# Patient Record
Sex: FEMALE | Race: WHITE | NOT HISPANIC OR LATINO | Employment: OTHER | URBAN - METROPOLITAN AREA
[De-identification: names, ages, dates, MRNs, and addresses within clinical notes are randomized per-mention and may not be internally consistent; named-entity substitution may affect disease eponyms.]

---

## 2017-05-17 ENCOUNTER — OFFICE VISIT (OUTPATIENT)
Dept: FAMILY MEDICINE CLINIC | Facility: CLINIC | Age: 82
End: 2017-05-17

## 2017-05-17 ENCOUNTER — APPOINTMENT (OUTPATIENT)
Dept: LAB | Facility: HOSPITAL | Age: 82
End: 2017-05-17

## 2017-05-17 VITALS
DIASTOLIC BLOOD PRESSURE: 62 MMHG | HEART RATE: 67 BPM | WEIGHT: 176 LBS | HEIGHT: 64 IN | BODY MASS INDEX: 30.05 KG/M2 | OXYGEN SATURATION: 96 % | TEMPERATURE: 98 F | SYSTOLIC BLOOD PRESSURE: 132 MMHG

## 2017-05-17 DIAGNOSIS — R53.83 OTHER FATIGUE: ICD-10-CM

## 2017-05-17 DIAGNOSIS — I10 ESSENTIAL HYPERTENSION: ICD-10-CM

## 2017-05-17 DIAGNOSIS — E55.9 VITAMIN D DEFICIENCY: ICD-10-CM

## 2017-05-17 DIAGNOSIS — R26.89 BALANCE PROBLEMS: ICD-10-CM

## 2017-05-17 DIAGNOSIS — L85.3 DRY SKIN DERMATITIS: ICD-10-CM

## 2017-05-17 DIAGNOSIS — N18.30 CHRONIC KIDNEY DISEASE, STAGE III (MODERATE) (HCC): Primary | ICD-10-CM

## 2017-05-17 LAB
25(OH)D3 SERPL-MCNC: 43.3 NG/ML
ALBUMIN SERPL-MCNC: 4.1 G/DL (ref 3.2–4.8)
ALBUMIN/GLOB SERPL: 1.3 G/DL (ref 1.5–2.5)
ALP SERPL-CCNC: 88 U/L (ref 25–100)
ALT SERPL W P-5'-P-CCNC: 16 U/L (ref 7–40)
ANION GAP SERPL CALCULATED.3IONS-SCNC: 4 MMOL/L (ref 3–11)
AST SERPL-CCNC: 20 U/L (ref 0–33)
BILIRUB SERPL-MCNC: 0.5 MG/DL (ref 0.3–1.2)
BUN BLD-MCNC: 32 MG/DL (ref 9–23)
BUN/CREAT SERPL: 24.6 (ref 7–25)
CALCIUM SPEC-SCNC: 10.3 MG/DL (ref 8.7–10.4)
CHLORIDE SERPL-SCNC: 106 MMOL/L (ref 99–109)
CO2 SERPL-SCNC: 30 MMOL/L (ref 20–31)
CREAT BLD-MCNC: 1.3 MG/DL (ref 0.6–1.3)
DEPRECATED RDW RBC AUTO: 49.4 FL (ref 37–54)
ERYTHROCYTE [DISTWIDTH] IN BLOOD BY AUTOMATED COUNT: 13.9 % (ref 11.3–14.5)
GFR SERPL CREATININE-BSD FRML MDRD: 38 ML/MIN/1.73
GLOBULIN UR ELPH-MCNC: 3.1 GM/DL
GLUCOSE BLD-MCNC: 111 MG/DL (ref 70–100)
HCT VFR BLD AUTO: 40.9 % (ref 34.5–44)
HGB BLD-MCNC: 12.8 G/DL (ref 11.5–15.5)
MCH RBC QN AUTO: 30.4 PG (ref 27–31)
MCHC RBC AUTO-ENTMCNC: 31.3 G/DL (ref 32–36)
MCV RBC AUTO: 97.1 FL (ref 80–99)
PLATELET # BLD AUTO: 185 10*3/MM3 (ref 150–450)
PMV BLD AUTO: 11.3 FL (ref 6–12)
POTASSIUM BLD-SCNC: 4.6 MMOL/L (ref 3.5–5.5)
PROT SERPL-MCNC: 7.2 G/DL (ref 5.7–8.2)
RBC # BLD AUTO: 4.21 10*6/MM3 (ref 3.89–5.14)
SODIUM BLD-SCNC: 140 MMOL/L (ref 132–146)
WBC NRBC COR # BLD: 6.32 10*3/MM3 (ref 3.5–10.8)

## 2017-05-17 PROCEDURE — 80053 COMPREHEN METABOLIC PANEL: CPT | Performed by: FAMILY MEDICINE

## 2017-05-17 PROCEDURE — 82306 VITAMIN D 25 HYDROXY: CPT | Performed by: FAMILY MEDICINE

## 2017-05-17 PROCEDURE — 85027 COMPLETE CBC AUTOMATED: CPT | Performed by: FAMILY MEDICINE

## 2017-05-17 PROCEDURE — 36415 COLL VENOUS BLD VENIPUNCTURE: CPT | Performed by: FAMILY MEDICINE

## 2017-05-17 PROCEDURE — 99214 OFFICE O/P EST MOD 30 MIN: CPT | Performed by: FAMILY MEDICINE

## 2017-05-17 RX ORDER — NISOLDIPINE 8.5 MG/1
8.5 TABLET, FILM COATED, EXTENDED RELEASE ORAL DAILY
Qty: 90 TABLET | Refills: 3 | Status: SHIPPED | OUTPATIENT
Start: 2017-05-17 | End: 2018-05-07 | Stop reason: SDUPTHER

## 2017-05-17 RX ORDER — TRIAMTERENE AND HYDROCHLOROTHIAZIDE 37.5; 25 MG/1; MG/1
1 CAPSULE ORAL DAILY
Qty: 90 CAPSULE | Refills: 3 | Status: SHIPPED | OUTPATIENT
Start: 2017-05-17 | End: 2017-11-30 | Stop reason: HOSPADM

## 2017-05-17 RX ORDER — DESONIDE 0.5 MG/G
1 CREAM TOPICAL 2 TIMES DAILY
COMMUNITY
End: 2017-05-17 | Stop reason: SDUPTHER

## 2017-05-17 RX ORDER — DESONIDE 0.5 MG/G
1 CREAM TOPICAL 2 TIMES DAILY
Qty: 15 G | Refills: 1 | Status: SHIPPED | OUTPATIENT
Start: 2017-05-17 | End: 2017-11-30 | Stop reason: HOSPADM

## 2017-05-17 RX ORDER — BISOPROLOL FUMARATE 5 MG/1
5 TABLET, FILM COATED ORAL DAILY
Qty: 90 TABLET | Refills: 3 | Status: SHIPPED | OUTPATIENT
Start: 2017-05-17 | End: 2018-05-07 | Stop reason: SDUPTHER

## 2017-06-23 ENCOUNTER — OFFICE VISIT (OUTPATIENT)
Dept: FAMILY MEDICINE CLINIC | Facility: CLINIC | Age: 82
End: 2017-06-23

## 2017-06-23 VITALS
HEIGHT: 64 IN | TEMPERATURE: 97.7 F | SYSTOLIC BLOOD PRESSURE: 122 MMHG | HEART RATE: 64 BPM | BODY MASS INDEX: 28.68 KG/M2 | DIASTOLIC BLOOD PRESSURE: 62 MMHG | WEIGHT: 168 LBS | OXYGEN SATURATION: 98 %

## 2017-06-23 DIAGNOSIS — J01.00 ACUTE NON-RECURRENT MAXILLARY SINUSITIS: Primary | ICD-10-CM

## 2017-06-23 DIAGNOSIS — R53.83 OTHER FATIGUE: ICD-10-CM

## 2017-06-23 PROCEDURE — 99213 OFFICE O/P EST LOW 20 MIN: CPT | Performed by: FAMILY MEDICINE

## 2017-06-23 RX ORDER — PREDNISONE 10 MG/1
TABLET ORAL
Qty: 10 TABLET | Refills: 0 | Status: SHIPPED | OUTPATIENT
Start: 2017-06-23 | End: 2017-09-08

## 2017-06-23 RX ORDER — AMOXICILLIN AND CLAVULANATE POTASSIUM 875; 125 MG/1; MG/1
TABLET, FILM COATED ORAL
Refills: 0 | COMMUNITY
Start: 2017-06-09 | End: 2017-06-23

## 2017-06-23 NOTE — PROGRESS NOTES
Subjective   Rose J Kellermann is a 89 y.o. female    HPI Comments: Patient presents today complaining of persisting upper respiratory symptoms for the past 2-3 weeks.  She initially started with a sore throat, nasal congestion and sinus congestion.  She was seen at an urgent treatment Center and prescribed Augmentin with recommendation that she also take Mucinex and Claritin but she only took them for a couple of days.  She did complete all of the Augmentin.  Her major complaint is weakness and fatigue which she also has a sensation of sinus congestion and left ear pressure.  She has not had any cough, shortness of breath or chest pain.  She denies any fever, arthralgias or myalgias.    URI    Associated symptoms include congestion, coughing, ear pain, headaches and rhinorrhea. Pertinent negatives include no sore throat.   Fatigue   Associated symptoms include congestion, coughing, fatigue and headaches. Pertinent negatives include no chills, fever or sore throat.   Shortness of Breath   Associated symptoms include ear pain, headaches and rhinorrhea. Pertinent negatives include no fever or sore throat.       The following portions of the patient's history were reviewed and updated as appropriate: allergies, current medications, past social history and problem list    Review of Systems   Constitutional: Positive for fatigue. Negative for chills and fever.   HENT: Positive for congestion, ear pain, postnasal drip, rhinorrhea and sinus pressure. Negative for sore throat.    Eyes: Positive for pain.   Respiratory: Positive for cough and shortness of breath.    Neurological: Positive for headaches. Negative for dizziness.   Hematological: Negative for adenopathy.       Objective     Vitals:    06/23/17 1114   BP: 122/62   Pulse: 64   Temp: 97.7 °F (36.5 °C)   SpO2: 98%       Physical Exam   Constitutional: She appears well-developed and well-nourished.   HENT:   Head: Normocephalic and atraumatic.   Right Ear: Tympanic  membrane and ear canal normal.   Left Ear: Tympanic membrane and ear canal normal.   Nose: Mucosal edema, rhinorrhea and sinus tenderness present. Right sinus exhibits maxillary sinus tenderness and frontal sinus tenderness. Left sinus exhibits maxillary sinus tenderness and frontal sinus tenderness.   Mouth/Throat: Oropharynx is clear and moist. No oropharyngeal exudate.   Eyes: Pupils are equal, round, and reactive to light.   Cardiovascular: Normal rate and regular rhythm.    Pulmonary/Chest: Effort normal and breath sounds normal.   Nursing note and vitals reviewed.      Assessment/Plan   Problem List Items Addressed This Visit        Other    Fatigue      Other Visit Diagnoses     Acute non-recurrent maxillary sinusitis    -  Primary    Relevant Medications    predniSONE (DELTASONE) 10 MG tablet

## 2017-06-30 ENCOUNTER — TELEPHONE (OUTPATIENT)
Dept: FAMILY MEDICINE CLINIC | Facility: CLINIC | Age: 82
End: 2017-06-30

## 2017-06-30 RX ORDER — CHLORPHENIRAMINE MALEATE 4 MG/1
4 TABLET ORAL 2 TIMES DAILY
Qty: 30 TABLET | Refills: 0 | Status: SHIPPED | OUTPATIENT
Start: 2017-06-30 | End: 2017-06-30 | Stop reason: SDUPTHER

## 2017-06-30 RX ORDER — FLUTICASONE PROPIONATE 50 MCG
1 SPRAY, SUSPENSION (ML) NASAL DAILY
Qty: 1 EACH | Refills: 0 | Status: SHIPPED | OUTPATIENT
Start: 2017-06-30 | End: 2017-06-30 | Stop reason: SDUPTHER

## 2017-06-30 RX ORDER — CHLORPHENIRAMINE MALEATE 4 MG/1
4 TABLET ORAL 2 TIMES DAILY
Qty: 30 TABLET | Refills: 0 | Status: SHIPPED | OUTPATIENT
Start: 2017-06-30 | End: 2017-09-08

## 2017-06-30 RX ORDER — FLUTICASONE PROPIONATE 50 MCG
1 SPRAY, SUSPENSION (ML) NASAL DAILY
Qty: 1 EACH | Refills: 0 | Status: SHIPPED | OUTPATIENT
Start: 2017-06-30 | End: 2017-07-30

## 2017-06-30 NOTE — TELEPHONE ENCOUNTER
"----- Message from Glo ENG Eleuterio sent at 6/30/2017  1:09 PM EDT -----  Contact: PT.  PT. SAW DR. SIEGEL 1 WEEK AGO.  PT. WAS PRESCRIBED PRETIZONE FOR A FEW DAYS, COMPLETED ALL MEDS.  RIGHT EAR \"STUFFED UP\", NOT HEARING OUT OF IT CURRENTLY.  CAN \"SOMETHING\" BE CALLED INTO RX FOR THIS SYMPTOM?  PT. WANTS 'S ADVICE.  RX=[facility]  PT. CAN BE REACHED @: ABOVE HOME #.  "

## 2017-06-30 NOTE — TELEPHONE ENCOUNTER
Chlorpheniramine 4 mg, #30, 1 tablet twice a day and Flonase nasal spray 1 spray each nostril daily.

## 2017-07-13 ENCOUNTER — OFFICE VISIT (OUTPATIENT)
Dept: FAMILY MEDICINE CLINIC | Facility: CLINIC | Age: 82
End: 2017-07-13

## 2017-07-13 VITALS
SYSTOLIC BLOOD PRESSURE: 130 MMHG | TEMPERATURE: 97.7 F | HEIGHT: 64 IN | WEIGHT: 168 LBS | DIASTOLIC BLOOD PRESSURE: 72 MMHG | HEART RATE: 76 BPM | OXYGEN SATURATION: 97 % | BODY MASS INDEX: 28.68 KG/M2

## 2017-07-13 DIAGNOSIS — H65.22 LEFT CHRONIC SEROUS OTITIS MEDIA: Primary | ICD-10-CM

## 2017-07-13 PROCEDURE — 99213 OFFICE O/P EST LOW 20 MIN: CPT | Performed by: FAMILY MEDICINE

## 2017-07-13 NOTE — PROGRESS NOTES
Subjective   Rose J Kellermann is a 89 y.o. female    HPI Comments: Patient presents today with persisting fullness and decreased hearing at the left ear.  She feels like her voice echoes in her ear.  She has been treated with antibiotics, Mucinex, antihistamines, steroid nasal spray and prednisone over the last 3-4 weeks with persisting symptoms.  I recommended ENT consultation.    Ear Fullness    Associated symptoms include headaches and hearing loss. Pertinent negatives include no coughing, rhinorrhea or sore throat.       The following portions of the patient's history were reviewed and updated as appropriate: allergies, current medications, past social history and problem list    Review of Systems   Constitutional: Positive for fatigue. Negative for chills and fever.   HENT: Positive for congestion, ear pain and hearing loss. Negative for postnasal drip, rhinorrhea, sinus pressure and sore throat.    Eyes: Negative for pain.   Respiratory: Negative for cough and shortness of breath.    Neurological: Positive for headaches. Negative for dizziness.   Hematological: Negative for adenopathy.       Objective     Vitals:    07/13/17 1009   BP: 130/72   Pulse: 76   Temp: 97.7 °F (36.5 °C)   SpO2: 97%       Physical Exam   Constitutional: She appears well-developed and well-nourished.   HENT:   Head: Normocephalic and atraumatic.   Right Ear: Ear canal normal. Tympanic membrane is scarred. Tympanic membrane mobility is abnormal.   Left Ear: Ear canal normal. Tympanic membrane is scarred and erythematous. Tympanic membrane mobility is abnormal. A middle ear effusion is present.   Nose: No mucosal edema, rhinorrhea or sinus tenderness. Right sinus exhibits no maxillary sinus tenderness and no frontal sinus tenderness. Left sinus exhibits no maxillary sinus tenderness and no frontal sinus tenderness.   Mouth/Throat: Oropharynx is clear and moist. No oropharyngeal exudate.   Eyes: Pupils are equal, round, and reactive to  light.   Cardiovascular: Normal rate and regular rhythm.    Pulmonary/Chest: Effort normal and breath sounds normal.   Nursing note and vitals reviewed.      Assessment/Plan   Problem List Items Addressed This Visit     None      Visit Diagnoses     Left chronic serous otitis media    -  Primary    Relevant Orders    Ambulatory Referral to ENT (Otolaryngology)

## 2017-09-08 ENCOUNTER — OFFICE VISIT (OUTPATIENT)
Dept: CARDIOLOGY | Facility: CLINIC | Age: 82
End: 2017-09-08

## 2017-09-08 VITALS
DIASTOLIC BLOOD PRESSURE: 85 MMHG | SYSTOLIC BLOOD PRESSURE: 155 MMHG | WEIGHT: 170.4 LBS | HEIGHT: 64 IN | BODY MASS INDEX: 29.09 KG/M2 | HEART RATE: 80 BPM

## 2017-09-08 DIAGNOSIS — I10 ESSENTIAL HYPERTENSION: Primary | ICD-10-CM

## 2017-09-08 DIAGNOSIS — E78.2 MIXED HYPERLIPIDEMIA: ICD-10-CM

## 2017-09-08 PROCEDURE — 99213 OFFICE O/P EST LOW 20 MIN: CPT | Performed by: INTERNAL MEDICINE

## 2017-09-08 RX ORDER — ASPIRIN 81 MG/1
81 TABLET ORAL DAILY
Status: ON HOLD | COMMUNITY
End: 2017-11-24

## 2017-09-08 NOTE — PROGRESS NOTES
[facility]  Office Progress Note  Rose J Kellermann  1926  [phone]      Visit Date: 2017    PCP: Sam Hung MD  [address] 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 Fatigue, dyspnea and frequent premature atrial  contractions:  a. 2010, echocardiogram, mild concentric  LVH, mild MR, TR, normal LVEF, mildly elevated RVSP.    b. 2010, Holter with greater 18,000 PACs, improved with Zebeta.    2.  Hypertension, presumed essential.    3. Remote syncope with working diagnosis of vasodepressor, .   4. Remote knee surgery in .    5. Remote pneumonia, .    6. Osteoarthritis with questionable carpal tunnel syndrome bilaterally.    7. ,  children ages 56 and 58.    8. Endometrial cancer, status post robotically assisted hysterectomy with Dr. Haddad, 2011.    Allergies  Allergies   Allergen Reactions   • Sulfa Antibiotics Nausea Only and GI Intolerance       Current Medications    Current Outpatient Prescriptions:   •  aspirin 81 MG EC tablet, Take 81 mg by mouth Daily., Disp: , Rfl:   •  bisoprolol (ZEBeta) 5 MG tablet, Take 1 tablet by mouth Daily., Disp: 90 tablet, Rfl: 3  •  desonide (DESOWEN) 0.05 % cream, Apply 1 application topically 2 (Two) Times a Day., Disp: 15 g, Rfl: 1  •  Misc Natural Products (OSTEO BI-FLEX JOINT SHIELD PO), Take  by mouth., Disp: , Rfl:   •  Multiple Vitamin (MULTI VITAMIN DAILY PO), Take  by mouth., Disp: , Rfl:   •  nisoldipine (SULAR) 8.5 MG 24 hr tablet, Take 1 tablet by mouth Daily., Disp: 90 tablet, Rfl: 3  •  triamterene-hydrochlorothiazide (DYAZIDE) 37.5-25 MG per capsule, Take 1 capsule by mouth Daily., Disp: 90 capsule, Rfl: 3      History of Present Illness     Pt denies any chest  "pain, dyspnea, dyspnea on exertion, orthopnea, PND, palpitations, lower extremity edema, or claudication.  Balance issues and some mild lower extremity edema that improves with lying recumbent overnight.  She does little activities and is able to shop and with utilization of a cart    ROS:  All systems have been reviewed and are negative with the exception of those mentioned in the HPI.    OBJECTIVE:  Vitals:    09/08/17 1023 09/08/17 1024   BP: 180/99 155/85   BP Location: Right arm Left arm   Patient Position: Sitting Sitting   Pulse: 80    Weight: 170 lb 6.4 oz (77.3 kg)    Height: 64\" (162.6 cm)      Physical Exam   Constitutional: She appears well-developed and well-nourished.   Neck: Normal range of motion. Neck supple. No hepatojugular reflux and no JVD present. Carotid bruit is not present. No tracheal deviation present. No thyromegaly present.   Cardiovascular: Normal rate, regular rhythm, S1 normal, S2 normal, intact distal pulses and normal pulses.  PMI is not displaced.  Exam reveals no gallop, no distant heart sounds, no friction rub, no midsystolic click and no opening snap.    Murmur heard.   Harsh midsystolic murmur is present with a grade of 2/6  at the upper right sternal border radiating to the neck  Pulses:       Radial pulses are 2+ on the right side, and 2+ on the left side.        Dorsalis pedis pulses are 2+ on the right side, and 2+ on the left side.        Posterior tibial pulses are 2+ on the right side, and 2+ on the left side.   Pulmonary/Chest: Effort normal and breath sounds normal. She has no wheezes. She has no rales.   Abdominal: Soft. Bowel sounds are normal. She exhibits no mass. There is no tenderness. There is no guarding.   Musculoskeletal: She exhibits edema.       Diagnostic Data:  Procedures      ASSESSMENT:   Diagnosis Plan   1. Essential hypertension     2. Mixed hyperlipidemia         PLAN:  Hypertension presumed essential status has elevation today she will follow this " at home.    Dyslipidemia observed on no medical management at current    PACs with no symptoms of palpitations     Sam Hung MD, thank you for referring Ms. Kellermann for evaluation.  I have forwarded my electronically generated recommendations to you for review.  Please do not hesitate to call with any questions.      Andrew Xiao MD, FACC

## 2017-11-24 ENCOUNTER — ANESTHESIA EVENT (OUTPATIENT)
Dept: PERIOP | Facility: HOSPITAL | Age: 82
End: 2017-11-24

## 2017-11-24 ENCOUNTER — HOSPITAL ENCOUNTER (INPATIENT)
Facility: HOSPITAL | Age: 82
LOS: 6 days | Discharge: NURSING, REHAB OR LONG-TERM CARE | End: 2017-11-30
Attending: EMERGENCY MEDICINE | Admitting: INTERNAL MEDICINE

## 2017-11-24 ENCOUNTER — APPOINTMENT (OUTPATIENT)
Dept: GENERAL RADIOLOGY | Facility: HOSPITAL | Age: 82
End: 2017-11-24

## 2017-11-24 ENCOUNTER — ANESTHESIA (OUTPATIENT)
Dept: PERIOP | Facility: HOSPITAL | Age: 82
End: 2017-11-24

## 2017-11-24 DIAGNOSIS — Z74.09 IMPAIRED FUNCTIONAL MOBILITY, BALANCE, GAIT, AND ENDURANCE: ICD-10-CM

## 2017-11-24 DIAGNOSIS — S72.002A CLOSED FRACTURE OF LEFT HIP, INITIAL ENCOUNTER (HCC): Primary | ICD-10-CM

## 2017-11-24 DIAGNOSIS — Z78.9 IMPAIRED MOBILITY AND ADLS: ICD-10-CM

## 2017-11-24 DIAGNOSIS — Z74.09 IMPAIRED MOBILITY AND ADLS: ICD-10-CM

## 2017-11-24 LAB
ABO GROUP BLD: NORMAL
ALBUMIN SERPL-MCNC: 3.6 G/DL (ref 3.2–4.8)
ALBUMIN/GLOB SERPL: 1.2 G/DL (ref 1.5–2.5)
ALP SERPL-CCNC: 68 U/L (ref 25–100)
ALT SERPL W P-5'-P-CCNC: 15 U/L (ref 7–40)
ANION GAP SERPL CALCULATED.3IONS-SCNC: 9 MMOL/L (ref 3–11)
APTT PPP: 25.4 SECONDS (ref 24–31)
AST SERPL-CCNC: 26 U/L (ref 0–33)
BACTERIA UR QL AUTO: ABNORMAL /HPF
BASOPHILS # BLD AUTO: 0.02 10*3/MM3 (ref 0–0.2)
BASOPHILS NFR BLD AUTO: 0.2 % (ref 0–1)
BILIRUB SERPL-MCNC: 0.9 MG/DL (ref 0.3–1.2)
BILIRUB UR QL STRIP: NEGATIVE
BLD GP AB SCN SERPL QL: NEGATIVE
BNP SERPL-MCNC: 631 PG/ML (ref 0–100)
BUN BLD-MCNC: 39 MG/DL (ref 9–23)
BUN/CREAT SERPL: 21.7 (ref 7–25)
CALCIUM SPEC-SCNC: 9.4 MG/DL (ref 8.7–10.4)
CHLORIDE SERPL-SCNC: 99 MMOL/L (ref 99–109)
CLARITY UR: CLEAR
CO2 SERPL-SCNC: 25 MMOL/L (ref 20–31)
COLOR UR: YELLOW
CREAT BLD-MCNC: 1.8 MG/DL (ref 0.6–1.3)
DEPRECATED RDW RBC AUTO: 45.8 FL (ref 37–54)
EOSINOPHIL # BLD AUTO: 0.14 10*3/MM3 (ref 0–0.3)
EOSINOPHIL NFR BLD AUTO: 1.4 % (ref 0–3)
ERYTHROCYTE [DISTWIDTH] IN BLOOD BY AUTOMATED COUNT: 13.5 % (ref 11.3–14.5)
GFR SERPL CREATININE-BSD FRML MDRD: 26 ML/MIN/1.73
GLOBULIN UR ELPH-MCNC: 3 GM/DL
GLUCOSE BLD-MCNC: 122 MG/DL (ref 70–100)
GLUCOSE UR STRIP-MCNC: NEGATIVE MG/DL
HCT VFR BLD AUTO: 38.3 % (ref 34.5–44)
HGB BLD-MCNC: 12.6 G/DL (ref 11.5–15.5)
HGB UR QL STRIP.AUTO: ABNORMAL
HYALINE CASTS UR QL AUTO: ABNORMAL /LPF
IMM GRANULOCYTES # BLD: 0.01 10*3/MM3 (ref 0–0.03)
IMM GRANULOCYTES NFR BLD: 0.1 % (ref 0–0.6)
INR PPP: 1.04
KETONES UR QL STRIP: NEGATIVE
LEUKOCYTE ESTERASE UR QL STRIP.AUTO: ABNORMAL
LYMPHOCYTES # BLD AUTO: 0.69 10*3/MM3 (ref 0.6–4.8)
LYMPHOCYTES NFR BLD AUTO: 6.7 % (ref 24–44)
MCH RBC QN AUTO: 30.4 PG (ref 27–31)
MCHC RBC AUTO-ENTMCNC: 32.9 G/DL (ref 32–36)
MCV RBC AUTO: 92.3 FL (ref 80–99)
MONOCYTES # BLD AUTO: 0.63 10*3/MM3 (ref 0–1)
MONOCYTES NFR BLD AUTO: 6.1 % (ref 0–12)
NEUTROPHILS # BLD AUTO: 8.77 10*3/MM3 (ref 1.5–8.3)
NEUTROPHILS NFR BLD AUTO: 85.5 % (ref 41–71)
NITRITE UR QL STRIP: NEGATIVE
PH UR STRIP.AUTO: 6.5 [PH] (ref 5–8)
PLATELET # BLD AUTO: 121 10*3/MM3 (ref 150–450)
PMV BLD AUTO: 11.7 FL (ref 6–12)
POTASSIUM BLD-SCNC: 4.4 MMOL/L (ref 3.5–5.5)
PROT SERPL-MCNC: 6.6 G/DL (ref 5.7–8.2)
PROT UR QL STRIP: NEGATIVE
PROTHROMBIN TIME: 11.4 SECONDS (ref 9.6–11.5)
RBC # BLD AUTO: 4.15 10*6/MM3 (ref 3.89–5.14)
RBC # UR: ABNORMAL /HPF
REF LAB TEST METHOD: ABNORMAL
RH BLD: POSITIVE
SODIUM BLD-SCNC: 133 MMOL/L (ref 132–146)
SP GR UR STRIP: 1.01 (ref 1–1.03)
SQUAMOUS #/AREA URNS HPF: ABNORMAL /HPF
UROBILINOGEN UR QL STRIP: ABNORMAL
WBC NRBC COR # BLD: 10.26 10*3/MM3 (ref 3.5–10.8)
WBC UR QL AUTO: ABNORMAL /HPF

## 2017-11-24 PROCEDURE — 25010000002 PHENYLEPHRINE PER 1 ML: Performed by: NURSE ANESTHETIST, CERTIFIED REGISTERED

## 2017-11-24 PROCEDURE — 86850 RBC ANTIBODY SCREEN: CPT | Performed by: EMERGENCY MEDICINE

## 2017-11-24 PROCEDURE — 25010000002 PROPOFOL 1000 MG/ML EMULSION: Performed by: NURSE ANESTHETIST, CERTIFIED REGISTERED

## 2017-11-24 PROCEDURE — C1713 ANCHOR/SCREW BN/BN,TIS/BN: HCPCS | Performed by: ORTHOPAEDIC SURGERY

## 2017-11-24 PROCEDURE — 99284 EMERGENCY DEPT VISIT MOD MDM: CPT

## 2017-11-24 PROCEDURE — 0QS734Z REPOSITION LEFT UPPER FEMUR WITH INTERNAL FIXATION DEVICE, PERCUTANEOUS APPROACH: ICD-10-PCS | Performed by: ORTHOPAEDIC SURGERY

## 2017-11-24 PROCEDURE — 25010000002 DEXAMETHASONE PER 1 MG: Performed by: NURSE ANESTHETIST, CERTIFIED REGISTERED

## 2017-11-24 PROCEDURE — 86900 BLOOD TYPING SEROLOGIC ABO: CPT | Performed by: EMERGENCY MEDICINE

## 2017-11-24 PROCEDURE — 99222 1ST HOSP IP/OBS MODERATE 55: CPT | Performed by: INTERNAL MEDICINE

## 2017-11-24 PROCEDURE — 93005 ELECTROCARDIOGRAM TRACING: CPT | Performed by: EMERGENCY MEDICINE

## 2017-11-24 PROCEDURE — 25010000002 ENOXAPARIN PER 10 MG: Performed by: ORTHOPAEDIC SURGERY

## 2017-11-24 PROCEDURE — 81001 URINALYSIS AUTO W/SCOPE: CPT | Performed by: EMERGENCY MEDICINE

## 2017-11-24 PROCEDURE — 85610 PROTHROMBIN TIME: CPT | Performed by: EMERGENCY MEDICINE

## 2017-11-24 PROCEDURE — 25010000002 ONDANSETRON PER 1 MG: Performed by: EMERGENCY MEDICINE

## 2017-11-24 PROCEDURE — 25010000002 ROPIVACAINE PER 1 MG: Performed by: NURSE ANESTHETIST, CERTIFIED REGISTERED

## 2017-11-24 PROCEDURE — 87186 SC STD MICRODIL/AGAR DIL: CPT | Performed by: EMERGENCY MEDICINE

## 2017-11-24 PROCEDURE — 71010 HC CHEST PA OR AP: CPT

## 2017-11-24 PROCEDURE — 85730 THROMBOPLASTIN TIME PARTIAL: CPT | Performed by: EMERGENCY MEDICINE

## 2017-11-24 PROCEDURE — 87086 URINE CULTURE/COLONY COUNT: CPT | Performed by: EMERGENCY MEDICINE

## 2017-11-24 PROCEDURE — 83880 ASSAY OF NATRIURETIC PEPTIDE: CPT | Performed by: EMERGENCY MEDICINE

## 2017-11-24 PROCEDURE — 25010000003 CEFAZOLIN IN DEXTROSE 2-4 GM/100ML-% SOLUTION: Performed by: NURSE ANESTHETIST, CERTIFIED REGISTERED

## 2017-11-24 PROCEDURE — 25010000003 CEFAZOLIN 1 GM/50ML SOLUTION: Performed by: ORTHOPAEDIC SURGERY

## 2017-11-24 PROCEDURE — 73502 X-RAY EXAM HIP UNI 2-3 VIEWS: CPT

## 2017-11-24 PROCEDURE — 85025 COMPLETE CBC W/AUTO DIFF WBC: CPT | Performed by: EMERGENCY MEDICINE

## 2017-11-24 PROCEDURE — 86901 BLOOD TYPING SEROLOGIC RH(D): CPT | Performed by: EMERGENCY MEDICINE

## 2017-11-24 PROCEDURE — 87077 CULTURE AEROBIC IDENTIFY: CPT | Performed by: EMERGENCY MEDICINE

## 2017-11-24 PROCEDURE — 80053 COMPREHEN METABOLIC PANEL: CPT | Performed by: EMERGENCY MEDICINE

## 2017-11-24 PROCEDURE — 76000 FLUOROSCOPY <1 HR PHYS/QHP: CPT

## 2017-11-24 PROCEDURE — P9612 CATHETERIZE FOR URINE SPEC: HCPCS

## 2017-11-24 DEVICE — IMPLANTABLE DEVICE: Type: IMPLANTABLE DEVICE | Site: HIP | Status: FUNCTIONAL

## 2017-11-24 RX ORDER — ONDANSETRON 2 MG/ML
4 INJECTION INTRAMUSCULAR; INTRAVENOUS ONCE
Status: COMPLETED | OUTPATIENT
Start: 2017-11-24 | End: 2017-11-24

## 2017-11-24 RX ORDER — CEFAZOLIN SODIUM 2 G/100ML
INJECTION, SOLUTION INTRAVENOUS AS NEEDED
Status: DISCONTINUED | OUTPATIENT
Start: 2017-11-24 | End: 2017-11-24 | Stop reason: SURG

## 2017-11-24 RX ORDER — SODIUM CHLORIDE, SODIUM LACTATE, POTASSIUM CHLORIDE, CALCIUM CHLORIDE 600; 310; 30; 20 MG/100ML; MG/100ML; MG/100ML; MG/100ML
9 INJECTION, SOLUTION INTRAVENOUS CONTINUOUS
Status: DISCONTINUED | OUTPATIENT
Start: 2017-11-24 | End: 2017-11-24

## 2017-11-24 RX ORDER — DEXAMETHASONE SODIUM PHOSPHATE 4 MG/ML
INJECTION, SOLUTION INTRA-ARTICULAR; INTRALESIONAL; INTRAMUSCULAR; INTRAVENOUS; SOFT TISSUE AS NEEDED
Status: DISCONTINUED | OUTPATIENT
Start: 2017-11-24 | End: 2017-11-24 | Stop reason: SURG

## 2017-11-24 RX ORDER — DOCUSATE SODIUM 100 MG/1
100 CAPSULE, LIQUID FILLED ORAL 2 TIMES DAILY
Status: DISCONTINUED | OUTPATIENT
Start: 2017-11-24 | End: 2017-11-27

## 2017-11-24 RX ORDER — MAGNESIUM HYDROXIDE 1200 MG/15ML
LIQUID ORAL AS NEEDED
Status: DISCONTINUED | OUTPATIENT
Start: 2017-11-24 | End: 2017-11-24 | Stop reason: HOSPADM

## 2017-11-24 RX ORDER — SODIUM CHLORIDE 450 MG/100ML
100 INJECTION, SOLUTION INTRAVENOUS CONTINUOUS
Status: DISCONTINUED | OUTPATIENT
Start: 2017-11-24 | End: 2017-11-25

## 2017-11-24 RX ORDER — NALOXONE HCL 0.4 MG/ML
0.4 VIAL (ML) INJECTION
Status: DISCONTINUED | OUTPATIENT
Start: 2017-11-24 | End: 2017-11-30 | Stop reason: HOSPADM

## 2017-11-24 RX ORDER — LIDOCAINE HYDROCHLORIDE 10 MG/ML
INJECTION, SOLUTION EPIDURAL; INFILTRATION; INTRACAUDAL; PERINEURAL AS NEEDED
Status: DISCONTINUED | OUTPATIENT
Start: 2017-11-24 | End: 2017-11-24 | Stop reason: SURG

## 2017-11-24 RX ORDER — PROMETHAZINE HYDROCHLORIDE 12.5 MG/1
12.5 TABLET ORAL EVERY 6 HOURS PRN
Status: DISCONTINUED | OUTPATIENT
Start: 2017-11-24 | End: 2017-11-30 | Stop reason: HOSPADM

## 2017-11-24 RX ORDER — BISOPROLOL FUMARATE 5 MG/1
5 TABLET, FILM COATED ORAL DAILY
Status: DISCONTINUED | OUTPATIENT
Start: 2017-11-24 | End: 2017-11-24

## 2017-11-24 RX ORDER — ROPIVACAINE HYDROCHLORIDE 2 MG/ML
8 INJECTION, SOLUTION EPIDURAL; INFILTRATION CONTINUOUS
Status: DISCONTINUED | OUTPATIENT
Start: 2017-11-24 | End: 2017-11-28

## 2017-11-24 RX ORDER — MORPHINE SULFATE 4 MG/ML
1 INJECTION, SOLUTION INTRAMUSCULAR; INTRAVENOUS EVERY 4 HOURS PRN
Status: DISCONTINUED | OUTPATIENT
Start: 2017-11-24 | End: 2017-11-30 | Stop reason: HOSPADM

## 2017-11-24 RX ORDER — DIPHENOXYLATE HYDROCHLORIDE AND ATROPINE SULFATE 2.5; .025 MG/1; MG/1
1 TABLET ORAL DAILY
Status: DISCONTINUED | OUTPATIENT
Start: 2017-11-25 | End: 2017-11-30 | Stop reason: HOSPADM

## 2017-11-24 RX ORDER — CALCIUM CARBONATE 200(500)MG
1 TABLET,CHEWABLE ORAL 2 TIMES DAILY PRN
Status: DISCONTINUED | OUTPATIENT
Start: 2017-11-24 | End: 2017-11-30 | Stop reason: HOSPADM

## 2017-11-24 RX ORDER — HYDROCODONE BITARTRATE AND ACETAMINOPHEN 7.5; 325 MG/1; MG/1
1 TABLET ORAL EVERY 4 HOURS PRN
Status: DISCONTINUED | OUTPATIENT
Start: 2017-11-24 | End: 2017-11-30 | Stop reason: HOSPADM

## 2017-11-24 RX ORDER — BISOPROLOL FUMARATE 5 MG/1
5 TABLET, FILM COATED ORAL NIGHTLY
Status: DISCONTINUED | OUTPATIENT
Start: 2017-11-24 | End: 2017-11-30 | Stop reason: HOSPADM

## 2017-11-24 RX ORDER — SODIUM CHLORIDE 0.9 % (FLUSH) 0.9 %
1-10 SYRINGE (ML) INJECTION AS NEEDED
Status: DISCONTINUED | OUTPATIENT
Start: 2017-11-24 | End: 2017-11-24 | Stop reason: HOSPADM

## 2017-11-24 RX ORDER — SODIUM CHLORIDE 9 MG/ML
9 INJECTION, SOLUTION INTRAVENOUS CONTINUOUS
Status: DISCONTINUED | OUTPATIENT
Start: 2017-11-24 | End: 2017-11-26

## 2017-11-24 RX ORDER — 0.9 % SODIUM CHLORIDE 0.9 %
10 VIAL (ML) INJECTION EVERY 12 HOURS SCHEDULED
Status: DISCONTINUED | OUTPATIENT
Start: 2017-11-24 | End: 2017-11-30 | Stop reason: HOSPADM

## 2017-11-24 RX ORDER — LIDOCAINE HYDROCHLORIDE 10 MG/ML
0.5 INJECTION, SOLUTION EPIDURAL; INFILTRATION; INTRACAUDAL; PERINEURAL ONCE AS NEEDED
Status: DISCONTINUED | OUTPATIENT
Start: 2017-11-24 | End: 2017-11-24 | Stop reason: HOSPADM

## 2017-11-24 RX ORDER — FAMOTIDINE 20 MG/1
20 TABLET, FILM COATED ORAL ONCE
Status: COMPLETED | OUTPATIENT
Start: 2017-11-24 | End: 2017-11-24

## 2017-11-24 RX ORDER — SODIUM CHLORIDE 0.9 % (FLUSH) 0.9 %
10 SYRINGE (ML) INJECTION AS NEEDED
Status: DISCONTINUED | OUTPATIENT
Start: 2017-11-24 | End: 2017-11-24

## 2017-11-24 RX ORDER — FAMOTIDINE 10 MG/ML
20 INJECTION, SOLUTION INTRAVENOUS ONCE
Status: DISCONTINUED | OUTPATIENT
Start: 2017-11-24 | End: 2017-11-24 | Stop reason: HOSPADM

## 2017-11-24 RX ORDER — SODIUM CHLORIDE 0.9 % (FLUSH) 0.9 %
1-10 SYRINGE (ML) INJECTION AS NEEDED
Status: DISCONTINUED | OUTPATIENT
Start: 2017-11-24 | End: 2017-11-30 | Stop reason: HOSPADM

## 2017-11-24 RX ORDER — FAMOTIDINE 20 MG/1
20 TABLET, FILM COATED ORAL ONCE
Status: DISCONTINUED | OUTPATIENT
Start: 2017-11-24 | End: 2017-11-24 | Stop reason: HOSPADM

## 2017-11-24 RX ORDER — FAMOTIDINE 10 MG/ML
20 INJECTION, SOLUTION INTRAVENOUS ONCE
Status: DISCONTINUED | OUTPATIENT
Start: 2017-11-24 | End: 2017-11-24

## 2017-11-24 RX ORDER — NISOLDIPINE 8.5 MG/1
8.5 TABLET, FILM COATED, EXTENDED RELEASE ORAL DAILY
Status: DISCONTINUED | OUTPATIENT
Start: 2017-11-25 | End: 2017-11-30 | Stop reason: HOSPADM

## 2017-11-24 RX ORDER — ROPIVACAINE HYDROCHLORIDE 5 MG/ML
INJECTION, SOLUTION EPIDURAL; INFILTRATION; PERINEURAL AS NEEDED
Status: DISCONTINUED | OUTPATIENT
Start: 2017-11-24 | End: 2017-11-24 | Stop reason: SURG

## 2017-11-24 RX ORDER — LORAZEPAM 1 MG/1
1 TABLET ORAL EVERY 6 HOURS PRN
Status: DISCONTINUED | OUTPATIENT
Start: 2017-11-24 | End: 2017-11-30 | Stop reason: HOSPADM

## 2017-11-24 RX ADMIN — EPHEDRINE SULFATE 10 MG: 50 INJECTION INTRAMUSCULAR; INTRAVENOUS; SUBCUTANEOUS at 15:50

## 2017-11-24 RX ADMIN — ENOXAPARIN SODIUM 30 MG: 30 INJECTION SUBCUTANEOUS at 19:51

## 2017-11-24 RX ADMIN — PHENYLEPHRINE HYDROCHLORIDE 50 MCG: 10 INJECTION INTRAVENOUS at 15:16

## 2017-11-24 RX ADMIN — SODIUM CHLORIDE, PRESERVATIVE FREE 10 ML: 5 INJECTION INTRAVENOUS at 19:51

## 2017-11-24 RX ADMIN — SODIUM CHLORIDE 100 ML/HR: 4.5 INJECTION, SOLUTION INTRAVENOUS at 18:22

## 2017-11-24 RX ADMIN — PHENYLEPHRINE HYDROCHLORIDE 20 MCG: 10 INJECTION INTRAVENOUS at 15:22

## 2017-11-24 RX ADMIN — FAMOTIDINE 20 MG: 20 TABLET, FILM COATED ORAL at 14:09

## 2017-11-24 RX ADMIN — PROPOFOL 100 MCG/KG/MIN: 10 INJECTION, EMULSION INTRAVENOUS at 15:16

## 2017-11-24 RX ADMIN — LIDOCAINE HYDROCHLORIDE 50 MG: 10 INJECTION, SOLUTION EPIDURAL; INFILTRATION; INTRACAUDAL; PERINEURAL at 15:16

## 2017-11-24 RX ADMIN — DEXAMETHASONE SODIUM PHOSPHATE 8 MG: 4 INJECTION, SOLUTION INTRAMUSCULAR; INTRAVENOUS at 15:38

## 2017-11-24 RX ADMIN — ONDANSETRON 4 MG: 2 INJECTION INTRAMUSCULAR; INTRAVENOUS at 15:38

## 2017-11-24 RX ADMIN — PHENYLEPHRINE HYDROCHLORIDE 50 MCG: 10 INJECTION INTRAVENOUS at 15:18

## 2017-11-24 RX ADMIN — SODIUM CHLORIDE, POTASSIUM CHLORIDE, SODIUM LACTATE AND CALCIUM CHLORIDE: 600; 310; 30; 20 INJECTION, SOLUTION INTRAVENOUS at 15:13

## 2017-11-24 RX ADMIN — ROPIVACAINE HYDROCHLORIDE 25 ML: 5 INJECTION, SOLUTION EPIDURAL; INFILTRATION; PERINEURAL at 13:15

## 2017-11-24 RX ADMIN — SODIUM CHLORIDE 9 ML/HR: 9 INJECTION, SOLUTION INTRAVENOUS at 14:08

## 2017-11-24 RX ADMIN — PHENYLEPHRINE HYDROCHLORIDE 50 MCG: 10 INJECTION INTRAVENOUS at 15:17

## 2017-11-24 RX ADMIN — CEFAZOLIN SODIUM 1 G: 1 INJECTION, SOLUTION INTRAVENOUS at 19:51

## 2017-11-24 RX ADMIN — CEFAZOLIN SODIUM 2 G: 2 INJECTION, SOLUTION INTRAVENOUS at 15:14

## 2017-11-24 RX ADMIN — PHENYLEPHRINE HYDROCHLORIDE 20 MCG: 10 INJECTION INTRAVENOUS at 15:21

## 2017-11-24 RX ADMIN — EPHEDRINE SULFATE 10 MG: 50 INJECTION INTRAMUSCULAR; INTRAVENOUS; SUBCUTANEOUS at 15:22

## 2017-11-24 RX ADMIN — PHENYLEPHRINE HYDROCHLORIDE 1 MCG/KG/MIN: 10 INJECTION INTRAVENOUS at 16:08

## 2017-11-24 NOTE — ANESTHESIA PROCEDURE NOTES
FASCIA iLIACUS Block    Patient location during procedure: pre-op  Start time: 11/24/2017 1:15 PM  Reason for block: procedure for pain and at surgeon's request  Performed by  CRNA: RAYMOND FREED  Assisted by: JENNIFER CONNOR  Preanesthetic Checklist  Completed: patient identified, site marked, surgical consent, pre-op evaluation, timeout performed, IV checked, risks and benefits discussed and monitors and equipment checked  Prep:  Pt Position: supine  Sterile barriers:cap, gloves and mask  Prep: ChloraPrep  Patient monitoring: blood pressure monitoring, continuous pulse oximetry and EKG  Procedure  Sedation:no  Performed under: local infiltration  Guidance:ultrasound guided  Images:still images obtained    Laterality:left  Block Type:fascia iliaca catheter  Injection Technique:catheter  Needle Type:echogenic  Needle Gauge:18 G  Resistance on Injection: none  Catheter Size:20 G (20g)  Cath Depth at skin: 15 cm  Medications  Preservative Free Saline:25ml  Local Injected:ropivacaine 0.5% Local Amount Injected:25mL  Post Assessment  Injection Assessment: negative aspiration for heme, no paresthesia on injection and incremental injection  Patient Tolerance:comfortable throughout block  Complications:no  Additional Notes  Procedure:                 Pt placed in supine position.   The insertion site was prepped in sterile fashion with Chlorapreop and clear plastic drapes.  Analgesia was provided by skin infiltration at insertion site with Lidocaine 1% 3mls.  A B-Mathews 18 g , 4 inch echogenic Touhy needle was advance In-plane under ultrasound guidance. The   Anterior superior Iliac crest was initially visualized and the probe was directed slightly medially and slightly towards the umbilicus.  The course of the needle was tracked over the sartorius muscle through the fascia Iliacus and into the anterior portion of the Iliacus muscle.  Major vessels where identified and avoided as where structures of the peritoneal cavity.   LA injection was made incrementally in 1-5ml amounts spread was visualized superiorly below fascia iliacus.  Injection was completed with negative aspiration of blood and negative intravascular injection.  Injection pressures where normal or minimal resistance.  A 20 g B-Mathews wire styleted catheter was then advance thru the needle and very easily placed in a superior or cephalad direction.  The catheter was secured at insertion site with skin afix , mastisol, steristreps.  A CHG tegaderm dressing was placed over the insertion site and the nerve catheter labeled and capped.  Thank You.

## 2017-11-24 NOTE — ANESTHESIA POSTPROCEDURE EVALUATION
Patient: Rose J Kellermann    Procedure Summary     Date Anesthesia Start Anesthesia Stop Room / Location    11/24/17 1514  [facility]       Procedure Diagnosis Surgeon Provider    HIP PERCUTANEOUS PINNING (Left Hip) No diagnosis on file. MD Baron Castillo MD          Anesthesia Type: general  Last vitals  BP   97/68   Temp   98.0   Pulse   68   Resp   14    SpO2   96%     Post Anesthesia Care and Evaluation    Patient location during evaluation: PACU  Post-procedure mental status: asleep.  Pain management: adequate  Airway patency: patent  Anesthetic complications: No anesthetic complications  PONV Status: none  Cardiovascular status: hemodynamically stable and acceptable  Respiratory status: nonlabored ventilation, acceptable and nasal cannula  Hydration status: acceptable

## 2017-11-24 NOTE — ANESTHESIA PROCEDURE NOTES
Airway  Urgency: elective    Airway not difficult    General Information and Staff    Patient location during procedure: OR  CRNA: WENDY CULP    Indications and Patient Condition  Indications for airway management: airway protection    Preoxygenated: yes  Mask difficulty assessment: 1 - vent by mask    Final Airway Details  Final airway type: supraglottic airway      Successful airway: Supreme  Size 3    Number of attempts at approach: 1    Additional Comments  LMA placed without difficulty, ventilation with assist, equal breath sounds and symmetric chest rise and fall

## 2017-11-24 NOTE — ANESTHESIA PREPROCEDURE EVALUATION
Anesthesia Evaluation     Patient summary reviewed and Nursing notes reviewed   NPO Solid Status: > 6 hours  NPO Liquid Status: > 6 hours     Airway   Mallampati: I  TM distance: >3 FB  Neck ROM: full  no difficulty expected  Dental      Pulmonary     breath sounds clear to auscultation  (+) pneumonia ,   Cardiovascular     ECG reviewed  Rhythm: regular  Rate: normal    (+) hypertension, PVD,       Neuro/Psych  GI/Hepatic/Renal/Endo      Musculoskeletal     Abdominal    Substance History      OB/GYN          Other   (+) arthritis     ROS/Med Hx Other: Indwelling fascia iliacus catheter; toast and juice at 0900      Phys Exam Other: Torus pallatinus                              Anesthesia Plan    ASA 2     general     intravenous induction   Anesthetic plan and risks discussed with patient and child.    Plan discussed with CRNA.

## 2017-11-25 PROCEDURE — 97530 THERAPEUTIC ACTIVITIES: CPT

## 2017-11-25 PROCEDURE — 25010000003 CEFAZOLIN 1 GM/50ML SOLUTION: Performed by: ORTHOPAEDIC SURGERY

## 2017-11-25 PROCEDURE — 25010000002 ENOXAPARIN PER 10 MG: Performed by: ORTHOPAEDIC SURGERY

## 2017-11-25 PROCEDURE — 97162 PT EVAL MOD COMPLEX 30 MIN: CPT

## 2017-11-25 PROCEDURE — 25010000002 ROPIVACAINE PER 1 MG: Performed by: NURSE ANESTHETIST, CERTIFIED REGISTERED

## 2017-11-25 PROCEDURE — 97166 OT EVAL MOD COMPLEX 45 MIN: CPT

## 2017-11-25 PROCEDURE — 99232 SBSQ HOSP IP/OBS MODERATE 35: CPT | Performed by: INTERNAL MEDICINE

## 2017-11-25 RX ADMIN — ENOXAPARIN SODIUM 30 MG: 30 INJECTION SUBCUTANEOUS at 20:19

## 2017-11-25 RX ADMIN — CEFAZOLIN SODIUM 1 G: 1 INJECTION, SOLUTION INTRAVENOUS at 08:44

## 2017-11-25 RX ADMIN — NISOLDIPINE 8.5 MG: 8.5 TABLET, FILM COATED, EXTENDED RELEASE ORAL at 08:44

## 2017-11-25 RX ADMIN — SODIUM CHLORIDE, PRESERVATIVE FREE 10 ML: 5 INJECTION INTRAVENOUS at 20:19

## 2017-11-25 RX ADMIN — BISOPROLOL FUMARATE 5 MG: 5 TABLET ORAL at 20:18

## 2017-11-25 RX ADMIN — ROPIVACAINE HYDROCHLORIDE 8 ML/HR: 2 INJECTION, SOLUTION EPIDURAL; INFILTRATION at 15:36

## 2017-11-25 RX ADMIN — ROPIVACAINE HYDROCHLORIDE 8 ML/HR: 2 INJECTION, SOLUTION EPIDURAL; INFILTRATION at 03:16

## 2017-11-25 RX ADMIN — DOCUSATE SODIUM 100 MG: 100 CAPSULE, LIQUID FILLED ORAL at 17:29

## 2017-11-25 RX ADMIN — SODIUM CHLORIDE, PRESERVATIVE FREE 10 ML: 5 INJECTION INTRAVENOUS at 08:45

## 2017-11-25 RX ADMIN — SODIUM CHLORIDE 100 ML/HR: 4.5 INJECTION, SOLUTION INTRAVENOUS at 10:41

## 2017-11-25 RX ADMIN — DOCUSATE SODIUM 100 MG: 100 CAPSULE, LIQUID FILLED ORAL at 08:44

## 2017-11-25 RX ADMIN — Medication 1 TABLET: at 08:44

## 2017-11-26 LAB
HCT VFR BLD AUTO: 31 % (ref 34.5–44)
HGB BLD-MCNC: 10 G/DL (ref 11.5–15.5)

## 2017-11-26 PROCEDURE — 85014 HEMATOCRIT: CPT | Performed by: INTERNAL MEDICINE

## 2017-11-26 PROCEDURE — 25010000002 ROPIVACAINE PER 1 MG: Performed by: NURSE ANESTHETIST, CERTIFIED REGISTERED

## 2017-11-26 PROCEDURE — 85018 HEMOGLOBIN: CPT | Performed by: INTERNAL MEDICINE

## 2017-11-26 PROCEDURE — 99232 SBSQ HOSP IP/OBS MODERATE 35: CPT | Performed by: INTERNAL MEDICINE

## 2017-11-26 PROCEDURE — 25010000002 ENOXAPARIN PER 10 MG: Performed by: ORTHOPAEDIC SURGERY

## 2017-11-26 RX ADMIN — Medication 1 TABLET: at 09:06

## 2017-11-26 RX ADMIN — ROPIVACAINE HYDROCHLORIDE 8 ML/HR: 2 INJECTION, SOLUTION EPIDURAL; INFILTRATION at 15:02

## 2017-11-26 RX ADMIN — ROPIVACAINE HYDROCHLORIDE 8 ML/HR: 2 INJECTION, SOLUTION EPIDURAL; INFILTRATION at 03:11

## 2017-11-26 RX ADMIN — NISOLDIPINE 8.5 MG: 8.5 TABLET, FILM COATED, EXTENDED RELEASE ORAL at 09:06

## 2017-11-26 RX ADMIN — ENOXAPARIN SODIUM 30 MG: 30 INJECTION SUBCUTANEOUS at 20:16

## 2017-11-27 PROBLEM — R82.71 ASYMPTOMATIC BACTERIURIA: Status: ACTIVE | Noted: 2017-11-27

## 2017-11-27 LAB
ANION GAP SERPL CALCULATED.3IONS-SCNC: 5 MMOL/L (ref 3–11)
BACTERIA SPEC AEROBE CULT: ABNORMAL
BACTERIA SPEC AEROBE CULT: ABNORMAL
BUN BLD-MCNC: 38 MG/DL (ref 9–23)
BUN/CREAT SERPL: 29.2 (ref 7–25)
CALCIUM SPEC-SCNC: 7.9 MG/DL (ref 8.7–10.4)
CHLORIDE SERPL-SCNC: 107 MMOL/L (ref 99–109)
CO2 SERPL-SCNC: 26 MMOL/L (ref 20–31)
CREAT BLD-MCNC: 1.3 MG/DL (ref 0.6–1.3)
GFR SERPL CREATININE-BSD FRML MDRD: 38 ML/MIN/1.73
GLUCOSE BLD-MCNC: 86 MG/DL (ref 70–100)
POTASSIUM BLD-SCNC: 3.8 MMOL/L (ref 3.5–5.5)
SODIUM BLD-SCNC: 138 MMOL/L (ref 132–146)

## 2017-11-27 PROCEDURE — 80048 BASIC METABOLIC PNL TOTAL CA: CPT | Performed by: INTERNAL MEDICINE

## 2017-11-27 PROCEDURE — 25010000002 ENOXAPARIN PER 10 MG: Performed by: ORTHOPAEDIC SURGERY

## 2017-11-27 PROCEDURE — 97110 THERAPEUTIC EXERCISES: CPT

## 2017-11-27 PROCEDURE — 99232 SBSQ HOSP IP/OBS MODERATE 35: CPT | Performed by: INTERNAL MEDICINE

## 2017-11-27 PROCEDURE — 25010000002 ROPIVACAINE PER 1 MG: Performed by: NURSE ANESTHETIST, CERTIFIED REGISTERED

## 2017-11-27 PROCEDURE — 97530 THERAPEUTIC ACTIVITIES: CPT

## 2017-11-27 PROCEDURE — 97116 GAIT TRAINING THERAPY: CPT

## 2017-11-27 RX ORDER — SENNA AND DOCUSATE SODIUM 50; 8.6 MG/1; MG/1
2 TABLET, FILM COATED ORAL NIGHTLY
Status: DISCONTINUED | OUTPATIENT
Start: 2017-11-27 | End: 2017-11-30 | Stop reason: HOSPADM

## 2017-11-27 RX ADMIN — BISOPROLOL FUMARATE 5 MG: 5 TABLET ORAL at 01:43

## 2017-11-27 RX ADMIN — NISOLDIPINE 8.5 MG: 8.5 TABLET, FILM COATED, EXTENDED RELEASE ORAL at 08:25

## 2017-11-27 RX ADMIN — Medication 1 TABLET: at 08:25

## 2017-11-27 RX ADMIN — SODIUM CHLORIDE, PRESERVATIVE FREE 10 ML: 5 INJECTION INTRAVENOUS at 20:39

## 2017-11-27 RX ADMIN — ENOXAPARIN SODIUM 30 MG: 30 INJECTION SUBCUTANEOUS at 20:39

## 2017-11-27 RX ADMIN — DOCUSATE SODIUM 100 MG: 100 CAPSULE, LIQUID FILLED ORAL at 08:25

## 2017-11-27 RX ADMIN — ROPIVACAINE HYDROCHLORIDE 8 ML/HR: 2 INJECTION, SOLUTION EPIDURAL; INFILTRATION at 14:08

## 2017-11-27 RX ADMIN — SODIUM CHLORIDE, PRESERVATIVE FREE 10 ML: 5 INJECTION INTRAVENOUS at 08:25

## 2017-11-27 RX ADMIN — ROPIVACAINE HYDROCHLORIDE 8 ML/HR: 2 INJECTION, SOLUTION EPIDURAL; INFILTRATION at 02:28

## 2017-11-27 RX ADMIN — POLYETHYLENE GLYCOL 3350 17 G: 17 POWDER, FOR SOLUTION ORAL at 10:17

## 2017-11-28 PROCEDURE — 97110 THERAPEUTIC EXERCISES: CPT

## 2017-11-28 PROCEDURE — 99232 SBSQ HOSP IP/OBS MODERATE 35: CPT | Performed by: INTERNAL MEDICINE

## 2017-11-28 PROCEDURE — 25010000002 ENOXAPARIN PER 10 MG: Performed by: ORTHOPAEDIC SURGERY

## 2017-11-28 PROCEDURE — 97116 GAIT TRAINING THERAPY: CPT

## 2017-11-28 PROCEDURE — 25010000002 ROPIVACAINE PER 1 MG: Performed by: NURSE ANESTHETIST, CERTIFIED REGISTERED

## 2017-11-28 RX ORDER — ACETAMINOPHEN 325 MG/1
650 TABLET ORAL EVERY 6 HOURS PRN
Status: DISCONTINUED | OUTPATIENT
Start: 2017-11-28 | End: 2017-11-30 | Stop reason: HOSPADM

## 2017-11-28 RX ADMIN — ROPIVACAINE HYDROCHLORIDE 8 ML/HR: 2 INJECTION, SOLUTION EPIDURAL; INFILTRATION at 01:53

## 2017-11-28 RX ADMIN — BISOPROLOL FUMARATE 5 MG: 5 TABLET ORAL at 20:30

## 2017-11-28 RX ADMIN — ACETAMINOPHEN 650 MG: 325 TABLET, FILM COATED ORAL at 20:30

## 2017-11-28 RX ADMIN — SODIUM CHLORIDE, PRESERVATIVE FREE 10 ML: 5 INJECTION INTRAVENOUS at 08:13

## 2017-11-28 RX ADMIN — ENOXAPARIN SODIUM 30 MG: 30 INJECTION SUBCUTANEOUS at 20:31

## 2017-11-28 RX ADMIN — NISOLDIPINE 8.5 MG: 8.5 TABLET, FILM COATED, EXTENDED RELEASE ORAL at 08:12

## 2017-11-28 RX ADMIN — Medication 1 TABLET: at 08:12

## 2017-11-29 PROCEDURE — 99232 SBSQ HOSP IP/OBS MODERATE 35: CPT | Performed by: NURSE PRACTITIONER

## 2017-11-29 PROCEDURE — 97110 THERAPEUTIC EXERCISES: CPT

## 2017-11-29 PROCEDURE — 25010000002 ENOXAPARIN PER 10 MG: Performed by: ORTHOPAEDIC SURGERY

## 2017-11-29 PROCEDURE — 97116 GAIT TRAINING THERAPY: CPT

## 2017-11-29 PROCEDURE — 97530 THERAPEUTIC ACTIVITIES: CPT

## 2017-11-29 RX ADMIN — SODIUM CHLORIDE, PRESERVATIVE FREE 10 ML: 5 INJECTION INTRAVENOUS at 08:34

## 2017-11-29 RX ADMIN — NISOLDIPINE 8.5 MG: 8.5 TABLET, FILM COATED, EXTENDED RELEASE ORAL at 08:33

## 2017-11-29 RX ADMIN — ENOXAPARIN SODIUM 30 MG: 30 INJECTION SUBCUTANEOUS at 20:24

## 2017-11-29 RX ADMIN — Medication 2 TABLET: at 20:23

## 2017-11-29 RX ADMIN — Medication 1 TABLET: at 08:33

## 2017-11-29 RX ADMIN — BISOPROLOL FUMARATE 5 MG: 5 TABLET ORAL at 20:24

## 2017-11-30 VITALS
DIASTOLIC BLOOD PRESSURE: 74 MMHG | OXYGEN SATURATION: 93 % | HEART RATE: 59 BPM | TEMPERATURE: 97.6 F | SYSTOLIC BLOOD PRESSURE: 163 MMHG | BODY MASS INDEX: 28.32 KG/M2 | RESPIRATION RATE: 16 BRPM | HEIGHT: 65 IN | WEIGHT: 170 LBS

## 2017-11-30 PROCEDURE — 97116 GAIT TRAINING THERAPY: CPT

## 2017-11-30 PROCEDURE — 99239 HOSP IP/OBS DSCHRG MGMT >30: CPT | Performed by: NURSE PRACTITIONER

## 2017-11-30 PROCEDURE — 97110 THERAPEUTIC EXERCISES: CPT

## 2017-11-30 RX ORDER — CALCIUM CARBONATE 200(500)MG
1 TABLET,CHEWABLE ORAL 2 TIMES DAILY PRN
Start: 2017-11-30 | End: 2018-09-07 | Stop reason: SDDI

## 2017-11-30 RX ORDER — SENNA AND DOCUSATE SODIUM 50; 8.6 MG/1; MG/1
2 TABLET, FILM COATED ORAL NIGHTLY
Start: 2017-11-30 | End: 2018-07-05

## 2017-11-30 RX ORDER — ACETAMINOPHEN 325 MG/1
650 TABLET ORAL EVERY 6 HOURS PRN
Start: 2017-11-30

## 2017-11-30 RX ADMIN — NISOLDIPINE 8.5 MG: 8.5 TABLET, FILM COATED, EXTENDED RELEASE ORAL at 09:21

## 2017-11-30 RX ADMIN — Medication 1 TABLET: at 09:21

## 2017-12-11 ENCOUNTER — TELEPHONE (OUTPATIENT)
Dept: FAMILY MEDICINE CLINIC | Facility: CLINIC | Age: 82
End: 2017-12-11

## 2018-01-11 ENCOUNTER — OFFICE VISIT (OUTPATIENT)
Dept: FAMILY MEDICINE CLINIC | Facility: CLINIC | Age: 83
End: 2018-01-11

## 2018-01-11 ENCOUNTER — TELEPHONE (OUTPATIENT)
Dept: FAMILY MEDICINE CLINIC | Facility: CLINIC | Age: 83
End: 2018-01-11

## 2018-01-11 VITALS
RESPIRATION RATE: 16 BRPM | BODY MASS INDEX: 28.75 KG/M2 | HEIGHT: 64 IN | WEIGHT: 168.4 LBS | HEART RATE: 78 BPM | OXYGEN SATURATION: 96 % | DIASTOLIC BLOOD PRESSURE: 80 MMHG | SYSTOLIC BLOOD PRESSURE: 145 MMHG | TEMPERATURE: 98.2 F

## 2018-01-11 DIAGNOSIS — J40 BRONCHITIS: Primary | ICD-10-CM

## 2018-01-11 PROCEDURE — 99213 OFFICE O/P EST LOW 20 MIN: CPT | Performed by: PHYSICIAN ASSISTANT

## 2018-01-11 RX ORDER — PREDNISONE 10 MG/1
20 TABLET ORAL 2 TIMES DAILY
Qty: 14 TABLET | Refills: 0 | Status: SHIPPED | OUTPATIENT
Start: 2018-01-11 | End: 2018-01-11 | Stop reason: SDUPTHER

## 2018-01-11 RX ORDER — PREDNISONE 10 MG/1
20 TABLET ORAL 2 TIMES DAILY
Qty: 14 TABLET | Refills: 0 | Status: SHIPPED | OUTPATIENT
Start: 2018-01-11 | End: 2018-02-19

## 2018-01-11 RX ORDER — AZITHROMYCIN 250 MG/1
TABLET, FILM COATED ORAL
Qty: 6 TABLET | Refills: 1 | Status: SHIPPED | OUTPATIENT
Start: 2018-01-11 | End: 2018-02-19

## 2018-01-11 RX ORDER — AZITHROMYCIN 250 MG/1
TABLET, FILM COATED ORAL
Qty: 6 TABLET | Refills: 1 | Status: SHIPPED | OUTPATIENT
Start: 2018-01-11 | End: 2018-01-11 | Stop reason: SDUPTHER

## 2018-01-11 NOTE — TELEPHONE ENCOUNTER
----- Message from Renae Tian sent at 1/11/2018  1:53 PM EST -----  Please call patient at [phone] regarding her predniSONE (DELTASONE) 10 MG tablet. Patient said that she only received 14 tablets and is worried that won't be enough??  Thanks,  Renae..

## 2018-01-11 NOTE — PROGRESS NOTES
Subjective   Rose J Kellermann is a 89 y.o. female    History of Present Illness  Patient is a pleasant 89-year-old white female who comes in plan shortness breath no wheezing congestion cough is productive of green-yellow sputum mild sore throat blowing green-yellow drainage from nose.  Large amount sinus pressure sinus congestion mouth sore throat large amount of congestion and is warm  The following portions of the patient's history were reviewed and updated as appropriate: allergies, current medications, past social history and problem list    Review of Systems   Constitutional: Negative for chills, fatigue and fever.   HENT: Positive for congestion, ear pain, postnasal drip, rhinorrhea and sinus pressure. Negative for sore throat.    Eyes: Positive for pain.   Respiratory: Positive for cough, chest tightness and wheezing. Negative for shortness of breath.    Cardiovascular: Negative for chest pain.   Gastrointestinal: Negative for nausea.   Neurological: Positive for headaches. Negative for dizziness.   Hematological: Negative for adenopathy.       Objective     Vitals:    01/11/18 0955   BP: 145/80   Pulse: 78   Resp: 16   Temp: 98.2 °F (36.8 °C)   SpO2: 96%       Physical Exam   Constitutional: She appears well-developed and well-nourished. No distress.   HENT:   Head: Normocephalic and atraumatic.   Right Ear: Tympanic membrane and ear canal normal.   Left Ear: Tympanic membrane and ear canal normal.   Nose: Mucosal edema, rhinorrhea and sinus tenderness present. Right sinus exhibits maxillary sinus tenderness and frontal sinus tenderness. Left sinus exhibits maxillary sinus tenderness and frontal sinus tenderness.   Mouth/Throat: Oropharynx is clear and moist. No oropharyngeal exudate.   Eyes: Pupils are equal, round, and reactive to light.   Neck: Neck supple. No JVD present.   Cardiovascular: Normal rate, regular rhythm and normal heart sounds.    No murmur heard.  Pulmonary/Chest: Effort normal and  breath sounds normal. No stridor. No respiratory distress.   Musculoskeletal: She exhibits no edema.   Lymphadenopathy:     She has no cervical adenopathy.   Skin: She is not diaphoretic.   Nursing note and vitals reviewed.      Assessment/Plan     Diagnoses and all orders for this visit:    Bronchitis  -     azithromycin (ZITHROMAX) 250 MG tablet; Take 2 tablets the first day, then 1 tablet daily for 4 days.  -     predniSONE (DELTASONE) 10 MG tablet; Take 2 tablets by mouth 2 (Two) Times a Day.    Follow-up if no better.

## 2018-01-11 NOTE — TELEPHONE ENCOUNTER
Prescription has two sets of directions...BID and 2 BID. I clarified with Castro that she should only be taking one twice daily. Patient informed.

## 2018-02-09 ENCOUNTER — TELEPHONE (OUTPATIENT)
Dept: FAMILY MEDICINE CLINIC | Facility: CLINIC | Age: 83
End: 2018-02-09

## 2018-02-09 NOTE — TELEPHONE ENCOUNTER
----- Message from Renae Tian sent at 2/8/2018  2:43 PM EST -----  [facility] (Marina) physical therapist called to get a verbal order to do more PT for patient, call Marina at /[phone]..  Thanks,  Renae..

## 2018-02-14 ENCOUNTER — TELEPHONE (OUTPATIENT)
Dept: FAMILY MEDICINE CLINIC | Facility: CLINIC | Age: 83
End: 2018-02-14

## 2018-02-14 RX ORDER — FUROSEMIDE 20 MG/1
20 TABLET ORAL DAILY
Qty: 10 TABLET | Refills: 0 | Status: SHIPPED | OUTPATIENT
Start: 2018-02-14 | End: 2018-07-05

## 2018-02-14 NOTE — TELEPHONE ENCOUNTER
----- Message from Renea Tian sent at 2/13/2018 10:49 AM EST -----  Patient went to [facility] after her hip surgery, on Lovenox injections at [facility] but did not get them at home and her left legs is swelling. She did a ultrasound for DVT and it was negative/they did this early part of December. Please call Marina/physical [facility] at [phone]. Marina is wanting to know if there is any medication that Dr.Van Ziegler can put her on for her left leg swelling. Patient has appt on February 19th to see Dr.Van Ziegler..  Thanks,  Renae..

## 2018-02-19 ENCOUNTER — TELEPHONE (OUTPATIENT)
Dept: FAMILY MEDICINE CLINIC | Facility: CLINIC | Age: 83
End: 2018-02-19

## 2018-02-19 ENCOUNTER — OFFICE VISIT (OUTPATIENT)
Dept: FAMILY MEDICINE CLINIC | Facility: CLINIC | Age: 83
End: 2018-02-19

## 2018-02-19 VITALS
DIASTOLIC BLOOD PRESSURE: 72 MMHG | HEART RATE: 65 BPM | OXYGEN SATURATION: 97 % | WEIGHT: 168.8 LBS | SYSTOLIC BLOOD PRESSURE: 138 MMHG | BODY MASS INDEX: 28.82 KG/M2 | HEIGHT: 64 IN

## 2018-02-19 DIAGNOSIS — I82.4Z2 ACUTE DEEP VEIN THROMBOSIS (DVT) OF DISTAL VEIN OF LEFT LOWER EXTREMITY (HCC): ICD-10-CM

## 2018-02-19 DIAGNOSIS — M79.662 PAIN AND SWELLING OF LEFT LOWER LEG: Primary | ICD-10-CM

## 2018-02-19 DIAGNOSIS — E78.5 DYSLIPIDEMIA: ICD-10-CM

## 2018-02-19 DIAGNOSIS — Z87.81 STATUS POST CLOSED FRACTURE OF LEFT HIP: ICD-10-CM

## 2018-02-19 DIAGNOSIS — M79.89 PAIN AND SWELLING OF LEFT LOWER LEG: Primary | ICD-10-CM

## 2018-02-19 DIAGNOSIS — I10 ESSENTIAL HYPERTENSION: ICD-10-CM

## 2018-02-19 PROCEDURE — 99214 OFFICE O/P EST MOD 30 MIN: CPT | Performed by: FAMILY MEDICINE

## 2018-02-19 NOTE — TELEPHONE ENCOUNTER
Pt aware of provider comment. Wondering if she needs to continue the lasix prescription? Please advise.

## 2018-02-19 NOTE — TELEPHONE ENCOUNTER
----- Message from Dennis Lai sent at 2/19/2018 11:33 AM EST -----  Contact: PATIENT  PATIENT ASKED IF ANYTHING NEEDS TO BE RESTRICTED WHILE SHE IS TAKING THE XARELTO SAMPLES FOR 3 WEEKS. PATIENT HAS ALSO ASKED IF SHE NEEDS TO CONTINUE THE DIURETIC. A GOOD CALL BACK SABINO PINA -[phone]. THANK YOU.

## 2018-02-19 NOTE — PROGRESS NOTES
Subjective   Rose J Kellermann is a 89 y.o. female    HPI Comments: Patient presents today for follow-up after a hip fracture in November the resulted in surgical repair using hip nails.  Patient complains of pain in the right lower side of her back and is also complaining of redness and swelling in her left lower extremity.  She has not been as mobile since her hip fracture.  She had relatively sudden onset of the redness and swelling last week and her physical therapist recommended that she be seen.      The following portions of the patient's history were reviewed and updated as appropriate: allergies, current medications, past social history and problem list    Review of Systems   Constitutional: Negative.  Negative for chills, fatigue, fever and unexpected weight change.   Respiratory: Negative.  Negative for cough, chest tightness, shortness of breath and wheezing.    Cardiovascular: Positive for leg swelling. Negative for chest pain and palpitations.   Gastrointestinal: Negative.  Negative for abdominal pain, nausea and vomiting.   Endocrine: Negative for polydipsia, polyphagia and polyuria.   Genitourinary: Negative for dysuria, frequency and urgency.   Musculoskeletal: Positive for arthralgias, back pain, gait problem and myalgias.   Skin: Positive for color change. Negative for rash.   Neurological: Negative for dizziness, tremors, syncope, weakness, numbness and headaches.   Hematological: Negative for adenopathy. Does not bruise/bleed easily.   Psychiatric/Behavioral: Negative for behavioral problems and dysphoric mood. The patient is not nervous/anxious.        Objective     Vitals:    02/19/18 0926   BP: 138/72   Pulse: 65   SpO2: 97%       Physical Exam   Constitutional: She is oriented to person, place, and time. She appears well-developed and well-nourished.   HENT:   Head: Normocephalic.   Mouth/Throat: Oropharynx is clear and moist.   Eyes: Conjunctivae are normal. Pupils are equal, round, and  reactive to light.   Neck: Neck supple. No JVD present. No thyromegaly present.   Cardiovascular: Normal rate, regular rhythm, normal heart sounds, intact distal pulses and normal pulses.    No murmur heard.  Pulmonary/Chest: Effort normal and breath sounds normal. No respiratory distress.   Abdominal: Soft. Bowel sounds are normal. There is no hepatosplenomegaly. There is no tenderness.   Musculoskeletal: She exhibits edema.        Lumbar back: She exhibits decreased range of motion, tenderness, bony tenderness and pain. She exhibits no swelling, no deformity and no spasm.   Lymphadenopathy:     She has no cervical adenopathy.   Neurological: She is alert and oriented to person, place, and time. She has normal reflexes.   Skin: Skin is warm and dry. No rash noted. There is erythema.   Erythema LLE   Psychiatric: She has a normal mood and affect. Her behavior is normal.   Nursing note and vitals reviewed.      Assessment/Plan   Problem List Items Addressed This Visit        Cardiovascular and Mediastinum    Essential hypertension       Other    Dyslipidemia      Other Visit Diagnoses     Pain and swelling of left lower leg    -  Primary    Relevant Orders    Duplex Venous Lower Extremity - Left CAR    Acute deep vein thrombosis (DVT) of distal vein of left lower extremity        Status post closed fracture of left hip                 Patient sent for venous Doppler of left lower extremity following her office visit today.  Verbal report called back that Doppler was positive for an acute DVT.  Patient started on Xarelto 15 mg twice a day.  Samples were given.  Follow-up in 3 weeks

## 2018-03-13 ENCOUNTER — OFFICE VISIT (OUTPATIENT)
Dept: FAMILY MEDICINE CLINIC | Facility: CLINIC | Age: 83
End: 2018-03-13

## 2018-03-13 VITALS
BODY MASS INDEX: 29.02 KG/M2 | HEIGHT: 64 IN | WEIGHT: 170 LBS | TEMPERATURE: 98.8 F | SYSTOLIC BLOOD PRESSURE: 124 MMHG | OXYGEN SATURATION: 95 % | HEART RATE: 54 BPM | DIASTOLIC BLOOD PRESSURE: 70 MMHG

## 2018-03-13 DIAGNOSIS — I82.4Z2 ACUTE DEEP VEIN THROMBOSIS (DVT) OF DISTAL VEIN OF LEFT LOWER EXTREMITY (HCC): Primary | ICD-10-CM

## 2018-03-13 PROCEDURE — 99213 OFFICE O/P EST LOW 20 MIN: CPT | Performed by: FAMILY MEDICINE

## 2018-05-07 ENCOUNTER — OFFICE VISIT (OUTPATIENT)
Dept: FAMILY MEDICINE CLINIC | Facility: CLINIC | Age: 83
End: 2018-05-07

## 2018-05-07 VITALS
OXYGEN SATURATION: 96 % | DIASTOLIC BLOOD PRESSURE: 70 MMHG | HEIGHT: 64 IN | TEMPERATURE: 97.8 F | BODY MASS INDEX: 28.34 KG/M2 | HEART RATE: 89 BPM | WEIGHT: 166 LBS | SYSTOLIC BLOOD PRESSURE: 126 MMHG

## 2018-05-07 DIAGNOSIS — I10 ESSENTIAL HYPERTENSION: ICD-10-CM

## 2018-05-07 DIAGNOSIS — I82.4Z2 ACUTE DEEP VEIN THROMBOSIS (DVT) OF DISTAL VEIN OF LEFT LOWER EXTREMITY (HCC): ICD-10-CM

## 2018-05-07 PROCEDURE — 99213 OFFICE O/P EST LOW 20 MIN: CPT | Performed by: FAMILY MEDICINE

## 2018-05-07 RX ORDER — BISOPROLOL FUMARATE 5 MG/1
5 TABLET, FILM COATED ORAL DAILY
Qty: 90 TABLET | Refills: 3 | Status: SHIPPED | OUTPATIENT
Start: 2018-05-07 | End: 2018-12-28 | Stop reason: SDUPTHER

## 2018-05-07 RX ORDER — TRIAMTERENE AND HYDROCHLOROTHIAZIDE 37.5; 25 MG/1; MG/1
1 CAPSULE ORAL EVERY MORNING
Qty: 90 CAPSULE | Refills: 3 | Status: SHIPPED | OUTPATIENT
Start: 2018-05-07 | End: 2018-12-28

## 2018-05-07 RX ORDER — NISOLDIPINE 8.5 MG/1
8.5 TABLET, FILM COATED, EXTENDED RELEASE ORAL DAILY
Qty: 90 TABLET | Refills: 3 | Status: SHIPPED | OUTPATIENT
Start: 2018-05-07 | End: 2019-04-22 | Stop reason: SDUPTHER

## 2018-05-07 RX ORDER — TRIAMTERENE AND HYDROCHLOROTHIAZIDE 37.5; 25 MG/1; MG/1
CAPSULE ORAL
COMMUNITY
Start: 2018-03-15 | End: 2018-05-07 | Stop reason: SDUPTHER

## 2018-05-08 NOTE — PROGRESS NOTES
Subjective   Rose J Kellermann is a 89 y.o. female    Follow-up regarding left lower extremity DVT after left hip fracture and surgical pinning.  Patient is now on Xarelto 20 mg daily and reports no adverse effects.  She is ambulating with the assistance of a cane.  She remains physically active and is driving on her own.  She is noted that the left lower extremity has more swollen than the right lower extremity but it appears to gradually be decreasing.        The following portions of the patient's history were reviewed and updated as appropriate: allergies, current medications, past social history and problem list    Review of Systems   Constitutional: Negative.  Negative for chills, fatigue, fever and unexpected weight change.   Respiratory: Negative.  Negative for cough, chest tightness, shortness of breath and wheezing.    Cardiovascular: Positive for leg swelling. Negative for chest pain and palpitations.   Gastrointestinal: Negative.  Negative for abdominal pain, nausea and vomiting.   Endocrine: Negative for polydipsia, polyphagia and polyuria.   Genitourinary: Negative for dysuria, frequency and urgency.   Musculoskeletal: Positive for arthralgias, back pain, gait problem and myalgias.   Skin: Negative for rash.   Neurological: Negative for dizziness, tremors, syncope, weakness, numbness and headaches.   Hematological: Negative for adenopathy. Does not bruise/bleed easily.   Psychiatric/Behavioral: Negative for behavioral problems and dysphoric mood. The patient is not nervous/anxious.        Objective     Vitals:    05/07/18 1119   BP: 126/70   Pulse: 89   Temp: 97.8 °F (36.6 °C)   SpO2: 96%       Physical Exam   Constitutional: She is oriented to person, place, and time. She appears well-developed and well-nourished.   HENT:   Head: Normocephalic.   Mouth/Throat: Oropharynx is clear and moist.   Eyes: Conjunctivae are normal. Pupils are equal, round, and reactive to light.   Neck: Neck supple. No JVD  present. No thyromegaly present.   Cardiovascular: Normal rate, regular rhythm, normal heart sounds, intact distal pulses and normal pulses.    No murmur heard.  Pulmonary/Chest: Effort normal and breath sounds normal. No respiratory distress.   Abdominal: Soft. Bowel sounds are normal. There is no hepatosplenomegaly. There is no tenderness.   Musculoskeletal: She exhibits edema.        Lumbar back: She exhibits decreased range of motion, tenderness, bony tenderness and pain. She exhibits no swelling, no deformity and no spasm.   Lymphadenopathy:     She has no cervical adenopathy.   Neurological: She is alert and oriented to person, place, and time. She has normal reflexes.   Skin: Skin is warm and dry. No rash noted.   Psychiatric: She has a normal mood and affect. Her behavior is normal.   Nursing note and vitals reviewed.      Assessment/Plan   Problem List Items Addressed This Visit        Cardiovascular and Mediastinum    Essential hypertension    Relevant Medications    nisoldipine (SULAR) 8.5 MG 24 hr tablet    bisoprolol (ZEBeta) 5 MG tablet    triamterene-hydrochlorothiazide (DYAZIDE) 37.5-25 MG per capsule      Other Visit Diagnoses     Acute deep vein thrombosis (DVT) of distal vein of left lower extremity        Relevant Medications    rivaroxaban (XARELTO) 20 MG tablet

## 2018-05-09 ENCOUNTER — TELEPHONE (OUTPATIENT)
Dept: FAMILY MEDICINE CLINIC | Facility: CLINIC | Age: 83
End: 2018-05-09

## 2018-05-09 DIAGNOSIS — I82.4Z2 ACUTE DEEP VEIN THROMBOSIS (DVT) OF DISTAL VEIN OF LEFT LOWER EXTREMITY (HCC): ICD-10-CM

## 2018-05-09 NOTE — TELEPHONE ENCOUNTER
----- Message from Theresa Ruelas sent at 5/9/2018  1:59 PM EDT -----  Contact: PT  PT STATES [facility] HAS NOT RECEIVED THE XARELO RX FROM 5/7. SHE ONLY HAS 7 DAYS LEFT, VERY WORRIED ABOUT RUNNING OUT. PLEASE ADVISE Pt.

## 2018-07-05 ENCOUNTER — OFFICE VISIT (OUTPATIENT)
Dept: FAMILY MEDICINE CLINIC | Facility: CLINIC | Age: 83
End: 2018-07-05

## 2018-07-05 VITALS
HEART RATE: 59 BPM | HEIGHT: 64 IN | SYSTOLIC BLOOD PRESSURE: 112 MMHG | DIASTOLIC BLOOD PRESSURE: 82 MMHG | OXYGEN SATURATION: 97 %

## 2018-07-05 DIAGNOSIS — I10 ESSENTIAL HYPERTENSION: ICD-10-CM

## 2018-07-05 DIAGNOSIS — I82.5Z2 CHRONIC DEEP VEIN THROMBOSIS (DVT) OF DISTAL VEIN OF LEFT LOWER EXTREMITY (HCC): Primary | ICD-10-CM

## 2018-07-05 DIAGNOSIS — Z87.81 STATUS POST CLOSED FRACTURE OF LEFT HIP: ICD-10-CM

## 2018-07-05 DIAGNOSIS — I83.893 VARICOSE VEINS OF BOTH LEGS WITH EDEMA: ICD-10-CM

## 2018-07-05 PROCEDURE — 99213 OFFICE O/P EST LOW 20 MIN: CPT | Performed by: FAMILY MEDICINE

## 2018-07-05 NOTE — PROGRESS NOTES
Subjective   Rose J Kellermann is a 89 y.o. female    Patient returns for follow-up regarding left lower extremity deep vein thrombosis diagnosed in February of this year.  Patient was status post left hip surgery after a fall and fracture.  She reports continued swelling in the left lower extremity more than the right lower extremity.  Her varicose veins on the left seem worse than the right and her left hip continues to hurt.  She has not had follow-up with her orthopedic surgeon and is encouraged to do so.      Hypertension   Pertinent negatives include no chest pain, headaches, palpitations or shortness of breath.       The following portions of the patient's history were reviewed and updated as appropriate: allergies, current medications, past social history and problem list    Review of Systems   Constitutional: Negative for fatigue and unexpected weight change.   Respiratory: Negative for cough, chest tightness and shortness of breath.    Cardiovascular: Positive for leg swelling. Negative for chest pain and palpitations.   Gastrointestinal: Negative for nausea.   Musculoskeletal: Positive for arthralgias, gait problem and myalgias.   Skin: Negative for color change and rash.   Neurological: Negative for dizziness, syncope, weakness and headaches.       Objective     Vitals:    07/05/18 1044   BP: 112/82   Pulse: 59   SpO2: 97%       Physical Exam   Constitutional: She appears well-developed and well-nourished.   Neck: No JVD present.   Cardiovascular: Normal rate, regular rhythm, normal heart sounds, intact distal pulses and normal pulses.    No murmur heard.  Varicose veins of both lower extremities left > right.   Pulmonary/Chest: Effort normal and breath sounds normal. No respiratory distress.   Abdominal: Soft. Bowel sounds are normal. There is no hepatosplenomegaly. There is no tenderness.   Musculoskeletal: She exhibits edema.   Skin: Skin is warm and dry.   Nursing note and vitals  reviewed.      Assessment/Plan   Problem List Items Addressed This Visit        Cardiovascular and Mediastinum    Essential hypertension      Other Visit Diagnoses     Chronic deep vein thrombosis (DVT) of distal vein of left lower extremity (CMS/HCC)    -  Primary    Relevant Orders    Ambulatory Referral to Vascular Surgery    Varicose veins of both legs with edema        Relevant Orders    Ambulatory Referral to Vascular Surgery    Status post closed fracture of left hip

## 2018-07-25 ENCOUNTER — HOSPITAL ENCOUNTER (EMERGENCY)
Facility: HOSPITAL | Age: 83
Discharge: HOME | End: 2018-07-25
Attending: EMERGENCY MEDICINE | Admitting: EMERGENCY MEDICINE

## 2018-07-25 ENCOUNTER — APPOINTMENT (OUTPATIENT)
Dept: CT IMAGING | Facility: HOSPITAL | Age: 83
End: 2018-07-25

## 2018-07-25 ENCOUNTER — TELEPHONE (OUTPATIENT)
Dept: FAMILY MEDICINE CLINIC | Facility: CLINIC | Age: 83
End: 2018-07-25

## 2018-07-25 VITALS
BODY MASS INDEX: 28.17 KG/M2 | DIASTOLIC BLOOD PRESSURE: 112 MMHG | RESPIRATION RATE: 18 BRPM | HEART RATE: 69 BPM | HEIGHT: 64 IN | OXYGEN SATURATION: 92 % | WEIGHT: 165 LBS | TEMPERATURE: 98.8 F | SYSTOLIC BLOOD PRESSURE: 174 MMHG

## 2018-07-25 DIAGNOSIS — S09.90XA CLOSED HEAD INJURY, INITIAL ENCOUNTER: Primary | ICD-10-CM

## 2018-07-25 PROCEDURE — 99283 EMERGENCY DEPT VISIT LOW MDM: CPT

## 2018-07-25 PROCEDURE — 70450 CT HEAD/BRAIN W/O DYE: CPT

## 2018-07-25 NOTE — TELEPHONE ENCOUNTER
----- Message from Glo Mcclellan sent at 7/25/2018  3:27 PM EDT -----  Contact: PT.  PT. IS WANTING TO KNOW:  PT. STEPPED OFF A CURB, & HIT HER HEAD; A BUMP ON HER HEAD, NOW.  PT. HAS AN ICE PACK ON IT.  PT. IS ON A BLOOD THINNER:  MONTSERRAT, WONDERING IF THIS IS A CONCERN?    PT. CAN BE REACHED @ [phone], CELL PHONE #.

## 2018-09-06 ENCOUNTER — OFFICE VISIT (OUTPATIENT)
Dept: FAMILY MEDICINE CLINIC | Facility: CLINIC | Age: 83
End: 2018-09-06

## 2018-09-06 VITALS
HEART RATE: 51 BPM | SYSTOLIC BLOOD PRESSURE: 120 MMHG | HEIGHT: 64 IN | DIASTOLIC BLOOD PRESSURE: 70 MMHG | OXYGEN SATURATION: 98 % | WEIGHT: 165 LBS | TEMPERATURE: 99 F | BODY MASS INDEX: 28.17 KG/M2

## 2018-09-06 DIAGNOSIS — I10 ESSENTIAL HYPERTENSION: ICD-10-CM

## 2018-09-06 DIAGNOSIS — I82.5Z2 CHRONIC DEEP VEIN THROMBOSIS (DVT) OF DISTAL VEIN OF LEFT LOWER EXTREMITY (HCC): Primary | ICD-10-CM

## 2018-09-06 DIAGNOSIS — I83.893 VARICOSE VEINS OF BOTH LEGS WITH EDEMA: ICD-10-CM

## 2018-09-06 PROCEDURE — 99213 OFFICE O/P EST LOW 20 MIN: CPT | Performed by: FAMILY MEDICINE

## 2018-09-06 NOTE — PROGRESS NOTES
Subjective   Rose J Kellermann is a 89 y.o. female    Chief Complaint    Left leg pain  Hypertension      History of Present Illness     Patient recently seen by vascular surgery.  Had venous Doppler that shows resolution of her previous DVT so her Xarelto has been stopped and she is back on 81 mg aspirin daily.  She was also prescribed support hose by the vascular surgeon.  Her blood pressure was significantly elevated in their office at 174/112 so she was told to follow-up here to make sure her blood pressure improved.  Blood pressure is much better today at 120/70.      The following portions of the patient's history were reviewed and updated as appropriate: allergies, current medications, past social history and problem list    Review of Systems   Constitutional: Negative for chills, fatigue, fever and unexpected weight change.   Respiratory: Negative for cough, chest tightness, shortness of breath and wheezing.    Cardiovascular: Negative for chest pain, palpitations and leg swelling.   Gastrointestinal: Negative for abdominal pain, nausea and vomiting.   Endocrine: Negative for polydipsia, polyphagia and polyuria.   Genitourinary: Negative for dysuria, frequency and urgency.   Musculoskeletal: Negative for arthralgias, back pain and myalgias.   Skin: Negative for color change and rash.   Neurological: Negative for dizziness, syncope, weakness and headaches.   Hematological: Negative for adenopathy. Does not bruise/bleed easily.       Objective     Vitals:    09/06/18 1342   BP: 120/70   Pulse: 51   Temp: 99 °F (37.2 °C)   SpO2: 98%       Physical Exam   Constitutional: She is oriented to person, place, and time. She appears well-developed and well-nourished.   HENT:   Head: Normocephalic.   Mouth/Throat: Oropharynx is clear and moist.   Eyes: Pupils are equal, round, and reactive to light. Conjunctivae are normal.   Neck: Neck supple. No JVD present. No thyromegaly present.   Cardiovascular: Normal rate,  regular rhythm, normal heart sounds, intact distal pulses and normal pulses.    No murmur heard.  Pulmonary/Chest: Effort normal and breath sounds normal. No respiratory distress.   Abdominal: Soft. Bowel sounds are normal. There is no hepatosplenomegaly. There is no tenderness.   Musculoskeletal: She exhibits no edema.   Lymphadenopathy:     She has no cervical adenopathy.   Neurological: She is alert and oriented to person, place, and time.   Skin: Skin is warm and dry. No rash noted.   Psychiatric: She has a normal mood and affect. Her behavior is normal.   Nursing note and vitals reviewed.      Assessment/Plan   Problem List Items Addressed This Visit        Cardiovascular and Mediastinum    Essential hypertension      Other Visit Diagnoses     Chronic deep vein thrombosis (DVT) of distal vein of left lower extremity (CMS/HCC)    -  Primary    Varicose veins of both legs with edema

## 2018-09-07 ENCOUNTER — OFFICE VISIT (OUTPATIENT)
Dept: CARDIOLOGY | Facility: CLINIC | Age: 83
End: 2018-09-07

## 2018-09-07 VITALS
HEIGHT: 63 IN | WEIGHT: 166.2 LBS | DIASTOLIC BLOOD PRESSURE: 84 MMHG | BODY MASS INDEX: 29.45 KG/M2 | HEART RATE: 62 BPM | OXYGEN SATURATION: 96 % | SYSTOLIC BLOOD PRESSURE: 180 MMHG

## 2018-09-07 DIAGNOSIS — I10 ESSENTIAL HYPERTENSION: Primary | ICD-10-CM

## 2018-09-07 DIAGNOSIS — I35.0 NONRHEUMATIC AORTIC VALVE STENOSIS: ICD-10-CM

## 2018-09-07 PROCEDURE — 99213 OFFICE O/P EST LOW 20 MIN: CPT | Performed by: INTERNAL MEDICINE

## 2018-09-07 RX ORDER — ASPIRIN 81 MG/1
81 TABLET ORAL DAILY
Status: ON HOLD | COMMUNITY
End: 2018-11-01

## 2018-09-07 NOTE — PROGRESS NOTES
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 Fatigue, dyspnea and frequent premature atrial  contractions:  a. 2010, echocardiogram, mild concentric  LVH, mild MR, TR, normal LVEF, mildly elevated RVSP.    b. 2010, Holter with greater 18,000 PACs, improved with Zebeta.    2.  Hypertension, presumed essential.    3. DVT   a. Resolution on us 2018  4. Remote syncope with working diagnosis of vasodepressor, .   5. Remote knee surgery in .    6. Remote pneumonia, .    7. Osteoarthritis with questionable carpal tunnel syndrome bilaterally.    8. ,  children ages 56 and 58.    9. Endometrial cancer, status post robotically assisted hysterectomy with Dr. Haddad, 2011.    10. L hip fx   Allergies  Allergies   Allergen Reactions   • Sulfa Antibiotics Nausea Only and GI Intolerance       Current Medications    Current Outpatient Prescriptions:   •  acetaminophen (TYLENOL) 325 MG tablet, Take 2 tablets by mouth Every 6 (Six) Hours As Needed for Mild Pain ., Disp: , Rfl:   •  aspirin 81 MG EC tablet, Take 81 mg by mouth Daily., Disp: , Rfl:   •  bisoprolol (ZEBeta) 5 MG tablet, Take 1 tablet by mouth Daily., Disp: 90 tablet, Rfl: 3  •  Misc Natural Products (OSTEO BI-FLEX JOINT SHIELD PO), Take 1 tablet by mouth Daily., Disp: , Rfl:   •  Multiple Vitamin (MULTI VITAMIN DAILY PO), Take 1 tablet by mouth Daily., Disp: , Rfl:   •  nisoldipine (SULAR) 8.5 MG 24 hr tablet, Take 1 tablet by mouth Daily., Disp: 90 tablet, Rfl: 3  •  triamterene-hydrochlorothiazide (DYAZIDE) 37.5-25 MG per capsule, Take 1 capsule by mouth Every Morning., Disp:  "90 capsule, Rfl: 3      History of Present Illness     Pt denies any chest pain, dyspnea, dyspnea on exertion, orthopnea, PND, palpitations, lower extremity edema, or claudication.  Balance issues and some mild lower extremity edema that improves with lying recumbent overnight.  She does little activities and is able to shop and with utilization of a cart    ROS:  All systems have been reviewed and are negative with the exception of those mentioned in the HPI.    OBJECTIVE:  Vitals:    09/07/18 1312   BP: 180/84   BP Location: Right arm   Patient Position: Sitting   Pulse: 62   SpO2: 96%   Weight: 75.4 kg (166 lb 3.2 oz)   Height: 160 cm (63\")     Physical Exam   Constitutional: She appears well-developed and well-nourished.   Neck: Normal range of motion. Neck supple. No hepatojugular reflux and no JVD present. Carotid bruit is not present. No tracheal deviation present. No thyromegaly present.   Cardiovascular: Normal rate, regular rhythm, S1 normal, S2 normal, intact distal pulses and normal pulses.  PMI is not displaced.  Exam reveals no gallop, no distant heart sounds, no friction rub, no midsystolic click and no opening snap.    Murmur heard.   Harsh midsystolic murmur is present with a grade of 2/6  at the upper right sternal border radiating to the neck  Pulses:       Radial pulses are 2+ on the right side, and 2+ on the left side.        Dorsalis pedis pulses are 2+ on the right side, and 2+ on the left side.        Posterior tibial pulses are 2+ on the right side, and 2+ on the left side.   Pulmonary/Chest: Effort normal and breath sounds normal. She has no wheezes. She has no rales.   Abdominal: Soft. Bowel sounds are normal. She exhibits no mass. There is no tenderness. There is no guarding.   Musculoskeletal: She exhibits edema.       Diagnostic Data:  Procedures      ASSESSMENT:   Diagnosis Plan   1. Essential hypertension     2. Nonrheumatic aortic valve stenosis         PLAN:  Hypertension presumed " essential status has elevation today she will follow this at home.    Dyslipidemia observed on no medical management at current    PACs with no symptoms of palpitations     LEE ANN- no recent echo.  Asymptomatic.  Needs home bps controlled.        Sam Hung MD, thank you for referring Ms. Kellermann for evaluation.  I have forwarded my electronically generated recommendations to you for review.  Please do not hesitate to call with any questions.    Scribed for Andrew Xaio MD by Lisa Dudley PA-C. 9/7/2018  1:29 PM   I, Andrew Xiao MD, personally performed the services described in this documentation as scribed by the above named individual in my presence, and it is both accurate and complete.  9/7/2018  1:29 PM    Andrew Xiao MD, FACC

## 2018-09-13 ENCOUNTER — TRANSCRIBE ORDERS (OUTPATIENT)
Dept: FAMILY MEDICINE CLINIC | Facility: CLINIC | Age: 83
End: 2018-09-13

## 2018-09-13 ENCOUNTER — TELEPHONE (OUTPATIENT)
Dept: FAMILY MEDICINE CLINIC | Facility: CLINIC | Age: 83
End: 2018-09-13

## 2018-09-13 DIAGNOSIS — G89.29 CHRONIC LEFT HIP PAIN: Primary | ICD-10-CM

## 2018-09-13 DIAGNOSIS — M25.552 CHRONIC LEFT HIP PAIN: Primary | ICD-10-CM

## 2018-09-14 ENCOUNTER — HOSPITAL ENCOUNTER (OUTPATIENT)
Dept: GENERAL RADIOLOGY | Facility: HOSPITAL | Age: 83
Discharge: HOME | End: 2018-09-14
Admitting: FAMILY MEDICINE

## 2018-09-14 PROCEDURE — 73502 X-RAY EXAM HIP UNI 2-3 VIEWS: CPT

## 2018-09-17 ENCOUNTER — TELEPHONE (OUTPATIENT)
Dept: FAMILY MEDICINE CLINIC | Facility: CLINIC | Age: 83
End: 2018-09-17

## 2018-09-17 NOTE — TELEPHONE ENCOUNTER
----- Message from Valarie Keyes sent at 9/17/2018  2:04 PM EDT -----  Contact: patient  SHE WANTED TO KNOW IF HER XRAYS WERE BACK FROM LAST WEEK AND IF SOMEONE CAN CALL HER WITH THE RESULTS. [phone] (CELL)

## 2018-09-20 ENCOUNTER — TRANSCRIBE ORDERS (OUTPATIENT)
Dept: FAMILY MEDICINE CLINIC | Facility: CLINIC | Age: 83
End: 2018-09-20

## 2018-09-20 ENCOUNTER — TELEPHONE (OUTPATIENT)
Dept: FAMILY MEDICINE CLINIC | Facility: CLINIC | Age: 83
End: 2018-09-20

## 2018-09-20 DIAGNOSIS — G89.29 CHRONIC LEFT HIP PAIN: Primary | ICD-10-CM

## 2018-09-20 DIAGNOSIS — M25.552 CHRONIC LEFT HIP PAIN: Primary | ICD-10-CM

## 2018-09-20 NOTE — TELEPHONE ENCOUNTER
----- Message from Renae Tian sent at 9/20/2018  9:14 AM EDT -----  Patient had an xray of her left hip and a fracture was seen of one of the compression screws within the left femoral neck of the hip. She wants to see Dr.Kirk maradiaga? I entered a referral/pending for Dr.Van Ziegler's signature..  ThanksRenae..

## 2018-09-21 ENCOUNTER — TELEPHONE (OUTPATIENT)
Dept: FAMILY MEDICINE CLINIC | Facility: CLINIC | Age: 83
End: 2018-09-21

## 2018-09-21 NOTE — TELEPHONE ENCOUNTER
Spoke with patient and she needed a referral to another surgeon about her hip.  Sent to amy to see if she could get in down stairs

## 2018-09-21 NOTE — TELEPHONE ENCOUNTER
----- Message from Glo Mcclellan sent at 9/21/2018  2:48 PM EDT -----  Contact: PTParis  PT. IS WANTING KELVIN MANNING, TO CONTACT HER BACK RE. MESSAGE FROM DR. EUGENIA MACKEY CAN BE REACHED @ [facility] #.

## 2018-09-26 ENCOUNTER — TELEPHONE (OUTPATIENT)
Dept: FAMILY MEDICINE CLINIC | Facility: CLINIC | Age: 83
End: 2018-09-26

## 2018-09-26 NOTE — TELEPHONE ENCOUNTER
----- Message from Renae Tian sent at 9/26/2018  1:25 PM EDT -----  Patient has been in so much pain regarding her hip but her knees are starting to hurt. She wants to see if Dr.Van Ziegler can get her something for her pain, called in to her pharmacy [facility] [address] [facility] [address] [facility] [address] [facility] [facility] [phone] [facility] [phone]..  ThanksRenae..

## 2018-10-01 ENCOUNTER — TELEPHONE (OUTPATIENT)
Dept: FAMILY MEDICINE CLINIC | Facility: CLINIC | Age: 83
End: 2018-10-01

## 2018-10-01 NOTE — TELEPHONE ENCOUNTER
----- Message from Theresa Ruelas sent at 10/1/2018 10:47 AM EDT -----  Contact: PTS DAUGHTER, KATHY KELLERMAN [phone]  CLIP FROM HIP SURGERY HAS COME LOOSE, IN EXTREME PAIN. SHE IS DECLINING RAPIDLY, APPT WITH DR MCADAMS IS 2 WKS AWAY AND SHE DOESN'T FEEL LIKE SHE SHOULD WAIT THAT LONG.     PLEASE CALL DAUGHTER ASAP.

## 2018-10-01 NOTE — TELEPHONE ENCOUNTER
I spoke with Antonietta and patient already has pain medication and doesn't want to go back to the ED. I recommended she call Dr. Baker's office to see if they have had any cancellations.

## 2018-10-17 ENCOUNTER — OFFICE VISIT (OUTPATIENT)
Dept: ORTHOPEDIC SURGERY | Facility: CLINIC | Age: 83
End: 2018-10-17

## 2018-10-17 ENCOUNTER — APPOINTMENT (OUTPATIENT)
Dept: PREADMISSION TESTING | Facility: HOSPITAL | Age: 83
End: 2018-10-17

## 2018-10-17 ENCOUNTER — TELEPHONE (OUTPATIENT)
Dept: CARDIOLOGY | Facility: CLINIC | Age: 83
End: 2018-10-17

## 2018-10-17 VITALS — HEIGHT: 64 IN | WEIGHT: 157 LBS | BODY MASS INDEX: 26.8 KG/M2

## 2018-10-17 VITALS
BODY MASS INDEX: 28.17 KG/M2 | TEMPERATURE: 97.8 F | OXYGEN SATURATION: 95 % | HEART RATE: 59 BPM | WEIGHT: 159 LBS | HEIGHT: 63 IN

## 2018-10-17 DIAGNOSIS — M16.52 POST-TRAUMATIC OSTEOARTHRITIS OF LEFT HIP: Primary | ICD-10-CM

## 2018-10-17 DIAGNOSIS — M16.52 POST-TRAUMATIC OSTEOARTHRITIS OF LEFT HIP: ICD-10-CM

## 2018-10-17 LAB
ANION GAP SERPL CALCULATED.3IONS-SCNC: 10 MMOL/L (ref 3–11)
APTT PPP: 26.6 SECONDS (ref 24–31)
BACTERIA UR QL AUTO: ABNORMAL /HPF
BASOPHILS # BLD AUTO: 0.01 10*3/MM3 (ref 0–0.2)
BASOPHILS NFR BLD AUTO: 0.1 % (ref 0–1)
BILIRUB UR QL STRIP: NEGATIVE
BILIRUB UR QL STRIP: NEGATIVE
BUN BLD-MCNC: 52 MG/DL (ref 9–23)
BUN/CREAT SERPL: 30.1 (ref 7–25)
CALCIUM SPEC-SCNC: 10.9 MG/DL (ref 8.7–10.4)
CHLORIDE SERPL-SCNC: 97 MMOL/L (ref 99–109)
CLARITY UR: CLEAR
CLARITY UR: CLEAR
CO2 SERPL-SCNC: 26 MMOL/L (ref 20–31)
COLOR UR: YELLOW
COLOR UR: YELLOW
CREAT BLD-MCNC: 1.73 MG/DL (ref 0.6–1.3)
CRP SERPL-MCNC: 1.78 MG/DL (ref 0–1)
DEPRECATED RDW RBC AUTO: 45 FL (ref 37–54)
EOSINOPHIL # BLD AUTO: 0.07 10*3/MM3 (ref 0–0.3)
EOSINOPHIL NFR BLD AUTO: 0.9 % (ref 0–3)
ERYTHROCYTE [DISTWIDTH] IN BLOOD BY AUTOMATED COUNT: 13.4 % (ref 11.3–14.5)
ERYTHROCYTE [SEDIMENTATION RATE] IN BLOOD: 23 MM/HR (ref 0–30)
GFR SERPL CREATININE-BSD FRML MDRD: 28 ML/MIN/1.73
GLUCOSE BLD-MCNC: 116 MG/DL (ref 70–100)
GLUCOSE UR STRIP-MCNC: NEGATIVE MG/DL
GLUCOSE UR STRIP-MCNC: NEGATIVE MG/DL
HBA1C MFR BLD: 6.1 % (ref 4.8–5.6)
HCT VFR BLD AUTO: 39.4 % (ref 34.5–44)
HGB BLD-MCNC: 12.9 G/DL (ref 11.5–15.5)
HGB UR QL STRIP.AUTO: NEGATIVE
HGB UR QL STRIP.AUTO: NEGATIVE
HYALINE CASTS UR QL AUTO: ABNORMAL /LPF
IMM GRANULOCYTES # BLD: 0.02 10*3/MM3 (ref 0–0.03)
IMM GRANULOCYTES NFR BLD: 0.3 % (ref 0–0.6)
INR PPP: 1.06 (ref 0.91–1.09)
KETONES UR QL STRIP: ABNORMAL
KETONES UR QL STRIP: ABNORMAL
LEUKOCYTE ESTERASE UR QL STRIP.AUTO: ABNORMAL
LEUKOCYTE ESTERASE UR QL STRIP.AUTO: NEGATIVE
LYMPHOCYTES # BLD AUTO: 1.18 10*3/MM3 (ref 0.6–4.8)
LYMPHOCYTES NFR BLD AUTO: 15.2 % (ref 24–44)
MCH RBC QN AUTO: 30.3 PG (ref 27–31)
MCHC RBC AUTO-ENTMCNC: 32.7 G/DL (ref 32–36)
MCV RBC AUTO: 92.5 FL (ref 80–99)
MONOCYTES # BLD AUTO: 0.84 10*3/MM3 (ref 0–1)
MONOCYTES NFR BLD AUTO: 10.8 % (ref 0–12)
NEUTROPHILS # BLD AUTO: 5.66 10*3/MM3 (ref 1.5–8.3)
NEUTROPHILS NFR BLD AUTO: 73 % (ref 41–71)
NITRITE UR QL STRIP: NEGATIVE
NITRITE UR QL STRIP: NEGATIVE
PH UR STRIP.AUTO: 6 [PH] (ref 5–8)
PH UR STRIP.AUTO: 6 [PH] (ref 5–8)
PLATELET # BLD AUTO: 169 10*3/MM3 (ref 150–450)
PMV BLD AUTO: 11.3 FL (ref 6–12)
POTASSIUM BLD-SCNC: 5.1 MMOL/L (ref 3.5–5.5)
PROT UR QL STRIP: NEGATIVE
PROT UR QL STRIP: NEGATIVE
PROTHROMBIN TIME: 11.1 SECONDS (ref 9.6–11.5)
RBC # BLD AUTO: 4.26 10*6/MM3 (ref 3.89–5.14)
RBC # UR: ABNORMAL /HPF
REF LAB TEST METHOD: ABNORMAL
SODIUM BLD-SCNC: 133 MMOL/L (ref 132–146)
SP GR UR STRIP: 1.02 (ref 1–1.03)
SP GR UR STRIP: 1.02 (ref 1–1.03)
SQUAMOUS #/AREA URNS HPF: ABNORMAL /HPF
UROBILINOGEN UR QL STRIP: ABNORMAL
UROBILINOGEN UR QL STRIP: ABNORMAL
WBC NRBC COR # BLD: 7.76 10*3/MM3 (ref 3.5–10.8)
WBC UR QL AUTO: ABNORMAL /HPF

## 2018-10-17 PROCEDURE — 99203 OFFICE O/P NEW LOW 30 MIN: CPT | Performed by: ORTHOPAEDIC SURGERY

## 2018-10-17 PROCEDURE — 85025 COMPLETE CBC W/AUTO DIFF WBC: CPT | Performed by: ORTHOPAEDIC SURGERY

## 2018-10-17 PROCEDURE — 85730 THROMBOPLASTIN TIME PARTIAL: CPT | Performed by: ORTHOPAEDIC SURGERY

## 2018-10-17 PROCEDURE — 83036 HEMOGLOBIN GLYCOSYLATED A1C: CPT | Performed by: ORTHOPAEDIC SURGERY

## 2018-10-17 PROCEDURE — 80048 BASIC METABOLIC PNL TOTAL CA: CPT | Performed by: ORTHOPAEDIC SURGERY

## 2018-10-17 PROCEDURE — 81001 URINALYSIS AUTO W/SCOPE: CPT | Performed by: ORTHOPAEDIC SURGERY

## 2018-10-17 PROCEDURE — 86140 C-REACTIVE PROTEIN: CPT | Performed by: ORTHOPAEDIC SURGERY

## 2018-10-17 PROCEDURE — 85610 PROTHROMBIN TIME: CPT | Performed by: ORTHOPAEDIC SURGERY

## 2018-10-17 PROCEDURE — 93005 ELECTROCARDIOGRAM TRACING: CPT

## 2018-10-17 PROCEDURE — 85652 RBC SED RATE AUTOMATED: CPT | Performed by: ORTHOPAEDIC SURGERY

## 2018-10-17 PROCEDURE — 93010 ELECTROCARDIOGRAM REPORT: CPT | Performed by: INTERNAL MEDICINE

## 2018-10-17 PROCEDURE — 36415 COLL VENOUS BLD VENIPUNCTURE: CPT

## 2018-10-17 PROCEDURE — 81003 URINALYSIS AUTO W/O SCOPE: CPT | Performed by: ORTHOPAEDIC SURGERY

## 2018-10-17 RX ORDER — TRAMADOL HYDROCHLORIDE 50 MG/1
TABLET ORAL
Refills: 0 | COMMUNITY
Start: 2018-09-26 | End: 2018-11-01 | Stop reason: HOSPADM

## 2018-10-17 RX ORDER — ACETAMINOPHEN 325 MG/1
1000 TABLET ORAL ONCE
Status: CANCELLED | OUTPATIENT
Start: 2018-10-17 | End: 2018-10-17

## 2018-10-17 RX ORDER — PREGABALIN 150 MG/1
150 CAPSULE ORAL ONCE
Status: CANCELLED | OUTPATIENT
Start: 2018-10-17 | End: 2018-10-17

## 2018-10-17 RX ORDER — MELOXICAM 7.5 MG/1
15 TABLET ORAL ONCE
Status: CANCELLED | OUTPATIENT
Start: 2018-10-17 | End: 2018-10-17

## 2018-10-17 ASSESSMENT — HOOS JR
HOOS JR SCORE: 20.805
HOOS JR SCORE: 21

## 2018-10-17 NOTE — DISCHARGE INSTRUCTIONS

## 2018-10-17 NOTE — PAT
Patient states dr angulo office states pt didn't have to go to class today.     Eras information given to patient and explained.     Prescription information given to patient and explained.

## 2018-10-17 NOTE — TELEPHONE ENCOUNTER
SHORTY called and says that the patient is having a knee replacement on 10/26/18 with Dr. Baker and needs pre-op clearance.   She was seen 9/7/18.

## 2018-10-17 NOTE — PROGRESS NOTES
[facility] [place]    Subjective     Chief Complaint   Patient presents with   • Left Hip - Pain        HPI    Rose J Kellermann is a 89 y.o. female.  She presents today for evaluation of left hip pain.  She had a fracture last November, when she slipped and fell on some wet leaves, sustaining a femoral neck fracture.  She underwent a closed reduction and screw fixation with Dr. Swanson at that time.  She apparently only saw Dr. Swanson for 1 visit postoperatively, but his had progressive pain over the past 2-3 months, following no injury.  The pain is 8-10 out of 10, throbbing, worsening, and she is walking with a walker with great difficulty.      Patient Active Problem List   Diagnosis   • Essential hypertension   • Vitamin D deficiency   • Fatigue   • Chronic kidney disease, stage III (moderate)    • Osteoarthritis   • Dyslipidemia   • Post-menopausal bleeding   • Endometrial cancer (CMS/HCC)   • Closed fracture of left hip (CMS/HCC)   • Asymptomatic bacteriuria   • Post-traumatic osteoarthritis of left hip     Past Medical History:   Diagnosis Date   • Abscess of back    • Arrhythmia    • Cancer (CMS/HCC) 12/2011    Endometrial cancer, status post robotically assisted hysterectomy with Dr. Haddad, December 2011.     • CKD (chronic kidney disease), stage III (CMS/HCC)     no dilaysis    • Dizzy    • Dyslipidemia     Dyslipidemia.  Observing.   • Enthesopathy of hip region    • Generalized osteoarthrosis, involving multiple sites    • H/O blood clots    • Headache    • Hearing loss    • Hypertension    • Low backache    • Needs flu shot    • Osteoarthritis     Osteoarthritis with questionable carpal tunnel syndrome bilaterally.    • Osteoarthritis of both knees    • Otitis, serous    • Pneumonia 1967    Remote pneumonia, 1967.    • Retinal hemorrhage    • SI joint arthritis (CMS/HCC)    • SOB (shortness of breath)    • Syncope 2001    Remote syncope with working diagnosis of vasodepressor,  2001.    • Venous insufficiency of leg    • Wears glasses     readers      Past Surgical History:   Procedure Laterality Date   • CATARACT EXTRACTION      bilat    • COLONOSCOPY     • HIP FRACTURE SURGERY Left 11/2017   • HIP PERCUTANEOUS PINNING Left 11/24/2017    Procedure: HIP PERCUTANEOUS PINNING;  Surgeon: Lucas Swanson MD;  Location: [facility];  Service:    • HYSTERECTOMY      total? but unsure    • KNEE SURGERY  2001    Remote knee surgery in 2001.- right       Family History   Problem Relation Age of Onset   • Heart disease Mother    • Osteoarthritis Mother    • Hypertension Mother    • Hypertension Father    • Heart attack Father    • Osteoarthritis Father    • Cancer Brother         lung     Social History     Social History   • Marital status:      Spouse name: N/A   • Number of children: N/A   • Years of education: N/A     Occupational History   • Not on file.     Social History Main Topics   • Smoking status: Never Smoker   • Smokeless tobacco: Never Used   • Alcohol use No   • Drug use: No   • Sexual activity: Defer     Other Topics Concern   • Not on file     Social History Narrative    Lives alone, 2 daughters, one here, one in [place]. Retired teacher      Current Outpatient Prescriptions on File Prior to Visit   Medication Sig Dispense Refill   • acetaminophen (TYLENOL) 325 MG tablet Take 2 tablets by mouth Every 6 (Six) Hours As Needed for Mild Pain .     • aspirin 81 MG EC tablet Take 81 mg by mouth Daily. LD 17th     • bisoprolol (ZEBeta) 5 MG tablet Take 1 tablet by mouth Daily. 90 tablet 3   • Misc Natural Products (OSTEO BI-FLEX JOINT SHIELD PO) Take 1 tablet by mouth Daily.     • Multiple Vitamin (MULTI VITAMIN DAILY PO) Take 1 tablet by mouth Daily.     • nisoldipine (SULAR) 8.5 MG 24 hr tablet Take 1 tablet by mouth Daily. 90 tablet 3   • triamterene-hydrochlorothiazide (DYAZIDE) 37.5-25 MG per capsule Take 1 capsule by mouth Every Morning. 90 capsule 3     No current  facility-administered medications on file prior to visit.       Allergies   Allergen Reactions   • Sulfa Antibiotics Nausea Only and GI Intolerance        Review of Systems   Constitutional: Positive for activity change, appetite change, chills and fatigue. Negative for diaphoresis, fever and unexpected weight change.   HENT: Positive for hearing loss. Negative for congestion, dental problem, drooling, ear discharge, ear pain, facial swelling, mouth sores, nosebleeds, postnasal drip, rhinorrhea, sinus pressure, sneezing, sore throat, tinnitus, trouble swallowing and voice change.    Eyes: Positive for redness and visual disturbance. Negative for photophobia, pain, discharge and itching.   Respiratory: Negative for apnea, cough, choking, chest tightness, shortness of breath, wheezing and stridor.    Cardiovascular: Positive for leg swelling. Negative for chest pain and palpitations.   Gastrointestinal: Positive for constipation. Negative for abdominal distention, abdominal pain, anal bleeding, blood in stool, diarrhea, nausea, rectal pain and vomiting.   Endocrine: Negative for cold intolerance, heat intolerance, polydipsia, polyphagia and polyuria.   Genitourinary: Negative for decreased urine volume, difficulty urinating, dysuria, enuresis, flank pain, frequency, genital sores, hematuria and urgency.   Musculoskeletal: Positive for arthralgias and joint swelling. Negative for back pain, gait problem, myalgias, neck pain and neck stiffness.   Skin: Negative for color change, pallor, rash and wound.   Allergic/Immunologic: Negative for environmental allergies, food allergies and immunocompromised state.   Neurological: Positive for numbness. Negative for dizziness, tremors, seizures, syncope, facial asymmetry, speech difficulty, weakness, light-headedness and headaches.   Hematological: Negative for adenopathy. Does not bruise/bleed easily.        Blood clots     Psychiatric/Behavioral: Positive for decreased  "concentration and sleep disturbance. Negative for agitation, behavioral problems, confusion, dysphoric mood, hallucinations, self-injury and suicidal ideas. The patient is not nervous/anxious and is not hyperactive.         Objective      Physical Exam  Pulse 59   Temp 97.8 °F (36.6 °C)   Ht 160 cm (62.99\")   Wt 72.1 kg (159 lb)   SpO2 95%   BMI 28.17 kg/m²     Body mass index is 28.17 kg/m².    General:   Mental Status:  Alert   Appearance: Cooperative, in no acute distress   Build and Nutrition: Well-nourished and well developed female   Orientation: Alert and oriented to person, place and time   Posture: Normal   Gait: Limping on the left    Integument:   Left hip: Previous skin incision is well-healed with no signs of infection    Neurologic:   Sensation:    Left foot: Intact to light touch on the dorsal and plantar aspect   Motor:  Left lower extremity: 5/5 quadriceps, hamstrings, ankle dorsiflexors, and ankle plantar flexors  Vascular:   Left lower extremity: 2+ dorsalis pedis pulse, prompt capillary refill    Lower Extremity:   Left Hip:    Tenderness:  None    Swelling:  None    Crepitus:  None    Atrophy:  None    Range of motion:  External Rotation: 10°, with pain       Internal Rotation: 10°, with pain       Flexion:  80°       Extension:  0°   Instability:  None  Deformities:  None  [facility]al [facility]    Short on the left compared to the right        Imaging/Studies    EXAMINATION: XR HIP W OR WO PELVIS 2-3 VIEW LEFT- 09/14/2018     INDICATION: M25.552-Pain in left hip; G89.29-Other chronic pain      COMPARISON: 11/24/2017     FINDINGS: Imaging of the pelvis and 2 views of the left hip reveal a  broken screw identified within the left femoral neck. The 2 additional  screws are intact. The alignment is satisfactory. No evidence of acute  bony abnormality. Phleboliths are seen in both sides of the pelvis.        IMPRESSION:  Fracture seen of one of the compression screws within " the  left femoral neck. No acute bony abnormality is identified. The  remainder of the 2 screws are unremarkable. Degenerative changes  identified within the acetabulum with spurring.     D:  09/14/2018  E:  09/14/2018     This report was finalized on 9/14/2018 5:06 PM by Dr. Areli You MD.    Assessment and Plan     Anastasia was seen today for pain.    Diagnoses and all orders for this visit:    Post-traumatic osteoarthritis of left hip  -     XR Hip With or Without Pelvis 1 View Left  -     Case Request; Standing  -     Instructions on coughing, deep breathing, and incentive spirometry.; Future  -     CBC and Differential; Future  -     Basic metabolic panel; Future  -     Protime-INR; Future  -     APTT; Future  -     Hemoglobin A1c; Future  -     Sedimentation rate; Future  -     C-reactive protein; Future  -     Cancel: Urinalysis With Culture If Indicated - Urine, Clean Catch; Future  -     Tranexamic Acid 1,000 mg in sodium chloride 0.9 % 100 mL; Infuse 1,000 mg into a venous catheter 1 (One) Time.  -     Tranexamic Acid 1,000 mg in sodium chloride 0.9 % 100 mL; Infuse 1,000 mg into a venous catheter 1 (One) Time.  -     ceFAZolin (ANCEF) 2 g in sodium chloride 0.9 % 100 mL IVPB; Infuse 2 g into a venous catheter 1 (One) Time.  -     acetaminophen (TYLENOL) tablet 975 mg; Take 3 tablets by mouth 1 (One) Time.  -     meloxicam (MOBIC) tablet 15 mg; Take 2 tablets by mouth 1 (One) Time.  -     pregabalin (LYRICA) capsule 150 mg; Take 1 capsule by mouth 1 (One) Time.  -     mupirocin (BACTROBAN) 2 % nasal ointment 1 application; 1 application by Each Nare route 1 (One) Time.  -     Case Request    Other orders  -     Outpatient In A Bed; Standing  -     Follow Anesthesia Guidelines / Standing Orders; Future  -     Obtain informed consent  -     Provide instructions to patient regarding NPO status  -     Clorhexidine skin prep  -     Care Order / Instruction for all Female Patients  -     Follow Anesthesia  Guidelines / Standing Orders; Standing  -     Verify NPO Status; Standing  -     SCD (sequential compression device)- to be placed on patient in Pre-op; Standing  -     Clip operative site; Standing  -     Obtain informed consent (if not collected inpatient or PAT); Standing  -     Notify Physician - Standard; Standing  -     NPO After Midnight  -     mupirocin (BACTROBAN) 2 % ointment; Apply into the nostril(s) as directed by provider 2 (Two) Times a Day.  -     Chlorhexidine Gluconate 4 % solution; Shower with hibiclens solution as directed for 5 days prior to surgery        I reviewed my findings with patient and her daughter who accompanies her today.  The fracture did not heal, and she has avascular changes in the femoral head, with broken hardware.  She is a candidate for conversion to a total hip arthroplasty.  The possibility of infection has been discussed, but I think is low likelihood.  At this point, she is interested in proceeding with surgery, as she has become significantly debilitated because of his hip pain.  Risks, benefits, and alternatives to the procedure been discussed.  Please see my counseling note for details.  Should we encounter infection at the time of the conversion to a total hip, a spacer may need to be placed, with a staged procedure if appropriate.  I think it is low likelihood that she does have an infection.  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    Of note, she did have a DVT following her hip surgery, and was on Xarelto for several months, but is currently only on an aspirin.  She had a repeat duplex scan on 8/31/2018, that showed resolution of the acute DVT.    Surgical Counseling     I have informed the patient of the diagnosis and the prognosis.  Exhaustive conservative treatment modalities have not resulted in long term pain relief.  The symptoms have progressed to the point of daily pain and inability to perform  activities of daily living without significant pain.  The patient has reached the point of desiring to proceed with total hip arthroplasty after discussing the risks, benefits and alternatives to the procedure.  The surgical procedure itself was discussed in detail.  Risks of the procedure were discussed, which included but are not limited to, bleeding, infection, damage to blood vessels and nerves, incomplete pain relief, loosening of the prosthesis, deep infection, need for further surgery, leg length discrepancy, hip dislocation, loss of limb, deep venous thrombosis, pulmonary embolus, death, heart attack, stroke, kidney failure, liver failure, and anesthetic complications.  In addition, the potential for deep infection developing in the future was discussed, which could require further surgery.  The hip would have to be re-opened, debrided, and potentially remove the prosthesis, which may or may not be replaced in the future.  Also, the possibility for loosening of the prosthesis has been mentioned.  If the prosthesis loosened, a revision arthroplasty could be performed, with results that are not as predictable compared to the original procedure.  The typical rehabilitative course has also been discussed, and full recovery may take up to a year to see the maximum benefit.  The importance of patient cooperation in the rehabilitative efforts has also been discussed.  No guarantees whatsoever were given.  The patient understands the potential risks versus the benefits and desires to proceed with total hip arthroplasty at a mutually convenient time.    Return for For surgery as planned.      Medical Decision Making  Management Options : major surgery with risk factors  Data/Risk: lab tests, radiology tests and independent visualization of imaging, lab tests, or EMG/NCV      Stephen Baker MD  10/17/18  5:40 PM

## 2018-10-19 ENCOUNTER — TELEPHONE (OUTPATIENT)
Dept: ORTHOPEDIC SURGERY | Facility: CLINIC | Age: 83
End: 2018-10-19

## 2018-10-19 NOTE — TELEPHONE ENCOUNTER
----- Message from Stephen Baker MD sent at 10/19/2018  3:13 PM EDT -----  Ok - thanks    ----- Message -----  From: Thalia Carver  Sent: 10/19/2018  11:06 AM  To: Stephen Baker MD    CRP 1.78

## 2018-10-26 ENCOUNTER — APPOINTMENT (OUTPATIENT)
Dept: GENERAL RADIOLOGY | Facility: HOSPITAL | Age: 83
End: 2018-10-26

## 2018-10-26 ENCOUNTER — ANESTHESIA EVENT (OUTPATIENT)
Dept: PERIOP | Facility: HOSPITAL | Age: 83
End: 2018-10-26

## 2018-10-26 ENCOUNTER — ANESTHESIA (OUTPATIENT)
Dept: PERIOP | Facility: HOSPITAL | Age: 83
End: 2018-10-26

## 2018-10-26 ENCOUNTER — HOSPITAL ENCOUNTER (INPATIENT)
Facility: HOSPITAL | Age: 83
LOS: 6 days | Discharge: NURSING, REHAB OR LONG-TERM CARE | End: 2018-11-01
Attending: ORTHOPAEDIC SURGERY | Admitting: ORTHOPAEDIC SURGERY

## 2018-10-26 DIAGNOSIS — Z74.09 IMPAIRED MOBILITY AND ADLS: ICD-10-CM

## 2018-10-26 DIAGNOSIS — Z78.9 IMPAIRED MOBILITY AND ADLS: ICD-10-CM

## 2018-10-26 DIAGNOSIS — M16.52 POST-TRAUMATIC OSTEOARTHRITIS OF LEFT HIP: ICD-10-CM

## 2018-10-26 DIAGNOSIS — Z74.09 IMPAIRED FUNCTIONAL MOBILITY, BALANCE, GAIT, AND ENDURANCE: Primary | ICD-10-CM

## 2018-10-26 PROBLEM — Z96.642 STATUS POST TOTAL REPLACEMENT OF LEFT HIP: Status: ACTIVE | Noted: 2018-10-26

## 2018-10-26 PROBLEM — R73.03 PREDIABETES: Status: ACTIVE | Noted: 2018-10-26

## 2018-10-26 LAB
HCT VFR BLD AUTO: 29 % (ref 34.5–44)
HGB BLD-MCNC: 9.3 G/DL (ref 11.5–15.5)
POTASSIUM BLDA-SCNC: 4.33 MMOL/L (ref 3.5–5.3)

## 2018-10-26 PROCEDURE — 87075 CULTR BACTERIA EXCEPT BLOOD: CPT | Performed by: ORTHOPAEDIC SURGERY

## 2018-10-26 PROCEDURE — A9270 NON-COVERED ITEM OR SERVICE: HCPCS | Performed by: ORTHOPAEDIC SURGERY

## 2018-10-26 PROCEDURE — 87102 FUNGUS ISOLATION CULTURE: CPT | Performed by: ORTHOPAEDIC SURGERY

## 2018-10-26 PROCEDURE — 63710000001 MELOXICAM 7.5 MG TABLET: Performed by: ORTHOPAEDIC SURGERY

## 2018-10-26 PROCEDURE — 25010000002 ROPIVACAINE PER 1 MG: Performed by: ORTHOPAEDIC SURGERY

## 2018-10-26 PROCEDURE — 25010000002 FENTANYL CITRATE (PF) 100 MCG/2ML SOLUTION: Performed by: NURSE ANESTHETIST, CERTIFIED REGISTERED

## 2018-10-26 PROCEDURE — A9270 NON-COVERED ITEM OR SERVICE: HCPCS | Performed by: ANESTHESIOLOGY

## 2018-10-26 PROCEDURE — 84132 ASSAY OF SERUM POTASSIUM: CPT | Performed by: ANESTHESIOLOGY

## 2018-10-26 PROCEDURE — 87176 TISSUE HOMOGENIZATION CULTR: CPT | Performed by: ORTHOPAEDIC SURGERY

## 2018-10-26 PROCEDURE — 25010000002 PROPOFOL 10 MG/ML EMULSION: Performed by: NURSE ANESTHETIST, CERTIFIED REGISTERED

## 2018-10-26 PROCEDURE — 97116 GAIT TRAINING THERAPY: CPT

## 2018-10-26 PROCEDURE — 0SRB04A REPLACEMENT OF LEFT HIP JOINT WITH CERAMIC ON POLYETHYLENE SYNTHETIC SUBSTITUTE, UNCEMENTED, OPEN APPROACH: ICD-10-PCS | Performed by: ORTHOPAEDIC SURGERY

## 2018-10-26 PROCEDURE — 27132 TOTAL HIP ARTHROPLASTY: CPT | Performed by: ORTHOPAEDIC SURGERY

## 2018-10-26 PROCEDURE — 0SPB04Z REMOVAL OF INTERNAL FIXATION DEVICE FROM LEFT HIP JOINT, OPEN APPROACH: ICD-10-PCS | Performed by: ORTHOPAEDIC SURGERY

## 2018-10-26 PROCEDURE — 63710000001 PREGABALIN 150 MG CAPSULE: Performed by: ORTHOPAEDIC SURGERY

## 2018-10-26 PROCEDURE — 87116 MYCOBACTERIA CULTURE: CPT | Performed by: ORTHOPAEDIC SURGERY

## 2018-10-26 PROCEDURE — 87070 CULTURE OTHR SPECIMN AEROBIC: CPT | Performed by: ORTHOPAEDIC SURGERY

## 2018-10-26 PROCEDURE — 73502 X-RAY EXAM HIP UNI 2-3 VIEWS: CPT

## 2018-10-26 PROCEDURE — 63710000001 FAMOTIDINE 20 MG TABLET: Performed by: ANESTHESIOLOGY

## 2018-10-26 PROCEDURE — C1776 JOINT DEVICE (IMPLANTABLE): HCPCS | Performed by: ORTHOPAEDIC SURGERY

## 2018-10-26 PROCEDURE — 85014 HEMATOCRIT: CPT | Performed by: NURSE ANESTHETIST, CERTIFIED REGISTERED

## 2018-10-26 PROCEDURE — 85018 HEMOGLOBIN: CPT | Performed by: NURSE ANESTHETIST, CERTIFIED REGISTERED

## 2018-10-26 PROCEDURE — 25010000002 NEOSTIGMINE 10 MG/10ML SOLUTION: Performed by: NURSE ANESTHETIST, CERTIFIED REGISTERED

## 2018-10-26 PROCEDURE — 63710000001 MUPIROCIN 2 % OINTMENT: Performed by: ORTHOPAEDIC SURGERY

## 2018-10-26 PROCEDURE — 87205 SMEAR GRAM STAIN: CPT | Performed by: ORTHOPAEDIC SURGERY

## 2018-10-26 PROCEDURE — 87206 SMEAR FLUORESCENT/ACID STAI: CPT | Performed by: ORTHOPAEDIC SURGERY

## 2018-10-26 PROCEDURE — 25010000002 PHENYLEPHRINE PER 1 ML: Performed by: NURSE ANESTHETIST, CERTIFIED REGISTERED

## 2018-10-26 PROCEDURE — 97162 PT EVAL MOD COMPLEX 30 MIN: CPT

## 2018-10-26 PROCEDURE — 25010000003 CEFAZOLIN IN DEXTROSE 2-4 GM/100ML-% SOLUTION: Performed by: ORTHOPAEDIC SURGERY

## 2018-10-26 DEVICE — R3 3 HOLE ACETABULAR SHELL 50MM
Type: IMPLANTABLE DEVICE | Site: HIP | Status: FUNCTIONAL
Brand: R3 ACETABULAR

## 2018-10-26 DEVICE — R3 0 DEGREE XLPE ACETABULAR LINER                                    32MM ID X OD 50MM
Type: IMPLANTABLE DEVICE | Site: HIP | Status: FUNCTIONAL
Brand: R3

## 2018-10-26 DEVICE — IMPLANTABLE DEVICE: Type: IMPLANTABLE DEVICE | Site: HIP | Status: FUNCTIONAL

## 2018-10-26 DEVICE — SYNERGY POROUS PLUS HA STANDARD                                    OFFSET SIZE 14
Type: IMPLANTABLE DEVICE | Site: HIP | Status: FUNCTIONAL
Brand: SYNERGY

## 2018-10-26 DEVICE — REFLECTION SPHERICAL HEAD SCREW 25MM
Type: IMPLANTABLE DEVICE | Site: HIP | Status: FUNCTIONAL
Brand: REFLECTION

## 2018-10-26 DEVICE — R3 US DELTA HEAD 32 +0
Type: IMPLANTABLE DEVICE | Site: HIP | Status: FUNCTIONAL
Brand: BIOLOX DELTA

## 2018-10-26 DEVICE — REFLECTION SPHERICAL HEAD SCREW 30MM
Type: IMPLANTABLE DEVICE | Site: HIP | Status: FUNCTIONAL
Brand: REFLECTION

## 2018-10-26 RX ORDER — LABETALOL HYDROCHLORIDE 5 MG/ML
5 INJECTION, SOLUTION INTRAVENOUS
Status: DISCONTINUED | OUTPATIENT
Start: 2018-10-26 | End: 2018-10-26 | Stop reason: HOSPADM

## 2018-10-26 RX ORDER — HYDROCODONE BITARTRATE AND ACETAMINOPHEN 5; 325 MG/1; MG/1
1 TABLET ORAL ONCE AS NEEDED
Status: DISCONTINUED | OUTPATIENT
Start: 2018-10-26 | End: 2018-10-26 | Stop reason: HOSPADM

## 2018-10-26 RX ORDER — NISOLDIPINE 8.5 MG/1
8.5 TABLET, FILM COATED, EXTENDED RELEASE ORAL DAILY
Status: DISCONTINUED | OUTPATIENT
Start: 2018-10-26 | End: 2018-10-30

## 2018-10-26 RX ORDER — FAMOTIDINE 10 MG/ML
20 INJECTION, SOLUTION INTRAVENOUS ONCE
Status: CANCELLED | OUTPATIENT
Start: 2018-10-26 | End: 2018-10-26

## 2018-10-26 RX ORDER — HYDROMORPHONE HYDROCHLORIDE 1 MG/ML
0.25 INJECTION, SOLUTION INTRAMUSCULAR; INTRAVENOUS; SUBCUTANEOUS
Status: DISCONTINUED | OUTPATIENT
Start: 2018-10-26 | End: 2018-10-26 | Stop reason: HOSPADM

## 2018-10-26 RX ORDER — PROMETHAZINE HYDROCHLORIDE 25 MG/ML
6.25 INJECTION, SOLUTION INTRAMUSCULAR; INTRAVENOUS ONCE AS NEEDED
Status: DISCONTINUED | OUTPATIENT
Start: 2018-10-26 | End: 2018-10-26 | Stop reason: SDUPTHER

## 2018-10-26 RX ORDER — MELOXICAM 7.5 MG/1
15 TABLET ORAL ONCE
Status: COMPLETED | OUTPATIENT
Start: 2018-10-26 | End: 2018-10-26

## 2018-10-26 RX ORDER — ONDANSETRON 2 MG/ML
4 INJECTION INTRAMUSCULAR; INTRAVENOUS ONCE AS NEEDED
Status: DISCONTINUED | OUTPATIENT
Start: 2018-10-26 | End: 2018-10-26 | Stop reason: HOSPADM

## 2018-10-26 RX ORDER — NALOXONE HCL 0.4 MG/ML
0.1 VIAL (ML) INJECTION
Status: DISCONTINUED | OUTPATIENT
Start: 2018-10-26 | End: 2018-11-01 | Stop reason: HOSPADM

## 2018-10-26 RX ORDER — MAGNESIUM HYDROXIDE 1200 MG/15ML
LIQUID ORAL AS NEEDED
Status: DISCONTINUED | OUTPATIENT
Start: 2018-10-26 | End: 2018-10-26 | Stop reason: HOSPADM

## 2018-10-26 RX ORDER — HYDROCODONE BITARTRATE AND ACETAMINOPHEN 5; 325 MG/1; MG/1
1 TABLET ORAL ONCE AS NEEDED
Status: DISCONTINUED | OUTPATIENT
Start: 2018-10-26 | End: 2018-10-26 | Stop reason: SDUPTHER

## 2018-10-26 RX ORDER — SODIUM CHLORIDE 0.9 % (FLUSH) 0.9 %
3 SYRINGE (ML) INJECTION EVERY 12 HOURS SCHEDULED
Status: CANCELLED | OUTPATIENT
Start: 2018-10-26

## 2018-10-26 RX ORDER — CEFAZOLIN SODIUM 2 G/100ML
2 INJECTION, SOLUTION INTRAVENOUS ONCE
Status: DISCONTINUED | OUTPATIENT
Start: 2018-10-26 | End: 2018-10-26 | Stop reason: HOSPADM

## 2018-10-26 RX ORDER — SODIUM CHLORIDE 0.9 % (FLUSH) 0.9 %
3 SYRINGE (ML) INJECTION EVERY 12 HOURS SCHEDULED
Status: DISCONTINUED | OUTPATIENT
Start: 2018-10-26 | End: 2018-10-26 | Stop reason: HOSPADM

## 2018-10-26 RX ORDER — FENTANYL CITRATE 50 UG/ML
INJECTION, SOLUTION INTRAMUSCULAR; INTRAVENOUS AS NEEDED
Status: DISCONTINUED | OUTPATIENT
Start: 2018-10-26 | End: 2018-10-26 | Stop reason: SURG

## 2018-10-26 RX ORDER — SODIUM CHLORIDE, SODIUM LACTATE, POTASSIUM CHLORIDE, CALCIUM CHLORIDE 600; 310; 30; 20 MG/100ML; MG/100ML; MG/100ML; MG/100ML
9 INJECTION, SOLUTION INTRAVENOUS CONTINUOUS
Status: DISCONTINUED | OUTPATIENT
Start: 2018-10-26 | End: 2018-10-26

## 2018-10-26 RX ORDER — NALOXONE HCL 0.4 MG/ML
0.4 VIAL (ML) INJECTION
Status: DISCONTINUED | OUTPATIENT
Start: 2018-10-26 | End: 2018-11-01 | Stop reason: HOSPADM

## 2018-10-26 RX ORDER — DOCUSATE SODIUM 100 MG/1
100 CAPSULE, LIQUID FILLED ORAL 2 TIMES DAILY PRN
Status: DISCONTINUED | OUTPATIENT
Start: 2018-10-26 | End: 2018-11-01 | Stop reason: HOSPADM

## 2018-10-26 RX ORDER — NALOXONE HCL 0.4 MG/ML
0.4 VIAL (ML) INJECTION AS NEEDED
Status: DISCONTINUED | OUTPATIENT
Start: 2018-10-26 | End: 2018-10-26 | Stop reason: SDUPTHER

## 2018-10-26 RX ORDER — HYDROCODONE BITARTRATE AND ACETAMINOPHEN 5; 325 MG/1; MG/1
1 TABLET ORAL EVERY 4 HOURS PRN
Status: DISCONTINUED | OUTPATIENT
Start: 2018-10-26 | End: 2018-11-01 | Stop reason: HOSPADM

## 2018-10-26 RX ORDER — SODIUM CHLORIDE 9 MG/ML
120 INJECTION, SOLUTION INTRAVENOUS CONTINUOUS
Status: DISCONTINUED | OUTPATIENT
Start: 2018-10-26 | End: 2018-11-01 | Stop reason: HOSPADM

## 2018-10-26 RX ORDER — BISACODYL 10 MG
10 SUPPOSITORY, RECTAL RECTAL DAILY PRN
Status: DISCONTINUED | OUTPATIENT
Start: 2018-10-26 | End: 2018-11-01 | Stop reason: HOSPADM

## 2018-10-26 RX ORDER — NALOXONE HCL 0.4 MG/ML
0.4 VIAL (ML) INJECTION AS NEEDED
Status: DISCONTINUED | OUTPATIENT
Start: 2018-10-26 | End: 2018-10-26 | Stop reason: HOSPADM

## 2018-10-26 RX ORDER — IPRATROPIUM BROMIDE AND ALBUTEROL SULFATE 2.5; .5 MG/3ML; MG/3ML
3 SOLUTION RESPIRATORY (INHALATION) ONCE AS NEEDED
Status: DISCONTINUED | OUTPATIENT
Start: 2018-10-26 | End: 2018-10-26 | Stop reason: SDUPTHER

## 2018-10-26 RX ORDER — MELOXICAM 7.5 MG/1
15 TABLET ORAL DAILY
Status: DISCONTINUED | OUTPATIENT
Start: 2018-10-27 | End: 2018-11-01 | Stop reason: HOSPADM

## 2018-10-26 RX ORDER — NEOSTIGMINE METHYLSULFATE 1 MG/ML
INJECTION, SOLUTION INTRAVENOUS AS NEEDED
Status: DISCONTINUED | OUTPATIENT
Start: 2018-10-26 | End: 2018-10-26 | Stop reason: SURG

## 2018-10-26 RX ORDER — MEPERIDINE HYDROCHLORIDE 25 MG/ML
12.5 INJECTION INTRAMUSCULAR; INTRAVENOUS; SUBCUTANEOUS
Status: DISCONTINUED | OUTPATIENT
Start: 2018-10-26 | End: 2018-10-26 | Stop reason: SDUPTHER

## 2018-10-26 RX ORDER — SODIUM CHLORIDE 0.9 % (FLUSH) 0.9 %
1-10 SYRINGE (ML) INJECTION AS NEEDED
Status: DISCONTINUED | OUTPATIENT
Start: 2018-10-26 | End: 2018-10-26 | Stop reason: SDUPTHER

## 2018-10-26 RX ORDER — PREGABALIN 150 MG/1
150 CAPSULE ORAL ONCE
Status: COMPLETED | OUTPATIENT
Start: 2018-10-26 | End: 2018-10-26

## 2018-10-26 RX ORDER — MORPHINE SULFATE 2 MG/ML
1 INJECTION, SOLUTION INTRAMUSCULAR; INTRAVENOUS
Status: DISCONTINUED | OUTPATIENT
Start: 2018-10-26 | End: 2018-11-01 | Stop reason: HOSPADM

## 2018-10-26 RX ORDER — PROMETHAZINE HYDROCHLORIDE 25 MG/1
25 SUPPOSITORY RECTAL ONCE AS NEEDED
Status: DISCONTINUED | OUTPATIENT
Start: 2018-10-26 | End: 2018-10-26 | Stop reason: HOSPADM

## 2018-10-26 RX ORDER — SODIUM CHLORIDE 0.9 % (FLUSH) 0.9 %
1-10 SYRINGE (ML) INJECTION AS NEEDED
Status: DISCONTINUED | OUTPATIENT
Start: 2018-10-26 | End: 2018-11-01 | Stop reason: HOSPADM

## 2018-10-26 RX ORDER — PROMETHAZINE HYDROCHLORIDE 25 MG/1
25 SUPPOSITORY RECTAL ONCE AS NEEDED
Status: DISCONTINUED | OUTPATIENT
Start: 2018-10-26 | End: 2018-10-26 | Stop reason: SDUPTHER

## 2018-10-26 RX ORDER — ROPIVACAINE HYDROCHLORIDE 5 MG/ML
INJECTION, SOLUTION EPIDURAL; INFILTRATION; PERINEURAL AS NEEDED
Status: DISCONTINUED | OUTPATIENT
Start: 2018-10-26 | End: 2018-10-26 | Stop reason: HOSPADM

## 2018-10-26 RX ORDER — BISOPROLOL FUMARATE 5 MG/1
5 TABLET, FILM COATED ORAL DAILY
Status: DISCONTINUED | OUTPATIENT
Start: 2018-10-26 | End: 2018-11-01 | Stop reason: HOSPADM

## 2018-10-26 RX ORDER — ACETAMINOPHEN 500 MG
1000 TABLET ORAL ONCE
Status: DISCONTINUED | OUTPATIENT
Start: 2018-10-26 | End: 2018-10-26 | Stop reason: HOSPADM

## 2018-10-26 RX ORDER — SODIUM CHLORIDE 0.9 % (FLUSH) 0.9 %
3-10 SYRINGE (ML) INJECTION AS NEEDED
Status: CANCELLED | OUTPATIENT
Start: 2018-10-26

## 2018-10-26 RX ORDER — SODIUM CHLORIDE 9 MG/ML
INJECTION, SOLUTION INTRAVENOUS CONTINUOUS PRN
Status: DISCONTINUED | OUTPATIENT
Start: 2018-10-26 | End: 2018-10-26 | Stop reason: SURG

## 2018-10-26 RX ORDER — PROMETHAZINE HYDROCHLORIDE 25 MG/1
25 TABLET ORAL ONCE AS NEEDED
Status: DISCONTINUED | OUTPATIENT
Start: 2018-10-26 | End: 2018-10-26 | Stop reason: HOSPADM

## 2018-10-26 RX ORDER — ONDANSETRON 2 MG/ML
4 INJECTION INTRAMUSCULAR; INTRAVENOUS ONCE AS NEEDED
Status: DISCONTINUED | OUTPATIENT
Start: 2018-10-26 | End: 2018-10-26 | Stop reason: SDUPTHER

## 2018-10-26 RX ORDER — PROPOFOL 10 MG/ML
VIAL (ML) INTRAVENOUS AS NEEDED
Status: DISCONTINUED | OUTPATIENT
Start: 2018-10-26 | End: 2018-10-26 | Stop reason: SURG

## 2018-10-26 RX ORDER — SODIUM CHLORIDE 0.9 % (FLUSH) 0.9 %
3 SYRINGE (ML) INJECTION EVERY 12 HOURS SCHEDULED
Status: DISCONTINUED | OUTPATIENT
Start: 2018-10-26 | End: 2018-11-01 | Stop reason: HOSPADM

## 2018-10-26 RX ORDER — FENTANYL CITRATE 50 UG/ML
50 INJECTION, SOLUTION INTRAMUSCULAR; INTRAVENOUS
Status: DISCONTINUED | OUTPATIENT
Start: 2018-10-26 | End: 2018-10-26 | Stop reason: HOSPADM

## 2018-10-26 RX ORDER — FENTANYL CITRATE 50 UG/ML
25 INJECTION, SOLUTION INTRAMUSCULAR; INTRAVENOUS
Status: DISCONTINUED | OUTPATIENT
Start: 2018-10-26 | End: 2018-10-26 | Stop reason: SDUPTHER

## 2018-10-26 RX ORDER — PROMETHAZINE HYDROCHLORIDE 25 MG/ML
6.25 INJECTION, SOLUTION INTRAMUSCULAR; INTRAVENOUS ONCE AS NEEDED
Status: DISCONTINUED | OUTPATIENT
Start: 2018-10-26 | End: 2018-10-26 | Stop reason: HOSPADM

## 2018-10-26 RX ORDER — IPRATROPIUM BROMIDE AND ALBUTEROL SULFATE 2.5; .5 MG/3ML; MG/3ML
3 SOLUTION RESPIRATORY (INHALATION) ONCE AS NEEDED
Status: DISCONTINUED | OUTPATIENT
Start: 2018-10-26 | End: 2018-10-26 | Stop reason: HOSPADM

## 2018-10-26 RX ORDER — HYDROMORPHONE HYDROCHLORIDE 1 MG/ML
0.25 INJECTION, SOLUTION INTRAMUSCULAR; INTRAVENOUS; SUBCUTANEOUS
Status: DISCONTINUED | OUTPATIENT
Start: 2018-10-26 | End: 2018-11-01 | Stop reason: HOSPADM

## 2018-10-26 RX ORDER — PROMETHAZINE HYDROCHLORIDE 25 MG/1
25 TABLET ORAL ONCE AS NEEDED
Status: DISCONTINUED | OUTPATIENT
Start: 2018-10-26 | End: 2018-10-26 | Stop reason: SDUPTHER

## 2018-10-26 RX ORDER — FAMOTIDINE 20 MG/1
20 TABLET, FILM COATED ORAL ONCE
Status: COMPLETED | OUTPATIENT
Start: 2018-10-26 | End: 2018-10-26

## 2018-10-26 RX ORDER — SODIUM CHLORIDE 0.9 % (FLUSH) 0.9 %
1-10 SYRINGE (ML) INJECTION AS NEEDED
Status: DISCONTINUED | OUTPATIENT
Start: 2018-10-26 | End: 2018-10-26 | Stop reason: HOSPADM

## 2018-10-26 RX ORDER — ONDANSETRON 2 MG/ML
4 INJECTION INTRAMUSCULAR; INTRAVENOUS EVERY 6 HOURS PRN
Status: DISCONTINUED | OUTPATIENT
Start: 2018-10-26 | End: 2018-11-01 | Stop reason: HOSPADM

## 2018-10-26 RX ORDER — LIDOCAINE HYDROCHLORIDE 10 MG/ML
0.5 INJECTION, SOLUTION EPIDURAL; INFILTRATION; INTRACAUDAL; PERINEURAL ONCE AS NEEDED
Status: COMPLETED | OUTPATIENT
Start: 2018-10-26 | End: 2018-10-26

## 2018-10-26 RX ORDER — GLYCOPYRROLATE 0.2 MG/ML
INJECTION INTRAMUSCULAR; INTRAVENOUS AS NEEDED
Status: DISCONTINUED | OUTPATIENT
Start: 2018-10-26 | End: 2018-10-26 | Stop reason: SURG

## 2018-10-26 RX ORDER — CEFAZOLIN SODIUM 2 G/100ML
2 INJECTION, SOLUTION INTRAVENOUS EVERY 8 HOURS
Status: COMPLETED | OUTPATIENT
Start: 2018-10-26 | End: 2018-10-27

## 2018-10-26 RX ORDER — BISACODYL 5 MG/1
10 TABLET, DELAYED RELEASE ORAL DAILY PRN
Status: DISCONTINUED | OUTPATIENT
Start: 2018-10-26 | End: 2018-11-01 | Stop reason: HOSPADM

## 2018-10-26 RX ORDER — LIDOCAINE HYDROCHLORIDE 10 MG/ML
INJECTION, SOLUTION EPIDURAL; INFILTRATION; INTRACAUDAL; PERINEURAL AS NEEDED
Status: DISCONTINUED | OUTPATIENT
Start: 2018-10-26 | End: 2018-10-26 | Stop reason: SURG

## 2018-10-26 RX ORDER — ATRACURIUM BESYLATE 10 MG/ML
INJECTION, SOLUTION INTRAVENOUS AS NEEDED
Status: DISCONTINUED | OUTPATIENT
Start: 2018-10-26 | End: 2018-10-26 | Stop reason: SURG

## 2018-10-26 RX ORDER — HYDRALAZINE HYDROCHLORIDE 20 MG/ML
5 INJECTION INTRAMUSCULAR; INTRAVENOUS
Status: DISCONTINUED | OUTPATIENT
Start: 2018-10-26 | End: 2018-10-26 | Stop reason: HOSPADM

## 2018-10-26 RX ORDER — LABETALOL HYDROCHLORIDE 5 MG/ML
5 INJECTION, SOLUTION INTRAVENOUS
Status: DISCONTINUED | OUTPATIENT
Start: 2018-10-26 | End: 2018-10-26 | Stop reason: SDUPTHER

## 2018-10-26 RX ORDER — ONDANSETRON 4 MG/1
4 TABLET, FILM COATED ORAL EVERY 6 HOURS PRN
Status: DISCONTINUED | OUTPATIENT
Start: 2018-10-26 | End: 2018-11-01 | Stop reason: HOSPADM

## 2018-10-26 RX ORDER — HYDROMORPHONE HYDROCHLORIDE 1 MG/ML
0.2 INJECTION, SOLUTION INTRAMUSCULAR; INTRAVENOUS; SUBCUTANEOUS
Status: DISCONTINUED | OUTPATIENT
Start: 2018-10-26 | End: 2018-10-26 | Stop reason: HOSPADM

## 2018-10-26 RX ORDER — MEPERIDINE HYDROCHLORIDE 25 MG/ML
12.5 INJECTION INTRAMUSCULAR; INTRAVENOUS; SUBCUTANEOUS
Status: DISCONTINUED | OUTPATIENT
Start: 2018-10-26 | End: 2018-10-26 | Stop reason: HOSPADM

## 2018-10-26 RX ADMIN — MELOXICAM 15 MG: 7.5 TABLET ORAL at 09:45

## 2018-10-26 RX ADMIN — Medication 3 ML: at 19:41

## 2018-10-26 RX ADMIN — SODIUM CHLORIDE: 9 INJECTION, SOLUTION INTRAVENOUS at 12:24

## 2018-10-26 RX ADMIN — PHENYLEPHRINE HYDROCHLORIDE 160 MCG: 10 INJECTION INTRAVENOUS at 12:40

## 2018-10-26 RX ADMIN — ATRACURIUM BESYLATE 35 MG: 10 INJECTION, SOLUTION INTRAVENOUS at 12:26

## 2018-10-26 RX ADMIN — PHENYLEPHRINE HYDROCHLORIDE 80 MCG: 10 INJECTION INTRAVENOUS at 13:26

## 2018-10-26 RX ADMIN — PHENYLEPHRINE HYDROCHLORIDE 80 MCG: 10 INJECTION INTRAVENOUS at 12:54

## 2018-10-26 RX ADMIN — NEOSTIGMINE METHYLSULFATE 2 MG: 1 INJECTION, SOLUTION INTRAVENOUS at 14:03

## 2018-10-26 RX ADMIN — FENTANYL CITRATE 50 MCG: 50 INJECTION, SOLUTION INTRAMUSCULAR; INTRAVENOUS at 12:45

## 2018-10-26 RX ADMIN — MUPIROCIN 1 APPLICATION: 20 OINTMENT TOPICAL at 09:45

## 2018-10-26 RX ADMIN — LIDOCAINE HYDROCHLORIDE 0.5 ML: 10 INJECTION, SOLUTION EPIDURAL; INFILTRATION; INTRACAUDAL; PERINEURAL at 09:45

## 2018-10-26 RX ADMIN — PHENYLEPHRINE HYDROCHLORIDE 80 MCG: 10 INJECTION INTRAVENOUS at 14:15

## 2018-10-26 RX ADMIN — EPHEDRINE SULFATE 10 MG: 50 INJECTION INTRAMUSCULAR; INTRAVENOUS; SUBCUTANEOUS at 12:40

## 2018-10-26 RX ADMIN — FAMOTIDINE 20 MG: 20 TABLET ORAL at 09:45

## 2018-10-26 RX ADMIN — PREGABALIN 150 MG: 150 CAPSULE ORAL at 09:45

## 2018-10-26 RX ADMIN — PROPOFOL 75 MG: 10 INJECTION, EMULSION INTRAVENOUS at 12:26

## 2018-10-26 RX ADMIN — GLYCOPYRROLATE 0.2 MG: 0.2 INJECTION, SOLUTION INTRAMUSCULAR; INTRAVENOUS at 14:03

## 2018-10-26 RX ADMIN — LIDOCAINE HYDROCHLORIDE 50 MG: 10 INJECTION, SOLUTION EPIDURAL; INFILTRATION; INTRACAUDAL; PERINEURAL at 12:26

## 2018-10-26 RX ADMIN — PHENYLEPHRINE HYDROCHLORIDE 40 MCG: 10 INJECTION INTRAVENOUS at 13:40

## 2018-10-26 RX ADMIN — CEFAZOLIN SODIUM 2 G: 2 INJECTION, SOLUTION INTRAVENOUS at 19:41

## 2018-10-26 NOTE — ANESTHESIA PROCEDURE NOTES
Airway  Date/Time: 10/26/2018 12:30 PM  Airway not difficult    General Information and Staff    Patient location during procedure: OR  CRNA: EHSAN [facility] LA TORRE    Indications and Patient Condition  Indications for airway management: airway protection    Preoxygenated: yes  Mask difficulty assessment: 1 - vent by mask    Final Airway Details  Final airway type: endotracheal airway      Successful airway: ETT  Cuffed: yes   Successful intubation technique: direct laryngoscopy  Endotracheal tube insertion site: oral  Blade: Edmondson  Blade size: 2  Cormack-Lehane Classification: grade IIa - partial view of glottis  Placement verified by: chest auscultation and capnometry   Cuff volume (mL): 8  Measured from: lips  ETT to teeth (cm): 20  Number of attempts at approach: 1

## 2018-10-26 NOTE — ANESTHESIA POSTPROCEDURE EVALUATION
Patient: Rose J Kellermann    Procedure Summary     Date:  10/26/18 Room / Location:   [facility]    Anesthesia Start:  1225 Anesthesia Stop:      Procedure:  TOTAL HIP ARTHROPLASTY LEFT (Left Hip) Diagnosis:       Post-traumatic osteoarthritis of left hip      (Post-traumatic osteoarthritis of left hip [M16.52])    Surgeon:  Stephen Baker MD Provider:  Truman Cohen MD    Anesthesia Type:  spinal ASA Status:  3          Anesthesia Type: spinal  Last vitals  BP   103/85   Temp   97.2   Pulse   83   Resp   16     SpO2   100%     Post Anesthesia Care and Evaluation    Patient location during evaluation: PACU  Patient participation: complete - patient participated  Level of consciousness: awake  Pain score: 0  Pain management: adequate  Airway patency: patent  Anesthetic complications: No anesthetic complications  PONV Status: none  Cardiovascular status: acceptable and stable  Respiratory status: nasal cannula, unassisted, acceptable and spontaneous ventilation  Hydration status: acceptable

## 2018-10-26 NOTE — ANESTHESIA PREPROCEDURE EVALUATION
Anesthesia Evaluation     Patient summary reviewed and Nursing notes reviewed   NPO Solid Status: > 8 hours  NPO Liquid Status: > 8 hours           Airway   Mallampati: II  TM distance: >3 FB  Neck ROM: full  No difficulty expected  Dental      Pulmonary    (-) asthma (No MDI ), shortness of breath, not a smoker, lung cancer  Cardiovascular     Patient on routine beta blocker    (+) hypertension, dysrhythmias PVC, DVT,   (-) past MI, CAD, angina, cardiac stents      Neuro/Psych  (-) seizures, TIA (remote ), CVA, dizziness/light headedness, syncope  GI/Hepatic/Renal/Endo    (+)   renal disease (creat v1.7 recent increase -K ok ) CRI,   (-) liver disease, diabetes, hypothyroidism    Musculoskeletal     Abdominal    Substance History      OB/GYN          Other   (+) arthritis   history of cancer (endometrial )    ROS/Med Hx Other: TXA ? In light of DVT                Anesthesia Plan    ASA 3     spinal   (Off anticoags several weeks)  intravenous induction   Anesthetic plan, all risks, benefits, and alternatives have been provided, discussed and informed consent has been obtained with: patient.    Plan discussed with CRNA.

## 2018-10-27 PROBLEM — D62 ACUTE BLOOD LOSS ANEMIA: Status: ACTIVE | Noted: 2018-10-27

## 2018-10-27 LAB
DEPRECATED RDW RBC AUTO: 46.5 FL (ref 37–54)
ERYTHROCYTE [DISTWIDTH] IN BLOOD BY AUTOMATED COUNT: 13.6 % (ref 11.3–14.5)
HCT VFR BLD AUTO: 27.3 % (ref 34.5–44)
HGB BLD-MCNC: 8.8 G/DL (ref 11.5–15.5)
MCH RBC QN AUTO: 30.2 PG (ref 27–31)
MCHC RBC AUTO-ENTMCNC: 32.2 G/DL (ref 32–36)
MCV RBC AUTO: 93.8 FL (ref 80–99)
PLATELET # BLD AUTO: 120 10*3/MM3 (ref 150–450)
PMV BLD AUTO: 11.3 FL (ref 6–12)
RBC # BLD AUTO: 2.91 10*6/MM3 (ref 3.89–5.14)
WBC NRBC COR # BLD: 9.22 10*3/MM3 (ref 3.5–10.8)

## 2018-10-27 PROCEDURE — 97166 OT EVAL MOD COMPLEX 45 MIN: CPT

## 2018-10-27 PROCEDURE — 97530 THERAPEUTIC ACTIVITIES: CPT

## 2018-10-27 PROCEDURE — 97110 THERAPEUTIC EXERCISES: CPT

## 2018-10-27 PROCEDURE — 99024 POSTOP FOLLOW-UP VISIT: CPT | Performed by: ORTHOPAEDIC SURGERY

## 2018-10-27 PROCEDURE — 97116 GAIT TRAINING THERAPY: CPT

## 2018-10-27 PROCEDURE — 25010000003 CEFAZOLIN IN DEXTROSE 2-4 GM/100ML-% SOLUTION: Performed by: ORTHOPAEDIC SURGERY

## 2018-10-27 PROCEDURE — 85027 COMPLETE CBC AUTOMATED: CPT | Performed by: ORTHOPAEDIC SURGERY

## 2018-10-27 RX ADMIN — APIXABAN 2.5 MG: 2.5 TABLET, FILM COATED ORAL at 21:08

## 2018-10-27 RX ADMIN — SODIUM CHLORIDE 120 ML/HR: 9 INJECTION, SOLUTION INTRAVENOUS at 17:16

## 2018-10-27 RX ADMIN — SODIUM CHLORIDE 120 ML/HR: 9 INJECTION, SOLUTION INTRAVENOUS at 09:19

## 2018-10-27 RX ADMIN — HYDROCODONE BITARTRATE AND ACETAMINOPHEN 1 TABLET: 5; 325 TABLET ORAL at 21:07

## 2018-10-27 RX ADMIN — DOCUSATE SODIUM 100 MG: 100 CAPSULE, LIQUID FILLED ORAL at 09:19

## 2018-10-27 RX ADMIN — MELOXICAM 15 MG: 7.5 TABLET ORAL at 09:19

## 2018-10-27 RX ADMIN — CEFAZOLIN SODIUM 2 G: 2 INJECTION, SOLUTION INTRAVENOUS at 04:15

## 2018-10-27 RX ADMIN — BISACODYL 10 MG: 5 TABLET, COATED ORAL at 21:07

## 2018-10-27 RX ADMIN — DOCUSATE SODIUM 100 MG: 100 CAPSULE, LIQUID FILLED ORAL at 21:08

## 2018-10-28 LAB
ANION GAP SERPL CALCULATED.3IONS-SCNC: 8 MMOL/L (ref 3–11)
BUN BLD-MCNC: 36 MG/DL (ref 9–23)
BUN/CREAT SERPL: 24.5 (ref 7–25)
CALCIUM SPEC-SCNC: 7.2 MG/DL (ref 8.7–10.4)
CHLORIDE SERPL-SCNC: 105 MMOL/L (ref 99–109)
CO2 SERPL-SCNC: 21 MMOL/L (ref 20–31)
CREAT BLD-MCNC: 1.47 MG/DL (ref 0.6–1.3)
DEPRECATED RDW RBC AUTO: 47.6 FL (ref 37–54)
ERYTHROCYTE [DISTWIDTH] IN BLOOD BY AUTOMATED COUNT: 13.5 % (ref 11.3–14.5)
GFR SERPL CREATININE-BSD FRML MDRD: 33 ML/MIN/1.73
GLUCOSE BLD-MCNC: 98 MG/DL (ref 70–100)
HCT VFR BLD AUTO: 24.7 % (ref 34.5–44)
HGB BLD-MCNC: 7.8 G/DL (ref 11.5–15.5)
MCH RBC QN AUTO: 30.4 PG (ref 27–31)
MCHC RBC AUTO-ENTMCNC: 31.6 G/DL (ref 32–36)
MCV RBC AUTO: 96.1 FL (ref 80–99)
PLATELET # BLD AUTO: 109 10*3/MM3 (ref 150–450)
PMV BLD AUTO: 11.2 FL (ref 6–12)
POTASSIUM BLD-SCNC: 4.7 MMOL/L (ref 3.5–5.5)
RBC # BLD AUTO: 2.57 10*6/MM3 (ref 3.89–5.14)
SODIUM BLD-SCNC: 134 MMOL/L (ref 132–146)
WBC NRBC COR # BLD: 8.34 10*3/MM3 (ref 3.5–10.8)

## 2018-10-28 PROCEDURE — 97116 GAIT TRAINING THERAPY: CPT

## 2018-10-28 PROCEDURE — 97110 THERAPEUTIC EXERCISES: CPT

## 2018-10-28 PROCEDURE — 85027 COMPLETE CBC AUTOMATED: CPT | Performed by: ORTHOPAEDIC SURGERY

## 2018-10-28 PROCEDURE — 80048 BASIC METABOLIC PNL TOTAL CA: CPT | Performed by: INTERNAL MEDICINE

## 2018-10-28 PROCEDURE — 97530 THERAPEUTIC ACTIVITIES: CPT

## 2018-10-28 RX ORDER — TAMSULOSIN HYDROCHLORIDE 0.4 MG/1
0.4 CAPSULE ORAL DAILY
Status: DISCONTINUED | OUTPATIENT
Start: 2018-10-28 | End: 2018-11-01 | Stop reason: HOSPADM

## 2018-10-28 RX ADMIN — APIXABAN 2.5 MG: 2.5 TABLET, FILM COATED ORAL at 09:02

## 2018-10-28 RX ADMIN — SODIUM CHLORIDE 120 ML/HR: 9 INJECTION, SOLUTION INTRAVENOUS at 09:05

## 2018-10-28 RX ADMIN — TAMSULOSIN HYDROCHLORIDE 0.4 MG: 0.4 CAPSULE ORAL at 13:07

## 2018-10-28 RX ADMIN — BISACODYL 10 MG: 10 SUPPOSITORY RECTAL at 06:00

## 2018-10-28 RX ADMIN — SODIUM CHLORIDE 120 ML/HR: 9 INJECTION, SOLUTION INTRAVENOUS at 16:53

## 2018-10-28 RX ADMIN — Medication 3 ML: at 19:43

## 2018-10-28 RX ADMIN — HYDROCODONE BITARTRATE AND ACETAMINOPHEN 1 TABLET: 5; 325 TABLET ORAL at 06:01

## 2018-10-28 RX ADMIN — HYDROCODONE BITARTRATE AND ACETAMINOPHEN 1 TABLET: 5; 325 TABLET ORAL at 17:49

## 2018-10-28 RX ADMIN — MELOXICAM 15 MG: 7.5 TABLET ORAL at 09:02

## 2018-10-28 RX ADMIN — APIXABAN 2.5 MG: 2.5 TABLET, FILM COATED ORAL at 19:43

## 2018-10-29 PROBLEM — N99.89 POSTOPERATIVE URINARY RETENTION: Status: ACTIVE | Noted: 2018-10-29

## 2018-10-29 PROBLEM — R33.8 POSTOPERATIVE URINARY RETENTION: Status: ACTIVE | Noted: 2018-10-29

## 2018-10-29 LAB
DEPRECATED RDW RBC AUTO: 47.8 FL (ref 37–54)
ERYTHROCYTE [DISTWIDTH] IN BLOOD BY AUTOMATED COUNT: 13.6 % (ref 11.3–14.5)
HCT VFR BLD AUTO: 23.3 % (ref 34.5–44)
HGB BLD-MCNC: 7.5 G/DL (ref 11.5–15.5)
MCH RBC QN AUTO: 30.7 PG (ref 27–31)
MCHC RBC AUTO-ENTMCNC: 32.2 G/DL (ref 32–36)
MCV RBC AUTO: 95.5 FL (ref 80–99)
PLATELET # BLD AUTO: 136 10*3/MM3 (ref 150–450)
PMV BLD AUTO: 10.8 FL (ref 6–12)
RBC # BLD AUTO: 2.44 10*6/MM3 (ref 3.89–5.14)
WBC NRBC COR # BLD: 7.38 10*3/MM3 (ref 3.5–10.8)

## 2018-10-29 PROCEDURE — 85027 COMPLETE CBC AUTOMATED: CPT | Performed by: ORTHOPAEDIC SURGERY

## 2018-10-29 PROCEDURE — 97110 THERAPEUTIC EXERCISES: CPT

## 2018-10-29 PROCEDURE — 97116 GAIT TRAINING THERAPY: CPT

## 2018-10-29 PROCEDURE — 99024 POSTOP FOLLOW-UP VISIT: CPT | Performed by: ORTHOPAEDIC SURGERY

## 2018-10-29 RX ORDER — TRIAMTERENE AND HYDROCHLOROTHIAZIDE 37.5; 25 MG/1; MG/1
1 TABLET ORAL DAILY
Status: DISCONTINUED | OUTPATIENT
Start: 2018-10-30 | End: 2018-11-01 | Stop reason: HOSPADM

## 2018-10-29 RX ADMIN — BISOPROLOL FUMARATE 5 MG: 5 TABLET ORAL at 09:50

## 2018-10-29 RX ADMIN — Medication 3 ML: at 09:51

## 2018-10-29 RX ADMIN — HYDROCODONE BITARTRATE AND ACETAMINOPHEN 1 TABLET: 5; 325 TABLET ORAL at 20:50

## 2018-10-29 RX ADMIN — Medication 3 ML: at 20:39

## 2018-10-29 RX ADMIN — DOCUSATE SODIUM 100 MG: 100 CAPSULE, LIQUID FILLED ORAL at 20:50

## 2018-10-29 RX ADMIN — HYDROCODONE BITARTRATE AND ACETAMINOPHEN 1 TABLET: 5; 325 TABLET ORAL at 04:25

## 2018-10-29 RX ADMIN — NISOLDIPINE 8.5 MG: 8.5 TABLET, FILM COATED, EXTENDED RELEASE ORAL at 09:50

## 2018-10-29 RX ADMIN — APIXABAN 2.5 MG: 2.5 TABLET, FILM COATED ORAL at 09:50

## 2018-10-29 RX ADMIN — MELOXICAM 15 MG: 7.5 TABLET ORAL at 09:50

## 2018-10-29 RX ADMIN — APIXABAN 2.5 MG: 2.5 TABLET, FILM COATED ORAL at 20:39

## 2018-10-29 RX ADMIN — TAMSULOSIN HYDROCHLORIDE 0.4 MG: 0.4 CAPSULE ORAL at 09:50

## 2018-10-29 RX ADMIN — HYDROCODONE BITARTRATE AND ACETAMINOPHEN 1 TABLET: 5; 325 TABLET ORAL at 14:39

## 2018-10-30 LAB
ANION GAP SERPL CALCULATED.3IONS-SCNC: 5 MMOL/L (ref 3–11)
BACTERIA SPEC AEROBE CULT: NORMAL
BUN BLD-MCNC: 31 MG/DL (ref 9–23)
BUN/CREAT SERPL: 23.8 (ref 7–25)
CALCIUM SPEC-SCNC: 7.4 MG/DL (ref 8.7–10.4)
CHLORIDE SERPL-SCNC: 109 MMOL/L (ref 99–109)
CO2 SERPL-SCNC: 21 MMOL/L (ref 20–31)
CREAT BLD-MCNC: 1.3 MG/DL (ref 0.6–1.3)
DEPRECATED RDW RBC AUTO: 47.2 FL (ref 37–54)
ERYTHROCYTE [DISTWIDTH] IN BLOOD BY AUTOMATED COUNT: 13.8 % (ref 11.3–14.5)
GFR SERPL CREATININE-BSD FRML MDRD: 38 ML/MIN/1.73
GLUCOSE BLD-MCNC: 81 MG/DL (ref 70–100)
GRAM STN SPEC: NORMAL
GRAM STN SPEC: NORMAL
HCT VFR BLD AUTO: 22.3 % (ref 34.5–44)
HGB BLD-MCNC: 7.1 G/DL (ref 11.5–15.5)
MCH RBC QN AUTO: 29.8 PG (ref 27–31)
MCHC RBC AUTO-ENTMCNC: 31.8 G/DL (ref 32–36)
MCV RBC AUTO: 93.7 FL (ref 80–99)
PLATELET # BLD AUTO: 175 10*3/MM3 (ref 150–450)
PMV BLD AUTO: 10.8 FL (ref 6–12)
POTASSIUM BLD-SCNC: 4 MMOL/L (ref 3.5–5.5)
RBC # BLD AUTO: 2.38 10*6/MM3 (ref 3.89–5.14)
SODIUM BLD-SCNC: 135 MMOL/L (ref 132–146)
WBC NRBC COR # BLD: 5.66 10*3/MM3 (ref 3.5–10.8)

## 2018-10-30 PROCEDURE — 97116 GAIT TRAINING THERAPY: CPT

## 2018-10-30 PROCEDURE — 80048 BASIC METABOLIC PNL TOTAL CA: CPT | Performed by: NURSE PRACTITIONER

## 2018-10-30 PROCEDURE — 97535 SELF CARE MNGMENT TRAINING: CPT | Performed by: OCCUPATIONAL THERAPIST

## 2018-10-30 PROCEDURE — 97110 THERAPEUTIC EXERCISES: CPT

## 2018-10-30 PROCEDURE — 99024 POSTOP FOLLOW-UP VISIT: CPT | Performed by: ORTHOPAEDIC SURGERY

## 2018-10-30 PROCEDURE — 85027 COMPLETE CBC AUTOMATED: CPT | Performed by: NURSE PRACTITIONER

## 2018-10-30 RX ORDER — NISOLDIPINE 8.5 MG/1
8.5 TABLET, FILM COATED, EXTENDED RELEASE ORAL DAILY
Status: DISCONTINUED | OUTPATIENT
Start: 2018-10-30 | End: 2018-11-01 | Stop reason: HOSPADM

## 2018-10-30 RX ORDER — HYDROCODONE BITARTRATE AND ACETAMINOPHEN 5; 325 MG/1; MG/1
1 TABLET ORAL EVERY 4 HOURS PRN
Qty: 20 TABLET | Refills: 0 | Status: SHIPPED | OUTPATIENT
Start: 2018-10-30 | End: 2018-11-05

## 2018-10-30 RX ADMIN — HYDROCODONE BITARTRATE AND ACETAMINOPHEN 1 TABLET: 5; 325 TABLET ORAL at 20:46

## 2018-10-30 RX ADMIN — DOCUSATE SODIUM 100 MG: 100 CAPSULE, LIQUID FILLED ORAL at 14:48

## 2018-10-30 RX ADMIN — APIXABAN 2.5 MG: 2.5 TABLET, FILM COATED ORAL at 09:24

## 2018-10-30 RX ADMIN — APIXABAN 2.5 MG: 2.5 TABLET, FILM COATED ORAL at 20:47

## 2018-10-30 RX ADMIN — MELOXICAM 15 MG: 7.5 TABLET ORAL at 09:23

## 2018-10-30 RX ADMIN — HYDROCODONE BITARTRATE AND ACETAMINOPHEN 1 TABLET: 5; 325 TABLET ORAL at 14:48

## 2018-10-30 RX ADMIN — TRIAMTERENE AND HYDROCHLOROTHIAZIDE 1 TABLET: 37.5; 25 TABLET ORAL at 09:23

## 2018-10-30 RX ADMIN — HYDROCODONE BITARTRATE AND ACETAMINOPHEN 1 TABLET: 5; 325 TABLET ORAL at 04:07

## 2018-10-30 RX ADMIN — TAMSULOSIN HYDROCHLORIDE 0.4 MG: 0.4 CAPSULE ORAL at 09:24

## 2018-10-30 RX ADMIN — BISOPROLOL FUMARATE 5 MG: 5 TABLET ORAL at 09:24

## 2018-10-30 RX ADMIN — NISOLDIPINE 8.5 MG: 8.5 TABLET, FILM COATED, EXTENDED RELEASE ORAL at 14:48

## 2018-10-31 LAB
ANION GAP SERPL CALCULATED.3IONS-SCNC: 8 MMOL/L (ref 3–11)
BUN BLD-MCNC: 25 MG/DL (ref 9–23)
BUN/CREAT SERPL: 21.9 (ref 7–25)
CALCIUM SPEC-SCNC: 8.2 MG/DL (ref 8.7–10.4)
CHLORIDE SERPL-SCNC: 108 MMOL/L (ref 99–109)
CO2 SERPL-SCNC: 21 MMOL/L (ref 20–31)
CREAT BLD-MCNC: 1.14 MG/DL (ref 0.6–1.3)
DEPRECATED RDW RBC AUTO: 45.4 FL (ref 37–54)
ERYTHROCYTE [DISTWIDTH] IN BLOOD BY AUTOMATED COUNT: 13.5 % (ref 11.3–14.5)
GFR SERPL CREATININE-BSD FRML MDRD: 45 ML/MIN/1.73
GLUCOSE BLD-MCNC: 102 MG/DL (ref 70–100)
HCT VFR BLD AUTO: 22.9 % (ref 34.5–44)
HGB BLD-MCNC: 7.5 G/DL (ref 11.5–15.5)
IRON 24H UR-MRATE: 22 MCG/DL (ref 50–175)
IRON SATN MFR SERPL: 8 % (ref 15–50)
MCH RBC QN AUTO: 30.1 PG (ref 27–31)
MCHC RBC AUTO-ENTMCNC: 32.8 G/DL (ref 32–36)
MCV RBC AUTO: 92 FL (ref 80–99)
PLATELET # BLD AUTO: 200 10*3/MM3 (ref 150–450)
PMV BLD AUTO: 10.7 FL (ref 6–12)
POTASSIUM BLD-SCNC: 4 MMOL/L (ref 3.5–5.5)
RBC # BLD AUTO: 2.49 10*6/MM3 (ref 3.89–5.14)
SODIUM BLD-SCNC: 137 MMOL/L (ref 132–146)
TIBC SERPL-MCNC: 268 MCG/DL (ref 250–450)
WBC NRBC COR # BLD: 5.39 10*3/MM3 (ref 3.5–10.8)

## 2018-10-31 PROCEDURE — 97110 THERAPEUTIC EXERCISES: CPT

## 2018-10-31 PROCEDURE — 97116 GAIT TRAINING THERAPY: CPT

## 2018-10-31 PROCEDURE — 99024 POSTOP FOLLOW-UP VISIT: CPT | Performed by: ORTHOPAEDIC SURGERY

## 2018-10-31 PROCEDURE — 80048 BASIC METABOLIC PNL TOTAL CA: CPT | Performed by: NURSE PRACTITIONER

## 2018-10-31 PROCEDURE — 85027 COMPLETE CBC AUTOMATED: CPT | Performed by: NURSE PRACTITIONER

## 2018-10-31 PROCEDURE — 83550 IRON BINDING TEST: CPT | Performed by: INTERNAL MEDICINE

## 2018-10-31 PROCEDURE — 83540 ASSAY OF IRON: CPT | Performed by: INTERNAL MEDICINE

## 2018-10-31 RX ADMIN — BISOPROLOL FUMARATE 5 MG: 5 TABLET ORAL at 09:44

## 2018-10-31 RX ADMIN — APIXABAN 2.5 MG: 2.5 TABLET, FILM COATED ORAL at 09:44

## 2018-10-31 RX ADMIN — APIXABAN 2.5 MG: 2.5 TABLET, FILM COATED ORAL at 20:03

## 2018-10-31 RX ADMIN — TAMSULOSIN HYDROCHLORIDE 0.4 MG: 0.4 CAPSULE ORAL at 09:44

## 2018-10-31 RX ADMIN — NISOLDIPINE 8.5 MG: 8.5 TABLET, FILM COATED, EXTENDED RELEASE ORAL at 09:44

## 2018-10-31 RX ADMIN — MELOXICAM 15 MG: 7.5 TABLET ORAL at 09:44

## 2018-10-31 RX ADMIN — HYDROCODONE BITARTRATE AND ACETAMINOPHEN 1 TABLET: 5; 325 TABLET ORAL at 20:03

## 2018-10-31 RX ADMIN — TRIAMTERENE AND HYDROCHLOROTHIAZIDE 1 TABLET: 37.5; 25 TABLET ORAL at 09:44

## 2018-11-01 VITALS
RESPIRATION RATE: 18 BRPM | HEIGHT: 64 IN | SYSTOLIC BLOOD PRESSURE: 145 MMHG | TEMPERATURE: 98.1 F | BODY MASS INDEX: 26.8 KG/M2 | OXYGEN SATURATION: 96 % | DIASTOLIC BLOOD PRESSURE: 85 MMHG | WEIGHT: 157 LBS | HEART RATE: 80 BPM

## 2018-11-01 PROCEDURE — 97110 THERAPEUTIC EXERCISES: CPT

## 2018-11-01 PROCEDURE — 97535 SELF CARE MNGMENT TRAINING: CPT | Performed by: OCCUPATIONAL THERAPIST

## 2018-11-01 PROCEDURE — 99024 POSTOP FOLLOW-UP VISIT: CPT | Performed by: ORTHOPAEDIC SURGERY

## 2018-11-01 RX ORDER — PSEUDOEPHEDRINE HCL 30 MG
100 TABLET ORAL 2 TIMES DAILY PRN
Start: 2018-11-01 | End: 2018-12-28

## 2018-11-01 RX ORDER — FERROUS SULFATE 325(65) MG
325 TABLET ORAL 2 TIMES DAILY
Start: 2018-11-01

## 2018-11-01 RX ORDER — ASPIRIN 81 MG/1
81 TABLET ORAL DAILY
Start: 2018-11-01 | End: 2023-02-23

## 2018-11-01 RX ORDER — ASCORBIC ACID 500 MG
500 TABLET ORAL DAILY
Start: 2018-11-01 | End: 2019-09-13

## 2018-11-01 RX ADMIN — TRIAMTERENE AND HYDROCHLOROTHIAZIDE 1 TABLET: 37.5; 25 TABLET ORAL at 08:02

## 2018-11-01 RX ADMIN — BISOPROLOL FUMARATE 5 MG: 5 TABLET ORAL at 08:02

## 2018-11-01 RX ADMIN — TAMSULOSIN HYDROCHLORIDE 0.4 MG: 0.4 CAPSULE ORAL at 08:02

## 2018-11-01 RX ADMIN — APIXABAN 2.5 MG: 2.5 TABLET, FILM COATED ORAL at 08:02

## 2018-11-01 RX ADMIN — MELOXICAM 15 MG: 7.5 TABLET ORAL at 08:02

## 2018-11-01 RX ADMIN — NISOLDIPINE 8.5 MG: 8.5 TABLET, FILM COATED, EXTENDED RELEASE ORAL at 08:02

## 2018-11-01 NOTE — THERAPY DISCHARGE NOTE
Acute Care - Physical Therapy Treatment Note/Discharge  [facility]     Patient Name: Rose J Kellermann  : 1926  MRN: [id number]  Today's Date: 2018  Onset of Illness/Injury or Date of Surgery: 10/26/18  Date of Referral to PT: 10/26/18  Referring Physician: Samuel    Admit Date: 10/26/2018    Visit Dx:    ICD-10-CM ICD-9-CM   1. Impaired functional mobility, balance, gait, and endurance Z74.09 V49.89   2. Post-traumatic osteoarthritis of left hip M16.52 715.25   3. Impaired mobility and ADLs Z74.09 799.89     Patient Active Problem List   Diagnosis   • Essential hypertension   • Vitamin D deficiency   • Fatigue   • Chronic kidney disease, stage III (moderate)    • Osteoarthritis   • Dyslipidemia   • Post-menopausal bleeding   • Endometrial cancer (CMS/HCC)   • Closed fracture of left hip (CMS/HCC)   • Asymptomatic bacteriuria   • Post-traumatic osteoarthritis of left hip   • Status post total replacement of left hip   • Prediabetes   • Acute blood loss anemia, asymptomatic   • Postoperative urinary retention       Physical Therapy Education     Title: PT OT SLP Therapies (Done)     Topic: Physical Therapy (Done)     Point: Mobility training (Done)    Learning Progress Summary     Learner Status Readiness Method Response Comment Documented by    Patient Done SHANICE Ballard NR reviewed benefits of activity SC 18 1017     Done IHSAN Henley NR reviewed safety with mobility SC 10/31/18 1400     Done Ugo PRASAD reviewed benefits of activity SC 10/31/18 0838     Active Acceptance E NR  AS 10/30/18 1400     Active Acceptance E NR  AS 10/30/18 0833     Done Acceptance E SHANICENR   10/29/18 1638     Done Acceptance E VU,NR Cues required for LE placement with transfers. Pt able to verbalize understanding. GUSTAVO 10/28/18 1539     Done Acceptance E,D SHANICE,NR Pt req vc and tc for LE exercises. Pt educated on proper technique for sit to stands to decrease fall risk. Pt verbalized understanding but req additional  cueing throughout practice.  10/28/18 0927     Done Acceptance E VU,NR PT reminded of HEP and verbalized understanding. Pt educated on dizziness relating to falls and importance of reducing fall risk. GUSTAVO 10/27/18 1551     Done Acceptance E VU,NR Pt educated on hip precautions, unable to recall despite cues. Pt also educated on HEP- verbalized understanding. GUSTAVO 10/27/18 0957     Done Acceptance E,D VU,NR Edu re: lateral hip precautions, WB status, gait mechanics, benefits of mobility  10/26/18 1655          Point: Home exercise program (Done)    Learning Progress Summary     Learner Status Readiness Method Response Comment Documented by    Patient Done SHANICE Ballard NR reviewed benefits of activity SC 11/01/18 1017     Done IHSAN Henley NR reviewed safety with mobility SC 10/31/18 1400     Done Ugo PRASAD reviewed benefits of activity SC 10/31/18 0838     Active Acceptance E NR  AS 10/30/18 1400     Active Acceptance E NR  AS 10/30/18 0833     Done Keya PRASADNR   10/29/18 1638     Done Acceptance E,D VU,NR Pt req vc and tc for LE exercises. Pt educated on proper technique for sit to stands to decrease fall risk. Pt verbalized understanding but req additional cueing throughout practice. GUSTAVO 10/28/18 0927     Done Acceptance E VU,NR PT reminded of HEP and verbalized understanding. Pt educated on dizziness relating to falls and importance of reducing fall risk.  10/27/18 1551     Done Acceptance E VU,NR Pt educated on hip precautions, unable to recall despite cues. Pt also educated on HEP- verbalized understanding.  10/27/18 0957     Done Acceptance ED SHANICE,NR Edu re: lateral hip precautions, WB status, gait mechanics, benefits of mobility  10/26/18 1655          Point: Body mechanics (Done)    Learning Progress Summary     Learner Status Readiness Method Response Comment Documented by    Patient Done SHANICE Ballard NR reviewed benefits of activity SC 11/01/18 1017     Done IHSAN Henley NR reviewed  safety with mobility SC 10/31/18 1400     Done Ugo PRASAD reviewed benefits of activity SC 10/31/18 0838     Active Acceptance E NR  AS 10/30/18 1400     Active Acceptance E NR  AS 10/30/18 0833     Done Acceptance E VU,NR   10/29/18 1638     Done Acceptance E,D VU,NR Pt req vc and tc for LE exercises. Pt educated on proper technique for sit to stands to decrease fall risk. Pt verbalized understanding but req additional cueing throughout practice. GUSTAVO 10/28/18 0927     Done Acceptance E VU,NR PT reminded of HEP and verbalized understanding. Pt educated on dizziness relating to falls and importance of reducing fall risk. GUSTAVO 10/27/18 1551     Done Acceptance E VU,NR Pt educated on hip precautions, unable to recall despite cues. Pt also educated on HEP- verbalized understanding.  10/27/18 0957     Done Acceptance E,D VU,NR Edu re: lateral hip precautions, WB status, gait mechanics, benefits of mobility  10/26/18 1655          Point: Precautions (Done)    Learning Progress Summary     Learner Status Readiness Method Response Comment Documented by    Patient Done Ugo CHAMP[place]NE,SHANICE,NR reviewed benefits of activity SC 11/01/18 1017     Done IHSAN Henley NR reviewed safety with mobility SC 10/31/18 1400     Done Ugo PRASAD reviewed benefits of activity SC 10/31/18 0838     Active Acceptance E NR  AS 10/30/18 1400     Active Acceptance E NR  AS 10/30/18 0833     Done Acceptance E VU,NR   10/29/18 1638     Done Acceptance E,D VU,NR Pt req vc and tc for LE exercises. Pt educated on proper technique for sit to stands to decrease fall risk. Pt verbalized understanding but req additional cueing throughout practice. GUSTAVO 10/28/18 0927     Done Acceptance E VU,NR PT reminded of HEP and verbalized understanding. Pt educated on dizziness relating to falls and importance of reducing fall risk. GUSTAVO 10/27/18 1551     Done Acceptance E VU,NR Pt educated on hip precautions, unable to recall despite cues. Pt also educated on HEP-  verbalized understanding. GUSTAVO 10/27/18 0957     Done Acceptance E,D SHANICE,NR Edu re: lateral hip precautions, WB status, gait mechanics, benefits of mobility  10/26/18 1655                      User Key     Initials Effective Dates Name Provider Type Discipline    SC 06/19/15 -  Sheila Becker, PT Physical Therapist PT     06/19/15 -  Yazmin Nielsen, PT Physical Therapist PT    AS 06/22/15 -  Layla Quiroz, PTA Physical Therapy Assistant PT     04/03/18 -  Jude Brush, PT Physical Therapist PT    GUSTAVO 08/30/18 -  Jeannette Plascencia, PT Physical Therapist PT                    PT Rehab Goals     Row Name 11/01/18 1017             Bed Mobility Goal 1 (PT)    Activity/Assistive Device (Bed Mobility Goal 1, PT) sit to supine/supine to sit  -SC      [place] Level/Cues Needed (Bed Mobility Goal 1, PT) conditional independence  -SC      Time Frame (Bed Mobility Goal 1, PT) 5 days;long term goal (LTG)  -SC      Progress/Outcomes (Bed Mobility Goal 1, PT) goal met  -SC         Transfer Goal 1 (PT)    Activity/Assistive Device (Transfer Goal 1, PT) sit-to-stand/stand-to-sit;walker, rolling  -SC      [place] Level/Cues Needed (Transfer Goal 1, PT) conditional independence  -SC      Time Frame (Transfer Goal 1, PT) 5 days;long term goal (LTG)  -SC      Progress/Outcome (Transfer Goal 1, PT) goal met  -SC         Gait Training Goal 1 (PT)    Activity/Assistive Device (Gait Training Goal 1, PT) gait (walking locomotion);walker, rolling  -SC      [place] Level (Gait Training Goal 1, PT) conditional independence  -SC      Distance (Gait Goal 1, PT) 150 feet  -SC      Time Frame (Gait Training Goal 1, PT) 5 days;long term goal (LTG)  -SC      Progress/Outcome (Gait Training Goal 1, PT) goal met  -SC        User Key  (r) = Recorded By, (t) = Taken By, (c) = Cosigned By    Initials Name Provider Type Discipline    SC Sheila Becker, PT Physical Therapist PT        Therapy Treatment        Rehabilitation  Treatment Summary     Row Name 11/01/18 1017 11/01/18 0755          Treatment Time/Intention    Discipline physical therapist  -SC occupational therapist  -AR     Document Type discharge treatment  -SC therapy note (daily note);discharge treatment  -AR     Subjective Information complains of;fatigue  -SC no complaints  -AR     Mode of Treatment physical therapy  -SC occupational therapy  -AR     Patient/Family Observations daughter  -SC  --     Care Plan Review risks/benefits reviewed  -SC  --     Patient Effort good  -SC excellent  -AR     Existing Precautions/Restrictions fall;left;hip, posterior  -SC fall;left;hip, posterior  -AR     Treatment Considerations/Comments patient refused to walk  -SC  --     Patient Response to Treatment good effort with exercise  -SC  --     Recorded by [SC] Sheila Becker, PT 11/01/18 1149 [AR] Maricruz Gamino, OT 11/01/18 0840     Row Name 11/01/18 0755             Cognitive Assessment/Intervention- PT/OT    Affect/Mental Status (Cognitive) WNL  -AR      Orientation Status (Cognition) oriented x 4  -AR      Follows Commands (Cognition) WNL  -AR      Cognitive Function (Cognitive) WNL  -AR      Personal Safety Interventions fall prevention program maintained  -AR      Recorded by [AR] Maricruz Gamino, OT 11/01/18 0840      Row Name 11/01/18 1017 11/01/18 0755          Safety Issues, Functional Mobility    Safety Issues Affecting Function (Mobility) judgment  -SC  --     Impairments Affecting Function (Mobility) pain;strength  -SC pain;range of motion (ROM);balance  -AR     Recorded by [SC] Sheila Becker, PT 11/01/18 1149 [AR] Maricruz Gamino, OT 11/01/18 0840     Row Name 11/01/18 1017 11/01/18 0755          Mobility Assessment/Intervention    Extremity Weight-bearing Status left lower extremity  -SC left lower extremity  -AR     Left Lower Extremity (Weight-bearing Status) weight-bearing as tolerated (WBAT)  -SC weight-bearing as tolerated (WBAT)  -AR     Recorded by  [SC] Sheila Becker, PT 11/01/18 1149 [AR] Maricruz Gamino, OT 11/01/18 0840     Row Name 11/01/18 1017 11/01/18 0755          Bed Mobility Assessment/Treatment    Comment (Bed Mobility) deferred- uic  -SC Not observed  -AR     Recorded by [SC] Sheila Becker, PT 11/01/18 1149 [AR] Maricruz Gamino, OT 11/01/18 0840     Row Name 11/01/18 0755             Functional Mobility    Functional Mobility- Ind. Level contact guard assist;verbal cues required  -AR      Functional Mobility- Device rolling walker  -AR      Functional Mobility-Distance (Feet) 15  -AR      Recorded by [AR] Maricruz Gamino, OT 11/01/18 0840      Row Name 11/01/18 1017 11/01/18 0755          Transfer Assessment/Treatment    Transfer Assessment/Treatment  -- sit-stand transfer;stand-sit transfer  -AR     Comment (Transfers) deferred- refused  -SC Cues to advance LLE during stand-to-sit transition  -AR     Recorded by [SC] Sheila Becker, PT 11/01/18 1149 [AR] Maricruz Gamino, OT 11/01/18 0840     Row Name 11/01/18 0755             Sit-Stand Transfer    Sit-Stand [place] (Transfers) contact guard  -AR      Assistive Device (Sit-Stand Transfers) walker, front-wheeled  -AR      Recorded by [AR] Maricruz Gamino, OT 11/01/18 0840      Row Name 11/01/18 0755             Stand-Sit Transfer    Stand-Sit [place] (Transfers) verbal cues;contact guard  -AR      Assistive Device (Stand-Sit Transfers) walker, front-wheeled  -AR      Recorded by [AR] Maricruz Gamino, OT 11/01/18 0840      Row Name 11/01/18 0755             Toilet Transfer    Assistive Device (Toilet Transfer) --   pt declined need to toilet  -AR      Recorded by [AR] Maricruz Gamino, OT 11/01/18 0840      Row Name 11/01/18 1017             Gait/Stairs Assessment/Training    Comment (Gait/Stairs) deferred- refused  -SC      Recorded by [SC] Sheila Becker, PT 11/01/18 1149      Row Name 11/01/18 0755             ADL Assessment/Intervention    67251 - OT  Self Care/Mgmt Minutes 24  -AR      BADL Assessment/Intervention bathing;lower body dressing  -AR      Recorded by [AR] Maricruz Gamino, OT 11/01/18 0840      Row Name 11/01/18 0755             Bathing Assessment/Intervention    Bathing [place] Level bathing skills;lower body  -AR      Comment (Bathing) Reviewed use of LH sponge to assist with LB ADLS  -AR      Recorded by [AR] Maricruz Gamino, OT 11/01/18 0840      Row Name 11/01/18 0755             Lower Body Dressing Assessment/Training    Lower Body Dressing [place] Level doff;don;socks;nonverbal cues (demo/gesture);contact guard assist  -AR      Assistive Devices (Lower Body Dressing) reacher;sock-aid  -AR      Lower Body Dressing Position supported sitting  -AR      Comment (Lower Body Dressing) Reviewed precautions and use of AE to assist with maintaining, incorporation of link-dressing technqiue.   -AR      Recorded by [AR] Maricruz Gamino, OT 11/01/18 0840      Row Name 11/01/18 1017             Therapeutic Exercise    24099 - PT Therapeutic Exercise Minutes 10  -SC      Recorded by [SC] Sheila Becker, PT 11/01/18 1149      Row Name 11/01/18 1017             Therapeutic Exercise    Lower Extremity (Therapeutic Exercise) gluteal sets;heel slides, left;LAQ (long arc quad), left;quad sets, bilateral;SAQ (short arc quad), left;SLR (straight leg raise), left   hip abd/add  -SC      Sets/Reps (Therapeutic Exercise) 10-15  -SC      Recorded by [SC] Sheila Becker, PT 11/01/18 1149      Row Name 11/01/18 0755             Static Sitting Balance    Level of [place] (Unsupported Sitting, Static Balance) supervision  -AR      Sitting Position (Unsupported Sitting, Static Balance) sitting in chair  -AR      Recorded by [AR] Maricruz Gamino, OT 11/01/18 0840      Row Name 11/01/18 0755             Dynamic Sitting Balance    Level of [place], Reaches Outside Midline (Sitting, Dynamic Balance) supervision  -AR      Sitting Position,  Reaches Outside Midline (Sitting, Dynamic Balance) sitting in chair  -AR      Recorded by [AR] Maricruz Gamino, OT 11/01/18 0840      Row Name 11/01/18 0755             Static Standing Balance    Level of [place] (Supported Standing, Static Balance) contact guard assist  -AR      Time Able to Maintain Position (Supported Standing, Static Balance) 1 to 2 minutes  -AR      Assistive Device Utilized (Supported Standing, Static Balance) rolling walker  -AR      Recorded by [AR] Maricruz Gamino, OT 11/01/18 0840      Row Name 11/01/18 0755             Dynamic Standing Balance    Level of [place], Reaches Outside Midline (Standing, Dynamic Balance) contact guard assist  -AR      Time Able to Maintain Position, Reaches Outside Midline (Standing, Dynamic Balance) 1 to 2 minutes  -AR      Recorded by [AR] Maricruz Gamino, OT 11/01/18 0840      Row Name 11/01/18 0755             Positioning and Restraints    Pre-Treatment Position sitting in chair/recliner  -AR      Post Treatment Position chair  -AR      In Chair reclined;call light within reach;encouraged to call for assist;exit alarm on  -AR      Recorded by [AR] Maricruz Gamino, OT 11/01/18 0840      Row Name 11/01/18 1017 11/01/18 0755          Pain Scale: Numbers Pre/Post-Treatment    Pain Scale: Numbers, Pretreatment  -- 0/10 - no pain  -AR     Pain Scale: Numbers, Post-Treatment  -- 0/10 - no pain  -AR     Pain Location - Side Left  -SC  --     Pain Location hip  -SC  --     Pain Intervention(s) Repositioned  -SC  --     Recorded by [SC] Sheila Becker, PT 11/01/18 1149 [AR] Maricruz Gamino, OT 11/01/18 0840     Row Name 11/01/18 1017             Pain Scale: FACES Pre/Post-Treatment    Pain: FACES Scale, Pretreatment 2-->hurts little bit  -SC      Pain: FACES Scale, Post-Treatment 2-->hurts little bit  -SC      Recorded by [SC] Sheila Becker, PT 11/01/18 1149      Row Name                Wound 10/26/18 1026 Left hip incision    Wound -  Properties Group Date first assessed: 10/26/18 [JA] Time first assessed: 1026 [JA] Side: Left [JA] Location: hip [JA] Type: incision [JA] Recorded by:  [JA] Louie Barker, RN 10/26/18 1026    Row Name 11/01/18 1017             Coping    Observed Emotional State accepting;pleasant  -SC      Verbalized Emotional State acceptance  -SC      Recorded by [SC] Sheila Becker, PT 11/01/18 1149      Row Name 11/01/18 1017 11/01/18 0755          Plan of Care Review    Plan of Care Reviewed With patient  -SC patient  -AR     Recorded by [SC] Sheila Becker, PT 11/01/18 1149 [AR] Maricruz Gamino, OT 11/01/18 0840     Row Name 11/01/18 0755             Outcome Summary/Treatment Plan (OT)    Daily Summary of Progress (OT) progress toward functional goals as expected;prepare for discharge  -AR      Anticipated Discharge Disposition (OT) inpatient rehabilitation facility  -AR      Reason for Discharge (OT Discharge Summary) patient discharged from this facility  -AR      Recorded by [AR] Maricruz Gamino, OT 11/01/18 0840      Row Name 11/01/18 1017             Outcome Summary/Treatment Plan (PT)    Daily Summary of Progress (PT) progress toward functional goals is good  -SC      Recorded by [SC] Sheila Becker, PT 11/01/18 1149        User Key  (r) = Recorded By, (t) = Taken By, (c) = Cosigned By    Initials Name Effective Dates Discipline    SC Sheila Becker, PT 06/19/15 -  PT    AR Maricruz Gamino, OT 06/22/15 -  OT    Louie Finley, RN 06/16/16 -  Nurse        Wound 10/26/18 1026 Left hip incision (Active)   Dressing Appearance dry;intact 11/1/2018  8:02 AM   Closure Adhesive bandage 10/31/2018  4:00 PM   Base dressing in place, unable to visualize 10/31/2018  2:00 PM   Drainage Amount none 11/1/2018  8:02 AM   Periwound Care, Wound dry periwound area maintained 10/31/2018  6:00 PM       PT Recommendation and Plan  Therapy Frequency (PT Clinical Impression): 2 times/day  Outcome Summary/Treatment  Plan (PT)  Daily Summary of Progress (PT): progress toward functional goals is good  Plan of Care Reviewed With: patient  Progress: improving  Outcome Summary: Patient progressing towards goals. Leaving for rehab.          Outcome Measures     Row Name 11/01/18 1017 11/01/18 0755 10/31/18 1400       How much help from another person do you currently need...    Turning from your back to your side while in flat bed without using bedrails? 3  -SC  -- 3  -SC    Moving from lying on back to sitting on the side of a flat bed without bedrails? 3  -SC  -- 3  -SC    Moving to and from a bed to a chair (including a wheelchair)? 3  -SC  -- 3  -SC    Standing up from a chair using your arms (e.g., wheelchair, bedside chair)? 3  -SC  -- 3  -SC    Climbing 3-5 steps with a railing? 2  -SC  -- 2  -SC    To walk in hospital room? 3  -SC  -- 3  -SC    AM-PAC 6 Clicks Score 17  -SC  -- 17  -SC       How much help from another is currently needed...    Putting on and taking off regular lower body clothing?  -- 3  -AR  --    Bathing (including washing, rinsing, and drying)  -- 3  -AR  --    Toileting (which includes using toilet bed pan or urinal)  -- 3  -AR  --    Putting on and taking off regular upper body clothing  -- 3  -AR  --    Taking care of personal grooming (such as brushing teeth)  -- 3  -AR  --    Eating meals  -- 3  -AR  --    Score  -- 18  -AR  --       Functional Assessment    Outcome Measure Options AM-PAC 6 Clicks Basic Mobility (PT)  -SC AM-PAC 6 Clicks Daily Activity (OT)  -AR AM-PAC 6 Clicks Basic Mobility (PT)  -SC    Row Name 10/31/18 0838 10/30/18 1700 10/30/18 1300       How much help from another person do you currently need...    Turning from your back to your side while in flat bed without using bedrails? 3  -SC  -- 3  -AS    Moving from lying on back to sitting on the side of a flat bed without bedrails? 3  -SC  -- 3  -AS    Moving to and from a bed to a chair (including a wheelchair)? 3  -SC  -- 3  -AS     Standing up from a chair using your arms (e.g., wheelchair, bedside chair)? 3  -SC  -- 3  -AS    Climbing 3-5 steps with a railing? 2  -SC  -- 2  -AS    To walk in hospital room? 3  -SC  -- 3  -AS    AM-PAC 6 Clicks Score 17  -SC  -- 17  -AS       How much help from another is currently needed...    Putting on and taking off regular lower body clothing?  -- 2  -AR  --    Bathing (including washing, rinsing, and drying)  -- 2  -AR  --    Toileting (which includes using toilet bed pan or urinal)  -- 3  -AR  --    Putting on and taking off regular upper body clothing  -- 3  -AR  --    Taking care of personal grooming (such as brushing teeth)  -- 3  -AR  --    Eating meals  -- 3  -AR  --    Score  -- 16  -AR  --       Functional Assessment    Outcome Measure Options -[facility] 6 Clicks Basic Mobility (PT)  -[facility] 6 Clicks Daily Activity (OT)  -[facility] 6 Clicks Basic Mobility (PT)  -AS    Row Name 10/30/18 0802 10/29/18 1530          How much help from another person do you currently need...    Turning from your back to your side while in flat bed without using bedrails? 3  -AS 3  -EH     Moving from lying on back to sitting on the side of a flat bed without bedrails? 3  -AS 3  -EH     Moving to and from a bed to a chair (including a wheelchair)? 3  -AS 3  -EH     Standing up from a chair using your arms (e.g., wheelchair, bedside chair)? 3  -AS 3  -EH     Climbing 3-5 steps with a railing? 2  -AS 2  -EH     To walk in hospital room? 3  -AS 3  -EH     AM-PAC 6 Clicks Score 17  -AS 17  -        Functional Assessment    Outcome Measure Options AM-PAC 6 Clicks Basic Mobility (PT)  -AS AM-[facility] 6 Clicks Basic Mobility (PT)  -       User Key  (r) = Recorded By, (t) = Taken By, (c) = Cosigned By    Initials Name Provider Type    SC Sheila Becker, PT Physical Therapist    EH Yazmin Nielsen, PT Physical Therapist    Maricruz Madden, OT Occupational Therapist    AS Layla Quiroz, [facility] Physical Therapy  Assistant           Time Calculation:         PT Charges     Row Name 11/01/18 1017             Time Calculation    Start Time 1017  -SC      PT Received On 11/01/18  -SC      PT Goal Re-Cert Due Date 11/05/18  -SC         Time Calculation- PT    Total Timed Code Minutes- PT 10 minute(s)  -SC         Timed Charges    11299 - PT Therapeutic Exercise Minutes 10  -SC        User Key  (r) = Recorded By, (t) = Taken By, (c) = Cosigned By    Initials Name Provider Type    SC Sheila Becker, PT Physical Therapist        Therapy Suggested Charges     Code   Minutes Charges    15120 (CPT®) Hc Pt Neuromusc Re Education Ea 15 Min      07067 (CPT®) Hc Pt Ther Proc Ea 15 Min 10 1    62243 (CPT®) Hc Gait Training Ea 15 Min      81891 (CPT®) Hc Pt Therapeutic Act Ea 15 Min      41617 (CPT®) Hc Pt Manual Therapy Ea 15 Min      15198 (CPT®) Hc Pt Iontophoresis Ea 15 Min      91111 (CPT®) Hc Pt Elec Stim Ea-Per 15 Min      19936 (CPT®) Hc Pt Ultrasound Ea 15 Min      99218 (CPT®) Hc Pt Self Care/Mgmt/Train Ea 15 Min      50197 (CPT®) Hc Pt Prosthetic (S) Train Initial Encounter, Each 15 Min      44816 (CPT®) Hc Pt Orthotic(S)/Prosthetic(S) Encounter, Each 15 Min      54146 (CPT®) Hc Orthotic(S) Mgmt/Train Initial Encounter, Each 15min      Total  10 1          Therapy Charges for Today     Code Description Service Date Service Provider Modifiers Qty    23289021700 HC PT THER PROC EA 15 MIN 10/31/2018 Eugenio, Shearon A, PT GP 1    20987659857 HC GAIT TRAINING EA 15 MIN 10/31/2018 Eugenio, Shearon A, PT GP 1    71651436980 HC PT THER PROC EA 15 MIN 10/31/2018 Eugenio, Shearon A, PT GP 1    48480745522 HC GAIT TRAINING EA 15 MIN 10/31/2018 Eugenio, Shearon A, PT GP 1    79814387095 HC PT THER PROC EA 15 MIN 11/1/2018 Eugenio, Shearon A, PT GP 1          PT G-Codes  Outcome Measure Options: AM-PAC 6 Clicks Basic Mobility (PT)  AM-PAC 6 Clicks Score: 17  Score: 18    PT Discharge Summary  Reason for Discharge: All goals achieved  Outcomes Achieved:  Able to achieve all goals within established timeline  Discharge Destination: Inpatient rehabilitation facility    Sheila Becker, PT  11/1/2018

## 2018-11-01 NOTE — THERAPY DISCHARGE NOTE
Acute Care - Occupational Therapy Treatment Note/Discharge   [place]     Patient Name: Rose J Kellermann  : 1926  MRN: [id number]  Today's Date: 2018  Onset of Illness/Injury or Date of Surgery: 10/26/18  Date of Referral to OT: 10/26/18  Referring Physician: Samuel      Admit Date: 10/26/2018    Visit Dx:     ICD-10-CM ICD-9-CM   1. Impaired functional mobility, balance, gait, and endurance Z74.09 V49.89   2. Post-traumatic osteoarthritis of left hip M16.52 715.25   3. Impaired mobility and ADLs Z74.09 799.89     Patient Active Problem List   Diagnosis   • Essential hypertension   • Vitamin D deficiency   • Fatigue   • Chronic kidney disease, stage III (moderate)    • Osteoarthritis   • Dyslipidemia   • Post-menopausal bleeding   • Endometrial cancer (CMS/HCC)   • Closed fracture of left hip (CMS/HCC)   • Asymptomatic bacteriuria   • Post-traumatic osteoarthritis of left hip   • Status post total replacement of left hip   • Prediabetes   • Acute blood loss anemia, asymptomatic   • Postoperative urinary retention       Therapy Treatment          Rehabilitation Treatment Summary     Row Name 18 0755             Treatment Time/Intention    Discipline occupational therapist  -AR      Document Type therapy note (daily note);discharge treatment  -AR      Subjective Information no complaints  -AR      Mode of Treatment occupational therapy  -AR      Patient Effort excellent  -AR      Existing Precautions/Restrictions fall;left;hip, posterior  -AR      Recorded by [AR] Maricruz Gamino OT 18 0840      Row Name 18 0755             Cognitive Assessment/Intervention- PT/OT    Affect/Mental Status (Cognitive) WNL  -AR      Orientation Status (Cognition) oriented x 4  -AR      Follows Commands (Cognition) WNL  -AR      Cognitive Function (Cognitive) WNL  -AR      Personal Safety Interventions fall prevention program maintained  -AR      Recorded by [AR] Maricruz Gamino OT 18 0896       Row Name 11/01/18 0755             Safety Issues, Functional Mobility    Impairments Affecting Function (Mobility) pain;range of motion (ROM);balance  -AR      Recorded by [AR] Maricruz Gamino, OT 11/01/18 0840      Row Name 11/01/18 0755             Mobility Assessment/Intervention    Extremity Weight-bearing Status left lower extremity  -AR      Left Lower Extremity (Weight-bearing Status) weight-bearing as tolerated (WBAT)  -AR      Recorded by [AR] Maricruz Gamino OT 11/01/18 0840      Row Name 11/01/18 0755             Bed Mobility Assessment/Treatment    Comment (Bed Mobility) Not observed  -AR      Recorded by [AR] Maricruz Gamino OT 11/01/18 0840      Row Name 11/01/18 0755             Functional Mobility    Functional Mobility- Ind. Level contact guard assist;verbal cues required  -AR      Functional Mobility- Device rolling walker  -AR      Functional Mobility-Distance (Feet) 15  -AR      Recorded by [AR] Maricruz Gamino OT 11/01/18 0840      Row Name 11/01/18 0755             Transfer Assessment/Treatment    Transfer Assessment/Treatment sit-stand transfer;stand-sit transfer  -AR      Comment (Transfers) Cues to advance LLE during stand-to-sit transition  -AR      Recorded by [AR] Maricruz Gamino OT 11/01/18 0840      Row Name 11/01/18 0755             Sit-Stand Transfer    Sit-Stand [place] (Transfers) contact guard  -AR      Assistive Device (Sit-Stand Transfers) walker, front-wheeled  -AR      Recorded by [AR] Maricruz Gamino OT 11/01/18 0840      Row Name 11/01/18 0755             Stand-Sit Transfer    Stand-Sit [place] (Transfers) verbal cues;contact guard  -AR      Assistive Device (Stand-Sit Transfers) walker, front-wheeled  -AR      Recorded by [AR] Maricruz Gamino OT 11/01/18 0840      Row Name 11/01/18 0755             Toilet Transfer    Assistive Device (Toilet Transfer) --   pt declined need to toilet  -AR      Recorded by [AR] Maricruz Gamino  [place], OT 11/01/18 0840      Row Name 11/01/18 0755             ADL Assessment/Intervention    34063 - OT Self Care/Mgmt Minutes 24  -AR      BADL Assessment/Intervention bathing;lower body dressing  -AR      Recorded by [AR] Maricruz Gamino OT 11/01/18 0840      Row Name 11/01/18 0755             Bathing Assessment/Intervention    Bathing [place] Level bathing skills;lower body  -AR      Comment (Bathing) Reviewed use of LH sponge to assist with LB ADLS  -AR      Recorded by [AR] Maricruz Gamino, OT 11/01/18 0840      Row Name 11/01/18 0755             Lower Body Dressing Assessment/Training    Lower Body Dressing [place] Level doff;don;socks;nonverbal cues (demo/gesture);contact guard assist  -AR      Assistive Devices (Lower Body Dressing) reacher;sock-aid  -AR      Lower Body Dressing Position supported sitting  -AR      Comment (Lower Body Dressing) Reviewed precautions and use of AE to assist with maintaining, incorporation of link-dressing technqiue.   -AR      Recorded by [AR] Maricruz Gamino OT 11/01/18 0840      Row Name 11/01/18 0755             Static Sitting Balance    Level of [place] (Unsupported Sitting, Static Balance) supervision  -AR      Sitting Position (Unsupported Sitting, Static Balance) sitting in chair  -AR      Recorded by [AR] Maricruz Gamino OT 11/01/18 0840      Row Name 11/01/18 0755             Dynamic Sitting Balance    Level of [place], Reaches Outside Midline (Sitting, Dynamic Balance) supervision  -AR      Sitting Position, Reaches Outside Midline (Sitting, Dynamic Balance) sitting in chair  -AR      Recorded by [AR] Maricruz Gamino OT 11/01/18 0840      Row Name 11/01/18 0755             Static Standing Balance    Level of [place] (Supported Standing, Static Balance) contact guard assist  -AR      Time Able to Maintain Position (Supported Standing, Static Balance) 1 to 2 minutes  -AR      Assistive Device Utilized (Supported  Standing, Static Balance) rolling walker  -AR      Recorded by [AR] Maricruz Gamino OT 11/01/18 0840      Row Name 11/01/18 0755             Dynamic Standing Balance    Level of [place], Reaches Outside Midline (Standing, Dynamic Balance) contact guard assist  -AR      Time Able to Maintain Position, Reaches Outside Midline (Standing, Dynamic Balance) 1 to 2 minutes  -AR      Recorded by [AR] Maricruz Gamino OT 11/01/18 0840      Row Name 11/01/18 0755             Positioning and Restraints    Pre-Treatment Position sitting in chair/recliner  -AR      Post Treatment Position chair  -AR      In Chair reclined;call light within reach;encouraged to call for assist;exit alarm on  -AR      Recorded by [AR] Maricruz Gamino OT 11/01/18 0840      Row Name 11/01/18 0755             Pain Scale: Numbers Pre/Post-Treatment    Pain Scale: Numbers, Pretreatment 0/10 - no pain  -AR      Pain Scale: Numbers, Post-Treatment 0/10 - no pain  -AR      Recorded by [AR] Maricruz Gamino, SALVADOR 11/01/18 0840      Row Name                Wound 10/26/18 1026 Left hip incision    Wound - Properties Group Date first assessed: 10/26/18 [JA] Time first assessed: 1026 [JA] Side: Left [JA] Location: hip [JA] Type: incision [JA] Recorded by:  [JA] Louie Barker RN 10/26/18 1026    Row Name 11/01/18 0755             Plan of Care Review    Plan of Care Reviewed With patient  -AR      Recorded by [AR] Maricruz Gamino OT 11/01/18 0840      Row Name 11/01/18 0755             Outcome Summary/Treatment Plan (OT)    Daily Summary of Progress (OT) progress toward functional goals as expected;prepare for discharge  -AR      Anticipated Discharge Disposition (OT) inpatient rehabilitation facility  -AR      Reason for Discharge (OT Discharge Summary) patient discharged from this facility  -AR      Recorded by [AR] Maricruz Gamino OT 11/01/18 0840        User Key  (r) = Recorded By, (t) = Taken By, (c) = Cosigned By     Initials Name Effective Dates Discipline    Maricruz Madden, OT 06/22/15 -  OT    Louie Finley RN 06/16/16 -  Nurse        Wound 10/26/18 1026 Left hip incision (Active)   Dressing Appearance dry;intact;no drainage 10/31/2018  8:05 PM   Closure Adhesive bandage 10/31/2018  4:00 PM   Base dressing in place, unable to visualize 10/31/2018  2:00 PM   Drainage Amount none 10/31/2018  8:05 PM   Dressing Care, Wound low-adherent 10/31/2018 10:00 AM   Periwound Care, Wound dry periwound area maintained 10/31/2018  6:00 PM             OT Rehab Goals     Row Name 11/01/18 0755             Transfer Goal 1 (OT)    Progress/Outcome (Transfer Goal 1, OT) goal not met  -AR         Dressing Goal 1 (OT)    Progress/Outcome (Dressing Goal 1, OT) goal met  -AR         Patient Education Goal (OT)    Progress/Outcome (Patient Education Goal, OT) goal not met  -AR        User Key  (r) = Recorded By, (t) = Taken By, (c) = Cosigned By    Initials Name Provider Type Discipline    Maricruz Madden, OT Occupational Therapist OT          Occupational Therapy Education     Title: PT OT SLP Therapies (Done)     Topic: Occupational Therapy (Done)     Point: ADL training (Done)     Description: Instruct learner(s) on proper safety adaptation and remediation techniques during self care or transfers.   Instruct in proper use of assistive devices.   Learning Progress Summary     Learner Status Readiness Method Response Comment Documented by    Patient Done Ugo E,TB,D VU,NR  AR 11/01/18 0755     Done Ugo PRASAD,NR  AR 10/30/18 1700     Done Acceptance E,D,TB VU,DU,NR Education reinforced for THP, role of OT, transfer training  10/27/18 1324    Family Done Acceptance E,D,TB VU,DU,NR Education reinforced for THP, role of OT, transfer training  10/27/18 1324          Point: Precautions (Done)     Description: Instruct learner(s) on prescribed precautions during self-care and functional transfers.   Learning Progress  Summary     Learner Status Readiness Method Response Comment Documented by    Patient Done Eager E,TB,D VU,NR  AR 11/01/18 0755     Done Eager E VU,NR  AR 10/30/18 1700     Done Acceptance E,D,TB VU,DU,NR Education reinforced for THP, role of OT, transfer training  10/27/18 1324    Family Done Acceptance E,D,TB VU,DU,NR Education reinforced for THP, role of OT, transfer training  10/27/18 1324          Point: Body mechanics (Done)     Description: Instruct learner(s) on proper positioning and spine alignment during self-care, functional mobility activities and/or exercises.   Learning Progress Summary     Learner Status Readiness Method Response Comment Documented by    Patient Done [place] E,TB,D VU,NR  AR 11/01/18 0755     Done Eager E VU,NR  AR 10/30/18 1700                      User Key     Initials Effective Dates Name Provider Type Discipline     06/08/18 -  Sammie Hand, OT Occupational Therapist OT    AR 06/22/15 -  Maricruz Gamino OT Occupational Therapist OT                OT Recommendation and Plan  Outcome Summary/Treatment Plan (OT)  Daily Summary of Progress (OT): progress toward functional goals as expected, prepare for discharge  Anticipated Discharge Disposition (OT): inpatient rehabilitation facility  Reason for Discharge (OT Discharge Summary): patient discharged from this facility  Daily Summary of Progress (OT): progress toward functional goals as expected, prepare for discharge  Plan of Care Review  Plan of Care Reviewed With: patient  Plan of Care Reviewed With: patient  Outcome Summary: Pt had no c/o pain , completed transfer training with [facility] this date. Reviewed use of AE with ADL retraining. Pt anticiaptes [facility] to [facility], recommend continued skilled OT intervention.           Outcome Measures     Row Name 11/01/18 0755 10/31/18 1400 10/31/18 0838       How much help from another person do you currently need...    Turning from your back to your side while in flat bed without  using bedrails?  -- 3  -SC 3  -SC    Moving from lying on back to sitting on the side of a flat bed without bedrails?  -- 3  -SC 3  -SC    Moving to and from a bed to a chair (including a wheelchair)?  -- 3  -SC 3  -SC    Standing up from a chair using your arms (e.g., wheelchair, bedside chair)?  -- 3  -SC 3  -SC    Climbing 3-5 steps with a railing?  -- 2  -SC 2  -SC    To walk in hospital room?  -- 3  -SC 3  -SC    AM-PAC 6 Clicks Score  -- 17  -SC 17  -SC       How much help from another is currently needed...    Putting on and taking off regular lower body clothing? 3  -AR  --  --    Bathing (including washing, rinsing, and drying) 3  -AR  --  --    Toileting (which includes using toilet bed pan or urinal) 3  -AR  --  --    Putting on and taking off regular upper body clothing 3  -AR  --  --    Taking care of personal grooming (such as brushing teeth) 3  -AR  --  --    Eating meals 3  -AR  --  --    Score 18  -AR  --  --       Functional Assessment    Outcome Measure Options AM-PAC 6 Clicks Daily Activity (OT)  -AR -[facility] 6 Clicks Basic Mobility (PT)  -[facility] 6 Clicks Basic Mobility (PT)  -SC    Row Name 10/30/18 1700 10/30/18 1300 10/30/18 0802       How much help from another person do you currently need...    Turning from your back to your side while in flat bed without using bedrails?  -- 3  -AS 3  -AS    Moving from lying on back to sitting on the side of a flat bed without bedrails?  -- 3  -AS 3  -AS    Moving to and from a bed to a chair (including a wheelchair)?  -- 3  -AS 3  -AS    Standing up from a chair using your arms (e.g., wheelchair, bedside chair)?  -- 3  -AS 3  -AS    Climbing 3-5 steps with a railing?  -- 2  -AS 2  -AS    To walk in hospital room?  -- 3  -AS 3  -AS    AM-PAC 6 Clicks Score  -- 17  -AS 17  -AS       How much help from another is currently needed...    Putting on and taking off regular lower body clothing? 2  -AR  --  --    Bathing (including washing, rinsing, and drying) 2   -AR  --  --    Toileting (which includes using toilet bed pan or urinal) 3  -AR  --  --    Putting on and taking off regular upper body clothing 3  -AR  --  --    Taking care of personal grooming (such as brushing teeth) 3  -AR  --  --    Eating meals 3  -AR  --  --    Score 16  -AR  --  --       Functional Assessment    Outcome Measure Options AM-PAC 6 Clicks Daily Activity (OT)  -AR AM-[facility] 6 Clicks Basic Mobility (PT)  -AS AM-[facility] 6 Clicks Basic Mobility (PT)  -AS    Row Name 10/29/18 1530 10/29/18 0857          How much help from another person do you currently need...    Turning from your back to your side while in flat bed without using bedrails? 3  -EH 3  -EH     Moving from lying on back to sitting on the side of a flat bed without bedrails? 3  -EH 3  -EH     Moving to and from a bed to a chair (including a wheelchair)? 3  -EH 3  -EH     Standing up from a chair using your arms (e.g., wheelchair, bedside chair)? 3  -EH 3  -EH     Climbing 3-5 steps with a railing? 2  -EH 2  -EH     To walk in hospital room? 3  -EH 3  -EH     AM-PAC 6 Clicks Score 17  -EH 17  -EH        Functional Assessment    Outcome Measure Options AM-PAC 6 Clicks Basic Mobility (PT)  -EH AM-[facility] 6 Clicks Basic Mobility (PT)  -EH       User Key  (r) = Recorded By, (t) = Taken By, (c) = Cosigned By    Initials Name Provider Type    Sheila Finnegan, PT Physical Therapist    EH Yazmin Nielsen, PT Physical Therapist    Maricruz Madden, OT Occupational Therapist    AS Layla Quiroz, PTA Physical Therapy Assistant           Time Calculation:          Time Calculation- OT     Row Name 11/01/18 0755             Time Calculation- OT    OT Start Time 0755  -AR      Total Timed Code Minutes- OT 24 minute(s)  -AR      OT Received On 11/01/18  -AR      OT Goal Re-Cert Due Date 11/06/18  -AR         Timed Charges    44614 - OT Self Care/Mgmt Minutes 24  -AR        User Key  (r) = Recorded By, (t) = Taken By, (c) = Cosigned By     Initials Name Provider Type    Maricruz Madden OT Occupational Therapist        Therapy Suggested Charges     Code   Minutes Charges    73010 (CPT®) Hc Ot Neuromusc Re Education Ea 15 Min      71331 (CPT®) Hc Ot Ther Proc Ea 15 Min      52786 (CPT®) Hc Ot Therapeutic Act Ea 15 Min      77931 (CPT®) Hc Ot Manual Therapy Ea 15 Min      66955 (CPT®) Hc Ot Iontophoresis Ea 15 Min      83703 (CPT®) Hc Ot Elec Stim Ea-Per 15 Min      98522 (CPT®) Hc Ot Ultrasound Ea 15 Min      04885 (CPT®) Hc Ot Self Care/Mgmt/Train Ea 15 Min 24 2    Total  24 2        Therapy Charges for Today     Code Description Service Date Service Provider Modifiers Qty    04434673184 HC OT SELF CARE/MGMT/TRAIN EA 15 MIN 11/1/2018 Maricruz Gamino OT GO 2    62401480493 HC OT THER SUPP EA 15 MIN 11/1/2018 Maricruz Gamino OT GO 2               OT Discharge Summary  Anticipated Discharge Disposition (OT): inpatient rehabilitation facility  Reason for Discharge: Discharge from facility  Discharge Destination: Inpatient rehabilitation facility    Maricruz Gamino OT  11/1/2018

## 2018-11-01 NOTE — DISCHARGE SUMMARY
Patient Name: Rose J Kellermann  MRN: [id number]  : 1926  DOS: 2018    Attending: Stephen Baker MD    Primary Care Provider: Sam Hung MD    Date of Admission:.10/26/2018  8:46 AM    Date of Discharge:  2018    Discharge Diagnosis:   Status post total replacement of left hip    Post-traumatic osteoarthritis of left hip    Essential hypertension    Chronic kidney disease, stage III (moderate)     Dyslipidemia    Prediabetes    Acute blood loss anemia, asymptomatic    Postoperative urinary retention      Hospital Course  Patient is a 89 y.o. female presented for left total hip arthroplasty by Dr. Baker under spinal anesthesia. She tolerated surgery well and was admitted for further medical management. She uses a cane or walker for ambulation.     Per Dr. Baker's note: ((The patient is a 89 y.o. 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 Baum in 2017, that failed to improve in spite of conservative treatment. The patient opted for a left total hip arthroplasty at this time and consented for the procedure. Please see my office notes for details with regard to preoperative counseling and operative rationale. ))     She has history of chronic DVT and was followed by vascular surgery. She took Xarelto; has since been discontinued and she is maintained with baby aspirin. She will be discharged with one month of Eliquis, then resume baby aspirin.  She has history of HTN. She is seen by Dr. Hinson, cardiology, and was given preop cardiac clearance.      The patient has done well postop. The patient has been able to ambulate 140 feet with PT.    The patient has had good pain control with PO pain medications.  Adjustments were made to pain medications to optimize postop pain management. Risks and benefits of opiate medications discussed with patient.    The patient was placed on DVT  prophylaxis including Eliquis. The patient was encouraged to use IS for atelectasis prophylaxis.   The patient was placed on a bowel regimen to prevent constipation while on pain medication.   The patient's H/H was monitored and iron levels checked. She refused IV iron and procrit injection. We do recommend that if she is willing she would benefit from iron and procrit in the setting of her chronic kidney disease. She will need a follow-up CBC in a week.  She did have postop urinary retention. She was given Flomax and is voiding without difficulty.    It is felt by all involved that the patient can discharge to [facility] at this time, and the patient has no further questions      Procedures Performed  DATE OF PROCEDURE:  10/26/18     PREOPERATIVE DIAGNOSIS: Left hip femoral neck nonunion, with arthritis     POSTOPERATIVE DIAGNOSIS: Left femoral neck nonunion, with arthritis     PROCEDURE PERFORMED: left total hip arthroplasty with [employer] components     SURGEON: Stephen Baker MD       Pertinent Test Results:    I reviewed the patient's new clinical results.     Results from last 7 days  Lab Units 10/31/18  0416 10/30/18  0533 10/29/18  0707   WBC 10*3/mm3 5.39 5.66 7.38   HEMOGLOBIN g/dL 7.5* 7.1* 7.5*   HEMATOCRIT % 22.9* 22.3* 23.3*   PLATELETS 10*3/mm3 200 175 136*       Results from last 7 days  Lab Units 10/31/18  0416 10/30/18  0533 10/28/18  0457   SODIUM mmol/L 137 135 134   POTASSIUM mmol/L 4.0 4.0 4.7   CHLORIDE mmol/L 108 109 105   CO2 mmol/L 21.0 21.0 21.0   BUN mg/dL 25* 31* 36*   CREATININE mg/dL 1.14 1.30 1.47*   CALCIUM mg/dL 8.2* 7.4* 7.2*   GLUCOSE mg/dL 102* 81 98     Results for PARRISALVERTOPASCALE (MRN [id number]) as of 11/1/2018 11:14   Ref. Range 10/31/2018 04:16   Iron Latest Ref Range: 50 - 175 mcg/dL 22 (L)   Iron Saturation Latest Ref Range: 15 - 50 % 8 (L)   TIBC Latest Ref Range: 250 - 450 mcg/dL 268     I reviewed the patient's new imaging including images and reports.      Physical  "therapy: Gaining strength. Walking 140  feet with walker    Discharge Assessment:    Vital Signs  Visit Vitals  /85   Pulse 80   Temp 98.1 °F (36.7 °C) (Tympanic)   Resp 18   Ht 162.6 cm (64\")   Wt 71.2 kg (157 lb)   SpO2 96%   BMI 26.95 kg/m²     Temp (24hrs), Av.2 °F (36.8 °C), Min:98 °F (36.7 °C), Max:98.5 °F (36.9 °C)      General Appearance:    Alert, cooperative, in no acute distress   Lungs:     Clear to auscultation,respirations regular, even and                   unlabored    Heart:    Regular rhythm and normal rate, normal S1 and S2   Abdomen:     Normal bowel sounds, no masses, no organomegaly, soft        non-tender, non-distended, no guarding, no rebound                 tenderness   Extremities:   Moves all extremities well, no edema, no cyanosis, no              Redness. Left hip [place] CDI   Pulses:   Pulses palpable and equal bilaterally   Skin:   No bleeding, bruising or rash   Neurologic:   Cranial nerves 2 - 12 grossly intact, sensation intact. Flexion and dorsiflexion intact bilateral feet.       Discharge Disposition: [facility]    Discharge Medications     Discharge Medications      New Medications      Instructions Start Date   apixaban 2.5 MG tablet tablet  Commonly known as:  ELIQUIS   2.5 mg, Oral, Every 12 Hours Scheduled, For 1 month      docusate sodium 100 MG capsule   100 mg, Oral, 2 Times Daily PRN      ferrous sulfate 325 (65 FE) MG tablet   325 mg, Oral, 2 Times Daily      HYDROcodone-acetaminophen 5-325 MG per tablet  Commonly known as:  NORCO   1 tablet, Oral, Every 4 Hours PRN      vitamin C 500 MG tablet  Commonly known as:  ASCORBIC ACID   500 mg, Oral, Daily         Changes to Medications      Instructions Start Date   aspirin 81 MG EC tablet  What changed:  additional instructions   81 mg, Oral, Daily, Resume in 1 month when Eliquis course complete         Continue These Medications      Instructions Start Date   acetaminophen 325 MG tablet  Commonly known as:  TYLENOL   " 650 mg, Oral, Every 6 Hours PRN      bisoprolol 5 MG tablet  Commonly known as:  ZEBeta   5 mg, Oral, Daily      MULTI VITAMIN DAILY PO   1 tablet, Oral, Daily      nisoldipine 8.5 MG 24 hr tablet  Commonly known as:  SULAR   8.5 mg, Oral, Daily      OSTEO BI-FLEX JOINT SHIELD PO   1 tablet, Oral, Daily      triamterene-hydrochlorothiazide 37.5-25 MG per capsule  Commonly known as:  DYAZIDE   1 capsule, Oral, Every Morning         Stop These Medications    traMADol 50 MG tablet  Commonly known as:  ULTRAM            Discharge Diet: Consistent carb diet    Activity at Discharge: WBAT LLE    Follow-up Appointments  Dr. Baker per his orders  CBC in 1 week    Discharge took over 30 min.    CONNIE Gutierres  11/01/18  11:20 AM   I have personally performed the evaluation on this patient. My history is consistant  with above documentation . My exam finding are listed above. I have personally reviewed and discussed the above formulated discharge plan with patient , her daughter and APRN.wy.

## 2018-11-01 NOTE — PLAN OF CARE
Problem: Patient Care Overview  Goal: Plan of Care Review  Outcome: Unable to achieve outcome(s) by discharge Date Met: 11/01/18 11/01/18 8880   OTHER   Outcome Summary Pt had no c/o pain , completed transfer training with [facility] this date. Reviewed use of AE with ADL retraining. Pt anticiaptes DC to [facility], recommend continued skilled OT intervention.    Plan of Care Review   Progress improving   Coping/Psychosocial   Plan of Care Reviewed With patient       Problem: Fractured Hip (Adult)  Goal: Signs and Symptoms of Listed Potential Problems Will be Absent, Minimized or Managed (Fractured Hip)  Outcome: Ongoing (interventions implemented as appropriate)   11/01/18 4688   Goal/Outcome Evaluation   Problems Assessed (Fractured Hip) functional deficit/self-care deficit   Problems Present (Fractured Hip) functional deficit/self-care deficit

## 2018-11-01 NOTE — PLAN OF CARE
Problem: Patient Care Overview  Goal: Plan of Care Review  Outcome: Outcome(s) achieved Date Met: 11/01/18 11/01/18 1017   OTHER   Outcome Summary Patient progressing towards goals. Leaving for rehab.   Plan of Care Review   Progress improving   Coping/Psychosocial   Plan of Care Reviewed With patient      11/01/18 1017   OTHER   Outcome Summary Patient progressing towards goals. Leaving for rehab.   Plan of Care Review   Progress improving   Coping/Psychosocial   Plan of Care Reviewed With patient      11/01/18 1017   OTHER   Outcome Summary Patient progressing towards goals. Leaving for rehab.   Plan of Care Review   Progress improving   Coping/Psychosocial   Plan of Care Reviewed With patient

## 2018-11-01 NOTE — PROGRESS NOTES
Case Management Discharge Note    Final Note: Ms. Kellermann has a skilled bed at [facility] today, if medically ready.  Notified Ms. Kellermann' daughter, Antonietta, by phone and she is at the bedside and will be transporting her Mother to the facility by private vehicle.  Please call report to [facility] at ph [phone].  Please have a copy of the transfer summary, AVS and any scripts in the discharge packet.  Thank you.    Destination - Selection Complete     Service Request Status Selected Specialties Address Phone Number Fax Number    [facility] Selected Skilled Nursing Facility [address] [id number] [phone]      Durable Medical Equipment     No service has been selected for the patient.      Dialysis/Infusion     No service has been selected for the patient.      Home Medical Care     No service has been selected for the patient.      Social Care     No service has been selected for the patient.             Final Discharge Disposition Code: 03 - skilled nursing facility (SNF)

## 2018-11-01 NOTE — PLAN OF CARE
Problem: Patient Care Overview  Goal: Plan of Care Review  Outcome: Ongoing (interventions implemented as appropriate)   11/01/18 9409   OTHER   Outcome Summary Patient rested well this shift. Up with assist x 1, walker, gait belt. VSS. PRN PO pain meds given x1. DC to [facility] today if medically ready.    Plan of Care Review   Progress no change   Coping/Psychosocial   Plan of Care Reviewed With patient       Problem: Fall Risk (Adult)  Goal: Identify Related Risk Factors and Signs and Symptoms  Outcome: Ongoing (interventions implemented as appropriate)    Goal: Absence of Fall  Outcome: Ongoing (interventions implemented as appropriate)      Problem: Skin Injury Risk (Adult)  Goal: Identify Related Risk Factors and Signs and Symptoms  Outcome: Ongoing (interventions implemented as appropriate)    Goal: Skin Health and Integrity  Outcome: Ongoing (interventions implemented as appropriate)      Problem: Fractured Hip (Adult)  Goal: Signs and Symptoms of Listed Potential Problems Will be Absent, Minimized or Managed (Fractured Hip)  Outcome: Ongoing (interventions implemented as appropriate)

## 2018-11-02 ENCOUNTER — READMISSION MANAGEMENT (OUTPATIENT)
Dept: CALL CENTER | Facility: HOSPITAL | Age: 83
End: 2018-11-02

## 2018-11-02 NOTE — OUTREACH NOTE
Prep Survey      Responses   Facility patient discharged from?  [place]   Is patient eligible?  No   What are the reasons patient is not eligible?  [facility]   Does the patient have one of the following disease processes/diagnoses(primary or secondary)?  Other   Prep survey completed?  Yes          Edel Kamara RN

## 2018-11-09 LAB — BACTERIA SPEC ANAEROBE CULT: NORMAL

## 2018-11-14 ENCOUNTER — OFFICE VISIT (OUTPATIENT)
Dept: ORTHOPEDIC SURGERY | Facility: CLINIC | Age: 83
End: 2018-11-14

## 2018-11-14 DIAGNOSIS — Z96.642 STATUS POST TOTAL REPLACEMENT OF LEFT HIP: Primary | ICD-10-CM

## 2018-11-14 PROCEDURE — 99024 POSTOP FOLLOW-UP VISIT: CPT | Performed by: PHYSICIAN ASSISTANT

## 2018-11-14 NOTE — PROGRESS NOTES
[facility] Orthopaedic Surgery Clinic Note    Subjective     Patient: Rose J Kellermann  : 1926    Primary Care Provider: Sam Hung MD    Requesting Provider: As above    Post-op (3 weeks s/p (L) NIKHIL 10/26/2018)      History    History of Present Illness: Patient presents today 3 weeks status post left total hip arthroplasty with Dr. Baker on 10/26/18.  She reports the hip is 100% better than prior to surgery.  She is working in physical therapy.  She is at [facility] with expected discharge on 18.  She has 3+ pitting edema in bilateral lower extremities but is not been wearing compression stockings.    Current Outpatient Medications on File Prior to Visit   Medication Sig Dispense Refill   • acetaminophen (TYLENOL) 325 MG tablet Take 2 tablets by mouth Every 6 (Six) Hours As Needed for Mild Pain .     • apixaban (ELIQUIS) 2.5 MG tablet tablet Take 1 tablet by mouth Every 12 (Twelve) Hours for 30 days. For 1 month 60 tablet 0   • aspirin 81 MG EC tablet Take 1 tablet by mouth Daily. Resume in 1 month when Eliquis course complete     • bisoprolol (ZEBeta) 5 MG tablet Take 1 tablet by mouth Daily. 90 tablet 3   • docusate sodium 100 MG capsule Take 100 mg by mouth 2 (Two) Times a Day As Needed for Constipation.     • ferrous sulfate 325 (65 FE) MG tablet Take 1 tablet by mouth 2 (Two) Times a Day.     • Misc Natural Products (OSTEO BI-FLEX JOINT SHIELD PO) Take 1 tablet by mouth Daily.     • Multiple Vitamin (MULTI VITAMIN DAILY PO) Take 1 tablet by mouth Daily.     • nisoldipine (SULAR) 8.5 MG 24 hr tablet Take 1 tablet by mouth Daily. 90 tablet 3   • triamterene-hydrochlorothiazide (DYAZIDE) 37.5-25 MG per capsule Take 1 capsule by mouth Every Morning. 90 capsule 3   • vitamin C (ASCORBIC ACID) 500 MG tablet Take 1 tablet by mouth Daily.       No current facility-administered medications on file prior to visit.       Allergies   Allergen Reactions   • Sulfa Antibiotics Nausea Only  and GI Intolerance      Past Medical History:   Diagnosis Date   • Abscess of back    • Arrhythmia     frequent PVC   • Cancer (CMS/[facility]) 12/2011    Endometrial cancer, status post robotically assisted hysterectomy with Dr. Haddad, December 2011.     • CKD (chronic kidney disease), stage III (CMS/[facility])     no dilaysis    • Dizzy    • Dvt femoral (deep venous thrombosis) (CMS/[facility]) 2017    s/p hip surgery in 2017. Took xarelto until August 2018   • Dyslipidemia     Dyslipidemia.  Observing.   • Enthesopathy of hip region    • Generalized osteoarthrosis, involving multiple sites    • Headache    • Hearing loss    • Hypertension    • Low backache    • Needs flu shot    • Osteoarthritis     Osteoarthritis with questionable carpal tunnel syndrome bilaterally.    • Otitis, serous    • Pneumonia 1967    Remote pneumonia, 1967.    • Retinal hemorrhage    • SI joint arthritis (CMS/[facility])    • SOB (shortness of breath)    • Syncope 2001    Remote syncope with working diagnosis of vasodepressor, 2001.    • Venous insufficiency of leg    • Wears glasses     readers     Past Surgical History:   Procedure Laterality Date   • CATARACT EXTRACTION      bilat    • COLONOSCOPY     • HYSTERECTOMY      total? but unsure    • KNEE SURGERY  2001    Remote knee surgery in 2001.- right      Family History   Problem Relation Age of Onset   • Heart disease Mother    • Osteoarthritis Mother    • Hypertension Mother    • Hypertension Father    • Heart attack Father    • Osteoarthritis Father    • Cancer Brother         lung      Social History     Socioeconomic History   • Marital status:      Spouse name: Not on file   • Number of children: Not on file   • Years of education: Not on file   • Highest education level: Not on file   Social Needs   • Financial resource strain: Not on file   • Food insecurity - worry: Not on file   • Food insecurity - inability: Not on file   • Transportation needs - medical: Not on file   • Transportation  needs - non-medical: Not on file   Occupational History   • Not on file   Tobacco Use   • Smoking status: Never Smoker   • Smokeless tobacco: Never Used   Substance and Sexual Activity   • Alcohol use: No   • Drug use: No   • Sexual activity: Defer     Birth control/protection: None   Other Topics Concern   • Not on file   Social History Narrative    Lives alone, 2 daughters, one here, one in [place]. Retired teacher        Review of Systems    The following portions of the patient's history were reviewed and updated as appropriate: allergies, current medications, past family history, past medical history, past social history, past surgical history and problem list.      Objective      Physical Exam  There were no vitals taken for this visit.    There is no height or weight on file to calculate BMI.    Ortho Exam  Left hip exam:  Incision is healing well.  No pain with hip motion  Intact hip flexors, extensors, adductors, abductors  NVI distally    Medical Decision Making    Data Review:   ordered and reviewed x-rays today    Assessment:  1. Status post total replacement of left hip        Plan:  Doing well status post right total hip arthroplasty.  X-rays show well-positioned total hip arthroplasty with no evidence of osteolysis, signs or fracture.  On exam she has no pain with hip rotation.  She does have 3+ pitting edema in bilateral lower extremities and I put on her [employer] to place compression stockings on her daily.  She will continue her therapy.  She will return in 6 to 8 weeks or sooner if needed.      Maricruz Horn, CONCHA  11/14/18  2:19 PM

## 2018-11-16 ENCOUNTER — TELEPHONE (OUTPATIENT)
Dept: ORTHOPEDIC SURGERY | Facility: CLINIC | Age: 83
End: 2018-11-16

## 2018-11-16 NOTE — TELEPHONE ENCOUNTER
[facility] [phone] needs a call back for a verbal order for PT and nurse care as patient wants to start after Thanksgiving which was not ordered.

## 2018-12-07 LAB
FUNGUS WND CULT: NORMAL
MYCOBACTERIUM SPEC CULT: NORMAL
NIGHT BLUE STAIN TISS: NORMAL

## 2018-12-28 ENCOUNTER — OFFICE VISIT (OUTPATIENT)
Dept: FAMILY MEDICINE CLINIC | Facility: CLINIC | Age: 83
End: 2018-12-28

## 2018-12-28 VITALS
BODY MASS INDEX: 28.51 KG/M2 | SYSTOLIC BLOOD PRESSURE: 142 MMHG | DIASTOLIC BLOOD PRESSURE: 80 MMHG | OXYGEN SATURATION: 97 % | HEIGHT: 64 IN | WEIGHT: 167 LBS | HEART RATE: 47 BPM | TEMPERATURE: 98.3 F

## 2018-12-28 DIAGNOSIS — D64.9 ANEMIA, UNSPECIFIED TYPE: ICD-10-CM

## 2018-12-28 DIAGNOSIS — R73.03 PREDIABETES: ICD-10-CM

## 2018-12-28 DIAGNOSIS — I10 ESSENTIAL HYPERTENSION: Primary | ICD-10-CM

## 2018-12-28 DIAGNOSIS — Z96.642 STATUS POST TOTAL REPLACEMENT OF LEFT HIP: ICD-10-CM

## 2018-12-28 DIAGNOSIS — R60.9 EDEMA, UNSPECIFIED TYPE: ICD-10-CM

## 2018-12-28 PROCEDURE — 99214 OFFICE O/P EST MOD 30 MIN: CPT | Performed by: FAMILY MEDICINE

## 2018-12-28 RX ORDER — BISOPROLOL FUMARATE 5 MG/1
5 TABLET, FILM COATED ORAL DAILY
Qty: 90 TABLET | Refills: 3 | Status: SHIPPED | OUTPATIENT
Start: 2018-12-28 | End: 2020-03-02

## 2018-12-28 RX ORDER — FUROSEMIDE 20 MG/1
20 TABLET ORAL DAILY
Qty: 90 TABLET | Refills: 3 | Status: SHIPPED | OUTPATIENT
Start: 2018-12-28 | End: 2019-04-22

## 2018-12-28 NOTE — PROGRESS NOTES
Subjective   Rose J Kellermann is a 89 y.o. female    Chief Complaint    Follow-up left total hip replacement  Hypertension  Edema      History of Present Illness  Patient presents today for a follow-up after total hip replacement.  Patient initially fell and broke her left hip and then underwent a surgical repair with 3 nails.  One of the nails broke and patient's pain dramatically worsened.  She was referred back to orthopedics and hip replacement was recommended.  She reports that she has had virtually no pain since her surgery, especially compared to the pain she was having before.  Concerns include hypertension, bilateral lower extremity edema, elevated blood sugar levels with a hemoglobin A1c of 6.1% and a postoperative anemia with a hemoglobin of 7.5.  The patient tells me she has not been taking iron since discharge from the rehabilitation facility.  She has been compliant with her antihypertensive medications and also her diuretic.  She is unable to get her support stockings on by herself.    The following portions of the patient's history were reviewed and updated as appropriate: allergies, current medications, past social history and problem list    Review of Systems   Constitutional: Negative for appetite change, chills, diaphoresis, fatigue, fever and unexpected weight change.   Eyes: Negative for visual disturbance.   Respiratory: Negative for cough, chest tightness, shortness of breath and wheezing.    Cardiovascular: Negative for chest pain, palpitations and leg swelling.   Gastrointestinal: Negative for abdominal pain, diarrhea, nausea and vomiting.   Endocrine: Negative for polydipsia, polyphagia and polyuria.   Genitourinary: Negative for dysuria, frequency and urgency.   Musculoskeletal: Positive for arthralgias and gait problem. Negative for back pain and myalgias.   Skin: Negative for color change and rash.   Neurological: Negative for dizziness, syncope, weakness, light-headedness, numbness  and headaches.   Hematological: Negative for adenopathy. Does not bruise/bleed easily.   Psychiatric/Behavioral: Negative.        Objective     Vitals:    12/28/18 1528   BP: 142/80   Pulse: (!) 47   Temp: 98.3 °F (36.8 °C)   SpO2: 97%       Physical Exam   Constitutional: She is oriented to person, place, and time. She appears well-developed and well-nourished.   HENT:   Head: Normocephalic.   Mouth/Throat: Oropharynx is clear and moist.   Eyes: Conjunctivae are normal. Pupils are equal, round, and reactive to light.   Neck: Neck supple. No JVD present. No thyromegaly present.   Cardiovascular: Normal rate, regular rhythm, normal heart sounds, intact distal pulses and normal pulses.   No murmur heard.  Pulmonary/Chest: Effort normal and breath sounds normal. No respiratory distress.   Abdominal: Soft. Bowel sounds are normal. There is no hepatosplenomegaly. There is no tenderness.   Musculoskeletal: She exhibits no edema.        Left hip: She exhibits decreased range of motion, decreased strength and tenderness.   Healing incision  Left hip   Lymphadenopathy:     She has no cervical adenopathy.   Neurological: She is alert and oriented to person, place, and time. No sensory deficit.   Skin: Skin is warm and dry. No rash noted. She is not diaphoretic.   Psychiatric: She has a normal mood and affect. Her behavior is normal.   Nursing note and vitals reviewed.      Assessment/Plan   Problem List Items Addressed This Visit        Cardiovascular and Mediastinum    Essential hypertension - Primary    Relevant Medications    furosemide (LASIX) 20 MG tablet    bisoprolol (ZEBeta) 5 MG tablet       Other    Status post total replacement of left hip    Prediabetes      Other Visit Diagnoses     Edema, unspecified type        Anemia, unspecified type

## 2019-01-09 ENCOUNTER — OFFICE VISIT (OUTPATIENT)
Dept: ORTHOPEDIC SURGERY | Facility: CLINIC | Age: 84
End: 2019-01-09

## 2019-01-09 DIAGNOSIS — Z96.642 STATUS POST TOTAL REPLACEMENT OF LEFT HIP: Primary | ICD-10-CM

## 2019-01-09 PROCEDURE — 99024 POSTOP FOLLOW-UP VISIT: CPT | Performed by: PHYSICIAN ASSISTANT

## 2019-01-09 NOTE — PROGRESS NOTES
[facility] Note    Subjective     Patient: Rose J Kellermann  : 1926    Primary Care Provider: Sam Hung MD    Requesting Provider: As above    Post-op (8 week f/u; 10 weeks status post Left Total Hip Arthroplasty 10/26/2018)      History    History of Present Illness: Patient returns today 10 weeks status post left total hip arthroplasty with Dr. Baker in 10/26/18.  Patient reports she continues to do physical therapy and using her walker out of the house and cane at home.  She is not having any pain.    Current Outpatient Medications on File Prior to Visit   Medication Sig Dispense Refill   • acetaminophen (TYLENOL) 325 MG tablet Take 2 tablets by mouth Every 6 (Six) Hours As Needed for Mild Pain .     • aspirin 81 MG EC tablet Take 1 tablet by mouth Daily. Resume in 1 month when Eliquis course complete     • bisoprolol (ZEBeta) 5 MG tablet Take 1 tablet by mouth Daily. 90 tablet 3   • ferrous sulfate 325 (65 FE) MG tablet Take 1 tablet by mouth 2 (Two) Times a Day.     • furosemide (LASIX) 20 MG tablet Take 1 tablet by mouth Daily. 90 tablet 3   • Misc Natural Products (OSTEO BI-FLEX JOINT SHIELD PO) Take 1 tablet by mouth Daily.     • Multiple Vitamin (MULTI VITAMIN DAILY PO) Take 1 tablet by mouth Daily.     • nisoldipine (SULAR) 8.5 MG 24 hr tablet Take 1 tablet by mouth Daily. 90 tablet 3   • vitamin C (ASCORBIC ACID) 500 MG tablet Take 1 tablet by mouth Daily.       No current facility-administered medications on file prior to visit.       Allergies   Allergen Reactions   • Sulfa Antibiotics Nausea Only and GI Intolerance      Past Medical History:   Diagnosis Date   • Abscess of back    • Arrhythmia     frequent PVC   • Cancer (CMS/HCC) 2011    Endometrial cancer, status post robotically assisted hysterectomy with Dr. Haddad, 2011.     • CKD (chronic kidney disease), stage III (CMS/HCC)     no dilaysis    • Dizzy    • Dvt femoral (deep  venous thrombosis) (CMS/[facility]) 2017    s/p hip surgery in 2017. Took xarelto until August 2018   • Dyslipidemia     Dyslipidemia.  Observing.   • Enthesopathy of hip region    • Generalized osteoarthrosis, involving multiple sites    • Headache    • Hearing loss    • Hypertension    • Low backache    • Needs flu shot    • Osteoarthritis     Osteoarthritis with questionable carpal tunnel syndrome bilaterally.    • Otitis, serous    • Pneumonia 1967    Remote pneumonia, 1967.    • Retinal hemorrhage    • SI joint arthritis (CMS/[facility])    • SOB (shortness of breath)    • Syncope 2001    Remote syncope with working diagnosis of vasodepressor, 2001.    • Venous insufficiency of leg    • Wears glasses     readers     Past Surgical History:   Procedure Laterality Date   • CATARACT EXTRACTION      bilat    • COLONOSCOPY     • HIP PERCUTANEOUS PINNING Left 11/24/2017    Procedure: HIP PERCUTANEOUS PINNING;  Surgeon: Lucas Swanson MD;  Location:  [facility];  Service:    • HYSTERECTOMY      total? but unsure    • KNEE SURGERY  2001    Remote knee surgery in 2001.- right    • TOTAL HIP ARTHROPLASTY Left 10/26/2018    Procedure: TOTAL HIP ARTHROPLASTY LEFT;  Surgeon: Stephen Baker MD;  Location:  [facility];  Service: Orthopedics     Family History   Problem Relation Age of Onset   • Heart disease Mother    • Osteoarthritis Mother    • Hypertension Mother    • Hypertension Father    • Heart attack Father    • Osteoarthritis Father    • Cancer Brother         lung      Social History     Socioeconomic History   • Marital status:      Spouse name: Not on file   • Number of children: Not on file   • Years of education: Not on file   • Highest education level: Not on file   Social Needs   • Financial resource strain: Not on file   • Food insecurity - worry: Not on file   • Food insecurity - inability: Not on file   • Transportation needs - medical: Not on file   • Transportation needs - non-medical: Not on file    Occupational History   • Not on file   Tobacco Use   • Smoking status: Never Smoker   • Smokeless tobacco: Never Used   Substance and Sexual Activity   • Alcohol use: No   • Drug use: No   • Sexual activity: Defer     Birth control/protection: None   Other Topics Concern   • Not on file   Social History Narrative    Lives alone, 2 daughters, one here, one in [place]. Retired teacher        Review of Systems    The following portions of the patient's history were reviewed and updated as appropriate: allergies, current medications, past family history, past medical history, past social history, past surgical history and problem list.      Objective      Physical Exam  There were no vitals taken for this visit.    There is no height or weight on file to calculate BMI.    Ortho Exam  Left hip exam:  Incision is well-healed  No pain with hip motion  4/5 hip abductor's adductor's and flexors  Neurovascular intact distally    Medical Decision Making    Data Review:   none    Assessment:  1. Status post total replacement of left hip        Plan:  Doing well status post left total hip arthroplasty.  Patient reports no pain in the hip and continues formal physical therapy.  She is using a walker for ambulation.  I suspect she has a continued limp given how painful hip was before surgery and the significance limp she had.  I would encourage her to continue strengthening exercises with physical therapy at home.  She will return in 3 months or sooner if needed.      Queta Montes PA-C  01/09/19  4:07 PM

## 2019-01-22 ENCOUNTER — TELEPHONE (OUTPATIENT)
Dept: ORTHOPEDIC SURGERY | Facility: CLINIC | Age: 84
End: 2019-01-22

## 2019-01-22 NOTE — TELEPHONE ENCOUNTER
JUDITH MANUEL BECAUSE SHE WOULD LIKE TO CONTINUE THERAPY WITH PT A LITTLE BIT LONGER TO WORK ON BALANCE STRENGTH. SHE WOULD LIKE VERBAL ORDERS. HER NUMBER [phone].

## 2019-04-22 ENCOUNTER — OFFICE VISIT (OUTPATIENT)
Dept: FAMILY MEDICINE CLINIC | Facility: CLINIC | Age: 84
End: 2019-04-22

## 2019-04-22 ENCOUNTER — LAB (OUTPATIENT)
Dept: LAB | Facility: HOSPITAL | Age: 84
End: 2019-04-22

## 2019-04-22 VITALS
HEIGHT: 64 IN | DIASTOLIC BLOOD PRESSURE: 70 MMHG | BODY MASS INDEX: 28.51 KG/M2 | HEART RATE: 64 BPM | SYSTOLIC BLOOD PRESSURE: 132 MMHG | WEIGHT: 167 LBS

## 2019-04-22 DIAGNOSIS — I83.893 VARICOSE VEINS OF BOTH LEGS WITH EDEMA: ICD-10-CM

## 2019-04-22 DIAGNOSIS — I10 ESSENTIAL HYPERTENSION: ICD-10-CM

## 2019-04-22 DIAGNOSIS — R73.03 PREDIABETES: ICD-10-CM

## 2019-04-22 DIAGNOSIS — D64.9 ANEMIA, UNSPECIFIED TYPE: ICD-10-CM

## 2019-04-22 DIAGNOSIS — Z00.00 ROUTINE GENERAL MEDICAL EXAMINATION AT A HEALTH CARE FACILITY: Primary | ICD-10-CM

## 2019-04-22 DIAGNOSIS — Z00.00 MEDICARE ANNUAL WELLNESS VISIT, SUBSEQUENT: ICD-10-CM

## 2019-04-22 DIAGNOSIS — N18.30 CHRONIC KIDNEY DISEASE, STAGE III (MODERATE) (HCC): ICD-10-CM

## 2019-04-22 DIAGNOSIS — Z00.00 ROUTINE GENERAL MEDICAL EXAMINATION AT A HEALTH CARE FACILITY: ICD-10-CM

## 2019-04-22 LAB
ALBUMIN SERPL-MCNC: 4.2 G/DL (ref 3.5–5.2)
ALBUMIN/GLOB SERPL: 1.2 G/DL
ALP SERPL-CCNC: 80 U/L (ref 39–117)
ALT SERPL W P-5'-P-CCNC: 11 U/L (ref 1–33)
ANION GAP SERPL CALCULATED.3IONS-SCNC: 16.4 MMOL/L
AST SERPL-CCNC: 20 U/L (ref 1–32)
BILIRUB SERPL-MCNC: 0.6 MG/DL (ref 0.2–1.2)
BUN BLD-MCNC: 29 MG/DL (ref 8–23)
BUN/CREAT SERPL: 17.3 (ref 7–25)
CALCIUM SPEC-SCNC: 9.9 MG/DL (ref 8.2–9.6)
CHLORIDE SERPL-SCNC: 101 MMOL/L (ref 98–107)
CO2 SERPL-SCNC: 23.6 MMOL/L (ref 22–29)
CREAT BLD-MCNC: 1.68 MG/DL (ref 0.57–1)
DEPRECATED RDW RBC AUTO: 49.3 FL (ref 37–54)
ERYTHROCYTE [DISTWIDTH] IN BLOOD BY AUTOMATED COUNT: 14.5 % (ref 12.3–15.4)
GFR SERPL CREATININE-BSD FRML MDRD: 28 ML/MIN/1.73
GLOBULIN UR ELPH-MCNC: 3.4 GM/DL
GLUCOSE BLD-MCNC: 85 MG/DL (ref 65–99)
HBA1C MFR BLD: 5.98 % (ref 4.8–5.6)
HCT VFR BLD AUTO: 41.3 % (ref 34–46.6)
HGB BLD-MCNC: 13.1 G/DL (ref 12–15.9)
IRON 24H UR-MRATE: 90 MCG/DL (ref 37–145)
MCH RBC QN AUTO: 30.2 PG (ref 26.6–33)
MCHC RBC AUTO-ENTMCNC: 31.7 G/DL (ref 31.5–35.7)
MCV RBC AUTO: 95.2 FL (ref 79–97)
PLATELET # BLD AUTO: 157 10*3/MM3 (ref 140–450)
PMV BLD AUTO: 11.5 FL (ref 6–12)
POTASSIUM BLD-SCNC: 4.6 MMOL/L (ref 3.5–5.2)
PROT SERPL-MCNC: 7.6 G/DL (ref 6–8.5)
RBC # BLD AUTO: 4.34 10*6/MM3 (ref 3.77–5.28)
SODIUM BLD-SCNC: 141 MMOL/L (ref 136–145)
WBC NRBC COR # BLD: 7.17 10*3/MM3 (ref 3.4–10.8)

## 2019-04-22 PROCEDURE — 83540 ASSAY OF IRON: CPT

## 2019-04-22 PROCEDURE — 85027 COMPLETE CBC AUTOMATED: CPT

## 2019-04-22 PROCEDURE — 83036 HEMOGLOBIN GLYCOSYLATED A1C: CPT

## 2019-04-22 PROCEDURE — 36415 COLL VENOUS BLD VENIPUNCTURE: CPT

## 2019-04-22 PROCEDURE — 99397 PER PM REEVAL EST PAT 65+ YR: CPT | Performed by: FAMILY MEDICINE

## 2019-04-22 PROCEDURE — G0439 PPPS, SUBSEQ VISIT: HCPCS | Performed by: FAMILY MEDICINE

## 2019-04-22 PROCEDURE — 80053 COMPREHEN METABOLIC PANEL: CPT

## 2019-04-22 RX ORDER — TRIAMTERENE AND HYDROCHLOROTHIAZIDE 37.5; 25 MG/1; MG/1
1 CAPSULE ORAL EVERY MORNING
Qty: 90 CAPSULE | Refills: 3 | Status: SHIPPED | OUTPATIENT
Start: 2019-04-22 | End: 2020-04-20 | Stop reason: SDUPTHER

## 2019-04-22 RX ORDER — NISOLDIPINE 8.5 MG/1
8.5 TABLET, FILM COATED, EXTENDED RELEASE ORAL DAILY
Qty: 90 TABLET | Refills: 3 | Status: SHIPPED | OUTPATIENT
Start: 2019-04-22 | End: 2020-04-20 | Stop reason: SDUPTHER

## 2019-04-22 RX ORDER — TRIAMTERENE AND HYDROCHLOROTHIAZIDE 37.5; 25 MG/1; MG/1
1 CAPSULE ORAL EVERY MORNING
COMMUNITY
End: 2019-04-22 | Stop reason: SDUPTHER

## 2019-04-22 NOTE — PROGRESS NOTES
Subjective   Rose J Kellermann is a 89 y.o. female    Chief Complaint    Annual physical exam  Subsequent Medicare wellness visit  Hypertension  Chronic kidney disease  Osteoarthritis    History of Present Illness  Patient presents today for annual physical examination and subsequent Medicare wellness visit.  Health maintenance issues are addressed.  Patient does not wish to consider further vaccinations which she bases on her age.  She appears to have recovered from her hip replacement surgery.  She reports that she still uses a cane primarily for confidence.  She occasionally uses a walker when away from home.  She denies any physical symptoms and specifically denies chest pain, shortness of breath or orthopnea.  She admits to occasional swelling in the feet and ankles although that is improved with her diuretic.  No acute complaints today.  She is not fasting but would like to get her blood work done.  She is uncertain about refills.    The following portions of the patient's history were reviewed and updated as appropriate: allergies, current medications, past social history and problem list    Review of Systems   Constitutional: Negative.  Negative for chills, fatigue, fever and unexpected weight change.   HENT: Negative.    Eyes: Negative.    Respiratory: Negative.  Negative for cough, chest tightness, shortness of breath and wheezing.    Cardiovascular: Negative.  Negative for chest pain, palpitations and leg swelling.   Gastrointestinal: Negative.  Negative for abdominal pain, nausea and vomiting.   Endocrine: Negative.  Negative for polydipsia, polyphagia and polyuria.   Genitourinary: Negative.  Negative for dysuria, frequency and urgency.   Musculoskeletal: Negative.  Negative for arthralgias, back pain and myalgias.   Skin: Negative.  Negative for color change and rash.   Allergic/Immunologic: Negative.    Neurological: Negative.  Negative for dizziness, syncope, weakness and headaches.   Hematological:  Negative.  Negative for adenopathy. Does not bruise/bleed easily.   Psychiatric/Behavioral: Negative.    All other systems reviewed and are negative.      Objective     Vitals:    04/22/19 1101   BP: 132/70   Pulse: 64       Physical Exam   Constitutional: She is oriented to person, place, and time. She appears well-developed and well-nourished.   HENT:   Head: Normocephalic and atraumatic.   Right Ear: External ear normal.   Left Ear: External ear normal.   Nose: Nose normal.   Mouth/Throat: Oropharynx is clear and moist.   Eyes: Conjunctivae and EOM are normal. Pupils are equal, round, and reactive to light.   Neck: Normal range of motion. Neck supple. No JVD present. Carotid bruit is not present. No thyromegaly present.   Cardiovascular: Normal rate, regular rhythm, normal heart sounds, intact distal pulses and normal pulses.   No murmur heard.  Pulmonary/Chest: Effort normal and breath sounds normal. No respiratory distress.   Abdominal: Soft. Bowel sounds are normal. She exhibits no mass. There is no hepatosplenomegaly. There is no tenderness.   Musculoskeletal: Normal range of motion. She exhibits no edema.   Lymphadenopathy:     She has no cervical adenopathy.   Neurological: She is alert and oriented to person, place, and time. She has normal reflexes. No cranial nerve deficit.   Skin: Skin is warm and dry. No rash noted.   Psychiatric: She has a normal mood and affect. Her behavior is normal.   Nursing note and vitals reviewed.      Assessment/Plan   Problem List Items Addressed This Visit        Cardiovascular and Mediastinum    Essential hypertension    Relevant Medications    nisoldipine (SULAR) 8.5 MG 24 hr tablet    triamterene-hydrochlorothiazide (DYAZIDE) 37.5-25 MG per capsule       Genitourinary    Chronic kidney disease, stage III (moderate)     Relevant Medications    triamterene-hydrochlorothiazide (DYAZIDE) 37.5-25 MG per capsule    Other Relevant Orders    Comprehensive Metabolic Panel        Other    Prediabetes    Relevant Orders    Comprehensive Metabolic Panel    Hemoglobin A1c      Other Visit Diagnoses     Routine general medical examination at a health care facility    -  Primary    Relevant Orders    CBC (No Diff)    Comprehensive Metabolic Panel    Medicare annual wellness visit, subsequent        Anemia, unspecified type        Relevant Orders    CBC (No Diff)    Iron    Varicose veins of both legs with edema            Patient is counseled during today's visit regarding preventive health measures to include safety in the home, medication safety measures, proper diet issues and appropriate exercise levels.

## 2019-04-22 NOTE — PROGRESS NOTES
QUICK REFERENCE INFORMATION:  The ABCs of the Annual Wellness Visit    Subsequent Medicare Wellness Visit     HEALTH RISK ASSESSMENT    : 1926    Recent Hospitalizations:  Recently treated at the following:  [facility].  [facility]      Current Medical Providers:  Patient Care Team:  Sam Hung MD as PCP - General        Smoking Status:  Social History     Tobacco Use   Smoking Status Never Smoker   Smokeless Tobacco Never Used       Alcohol Consumption:  Social History     Substance and Sexual Activity   Alcohol Use No       Depression Screen:   PHQ-2/PHQ-9 Depression Screening 2019   Little interest or pleasure in doing things 0   Feeling down, depressed, or hopeless 0   Total Score 0       Health Habits and Functional and Cognitive Screening:  Functional & Cognitive Status 2019   Do you have difficulty preparing food and eating? No   Do you have difficulty bathing yourself, getting dressed or grooming yourself? No   Do you have difficulty using the toilet? No   Do you have difficulty moving around from place to place? No   Do you have trouble with steps or getting out of a bed or a chair? No   In the past year have you fallen or experienced a near fall? No   Current Diet Well Balanced Diet   Dental Exam Up to date   Eye Exam Not up to date   Exercise (times per week) 2 times per week   Current Exercise Activities Include Walking   Do you need help using the phone?  No   Are you deaf or do you have serious difficulty hearing?  Yes   Do you need help with transportation? No   Do you need help shopping? No   Do you need help preparing meals?  No   Do you need help with housework?  Yes   Do you need help with laundry? Yes   Do you need help taking your medications? No   Do you need help managing money? No   Do you ever drive or ride in a car without wearing a seat belt? No           Does the patient have evidence of cognitive impairment? No    Asiprin use counseling: Taking  ASA appropriately as indicated      Recent Lab Results:       Lab Results   Component Value Date    HGBA1C 6.10 (H) 10/17/2018              Age-appropriate Screening Schedule:  Refer to the list below for future screening recommendations based on patient's age, sex and/or medical conditions. Orders for these recommended tests are listed in the plan section. The patient has been provided with a written plan.    Health Maintenance   Topic Date Due   • ZOSTER VACCINE (1 of 2) 12/09/1976   • LIPID PANEL  09/08/2017   • TDAP/TD VACCINES (1 - Tdap) 04/22/2020 (Originally 12/9/1945)   • PNEUMOCOCCAL VACCINES (65+ LOW/MEDIUM RISK) (1 of 2 - PCV13) 04/23/2020 (Originally 12/9/1991)   • INFLUENZA VACCINE  08/01/2019   • MAMMOGRAM  Discontinued        Subjective   History of Present Illness    Rose J Kellermann is a 89 y.o. female who presents for an Annual Wellness Visit.    The following portions of the patient's history were reviewed and updated as appropriate: allergies, current medications, past family history, past medical history, past social history, past surgical history and problem list.    Outpatient Medications Prior to Visit   Medication Sig Dispense Refill   • acetaminophen (TYLENOL) 325 MG tablet Take 2 tablets by mouth Every 6 (Six) Hours As Needed for Mild Pain .     • aspirin 81 MG EC tablet Take 1 tablet by mouth Daily. Resume in 1 month when Eliquis course complete     • bisoprolol (ZEBeta) 5 MG tablet Take 1 tablet by mouth Daily. 90 tablet 3   • ferrous sulfate 325 (65 FE) MG tablet Take 1 tablet by mouth 2 (Two) Times a Day.     • Misc Natural Products (OSTEO BI-FLEX JOINT SHIELD PO) Take 1 tablet by mouth Daily.     • Multiple Vitamin (MULTI VITAMIN DAILY PO) Take 1 tablet by mouth Daily.     • vitamin C (ASCORBIC ACID) 500 MG tablet Take 1 tablet by mouth Daily.     • nisoldipine (SULAR) 8.5 MG 24 hr tablet Take 1 tablet by mouth Daily. 90 tablet 3   • triamterene-hydrochlorothiazide (DYAZIDE) 37.5-25  "MG per capsule Take 1 capsule by mouth Every Morning.     • furosemide (LASIX) 20 MG tablet Take 1 tablet by mouth Daily. 90 tablet 3     No facility-administered medications prior to visit.        Patient Active Problem List   Diagnosis   • Essential hypertension   • Vitamin D deficiency   • Fatigue   • Chronic kidney disease, stage III (moderate)    • Osteoarthritis   • Dyslipidemia   • Post-menopausal bleeding   • Endometrial cancer (CMS/[facility])   • Closed fracture of left hip (CMS/[facility])   • Asymptomatic bacteriuria   • Post-traumatic osteoarthritis of left hip   • Status post total replacement of left hip   • Prediabetes   • Acute blood loss anemia, asymptomatic   • Postoperative urinary retention       Advance Care Planning:  Patient has an advance directive - a copy has been provided and is visible in patient header    Identification of Risk Factors:  Risk factors include: cardiovascular risk and increased fall risk.    Review of Systems    Compared to one year ago, the patient feels her physical health is better.  Compared to one year ago, the patient feels her mental health is the same.    Objective     Physical Exam    Vitals:    04/22/19 1101   BP: 132/70   Pulse: 64   Weight: 75.8 kg (167 lb)   Height: 162.6 cm (64.02\")       Patient's Body mass index is 28.65 kg/m². BMI is within normal parameters. No follow-up required..      Assessment/Plan   Patient Self-Management and Personalized Health Advice  The patient has been provided with information about: diet and exercise and preventive services including:   · Exercise counseling provided.    Visit Diagnoses:    ICD-10-CM ICD-9-CM   1. Routine general medical examination at a health care facility Z00.00 V70.0   2. Medicare annual wellness visit, subsequent Z00.00 V70.0   3. Anemia, unspecified type D64.9 285.9   4. Prediabetes R73.03 790.29   5. Varicose veins of both legs with edema I83.893 454.8   6. Chronic kidney disease, stage III (moderate)  N18.3 585.3 "   7. Essential hypertension I10 401.9       Orders Placed This Encounter   Procedures   • CBC (No Diff)     Standing Status:   Future     Number of Occurrences:   1     Standing Expiration Date:   4/22/2020   • Iron     Standing Status:   Future     Number of Occurrences:   1     Standing Expiration Date:   4/22/2020   • Comprehensive Metabolic Panel     Standing Status:   Future     Number of Occurrences:   1     Standing Expiration Date:   4/22/2020   • Hemoglobin A1c     Standing Status:   Future     Number of Occurrences:   1     Standing Expiration Date:   4/22/2020       Outpatient Encounter Medications as of 4/22/2019   Medication Sig Dispense Refill   • acetaminophen (TYLENOL) 325 MG tablet Take 2 tablets by mouth Every 6 (Six) Hours As Needed for Mild Pain .     • aspirin 81 MG EC tablet Take 1 tablet by mouth Daily. Resume in 1 month when Eliquis course complete     • bisoprolol (ZEBeta) 5 MG tablet Take 1 tablet by mouth Daily. 90 tablet 3   • ferrous sulfate 325 (65 FE) MG tablet Take 1 tablet by mouth 2 (Two) Times a Day.     • Misc Natural Products (OSTEO BI-FLEX JOINT SHIELD PO) Take 1 tablet by mouth Daily.     • Multiple Vitamin (MULTI VITAMIN DAILY PO) Take 1 tablet by mouth Daily.     • nisoldipine (SULAR) 8.5 MG 24 hr tablet Take 1 tablet by mouth Daily. 90 tablet 3   • triamterene-hydrochlorothiazide (DYAZIDE) 37.5-25 MG per capsule Take 1 capsule by mouth Every Morning. 90 capsule 3   • vitamin C (ASCORBIC ACID) 500 MG tablet Take 1 tablet by mouth Daily.     • [DISCONTINUED] nisoldipine (SULAR) 8.5 MG 24 hr tablet Take 1 tablet by mouth Daily. 90 tablet 3   • [DISCONTINUED] triamterene-hydrochlorothiazide (DYAZIDE) 37.5-25 MG per capsule Take 1 capsule by mouth Every Morning.     • [DISCONTINUED] furosemide (LASIX) 20 MG tablet Take 1 tablet by mouth Daily. 90 tablet 3     No facility-administered encounter medications on file as of 4/22/2019.        Reviewed use of high risk medication in the  elderly: not applicable  Reviewed for potential of harmful drug interactions in the elderly: not applicable    Follow Up:  Return in about 6 months (around 10/22/2019) for Recheck.     An After Visit Summary and PPPS with all of these plans were given to the patient.

## 2019-04-24 ENCOUNTER — OFFICE VISIT (OUTPATIENT)
Dept: ORTHOPEDIC SURGERY | Facility: CLINIC | Age: 84
End: 2019-04-24

## 2019-04-24 VITALS — HEIGHT: 64 IN | HEART RATE: 64 BPM | OXYGEN SATURATION: 97 % | WEIGHT: 167.11 LBS | BODY MASS INDEX: 28.53 KG/M2

## 2019-04-24 DIAGNOSIS — Z96.642 STATUS POST TOTAL REPLACEMENT OF LEFT HIP: Primary | ICD-10-CM

## 2019-04-24 PROCEDURE — 99213 OFFICE O/P EST LOW 20 MIN: CPT | Performed by: PHYSICIAN ASSISTANT

## 2019-04-24 NOTE — PROGRESS NOTES
[facility] Orthopa[facility] Clinic Note    Subjective     Patient: Rose J Kellermann  : 1926    Primary Care Provider: Sam Hung MD    Requesting Provider: As above    Follow-up of the Left Hip (3 month follow up, 6 month Status post total replacement of left hip 10/26/18)      History    Chief Complaint: Follow-up left total hip arthroplasty    History of Present Illness: Patient returns today for routine follow-up left total hip arthroplasty with Dr. Baker in 2018.  She reports no pain in the hip.  She is still using a cane secondary to weakness, not pain.  She is been very happy with her outcome.  No new symptoms.    Current Outpatient Medications on File Prior to Visit   Medication Sig Dispense Refill   • acetaminophen (TYLENOL) 325 MG tablet Take 2 tablets by mouth Every 6 (Six) Hours As Needed for Mild Pain .     • aspirin 81 MG EC tablet Take 1 tablet by mouth Daily. Resume in 1 month when Eliquis course complete     • bisoprolol (ZEBeta) 5 MG tablet Take 1 tablet by mouth Daily. 90 tablet 3   • ferrous sulfate 325 (65 FE) MG tablet Take 1 tablet by mouth 2 (Two) Times a Day.     • Misc Natural Products (OSTEO BI-FLEX JOINT SHIELD PO) Take 1 tablet by mouth Daily.     • Multiple Vitamin (MULTI VITAMIN DAILY PO) Take 1 tablet by mouth Daily.     • nisoldipine (SULAR) 8.5 MG 24 hr tablet Take 1 tablet by mouth Daily. 90 tablet 3   • triamterene-hydrochlorothiazide (DYAZIDE) 37.5-25 MG per capsule Take 1 capsule by mouth Every Morning. 90 capsule 3   • vitamin C (ASCORBIC ACID) 500 MG tablet Take 1 tablet by mouth Daily.       No current facility-administered medications on file prior to visit.       Allergies   Allergen Reactions   • Sulfa Antibiotics Nausea Only and GI Intolerance      Past Medical History:   Diagnosis Date   • Abscess of back    • Arrhythmia     frequent PVC   • Cancer (CMS/HCC) 2011    Endometrial cancer, status post robotically assisted hysterectomy  with Dr. Haddad, December 2011.     • CKD (chronic kidney disease), stage III (CMS/[facility])     no dilaysis    • Dizzy    • Dvt femoral (deep venous thrombosis) (CMS/[facility]) 2017    s/p hip surgery in 2017. Took xarelto until August 2018   • Dyslipidemia     Dyslipidemia.  Observing.   • Enthesopathy of hip region    • Generalized osteoarthrosis, involving multiple sites    • Headache    • Hearing loss    • Hypertension    • Low backache    • Needs flu shot    • Osteoarthritis     Osteoarthritis with questionable carpal tunnel syndrome bilaterally.    • Otitis, serous    • Pneumonia 1967    Remote pneumonia, 1967.    • Retinal hemorrhage    • SI joint arthritis (CMS/[facility])    • SOB (shortness of breath)    • Syncope 2001    Remote syncope with working diagnosis of vasodepressor, 2001.    • Venous insufficiency of leg    • Wears glasses     readers     Past Surgical History:   Procedure Laterality Date   • CATARACT EXTRACTION      bilat    • COLONOSCOPY     • HIP PERCUTANEOUS PINNING Left 11/24/2017    Procedure: HIP PERCUTANEOUS PINNING;  Surgeon: Lucas Swanson MD;  Location:  [facility];  Service:    • HYSTERECTOMY      total? but unsure    • KNEE SURGERY  2001    Remote knee surgery in 2001.- right    • TOTAL HIP ARTHROPLASTY Left 10/26/2018    Procedure: TOTAL HIP ARTHROPLASTY LEFT;  Surgeon: Stephen Baker MD;  Location:  [facility];  Service: Orthopedics     Family History   Problem Relation Age of Onset   • Heart disease Mother    • Osteoarthritis Mother    • Hypertension Mother    • Hypertension Father    • Heart attack Father    • Osteoarthritis Father    • Cancer Brother         lung      Social History     Socioeconomic History   • Marital status:      Spouse name: Not on file   • Number of children: Not on file   • Years of education: Not on file   • Highest education level: Not on file   Tobacco Use   • Smoking status: Never Smoker   • Smokeless tobacco: Never Used   Substance and Sexual Activity  "  • Alcohol use: No   • Drug use: No   • Sexual activity: Defer     Birth control/protection: None   Social History Narrative    Lives alone, 2 daughters, one here, one in [place]. Retired teacher        Review of Systems   Constitutional: Negative.    HENT: Positive for hearing loss.    Eyes: Negative.    Respiratory: Negative.    Cardiovascular: Negative.    Gastrointestinal: Negative.    Endocrine: Negative.    Genitourinary: Negative.    Musculoskeletal: Positive for arthralgias (left hip).   Skin: Negative.    Allergic/Immunologic: Negative.    Neurological: Negative.    Hematological: Negative.    Psychiatric/Behavioral: Negative.        The following portions of the patient's history were reviewed and updated as appropriate: allergies, current medications, past family history, past medical history, past social history, past surgical history and problem list.      Objective      Physical Exam  Pulse 64   Ht 162.6 cm (64\")   Wt 75.8 kg (167 lb 1.7 oz)   SpO2 97%   Breastfeeding? No   BMI 28.68 kg/m²     Body mass index is 28.68 kg/m².    Patient is well developed, well nourished and in no acute distress.  Alert and oriented x 3.    Ortho Exam  Left hip exam:    RANGE OF MOTION:   FLEXION CONTRACTURE: None   FLEXION: 110 degrees   INTERNAL ROTATION: 20 degrees at 90 degrees of flexion   EXTERNAL ROTATION: 40 degrees at 90 degrees of flexion    PAIN WITH HIP MOTION: no    STRENGTH:  5/5 hip adduction, abduction, flexion. 5/5 strength knee flexion, extension. 5/5 strength ankle dorsiflexion and plantarflexion.     GREATER TROCHANTER BURSAL PAIN:  No    SENSATION TO LIGHT TOUCH:  DEEP PERONEAL/SUPERFICIAL PERONEAL/SURAL/SAPHENOUS/TIBIAL:  intact     Trendelenburg gait        Medical Decision Making    Data Review:   ordered and reviewed x-rays today    Assessment:  1. Status post total replacement of left hip        Plan:  Doing well status post left total hip arthroplasty with Dr. Baker on 10/26/2018.  " X-rays today show well-positioned total hip arthroplasty with no evidence of osteolysis, subsidence or fracture.  Patient has been very pleased with her outcome with no pain in the hip.  We would recommend she continue using her cane.  She should continue doing her strengthening exercises at home.  She will return to see Dr. Baker in October 2019 for annual follow-up with repeat x-rays of the left hip or sooner if needed.    Patient history, diagnosis and treatment plan discussed with Dr. Baker.        Queta Montes PA-C  04/24/19  1:16 PM

## 2019-09-13 ENCOUNTER — OFFICE VISIT (OUTPATIENT)
Dept: CARDIOLOGY | Facility: CLINIC | Age: 84
End: 2019-09-13

## 2019-09-13 VITALS
OXYGEN SATURATION: 92 % | HEART RATE: 52 BPM | HEIGHT: 65 IN | BODY MASS INDEX: 27.99 KG/M2 | DIASTOLIC BLOOD PRESSURE: 70 MMHG | WEIGHT: 168 LBS | SYSTOLIC BLOOD PRESSURE: 162 MMHG

## 2019-09-13 DIAGNOSIS — I10 ESSENTIAL HYPERTENSION: Primary | ICD-10-CM

## 2019-09-13 DIAGNOSIS — M25.472 SWELLING OF ANKLE, LEFT: ICD-10-CM

## 2019-09-13 PROCEDURE — 99213 OFFICE O/P EST LOW 20 MIN: CPT | Performed by: INTERNAL MEDICINE

## 2019-09-13 NOTE — PROGRESS NOTES
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 Fatigue, dyspnea and frequent premature atrial  contractions:  a. 2010, echocardiogram, mild concentric  LVH, mild MR, TR, normal LVEF, mildly elevated RVSP.    b. 2010, Holter with greater 18,000 PACs, improved with Zebeta.    2. Hypertension, presumed essential.    3. DVT 2018  a. Resolution on us 2018  4. Hyperglycemia  a. 2019 A1c 5.9  5. Remote syncope with working diagnosis of vasodepressor, .   6. Remote knee surgery in .    7. Remote pneumonia, .    8. Osteoarthritis with questionable carpal tunnel syndrome bilaterally.    9. ,  children ages 56 and 58.    10. Endometrial cancer, status post robotically assisted hysterectomy with Dr. Haddad, 2011.    11. L hip fx       Chief Complaint   Patient presents with   • Hypertension     Allergies  Allergies   Allergen Reactions   • Sulfa Antibiotics Nausea Only and GI Intolerance       Current Medications    Current Outpatient Medications:   •  acetaminophen (TYLENOL) 325 MG tablet, Take 2 tablets by mouth Every 6 (Six) Hours As Needed for Mild Pain ., Disp: , Rfl:   •  aspirin 81 MG EC tablet, Take 1 tablet by mouth Daily. Resume in 1 month when Eliquis course complete, Disp: , Rfl:   •  bisoprolol (ZEBeta) 5 MG tablet, Take 1 tablet by mouth Daily., Disp: 90 tablet, Rfl: 3  •  ferrous sulfate 325 (65 FE) MG tablet, Take 1 tablet by mouth 2 (Two) Times a Day., Disp: , Rfl:   •  Misc Natural Products (OSTEO BI-FLEX JOINT SHIELD PO), Take 1 tablet by mouth Daily., Disp: , Rfl:   •  Multiple Vitamin (MULTI VITAMIN DAILY PO), Take 1 tablet by mouth Daily., Disp: , Rfl:   •  nisoldipine (SULAR) 8.5 MG 24 hr tablet,  "Take 1 tablet by mouth Daily., Disp: 90 tablet, Rfl: 3  •  triamterene-hydrochlorothiazide (DYAZIDE) 37.5-25 MG per capsule, Take 1 capsule by mouth Every Morning., Disp: 90 capsule, Rfl: 3      History of Present Illness   Rose J Kellermann is a 89 y.o. year old female here for follow up.  Not following blood pressures at home.  Generally feels well from a heart standpoint.  She does have left greater than right lower extremity swelling.          OBJECTIVE:  Vitals:    09/13/19 1256   BP: 162/70   BP Location: Right arm   Patient Position: Sitting   Pulse: 52   SpO2: 92%   Weight: 76.2 kg (168 lb)   Height: 163.8 cm (64.5\")     Physical Exam   Constitutional: She appears well-developed and well-nourished.   Neck: Normal range of motion. Neck supple. No hepatojugular reflux and no JVD present. Carotid bruit is not present. No tracheal deviation present. No thyromegaly present.   Cardiovascular: Normal rate, regular rhythm, S1 normal, S2 normal, intact distal pulses and normal pulses. PMI is not displaced. Exam reveals no gallop, no distant heart sounds, no friction rub, no midsystolic click and no opening snap.   Murmur heard.  Pulses:       Radial pulses are 2+ on the right side, and 2+ on the left side.        Dorsalis pedis pulses are 2+ on the right side, and 2+ on the left side.        Posterior tibial pulses are 2+ on the right side, and 2+ on the left side.   Pulmonary/Chest: Effort normal and breath sounds normal. She has no wheezes. She has no rales.   Abdominal: Soft. Bowel sounds are normal. She exhibits no mass. There is no tenderness. There is no guarding.   Musculoskeletal: She exhibits edema.       Diagnostic Data:  Procedures      ASSESSMENT:   Diagnosis Plan   1. Essential hypertension     2. Swelling of ankle, left         PLAN:  Hypertension patient on polypharmacy Dyazide bisoprolol nisoldipine will continue current medical regimen she will follow blood pressure next 1 week and contact us.    Kin " Sam Ziegler MD, thank you for referring Ms. Kellermann for evaluation.  I have forwarded my electronically generated recommendations to you for review.  Please do not hesitate to call with any questions.      Andrew Xiao MD, FACC

## 2019-09-23 ENCOUNTER — TELEPHONE (OUTPATIENT)
Dept: ORTHOPEDIC SURGERY | Facility: CLINIC | Age: 84
End: 2019-09-23

## 2019-09-24 ENCOUNTER — TELEPHONE (OUTPATIENT)
Dept: CARDIOLOGY | Facility: CLINIC | Age: 84
End: 2019-09-24

## 2019-09-24 NOTE — TELEPHONE ENCOUNTER
Pt called and reported blood pressures running in 120's-138's systolic and I reassured those were appropriate and where we want her bp to run.  Pt knows to follow up with us in 2020

## 2019-10-21 ENCOUNTER — OFFICE VISIT (OUTPATIENT)
Dept: ORTHOPEDIC SURGERY | Facility: CLINIC | Age: 84
End: 2019-10-21

## 2019-10-21 VITALS — BODY MASS INDEX: 27.96 KG/M2 | WEIGHT: 163.8 LBS | HEIGHT: 64 IN

## 2019-10-21 DIAGNOSIS — Z09 POSTOPERATIVE EXAMINATION: ICD-10-CM

## 2019-10-21 DIAGNOSIS — Z96.642 STATUS POST TOTAL REPLACEMENT OF LEFT HIP: Primary | ICD-10-CM

## 2019-10-21 PROCEDURE — 99213 OFFICE O/P EST LOW 20 MIN: CPT | Performed by: ORTHOPAEDIC SURGERY

## 2019-10-21 ASSESSMENT — HOOS JR
HOOS JR SCORE: 0
HOOS JR SCORE: 100

## 2019-10-21 NOTE — PROGRESS NOTES
[facility] Surgery Clinic Note    Subjective     Chief Complaint   Patient presents with   • Follow-up     6 month follow up; 1 year Status post total replacement of left hip 10/26/18        HPI    Rose J Kellermann is a 89 y.o. female.  She follows up today for left hip.  She is doing quite well, with 100% relief compared to her preoperative symptoms.  She is pleased with results.  She is now a year out from surgery.  She walks with a rolling walker.      Patient Active Problem List   Diagnosis   • Essential hypertension   • Vitamin D deficiency   • Fatigue   • Chronic kidney disease, stage III (moderate)    • Osteoarthritis   • Dyslipidemia   • Post-menopausal bleeding   • Endometrial cancer (CMS/HCC)   • Closed fracture of left hip (CMS/[facility])   • Asymptomatic bacteriuria   • Post-traumatic osteoarthritis of left hip   • Status post total replacement of left hip   • Prediabetes   • Acute blood loss anemia, asymptomatic   • Postoperative urinary retention     Past Medical History:   Diagnosis Date   • Abscess of back    • Arrhythmia     frequent PVC   • Cancer (CMS/[facility]) 12/2011    Endometrial cancer, status post robotically assisted hysterectomy with Dr. Haddad, December 2011.     • CKD (chronic kidney disease), stage III (CMS/HCC)     no dilaysis    • Dizzy    • Dvt femoral (deep venous thrombosis) (CMS/[facility]) 2017    s/p hip surgery in 2017. Took xarelto until August 2018   • Dyslipidemia     Dyslipidemia.  Observing.   • Enthesopathy of hip region    • Generalized osteoarthrosis, involving multiple sites    • Headache    • Hearing loss    • Hypertension    • Low backache    • Needs flu shot    • Osteoarthritis     Osteoarthritis with questionable carpal tunnel syndrome bilaterally.    • Otitis, serous    • Pneumonia 1967    Remote pneumonia, 1967.    • Retinal hemorrhage    • SI joint arthritis    • SOB (shortness of breath)    • Syncope 2001    Remote syncope with working diagnosis of vasodepressor,  2001.    • Venous insufficiency of leg    • Wears glasses     readers      Past Surgical History:   Procedure Laterality Date   • CATARACT EXTRACTION      bilat    • COLONOSCOPY     • HIP PERCUTANEOUS PINNING Left 11/24/2017    Procedure: HIP PERCUTANEOUS PINNING;  Surgeon: Lucas Swanson MD;  Location:  [facility];  Service:    • HYSTERECTOMY      total? but unsure    • KNEE SURGERY  2001    Remote knee surgery in 2001.- right    • TOTAL HIP ARTHROPLASTY Left 10/26/2018    Procedure: TOTAL HIP ARTHROPLASTY LEFT;  Surgeon: Stephen Baker MD;  Location:  [facility];  Service: Orthopedics      Family History   Problem Relation Age of Onset   • Heart disease Mother    • Osteoarthritis Mother    • Hypertension Mother    • Hypertension Father    • Heart attack Father    • Osteoarthritis Father    • Cancer Brother         lung     Social History     Socioeconomic History   • Marital status:      Spouse name: Not on file   • Number of children: Not on file   • Years of education: Not on file   • Highest education level: Not on file   Tobacco Use   • Smoking status: Never Smoker   • Smokeless tobacco: Never Used   Substance and Sexual Activity   • Alcohol use: No   • Drug use: No   • Sexual activity: Defer     Birth control/protection: None   Social History Narrative    Lives alone, 2 daughters, one here, one in [place]. Retired teacher      Current Outpatient Medications on File Prior to Visit   Medication Sig Dispense Refill   • acetaminophen (TYLENOL) 325 MG tablet Take 2 tablets by mouth Every 6 (Six) Hours As Needed for Mild Pain .     • aspirin 81 MG EC tablet Take 1 tablet by mouth Daily. Resume in 1 month when Eliquis course complete     • bisoprolol (ZEBeta) 5 MG tablet Take 1 tablet by mouth Daily. 90 tablet 3   • ferrous sulfate 325 (65 FE) MG tablet Take 1 tablet by mouth 2 (Two) Times a Day.     • Misc Natural Products (OSTEO BI-FLEX JOINT SHIELD PO) Take 1 tablet by mouth Daily.     • Multiple  "Vitamin (MULTI VITAMIN DAILY PO) Take 1 tablet by mouth Daily.     • nisoldipine (SULAR) 8.5 MG 24 hr tablet Take 1 tablet by mouth Daily. 90 tablet 3   • triamterene-hydrochlorothiazide (DYAZIDE) 37.5-25 MG per capsule Take 1 capsule by mouth Every Morning. 90 capsule 3     No current facility-administered medications on file prior to visit.       Allergies   Allergen Reactions   • Sulfa Antibiotics Nausea Only and GI Intolerance        Review of Systems   Constitutional: Negative.    HENT: Positive for hearing loss.    Eyes: Negative.    Respiratory: Negative.    Cardiovascular: Negative.    Gastrointestinal: Negative.    Endocrine: Negative.    Genitourinary: Negative.    Musculoskeletal: Positive for arthralgias and joint swelling.   Skin: Negative.    Allergic/Immunologic: Negative.    Neurological: Negative.    Hematological: Bruises/bleeds easily.   Psychiatric/Behavioral: Negative.         Objective      Physical Exam  Ht 163.8 cm (64.49\")   Wt 74.3 kg (163 lb 12.8 oz)   BMI 27.69 kg/m²     Body mass index is 27.69 kg/m².    General:   Mental Status:  Alert   Appearance: Cooperative, in no acute distress   Build and Nutrition: Well-nourished well-developed female   Orientation: Alert and oriented to person, place and time   Posture: Normal   Gait: Normal, with a rolling walker    Integument:   Left hip: Wound is well-healed with no signs of infection    Lower Extremity:   Left Hip:    Tenderness:  None    Swelling:  None    Crepitus:  None    Range of motion:  External Rotation: 30°       Internal Rotation: 30°       Flexion:  100°       Extension:  0°    Deformities:  None  Functional testing: Negative Stinchfield    No leg length discrepancy      Imaging/Studies      Imaging Results (last 24 hours)     Procedure Component Value Units Date/Time    XR Hip With or Without Pelvis 2 - 3 View Left [[id number]] Resulted:  10/21/19 1126     Updated:  10/21/19 1126    Narrative:       Left Hip " Radiographs  Indication: status-post left total hip arthroplasty  Views: low AP pelvis and lateral of the left hip    Comparison: no change compared to prior study, 4/24/2019    Findings:   The components are well aligned, with no signs of loosening or failure.          Assessment and Plan     Anastasia was seen today for follow-up.    Diagnoses and all orders for this visit:    Status post total replacement of left hip  -     XR Hip With or Without Pelvis 2 - 3 View Left    Postoperative examination        1. Status post total replacement of left hip    2. Postoperative examination        I reviewed my findings with the patient today.  Her left total hip arthroplasty is functioning well.  This was a conversion from cannulated screws, from a nonunion of the femoral neck fracture.  She is pleased with results, and we plan to see her back in 4 years, which would be a 5-year checkup from the time of surgery.  I will be happy to see her back sooner for any problems.    Return in about 4 years (around 10/21/2023) for Recheck with X-Rays.      Medical Decision Making  Data/Risk: radiology tests and independent visualization of imaging, lab tests, or EMG/NCV      Stephen Baker MD  10/21/19  11:46 AM

## 2019-10-22 ENCOUNTER — OFFICE VISIT (OUTPATIENT)
Dept: FAMILY MEDICINE CLINIC | Facility: CLINIC | Age: 84
End: 2019-10-22

## 2019-10-22 VITALS
OXYGEN SATURATION: 97 % | WEIGHT: 168 LBS | HEIGHT: 64 IN | HEART RATE: 121 BPM | DIASTOLIC BLOOD PRESSURE: 60 MMHG | SYSTOLIC BLOOD PRESSURE: 128 MMHG | BODY MASS INDEX: 28.68 KG/M2 | TEMPERATURE: 97.9 F

## 2019-10-22 DIAGNOSIS — E78.5 DYSLIPIDEMIA: ICD-10-CM

## 2019-10-22 DIAGNOSIS — I10 ESSENTIAL HYPERTENSION: Primary | ICD-10-CM

## 2019-10-22 DIAGNOSIS — Z23 IMMUNIZATION DUE: ICD-10-CM

## 2019-10-22 DIAGNOSIS — I87.2 VENOUS INSUFFICIENCY OF BOTH LOWER EXTREMITIES: ICD-10-CM

## 2019-10-22 DIAGNOSIS — R60.9 EDEMA, UNSPECIFIED TYPE: ICD-10-CM

## 2019-10-22 DIAGNOSIS — R73.03 PREDIABETES: ICD-10-CM

## 2019-10-22 PROCEDURE — 99214 OFFICE O/P EST MOD 30 MIN: CPT | Performed by: FAMILY MEDICINE

## 2019-10-22 NOTE — PROGRESS NOTES
Subjective   Rose J Kellermann is a 89 y.o. female    Chief Complaint    High blood pressure  Occasional palpitations  Elevated glucose  Abnormal labs    History of Present Illness  Patient presents today to recheck her hypertension.  Her blood pressure is quite normal today.  She had been checking it some at home and reports that it was always normal.  She did see her cardiologist in September and her blood pressure was elevated at that time.  She reports that she occasionally has some skipped beats but does not have any prolonged episodes of irregular pulse and has no symptoms associated with them.  Patient did not get her lab letter after her labs last April.  We went over them today.  Her blood sugar remains slightly elevated BUN and creatinine are also slightly elevated.  Patient reports that she has some occasional swelling in her left lower extremity but it will resolve overnight.  She does not wish to wear support stockings.  Medication list is reviewed with patient.  No refills needed.    The following portions of the patient's history were reviewed and updated as appropriate: allergies, current medications, past social history and problem list    Review of Systems   Constitutional: Negative for chills, fatigue, fever and unexpected weight change.   HENT: Negative.    Respiratory: Negative for cough, chest tightness, shortness of breath and wheezing.    Cardiovascular: Positive for leg swelling. Negative for chest pain and palpitations.   Gastrointestinal: Negative for abdominal pain, nausea and vomiting.   Endocrine: Negative for polydipsia, polyphagia and polyuria.   Genitourinary: Negative for dysuria, frequency and urgency.   Musculoskeletal: Negative for arthralgias, back pain and myalgias.   Skin: Negative for color change and rash.   Neurological: Negative for dizziness, syncope, weakness and headaches.   Hematological: Negative for adenopathy. Bruises/bleeds easily.   Psychiatric/Behavioral:  Negative for dysphoric mood. The patient is not nervous/anxious.        Objective     Vitals:    10/22/19 1056   BP: 128/60   Pulse: (!) 121   Temp: 97.9 °F (36.6 °C)   SpO2: 97%       Physical Exam   Constitutional: She is oriented to person, place, and time. She appears well-developed and well-nourished.   HENT:   Head: Normocephalic.   Mouth/Throat: Oropharynx is clear and moist.   Eyes: Conjunctivae are normal. Pupils are equal, round, and reactive to light.   Neck: Neck supple. No JVD present. No thyromegaly present.   Cardiovascular: Normal rate, regular rhythm, normal heart sounds, intact distal pulses and normal pulses.   No murmur heard.  Pulmonary/Chest: Effort normal and breath sounds normal. No respiratory distress.   Abdominal: Soft. Bowel sounds are normal. There is no hepatosplenomegaly. There is no tenderness.   Musculoskeletal: She exhibits edema. She exhibits no deformity.   Lymphadenopathy:     She has no cervical adenopathy.   Neurological: She is alert and oriented to person, place, and time.   Skin: Skin is warm and dry. No rash noted.   Psychiatric: She has a normal mood and affect. Her behavior is normal.   Nursing note and vitals reviewed.      Assessment/Plan   Problem List Items Addressed This Visit        Cardiovascular and Mediastinum    Essential hypertension - Primary       Other    Dyslipidemia    Prediabetes      Other Visit Diagnoses     Edema, unspecified type        Venous insufficiency of both lower extremities            Problem #1 continue current antihypertensive medications.  Continue to check blood pressure at home weekly.  Problem #2 continue diet treatment for elevated lipids.  Problem #3 stress limited sweets and carbohydrates.  Problem #4 elevate legs when sitting, consider support stockings (patient states she will not wear support stockings)  Problem #5 elevate legs when sitting, consider support stockings

## 2019-12-09 ENCOUNTER — OFFICE VISIT (OUTPATIENT)
Dept: FAMILY MEDICINE CLINIC | Facility: CLINIC | Age: 84
End: 2019-12-09

## 2019-12-09 ENCOUNTER — TELEPHONE (OUTPATIENT)
Dept: FAMILY MEDICINE CLINIC | Facility: CLINIC | Age: 84
End: 2019-12-09

## 2019-12-09 VITALS
BODY MASS INDEX: 28.4 KG/M2 | TEMPERATURE: 100.8 F | DIASTOLIC BLOOD PRESSURE: 62 MMHG | HEIGHT: 64 IN | SYSTOLIC BLOOD PRESSURE: 126 MMHG | OXYGEN SATURATION: 97 % | HEART RATE: 80 BPM

## 2019-12-09 DIAGNOSIS — J01.90 ACUTE SINUSITIS, RECURRENCE NOT SPECIFIED, UNSPECIFIED LOCATION: Primary | ICD-10-CM

## 2019-12-09 DIAGNOSIS — J40 BRONCHITIS: ICD-10-CM

## 2019-12-09 LAB
EXPIRATION DATE: NORMAL
FLUAV AG NPH QL: NEGATIVE
FLUBV AG NPH QL: NEGATIVE
INTERNAL CONTROL: NORMAL
Lab: NORMAL

## 2019-12-09 PROCEDURE — 99213 OFFICE O/P EST LOW 20 MIN: CPT | Performed by: FAMILY MEDICINE

## 2019-12-09 PROCEDURE — 87804 INFLUENZA ASSAY W/OPTIC: CPT | Performed by: FAMILY MEDICINE

## 2019-12-09 RX ORDER — PREDNISONE 10 MG/1
TABLET ORAL
Qty: 30 TABLET | Refills: 0 | Status: SHIPPED | OUTPATIENT
Start: 2019-12-09 | End: 2022-06-10

## 2019-12-09 RX ORDER — PROMETHAZINE HYDROCHLORIDE AND CODEINE PHOSPHATE 6.25; 1 MG/5ML; MG/5ML
5 SYRUP ORAL EVERY 6 HOURS PRN
Qty: 180 ML | Refills: 0 | Status: SHIPPED | OUTPATIENT
Start: 2019-12-09 | End: 2022-06-10

## 2019-12-09 RX ORDER — LEVOFLOXACIN 500 MG/1
500 TABLET, FILM COATED ORAL DAILY
Qty: 10 TABLET | Refills: 0 | Status: SHIPPED | OUTPATIENT
Start: 2019-12-09 | End: 2022-06-10

## 2019-12-09 NOTE — TELEPHONE ENCOUNTER
Pt called and said she went to an urgent care for bronchitis and the medications they gave her have not helped at all, she is still feeling miserable and wants to see Dr. Hung today if possible.  His next available appt was 1/3 and she cannot wait that long and wants to see if he can work her in.    Callback [phone]

## 2019-12-10 ENCOUNTER — HOSPITAL ENCOUNTER (EMERGENCY)
Facility: HOSPITAL | Age: 84
Discharge: HOME | End: 2019-12-10
Attending: EMERGENCY MEDICINE | Admitting: EMERGENCY MEDICINE

## 2019-12-10 ENCOUNTER — APPOINTMENT (OUTPATIENT)
Dept: CT IMAGING | Facility: HOSPITAL | Age: 84
End: 2019-12-10

## 2019-12-10 VITALS
DIASTOLIC BLOOD PRESSURE: 79 MMHG | OXYGEN SATURATION: 95 % | WEIGHT: 150 LBS | SYSTOLIC BLOOD PRESSURE: 120 MMHG | HEIGHT: 64 IN | RESPIRATION RATE: 16 BRPM | BODY MASS INDEX: 25.61 KG/M2 | TEMPERATURE: 97.4 F | HEART RATE: 79 BPM
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
## 2019-12-10 DIAGNOSIS — S00.03XA HEMATOMA OF SCALP, INITIAL ENCOUNTER: ICD-10-CM

## 2019-12-10 DIAGNOSIS — S09.90XA INJURY OF HEAD, INITIAL ENCOUNTER: ICD-10-CM

## 2019-12-10 PROCEDURE — 70450 CT HEAD/BRAIN W/O DYE: CPT

## 2019-12-10 PROCEDURE — 99283 EMERGENCY DEPT VISIT LOW MDM: CPT

## 2019-12-10 NOTE — ED PROVIDER NOTES
Subjective   Rose J Kellermann is a 89 y.o. female who presents to the ED with c/o fall. The patient was reportedly walking down the hallway in her home when she fell. She states that she does not remember injuring her head but the patient's daughter states that the patient received a bump on the back of her head after the fall. The patient denies loss of consciousness and the patient's daughter states that the patient has not shown any mental status changes after the fall. The patient was reportedly able to ambulate normally after the fall. She denies any pain. The patient states the she takes a baby Asprin regularly. She also reports that she has been dizzy recently due to her bronchitis, but states that her bronchitis symptoms have been improving. There are no other acute complaints at this time.        History provided by:  Patient, relative and medical records  Fall   Mechanism of injury: fall    Injury location:  Head/neck  Head/neck injury location:  Scalp  Incident location:  Home  Fall:     Fall occurred:  Walking and standing    Point of impact:  Head  Prior to arrival data:     Loss of consciousness: no    Associated symptoms: no abdominal pain, no back pain, no chest pain, no difficulty breathing, no headaches, no loss of consciousness, no nausea, no neck pain and no vomiting    Risk factors: anticoagulation therapy (asa)    Risk factors: no CAD        Review of Systems   Respiratory: Positive for cough.    Cardiovascular: Negative for chest pain.   Gastrointestinal: Negative for abdominal pain, nausea and vomiting.   Musculoskeletal: Negative for back pain and neck pain.   Neurological: Positive for dizziness. Negative for loss of consciousness and headaches.   All other systems reviewed and are negative.      Past Medical History:   Diagnosis Date   • Abscess of back    • Arrhythmia     frequent PVC   • Cancer (CMS/[facility]) 12/2011    Endometrial cancer, status post robotically assisted hysterectomy with  Dr. Haddad, December 2011.     • CKD (chronic kidney disease), stage III (CMS/[facility])     no dilaysis    • Dizzy    • Dvt femoral (deep venous thrombosis) (CMS/[facility]) 2017    s/p hip surgery in 2017. Took xarelto until August 2018   • Dyslipidemia     Dyslipidemia.  Observing.   • Enthesopathy of hip region    • Generalized osteoarthrosis, involving multiple sites    • Headache    • Hearing loss    • Hypertension    • Low backache    • Needs flu shot    • Osteoarthritis     Osteoarthritis with questionable carpal tunnel syndrome bilaterally.    • Otitis, serous    • Pneumonia 1967    Remote pneumonia, 1967.    • Retinal hemorrhage    • SI joint arthritis    • SOB (shortness of breath)    • Syncope 2001    Remote syncope with working diagnosis of vasodepressor, 2001.    • Venous insufficiency of leg    • Wears glasses     readers       Allergies   Allergen Reactions   • Sulfa Antibiotics Nausea Only and GI Intolerance       Past Surgical History:   Procedure Laterality Date   • CATARACT EXTRACTION      bilat    • COLONOSCOPY     • HIP PERCUTANEOUS PINNING Left 11/24/2017    Procedure: HIP PERCUTANEOUS PINNING;  Surgeon: Lucas Swanson MD;  Location:  [facility];  Service:    • HYSTERECTOMY      total? but unsure    • KNEE SURGERY  2001    Remote knee surgery in 2001.- right    • TOTAL HIP ARTHROPLASTY Left 10/26/2018    Procedure: TOTAL HIP ARTHROPLASTY LEFT;  Surgeon: Stephen Baker MD;  Location:  [facility];  Service: Orthopedics       Family History   Problem Relation Age of Onset   • Heart disease Mother    • Osteoarthritis Mother    • Hypertension Mother    • Hypertension Father    • Heart attack Father    • Osteoarthritis Father    • Cancer Brother         lung       Social History     Socioeconomic History   • Marital status:      Spouse name: Not on file   • Number of children: Not on file   • Years of education: Not on file   • Highest education level: Not on file   Tobacco Use   • Smoking status:  Never Smoker   • Smokeless tobacco: Never Used   Substance and Sexual Activity   • Alcohol use: No   • Drug use: No   • Sexual activity: Defer     Birth control/protection: None   Social History Narrative    Lives alone, 2 daughters, one here, one in [place]. Retired teacher         Objective   Physical Exam   Constitutional: She is oriented to person, place, and time. She appears well-developed and well-nourished. No distress.   HENT:   Head: Normocephalic and atraumatic.   Nose: Nose normal.   Eyes: Conjunctivae are normal. No scleral icterus.   Neck: Normal range of motion. Neck supple.   Cardiovascular: Normal rate, regular rhythm and normal heart sounds.   Pulmonary/Chest: Effort normal and breath sounds normal. No respiratory distress.   Abdominal: Soft. There is no tenderness.   Musculoskeletal: Normal range of motion. She exhibits no tenderness.   No difficulty moving extremities. No hip tenderness. No cervical, thoracic, and lumbar tenderness. Posterior left scalp hematoma.    Neurological: She is alert and oriented to person, place, and time.   Skin: Skin is warm and dry.   Psychiatric: She has a normal mood and affect. Her behavior is normal.   Nursing note and vitals reviewed.      Procedures         ED Course      Re-examined patient and she still has not complaints in ED other than scalp hematoma. Discussed result and tx plan for discharge. Family agreeable.     Reviewed old records.     No results found for this or any previous visit (from the past 24 hour(s)).  Note: In addition to lab results from this visit, the labs listed above may include labs taken at another facility or during a different encounter within the last 24 hours. Please correlate lab times with ED admission and discharge times for further clarification of the services performed during this visit.    CT Head Without Contrast   Final Result   There are diffuse age-related changes. There are no acute   findings. Specifically there  "is no calvarial fracture or intracranial   hemorrhage. A cephalhematoma soft tissue hemorrhage injury is noted in   the right posterior parietal region.       D:  12/10/2019   E:  12/10/2019           This report was finalized on 12/10/2019 11:54 AM by Dr. Aftab Dorantes MD.            Vitals:    12/10/19 1004 12/10/19 1157   BP: 113/71 120/79   BP Location: Right arm    Patient Position: Sitting Sitting   Pulse: 79 79   Resp: 20 16   Temp: 97.4 °F (36.3 °C)    TempSrc: Oral    SpO2: 93% 95%   Weight: 68 kg (150 lb)    Height: 162.6 cm (64\")      Medications - No data to display  ECG/EMG Results (last 24 hours)     ** No results found for the last 24 hours. **        No orders to display                       MDM    Final diagnoses:   Fall, initial encounter   Injury of head, initial encounter   Hematoma of scalp, initial encounter       Documentation assistance provided by leif Vázquez.  Information recorded by the scribe was done at my direction and has been verified and validated by me.     Brenda Vázquez  12/10/19 1242       Miya Hawkins PA  12/10/19 3996    "

## 2019-12-10 NOTE — PROGRESS NOTES
Discharge Planning Assessment  [facility]     Patient Name: Rose J Kellermann  MRN: [id number]  Today's Date: 12/10/2019    Admit Date: 12/10/2019    Discharge Needs Assessment     Row Name 12/10/19 1039       Living Environment    Lives With  alone    Current Living Arrangements  home/apartment/condo    Primary Care Provided by  self    Provides Primary Care For  no one    Family Caregiver if Needed  child(malika), adult    Family Caregiver Names  ALONZO, DAUGHTER, POA    Quality of Family Relationships  helpful;involved;supportive    Able to Return to Prior Arrangements  yes       Resource/Environmental Concerns    Resource/Environmental Concerns  none    Transportation Concerns  car, none       Transition Planning    Patient/Family Anticipates Transition to  home    Patient/Family Anticipated Services at Transition  none    Transportation Anticipated  family or friend will provide       Discharge Needs Assessment    Readmission Within the Last 30 Days  no previous admission in last 30 days    Concerns to be Addressed  denies needs/concerns at this time    Equipment Currently Used at Home  walker, rolling;cane, straight    Anticipated Changes Related to Illness  none    Equipment Needed After Discharge  none    Provided post acute provider list?  Refused    Patient's Choice of Community Agency(s)   COMPANY WAS SET-UP BY [facility], ONE YEAR AGO, PT        Discharge Plan     Row Name 12/10/19 1041       Plan    Plan  INITIAL    Plan Comments  PT LIVES IN [facility] IN HER OWN HOME. SHE LIVES ALONE, PROVIDES HER OWN CARE AND IS QUIT ALERT AND ORIENTED. HER DAUGHTER IS HERE WITH HER TODAY. SHE USES A STRAIGHT CANE IN THE HOME AND A WALKER WHEN OUT. SHE HAS RECEIVED CARE AT [facility] ONE YR AGO AFTER A HIP FX; WENT HOME WITH  PT PCP IS REGINA SIEGEL    Final Discharge Disposition Code  01 - home or self-care        Destination      Coordination has not been started for this encounter.      Durable Medical Equipment       Coordination has not been started for this encounter.      Dialysis/Infusion      Coordination has not been started for this encounter.      Home Medical Care      Coordination has not been started for this encounter.      Therapy      Coordination has not been started for this encounter.      Community Resources      Coordination has not been started for this encounter.          Demographic Summary    No documentation.       Functional Status     Row Name 12/10/19 1039       Functional Status    Usual Activity Tolerance  good    Current Activity Tolerance  moderate       Functional Status, IADL    Medications  independent    Meal Preparation  independent    Housekeeping  independent    Laundry  independent    Shopping  independent       Mental Status    General Appearance WDL  WDL       Mental Status Summary    Recent Changes in Mental Status/Cognitive Functioning  no changes       Employment/    Employment Status  retired        Psychosocial    No documentation.       Abuse/Neglect    No documentation.       Legal    No documentation.       Substance Abuse    No documentation.       Patient Forms    No documentation.           Rossy Coffey RN

## 2020-02-28 DIAGNOSIS — I10 ESSENTIAL HYPERTENSION: ICD-10-CM

## 2020-03-02 RX ORDER — BISOPROLOL FUMARATE 5 MG/1
TABLET, FILM COATED ORAL
Qty: 90 TABLET | Refills: 4 | Status: SHIPPED | OUTPATIENT
Start: 2020-03-02 | End: 2020-04-20 | Stop reason: SDUPTHER

## 2020-04-20 DIAGNOSIS — I10 ESSENTIAL HYPERTENSION: ICD-10-CM

## 2020-04-20 RX ORDER — NISOLDIPINE 8.5 MG/1
8.5 TABLET, FILM COATED, EXTENDED RELEASE ORAL DAILY
Qty: 90 TABLET | Refills: 1 | Status: SHIPPED | OUTPATIENT
Start: 2020-04-20 | End: 2020-10-06 | Stop reason: SDUPTHER

## 2020-04-20 RX ORDER — BISOPROLOL FUMARATE 5 MG/1
5 TABLET, FILM COATED ORAL DAILY
Qty: 90 TABLET | Refills: 1 | Status: SHIPPED | OUTPATIENT
Start: 2020-04-20 | End: 2020-10-06 | Stop reason: SDUPTHER

## 2020-04-20 RX ORDER — TRIAMTERENE AND HYDROCHLOROTHIAZIDE 37.5; 25 MG/1; MG/1
1 CAPSULE ORAL EVERY MORNING
Qty: 90 CAPSULE | Refills: 1 | Status: SHIPPED | OUTPATIENT
Start: 2020-04-20 | End: 2020-10-06 | Stop reason: SDUPTHER

## 2020-04-20 NOTE — TELEPHONE ENCOUNTER
PT IS REQUESTING REFILLS. PT STATES SHE DOES NOT WANT TO COME INTO THE OFFICE AT THIS TIME.    nisoldipine (SULAR) 8.5 MG 24 hr tablet    triamterene-hydrochlorothiazide (DYAZIDE) 37.5-25 MG per capsule    bisoprolol (ZEBeta) 5 MG tablet    PT CALL BACK [phone]    [facility]

## 2020-07-06 ENCOUNTER — TELEPHONE (OUTPATIENT)
Dept: FAMILY MEDICINE CLINIC | Facility: CLINIC | Age: 85
End: 2020-07-06

## 2020-10-06 DIAGNOSIS — I10 ESSENTIAL HYPERTENSION: ICD-10-CM

## 2020-10-06 RX ORDER — NISOLDIPINE 8.5 MG/1
8.5 TABLET, FILM COATED, EXTENDED RELEASE ORAL DAILY
Qty: 90 TABLET | Refills: 1 | Status: SHIPPED | OUTPATIENT
Start: 2020-10-06 | End: 2021-03-17

## 2020-10-06 RX ORDER — TRIAMTERENE AND HYDROCHLOROTHIAZIDE 37.5; 25 MG/1; MG/1
1 CAPSULE ORAL EVERY MORNING
Qty: 90 CAPSULE | Refills: 0 | Status: SHIPPED | OUTPATIENT
Start: 2020-10-06 | End: 2021-01-08

## 2020-10-06 RX ORDER — BISOPROLOL FUMARATE 5 MG/1
5 TABLET, FILM COATED ORAL DAILY
Qty: 90 TABLET | Refills: 1 | Status: SHIPPED | OUTPATIENT
Start: 2020-10-06 | End: 2021-09-07

## 2020-10-06 NOTE — TELEPHONE ENCOUNTER
Caller: Kellermann, Rose J    Relationship: Self    Best call back number: [phone]    Medication needed:   Requested Prescriptions     Pending Prescriptions Disp Refills   • triamterene-hydrochlorothiazide (DYAZIDE) 37.5-25 MG per capsule 90 capsule 1     Sig: Take 1 capsule by mouth Every Morning.   • nisoldipine (SULAR) 8.5 MG 24 hr tablet 90 tablet 1     Sig: Take 1 tablet by mouth Daily.   • bisoprolol (ZEBeta) 5 MG tablet 90 tablet 1     Sig: Take 1 tablet by mouth Daily.       When do you need the refill by: ASAP    What details did the patient provide when requesting the medication: PATIENT HAS REQUESTED IF PRESCRIPTIONS COULD BE CALLED IN FOR ABOVE MEDICATIONS WITHOUT HER HAVING TO COME TO THE OFFICE    Does the patient have less than a 3 day supply:  [x] Yes  [x] No    What is the patient's preferred pharmacy:    PATIENT USES [facility]

## 2020-10-06 NOTE — TELEPHONE ENCOUNTER
Pharmacy requesting  Triamterene, nisoldipine, and bisoprolol  Sent to mail order pharmacy    Last OV 12/09/19  ER for a Fall on 12/10/19  Last CMP was 04/22/19

## 2020-12-30 ENCOUNTER — TELEPHONE (OUTPATIENT)
Dept: FAMILY MEDICINE CLINIC | Facility: CLINIC | Age: 85
End: 2020-12-30

## 2020-12-30 NOTE — TELEPHONE ENCOUNTER
PATIENT WOULD LIKE TO TALK TO DR SIEGEL ABOUT A MEDICATION CHANGE THAT INSURANCE HAS ADVISED HER ON.    PLEASE HAVE DR SIEGEL CALL PATIENT BACK [phone]

## 2020-12-31 NOTE — TELEPHONE ENCOUNTER
Spoke with Ms. Quinonesman,   She states that her insurance will not pay for sular anymore and will pay amlodipine.  She has questions, do you like the medication?  She is worried about the new medication due to being on the sular for so long.  She has not tried anything else. Would it be safe to try the medication?  Please advise?  She has enough medication for a while right now.  She is just wanting your opinion.

## 2021-01-08 RX ORDER — TRIAMTERENE AND HYDROCHLOROTHIAZIDE 37.5; 25 MG/1; MG/1
CAPSULE ORAL
Qty: 90 CAPSULE | Refills: 3 | Status: SHIPPED | OUTPATIENT
Start: 2021-01-08 | End: 2021-12-13

## 2021-01-21 ENCOUNTER — HOSPITAL ENCOUNTER (EMERGENCY)
Facility: HOSPITAL | Age: 86
Discharge: HOME | End: 2021-01-22
Attending: EMERGENCY MEDICINE | Admitting: EMERGENCY MEDICINE

## 2021-01-21 ENCOUNTER — APPOINTMENT (OUTPATIENT)
Dept: CT IMAGING | Facility: HOSPITAL | Age: 86
End: 2021-01-21

## 2021-01-21 DIAGNOSIS — S01.01XA LACERATION OF SCALP, INITIAL ENCOUNTER: Primary | ICD-10-CM

## 2021-01-21 DIAGNOSIS — S09.90XA CLOSED HEAD INJURY, INITIAL ENCOUNTER: ICD-10-CM

## 2021-01-21 DIAGNOSIS — S01.81XA LACERATION OF FOREHEAD, INITIAL ENCOUNTER: ICD-10-CM
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
## 2021-01-21 PROCEDURE — 70450 CT HEAD/BRAIN W/O DYE: CPT

## 2021-01-21 PROCEDURE — 99283 EMERGENCY DEPT VISIT LOW MDM: CPT

## 2021-01-21 PROCEDURE — 72125 CT NECK SPINE W/O DYE: CPT

## 2021-01-21 RX ORDER — LIDOCAINE HYDROCHLORIDE AND EPINEPHRINE 10; 10 MG/ML; UG/ML
10 INJECTION, SOLUTION INFILTRATION; PERINEURAL ONCE
Status: COMPLETED | OUTPATIENT
Start: 2021-01-21 | End: 2021-01-21

## 2021-01-21 RX ADMIN — LIDOCAINE HYDROCHLORIDE,EPINEPHRINE BITARTRATE 10 ML: 10; .01 INJECTION, SOLUTION INFILTRATION; PERINEURAL at 22:10

## 2021-01-22 ENCOUNTER — PATIENT OUTREACH (OUTPATIENT)
Dept: CASE MANAGEMENT | Facility: OTHER | Age: 86
End: 2021-01-22

## 2021-01-22 VITALS
OXYGEN SATURATION: 96 % | HEIGHT: 64 IN | HEART RATE: 68 BPM | DIASTOLIC BLOOD PRESSURE: 89 MMHG | WEIGHT: 150 LBS | TEMPERATURE: 98.2 F | BODY MASS INDEX: 25.61 KG/M2 | SYSTOLIC BLOOD PRESSURE: 167 MMHG | RESPIRATION RATE: 18 BRPM

## 2021-01-22 PROCEDURE — 90471 IMMUNIZATION ADMIN: CPT | Performed by: EMERGENCY MEDICINE

## 2021-01-22 PROCEDURE — 25010000002 TDAP 5-2.5-18.5 LF-MCG/0.5 SUSPENSION: Performed by: EMERGENCY MEDICINE

## 2021-01-22 PROCEDURE — 90715 TDAP VACCINE 7 YRS/> IM: CPT | Performed by: EMERGENCY MEDICINE

## 2021-01-22 RX ORDER — CEPHALEXIN 500 MG/1
500 CAPSULE ORAL 4 TIMES DAILY
Qty: 40 CAPSULE | Refills: 0 | Status: SHIPPED | OUTPATIENT
Start: 2021-01-22 | End: 2021-02-01

## 2021-01-22 RX ORDER — ACETAMINOPHEN 500 MG
500 TABLET ORAL EVERY 4 HOURS PRN
Qty: 20 TABLET | Refills: 0 | Status: SHIPPED | OUTPATIENT
Start: 2021-01-22

## 2021-01-22 RX ADMIN — TETANUS TOXOID, REDUCED DIPHTHERIA TOXOID AND ACELLULAR PERTUSSIS VACCINE, ADSORBED 0.5 ML: 5; 2.5; 8; 8; 2.5 SUSPENSION INTRAMUSCULAR at 01:06

## 2021-01-22 NOTE — DISCHARGE INSTRUCTIONS
"Please follow-up with your PCP in a couple days for reevaluation.  Also follow-up with your primary care specialist in 1 week for wound check, suture and staple removal. You will need both the staples, sutures removed from the forehead and the scalp. There are 2 deeper sutures underneath the staples which will also need to be removed. These have long \"tails\" on them.  Please continue to monitor your symptoms over the next few days.  Any altered status, not acting on her baseline or any further concerns in the meantime, return back to the emergency department.  "

## 2021-01-22 NOTE — OUTREACH NOTE
Care Coordination Assessment    Documented/Reviewed By: Glo Hall RN Date/time: 1/22/2021 12:13 PM   Assessment completed with: children  Living arrangement: alone  Support system: children  Equipment used at home: cane, walker, tub seat  Bed or wheelchair confined: No  Inadequate nutrition: No  Medication adherence problem: No  Experiencing side effects from current medications: No  History of fall(s) in last 6 months: Yes  Difficulty keeping appointments: No  Family aware of the patient's advance care planning wishes: Yes (Comment: Has Living will )

## 2021-01-22 NOTE — OUTREACH NOTE
"Care Plan Note      Responses   Annual Wellness Visit:   -- [Will discuss with Dr. Hung ]   Care Gaps Addressed  Flu Shot, Pneumonia Vaccine   Flu Shot Status  Up to Date   Flu Shot Completion at [facility] or Other  Other   Other Location  [facility]   Pneumonia Vaccine Status  Up to Date   Other Patient Education/Resources   24/7 [facility] Nurse Call Line, [facility]   24/7 Nurse Call Line Education Method  Verbal   [facility] Education Method  Verbal   Does patient have depression diagnosis?  No   Advanced Directives:  Patient Has   Ed Visits past 12 months:  1   Hospitalizations past 12 months  None        The main concerns and/or symptoms the patient would like to address are: Was contacted pt regarding ED visit 1/21/21 with chief c/o fall.  Areli (POA) answered phone and states \"she seems to be doing fine.\"  She has obtained her antibiotic and started.  Knows to complete course.  Appt has been made with PCP, Dr.Van Ziegler to ED f/u and to remove sutures and staples from head laceration. Symptoms that would warrant return to ED discussed.  States she does walk with cane in home and has walker if needed. \"She does have a fall once in a blue moon, but never to this extent. \" Discussed fall prevention.  She has shower seat and grab bar and was actually on phone getting her Medical Alert necklace set up.  Care gaps discussed.  Had flu vaccine at pharmacy and has her scheduled for Covid 19 vaccine at [facility] next week.  Daughter is her transportation and she denies any food insecurities. At this point, she had to go to take the call concerning Medical Alert necklace, but denied any needs and voiced her appreciation for the call.     Education/instruction provided by Care Coordinator:  Role of  explained and contact number given.  [facility] 24/7 Nurse line explained and contact number provided. Fall prevention discussed.  AWV discussed and will discuss with Dr. Hung at next " weeks office visit.     Follow Up Outreach Due:    As needed     Glo Hall RN  Ambulatory     1/22/2021, 12:24 EST

## 2021-01-22 NOTE — ED PROVIDER NOTES
EMERGENCY DEPARTMENT ENCOUNTER      Pt Name: Rose J Kellermann  MRN: [id number]  YOB: 1926  Date of evaluation: 1/21/2021  Provider: Dylan Albright DO    CHIEF COMPLAINT       Chief Complaint   Patient presents with   • Fall         HISTORY OF PRESENT ILLNESS  (Location/Symptom, Timing/Onset, Context/Setting, Quality, Duration, Modifying Factors, Severity.)   Rose J Kellermann is a 89 y.o. female who presents to the emergency department for evaluation status post a fall.  She notes she tripped over the side of a carpet at home, feels like she hit her head on a wooden chair.  The patient denies any chest pain palpitations, unilateral weakness, numbness or tingling prior to the incident.  During my examination she does have active bleeding and she states she feels somewhat lightheaded but denies any neck pain, significant headache, no vision changes.  She denies any recent illness.  She does not take blood thinning medications.  Patient denies any other acute systemic complaints at this time.  Denies any other injuries, no other extremity pain.  No chest pain, abdominal pain, no hip pain or back pain.      Nursing notes were reviewed.    REVIEW OF SYSTEMS    (2-9 systems for level 4, 10 or more for level 5)   ROS:  General:  No fevers, no chills, no weakness  Cardiovascular:  No chest pain, no palpitations  Respiratory:  No shortness of breath, no cough, no wheezing  Gastrointestinal:  No pain, no nausea, no vomiting, no diarrhea  Musculoskeletal:  No muscle pain, no joint pain  Skin:  No rash, no easy bruising  Neurologic:  No speech problems, no headache, no extremity numbness, no extremity tingling, no extremity weakness  Psychiatric:  No anxiety  Genitourinary:  No dysuria, no hematuria    Except as noted above the remainder of the review of systems was reviewed and negative.       PAST MEDICAL HISTORY     Past Medical History:   Diagnosis Date   • Abscess of back    • Arrhythmia     frequent  PVC   • Cancer (CMS/[facility]) 12/2011    Endometrial cancer, status post robotically assisted hysterectomy with Dr. Haddad, December 2011.     • CKD (chronic kidney disease), stage III (CMS/[facility])     no dilaysis    • Dizzy    • Dvt femoral (deep venous thrombosis) (CMS/[facility]) 2017    s/p hip surgery in 2017. Took xarelto until August 2018   • Dyslipidemia     Dyslipidemia.  Observing.   • Enthesopathy of hip region    • Generalized osteoarthrosis, involving multiple sites    • Headache    • Hearing loss    • Hypertension    • Low backache    • Needs flu shot    • Osteoarthritis     Osteoarthritis with questionable carpal tunnel syndrome bilaterally.    • Otitis, serous    • Pneumonia 1967    Remote pneumonia, 1967.    • Retinal hemorrhage    • SI joint arthritis    • SOB (shortness of breath)    • Syncope 2001    Remote syncope with working diagnosis of vasodepressor, 2001.    • Venous insufficiency of leg    • Wears glasses     readers         SURGICAL HISTORY       Past Surgical History:   Procedure Laterality Date   • CATARACT EXTRACTION      bilat    • COLONOSCOPY     • HIP PERCUTANEOUS PINNING Left 11/24/2017    Procedure: HIP PERCUTANEOUS PINNING;  Surgeon: Lucas Swanson MD;  Location: [facility] OR;  Service:    • HYSTERECTOMY      total? but unsure    • KNEE SURGERY  2001    Remote knee surgery in 2001.- right    • TOTAL HIP ARTHROPLASTY Left 10/26/2018    Procedure: TOTAL HIP ARTHROPLASTY LEFT;  Surgeon: Stephen Baker MD;  Location: [facility] OR;  Service: Orthopedics         CURRENT MEDICATIONS       Current Facility-Administered Medications:   •  Tdap (BOOSTRIX) injection 0.5 mL, 0.5 mL, Intramuscular, During Hospitalization, Dylan Albright, DO    Current Outpatient Medications:   •  acetaminophen (TYLENOL) 325 MG tablet, Take 2 tablets by mouth Every 6 (Six) Hours As Needed for Mild Pain ., Disp: , Rfl:   •  acetaminophen (TYLENOL) 500 MG tablet, Take 1 tablet by mouth Every 4 (Four) Hours As  Needed for Mild Pain  or Fever., Disp: 20 tablet, Rfl: 0  •  aspirin 81 MG EC tablet, Take 1 tablet by mouth Daily. Resume in 1 month when Eliquis course complete, Disp: , Rfl:   •  bisoprolol (ZEBeta) 5 MG tablet, Take 1 tablet by mouth Daily., Disp: 90 tablet, Rfl: 1  •  cephalexin (KEFLEX) 500 MG capsule, Take 1 capsule by mouth 4 (Four) Times a Day for 10 days., Disp: 40 capsule, Rfl: 0  •  ferrous sulfate 325 (65 FE) MG tablet, Take 1 tablet by mouth 2 (Two) Times a Day., Disp: , Rfl:   •  levoFLOXacin (LEVAQUIN) 500 MG tablet, Take 1 tablet by mouth Daily., Disp: 10 tablet, Rfl: 0  •  Misc Natural Products (OSTEO BI-FLEX JOINT SHIELD PO), Take 1 tablet by mouth Daily., Disp: , Rfl:   •  Multiple Vitamin (MULTI VITAMIN DAILY PO), Take 1 tablet by mouth Daily., Disp: , Rfl:   •  nisoldipine (SULAR) 8.5 MG 24 hr tablet, Take 1 tablet by mouth Daily., Disp: 90 tablet, Rfl: 1  •  predniSONE (DELTASONE) 10 MG tablet, 3 tablets each morning for 5 days then 2 tablets each morning for 5 days then 1 tablet each morning for 5 days, Disp: 30 tablet, Rfl: 0  •  promethazine-codeine (PHENERGAN with CODEINE) 6.25-10 MG/5ML syrup, Take 5 mL by mouth Every 6 (Six) Hours As Needed for Cough., Disp: 180 mL, Rfl: 0  •  triamterene-hydrochlorothiazide (DYAZIDE) 37.5-25 MG per capsule, TAKE 1 CAPSULE EVERY MORNING (NEED APPOINTMENT BY DECEMBER), Disp: 90 capsule, Rfl: 3    ALLERGIES     Sulfa antibiotics    FAMILY HISTORY       Family History   Problem Relation Age of Onset   • Heart disease Mother    • Osteoarthritis Mother    • Hypertension Mother    • Hypertension Father    • Heart attack Father    • Osteoarthritis Father    • Cancer Brother         lung          SOCIAL HISTORY       Social History     Socioeconomic History   • Marital status:      Spouse name: Not on file   • Number of children: Not on file   • Years of education: Not on file   • Highest education level: Not on file   Tobacco Use   • Smoking status:  "Never Smoker   • Smokeless tobacco: Never Used   Substance and Sexual Activity   • Alcohol use: No   • Drug use: No   • Sexual activity: Defer     Birth control/protection: None   Social History Narrative    Lives alone, 2 daughters, one here, one in [place]. Retired teacher         PHYSICAL EXAM    (up to 7 for level 4, 8 or more for level 5)     Vitals:    01/21/21 2157 01/21/21 2229 01/21/21 2325   BP:  (!) 150/105    BP Location:  Right arm    Patient Position:  Lying    Pulse: 74     Resp: 18 18    Temp:   98.2 °F (36.8 °C)   TempSrc:   Oral   SpO2:  95%    Weight:  68 kg (150 lb)    Height:  162.6 cm (64\")        Physical Exam  HENT:      Head:        Comments: There is a significant open laceration to the forehead extending up through the upper scalp and left posterior lateral scalp laceration is about 7 inches in total length.  There is small arteriole bleeding noted.      General : Patient is awake, alert, answer questions appropriately, no obvious head injury with active bleeding noted.  GCS 15.  HEENT: Scalp laceration as noted above.  Pupils are equally round and reactive to light, EOMI, conjunctivae clear  Neck: Neck is supple, full range of motion, trachea midline, no midline tenderness to palpation.  Cardiac: Heart regular rate, rhythm, no murmurs, rubs, or gallops  Lungs: Lungs are clear to auscultation  Chest wall: There is no tenderness to palpation over the chest wall or over ribs  Abdomen: Abdomen is soft, nontender, nondistended. There are no firm or pulsatile masses, no rebound rigidity or guarding.   Musculoskeletal: No tenderness to palpation of the bilateral upper and lower extremities.  No other obvious deformities noted.  5 out of 5 strength in all 4 extremities.  No focal muscle deficits are appreciated  Neuro: Motor intact, sensory intact, level of consciousness is normal, GCS 15  Dermatology: Laceration to the scalp and face noted above.  Skin is warm and dry  Psych: Mentation is " grossly normal, cognition is grossly normal. Affect is appropriate.      DIAGNOSTIC RESULTS     EKG: All EKG's are interpreted by the Emergency Department Physician who either signs or Co-signs this chart in the absence of a cardiologist.    No orders to display       RADIOLOGY:   Non-plain film images such as CT, Ultrasound and MRI are read by the radiologist. Plain radiographic images are visualized and preliminarily interpreted by the emergency physician with the below findings:      [] Radiologist's Report Reviewed:  CT Head Without Contrast   Final Result      1.  Large left frontal scalp soft tissue swelling and hematoma. No underlying fracture or evidence of acute intracranial hemorrhage.   2.  Fairly extensive presumed chronic microvascular ischemic change. Old left cerebellar infarct.   3.  Anterior wedging of both C5 and C6 most likely relates to advanced spondylosis, anterior spurring and degenerative endplate change, though without comparison exam it would be difficult to entirely exclude the possibility of acute injury though it is   felt unlikely. Vertebral body heights are otherwise maintained and no convincing evidence of acute displaced fracture or dislocation otherwise.    4.  Advanced multilevel degenerative changes in the cervical spine.   5.  Sinus disease.      Signer Name: AXEL LEONARDO MD    Signed: 1/22/2021 12:04 AM    Workstation Name: [id number]     [facility]      CT Cervical Spine Without Contrast   Final Result      1.  Large left frontal scalp soft tissue swelling and hematoma. No underlying fracture or evidence of acute intracranial hemorrhage.   2.  Fairly extensive presumed chronic microvascular ischemic change. Old left cerebellar infarct.   3.  Anterior wedging of both C5 and C6 most likely relates to advanced spondylosis, anterior spurring and degenerative endplate change, though without comparison exam it would be difficult to entirely exclude the  "possibility of acute injury though it is   felt unlikely. Vertebral body heights are otherwise maintained and no convincing evidence of acute displaced fracture or dislocation otherwise.    4.  Advanced multilevel degenerative changes in the cervical spine.   5.  Sinus disease.      Signer Name: AXEL LEONARDO MD    Signed: 1/22/2021 12:04 AM    Workstation Name: [email or web address]     [facility]            ED BEDSIDE ULTRASOUND:   Performed by ED Physician - none    LABS:    I have reviewed and interpreted all of the currently available lab results from this visit (if applicable):  Results for orders placed or performed in visit on 12/09/19   POCT Influenza A/B    Specimen: Swab   Result Value Ref Range    Rapid Influenza A Ag Negative Negative    Rapid Influenza B Ag Negative Negative    Internal Control Passed Passed    Lot Number [id number]     Expiration Date 11/17/2021         All other labs were within normal range or not returned as of this dictation.      EMERGENCY DEPARTMENT COURSE and DIFFERENTIAL DIAGNOSIS/MDM:   Vitals:    Vitals:    01/21/21 2157 01/21/21 2229 01/21/21 2325   BP:  (!) 150/105    BP Location:  Right arm    Patient Position:  Lying    Pulse: 74     Resp: 18 18    Temp:   98.2 °F (36.8 °C)   TempSrc:   Oral   SpO2:  95%    Weight:  68 kg (150 lb)    Height:  162.6 cm (64\")             Patient with a trip and fall with a significant wound laceration to the forehead, scalp.  There are signs of arteriole bleeding on arrival.  The wound was cleansed, irrigated, lidocaine with epinephrine was infiltrated.  I did have to place to figure 8 sutures about 2inches posterior to the hairline on the scalp secondary to an active bleed.  This was placed using 0 silk with very long tails for identification for removal.  We then placed about 10 staples into the scalp laceration.  The forehead laceration was approximated using 3-0, 4-0 simple interrupted sutures, 7 stitches were placed.  CT " "of the head, C-spine was obtained.  Imaging reviewed, no acute signs of any skull fracture or intracranial abnormality, some mild age-related changes to the neck.  The patient is nontender on reevaluation.  On reexam the patient's daughter is at her bedside.  She is fully awake and alert, no altered state, is answering questions appropriately, states she feels very well and wants to go home.  She is adamant that she had a trip and fall, she denies any abdominal pain, no signs of any urinary tract infection or recent illness.  They would prefer not to have any blood work or urinalysis completed at this time.  I feel it is reasonable, will continue with close head injury precautions with strict return precautions.  Will follow up with her PCP in 7 days for wound check, suture and staple removal.  I also discussed there are 2 deeper sutures with long \"tails\" approximately 2 inches from the hairline underneath the staples which will also need to be removed.  Patient will keep her pressure bandage on for the next couple days, then will keep the area cleansed with gentle soap, water.  Tetanus updated.  Is pretty extensive wound, with good irrigation, but given the extent of the large laceration we will cover the patient with Keflex antibiotic.  I had a discussion with the patient/family regarding diagnosis, diagnostic results, treatment plan, and medications.  The patient/family indicated understanding of these instructions.  I spent adequate time at the bedside preceding discharge necessary to personally discuss the aftercare instructions, giving patient education, providing explanations of the results of our evaluations/findings, and my decision making to assure that the patient/family understand the plan of care.  Time was allotted to answer questions at that time and throughout the ED course.  Emphasis was placed on timely follow-up after discharge.  I also discussed the potential for the development of an acute " emergent condition requiring further evaluation, admission, or even surgical intervention. I discussed that we found nothing during the visit today indicating the need for further workup, admission, or the presence of an unstable medical condition.  I encouraged the patient to return to the emergency department immediately for ANY concerns, worsening, new complaints, or if symptoms persist and unable to seek follow-up in a timely fashion.  The patient/family expressed understanding and agreement with this plan.  The patient will follow-up with their PCP in 1-2 days for reevaluation.       MEDICATIONS ADMINISTERED IN ED:  Medications   Tdap (BOOSTRIX) injection 0.5 mL (has no administration in time range)   lidocaine-EPINEPHrine (XYLOCAINE W/EPI) 1 %-1:414866 injection 10 mL (10 mL Injection Given by Other 1/21/21 2210)       PROCEDURES:  Procedures   Procedure Note - Laceration repair:  Questions were sought and answered and verbal consent was given by patient for the procedure. The area was prepped and draped in standard bedside fashion. The wound area was anesthetized with lidocaine with epinephrine. The wound was explored with No foreign bodies found. The wound was repaired with 3-0 4-0 Ethilon; 7 simple interrupted sutures were used.  Also approx 10 staples were used in the scalp laceration.  2 figure-of-eight deep stitches were placed in the scalp as well.  Long tails were left for identification for removal. The patient tolerated the procedure well without complications and my repeat neurovascular exam post-procedure is unchanged.    Wound care and scar minimization education was provided. Instructions were given to return for increasing pain, redness, streaking, discharge, or any other worsening or worrisome concerns.    CRITICAL CARE TIME    Total Critical Care time was 0 minutes, excluding separately reportable procedures.   There was a high probability of clinically significant/life threatening  "deterioration in the patient's condition which required my urgent intervention.      FINAL IMPRESSION      1. Laceration of scalp, initial encounter    2. Laceration of forehead, initial encounter    3. Fall, initial encounter    4. Closed head injury, initial encounter          DISPOSITION/PLAN     ED Disposition     ED Disposition Condition Comment    Discharge Stable           PATIENT REFERRED TO:  Sam Hung MD  [address]    [place] [address]  [phone]    In 1 week  For suture removal, For wound re-check.  You will need both the staples, sutures removed from the forehead and the scalp.  There are 2 deeper sutures underneath the staples which will also need to be removed.  These have long \"tails\" on them    [facility] Emergency Department  [address] [place] [id number] [phone]    If symptoms worsen      DISCHARGE MEDICATIONS:     Medication List      START taking these medications    cephalexin 500 MG capsule  Commonly known as: KEFLEX  Take 1 capsule by mouth 4 (Four) Times a Day for 10 days.        CHANGE how you take these medications    * acetaminophen 325 MG tablet  Commonly known as: TYLENOL  Take 2 tablets by mouth Every 6 (Six) Hours As Needed for Mild Pain .  What changed: Another medication with the same name was added. Make sure you understand how and when to take each.     * acetaminophen 500 MG tablet  Commonly known as: TYLENOL  Take 1 tablet by mouth Every 4 (Four) Hours As Needed for Mild Pain  or Fever.  What changed: You were already taking a medication with the same name, and this prescription was added. Make sure you understand how and when to take each.         * This list has 2 medication(s) that are the same as other medications prescribed for you. Read the directions carefully, and ask your doctor or other care provider to review them with you.            CONTINUE taking these medications    aspirin 81 MG " EC tablet  Take 1 tablet by mouth Daily. Resume in 1 month when Eliquis course complete     bisoprolol 5 MG tablet  Commonly known as: ZEBeta  Take 1 tablet by mouth Daily.     ferrous sulfate 325 (65 FE) MG tablet  Take 1 tablet by mouth 2 (Two) Times a Day.     levoFLOXacin 500 MG tablet  Commonly known as: LEVAQUIN  Take 1 tablet by mouth Daily.     multivitamin tablet tablet  Commonly known as: THERAGRAN     nisoldipine 8.5 MG 24 hr tablet  Commonly known as: SULAR  Take 1 tablet by mouth Daily.     OSTEO BI-FLEX JOINT SHIELD PO     predniSONE 10 MG tablet  Commonly known as: DELTASONE  3 tablets each morning for 5 days then 2 tablets each morning for 5 days then 1 tablet each morning for 5 days     promethazine-codeine 6.25-10 MG/5ML syrup  Commonly known as: PHENERGAN with CODEINE  Take 5 mL by mouth Every 6 (Six) Hours As Needed for Cough.     triamterene-hydrochlorothiazide 37.5-25 MG per capsule  Commonly known as: DYAZIDE  TAKE 1 CAPSULE EVERY MORNING (NEED APPOINTMENT BY DECEMBER)           Where to Get Your Medications      These medications were sent to [id number] - [place], [place] - [address] [facility] [phone]  - [phone] [place] [address] [facility] [address]    Phone: [phone]   · acetaminophen 500 MG tablet  · cephalexin 500 MG capsule             Comment: Please note this report has been produced using speech recognition software.      Dylan Albright DO  Attending Emergency Physician               Dylan Albright DO  01/22/21 0031

## 2021-01-29 ENCOUNTER — OFFICE VISIT (OUTPATIENT)
Dept: FAMILY MEDICINE CLINIC | Facility: CLINIC | Age: 86
End: 2021-01-29

## 2021-01-29 VITALS
SYSTOLIC BLOOD PRESSURE: 130 MMHG | DIASTOLIC BLOOD PRESSURE: 82 MMHG | HEIGHT: 64 IN | BODY MASS INDEX: 25.61 KG/M2 | HEART RATE: 75 BPM | RESPIRATION RATE: 18 BRPM | WEIGHT: 150 LBS | OXYGEN SATURATION: 98 %

## 2021-01-29 DIAGNOSIS — Z48.02 ENCOUNTER FOR STAPLE REMOVAL: ICD-10-CM

## 2021-01-29 DIAGNOSIS — S01.81XD LACERATION OF FOREHEAD, SUBSEQUENT ENCOUNTER: Primary | ICD-10-CM

## 2021-01-29 DIAGNOSIS — Z48.02 VISIT FOR SUTURE REMOVAL: ICD-10-CM

## 2021-01-29 DIAGNOSIS — S01.01XD LACERATION OF SCALP, SUBSEQUENT ENCOUNTER: ICD-10-CM

## 2021-01-29 PROCEDURE — 99212 OFFICE O/P EST SF 10 MIN: CPT | Performed by: FAMILY MEDICINE

## 2021-01-30 NOTE — PROGRESS NOTES
Subjective   Rose J Kellermann is a 89 y.o. female    Chief Complaint    Wound check  Suture and staple removal      History of Present Illness  Patient presents today accompanied by her daughter for removal of suture and staples that were placed on 1/21/2021 in the emergency department at [facility].  According to the emergency department note there were 7 anterior sutures over the forehead and approximately 10 staples placed in the scalp.  There were also 2 sutures beyond the hairline that were placed to help control bleeding.  Patient has had no sequelae from her fall, no signs of concussion with no headache or other neurologic changes.  She apparently tripped over the edge of a carpet and fell in her own home.  She has extensive ecchymosis over her face extending down into her chin area.    The following portions of the patient's history were reviewed and updated as appropriate: allergies, current medications, past social history and problem list    Review of Systems   Constitutional: Negative for chills and fever.   Eyes: Negative for photophobia, pain and visual disturbance.   Respiratory: Negative for cough and shortness of breath.    Cardiovascular: Negative for chest pain and palpitations.   Skin: Positive for wound.   Neurological: Negative for dizziness, tremors, seizures, syncope, facial asymmetry, speech difficulty, weakness, light-headedness, numbness and headaches.   Hematological: Negative for adenopathy. Does not bruise/bleed easily.   Psychiatric/Behavioral: Negative for confusion and dysphoric mood. The patient is not nervous/anxious.        Objective     Vitals:    01/29/21 1443   BP: 130/82   Pulse: 75   Resp: 18   SpO2: 98%       Physical Exam  Vitals signs and nursing note reviewed.   HENT:      Head: Normocephalic.      Comments: Posttraumatic appearance with diffuse ecchymosis involving the entire left side of her face and forehead extending down into the mandibular chin area.   This all appears to be in the resolving phase.  Visible sutures in the forehead as well as staples in the frontal scalp area are noted.  Skin:     Comments: 7 interrupted sutures removed from patient's forehead without incident.  Wound well adhered.  There is no bleeding, gaping, induration or hematoma present.    11 staples removed from the left anterior scalp with no evidence of bleeding after removal of the staples.    Patient's hair is extensively matted and stuck together and further inspection of the scalp and wound are not possible without cutting her hair away which I do not think is necessary.  She is given instructions for washing hair and if there appears to be any more sutures or staples present she is to return for removal.  Patient's daughter is present during our exam today and will help her mother look for anything that might have been missed today after they have a chance to clean her hair and scalp.   Neurological:      Mental Status: She is alert.         Assessment/Plan   Problems Addressed this Visit     None      Visit Diagnoses     Laceration of forehead, subsequent encounter    -  Primary    Laceration of scalp, subsequent encounter        Visit for suture removal        Encounter for staple removal          Diagnoses       Codes Comments    Laceration of forehead, subsequent encounter    -  Primary ICD-10-CM: S01.81XD  ICD-9-CM: V58.89, 873.42     Laceration of scalp, subsequent encounter     ICD-10-CM: S01.01XD  ICD-9-CM: V58.89, 873.0     Visit for suture removal     ICD-10-CM: Z48.02  ICD-9-CM: V58.32     Encounter for staple removal     ICD-10-CM: Z48.02  ICD-9-CM: V58.32

## 2021-02-26 ENCOUNTER — OFFICE VISIT (OUTPATIENT)
Dept: FAMILY MEDICINE CLINIC | Facility: CLINIC | Age: 86
End: 2021-02-26

## 2021-02-26 VITALS
HEART RATE: 71 BPM | DIASTOLIC BLOOD PRESSURE: 90 MMHG | OXYGEN SATURATION: 96 % | RESPIRATION RATE: 16 BRPM | HEIGHT: 64 IN | BODY MASS INDEX: 25.75 KG/M2 | SYSTOLIC BLOOD PRESSURE: 144 MMHG

## 2021-02-26 DIAGNOSIS — S01.81XD LACERATION OF FOREHEAD, SUBSEQUENT ENCOUNTER: Primary | ICD-10-CM

## 2021-02-26 DIAGNOSIS — Z48.02 VISIT FOR SUTURE REMOVAL: ICD-10-CM

## 2021-02-26 DIAGNOSIS — S01.01XD LACERATION OF SCALP, SUBSEQUENT ENCOUNTER: ICD-10-CM

## 2021-02-26 PROCEDURE — 99212 OFFICE O/P EST SF 10 MIN: CPT | Performed by: FAMILY MEDICINE

## 2021-02-26 NOTE — PROGRESS NOTES
Subjective   Rose J Kellermann is a 89 y.o. female    Chief Complaint    Residual suture/staple removal.    History of Present Illness  Ms. Oconnor suffered a fall with head laceration 01/21/21 where sutures and staples were placed. This was done in the emergency room at [facility]. She presented to the office 01/29/21 for removal of sutures and staples. Attempts to remove all of the sutures and staples were carried out on that date. However, there was a great deal of dried blood and matted hair and apparently not all of these were removed. She returns today to have this completed.      The following portions of the patient's history were reviewed and updated as appropriate: allergies, current medications, past social history and problem list    Review of Systems   Constitutional: Negative for chills and fever.   Eyes: Negative for photophobia, pain and visual disturbance.   Respiratory: Negative for cough and shortness of breath.    Cardiovascular: Negative for chest pain and palpitations.   Skin: Positive for wound.   Neurological: Negative for dizziness, tremors, seizures, syncope, facial asymmetry, speech difficulty, weakness, light-headedness, numbness and headaches.   Hematological: Negative for adenopathy. Does not bruise/bleed easily.   Psychiatric/Behavioral: Negative for confusion and dysphoric mood. The patient is not nervous/anxious.        Objective     Vitals:    02/26/21 1141   BP: 144/90   Pulse: 71   Resp: 16   SpO2: 96%       Physical Exam  Vitals signs and nursing note reviewed.   Constitutional:       Appearance: Normal appearance.   HENT:      Head: Normocephalic.   Skin:     Comments: A single staple is retained in the scalp wound and is removed with a staple removal.  Through elongated silk sutures are identified and are also removed with sterile scissors and pickups.  There is still a great deal of dried blood and eschar present.  This is debrided away carefully.  Minimal bleeding  is ensued.  This is easily well controlled   Neurological:      Mental Status: She is alert.       Assessment/Plan   Problems Addressed this Visit     None      Visit Diagnoses     Laceration of forehead, subsequent encounter    -  Primary    Laceration of scalp, subsequent encounter        Visit for suture removal          Diagnoses       Codes Comments    Laceration of forehead, subsequent encounter    -  Primary ICD-10-CM: S01.81XD  ICD-9-CM: V58.89, 873.42     Laceration of scalp, subsequent encounter     ICD-10-CM: S01.01XD  ICD-9-CM: V58.89, 873.0     Visit for suture removal     ICD-10-CM: Z48.02  ICD-9-CM: V58.32         Scribed for URMILA Hung MD by RENY MCKEON.  02/26/21   16:37 EST    I have personally performed the services described in this document as scribed by the above individual, and it is both accurate and complete.  URMILA Hung MD  2/28/2021  13:42 EST

## 2021-03-17 DIAGNOSIS — I10 ESSENTIAL HYPERTENSION: ICD-10-CM

## 2021-03-17 RX ORDER — NISOLDIPINE 8.5 MG/1
TABLET, FILM COATED, EXTENDED RELEASE ORAL
Qty: 90 TABLET | Refills: 3 | Status: SHIPPED | OUTPATIENT
Start: 2021-03-17

## 2021-09-03 DIAGNOSIS — I10 ESSENTIAL HYPERTENSION: ICD-10-CM

## 2021-09-07 RX ORDER — BISOPROLOL FUMARATE 5 MG/1
TABLET, FILM COATED ORAL
Qty: 90 TABLET | Refills: 3 | Status: SHIPPED | OUTPATIENT
Start: 2021-09-07 | End: 2022-08-10

## 2021-09-10 ENCOUNTER — TELEPHONE (OUTPATIENT)
Dept: FAMILY MEDICINE CLINIC | Facility: CLINIC | Age: 86
End: 2021-09-10

## 2021-09-10 RX ORDER — AMLODIPINE BESYLATE 5 MG/1
5 TABLET ORAL DAILY
Qty: 90 TABLET | Refills: 3 | Status: SHIPPED | OUTPATIENT
Start: 2021-09-10 | End: 2022-08-10

## 2021-09-10 NOTE — TELEPHONE ENCOUNTER
PATIENT HAS QUESTION ABOUT PRESCRIPTION SHE NEEDS. SHE WANTS TO CHANGE  HER MEDICATION.  UNCLEAR WHAT SHE WANTS.     PLEASE CALL [phone]

## 2021-09-10 NOTE — TELEPHONE ENCOUNTER
Caller: KELLERMANN, KATHERINE    Relationship: Emergency Contact    Best call back number: [phone]     What medication are you requesting: PATIENT'S DAUGHTER ALONZO CALLED STATING THAT THE PATIENT WOULD LIKE TO CHANGE A PRESCRIPTION.   PATIENT WOULD NEED A NEW PRESCRIPTION FOR AMLODIPINE BESYLATE. PATIENT STATED THAT THE PHARMACY CHANGED THE PRICE FOR nisoldipine (SULAR) 8.5 MG 24 hr tablet.        If a prescription is needed, what is your preferred pharmacy and phone number: [facility] [address] [phone] [facility] [phone]      Additional notes:  ALONZO ASKED FOR A CALL TO BE GIVEN TO THE PATIENT OR HERSELF WHEN THE PHARMACY HAS BEEN SENT THE NEW PRESCRIPTION. PATIENT'S  CONTACT NUMBER [phone].

## 2021-12-13 RX ORDER — TRIAMTERENE AND HYDROCHLOROTHIAZIDE 37.5; 25 MG/1; MG/1
CAPSULE ORAL
Qty: 90 CAPSULE | Refills: 3 | Status: SHIPPED | OUTPATIENT
Start: 2021-12-13 | End: 2023-01-23 | Stop reason: SDUPTHER

## 2022-06-10 ENCOUNTER — OFFICE VISIT (OUTPATIENT)
Dept: FAMILY MEDICINE CLINIC | Facility: CLINIC | Age: 87
End: 2022-06-10

## 2022-06-10 VITALS
DIASTOLIC BLOOD PRESSURE: 76 MMHG | TEMPERATURE: 97.2 F | HEART RATE: 71 BPM | BODY MASS INDEX: 30.39 KG/M2 | OXYGEN SATURATION: 99 % | HEIGHT: 64 IN | SYSTOLIC BLOOD PRESSURE: 138 MMHG | WEIGHT: 178 LBS

## 2022-06-10 DIAGNOSIS — L02.212 CUTANEOUS ABSCESS OF BACK EXCLUDING BUTTOCKS: Primary | ICD-10-CM

## 2022-06-10 PROCEDURE — 99213 OFFICE O/P EST LOW 20 MIN: CPT | Performed by: PHYSICIAN ASSISTANT

## 2022-06-10 RX ORDER — CEFTRIAXONE 500 MG/1
500 INJECTION, POWDER, FOR SOLUTION INTRAMUSCULAR; INTRAVENOUS ONCE
Qty: 1 EACH | Refills: 0 | Status: SHIPPED | OUTPATIENT
Start: 2022-06-10 | End: 2022-06-10

## 2022-06-10 RX ORDER — CEFDINIR 300 MG/1
300 CAPSULE ORAL 2 TIMES DAILY
Qty: 14 CAPSULE | Refills: 0 | Status: SHIPPED | OUTPATIENT
Start: 2022-06-10 | End: 2023-02-23

## 2022-06-10 NOTE — PROGRESS NOTES
Subjective   Rose J Kellermann is a 89 y.o. female  Cyst (Concerned about possible cyst/boil on back with swelling and redness x1 week )      History of Present Illness     Patient is a 89-year-old female seen today to discuss a red cyst or boil on her back with swelling for the past week that has been draining. She denies any allergies to antibiotics. The patient states she experienced similar symptoms several years ago that eventually popped and then resolved. She has a bandage on the site and denies taking any antibiotics at this time.     She is accompanied by an adult female.     The following portions of the patient's history were reviewed and updated as appropriate: allergies, current medications, past social history and problem list    Review of Systems   Constitutional: Negative for fever.   Musculoskeletal: Positive for back pain. Negative for arthralgias.   Skin: Positive for color change and wound. Negative for pallor and rash.       Objective     Vitals:    06/10/22 1157   BP: 138/76   Pulse: 71   Temp: 97.2 °F (36.2 °C)   SpO2: 99%       Physical Exam  Vitals and nursing note reviewed.   Constitutional:       General: She is not in acute distress.     Appearance: Normal appearance. She is well-developed. She is not ill-appearing, toxic-appearing or diaphoretic.   Skin:     General: Skin is warm and dry.      Coloration: Skin is not pale.      Findings: Erythema present. No rash.      Comments: Draining abscess measuring approximately 3 x 2 cm on mid upper back purulent drainage noted mild erythematous mildly tender   Neurological:      Mental Status: She is alert.         Assessment & Plan     Diagnoses and all orders for this visit:    1. Cutaneous abscess of back excluding buttocks (Primary)    Other orders  -     cefTRIAXone (ROCEPHIN) 500 MG injection; Inject 500 mg into the appropriate muscle as directed by prescriber 1 (One) Time for 1 dose.  Dispense: 1 each; Refill: 0  -     cefdinir (OMNICEF)  300 MG capsule; Take 1 capsule by mouth 2 (Two) Times a Day.  Dispense: 14 capsule; Refill: 0       1. Infected cyst  - The patient will receive an injection of an antibiotic today.  - I will start the patient on an oral antibiotic today.  - I advised the patient to do a warm compress twice a day for about 5 minutes.  - If the patient's symptoms do not improve by Monday, 06/13/2022, she will call the office.      Transcribed from ambient dictation for Navya Sethi PA-C by KADY BARROW.  06/10/22   14:08 EDT    Patient verbalized consent to the visit recording.

## 2022-06-15 ENCOUNTER — TELEPHONE (OUTPATIENT)
Dept: FAMILY MEDICINE CLINIC | Facility: CLINIC | Age: 87
End: 2022-06-15

## 2022-06-15 NOTE — TELEPHONE ENCOUNTER
PT DAUGHTER CALLED STATED THAT PT RECEIVED CALL FROM [facility] FOR RX   ceftriaxone, PT NOT SURE AS TO KNOW SHE IS HAVING TO  RX AND LIKE TO KNOW WHAT IT IS FOR.    PLEASE ADVISE.  CALL BACK:[phone]    [facility] #[id number] - [place], [place] - [address] [facility] - [phone]  - [phone] FX

## 2022-06-17 ENCOUNTER — TELEPHONE (OUTPATIENT)
Dept: FAMILY MEDICINE CLINIC | Facility: CLINIC | Age: 87
End: 2022-06-17

## 2022-06-17 NOTE — TELEPHONE ENCOUNTER
Caller: KELLERMANN, KATHERINE    Relationship: Emergency Contact    Best call back number: [phone]    What is the best time to reach you: ANY    Who are you requesting to speak with (clinical staff, provider,  specific staff member): CLINICAL    Do you know the name of the person who called: [facility]    What was the call regarding: [facility] CALL[facility] AND TOLD PATIENT THAT HAS ANOTHER MEDICATION TO  START OUT 'CEPH' ENDS WITH 'XONE'  DAUGHTER HAS CALLED THREE TIMES ASKING WHY THIS IS BEING CALLED IN AND TO CONFIRM THIS MEDICATION IF FOR PATIENT. STATES HAS ALREADY PICKED UP NEW MEDICATION ON Friday. SO IS NOT EXPECTING ANY OTHER MEDICATIONS.    Do you require a callback: PLEASE CALL- WILL WARM TRANSFER DUE TO CONCERN OF NO CALL BACKS FROM PREVIOUS MESSAGE.

## 2022-08-10 DIAGNOSIS — I10 ESSENTIAL HYPERTENSION: ICD-10-CM

## 2022-08-10 RX ORDER — BISOPROLOL FUMARATE 5 MG/1
TABLET, FILM COATED ORAL
Qty: 90 TABLET | Refills: 3 | Status: SHIPPED | OUTPATIENT
Start: 2022-08-10

## 2022-08-10 RX ORDER — AMLODIPINE BESYLATE 5 MG/1
TABLET ORAL
Qty: 90 TABLET | Refills: 3 | Status: SHIPPED | OUTPATIENT
Start: 2022-08-10

## 2023-01-23 RX ORDER — TRIAMTERENE AND HYDROCHLOROTHIAZIDE 37.5; 25 MG/1; MG/1
1 CAPSULE ORAL DAILY
Qty: 90 CAPSULE | Refills: 3 | Status: SHIPPED | OUTPATIENT
Start: 2023-01-23 | End: 2023-01-23 | Stop reason: SDUPTHER

## 2023-01-23 NOTE — TELEPHONE ENCOUNTER
Caller: KELLERMANN, KATHERINE    Relationship: Emergency Contact    Best call back number: [phone]    Requested Prescriptions:   Requested Prescriptions     Pending Prescriptions Disp Refills   • triamterene-hydrochlorothiazide (DYAZIDE) 37.5-25 MG per capsule 90 capsule 3      Pharmacy where request should be sent: [id number] - [facility] [address]  AT [place] [facility] & [facility] [phone] [facility] [phone] FX     PATIENT IS WANTING THIS LOCALLY THIS TIME AS SHE IS OUT, PATIENT IS ALSO WANTING SCRIPTS TO BE SENT TO [facility].     Does the patient have less than a 3 day supply:  [x] Yes  [] No    Would you like a call back once the refill request has been completed: [x] Yes [] No    If the office needs to give you a call back, can they leave a voicemail: [x] Yes [] No    Laura Sharpe Rep   01/23/23 09:47 EST

## 2023-01-23 NOTE — TELEPHONE ENCOUNTER
Caller: KELLERMANN, KATHERINE    Relationship: Emergency Contact    Best call back number: [phone]    Requested Prescriptions:   Requested Prescriptions     Pending Prescriptions Disp Refills   • triamterene-hydrochlorothiazide (DYAZIDE) 37.5-25 MG per capsule 90 capsule 3     Sig: Take 1 capsule by mouth Daily.        Pharmacy where request should be sent: [id number] [facility] [address] AT [facility] [address] & [facility] [phone] [facility] [phone]      Additional details provided by patient: PATIENT HAS BEEN OUT AND HER  DAUGHTER DOES NOT KNOW HOW LONG IT HAS BEEN OUT FOR AND IT IS WORRYING HER     Does the patient have less than a 3 day supply:  [x] Yes  [] No    Would you like a call back once the refill request has been completed: [x] Yes [] No    If the office needs to give you a call back, can they leave a voicemail: [x] Yes [] No    Cadance Dunaway, Reg[facility] Rep   01/23/23 14:19 EST

## 2023-01-24 RX ORDER — TRIAMTERENE AND HYDROCHLOROTHIAZIDE 37.5; 25 MG/1; MG/1
1 CAPSULE ORAL DAILY
Qty: 90 CAPSULE | Refills: 3 | Status: SHIPPED | OUTPATIENT
Start: 2023-01-24

## 2023-02-23 ENCOUNTER — OFFICE VISIT (OUTPATIENT)
Dept: FAMILY MEDICINE CLINIC | Facility: CLINIC | Age: 88
End: 2023-02-23
Payer: MEDICARE

## 2023-02-23 VITALS
TEMPERATURE: 98 F | BODY MASS INDEX: 28.68 KG/M2 | WEIGHT: 168 LBS | SYSTOLIC BLOOD PRESSURE: 152 MMHG | DIASTOLIC BLOOD PRESSURE: 90 MMHG | HEIGHT: 64 IN | OXYGEN SATURATION: 95 % | RESPIRATION RATE: 16 BRPM | HEART RATE: 93 BPM

## 2023-02-23 DIAGNOSIS — R29.898 WEAKNESS OF BOTH LOWER EXTREMITIES: Primary | ICD-10-CM

## 2023-02-23 DIAGNOSIS — R29.6 FREQUENT FALLS: ICD-10-CM

## 2023-02-23 PROBLEM — H02.409 PTOSIS OF EYELID: Status: ACTIVE | Noted: 2019-05-31

## 2023-02-23 PROBLEM — H52.229 REGULAR ASTIGMATISM: Status: ACTIVE | Noted: 2017-05-12

## 2023-02-23 PROBLEM — H26.40 SECONDARY CATARACT: Status: ACTIVE | Noted: 2021-07-12

## 2023-02-23 PROBLEM — H52.4 PRESBYOPIA: Status: ACTIVE | Noted: 2017-05-12

## 2023-02-23 PROBLEM — H18.49: Status: ACTIVE | Noted: 2021-07-12

## 2023-02-23 PROBLEM — H52.10 MYOPIA: Status: ACTIVE | Noted: 2017-05-12

## 2023-02-23 PROCEDURE — 99213 OFFICE O/P EST LOW 20 MIN: CPT | Performed by: FAMILY MEDICINE

## 2023-02-23 PROCEDURE — 1159F MED LIST DOCD IN RCRD: CPT | Performed by: FAMILY MEDICINE

## 2023-02-23 PROCEDURE — 1160F RVW MEDS BY RX/DR IN RCRD: CPT | Performed by: FAMILY MEDICINE

## 2023-03-08 ENCOUNTER — TELEPHONE (OUTPATIENT)
Dept: FAMILY MEDICINE CLINIC | Facility: CLINIC | Age: 88
End: 2023-03-08
Payer: MEDICARE

## 2023-03-09 ENCOUNTER — OFFICE VISIT (OUTPATIENT)
Dept: FAMILY MEDICINE CLINIC | Facility: CLINIC | Age: 88
End: 2023-03-09
Payer: MEDICARE

## 2023-03-09 VITALS
SYSTOLIC BLOOD PRESSURE: 134 MMHG | HEIGHT: 64 IN | TEMPERATURE: 97.6 F | DIASTOLIC BLOOD PRESSURE: 62 MMHG | OXYGEN SATURATION: 98 % | HEART RATE: 78 BPM | BODY MASS INDEX: 28.84 KG/M2

## 2023-03-09 DIAGNOSIS — B02.8 HERPES ZOSTER WITH COMPLICATION: Primary | ICD-10-CM

## 2023-03-09 PROCEDURE — 99213 OFFICE O/P EST LOW 20 MIN: CPT | Performed by: PHYSICIAN ASSISTANT

## 2023-03-09 PROCEDURE — 1159F MED LIST DOCD IN RCRD: CPT | Performed by: PHYSICIAN ASSISTANT

## 2023-03-09 PROCEDURE — 1160F RVW MEDS BY RX/DR IN RCRD: CPT | Performed by: PHYSICIAN ASSISTANT

## 2023-03-09 RX ORDER — CEPHALEXIN 500 MG/1
500 CAPSULE ORAL 3 TIMES DAILY
Qty: 21 CAPSULE | Refills: 0 | Status: SHIPPED | OUTPATIENT
Start: 2023-03-09 | End: 2023-03-24

## 2023-03-09 RX ORDER — VALACYCLOVIR HYDROCHLORIDE 1 G/1
1000 TABLET, FILM COATED ORAL 3 TIMES DAILY
Qty: 21 TABLET | Refills: 0 | Status: SHIPPED | OUTPATIENT
Start: 2023-03-09 | End: 2023-03-24

## 2023-03-09 RX ORDER — MAGNESIUM HYDROXIDE 1200 MG/15ML
100 LIQUID ORAL DAILY
Qty: 2000 ML | Refills: 2 | Status: SHIPPED | OUTPATIENT
Start: 2023-03-09

## 2023-03-09 NOTE — PROGRESS NOTES
Subjective   Rose J Kellermann is a 89 y.o. female  Rash (Rash on left side of torso, concerned about possible shingles )      History of Present Illness    The patient presents today for evaluation of a rash. She is accompanied by an adult female.    The adult female reports the rash is mostly on her back and it circles around to the front. The adult female reports the rash is worse today than it was yesterday. The adult female reports the patient has been under the weather for the past week. The adult female noticed a guevara on the patient's pajamas, but it was not blood, it was pus. The adult female reports the patient felt a little bit better and she washed her hair and took a bath and that is when they noticed the rash. The patient reports the rash does not itch. The adult female reports the rash was red and cracked looking and there were a couple of pus nodules. The patient is not complaining of pain.     The following portions of the patient's history were reviewed and updated as appropriate: allergies, current medications, past social history and problem list    Review of Systems   Constitutional: Positive for fatigue. Negative for fever.   Musculoskeletal: Negative for arthralgias.   Skin: Positive for color change and rash. Negative for pallor and wound.   Allergic/Immunologic: Positive for immunocompromised state.       Objective     Vitals:    03/09/23 1356   BP: 134/62   Pulse: 78   Temp: 97.6 °F (36.4 °C)   SpO2: 98%       Physical Exam  Vitals and nursing note reviewed.   Constitutional:       General: She is not in acute distress.     Appearance: Normal appearance. She is well-developed. She is not ill-appearing, toxic-appearing or diaphoretic.   Skin:     General: Skin is warm and dry.      Coloration: Skin is not pale.      Findings: Erythema and rash present.      Comments: Zoster rash over left upper back around to LUQunder left breast   Neurological:      Mental Status: She is alert.          Assessment & Plan     Shingles  I will prescribe Keflex and Valtrex to be taken 3 times daily.  I advised the patient to rinse the area with sterile saline water 1 to 2 times daily and pat it dry with a dry washcloth.  I advised the patient to wear a nice soft cotton shirt over it.  I advised the patient to contact the office if her symptoms worsen.    Diagnoses and all orders for this visit:    1. Herpes zoster with complication (Primary)    Other orders  -     sodium chloride (NS) 0.9 % irrigation; Irrigate with 100 mL to the affected area as directed by provider Daily.  Dispense: 2000 mL; Refill: 2  -     valACYclovir (Valtrex) 1000 MG tablet; Take 1 tablet by mouth 3 (Three) Times a Day.  Dispense: 21 tablet; Refill: 0  -     cephalexin (Keflex) 500 MG capsule; Take 1 capsule by mouth 3 (Three) Times a Day.  Dispense: 21 capsule; Refill: 0     Transcribed from ambient dictation for Navya Sethi PA-C by Thalia Juarez.  03/09/23   15:27 EST    Patient or patient representative verbalized consent to the visit recording.  I have personally performed the services described in this document as transcribed by the above individual, and it is both accurate and complete.  Navya Sethi PA-C  3/9/2023  16:55 EST

## 2023-03-16 ENCOUNTER — TELEPHONE (OUTPATIENT)
Dept: FAMILY MEDICINE CLINIC | Facility: CLINIC | Age: 88
End: 2023-03-16

## 2023-03-16 NOTE — TELEPHONE ENCOUNTER
Caller: KELLERMANN, KATHERINE    Relationship: Emergency Contact    Best call back number: [phone]    What is the best time to reach you: ANYTIME    Who are you requesting to speak with (clinical staff, provider,  specific staff member): CLINICAL STAFF    Do you know the name of the person who called: DAUGHTER    What was the call regarding: PATIENTS DAUGHTER STATES THAT SHE WOULD LIKE A CALL ABOUT THE PATIENTS valACYclovir (Valtrex) 1000 MG tablet MEDICATION.    Do you require a callback: YES

## 2023-03-17 NOTE — TELEPHONE ENCOUNTER
Daughter states the patient has shingles and the pharmacy gave her 31 valcyclovir pills instead of 21. She wants to know if she should give her the remainder of the prescription or discontinue it

## 2023-03-17 NOTE — TELEPHONE ENCOUNTER
Areli Castro's call. States that the rash is better- has scabbed over and hasnt been draining in a while. Advised to call if it starts getting worse again.

## 2023-03-24 ENCOUNTER — OFFICE VISIT (OUTPATIENT)
Dept: FAMILY MEDICINE CLINIC | Facility: CLINIC | Age: 88
End: 2023-03-24
Payer: MEDICARE

## 2023-03-24 VITALS
SYSTOLIC BLOOD PRESSURE: 128 MMHG | TEMPERATURE: 98.1 F | BODY MASS INDEX: 28.84 KG/M2 | HEIGHT: 64 IN | HEART RATE: 67 BPM | OXYGEN SATURATION: 93 % | DIASTOLIC BLOOD PRESSURE: 62 MMHG

## 2023-03-24 DIAGNOSIS — E44.1 MILD PROTEIN-CALORIE MALNUTRITION: ICD-10-CM

## 2023-03-24 DIAGNOSIS — B02.8 HERPES ZOSTER WITH COMPLICATION: Primary | ICD-10-CM

## 2023-03-24 DIAGNOSIS — R53.1 WEAKNESS: ICD-10-CM

## 2023-03-24 PROCEDURE — 99212 OFFICE O/P EST SF 10 MIN: CPT | Performed by: PHYSICIAN ASSISTANT

## 2023-03-24 NOTE — PROGRESS NOTES
Subjective   Rose J Kellermann is a 89 y.o. female  Herpes Zoster (Follow up on shingles ) and Fatigue (Ongoing fatigue and weakness, not eating much )      History of Present Illness     The patient is a 89-year-old female seen today for follow-up on shingles, ongoing weakness, and fatigue. She is accompanied by her daughter.    Since her last visit, the patient notes that her shingles rash has scabbed over and has not been painful at all. She currently complains of loss of appetite. She denies any nausea, constipation, urination issues, abdominal pain with eating, vomiting, or diarrhea. The patient's daughter said the patient eats well at breakfast, has some lunch, and usually has 0.5 portions of a regular dinner. She also notes the patient has been going to bed around 6:00 PM and wakes up between 9:00 and 10:00 AM. The patient likes to drink orange juice, water, and 7 Up.    The following portions of the patient's history were reviewed and updated as appropriate: allergies, current medications, past social history and problem list    Review of Systems   Constitutional: Positive for fatigue.   Skin: Positive for rash.   Neurological: Positive for weakness.   Psychiatric/Behavioral: Positive for decreased concentration.       Objective     Vitals:    03/24/23 1325   BP: 128/62   Pulse: 67   Temp: 98.1 °F (36.7 °C)   SpO2: 93%       Physical Exam  Vitals and nursing note reviewed.   Constitutional:       General: She is not in acute distress.     Appearance: Normal appearance. She is well-developed. She is not ill-appearing, toxic-appearing or diaphoretic.   Eyes:      Conjunctiva/sclera: Conjunctivae normal.   Cardiovascular:      Rate and Rhythm: Normal rate and regular rhythm.   Pulmonary:      Effort: Pulmonary effort is normal.      Breath sounds: Normal breath sounds.   Skin:     Findings: Rash ( zoster rash healing) present.   Neurological:      Mental Status: She is alert and oriented to person, place, and  time.      Coordination: Coordination normal.   Psychiatric:         Attention and Perception: She is attentive.         Mood and Affect: Mood normal.         Behavior: Behavior normal.         Thought Content: Thought content normal.         Judgment: Judgment normal.         Assessment & Plan     Diagnoses and all orders for this visit:    1. Herpes zoster with complication (Primary)    2. Weakness    3. Mild protein-calorie malnutrition (HCC)    The patient was advised to use a high protein meal replacement shake, like Premier Protein, 3 times a day to at least get calories and protein and nutrition in her until that can stimulate her appetite back to normal. She was also advised to drink plenty of water.     I spent 20 minutes in patient care: Reviewing records prior to the visit, examining the patient, entering orders and documentation    Part of this note may be an electronic transcription/translation of spoken language to printed text using the Dragon Dictation System.      Transcribed from ambient dictation for Navya Sethi PA-C by Ninfa Jha.  03/24/23   14:40 EDT    Patient or patient representative verbalized consent to the visit recording.  I have personally performed the services described in this document as transcribed by the above individual, and it is both accurate and complete.  Navya Sethi PA-C  3/24/2023  17:04 EDT

## 2023-04-18 ENCOUNTER — TELEPHONE (OUTPATIENT)
Dept: FAMILY MEDICINE CLINIC | Facility: CLINIC | Age: 88
End: 2023-04-18

## 2023-04-18 NOTE — TELEPHONE ENCOUNTER
Caller: KELLERMANN, KATHERINE    Relationship: Emergency Contact    Best call back number: [phone]    What is the best time to reach you: ANYTIME    Who are you requesting to speak with (clinical staff, provider,  specific staff member): LUL WU    Do you know the name of the person who called: DAUGHTER    What was the call regarding: PATIENTS DAUGHTER STATES THAT SHE WOULD LIKE A CALL ABOUT HER MOTHER.    Do you require a callback: YES

## 2023-04-25 ENCOUNTER — TELEPHONE (OUTPATIENT)
Dept: FAMILY MEDICINE CLINIC | Facility: CLINIC | Age: 88
End: 2023-04-25

## 2023-04-25 NOTE — TELEPHONE ENCOUNTER
Jaziel with [facility] home health calling to get verbal orders for:  Home health 1x wk for 8 weeks  Speech therapy- evaluation  Please call Jaziel at [phone]

## 2023-05-08 ENCOUNTER — LAB (OUTPATIENT)
Dept: FAMILY MEDICINE CLINIC | Facility: CLINIC | Age: 88
End: 2023-05-08
Payer: MEDICARE

## 2023-05-08 ENCOUNTER — OFFICE VISIT (OUTPATIENT)
Dept: FAMILY MEDICINE CLINIC | Facility: CLINIC | Age: 88
End: 2023-05-08
Payer: MEDICARE

## 2023-05-08 VITALS
HEIGHT: 64 IN | RESPIRATION RATE: 14 BRPM | TEMPERATURE: 96.9 F | BODY MASS INDEX: 28.84 KG/M2 | OXYGEN SATURATION: 94 % | HEART RATE: 86 BPM | SYSTOLIC BLOOD PRESSURE: 120 MMHG | DIASTOLIC BLOOD PRESSURE: 78 MMHG

## 2023-05-08 DIAGNOSIS — I49.9 IRREGULAR HEART RATE: ICD-10-CM

## 2023-05-08 DIAGNOSIS — R01.1 HEART MURMUR: ICD-10-CM

## 2023-05-08 DIAGNOSIS — N18.30 STAGE 3 CHRONIC KIDNEY DISEASE, UNSPECIFIED WHETHER STAGE 3A OR 3B CKD: ICD-10-CM

## 2023-05-08 DIAGNOSIS — R53.83 FATIGUE, UNSPECIFIED TYPE: ICD-10-CM

## 2023-05-08 DIAGNOSIS — R63.0 ANOREXIA: ICD-10-CM

## 2023-05-08 DIAGNOSIS — E55.9 VITAMIN D DEFICIENCY: ICD-10-CM

## 2023-05-08 DIAGNOSIS — Z00.00 GENERAL MEDICAL EXAM: ICD-10-CM

## 2023-05-08 DIAGNOSIS — B02.8 HERPES ZOSTER WITH COMPLICATION: Primary | ICD-10-CM

## 2023-05-08 DIAGNOSIS — I10 ESSENTIAL HYPERTENSION: ICD-10-CM

## 2023-05-08 LAB
DEPRECATED RDW RBC AUTO: 47.2 FL (ref 37–54)
ERYTHROCYTE [DISTWIDTH] IN BLOOD BY AUTOMATED COUNT: 13.8 % (ref 12.3–15.4)
HCT VFR BLD AUTO: 35.9 % (ref 34–46.6)
HGB BLD-MCNC: 12.2 G/DL (ref 12–15.9)
MCH RBC QN AUTO: 32.1 PG (ref 26.6–33)
MCHC RBC AUTO-ENTMCNC: 34 G/DL (ref 31.5–35.7)
MCV RBC AUTO: 94.5 FL (ref 79–97)
PLATELET # BLD AUTO: 154 10*3/MM3 (ref 140–450)
PMV BLD AUTO: 11.4 FL (ref 6–12)
RBC # BLD AUTO: 3.8 10*6/MM3 (ref 3.77–5.28)
WBC NRBC COR # BLD: 7.34 10*3/MM3 (ref 3.4–10.8)

## 2023-05-08 PROCEDURE — 84439 ASSAY OF FREE THYROXINE: CPT | Performed by: FAMILY MEDICINE

## 2023-05-08 PROCEDURE — 84443 ASSAY THYROID STIM HORMONE: CPT | Performed by: FAMILY MEDICINE

## 2023-05-08 PROCEDURE — 82306 VITAMIN D 25 HYDROXY: CPT | Performed by: FAMILY MEDICINE

## 2023-05-08 PROCEDURE — 80053 COMPREHEN METABOLIC PANEL: CPT | Performed by: FAMILY MEDICINE

## 2023-05-08 PROCEDURE — 85027 COMPLETE CBC AUTOMATED: CPT | Performed by: FAMILY MEDICINE

## 2023-05-08 PROCEDURE — 82607 VITAMIN B-12: CPT | Performed by: FAMILY MEDICINE

## 2023-05-08 PROCEDURE — 82746 ASSAY OF FOLIC ACID SERUM: CPT | Performed by: FAMILY MEDICINE

## 2023-05-08 RX ORDER — CHLORAL HYDRATE 500 MG
CAPSULE ORAL
COMMUNITY

## 2023-05-08 NOTE — PROGRESS NOTES
Subjective   Rose J Kellermann is a 89 y.o. female    Herpes zoster  Fatigue  Hypertension    History of Present Illness  The patient is in for a recheck related to a shingles outbreak from back in 03/2023. She was advised to follow up if this did not resolve. The rash is still visible, so she is here with her daughter to have it checked out. Her blood pressure is well controlled at 120/78 mmHg. Noted on the appointment schedule was that the patient was having severe fatigue, but that was not mentioned in the nurse's complaints.    The patient's daughter conveys that the patient never had pain from the shingles. The patient's daughter notes that she is tired all the time. The patient's daughter relays that she is confused off and on.    The patient's daughter reports that it has been a while since she had an echocardiogram or even seen her cardiologist, Dr. Xiao.    The following portions of the patient's history were reviewed and updated as appropriate: allergies, current medications, past social history and problem list    Review of Systems   Constitutional: Positive for fatigue and unexpected weight change. Negative for chills and fever.   Respiratory: Negative for cough, chest tightness and shortness of breath.    Cardiovascular: Negative for chest pain, palpitations and leg swelling.   Gastrointestinal: Negative for diarrhea, nausea and vomiting.   Musculoskeletal: Negative for arthralgias.   Skin: Positive for color change. Negative for pallor, rash and wound.   Allergic/Immunologic: Negative for immunocompromised state.   Neurological: Negative for dizziness, syncope, weakness and headaches.   Psychiatric/Behavioral: Positive for confusion and decreased concentration. Negative for agitation, behavioral problems and hallucinations. The patient is nervous/anxious.        Objective     Vitals:    05/08/23 1401   BP: 120/78   Pulse: 86   Resp: 14   Temp: 96.9 °F (36.1 °C)   SpO2: 94%       Physical Exam  Vitals  and nursing note reviewed.   Constitutional:       General: She is not in acute distress.     Appearance: Normal appearance. She is well-developed. She is not ill-appearing, toxic-appearing or diaphoretic.   HENT:      Head: Normocephalic and atraumatic.   Eyes:      Conjunctiva/sclera: Conjunctivae normal.      Pupils: Pupils are equal, round, and reactive to light.   Neck:      Vascular: No carotid bruit or JVD.   Cardiovascular:      Rate and Rhythm: Normal rate and regular rhythm.      Pulses: Normal pulses.      Heart sounds: Normal heart sounds. No murmur heard.     Comments: 2/6 to 3/6 systolic murmur. Irregular heart rate.   Pulmonary:      Effort: Pulmonary effort is normal. No respiratory distress.      Breath sounds: Normal breath sounds.   Abdominal:      Palpations: Abdomen is soft.      Tenderness: There is no abdominal tenderness.   Musculoskeletal:      Right lower leg: No edema.      Left lower leg: No edema.   Skin:     General: Skin is warm and dry.      Coloration: Skin is not pale.      Findings: Erythema present. No rash.   Neurological:      Mental Status: She is alert.   Psychiatric:         Mood and Affect: Mood normal.         Behavior: Behavior normal.      Comments: Pleasantly confused.         Assessment & Plan     Problems Addressed this Visit        Cardiac and Vasculature    Essential hypertension    Relevant Orders    Comprehensive Metabolic Panel    TSH    T4, Free       Endocrine and Metabolic    Vitamin D deficiency    Relevant Orders    Vitamin D,25-Hydroxy       Genitourinary and Reproductive     Chronic kidney disease, stage III (moderate)     Relevant Orders    Comprehensive Metabolic Panel       Symptoms and Signs    Fatigue    Relevant Orders    CBC (No Diff)    Comprehensive Metabolic Panel    TSH    T4, Free    Vitamin B12    Folate   Other Visit Diagnoses     Herpes zoster with complication    -  Primary    Relevant Orders    CBC (No Diff)    Comprehensive Metabolic Panel     Anorexia        Relevant Orders    CBC (No Diff)    Comprehensive Metabolic Panel    TSH    T4, Free    Vitamin B12    Irregular heart rate        Heart murmur          Diagnoses       Codes Comments    Herpes zoster with complication    -  Primary ICD-10-CM: B02.8  ICD-9-CM: 053.8     Essential hypertension     ICD-10-CM: I10  ICD-9-CM: 401.9     Fatigue, unspecified type     ICD-10-CM: R53.83  ICD-9-CM: 780.79     Stage 3 chronic kidney disease, unspecified whether stage 3a or 3b CKD     ICD-10-CM: N18.30  ICD-9-CM: 585.3     Vitamin D deficiency     ICD-10-CM: E55.9  ICD-9-CM: 268.9     Anorexia     ICD-10-CM: R63.0  ICD-9-CM: 783.0     Irregular heart rate     ICD-10-CM: I49.9  ICD-9-CM: 427.9     Heart murmur     ICD-10-CM: R01.1  ICD-9-CM: 785.2         Plan    Lab survey to search for etiology for symptoms.  If all normal recommend follow-up with cardiology as her valvular function appears to have worsened based on her auscultation.    Encouraged patient and daughter to continue working on diet as they have been doing.    I spent 25 minutes in patient care: Reviewing records prior to the visit, examining the patient, entering orders and documentation    Part of this note may be an electronic transcription/translation of spoken language to printed text using the Dragon Dictation System.           Transcribed from ambient dictation for URMILA Hung MD by Summer Zuluaga.  05/08/23   16:01 EDT  Patient or patient representative verbalized consent to the visit recording.  I have personally performed the services described in this document as transcribed by the above individual, and it is both accurate and complete.

## 2023-05-09 ENCOUNTER — LAB (OUTPATIENT)
Dept: LAB | Facility: HOSPITAL | Age: 88
End: 2023-05-09
Payer: MEDICARE

## 2023-05-09 ENCOUNTER — TELEPHONE (OUTPATIENT)
Dept: FAMILY MEDICINE CLINIC | Facility: CLINIC | Age: 88
End: 2023-05-09
Payer: MEDICARE

## 2023-05-09 ENCOUNTER — TRANSCRIBE ORDERS (OUTPATIENT)
Dept: FAMILY MEDICINE CLINIC | Facility: CLINIC | Age: 88
End: 2023-05-09
Payer: MEDICARE

## 2023-05-09 DIAGNOSIS — E83.52 HYPERCALCEMIA: ICD-10-CM

## 2023-05-09 DIAGNOSIS — N17.9 ACUTE RENAL FAILURE, UNSPECIFIED ACUTE RENAL FAILURE TYPE: ICD-10-CM

## 2023-05-09 DIAGNOSIS — R01.1 HEART MURMUR: ICD-10-CM

## 2023-05-09 DIAGNOSIS — I49.9 IRREGULAR HEART RATE: ICD-10-CM

## 2023-05-09 DIAGNOSIS — I10 ESSENTIAL HYPERTENSION: ICD-10-CM

## 2023-05-09 DIAGNOSIS — N17.9 ACUTE RENAL FAILURE, UNSPECIFIED ACUTE RENAL FAILURE TYPE: Primary | ICD-10-CM

## 2023-05-09 LAB
25(OH)D3 SERPL-MCNC: 107 NG/ML (ref 30–100)
ALBUMIN SERPL-MCNC: 3.8 G/DL (ref 3.5–5.2)
ALBUMIN/GLOB SERPL: 1.1 G/DL
ALP SERPL-CCNC: 70 U/L (ref 39–117)
ALT SERPL W P-5'-P-CCNC: 16 U/L (ref 1–33)
ANION GAP SERPL CALCULATED.3IONS-SCNC: 11.8 MMOL/L (ref 5–15)
AST SERPL-CCNC: 24 U/L (ref 1–32)
BACTERIA UR QL AUTO: ABNORMAL /HPF
BILIRUB SERPL-MCNC: 0.4 MG/DL (ref 0–1.2)
BILIRUB UR QL STRIP: NEGATIVE
BUN SERPL-MCNC: 49 MG/DL (ref 8–23)
BUN/CREAT SERPL: 23.7 (ref 7–25)
CALCIUM SPEC-SCNC: 12.9 MG/DL (ref 8.2–9.6)
CHLORIDE SERPL-SCNC: 98 MMOL/L (ref 98–107)
CLARITY UR: ABNORMAL
CO2 SERPL-SCNC: 27.2 MMOL/L (ref 22–29)
COD CRY URNS QL: ABNORMAL /HPF
COLOR UR: YELLOW
CREAT SERPL-MCNC: 2.07 MG/DL (ref 0.57–1)
EGFRCR SERPLBLD CKD-EPI 2021: 21.6 ML/MIN/1.73
FOLATE SERPL-MCNC: >20 NG/ML (ref 4.78–24.2)
GLOBULIN UR ELPH-MCNC: 3.6 GM/DL
GLUCOSE SERPL-MCNC: 102 MG/DL (ref 65–99)
GLUCOSE UR STRIP-MCNC: NEGATIVE MG/DL
HGB UR QL STRIP.AUTO: NEGATIVE
HYALINE CASTS UR QL AUTO: ABNORMAL /LPF
KETONES UR QL STRIP: NEGATIVE
LEUKOCYTE ESTERASE UR QL STRIP.AUTO: ABNORMAL
NITRITE UR QL STRIP: NEGATIVE
PH UR STRIP.AUTO: 6.5 [PH] (ref 5–8)
POTASSIUM SERPL-SCNC: 5.1 MMOL/L (ref 3.5–5.2)
PROT SERPL-MCNC: 7.4 G/DL (ref 6–8.5)
PROT UR QL STRIP: ABNORMAL
RBC # UR STRIP: ABNORMAL /HPF
REF LAB TEST METHOD: ABNORMAL
SODIUM SERPL-SCNC: 137 MMOL/L (ref 136–145)
SP GR UR STRIP: 1.02 (ref 1–1.03)
SQUAMOUS #/AREA URNS HPF: ABNORMAL /HPF
T4 FREE SERPL-MCNC: 1.29 NG/DL (ref 0.93–1.7)
TRANS CELLS #/AREA URNS HPF: ABNORMAL /HPF
TSH SERPL DL<=0.05 MIU/L-ACNC: 4.62 UIU/ML (ref 0.27–4.2)
UROBILINOGEN UR QL STRIP: ABNORMAL
VIT B12 BLD-MCNC: 1236 PG/ML (ref 211–946)
WAXY CASTS #/AREA URNS LPF: ABNORMAL /LPF
WBC # UR STRIP: ABNORMAL /HPF

## 2023-05-09 PROCEDURE — 82310 ASSAY OF CALCIUM: CPT

## 2023-05-09 PROCEDURE — 81001 URINALYSIS AUTO W/SCOPE: CPT

## 2023-05-09 PROCEDURE — 83970 ASSAY OF PARATHORMONE: CPT

## 2023-05-09 PROCEDURE — 36415 COLL VENOUS BLD VENIPUNCTURE: CPT

## 2023-05-09 NOTE — TELEPHONE ENCOUNTER
I spoke with Areli LEDBETTER regarding pts results, she verbalized an understanding [email or web address] (it's more convenient than coming here). I told her to hold Vit B12 and Vit D; should she hold other vitamins like Centrum, Osteo-biflex, and fish oil?  Also what are your thoughts/recc for pt to get cognitive testing done? She says you all discussed this a bit yesterday.

## 2023-05-09 NOTE — TELEPHONE ENCOUNTER
Areli notified, she said pt is not taking Sular; the only prescription meds she's on is Amlodipine, Triam/HCTZ, and Bisoprolol. Med list updated.

## 2023-05-09 NOTE — TELEPHONE ENCOUNTER
Yes hold those for now until she sees the kidney specialist.  I would also hold off on referral for cognitive testing until we get her labs straightened out.  Certainly her high calcium and high vitamin D could be contributing to her mental changes.

## 2023-05-09 NOTE — PROGRESS NOTES
Kidney failure. Hold vitamin supplements.  Should not be on both amlodipine and nisoldipine. Stop nisoldipine.  Need to repeat her calcium level and check her parathyroid hormone level.  Will need to return to the lab today.  We will make referral to kidney specialist.

## 2023-05-10 LAB
CALCIUM SPEC-SCNC: 12.7 MG/DL (ref 8.2–9.6)
PTH-INTACT SERPL-MCNC: 7.4 PG/ML (ref 15–65)

## 2023-05-31 ENCOUNTER — TELEPHONE (OUTPATIENT)
Dept: FAMILY MEDICINE CLINIC | Facility: CLINIC | Age: 88
End: 2023-05-31

## 2023-05-31 NOTE — TELEPHONE ENCOUNTER
Jaziel Osei,physical therapist with [facility], reporting that her starting oxygen was 93-96, upon barely any exertion it dropped to 82/83. Please advise if she needs to go to the ER. She does have an appt with cardio tomorrow.     P: [phone] (will be with patient for the next 30 min/until 2:45pm, please call patient after this time to let her know)

## 2023-06-01 ENCOUNTER — OFFICE VISIT (OUTPATIENT)
Dept: CARDIOLOGY | Facility: CLINIC | Age: 88
End: 2023-06-01

## 2023-06-01 ENCOUNTER — HOSPITAL ENCOUNTER (OUTPATIENT)
Dept: CARDIOLOGY | Facility: HOSPITAL | Age: 88
Discharge: HOME | End: 2023-06-01

## 2023-06-01 VITALS
SYSTOLIC BLOOD PRESSURE: 100 MMHG | DIASTOLIC BLOOD PRESSURE: 56 MMHG | WEIGHT: 168 LBS | HEIGHT: 63 IN | HEART RATE: 54 BPM | BODY MASS INDEX: 29.77 KG/M2 | OXYGEN SATURATION: 86 %

## 2023-06-01 VITALS — WEIGHT: 168 LBS | HEIGHT: 63 IN | BODY MASS INDEX: 29.77 KG/M2

## 2023-06-01 DIAGNOSIS — I10 ESSENTIAL HYPERTENSION: ICD-10-CM

## 2023-06-01 DIAGNOSIS — I49.1 PREMATURE ATRIAL CONTRACTIONS: ICD-10-CM

## 2023-06-01 DIAGNOSIS — R01.1 HEART MURMUR: ICD-10-CM

## 2023-06-01 DIAGNOSIS — R01.1 HEART MURMUR: Primary | ICD-10-CM

## 2023-06-01 DIAGNOSIS — R09.02 HYPOXIA: ICD-10-CM

## 2023-06-01 PROCEDURE — 93306 TTE W/DOPPLER COMPLETE: CPT | Performed by: INTERNAL MEDICINE

## 2023-06-01 PROCEDURE — 93306 TTE W/DOPPLER COMPLETE: CPT

## 2023-06-01 NOTE — TELEPHONE ENCOUNTER
Left detailed vm with POA number on file to go to ER if O2 has continued to drop into the 80s; and/of keep f/u with cardio today.

## 2023-06-01 NOTE — PROGRESS NOTES
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 Heart Murmur  2. Fatigue, dyspnea and frequent premature atrial contractions:  a. 2010, echocardiogram, mild concentric LVH, mild MR, TR, normal LVEF, mildly elevated RVSP.    b. 2010, Holter with greater 18,000 PACs, improved with Zebeta.    3. Hypertension, presumed essential.    4. DVT 2018  a. Resolution on us 2018  5. CKD Stage IV  1. 2023 Cr 2.07, eGFR 23  6. Hypercalcemia  1.  Calcium 12.7, PTHi 7.4 (low)  2. Continued workup by Dr. MACHUCA  7. Elevated TSH   8. Hyperglycemia  a. 2019 A1c 5.9  9. Remote syncope with working diagnosis of vasodepressor, .   10. Remote knee surgery in .    11. Remote pneumonia, .    12. Osteoarthritis with questionable carpal tunnel syndrome bilaterally.    13. ,  children ages 56 and 58.    14. Endometrial cancer, status post robotically assisted hysterectomy with Dr. Haddad, 2011.    15. L hip fx     CC:  Chief Complaint   Patient presents with   • Essential hypertension       Allergies  Allergies   Allergen Reactions   • Sulfa Antibiotics Nausea Only and GI Intolerance       Current Medications    Current Outpatient Medications:   •  amLODIPine (NORVASC) 5 MG tablet, TAKE 1 TABLET DAILY (Patient taking differently: Take 2 tablets by mouth Daily.), Disp: 90 tablet, Rfl: 3  •  bisoprolol (ZEBeta) 5 MG tablet, TAKE 1 TABLET DAILY, Disp: 90 tablet, Rfl: 3  •  Omega-3 Fatty Acids (fish oil) 1000 MG capsule capsule, Take  by mouth Daily With Breakfast., Disp: , Rfl:      History of Present Illness   HPI  Rose J Kellermann is a 89  y.o. year old female with the above mentioned PMH who presents for consult from Sam Ryan* for evaluation of heart murmur and irregular heart rate. She had a case of Shingles that was taking longer to heal around 2 to 3 months ago and the patient has been seeing Dr. Hung.  Patient's daughter noted that Ms. Kellerman has been experiencing more fatigue, sleeping more often and had more problems with memory since around that time.  She denies any symptoms of chest pain, shortness of breath, palpitations or lightheadedness.  Physical therapy saw her yesterday and noted some low oxygen saturations. Her oxygen saturation in office today was 86%. Dr. Hung has also been delineating cause of hypercalcemia with decreased parathyroid hormone.  Her nephrologist just discontinued her triamterene HCTZ and increased her amlodipine.    Pt denies any chest pain,PND, palpitations, or claudication. Pt denies history of CHF, DVT, PE, MI, CVA, TIA, or rheumatic fever.       ROS  Review of Systems   Constitutional: Positive for decreased appetite and malaise/fatigue.   Respiratory: Positive for shortness of breath.    Endocrine: Positive for cold intolerance.   Musculoskeletal: Positive for joint pain.   Gastrointestinal: Positive for nausea.   Psychiatric/Behavioral: Positive for altered mental status.   All other systems reviewed and are negative.      SOCIAL HX  Social History     Socioeconomic History   • Marital status:    Tobacco Use   • Smoking status: Never     Passive exposure: Never   • Smokeless tobacco: Never   Vaping Use   • Vaping Use: Never used   Substance and Sexual Activity   • Alcohol use: No   • Drug use: No   • Sexual activity: Defer     Birth control/protection: None       FAMILY HX  Family History   Problem Relation Age of Onset   • Heart disease Mother    • Osteoarthritis Mother    • Hypertension Mother    • Hypertension Father    • Heart attack Father    • Osteoarthritis Father   "  • Cancer Brother         lung       Vitals:    06/01/23 1252   BP: 100/56   BP Location: Left arm   Patient Position: Sitting   Pulse: 54   SpO2: (!) 86%   Weight: 76.2 kg (168 lb)   Height: 160 cm (63\")     Body mass index is 29.76 kg/m².     PHYSICAL EXAMINATION:  Constitutional:       Appearance: Not in distress. Frail.   Pulmonary:      Effort: Pulmonary effort is normal.      Breath sounds: Diffuse Wheezing present. No rhonchi. No rales.   Cardiovascular:      Normal rate. Irregular rhythm. Normal S1. Normal S2.      Murmurs: There is a grade 3/6 early systolic murmur.      No gallop. No click. No rub.   Pulses:     Intact distal pulses.   Edema:     Peripheral edema absent.   Skin:     General: Skin is dry.   Neurological:      General: No focal deficit present.      Mental Status: Alert and oriented to person, place and time.         Diagnostic Data:    ECG 12 Lead    Date/Time: 6/1/2023 1:41 PM  Performed by: Zain Talamantes PA-C  Authorized by: Zain Talamantes PA-C   Comparison: compared with previous ECG from 10/17/2018  Comparison to previous ECG: Patient now with nonconducted PACs  Rhythm: sinus rhythm and sinus bradycardia  Ectopy comments: With occasional nonconducted PACs  Rate: bradycardic  BPM: 58    Clinical impression: abnormal EKG          No results found for: CHLPL, TRIG, HDL, LDLDIRECT  Lab Results   Component Value Date    GLUCOSE 102 (H) 05/08/2023    BUN 49 (H) 05/08/2023    CREATININE 2.07 (H) 05/08/2023     05/08/2023    K 5.1 05/08/2023    CL 98 05/08/2023    CO2 27.2 05/08/2023     Lab Results   Component Value Date    HGBA1C 5.98 (H) 04/22/2019     Lab Results   Component Value Date    WBC 7.34 05/08/2023    HGB 12.2 05/08/2023    HCT 35.9 05/08/2023     05/08/2023         Advance Care Planning   ACP discussion was declined by the patient. Patient has an advance directive in EMR which is still valid.          ASSESSMENT:   Diagnosis Plan   1. Heart murmur  Adult " Transthoracic Echo Complete W/ Cont if Necessary Per Protocol      2. Premature atrial contractions  Holter Monitor - 48 Hour      3. Essential hypertension        4. Hypoxia            PLAN:  Heart Murmur- will document echocardiogram today at 3 PM. Prior echo in 2010 documented only mild MR.    PACs- EKG today showed sinus rhythm that appears to have PACs with AVB. We will document 48 hour monitor to rule out frequent high grade heart block and document burden of ectopy.    Hypertension- Controlled at 100/56 with recent antihypertensive change by nephrology.  Continue current regimen.    Hypoxia-patient experiences drop of oxygen into the 80s after physical exertion.  We will document overnight oximetry try to get her set up with overnight oxygen therapy.  Continue to attend follow-up with Dr. Archer.    Sam Ryan*, thank you for referring Ms. Kellermann for evaluation.  I have forwarded my electronically generated recommendations to you for review.  Please do not hesitate to call with any questions.    Scribed by Zain Talamantes PA-C for Andrew Xiao MD, FACC

## 2023-06-02 LAB
BH CV ECHO MEAS - AI P1/2T: 515.7 MSEC
BH CV ECHO MEAS - AO MAX PG: 25.8 MMHG
BH CV ECHO MEAS - AO MEAN PG: 15.3 MMHG
BH CV ECHO MEAS - AO ROOT DIAM: 3.1 CM
BH CV ECHO MEAS - AO V2 MAX: 254.1 CM/SEC
BH CV ECHO MEAS - AO V2 VTI: 60.4 CM
BH CV ECHO MEAS - AVA(I,D): 1.57 CM2
BH CV ECHO MEAS - EDV(CUBED): 107.2 ML
BH CV ECHO MEAS - EDV(MOD-SP2): 72.3 ML
BH CV ECHO MEAS - EDV(MOD-SP4): 63.5 ML
BH CV ECHO MEAS - EF(MOD-BP): 61.4 %
BH CV ECHO MEAS - EF(MOD-SP2): 65.1 %
BH CV ECHO MEAS - EF(MOD-SP4): 54.8 %
BH CV ECHO MEAS - ESV(CUBED): 23.2 ML
BH CV ECHO MEAS - ESV(MOD-SP2): 25.2 ML
BH CV ECHO MEAS - ESV(MOD-SP4): 28.7 ML
BH CV ECHO MEAS - FS: 40 %
BH CV ECHO MEAS - IVS/LVPW: 1.15 CM
BH CV ECHO MEAS - IVSD: 0.97 CM
BH CV ECHO MEAS - LA DIMENSION: 3.9 CM
BH CV ECHO MEAS - LAT PEAK E' VEL: 10.4 CM/SEC
BH CV ECHO MEAS - LV MASS(C)D: 146 GRAMS
BH CV ECHO MEAS - LV MAX PG: 5.6 MMHG
BH CV ECHO MEAS - LV MEAN PG: 3 MMHG
BH CV ECHO MEAS - LV V1 MAX: 118.7 CM/SEC
BH CV ECHO MEAS - LV V1 VTI: 30.8 CM
BH CV ECHO MEAS - LVIDD: 4.8 CM
BH CV ECHO MEAS - LVIDS: 2.9 CM
BH CV ECHO MEAS - LVOT AREA: 3.1 CM2
BH CV ECHO MEAS - LVOT DIAM: 1.98 CM
BH CV ECHO MEAS - LVPWD: 0.84 CM
BH CV ECHO MEAS - MED PEAK E' VEL: 7.2 CM/SEC
BH CV ECHO MEAS - MV A MAX VEL: 142 CM/SEC
BH CV ECHO MEAS - MV DEC SLOPE: 561.2 CM/SEC2
BH CV ECHO MEAS - MV DEC TIME: 0.49 MSEC
BH CV ECHO MEAS - MV E MAX VEL: 142.6 CM/SEC
BH CV ECHO MEAS - MV E/A: 1
BH CV ECHO MEAS - MV MAX PG: 12.4 MMHG
BH CV ECHO MEAS - MV MEAN PG: 5.3 MMHG
BH CV ECHO MEAS - MV P1/2T: 93.3 MSEC
BH CV ECHO MEAS - MV V2 VTI: 64.8 CM
BH CV ECHO MEAS - MVA(P1/2T): 2.36 CM2
BH CV ECHO MEAS - MVA(VTI): 1.46 CM2
BH CV ECHO MEAS - PA ACC TIME: 0.12 SEC
BH CV ECHO MEAS - PA PR(ACCEL): 23.5 MMHG
BH CV ECHO MEAS - PI END-D VEL: 125.3 CM/SEC
BH CV ECHO MEAS - RAP SYSTOLE: 15 MMHG
BH CV ECHO MEAS - RVSP: 80 MMHG
BH CV ECHO MEAS - SV(LVOT): 94.6 ML
BH CV ECHO MEAS - SV(MOD-SP2): 47.1 ML
BH CV ECHO MEAS - SV(MOD-SP4): 34.8 ML
BH CV ECHO MEAS - TAPSE (>1.6): 2.26 CM
BH CV ECHO MEAS - TR MAX PG: 65.1 MMHG
BH CV ECHO MEAS - TR MAX VEL: 403.1 CM/SEC
BH CV ECHO MEASUREMENTS AVERAGE E/E' RATIO: 16.2
BH CV XLRA - RV BASE: 3.7 CM
BH CV XLRA - RV LENGTH: 6.5 CM
BH CV XLRA - RV MID: 2.8 CM
BH CV XLRA - TDI S': 15.7 CM/SEC
LEFT ATRIUM VOLUME INDEX: 54.7 ML/M2
MAXIMAL PREDICTED HEART RATE: 124 BPM
STRESS TARGET HR: 105 BPM

## 2023-06-05 ENCOUNTER — TELEPHONE (OUTPATIENT)
Dept: CARDIOLOGY | Facility: CLINIC | Age: 88
End: 2023-06-05
Payer: MEDICARE

## 2023-06-05 NOTE — TELEPHONE ENCOUNTER
Per [facility] -   Mild AS, MS, nl lvef     Left detailed message per verbal release, call for any further questions.

## 2023-06-08 ENCOUNTER — TELEPHONE (OUTPATIENT)
Dept: CARDIOLOGY | Facility: CLINIC | Age: 88
End: 2023-06-08
Payer: MEDICARE

## 2023-06-08 NOTE — TELEPHONE ENCOUNTER
Spoke with daughter, Antonietta and she is requesting results from OO the patient wore over the weekend of 6/3/2023  Spoke with Darlene today, 6/8/2023 - they report that they have not been able to get in touch to deliver to pt. Verified phone number they are contacting. LM for Antonietta to call [employer] or me if needed.     Antonietta returned call and left message. Holter was mailed back over the weekend and she is requesting results. Per Holter, monitor is being processed and results should be available on 6/9. Will follow up Friday.    Update - 6/9/2023  Monitor reviewed with ERNESTO - average HR 55, max 81. Order noted to decrease bisoprolol to every other day for one week and then discontinue. Will await overnight oximeter to determine need for nocturnal O2.   Daughter Antonietta aware of med change. Pt setup to wear overnight oximetry tonight. Will follow up next week.

## 2023-06-12 ENCOUNTER — TELEPHONE (OUTPATIENT)
Dept: FAMILY MEDICINE CLINIC | Facility: CLINIC | Age: 88
End: 2023-06-12

## 2023-06-12 NOTE — TELEPHONE ENCOUNTER
Caller: KELLERMANN, KATHERINE    Relationship: Emergency Contact    Best call back number: [phone]     What was the call regarding:  PATIENT'S (DAUGHTER) ALONZO WOULD LIKE A CALL BACK REGARDING PATIENT HAVING SOME RECENT MEDICATION'S CHANGE'S AND NOW PATIENT HAS SWELLING IN UPPER ANKLES ON BOTH FEET AND WOULD LIKE TO BE INFORMED IF THE MEDICATION CHANGE COULD BE THE CAUSE AND IF SO WHAT SHOULD PATIENT DO TO HELP WITH THE SWELLING IN HER ANKLES

## 2023-06-13 RX ORDER — BUMETANIDE 0.5 MG/1
TABLET ORAL
Qty: 30 TABLET | Refills: 5 | Status: SHIPPED | OUTPATIENT
Start: 2023-06-13

## 2023-06-13 NOTE — TELEPHONE ENCOUNTER
Stay on the increased dose of Norvasc. Will add a different diuretic to use each morning for swelling as needed.

## 2023-06-13 NOTE — TELEPHONE ENCOUNTER
Daughter says that pt seen Dr. Hernandez in May (nephrologist) and he DC'd her off of Triam/HCTZ and started her on Norvasc 5mg daily, she started have BLE, so when she seen Dr. Xiao on 06/01 they told him about this and he increased her Norvasc to 2 pills daily. She says that it hasn't helped at all. Daughter would like to know if you have any recc?

## 2023-06-19 PROBLEM — G47.34 NOCTURNAL HYPOXIA: Status: ACTIVE | Noted: 2023-06-01

## 2023-06-20 ENCOUNTER — TELEPHONE (OUTPATIENT)
Dept: FAMILY MEDICINE CLINIC | Facility: CLINIC | Age: 88
End: 2023-06-20

## 2023-06-20 NOTE — TELEPHONE ENCOUNTER
Caller: JAZZYROSAAUDELIA DEL TOROINE    Relationship: Emergency Contact    Best call back number: [phone]     What is the best time to reach you: ANYTIME    Who are you requesting to speak with (clinical staff, provider,  specific staff member): CLINICAL STAFF    Do you know the name of the person who called: NURSE    What was the call regarding: THE TREATMENT THAT IS BEING RECOMMENDED BY THE CARDIOLOGIST.  SHE HAS A NEW ONSET OF THE OXYGEN LEVEL DROPPING.    PLEASE CALL BACK ASAP         DELETE AFTER READING TO PATIENT: “ Thank you for sharing this information with me. I will send a message to the . Please allow up to 48 hours for the  to follow up on this request.”

## 2023-06-21 NOTE — TELEPHONE ENCOUNTER
Please see if we can arrange a venous Doppler to rule out DVT in her lower extremities.    Majority of nocturnal oxygen desaturation comes from COPD/emphysema and/or sleep apnea.  The simple treatment of supplemental oxygen is the same for either of these causes.  It does not make a lot of sense to put her through a sleep study and/or a pulmonary work-up if the treatment is going to be the same.

## 2023-06-21 NOTE — TELEPHONE ENCOUNTER
Spoke with daughter and she said Dr. Xiao done an overnight oximetry test on Anastasia and said her O2 is dropping at night and he recc overnight O2. Daughter is concerned because she feels like she would rather find out what's causing her O2 to drop at night before they try to fix it. She said Anastasia does have a hx of a clot in one of her legs. She wants to know if you could order some testing/studies to check for a possible clot and/or to see what's causing the O2 to drop. Please advise.

## 2023-06-22 NOTE — TELEPHONE ENCOUNTER
,  I ordered a venous doppler for bilateral lower extremities. Can you please sign order?  Thanks,  Renae.....

## 2023-07-25 ENCOUNTER — TELEPHONE (OUTPATIENT)
Dept: FAMILY MEDICINE CLINIC | Facility: CLINIC | Age: 88
End: 2023-07-25

## 2023-07-25 RX ORDER — TRIAMTERENE CAPSULES 50 MG/1
50 CAPSULE ORAL 2 TIMES DAILY
Qty: 180 CAPSULE | Refills: 3 | Status: CANCELLED | OUTPATIENT
Start: 2023-07-25

## 2023-07-25 RX ORDER — HYDRALAZINE HYDROCHLORIDE 25 MG/1
25 TABLET, FILM COATED ORAL 2 TIMES DAILY
Qty: 180 TABLET | Refills: 3 | Status: SHIPPED | OUTPATIENT
Start: 2023-07-25

## 2023-07-25 NOTE — TELEPHONE ENCOUNTER
Triamterene is not recommended in patients with decreased kidney function because of risk of high potassium and/or low sodium which can lead to multiple problems by themselves.  I am okay with switching off the amlodipine to something different just not triamterene.

## 2023-07-25 NOTE — TELEPHONE ENCOUNTER
Caller: KELLERMANN, KATHERINE    Relationship: Emergency Contact    Best call back number: [phone]    What is the best time to reach you: ANYTIME     Who are you requesting to speak with (clinical staff, provider,  specific staff member): CLINICAL STAFF    What was the call regarding: PATIENT'S DAUGHTER IS CALLING TO SPEAK WITH THE NURSE. SHE WOULD LIKE A CALL BACK AS SOON AS SHE CAN. SHE SAYS THAT IT IS IN REGARDS TO THE PATIENT'S SWELLING IN HER LEGS.     Is it okay if the provider responds through MyChart: NO

## 2023-07-25 NOTE — TELEPHONE ENCOUNTER
Spoke with Areli, she said pts previous doctor had prescribed her Triamterene at one point, she thinks's it's the one without the HCTZ, her insurance didn't cover it and that's why she was put on Amlodipine. She thinks that's what's caused the leg edema. She would like to know if you'd be willing to DC the Amlodipine and put her on plain Triamterene (she's pretty sure it's covered under her plan now). She would like to have that and continue the Bumetanide. Please advise, if so, she wants meds send to [facility].

## 2023-07-25 NOTE — TELEPHONE ENCOUNTER
Areli notified and said whatever you recc is fine, please send to [facility]. She said she will continue to give her the Amlodipine until the new med arrives; she may only give her the 5mg though to see if the swelling decreases. She will monitor her BP.

## 2023-08-07 RX ORDER — BUMETANIDE 0.5 MG/1
TABLET ORAL
Qty: 30 TABLET | Refills: 5 | Status: SHIPPED | OUTPATIENT
Start: 2023-08-07 | End: 2023-08-09

## 2023-08-07 NOTE — TELEPHONE ENCOUNTER
Caller: KELLERMANN, KATHERINE    Relationship: Emergency Contact    Best call back number: [phone]    Requested Prescriptions:   bumetanide (BUMEX) 0.5 MG tablet        Pharmacy where request should be sent:  [facility] - [place] [id number] [place] [address] [phone] FX      Last office visit with prescribing clinician: 7/6/2023   Last telemedicine visit with prescribing clinician: Visit date not found   Next office visit with prescribing clinician: Visit date not found     Additional details provided by patient: REQUEST TO HAVE 10-15 TABLETS BUMEX TO BE SENT TO [id number] - [facility] [address]  [facility]  & [address] [facility] [phone] [facility] [phone] FX     SINCE PT IS OUT OF RX AND REFILL TO BE SENT TO [facility] - [place] [id number] [facility] [phone] FX        Does the patient have less than a 3 day supply:  [x] Yes  [] No    Would you like a call back once the refill request has been completed: [] Yes [x] No    If the office needs to give you a call back, can they leave a voicemail: [] Yes [x] No    Laura Pedraza Rep   08/07/23 10:55 EDT

## 2023-08-08 RX ORDER — BUMETANIDE 0.5 MG/1
TABLET ORAL
Qty: 30 TABLET | Refills: 5 | Status: CANCELLED | OUTPATIENT
Start: 2023-08-08

## 2023-08-08 NOTE — TELEPHONE ENCOUNTER
Areli reports that pt is completely out of the Bumex and is [facility] [address] [address] [facility] [address] [facility]. [facility], but states that they have been taking the 0.5mg twice a day, but has been sent to [facility] as once a day.   Please advise on the temp script and about the frequency of the bumex [facility] to [facility].   States that they are taking the hydralazine for less than a week and have not seen any changes to her edema.

## 2023-08-09 RX ORDER — BUMETANIDE 1 MG/1
TABLET ORAL
Qty: 60 TABLET | Refills: 5 | Status: ON HOLD | OUTPATIENT
Start: 2023-08-09

## 2023-08-09 NOTE — TELEPHONE ENCOUNTER
I increased the dose of Bumex to 1.0 mg and she is to take 1 or 2 of these each morning for swelling.  Make sure she is elevating her feet at all times.

## 2023-08-09 NOTE — TELEPHONE ENCOUNTER
I will resend meds but can you verify if you want her to take the Bumex once a day or twice a day? Also, see daughters message saying there is no difference in edema with taking Bumex and Hydralazine.

## 2023-08-10 ENCOUNTER — APPOINTMENT (OUTPATIENT)
Dept: CT IMAGING | Facility: HOSPITAL | Age: 88
DRG: 291 | End: 2023-08-10
Payer: MEDICARE

## 2023-08-10 ENCOUNTER — APPOINTMENT (OUTPATIENT)
Dept: GENERAL RADIOLOGY | Facility: HOSPITAL | Age: 88
DRG: 291 | End: 2023-08-10
Payer: MEDICARE

## 2023-08-10 ENCOUNTER — HOSPITAL ENCOUNTER (INPATIENT)
Facility: HOSPITAL | Age: 88
LOS: 6 days | Discharge: HOME | DRG: 291 | End: 2023-08-16
Attending: EMERGENCY MEDICINE | Admitting: PEDIATRICS
Payer: MEDICARE

## 2023-08-10 DIAGNOSIS — J96.01 ACUTE RESPIRATORY FAILURE WITH HYPOXIA: ICD-10-CM

## 2023-08-10 DIAGNOSIS — I82.5Y3 CHRONIC DEEP VEIN THROMBOSIS (DVT) OF PROXIMAL VEIN OF BOTH LOWER EXTREMITIES: ICD-10-CM

## 2023-08-10 DIAGNOSIS — I50.9 ACUTE ON CHRONIC CONGESTIVE HEART FAILURE, UNSPECIFIED HEART FAILURE TYPE: Primary | ICD-10-CM

## 2023-08-10 DIAGNOSIS — N18.9 CHRONIC KIDNEY DISEASE, UNSPECIFIED CKD STAGE: ICD-10-CM

## 2023-08-10 PROBLEM — I50.31 ACUTE HEART FAILURE WITH PRESERVED EJECTION FRACTION (HFPEF): Status: ACTIVE | Noted: 2023-08-10

## 2023-08-10 PROBLEM — I82.503 CHRONIC DEEP VEIN THROMBOSIS (DVT) OF BOTH LOWER EXTREMITIES: Status: ACTIVE | Noted: 2023-08-10

## 2023-08-10 LAB
ALBUMIN SERPL-MCNC: 3.8 G/DL (ref 3.5–5.2)
ALBUMIN/GLOB SERPL: 1.2 G/DL
ALP SERPL-CCNC: 75 U/L (ref 39–117)
ALT SERPL W P-5'-P-CCNC: 8 U/L (ref 1–33)
ANION GAP SERPL CALCULATED.3IONS-SCNC: 12 MMOL/L (ref 5–15)
AST SERPL-CCNC: 15 U/L (ref 1–32)
BASOPHILS # BLD AUTO: 0.02 10*3/MM3 (ref 0–0.2)
BASOPHILS NFR BLD AUTO: 0.4 % (ref 0–1.5)
BILIRUB SERPL-MCNC: 0.5 MG/DL (ref 0–1.2)
BUN SERPL-MCNC: 21 MG/DL (ref 8–23)
BUN/CREAT SERPL: 11.7 (ref 7–25)
CALCIUM SPEC-SCNC: 10 MG/DL (ref 8.2–9.6)
CHLORIDE SERPL-SCNC: 104 MMOL/L (ref 98–107)
CO2 SERPL-SCNC: 26 MMOL/L (ref 22–29)
CREAT SERPL-MCNC: 1.79 MG/DL (ref 0.57–1)
D DIMER PPP FEU-MCNC: 1.22 MCGFEU/ML (ref 0–0.96)
DEPRECATED RDW RBC AUTO: 46.7 FL (ref 37–54)
EGFRCR SERPLBLD CKD-EPI 2021: 25.7 ML/MIN/1.73
EOSINOPHIL # BLD AUTO: 0.18 10*3/MM3 (ref 0–0.4)
EOSINOPHIL NFR BLD AUTO: 3.7 % (ref 0.3–6.2)
ERYTHROCYTE [DISTWIDTH] IN BLOOD BY AUTOMATED COUNT: 13.2 % (ref 12.3–15.4)
FLUAV RNA RESP QL NAA+PROBE: NOT DETECTED
FLUBV RNA RESP QL NAA+PROBE: NOT DETECTED
GLOBULIN UR ELPH-MCNC: 3.3 GM/DL
GLUCOSE SERPL-MCNC: 130 MG/DL (ref 65–99)
HCT VFR BLD AUTO: 39.5 % (ref 34–46.6)
HGB BLD-MCNC: 12.3 G/DL (ref 12–15.9)
HOLD SPECIMEN: NORMAL
IMM GRANULOCYTES # BLD AUTO: 0.01 10*3/MM3 (ref 0–0.05)
IMM GRANULOCYTES NFR BLD AUTO: 0.2 % (ref 0–0.5)
LYMPHOCYTES # BLD AUTO: 0.94 10*3/MM3 (ref 0.7–3.1)
LYMPHOCYTES NFR BLD AUTO: 19.5 % (ref 19.6–45.3)
MCH RBC QN AUTO: 30.6 PG (ref 26.6–33)
MCHC RBC AUTO-ENTMCNC: 31.1 G/DL (ref 31.5–35.7)
MCV RBC AUTO: 98.3 FL (ref 79–97)
MONOCYTES # BLD AUTO: 0.52 10*3/MM3 (ref 0.1–0.9)
MONOCYTES NFR BLD AUTO: 10.8 % (ref 5–12)
NEUTROPHILS NFR BLD AUTO: 3.14 10*3/MM3 (ref 1.7–7)
NEUTROPHILS NFR BLD AUTO: 65.4 % (ref 42.7–76)
NRBC BLD AUTO-RTO: 0 /100 WBC (ref 0–0.2)
NT-PROBNP SERPL-MCNC: 2658 PG/ML (ref 0–1800)
PLATELET # BLD AUTO: 169 10*3/MM3 (ref 140–450)
PMV BLD AUTO: 10.3 FL (ref 6–12)
POTASSIUM SERPL-SCNC: 4.2 MMOL/L (ref 3.5–5.2)
PROT SERPL-MCNC: 7.1 G/DL (ref 6–8.5)
QT INTERVAL: 382 MS
QTC INTERVAL: 446 MS
RBC # BLD AUTO: 4.02 10*6/MM3 (ref 3.77–5.28)
SARS-COV-2 RNA RESP QL NAA+PROBE: NOT DETECTED
SODIUM SERPL-SCNC: 142 MMOL/L (ref 136–145)
TROPONIN T SERPL HS-MCNC: 39 NG/L
WBC NRBC COR # BLD: 4.81 10*3/MM3 (ref 3.4–10.8)
WHOLE BLOOD HOLD COAG: NORMAL
WHOLE BLOOD HOLD SPECIMEN: NORMAL

## 2023-08-10 PROCEDURE — 71275 CT ANGIOGRAPHY CHEST: CPT

## 2023-08-10 PROCEDURE — 85379 FIBRIN DEGRADATION QUANT: CPT | Performed by: EMERGENCY MEDICINE

## 2023-08-10 PROCEDURE — 80053 COMPREHEN METABOLIC PANEL: CPT | Performed by: EMERGENCY MEDICINE

## 2023-08-10 PROCEDURE — 85025 COMPLETE CBC W/AUTO DIFF WBC: CPT | Performed by: EMERGENCY MEDICINE

## 2023-08-10 PROCEDURE — 99222 1ST HOSP IP/OBS MODERATE 55: CPT | Performed by: INTERNAL MEDICINE

## 2023-08-10 PROCEDURE — 83880 ASSAY OF NATRIURETIC PEPTIDE: CPT | Performed by: EMERGENCY MEDICINE

## 2023-08-10 PROCEDURE — 99285 EMERGENCY DEPT VISIT HI MDM: CPT

## 2023-08-10 PROCEDURE — 84484 ASSAY OF TROPONIN QUANT: CPT | Performed by: EMERGENCY MEDICINE

## 2023-08-10 PROCEDURE — 71045 X-RAY EXAM CHEST 1 VIEW: CPT

## 2023-08-10 PROCEDURE — 25510000001 IOPAMIDOL PER 1 ML: Performed by: EMERGENCY MEDICINE

## 2023-08-10 PROCEDURE — 25010000002 FUROSEMIDE PER 20 MG: Performed by: EMERGENCY MEDICINE

## 2023-08-10 PROCEDURE — 93005 ELECTROCARDIOGRAM TRACING: CPT | Performed by: EMERGENCY MEDICINE

## 2023-08-10 PROCEDURE — 87636 SARSCOV2 & INF A&B AMP PRB: CPT | Performed by: EMERGENCY MEDICINE

## 2023-08-10 RX ORDER — ONDANSETRON 2 MG/ML
4 INJECTION INTRAMUSCULAR; INTRAVENOUS EVERY 6 HOURS PRN
Status: DISCONTINUED | OUTPATIENT
Start: 2023-08-10 | End: 2023-08-16 | Stop reason: HOSPADM

## 2023-08-10 RX ORDER — ACETAMINOPHEN 160 MG/5ML
650 SOLUTION ORAL EVERY 4 HOURS PRN
Status: DISCONTINUED | OUTPATIENT
Start: 2023-08-10 | End: 2023-08-16 | Stop reason: HOSPADM

## 2023-08-10 RX ORDER — AMLODIPINE BESYLATE 10 MG/1
10 TABLET ORAL DAILY
Status: DISCONTINUED | OUTPATIENT
Start: 2023-08-10 | End: 2023-08-11

## 2023-08-10 RX ORDER — ONDANSETRON 4 MG/1
4 TABLET, FILM COATED ORAL EVERY 6 HOURS PRN
Status: DISCONTINUED | OUTPATIENT
Start: 2023-08-10 | End: 2023-08-16 | Stop reason: HOSPADM

## 2023-08-10 RX ORDER — AMLODIPINE BESYLATE 5 MG/1
1 TABLET ORAL DAILY
COMMUNITY
Start: 2023-08-17

## 2023-08-10 RX ORDER — BISACODYL 5 MG/1
5 TABLET, DELAYED RELEASE ORAL DAILY PRN
Status: DISCONTINUED | OUTPATIENT
Start: 2023-08-10 | End: 2023-08-16 | Stop reason: HOSPADM

## 2023-08-10 RX ORDER — POLYETHYLENE GLYCOL 3350 17 G/17G
17 POWDER, FOR SOLUTION ORAL DAILY PRN
Status: DISCONTINUED | OUTPATIENT
Start: 2023-08-10 | End: 2023-08-16 | Stop reason: HOSPADM

## 2023-08-10 RX ORDER — ACETAMINOPHEN 325 MG/1
650 TABLET ORAL EVERY 4 HOURS PRN
Status: DISCONTINUED | OUTPATIENT
Start: 2023-08-10 | End: 2023-08-16 | Stop reason: HOSPADM

## 2023-08-10 RX ORDER — ACETAMINOPHEN 650 MG/1
650 SUPPOSITORY RECTAL EVERY 4 HOURS PRN
Status: DISCONTINUED | OUTPATIENT
Start: 2023-08-10 | End: 2023-08-16 | Stop reason: HOSPADM

## 2023-08-10 RX ORDER — SODIUM CHLORIDE 0.9 % (FLUSH) 0.9 %
10 SYRINGE (ML) INJECTION EVERY 12 HOURS SCHEDULED
Status: DISCONTINUED | OUTPATIENT
Start: 2023-08-10 | End: 2023-08-16 | Stop reason: HOSPADM

## 2023-08-10 RX ORDER — BUMETANIDE 0.25 MG/ML
2 INJECTION INTRAMUSCULAR; INTRAVENOUS EVERY 12 HOURS
Status: DISCONTINUED | OUTPATIENT
Start: 2023-08-10 | End: 2023-08-11

## 2023-08-10 RX ORDER — HYDRALAZINE HYDROCHLORIDE 25 MG/1
25 TABLET, FILM COATED ORAL 2 TIMES DAILY
Status: DISCONTINUED | OUTPATIENT
Start: 2023-08-10 | End: 2023-08-16 | Stop reason: HOSPADM

## 2023-08-10 RX ORDER — SODIUM CHLORIDE 0.9 % (FLUSH) 0.9 %
10 SYRINGE (ML) INJECTION AS NEEDED
Status: DISCONTINUED | OUTPATIENT
Start: 2023-08-10 | End: 2023-08-16 | Stop reason: HOSPADM

## 2023-08-10 RX ORDER — BISACODYL 10 MG
10 SUPPOSITORY, RECTAL RECTAL DAILY PRN
Status: DISCONTINUED | OUTPATIENT
Start: 2023-08-10 | End: 2023-08-16 | Stop reason: HOSPADM

## 2023-08-10 RX ORDER — SODIUM CHLORIDE 9 MG/ML
40 INJECTION, SOLUTION INTRAVENOUS AS NEEDED
Status: DISCONTINUED | OUTPATIENT
Start: 2023-08-10 | End: 2023-08-16 | Stop reason: HOSPADM

## 2023-08-10 RX ORDER — FUROSEMIDE 10 MG/ML
80 INJECTION INTRAMUSCULAR; INTRAVENOUS ONCE
Status: COMPLETED | OUTPATIENT
Start: 2023-08-10 | End: 2023-08-10

## 2023-08-10 RX ORDER — AMOXICILLIN 250 MG
2 CAPSULE ORAL 2 TIMES DAILY
Status: DISCONTINUED | OUTPATIENT
Start: 2023-08-10 | End: 2023-08-16 | Stop reason: HOSPADM

## 2023-08-10 RX ADMIN — FUROSEMIDE 80 MG: 10 INJECTION, SOLUTION INTRAMUSCULAR; INTRAVENOUS at 13:29

## 2023-08-10 RX ADMIN — Medication 10 ML: at 20:53

## 2023-08-10 RX ADMIN — APIXABAN 5 MG: 5 TABLET, FILM COATED ORAL at 20:53

## 2023-08-10 RX ADMIN — AMLODIPINE BESYLATE 10 MG: 10 TABLET ORAL at 15:29

## 2023-08-10 RX ADMIN — IOPAMIDOL 75 ML: 755 INJECTION, SOLUTION INTRAVENOUS at 12:26

## 2023-08-10 RX ADMIN — BUMETANIDE 2 MG: 0.25 INJECTION, SOLUTION INTRAMUSCULAR; INTRAVENOUS at 20:52

## 2023-08-10 RX ADMIN — HYDRALAZINE HYDROCHLORIDE 25 MG: 25 TABLET, FILM COATED ORAL at 20:52

## 2023-08-10 NOTE — PLAN OF CARE
Goal Outcome Evaluation:  Plan of Care Reviewed With: patient           Outcome Evaluation: Pt a/o, VSS, 6 LNC, Sat. 92%,  no c/o pain at this time. Continue monitoring.

## 2023-08-10 NOTE — H&P
[facility]  HISTORY AND PHYSICAL    Patient Name: Rose J Kellermann  : 1926  MRN: [id number]  Primary Care Physician: Sam Hung MD  Date of admission: 8/10/2023      Subjective   Subjective     Chief Complaint:  SOA    HPI:  Rose J Kellermann is a 89 y.o. female with a past medical history of HTN, b/l DVT diagnosis in 2023 on Eliquis and HFrEF presented to the ED complaining of increased shortness of air over the last few days. The patient states she has become more and more short of air at home over the last few days, especially with exertion. She states she checked her pulse ox level at home and it was in the 80s so she came to the ED. The patient follows with Dr. Xiao and takes Bumex 2mg PO daily at home. She states she has been compliant with her medications. She states she has also had some b/l leg edema. She states she had some leg swelling in July and her PCP did an ultrasound that showed b/l LE DVTs and she was started on Eliquis. She states she has been compliant with taking this. CTA chest in the ED is negative for a PE but does show pulmonary edema. The patient is currently requiring 4L NC to keep her O2 sats above 90%. The patient will be admitted to the hospitalist service for further evaluation and treatment.    Review of Systems   Gen- No fevers, chills  CV- No chest pain, palpitations  Resp- No cough, +dyspnea  GI- No N/V/D, abd pain  +LE edema b/l    Personal History     Past Medical History:   Diagnosis Date    Abscess of back     Arrhythmia     frequent PVC    Cancer 2011    Endometrial cancer, status post robotically assisted hysterectomy with Dr. Haddad, 2011.      CKD (chronic kidney disease), stage III     no dilaysis     Dizzy     Dvt femoral (deep venous thrombosis) 2017    s/p hip surgery in . Took xarelto until 2018    Dyslipidemia     Dyslipidemia.  Observing.    Enthesopathy of hip  region     Generalized osteoarthrosis, involving multiple sites     Headache     Hearing loss     Hypertension     Low backache     Needs flu shot     Osteoarthritis     Osteoarthritis with questionable carpal tunnel syndrome bilaterally.     Otitis, serous     Pneumonia 1967    Remote pneumonia, 1967.     Retinal hemorrhage     SI joint arthritis     SOB (shortness of breath)     Syncope 2001    Remote syncope with working diagnosis of vasodepressor, 2001.     Venous insufficiency of leg     Wears glasses     readers         Oncology Problem List:  Endometrial cancer (Status: Active)    Oncology/Hematology History   Endometrial cancer   12/12/2011 Initial Diagnosis    D&C and hysterectomy for PMB. 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 Stage IA grade 2 by final path         Past Surgical History:   Procedure Laterality Date    CATARACT EXTRACTION      bilat     COLONOSCOPY      HIP PERCUTANEOUS PINNING Left 11/24/2017    Procedure: HIP PERCUTANEOUS PINNING;  Surgeon: Lucas Swanson MD;  Location:  [facility];  Service:     HYSTERECTOMY      total? but unsure     KNEE SURGERY  2001    Remote knee surgery in 2001.- right     TOTAL HIP ARTHROPLASTY Left 10/26/2018    Procedure: TOTAL HIP ARTHROPLASTY LEFT;  Surgeon: Stephen Baker MD;  Location:  [facility];  Service: Orthopedics       Family History: her family history includes Cancer in her brother; Heart attack in her father; Heart disease in her mother; Hypertension in her father and mother; Osteoarthritis in her father and mother.     Social History:  reports that she has never smoked. She has never been exposed to tobacco smoke. She has never used smokeless tobacco. She reports that she does not drink alcohol and does not use drugs.  Social History     Social History Narrative    Lives alone, 2 daughters, one here, one in [place]. Retired teacher        Medications:  Available home medication information reviewed.  (Not in a hospital admission)      Allergies   Allergen Reactions    Sulfa Antibiotics Nausea Only and GI Intolerance       Objective   Objective     Vital Signs:   Temp:  [98 øF (36.7 øC)] 98 øF (36.7 øC)  Heart Rate:  [77] 77  Resp:  [18] 18  BP: (127)/(100) 127/100  Flow (L/min):  [2-4] 4       Physical Exam   Constitutional: Awake, alert  Eyes: PERRLA, sclerae anicteric, no conjunctival injection  HENT: NCAT, mucous membranes moist  Neck: Supple, no thyromegaly, no lymphadenopathy, trachea midline  Respiratory: decreased to auscultation bilaterally with rhonchi in bases, nonlabored respirations on 4L NC  Cardiovascular: RRR, no murmurs, rubs, or gallops, palpable pedal pulses bilaterally  Gastrointestinal: Positive bowel sounds, soft, nontender, nondistended  Musculoskeletal: 2+ bilateral LE pitting edema, no clubbing or cyanosis to extremities  Psychiatric: Appropriate affect, cooperative  Neurologic: Oriented x 3, strength symmetric in all extremities, Cranial Nerves grossly intact to confrontation, speech clear  Skin: No rashes    Result Review:  I have personally reviewed the results from the time of this admission to 8/10/2023 13:28 EDT and agree with these findings:  [x]  Laboratory list / accordion  []  Microbiology  [x]  Radiology  []  EKG/Telemetry   []  Cardiology/Vascular   []  Pathology  []  Old records  []  Other:    LAB RESULTS:      Lab 08/10/23  1121   WBC 4.81   HEMOGLOBIN 12.3   HEMATOCRIT 39.5   PLATELETS 169   NEUTROS ABS 3.14   IMMATURE GRANS (ABS) 0.01   LYMPHS ABS 0.94   MONOS ABS 0.52   EOS ABS 0.18   MCV 98.3*   D DIMER QUANT 1.22*         Lab 08/10/23  1121   SODIUM 142   POTASSIUM 4.2   CHLORIDE 104   CO2 26.0   ANION GAP 12.0   BUN 21   CREATININE 1.79*   EGFR 25.7*   GLUCOSE 130*   CALCIUM 10.0*         Lab 08/10/23  1121   TOTAL PROTEIN 7.1   ALBUMIN 3.8   GLOBULIN 3.3   ALT (SGPT) 8   AST (SGOT) 15   BILIRUBIN 0.5    ALK PHOS 75         Lab 08/10/23  1121   PROBNP 2,658.0*   HSTROP T 39*                 UA          5/9/2023    11:37   Urinalysis   Squamous Epithelial Cells, UA 7-12    Specific [place], UA 1.019    Ketones, UA Negative    Blood, UA Negative    Leukocytes, UA Small (1+)    Nitrite, UA Negative    RBC, UA None Seen    WBC, UA 0-2    Bacteria, UA None Seen        Microbiology Results (last 10 days)       Procedure Component Value - Date/Time    COVID PRE-OP / PRE-PROCEDURE SCREENING ORDER (NO ISOLATION) - Swab, Nasopharynx [[id number]]  (Normal) Collected: 08/10/23 1130    Lab Status: Final result Specimen: Swab from Nasopharynx Updated: 08/10/23 1206    Narrative:      The following orders were created for panel order COVID PRE-OP / PRE-PROCEDURE SCREENING ORDER (NO ISOLATION) - Swab, Nasopharynx.  Procedure                               Abnormality         Status                     ---------                               -----------         ------                     COVID-19 and FLU A/B PCR...[[id number]]  Normal              Final result                 Please view results for these tests on the individual orders.    COVID-19 and FLU A/B PCR - Swab, Nasopharynx [[id number]]  (Normal) Collected: 08/10/23 1130    Lab Status: Final result Specimen: Swab from Nasopharynx Updated: 08/10/23 1206     COVID19 Not Detected     Influenza A PCR Not Detected     Influenza B PCR Not Detected    Narrative:      Fact sheet for providers: https://www.fda.gov/media/654491/download    Fact sheet for patients: https://www.fda.gov/media/146695/download    Test performed by PCR.            XR Chest 1 View    Result Date: 8/10/2023  XR CHEST 1 VW Date of Exam: 8/10/2023 11:20 AM EDT Indication: SOA triage protocol Comparison: 11/24/2017. Findings: There is continued moderately severe cardiomegaly. There is a component of underlying chronic interstitial lung disease. However, there are new and worsening infiltrates of the left lower  lobe. There may be a small left effusion. Suggestion of minimal right effusion.     Impression: Impression: Findings suggest left lower lobe atelectasis and/or pneumonia superimposed over chronic lung disease, with small effusions. Electronically Signed: Sherley Sahu MD  8/10/2023 11:55 AM EDT  Workstation ID: [id number]    CT Angiogram Chest    Result Date: 8/10/2023  CT ANGIOGRAM CHEST Date of Exam: 8/10/2023 11:20 AM CDT Indication: hypoxia, elevated dimer, eval for pe. Comparison: None available. Technique: CTA of the chest was performed after the uneventful intravenous administration of 75 cc Isovue-370. Reconstructed coronal and sagittal images were also obtained. In addition, a 3-D volume rendered image was created for interpretation. Automated exposure control and iterative reconstruction methods were used. Findings: PULMONARY VASCULATURE: Pulmonary arteries are widely patent without evidence of embolus.Main pulmonary artery is normal in size. No evidence of right heart strain. MEDIASTINUM: Enlarged heart. Aortic and heart size are normal. No aortic dissection identified. No mass nor pericardial effusion. CORONARY ARTERIES: There is calcified atherosclerotic disease. LUNGS: There is bibasilar airspace disease, likely compressive atelectasis. There is intralobular septal thickening with mild central pulmonary edema. PLEURAL SPACE: There are small to moderate bilateral pleural effusions. LYMPH NODES: There are no pathologically enlarged lymph nodes. UPPER ABDOMEN: Unremarkable OSSEOUS STRUCTURES: There are subacute to more chronic posterior lateral left rib fractures. Correlate with history.     Impression: Impression: 1.No evidence of pulmonary embolism. 2.CHF features. Electronically Signed: Nils Vides MD  8/10/2023 11:35 AM CDT  Workstation ID: [id number]     Results for orders placed during the hospital encounter of 06/01/23    Adult Transthoracic Echo Complete W/ Cont if Necessary Per  Protocol    Interpretation Summary    Left ventricular systolic function is normal. Calculated left ventricular EF = 61.4% Left ventricular ejection fraction appears to be 61 - 65%.    Left ventricular diastolic function was indeterminate.    Left atrial volume is severely increased.    Mild aortic valve stenosis is present.    Aortic valve maximum pressure gradient is 26 mmHg. Aortic valve mean pressure gradient is 15 mmHg.    Mild mitral valve stenosis is present. The mitral valve peak gradient is 12 mmHg. The mitral valve mean gradient is 5 mmHg.    Moderate tricuspid valve regurgitation is present.    Estimated right ventricular systolic pressure from tricuspid regurgitation is markedly elevated (>55 mmHg).    There is a small left pleural effusion.      Assessment & Plan   Assessment & Plan     Active Hospital Problems    Diagnosis  POA    **Acute hypoxemic respiratory failure [J96.01]  Yes    Acute heart failure with preserved ejection fraction (HFpEF) [I50.31]  Yes    Chronic deep vein thrombosis (DVT) of both lower extremities [I82.503]  Yes    Chronic kidney disease, stage III (moderate)  [N18.30]  Yes    Essential hypertension [I10]  Yes     Rose J Kellermann is a 89 y.o. female with a past medical history of HTN, b/l DVT diagnosis in July 2023 on Eliquis and HFrEF presented to the ED complaining of increased shortness of air over the last few days.     Acute on chronic HFpEF  Acute hypoxemic respiratory failure  - CTA chest shows edema/CHF  - proBNP of 2,658  - patient currently on Eliquis for b/l DVT diagnosed outpatient in July 2023  - patient requiring 4L NC (above baseline)  - Lasix 80mg IV given in ED  - hold home Bumex 2mg PO every morning  - start Bumex 2mg IV Q12H  - patient follows with Dr Xiao- consult  - ECHO done in June 2023- reviewed. Defer obtaining another ECHO to cardiology  - strict intake/output  - cardiac diet with fluid restriction    B/l DVT  -patient currently on Eliquis for b/l  DVT diagnosed outpatient in July 2023    CKD Stage 3  - creatinine appears to be around baseline~ 1.5-1.7  - monitor closely with high dose IV diuretics ongoing    HTN  - continue home Hydralazine 50mg PO BID and Amlodipine 10mg PO daily    DVT prophylaxis:  Eliquis      CODE STATUS:  Full/CPR  Code Status and Medical Interventions:   Ordered at: 08/10/23 1327     Level Of Support Discussed With:    Patient     Code Status (Patient has no pulse and is not breathing):    CPR (Attempt to Resuscitate)     Medical Interventions (Patient has pulse or is breathing):    Full Support       Expected Discharge   Expected Discharge Date: 8/14/2023; Expected Discharge Time:      Dolores Durbin DO  08/10/23

## 2023-08-10 NOTE — Clinical Note
Level of Care: Telemetry [5]   Diagnosis: Acute hypoxemic respiratory failure [5644549]   Admitting Physician: BK PRUITT [[id number]]   Attending Physician: BK PRUITT [[id number]]

## 2023-08-10 NOTE — ED PROVIDER NOTES
[facility]    EMERGENCY DEPARTMENT ENCOUNTER      Pt Name: Rose J Kellermann  MRN: [id number]  YOB: 1926  Date of evaluation: 8/10/2023  Provider: Volodymyr Lord MD    CHIEF COMPLAINT       Chief Complaint   Patient presents with    Hypoxia         HISTORY OF PRESENT ILLNESS   Rose J Kellermann is a 89 y.o. female who presents to the emergency department with progressive moderate severity shortness of breath over the course the past several days.  Patient has history of congestive heart failure.  Does not normally wear oxygen at home but was found to be saturating in the low 90s on room air.  Denies any chest pain, cough, fever, or chills.      Nursing notes were reviewed.    REVIEW OF SYSTEMS     ROS:  A chief complaint appropriate review of systems was completed and is negative except as noted in the HPI.      PAST MEDICAL HISTORY     Past Medical History:   Diagnosis Date    Abscess of back     Arrhythmia     frequent PVC    Cancer 12/2011    Endometrial cancer, status post robotically assisted hysterectomy with Dr. Haddad, December 2011.      CKD (chronic kidney disease), stage III     no dilaysis     Dizzy     Dvt femoral (deep venous thrombosis) 2017    s/p hip surgery in 2017. Took xarelto until August 2018    Dyslipidemia     Dyslipidemia.  Observing.    Enthesopathy of hip region     Generalized osteoarthrosis, involving multiple sites     Headache     Hearing loss     Hypertension     Low backache     Needs flu shot     Osteoarthritis     Osteoarthritis with questionable carpal tunnel syndrome bilaterally.     Otitis, serous     Pneumonia 1967    Remote pneumonia, 1967.     Retinal hemorrhage     SI joint arthritis     SOB (shortness of breath)     Syncope 2001    Remote syncope with working diagnosis of vasodepressor, 2001.     Venous insufficiency of leg     Wears glasses     readers         SURGICAL HISTORY       Past Surgical History:   Procedure Laterality Date    CATARACT EXTRACTION       bilat     COLONOSCOPY      HIP PERCUTANEOUS PINNING Left 11/24/2017    Procedure: HIP PERCUTANEOUS PINNING;  Surgeon: Lucas Swanson MD;  Location:  [facility];  Service:     HYSTERECTOMY      total? but unsure     KNEE SURGERY  2001    Remote knee surgery in 2001.- right     TOTAL HIP ARTHROPLASTY Left 10/26/2018    Procedure: TOTAL HIP ARTHROPLASTY LEFT;  Surgeon: Stephen Baker MD;  Location:  [facility];  Service: Orthopedics         CURRENT MEDICATIONS       Current Facility-Administered Medications:     sodium chloride 0.9 % flush 10 mL, 10 mL, Intravenous, PRN, Volodymyr Lord MD    Current Outpatient Medications:     amLODIPine (NORVASC) 5 MG tablet, Take 2 tablets by mouth Daily., Disp: 180 tablet, Rfl: 1    apixaban (ELIQUIS) 5 MG tablet tablet, Take 1 tablet by mouth 2 (Two) Times a Day., Disp: 60 tablet, Rfl: 5    bumetanide (Bumex) 1 MG tablet, 1 or 2 tablets each morning for swelling, Disp: 60 tablet, Rfl: 5    hydrALAZINE (APRESOLINE) 25 MG tablet, Take 1 tablet by mouth 2 (Two) Times a Day., Disp: 180 tablet, Rfl: 3    Omega-3 Fatty Acids (fish oil) 1000 MG capsule capsule, Take  by mouth Daily With Breakfast., Disp: , Rfl:     ALLERGIES     Sulfa antibiotics    FAMILY HISTORY       Family History   Problem Relation Age of Onset    Heart disease Mother     Osteoarthritis Mother     Hypertension Mother     Hypertension Father     Heart attack Father     Osteoarthritis Father     Cancer Brother         lung          SOCIAL HISTORY       Social History     Socioeconomic History    Marital status:    Tobacco Use    Smoking status: Never     Passive exposure: Never    Smokeless tobacco: Never   Vaping Use    Vaping Use: Never used   Substance and Sexual Activity    Alcohol use: No    Drug use: No    Sexual activity: Defer     Birth control/protection: None         PHYSICAL EXAM    (up to 7 for level 4, 8 or more for level 5)     Vitals:    08/10/23 1329 08/10/23 1400 08/10/23 1413  08/10/23 1414   BP: (!) 153/113 (!) 167/140     BP Location:       Patient Position:       Pulse: 70 92 74    Resp:       Temp:       TempSrc:       SpO2:  (!) 87% (!) 89% 93%   Weight:       Height:           General: Awake, alert, no acute distress.  HEENT: Conjunctivae normal.  Neck: Trachea midline.  Cardiac: Heart regular rate, rhythm, no murmurs, rubs, or gallops  Lungs: Lungs are clear to auscultation, there is no wheezing, rhonchi, or rales. There is no use of accessory muscles.  Chest wall: There is no tenderness to palpation over the chest wall or over ribs  Abdomen: Abdomen is soft, nontender, nondistended. There are no firm or pulsatile masses, no rebound rigidity or guarding.   Musculoskeletal: Significant pitting edema to bilateral lower extremities  Neuro: Alert and oriented x 4.  Dermatology: Skin is warm and dry  Psych: Mentation is grossly normal, cognition is grossly normal. Affect is appropriate.        DIAGNOSTIC RESULTS     EKG: All EKGs are interpreted by the Emergency Department Physician who either signs or Co-signs this chart in the absence of a cardiologist.    ECG 12 Lead ED Triage Standing Order; SOA   Final Result   Test Reason : SOB   Blood Pressure :   */*   mmHG   Vent. Rate :  82 BPM     Atrial Rate :  82 BPM      P-R Int : 216 ms          QRS Dur :  90 ms       QT Int : 382 ms       P-R-T Axes :  88 -14  35 degrees      QTc Int : 446 ms      Sinus rhythm with 1st degree AV block   Anterior infarct , age undetermined   Abnormal ECG   When compared with ECG of 17-OCT-2018 16:09,   premature supraventricular complexes are no longer present   Confirmed by RODRI EID ([id number]) on 8/10/2023 11:16:44 AM      Referred By: ED MD           Confirmed By: RODRI EID            RADIOLOGY:   [x] Radiologist's Report Reviewed:  CT Angiogram Chest   Final Result   Impression:   1.No evidence of pulmonary embolism.   2.CHF features.            Electronically Signed: Nils Vides MD      8/10/2023 11:35 AM CDT     Workstation ID: [id number]      XR Chest 1 View   Final Result   Impression:   Findings suggest left lower lobe atelectasis and/or pneumonia superimposed over chronic lung disease, with small effusions.         Electronically Signed: Sherley Sahu MD     8/10/2023 11:55 AM EDT     Workstation ID: [id number]          I ordered and independently reviewed the above noted radiographic studies.        LABS:    I have reviewed and interpreted all of the currently available lab results from this visit (if applicable):  Results for orders placed or performed during the hospital encounter of 08/10/23   COVID-19 and FLU A/B PCR - Swab, Nasopharynx    Specimen: Nasopharynx; Swab   Result Value Ref Range    COVID19 Not Detected Not Detected - Ref. Range    Influenza A PCR Not Detected Not Detected    Influenza B PCR Not Detected Not Detected   Comprehensive Metabolic Panel    Specimen: Blood   Result Value Ref Range    Glucose 130 (H) 65 - 99 mg/dL    BUN 21 8 - 23 mg/dL    Creatinine 1.79 (H) 0.57 - 1.00 mg/dL    Sodium 142 136 - 145 mmol/L    Potassium 4.2 3.5 - 5.2 mmol/L    Chloride 104 98 - 107 mmol/L    CO2 26.0 22.0 - 29.0 mmol/L    Calcium 10.0 (H) 8.2 - 9.6 mg/dL    Total Protein 7.1 6.0 - 8.5 g/dL    Albumin 3.8 3.5 - 5.2 g/dL    ALT (SGPT) 8 1 - 33 U/L    AST (SGOT) 15 1 - 32 U/L    Alkaline Phosphatase 75 39 - 117 U/L    Total Bilirubin 0.5 0.0 - 1.2 mg/dL    Globulin 3.3 gm/dL    A/G Ratio 1.2 g/dL    BUN/Creatinine Ratio 11.7 7.0 - 25.0    Anion Gap 12.0 5.0 - 15.0 mmol/L    eGFR 25.7 (L) >60.0 mL/min/1.73   BNP    Specimen: Blood   Result Value Ref Range    proBNP 2,658.0 (H) 0.0 - 1,800.0 pg/mL   Single High Sensitivity Troponin T    Specimen: Blood   Result Value Ref Range    HS Troponin T 39 (H) <10 ng/L   CBC Auto Differential    Specimen: Blood   Result Value Ref Range    WBC 4.81 3.40 - 10.80 10*3/mm3    RBC 4.02 3.77 - 5.28 10*6/mm3    Hemoglobin 12.3 12.0 - 15.9 g/dL    Hematocrit  39.5 34.0 - 46.6 %    MCV 98.3 (H) 79.0 - 97.0 fL    MCH 30.6 26.6 - 33.0 pg    MCHC 31.1 (L) 31.5 - 35.7 g/dL    RDW 13.2 12.3 - 15.4 %    RDW-SD 46.7 37.0 - 54.0 fl    MPV 10.3 6.0 - 12.0 fL    Platelets 169 140 - 450 10*3/mm3    Neutrophil % 65.4 42.7 - 76.0 %    Lymphocyte % 19.5 (L) 19.6 - 45.3 %    Monocyte % 10.8 5.0 - 12.0 %    Eosinophil % 3.7 0.3 - 6.2 %    Basophil % 0.4 0.0 - 1.5 %    Immature Grans % 0.2 0.0 - 0.5 %    Neutrophils, Absolute 3.14 1.70 - 7.00 10*3/mm3    Lymphocytes, Absolute 0.94 0.70 - 3.10 10*3/mm3    Monocytes, Absolute 0.52 0.10 - 0.90 10*3/mm3    Eosinophils, Absolute 0.18 0.00 - 0.40 10*3/mm3    Basophils, Absolute 0.02 0.00 - 0.20 10*3/mm3    Immature Grans, Absolute 0.01 0.00 - 0.05 10*3/mm3    nRBC 0.0 0.0 - 0.2 /100 WBC   D-dimer, Quantitative    Specimen: Blood   Result Value Ref Range    D-Dimer, Quantitative 1.22 (H) 0.00 - 0.96 MCGFEU/mL   ECG 12 Lead ED Triage Standing Order; SOA   Result Value Ref Range    QT Interval 382 ms    QTC Interval 446 ms   Green Top (Gel)   Result Value Ref Range    Extra Tube Hold for add-ons.    Lavender Top   Result Value Ref Range    Extra Tube hold for add-on    Gold Top - SST   Result Value Ref Range    Extra Tube Hold for add-ons.    Light Blue Top   Result Value Ref Range    Extra Tube Hold for add-ons.         If labs were ordered, I independently reviewed the results and considered them in treating the patient.      EMERGENCY DEPARTMENT COURSE and DIFFERENTIAL DIAGNOSIS/MDM:   Vitals:  AS OF 14:42 EDT    BP - (!) 167/140  HR - 74  TEMP - 98 øF (36.7 øC) (Oral)  O2 SATS - 93%        Discussion below represents my analysis of pertinent findings related to patient's condition, differential diagnosis, treatment plan and final disposition.      Differential diagnosis:  The differential diagnosis associated with the patient's presentation includes: CHF exacerbation, pneumonia, PE, anemia, dysrhythmia, ACS      Independent interpretations  (ECG/rhythm strip/X-ray/US/CT scan): I independently interpreted the patient's CTA chest and see no evidence of PE.  I dependently interpreted the patient's cardiac monitor which shows sinus rhythm.      Additional sources:  Discussed/obtained information from independent historians:   [] Spouse:   [] Parent:   [] Friend:   [x] EMS: Report taken from EMS.  Patient with weakness and hypoxia.   [] Other:  External (non-ED) record review:   [] Inpatient record:   [] Office record:   [] Outpatient record:   [] Prior Outpatient labs:   [] Prior Outpatient radiology:   [] Primary Care record:   [] Outside ED record:   [x] Other: Reviewed prior echocardiogram report which revealed intact ejection fraction.      Patient's care impacted by:   [] Diabetes   [] Hypertension   [] Coronary Artery Disease   [] Cancer   [x] Other: Chronic kidney disease, arrhythmia, dyslipidemia, hypertension    Care significantly affected by Social Determinants of Health (housing and economic circumstances, unemployment)    [] Yes     [x] No   If yes, Patient's care significantly limited by  Social Determinants of Health including:    [] Inadequate housing    [] Low income    [] Alcoholism and drug addiction in family    [] Problems related to primary support group    [] Unemployment    [] Problems related to employment    [] Other Social Determinants of Health:       Consideration of admission/observation vs discharge: This patient presents with hypoxic respiratory failure and requires admission      I considered prescription management with:    [] Pain medication:   [] Antiviral:   [] Antibiotic:   [x] Other: Patient started on IV Lasix    ED Course:    ED Course as of 08/10/23 1442   Thu Aug 10, 2023   1314 Spoke with hospitalist Dr. Barboza.  Discussed patient history, presentation, workup.  Accepts patient for admission. [NS]   1441 Patient with acute hypoxic respiratory failure secondary to exacerbation of CHF.  There is no evidence of  pneumonia, PE, or other acute intrathoracic process.  No dysrhythmia or significant anemia.  Patient given IV Lasix in addition to supplemental oxygen in the ED and will be admitted to the hospital service. [NS]      ED Course User Index  [NS] Volodymyr Lord MD           CRITICAL CARE TIME    Approximately 35 minutes of discontinuous critical care time was provided to this patient by myself absent of any time spent performing procedures.  Patient presents critically ill with acute hypoxic respiratory failure secondary to acute exacerbation of CHF placing cardiovascular, respiratory, neurologic systems at risk requiring the following interventions supplemental oxygen, IV diuretic, interpretation of lab/ECG/imaging, frequent reassessment, coordination of admission with the following response: Resolution of hypoxia.  Patient at high risk of deterioration and possibly death without these interventions.      FINAL IMPRESSION      1. Acute on chronic congestive heart failure, unspecified heart failure type    2. Acute respiratory failure with hypoxia    3. Chronic kidney disease, unspecified CKD stage          DISPOSITION/PLAN     ED Disposition       ED Disposition   Decision to Admit    Condition   --    Comment   Level of Care: Telemetry [5]   Diagnosis: Acute hypoxemic respiratory failure [[id number]]   Admitting Physician: BK PRUITT [[id number]]   Attending Physician: BK PRUITT [[id number]]   Certification: I Certify That Inpatient Hospital Services Are Medically Necessary For Greater Than 2 Midnights                   Comment: Please note this report has been produced using speech recognition software.      Volodymyr Lord MD  Attending Emergency Physician             Volodymyr Lord MD  08/10/23 9699

## 2023-08-10 NOTE — CASE MANAGEMENT/SOCIAL WORK
Discharge Planning Assessment  [facility]     Patient Name: Rose J Kellermann  MRN: [id number]  Today's Date: 8/10/2023    Admit Date: 8/10/2023    Plan: IDP   Discharge Needs Assessment       Row Name 08/10/23 1647       Living Environment    People in Home alone    Current Living Arrangements home    Potentially Unsafe Housing Conditions unable to assess    Primary Care Provided by self;child(malika)    Provides Primary Care For no one    Family Caregiver if Needed child(malika), adult    Family Caregiver Names Katherine Kellermann    Quality of Family Relationships helpful;involved;supportive       Resource/Environmental Concerns    Transportation Concerns none       Transition Planning    Patient/Family Anticipates Transition to home    Transportation Anticipated family or friend will provide       Discharge Needs Assessment    Readmission Within the Last 30 Days no previous admission in last 30 days    Equipment Currently Used at Home wheelchair;rollator                   Discharge Plan       Row Name 08/10/23 8838       Plan    Plan IDP    Plan Comments MSW met with pt. and her daughter Katherine Kellermann at bedside. Pt. lives alone in [place].  Pt.'s daughter reports she lives less than 2 miles away from pt. Pt.'s PCP is Sam Hung. Pt.'s pharmacy is [facility]. Pt.'s insurance is [employer]. Pt. reports that she needs some assistance at baseline. Pt. has a rollator and w/c. No O2 currently. [facility] Pt. reports she has transportation back home when she is medically ready to d/c. Pt. has an advanced directive and ACP documentation on file. CM will continue to follow pt. throughout her stay.    Final Discharge Disposition Code 30 - still a patient                  Continued Care and Services - Admitted Since 8/10/2023    Coordination has not been started for this encounter.       Expected Discharge Date and Time       Expected  Discharge Date Expected Discharge Time    Aug 14, 2023            Demographic Summary       Row Name 08/10/23 1645       General Information    Admission Type inpatient    Arrived From home    Referral Source admission list;emergency department    Reason for Consult discharge planning    Preferred Language English                   Functional Status       Row Name 08/10/23 1646       Functional Status, IADL    Medications independent    Meal Preparation assistive equipment and person    Housekeeping assistive equipment    Laundry assistive equipment    Shopping assistive equipment       Mental Status Summary    Recent Changes in Mental Status/Cognitive Functioning unable to assess       Employment/    Employment Status retired                   Psychosocial    No documentation.                  Abuse/Neglect    No documentation.                  Legal    No documentation.                  Substance Abuse    No documentation.                  Patient Forms    No documentation.                     ROBERTO Santamaria

## 2023-08-10 NOTE — CONSULTS
[facility] Cardiology  Initial Consult Note      Patient Identification:  Rose J Kellermann        89 y.o.        female  1926  [id number]       Date of Consultation:  08/10/23    Reason for Consultation:  heart failure    PCP: Sam Hung MD  Primary cardiologist: Modesto Xiao MD  Referring physician: Dolores Durbin DO    PROBLEM LIST:  Heart Murmur  Fatigue, dyspnea and frequent premature atrial contractions:  2010, echocardiogram, mild concentric LVH, mild MR, TR, normal LVEF, mildly elevated RVSP.    2010, Holter with greater 18,000 PACs, improved with Zebeta.    Hypertension, presumed essential.    DVT   Resolution on us 2018  CKD Stage IV  2023 Cr 2.07, eGFR 23  Hypercalcemia   Calcium 12.7, PTHi 7.4 (low)  Continued workup by Dr. MACHUCA  Elevated TSH   Hyperglycemia  2019 A1c 5.9  Remote syncope with working diagnosis of vasodepressor, .   Remote knee surgery in .    Remote pneumonia, .    Osteoarthritis with questionable carpal tunnel syndrome bilaterally.    ,  children ages 56 and 58.    Endometrial cancer, status post robotically assisted hysterectomy with Dr. Haddad, 2011.    L hip fx     History of Present Illness:     Rose J Kellermann is a 89 y.o. with a history of frequent PACs, heart failure with preserved ejection fraction, mild aortic stenosis and mild mitral stenosis, moderate tricuspid regurgitation with echocardiographic evidence of pulmonary hypertension, chronic kidney disease, recurrent DVT diagnosed in  of this year who presented to the hospital today with increasing dyspnea and hypoxemia with physical therapy at home.  Her and her daughter report that for a period of a few weeks she has been having increased leg swelling and dyspnea with activity.  With physical therapy she has been noted to have her oxygen dropping into the 80s.  She sleeps on 2-3 pillows but this has  been stable and she denies any PND or orthopnea.  Additionally, she denies any palpitations or chest pain.    Past History:  Past Medical History:   Diagnosis Date    Abscess of back     Arrhythmia     frequent PVC    Cancer 12/2011    Endometrial cancer, status post robotically assisted hysterectomy with Dr. Haddad, December 2011.      CKD (chronic kidney disease), stage III     no dilaysis     Dizzy     Dvt femoral (deep venous thrombosis) 2017    s/p hip surgery in 2017. Took xarelto until August 2018    Dyslipidemia     Dyslipidemia.  Observing.    Enthesopathy of hip region     Generalized osteoarthrosis, involving multiple sites     Headache     Hearing loss     Hypertension     Low backache     Needs flu shot     Osteoarthritis     Osteoarthritis with questionable carpal tunnel syndrome bilaterally.     Otitis, serous     Pneumonia 1967    Remote pneumonia, 1967.     Retinal hemorrhage     SI joint arthritis     SOB (shortness of breath)     Syncope 2001    Remote syncope with working diagnosis of vasodepressor, 2001.     Venous insufficiency of leg     Wears glasses     readers     Past Surgical History:   Procedure Laterality Date    CATARACT EXTRACTION      bilat     COLONOSCOPY      HIP PERCUTANEOUS PINNING Left 11/24/2017    Procedure: HIP PERCUTANEOUS PINNING;  Surgeon: Lucas Swanson MD;  Location:  [facility];  Service:     HYSTERECTOMY      total? but unsure     KNEE SURGERY  2001    Remote knee surgery in 2001.- right     TOTAL HIP ARTHROPLASTY Left 10/26/2018    Procedure: TOTAL HIP ARTHROPLASTY LEFT;  Surgeon: Stephen Baker MD;  Location:  [facility];  Service: Orthopedics     Allergies   Allergen Reactions    Sulfa Antibiotics Nausea Only and GI Intolerance     Social History     Socioeconomic History    Marital status:    Tobacco Use    Smoking status: Never     Passive exposure: Never    Smokeless tobacco: Never   Vaping Use    Vaping Use: Never used   Substance and Sexual  Activity    Alcohol use: No    Drug use: No    Sexual activity: Defer     Birth control/protection: None     Family History   Problem Relation Age of Onset    Heart disease Mother     Osteoarthritis Mother     Hypertension Mother     Hypertension Father     Heart attack Father     Osteoarthritis Father     Cancer Brother         lung     Medications:  Medications Prior to Admission   Medication Sig Dispense Refill Last Dose    amLODIPine (NORVASC) 5 MG tablet Take 1 tablet by mouth Daily.   8/10/2023    apixaban (ELIQUIS) 5 MG tablet tablet Take 1 tablet by mouth 2 (Two) Times a Day. 60 tablet 5 8/10/2023    bumetanide (Bumex) 1 MG tablet 1 or 2 tablets each morning for swelling 60 tablet 5 8/10/2023    hydrALAZINE (APRESOLINE) 25 MG tablet Take 1 tablet by mouth 2 (Two) Times a Day. 180 tablet 3 8/10/2023     Current medications:  amLODIPine, 10 mg, Oral, Daily  apixaban, 5 mg, Oral, BID  bumetanide, 2 mg, Intravenous, Q12H  hydrALAZINE, 25 mg, Oral, BID  senna-docusate sodium, 2 tablet, Oral, BID  sodium chloride, 10 mL, Intravenous, Q12H      Current IV drips:       Review of Systems   Constitutional: Positive for weight gain.   Cardiovascular:  Positive for dyspnea on exertion. Negative for chest pain, orthopnea and palpitations.   Respiratory:  Positive for shortness of breath.    Skin:  Positive for color change.     Physical exam:  Temp:  [98 øF (36.7 øC)-98.4 øF (36.9 øC)] 98.4 øF (36.9 øC)  Heart Rate:  [70-92] 71  Resp:  [16-18] 16  BP: (127-167)/(100-140) 137/101  Flow (L/min):  [2-6] 6  Body mass index is 30.28 kg/mý.    Gen: well developed, older white female, lying in bed, comfortable appearing  HEENT: MMM, sclerae anicteric, conjunctivae normal  CV: regular rate, irregularly irregular, 2/6 systolic murmur at right upper sternal border, 1 out of 6 early diastolic murmur at apex, normal S1, S2. 2+ radial and DP pulses  Pulm: Nasal cannula, normal work of breathing, decreased air movement in bilateral  bases, bibasilar rales  Abd: soft, nontender, nondistended  Ext: normal bulk for age, normal tone, 1+ pitting bilateral lower extremities with woody skin changes and hyperpigmentation  Neuro: alert, oriented, face symmetrical, moving all extremities well  Psych: normal mood, appropriate affect    Cardiac Testing:    ECG  (personally reviewed) SR, 1st deg AVB, PRWP    ECHO:   Results for orders placed during the hospital encounter of 06/01/23    Adult Transthoracic Echo Complete W/ Cont if Necessary Per Protocol    Interpretation Summary    Left ventricular systolic function is normal. Calculated left ventricular EF = 61.4% Left ventricular ejection fraction appears to be 61 - 65%.    Left ventricular diastolic function was indeterminate.    Left atrial volume is severely increased.    Mild aortic valve stenosis is present.    Aortic valve maximum pressure gradient is 26 mmHg. Aortic valve mean pressure gradient is 15 mmHg.    Mild mitral valve stenosis is present. The mitral valve peak gradient is 12 mmHg. The mitral valve mean gradient is 5 mmHg.    Moderate tricuspid valve regurgitation is present.    Estimated right ventricular systolic pressure from tricuspid regurgitation is markedly elevated (>55 mmHg).    There is a small left pleural effusion.    Lab Review:    Lab Results   Component Value Date    WBC 4.81 08/10/2023    HGB 12.3 08/10/2023    HCT 39.5 08/10/2023    MCV 98.3 (H) 08/10/2023     08/10/2023     Lab Results   Component Value Date    GLUCOSE 130 (H) 08/10/2023    BUN 21 08/10/2023    CREATININE 1.79 (H) 08/10/2023    EGFRIFNONA 28 (L) 04/22/2019    BCR 11.7 08/10/2023    K 4.2 08/10/2023    CO2 26.0 08/10/2023    CALCIUM 10.0 (H) 08/10/2023    ALBUMIN 3.8 08/10/2023    AST 15 08/10/2023    ALT 8 08/10/2023     Lab Results   Component Value Date    TROPONINT 39 (H) 08/10/2023     Lab Results   Component Value Date    PROBNP 2,658.0 (H) 08/10/2023     Imaging:  All pertinent imaging studies  were personally reviewed.    Assessment & Plan    Acute hypoxemic respiratory failure    Essential hypertension    Chronic kidney disease, stage III (moderate)     Acute heart failure with preserved ejection fraction (HFpEF)    Chronic deep vein thrombosis (DVT) of both lower extremities    Acute on chronic heart failure with preserved ejection fraction  Mild aortic stenosis  Mild mitral stenosis   - Agree with continuation of IV diuretics with monitoring of renal function and electrolytes   - Some of her lower extremity edema may be attributable to her recent diagnosis of acute bilateral DVTs as well as usage of amlodipine    Hypertension   -Continuing hydralazine, consider decreasing dose of amlodipine if BP is acceptable to decrease leg swelling    Frequent PACs   - Bisoprolol had been stopped in June after cardiac monitor showed a burden of 10.9% but also demonstrated frequent pauses, longest episode 3 seconds    Thank you for allowing us to share in the care of Rose J Kellermann J. Adam Leigh, MD  08/10/23   15:34 EDT

## 2023-08-10 NOTE — TELEPHONE ENCOUNTER
Spoke with daughter and she said pt still feeling dizzy, O2 has been dropping to the 70s at times, stays around 88, still feeling dizzy at times; I told daughter its best to take her to the ER for eval, she agreed and said she'd take her to .

## 2023-08-11 LAB
ALBUMIN SERPL-MCNC: 3.3 G/DL (ref 3.5–5.2)
ALBUMIN/GLOB SERPL: 1.3 G/DL
ALP SERPL-CCNC: 63 U/L (ref 39–117)
ALT SERPL W P-5'-P-CCNC: 7 U/L (ref 1–33)
ANION GAP SERPL CALCULATED.3IONS-SCNC: 11 MMOL/L (ref 5–15)
AST SERPL-CCNC: 12 U/L (ref 1–32)
BILIRUB SERPL-MCNC: 0.5 MG/DL (ref 0–1.2)
BUN SERPL-MCNC: 24 MG/DL (ref 8–23)
BUN/CREAT SERPL: 13 (ref 7–25)
CALCIUM SPEC-SCNC: 9.1 MG/DL (ref 8.2–9.6)
CHLORIDE SERPL-SCNC: 101 MMOL/L (ref 98–107)
CHOLEST SERPL-MCNC: 169 MG/DL (ref 0–200)
CO2 SERPL-SCNC: 32 MMOL/L (ref 22–29)
CREAT SERPL-MCNC: 1.84 MG/DL (ref 0.57–1)
DEPRECATED RDW RBC AUTO: 46.5 FL (ref 37–54)
EGFRCR SERPLBLD CKD-EPI 2021: 24.9 ML/MIN/1.73
ERYTHROCYTE [DISTWIDTH] IN BLOOD BY AUTOMATED COUNT: 13.1 % (ref 12.3–15.4)
GLOBULIN UR ELPH-MCNC: 2.5 GM/DL
GLUCOSE SERPL-MCNC: 79 MG/DL (ref 65–99)
HCT VFR BLD AUTO: 35.6 % (ref 34–46.6)
HDLC SERPL-MCNC: 61 MG/DL (ref 40–60)
HGB BLD-MCNC: 11.3 G/DL (ref 12–15.9)
IRON 24H UR-MRATE: 41 MCG/DL (ref 37–145)
IRON SATN MFR SERPL: 16 % (ref 20–50)
LDLC SERPL CALC-MCNC: 94 MG/DL (ref 0–100)
LDLC/HDLC SERPL: 1.53 {RATIO}
MAGNESIUM SERPL-MCNC: 1.8 MG/DL (ref 1.7–2.3)
MCH RBC QN AUTO: 30.9 PG (ref 26.6–33)
MCHC RBC AUTO-ENTMCNC: 31.7 G/DL (ref 31.5–35.7)
MCV RBC AUTO: 97.3 FL (ref 79–97)
PLATELET # BLD AUTO: 161 10*3/MM3 (ref 140–450)
PMV BLD AUTO: 10.4 FL (ref 6–12)
POTASSIUM SERPL-SCNC: 3.9 MMOL/L (ref 3.5–5.2)
PROT SERPL-MCNC: 5.8 G/DL (ref 6–8.5)
RBC # BLD AUTO: 3.66 10*6/MM3 (ref 3.77–5.28)
SODIUM SERPL-SCNC: 144 MMOL/L (ref 136–145)
TIBC SERPL-MCNC: 264 MCG/DL (ref 298–536)
TRANSFERRIN SERPL-MCNC: 177 MG/DL (ref 200–360)
TRIGL SERPL-MCNC: 73 MG/DL (ref 0–150)
VLDLC SERPL-MCNC: 14 MG/DL (ref 5–40)
WBC NRBC COR # BLD: 5.05 10*3/MM3 (ref 3.4–10.8)

## 2023-08-11 PROCEDURE — 84466 ASSAY OF TRANSFERRIN: CPT | Performed by: HOSPITALIST

## 2023-08-11 PROCEDURE — 83540 ASSAY OF IRON: CPT | Performed by: HOSPITALIST

## 2023-08-11 PROCEDURE — 99232 SBSQ HOSP IP/OBS MODERATE 35: CPT | Performed by: STUDENT IN AN ORGANIZED HEALTH CARE EDUCATION/TRAINING PROGRAM

## 2023-08-11 PROCEDURE — 99232 SBSQ HOSP IP/OBS MODERATE 35: CPT | Performed by: INTERNAL MEDICINE

## 2023-08-11 PROCEDURE — 80061 LIPID PANEL: CPT | Performed by: HOSPITALIST

## 2023-08-11 PROCEDURE — 83735 ASSAY OF MAGNESIUM: CPT | Performed by: HOSPITALIST

## 2023-08-11 PROCEDURE — 85027 COMPLETE CBC AUTOMATED: CPT | Performed by: HOSPITALIST

## 2023-08-11 PROCEDURE — 80053 COMPREHEN METABOLIC PANEL: CPT | Performed by: HOSPITALIST

## 2023-08-11 RX ORDER — BUMETANIDE 0.25 MG/ML
2 INJECTION INTRAMUSCULAR; INTRAVENOUS DAILY
Status: DISCONTINUED | OUTPATIENT
Start: 2023-08-12 | End: 2023-08-14

## 2023-08-11 RX ORDER — AMLODIPINE BESYLATE 5 MG/1
5 TABLET ORAL DAILY
Status: DISCONTINUED | OUTPATIENT
Start: 2023-08-12 | End: 2023-08-16 | Stop reason: HOSPADM

## 2023-08-11 RX ADMIN — SENNOSIDES AND DOCUSATE SODIUM 2 TABLET: 50; 8.6 TABLET ORAL at 08:38

## 2023-08-11 RX ADMIN — Medication 10 ML: at 20:01

## 2023-08-11 RX ADMIN — HYDRALAZINE HYDROCHLORIDE 25 MG: 25 TABLET, FILM COATED ORAL at 08:38

## 2023-08-11 RX ADMIN — Medication 10 ML: at 08:40

## 2023-08-11 RX ADMIN — HYDRALAZINE HYDROCHLORIDE 25 MG: 25 TABLET, FILM COATED ORAL at 20:01

## 2023-08-11 RX ADMIN — AMLODIPINE BESYLATE 10 MG: 10 TABLET ORAL at 08:38

## 2023-08-11 RX ADMIN — APIXABAN 2.5 MG: 2.5 TABLET, FILM COATED ORAL at 20:01

## 2023-08-11 RX ADMIN — SENNOSIDES AND DOCUSATE SODIUM 2 TABLET: 50; 8.6 TABLET ORAL at 20:01

## 2023-08-11 RX ADMIN — BUMETANIDE 2 MG: 0.25 INJECTION, SOLUTION INTRAMUSCULAR; INTRAVENOUS at 08:38

## 2023-08-11 RX ADMIN — APIXABAN 5 MG: 5 TABLET, FILM COATED ORAL at 08:38

## 2023-08-11 NOTE — PLAN OF CARE
Goal Outcome Evaluation:   Patient is A&O and pleasant. Patient's VSS. NSR on tele. Sats 93% on 6lpm via NC. Plan for discharge 8/14/23. Daughter at bedside. Will continue to follow plan of care.

## 2023-08-11 NOTE — CASE MANAGEMENT/SOCIAL WORK
Continued Stay Note   [place]     Patient Name: Rose J Kellermann  MRN: [id number]  Today's Date: 8/11/2023    Admit Date: 8/10/2023    Plan: discharge plan   Discharge Plan       Row Name 08/11/23 1529       Plan    Plan discharge plan    Plan Comments Per discussion in MD rounds patient will be here throught weekend. Patient currently on 6L/NC O2 and daugther confirmed that patient does not have home oxygen. [facility] confirmed that patient was discharged from their services on 6/21. Spoke with daughter, Areli regarding discharge plan. Daughter is agreeable to home oxygen arranged if needed and has no preference to a particular DME company. Daughter also wants any home health services that's recommended and [facility]    Final Discharge Disposition Code 06 - home with home health care                   Discharge Codes    No documentation.                 Expected Discharge Date and Time       Expected Discharge Date Expected Discharge Time    Aug 14, 2023               Renae Mckeon RN

## 2023-08-11 NOTE — PROGRESS NOTES
"[facility] Cardiology  Hospital Progress Note     LOS: 1 day   Patient Care Team:  Sam Hung MD as PCP - General  PCP:  Sam Hung MD    Chief Complaint:  fu chf    SUBJECTIVE: less dyspneic, legs less swollen      OBJECTIVE:    Vital Sign Min/Max for last 24 hours  Temp  Min: 97.6 øF (36.4 øC)  Max: 98.6 øF (37 øC)   BP  Min: 112/95  Max: 146/86   Pulse  Min: 66  Max: 90   Resp  Min: 16  Max: 20   SpO2  Min: 90 %  Max: 95 %   No data recorded   No data recorded     Flowsheet Rows      Flowsheet Row First Filed Value   Admission Height 160 cm (63\") Documented at 08/10/2023 1108   Admission Weight 76.2 kg (168 lb) Documented at 08/10/2023 1108            Telemetry: sr w pacs      Intake/Output Summary (Last 24 hours) at 8/11/2023 1659  Last data filed at 8/11/2023 1300  Gross per 24 hour   Intake 600 ml   Output 3550 ml   Net -2950 ml     Intake & Output (last 3 days)         08/08 0701  08/09 0700 08/09 0701  08/10 0700 08/10 0701  08/11 0700 08/11 0701  08/12 0700    P.O.    600    Total Intake(mL/kg)    600 (7.5)    Urine (mL/kg/hr)   2850 700 (0.9)    Total Output   2850 700    Net   -2850 -100            Urine Unmeasured Occurrence   1 x              Physical Exam:  Constitutional:       Appearance: Not in distress.      Comments: Elderly on 3L nc    Neck:      Vascular: No JVR. JVD normal.   Pulmonary:      Effort: Pulmonary effort is normal.      Breath sounds: Normal breath sounds. No wheezing. No rhonchi. No rales.   Chest:      Chest wall: Not tender to palpatation.   Cardiovascular:      PMI at left midclavicular line. Normal rate. Regular rhythm. Normal S1. Normal S2.       Murmurs: There is a systolic murmur. There is a diastolic murmur.      No gallop.  No click. No rub.   Pulses:     Intact distal pulses.   Edema:     Peripheral edema present.  Abdominal:      General: Bowel sounds are normal.      Palpations: Abdomen is soft.      Tenderness: There " is no abdominal tenderness.   Musculoskeletal: Normal range of motion.         General: No tenderness. Skin:     General: Skin is warm and dry.   Neurological:      General: No focal deficit present.      Mental Status: Alert and oriented to person, place and time.        LABS/DIAGNOSTIC DATA:  Results from last 7 days   Lab Units 08/11/23  0516 08/10/23  1121   WBC 10*3/mm3 5.05 4.81   HEMOGLOBIN g/dL 11.3* 12.3   HEMATOCRIT % 35.6 39.5   PLATELETS 10*3/mm3 161 169     Lab Results   Lab Value Date/Time    TROPONINT 39 (H) 08/10/2023 1121         Results from last 7 days   Lab Units 08/11/23  0516 08/10/23  1121   SODIUM mmol/L 144 142   POTASSIUM mmol/L 3.9 4.2   CHLORIDE mmol/L 101 104   CO2 mmol/L 32.0* 26.0   BUN mg/dL 24* 21   CREATININE mg/dL 1.84* 1.79*   CALCIUM mg/dL 9.1 10.0*   BILIRUBIN mg/dL 0.5 0.5   ALK PHOS U/L 63 75   ALT (SGPT) U/L 7 8   AST (SGOT) U/L 12 15   GLUCOSE mg/dL 79 130*         Results from last 7 days   Lab Units 08/11/23  0516   CHOLESTEROL mg/dL 169   TRIGLYCERIDES mg/dL 73   HDL CHOL mg/dL 61*   LDL CHOL mg/dL 94               Medication Review:   [START ON 8/12/2023] amLODIPine, 5 mg, Oral, Daily  apixaban, 2.5 mg, Oral, BID  [START ON 8/12/2023] bumetanide, 2 mg, Intravenous, Daily  hydrALAZINE, 25 mg, Oral, BID  pharmacy consult - MTM, , Does not apply, Daily  senna-docusate sodium, 2 tablet, Oral, BID  sodium chloride, 10 mL, Intravenous, Q12H               Acute hypoxemic respiratory failure    Essential hypertension    Chronic kidney disease, stage III (moderate)     Acute heart failure with preserved ejection fraction (HFpEF)    Chronic deep vein thrombosis (DVT) of both lower extremities      ASSESSMENT/PLAN:  CHF HFpEF with valvular component   Diuresed with a/c renal insuff.  Given age and GFR will decrease diuretics.  Pt now agrees to nocturnal O2 at home.     Paf - given age and GFR decrease to low dose NOAC    Will see again Monday           Andrew Xiao MD    08/11/23  16:59 EDT

## 2023-08-11 NOTE — PROGRESS NOTES
[facility] Medicine Services  PROGRESS NOTE    Patient Name: Rose J Kellermann  : 1926  MRN: [id number]    Date of Admission: 8/10/2023  Primary Care Physician: Sam Hung MD    Subjective   Subjective     CC:  dyspnea    HPI:  She states she is feeling better today. However, still on 6L NC, home requirement is RA    ROS:  Gen- No fevers, chills  CV- No chest pain, palpitations  Resp- as above  GI- No N/V/D, abd pain       Objective   Objective     Vital Signs:   Temp:  [97.6 øF (36.4 øC)-98.4 øF (36.9 øC)] 98.2 øF (36.8 øC)  Heart Rate:  [66-92] 82  Resp:  [16-18] 18  BP: (112-167)/() 112/95  Flow (L/min):  [5-6] 6     Physical Exam:  Constitutional: No acute distress, awake, alert  HENT: NCAT, mucous membranes moist  Respiratory: resp effort fair. Bibasilar diminished sounds on 6L of NC. Appears comfortable at rest  Cardiovascular: RRR, no murmurs, rubs, or gallops  Gastrointestinal: Positive bowel sounds, soft, nontender, nondistended  Musculoskeletal: 1+ bilateral ankle edema  Psychiatric: Appropriate affect, cooperative  Neurologic: Oriented x 3, no focal deficits, speech is clear  Skin: BLE venous stasis      Results Reviewed:  LAB RESULTS:      Lab 23  0516 08/10/23  1121   WBC 5.05 4.81   HEMOGLOBIN 11.3* 12.3   HEMATOCRIT 35.6 39.5   PLATELETS 161 169   NEUTROS ABS  --  3.14   IMMATURE GRANS (ABS)  --  0.01   LYMPHS ABS  --  0.94   MONOS ABS  --  0.52   EOS ABS  --  0.18   MCV 97.3* 98.3*   D DIMER QUANT  --  1.22*         Lab 23  0516 08/10/23  1121   SODIUM 144 142   POTASSIUM 3.9 4.2   CHLORIDE 101 104   CO2 32.0* 26.0   ANION GAP 11.0 12.0   BUN 24* 21   CREATININE 1.84* 1.79*   EGFR 24.9* 25.7*   GLUCOSE 79 130*   CALCIUM 9.1 10.0*   MAGNESIUM 1.8  --          Lab 23  0516 08/10/23  1121   TOTAL PROTEIN 5.8* 7.1   ALBUMIN 3.3* 3.8   GLOBULIN 2.5 3.3   ALT (SGPT) 7 8   AST (SGOT) 12 15   BILIRUBIN 0.5 0.5   ALK PHOS 63 75          Lab 08/10/23  1121   PROBNP 2,658.0*   HSTROP T 39*         Lab 08/11/23  0516   CHOLESTEROL 169   LDL CHOL 94   HDL CHOL 61*   TRIGLYCERIDES 73         Lab 08/11/23  0516   IRON 41   IRON SATURATION (TSAT) 16*   TIBC 264*   TRANSFERRIN 177*         Brief Urine Lab Results  (Last result in the past 365 days)        Color   Clarity   Blood   Leuk Est   Nitrite   Protein   CREAT   Urine HCG        05/09/23 1137 Yellow   Cloudy   Negative   Small (1+)   Negative   100 mg/dL (2+)                   Microbiology Results Abnormal       Procedure Component Value - Date/Time    COVID PRE-OP / PRE-PROCEDURE SCREENING ORDER (NO ISOLATION) - Swab, Nasopharynx [[id number]]  (Normal) Collected: 08/10/23 1130    Lab Status: Final result Specimen: Swab from Nasopharynx Updated: 08/10/23 1206    Narrative:      The following orders were created for panel order COVID PRE-OP / PRE-PROCEDURE SCREENING ORDER (NO ISOLATION) - Swab, Nasopharynx.  Procedure                               Abnormality         Status                     ---------                               -----------         ------                     COVID-19 and FLU A/B PCR...[[id number]]  Normal              Final result                 Please view results for these tests on the individual orders.    COVID-19 and FLU A/B PCR - Swab, Nasopharynx [[id number]]  (Normal) Collected: 08/10/23 1130    Lab Status: Final result Specimen: Swab from Nasopharynx Updated: 08/10/23 1206     COVID19 Not Detected     Influenza A PCR Not Detected     Influenza B PCR Not Detected    Narrative:      Fact sheet for providers: https://www.fda.gov/media/020857/download    Fact sheet for patients: https://www.fda.gov/media/329437/download    Test performed by PCR.            XR Chest 1 View    Result Date: 8/10/2023  XR CHEST 1 VW Date of Exam: 8/10/2023 11:20 AM EDT Indication: SOA triage protocol Comparison: 11/24/2017. Findings: There is continued moderately severe cardiomegaly. There is a  component of underlying chronic interstitial lung disease. However, there are new and worsening infiltrates of the left lower lobe. There may be a small left effusion. Suggestion of minimal right effusion.     Impression: Impression: Findings suggest left lower lobe atelectasis and/or pneumonia superimposed over chronic lung disease, with small effusions. Electronically Signed: Sherley Sahu MD  8/10/2023 11:55 AM EDT  Workstation ID: [id number]    CT Angiogram Chest    Result Date: 8/10/2023  CT ANGIOGRAM CHEST Date of Exam: 8/10/2023 11:20 AM CDT Indication: hypoxia, elevated dimer, eval for pe. Comparison: None available. Technique: CTA of the chest was performed after the uneventful intravenous administration of 75 cc Isovue-370. Reconstructed coronal and sagittal images were also obtained. In addition, a 3-D volume rendered image was created for interpretation. Automated exposure control and iterative reconstruction methods were used. Findings: PULMONARY VASCULATURE: Pulmonary arteries are widely patent without evidence of embolus.Main pulmonary artery is normal in size. No evidence of right heart strain. MEDIASTINUM: Enlarged heart. Aortic and heart size are normal. No aortic dissection identified. No mass nor pericardial effusion. CORONARY ARTERIES: There is calcified atherosclerotic disease. LUNGS: There is bibasilar airspace disease, likely compressive atelectasis. There is intralobular septal thickening with mild central pulmonary edema. PLEURAL SPACE: There are small to moderate bilateral pleural effusions. LYMPH NODES: There are no pathologically enlarged lymph nodes. UPPER ABDOMEN: Unremarkable OSSEOUS STRUCTURES: There are subacute to more chronic posterior lateral left rib fractures. Correlate with history.     Impression: Impression: 1.No evidence of pulmonary embolism. 2.CHF features. Electronically Signed: Nils Vides MD  8/10/2023 11:35 AM CDT  Workstation ID: [id number]     Results for orders  placed during the hospital encounter of 06/01/23    Adult Transthoracic Echo Complete W/ Cont if Necessary Per Protocol    Interpretation Summary    Left ventricular systolic function is normal. Calculated left ventricular EF = 61.4% Left ventricular ejection fraction appears to be 61 - 65%.    Left ventricular diastolic function was indeterminate.    Left atrial volume is severely increased.    Mild aortic valve stenosis is present.    Aortic valve maximum pressure gradient is 26 mmHg. Aortic valve mean pressure gradient is 15 mmHg.    Mild mitral valve stenosis is present. The mitral valve peak gradient is 12 mmHg. The mitral valve mean gradient is 5 mmHg.    Moderate tricuspid valve regurgitation is present.    Estimated right ventricular systolic pressure from tricuspid regurgitation is markedly elevated (>55 mmHg).    There is a small left pleural effusion.      Current medications:  Scheduled Meds:amLODIPine, 10 mg, Oral, Daily  apixaban, 5 mg, Oral, BID  bumetanide, 2 mg, Intravenous, Q12H  hydrALAZINE, 25 mg, Oral, BID  pharmacy consult - MTM, , Does not apply, Daily  senna-docusate sodium, 2 tablet, Oral, BID  sodium chloride, 10 mL, Intravenous, Q12H      Continuous Infusions:   PRN Meds:.  acetaminophen **OR** acetaminophen **OR** acetaminophen    senna-docusate sodium **AND** polyethylene glycol **AND** bisacodyl **AND** bisacodyl    ondansetron **OR** ondansetron    sodium chloride    sodium chloride    sodium chloride    Assessment & Plan   Assessment & Plan     Active Hospital Problems    Diagnosis  POA    **Acute hypoxemic respiratory failure [J96.01]  Yes    Acute heart failure with preserved ejection fraction (HFpEF) [I50.31]  Yes    Chronic deep vein thrombosis (DVT) of both lower extremities [I82.503]  Yes    Chronic kidney disease, stage III (moderate)  [N18.30]  Yes    Essential hypertension [I10]  Yes      Resolved Hospital Problems   No resolved problems to display.        Brief Hospital  Course to date:  Rose J Kellermann is a 89 y.o. female with a past medical history of HTN, b/l DVT diagnosis in July 2023 on Eliquis and HFrEF presented to the ED complaining of increased shortness of air over the last few days. She has been admitted for acute on chronic HFpEF and cardiology consulted.     Acute on chronic HFpEF  Acute hypoxemic respiratory failure  - CTA chest shows edema/CHF  - proBNP of 2,658  - patient currently on Eliquis for b/l DVT diagnosed outpatient in July 2023  - still on 6L NC, above home requirement  - Lasix 80mg IV given in ED  - hold home Bumex 2mg PO every morning  - cont Bumex 2mg IV Q12H  - cardiology following  - ECHO done in June 2023- reviewed. Defer obtaining another ECHO to cardiology  - strict intake/output  - cardiac diet with fluid restriction     B/l DVT  -patient currently on Eliquis for b/l DVT diagnosed outpatient in July 2023     CKD Stage 3  - creatinine appears to be around baseline~ 1.5-1.7  - monitor closely with high dose IV diuretics ongoing     HTN  - continue home Hydralazine 50mg PO BID and will plan to decrease amlodipine dose in case leg swelling related to that        Expected Discharge Location and Transportation: home  Expected Discharge   Expected Discharge Date: 8/14/2023; Expected Discharge Time:      DVT prophylaxis:  Medical DVT prophylaxis orders are present.     AM-PAC 6 Clicks Score (PT): 18 (08/10/23 2000)    CODE STATUS:   Code Status and Medical Interventions:   Ordered at: 08/10/23 1327     Level Of Support Discussed With:    Patient     Code Status (Patient has no pulse and is not breathing):    CPR (Attempt to Resuscitate)     Medical Interventions (Patient has pulse or is breathing):    Full Support       Chanel Noriega MD  08/11/23

## 2023-08-12 LAB
ANION GAP SERPL CALCULATED.3IONS-SCNC: 10 MMOL/L (ref 5–15)
BUN SERPL-MCNC: 29 MG/DL (ref 8–23)
BUN/CREAT SERPL: 16.9 (ref 7–25)
CALCIUM SPEC-SCNC: 9 MG/DL (ref 8.2–9.6)
CHLORIDE SERPL-SCNC: 100 MMOL/L (ref 98–107)
CO2 SERPL-SCNC: 31 MMOL/L (ref 22–29)
CREAT SERPL-MCNC: 1.72 MG/DL (ref 0.57–1)
EGFRCR SERPLBLD CKD-EPI 2021: 26.9 ML/MIN/1.73
GLUCOSE SERPL-MCNC: 85 MG/DL (ref 65–99)
POTASSIUM SERPL-SCNC: 3.9 MMOL/L (ref 3.5–5.2)
SODIUM SERPL-SCNC: 141 MMOL/L (ref 136–145)

## 2023-08-12 PROCEDURE — 97110 THERAPEUTIC EXERCISES: CPT

## 2023-08-12 PROCEDURE — 97535 SELF CARE MNGMENT TRAINING: CPT

## 2023-08-12 PROCEDURE — 97162 PT EVAL MOD COMPLEX 30 MIN: CPT

## 2023-08-12 PROCEDURE — 99232 SBSQ HOSP IP/OBS MODERATE 35: CPT

## 2023-08-12 PROCEDURE — 80048 BASIC METABOLIC PNL TOTAL CA: CPT | Performed by: STUDENT IN AN ORGANIZED HEALTH CARE EDUCATION/TRAINING PROGRAM

## 2023-08-12 PROCEDURE — 97166 OT EVAL MOD COMPLEX 45 MIN: CPT

## 2023-08-12 PROCEDURE — 99232 SBSQ HOSP IP/OBS MODERATE 35: CPT | Performed by: STUDENT IN AN ORGANIZED HEALTH CARE EDUCATION/TRAINING PROGRAM

## 2023-08-12 RX ADMIN — AMLODIPINE BESYLATE 5 MG: 5 TABLET ORAL at 08:34

## 2023-08-12 RX ADMIN — APIXABAN 2.5 MG: 2.5 TABLET, FILM COATED ORAL at 21:40

## 2023-08-12 RX ADMIN — HYDRALAZINE HYDROCHLORIDE 25 MG: 25 TABLET, FILM COATED ORAL at 08:34

## 2023-08-12 RX ADMIN — BUMETANIDE 2 MG: 0.25 INJECTION, SOLUTION INTRAMUSCULAR; INTRAVENOUS at 08:33

## 2023-08-12 RX ADMIN — HYDRALAZINE HYDROCHLORIDE 25 MG: 25 TABLET, FILM COATED ORAL at 21:41

## 2023-08-12 RX ADMIN — Medication 10 ML: at 08:35

## 2023-08-12 RX ADMIN — APIXABAN 2.5 MG: 2.5 TABLET, FILM COATED ORAL at 08:34

## 2023-08-12 NOTE — PLAN OF CARE
Problem: Adult Inpatient Plan of Care  Goal: Optimal Comfort and Wellbeing  Intervention: Provide Person-Centered Care  Recent Flowsheet Documentation  Taken 8/11/2023 2000 by Molly Last RN  Trust Relationship/Rapport:   care explained   choices provided   reassurance provided   thoughts/feelings acknowledged   Goal Outcome Evaluation:      Pt is A&OX4 with VSS on 6L humidified 6L NC and no c/o pain or discomfort. Plan is to DC home with HH and 02 on the 14th.

## 2023-08-12 NOTE — THERAPY EVALUATION
Patient Name: Rose J Kellermann  : 1926    MRN: [id number]                              Today's Date: 2023       Admit Date: 8/10/2023    Visit Dx:     ICD-10-CM ICD-9-CM   1. Acute on chronic congestive heart failure, unspecified heart failure type  I50.9 428.0   2. Acute respiratory failure with hypoxia  J96.01 518.81   3. Chronic kidney disease, unspecified CKD stage  N18.9 585.9     Patient Active Problem List   Diagnosis    Essential hypertension    Vitamin D deficiency    Fatigue    Chronic kidney disease, stage III (moderate)     Osteoarthritis    Dyslipidemia    Post-menopausal bleeding    Endometrial cancer    Closed fracture of left hip    Asymptomatic bacteriuria    Post-traumatic osteoarthritis of left hip    Status post total replacement of left hip    Prediabetes    Acute blood loss anemia, asymptomatic    Postoperative urinary retention    Myopia    Posterior crocodile shagreen of cornea    Presbyopia    Ptosis of eyelid    Regular astigmatism    Retinal neovascularization    After cataract    Secondary cataract    Premature atrial contractions    Heart murmur    Nocturnal hypoxia    Acute hypoxemic respiratory failure    Acute heart failure with preserved ejection fraction (HFpEF)    Chronic deep vein thrombosis (DVT) of both lower extremities     Past Medical History:   Diagnosis Date    Abscess of back     Arrhythmia     frequent PVC    Cancer 2011    Endometrial cancer, status post robotically assisted hysterectomy with Dr. Haddad, 2011.      CKD (chronic kidney disease), stage III     no dilaysis     Dizzy     Dvt femoral (deep venous thrombosis) 2017    s/p hip surgery in . Took xarelto until 2018    Dyslipidemia     Dyslipidemia.  Observing.    Enthesopathy of hip region     Generalized osteoarthrosis, involving multiple sites     Headache     Hearing loss     Hypertension     Low backache     Needs flu shot     Osteoarthritis     Osteoarthritis with  questionable carpal tunnel syndrome bilaterally.     Otitis, serous     Pneumonia 1967    Remote pneumonia, 1967.     Retinal hemorrhage     SI joint arthritis     SOB (shortness of breath)     Syncope 2001    Remote syncope with working diagnosis of vasodepressor, 2001.     Venous insufficiency of leg     Wears glasses     readers     Past Surgical History:   Procedure Laterality Date    CATARACT EXTRACTION      bilat     COLONOSCOPY      HIP PERCUTANEOUS PINNING Left 11/24/2017    Procedure: HIP PERCUTANEOUS PINNING;  Surgeon: Lucas Swanson MD;  Location:  [facility];  Service:     HYSTERECTOMY      total? but unsure     KNEE SURGERY  2001    Remote knee surgery in 2001.- right     TOTAL HIP ARTHROPLASTY Left 10/26/2018    Procedure: TOTAL HIP ARTHROPLASTY LEFT;  Surgeon: Stephen Baker MD;  Location:  [facility];  Service: Orthopedics      General Information       Row Name 08/12/23 1714          Physical Therapy Time and Intention    Document Type evaluation  -DM     Mode of Treatment physical therapy  -DM       Row Name 08/12/23 1714          General Information    Patient Profile Reviewed yes  -DM     Prior Level of Function independent:;all household mobility;gait;transfer;bed mobility;ADL's;dependent:;driving;yard work  indep gt w/ rollator; has w/c, o2, SPC; had HHPT till 6-21-23; Dtr assists PRN  -DM     Existing Precautions/Restrictions fall;oxygen therapy device and L/min;other (see comments)  Hypox.resp fail; CHF; chr DVT BLE (Eliquis 7/'23);ptosis  -DM       Row Name 08/12/23 1714          Living Environment    People in Home alone  -DM       Row Name 08/12/23 1714          Home Main Entrance    Number of Stairs, Main Entrance three  -DM       Row Name 08/12/23 1714          Stairs Within Home, Primary    Stairs, Within Home, Primary 1-st w/ tub/show; gr bar,show.ch.  -DM     Number of Stairs, Within Home, Primary none  -DM       Row Name 08/12/23 1714          Cognition    Orientation Status  (Cognition) oriented x 3  -DM       Row Name 08/12/23 1714          Safety Issues, Functional Mobility    Safety Issues Affecting Function (Mobility) awareness of need for assistance;insight into deficits/self-awareness;safety precaution awareness;sequencing abilities  -DM     Impairments Affecting Function (Mobility) endurance/activity tolerance;shortness of breath;strength  -DM               User Key  (r) = Recorded By, (t) = Taken By, (c) = Cosigned By      Initials Name Provider Type    DM Lynne Davison, PT Physical Therapist                   Mobility       Row Name 08/12/23 1714          Bed Mobility    Supine-Sit [place] (Bed Mobility) unable to assess  -DM     Comment, (Bed Mobility) Nsg states pt fatigued from [facility] w/ OT this AM,& has already transf to Bed (req. ther exer in bed for this session)  -DM       Row Name 08/12/23 1714          Transfers    Comment, (Transfers) def. per nsg  -DM       Row Name 08/12/23 1714          Bed-Chair Transfer    Bed-Chair [place] (Transfers) unable to assess  -DM       Row Name 08/12/23 1714          Sit-Stand Transfer    Sit-Stand [place] (Transfers) unable to assess  -DM       Row Name 08/12/23 1714          Gait/Stairs (Locomotion)    [place] Level (Gait) unable to assess  -DM     Comment, (Gait/Stairs) def. per nsg req  -DM               User Key  (r) = Recorded By, (t) = Taken By, (c) = Cosigned By      Initials Name Provider Type    DM Lynne Davison, PT Physical Therapist                   Obj/Interventions       Row Name 08/12/23 1714          Range of Motion Comprehensive    General Range of Motion lower extremity range of motion deficits identified  -DM     Comment, General Range of Motion BLE carroll. 25-30% d/t prev. L hip ORIF 11/'17 & subseq L THR 10/'18  -DM       Row Name 08/12/23 1714          Strength Comprehensive (MMT)    General Manual Muscle Testing (MMT) Assessment lower extremity strength deficits identified  -DM     Comment,  General Manual Muscle Testing (MMT) Assessment BLE grossly 2+ to 3+/5  -DM       Row Name 08/12/23 1714          Motor Skills    Therapeutic Exercise hip;knee;ankle;shoulder  issued HEP & Instructed;did 10 reps BUE exer as well w/ deep inspirations for sh. exer; rests btwn sets d/t O2 desat 88% on 6L  -DM       Row Name 08/12/23 1714          Shoulder (Therapeutic Exercise)    Shoulder (Therapeutic Exercise) AROM (active range of motion)  -DM     Shoulder AROM (Therapeutic Exercise) bilateral;flexion;aBduction;extension;aDduction;horizontal aBduction/aDduction;supine;10 repetitions;other (see comments)  Biceps curls  -DM       Row Name 08/12/23 1714          Hip (Therapeutic Exercise)    Hip (Therapeutic Exercise) AROM (active range of motion);AAROM (active assistive range of motion)  -DM     Hip AROM (Therapeutic Exercise) right;external rotation;internal rotation;supine;10 repetitions;other (see comments)  def. LLE rot. d/t prev. L THR '18  -DM     Hip AAROM (Therapeutic Exercise) bilateral;flexion;extension;aBduction;aDduction;supine;10 repetitions  -DM       Row Name 08/12/23 1714          Knee (Therapeutic Exercise)    Knee (Therapeutic Exercise) AROM (active range of motion);AAROM (active assistive range of motion);isometric exercises  -DM     Knee AROM (Therapeutic Exercise) bilateral;flexion;extension;SAQ (short arc quad);LAQ (long arc quad);heel slides;supine;10 repetitions;other (see comments)  -DM     Knee AAROM (Therapeutic Exercise) bilateral;flexion;extension;supine;10 repetitions  Min A for Ext.w/ modif LAQ, & flex w/ H.Slides  -DM     Knee Isometrics (Therapeutic Exercise) bilateral;hamstring sets;quad sets;supine;10 repetitions  -DM       Row Name 08/12/23 1714          Ankle (Therapeutic Exercise)    Ankle (Therapeutic Exercise) AROM (active range of motion);AAROM (active assistive range of motion)  -DM     Ankle AROM (Therapeutic Exercise) bilateral;dorsiflexion;plantarflexion;supine;10  repetitions  -DM     Ankle AAROM (Therapeutic Exercise) bilateral;supine;10 repetitions;other (see comments)  AC  -DM               User Key  (r) = Recorded By, (t) = Taken By, (c) = Cosigned By      Initials Name Provider Type    DM Lynne Davison, PT Physical Therapist                   Goals/Plan       Row Name 08/12/23 1714          Bed Mobility Goal 1 (PT)    Activity/Assistive Device (Bed Mobility Goal 1, PT) bed mobility activities, all  -DM     [place] Level/Cues Needed (Bed Mobility Goal 1, PT) minimum assist (75% or more patient effort)  -DM     Time Frame (Bed Mobility Goal 1, PT) long term goal (LTG);1 week  -DM       Row Name 08/12/23 1714          Transfer Goal 1 (PT)    Activity/Assistive Device (Transfer Goal 1, PT) sit-to-stand/stand-to-sit;bed-to-chair/chair-to-bed;walker, rolling  -DM     [place] Level/Cues Needed (Transfer Goal 1, PT) minimum assist (75% or more patient effort)  -DM     Time Frame (Transfer Goal 1, PT) long term goal (LTG);1 week  -DM       Row Name 08/12/23 1714          Gait Training Goal 1 (PT)    Activity/Assistive Device (Gait Training Goal 1, PT) gait (walking locomotion);walker, rolling  Stable VS; O2 sat > 92%  -DM     [place] Level (Gait Training Goal 1, PT) minimum assist (75% or more patient effort)  -DM     Distance (Gait Training Goal 1, PT) 25  -DM     Time Frame (Gait Training Goal 1, PT) long term goal (LTG);1 week  -DM       Row Name 08/12/23 1714          Stairs Goal 1 (PT)    Activity/Assistive Device (Stairs Goal 1, PT) ascending stairs;descending stairs;using handrail, right;cane, straight  -DM     [place] Level/Cues Needed (Stairs Goal 1, PT) moderate assist (50-74% patient effort)  -DM     Number of Stairs (Stairs Goal 1, PT) 3  -DM     Time Frame (Stairs Goal 1, PT) long term goal (LTG);1 week  -DM       Row Name 08/12/23 1714          Patient Education Goal (PT)    Activity (Patient Education Goal, PT) HEP exer  -DM      [place]/Cues/Accuracy (Memory Goal 2, PT) demonstrates adequately;verbalizes understanding  -DM     Time Frame (Patient Education Goal, PT) long term goal (LTG);1 week  -DM       Row Name 08/12/23 1714          Therapy Assessment/Plan (PT)    Planned Therapy Interventions (PT) balance training;bed mobility training;gait training;home exercise program;patient/family education;stair training;strengthening;transfer training  -DM               User Key  (r) = Recorded By, (t) = Taken By, (c) = Cosigned By      Initials Name Provider Type    DM Lynne Davison, PT Physical Therapist                   Clinical Impression       Row Name 08/12/23 1714          Pain    Pretreatment Pain Rating 0/10 - no pain  -DM     Posttreatment Pain Rating 0/10 - no pain  -DM     Additional Documentation Pain Scale: Numbers Pre/Post-Treatment (Group)  -DM       Row Name 08/12/23 1714          Plan of Care Review    Plan of Care Reviewed With patient  -DM     Progress improving  -DM     Outcome Evaluation PT eval completed. Presents w/ hypox resp fail., CHF, chr. BLE DVT's (hosp 7/'23 & placed on Eliquis), decr strength/ endurance & impaired funct mobil. Nsg req. defer mobil. d/t pt fatigued from [facility]IC > 4 Hr & just transf to bed, therefore performed ther exer in sup. w/ rests btwn sets d/t desat. 88% on 6 L, & HR to 99. Recommend home w/ dtr's assist & resuming HHPT at d/c.  -DM       Row Name 08/12/23 1714          Therapy Assessment/Plan (PT)    Patient/Family Therapy Goals Statement (PT) improved funct mobil  -DM     Rehab Potential (PT) good, to achieve stated therapy goals  -DM     Criteria for Skilled Interventions Met (PT) yes;meets criteria;skilled treatment is necessary  -DM     Therapy Frequency (PT) daily  -DM       Row Name 08/12/23 1714          Vital Signs    Pre Systolic BP Rehab 139  -DM     Pre Treatment Diastolic BP 67  -DM     Post Systolic BP Rehab 119  -DM     Post Treatment Diastolic BP 85  -DM     Pretreatment  Heart Rate (beats/min) 82  -DM     Intratreatment Heart Rate (beats/min) 99  -DM     Posttreatment Heart Rate (beats/min) 85  -DM     Pre SpO2 (%) 94  -DM     O2 Delivery Pre Treatment nasal cannula  6 L  -DM     Intra SpO2 (%) 88  -DM     O2 Delivery Intra Treatment nasal cannula  -DM     Post SpO2 (%) 93  -DM     O2 Delivery Post Treatment nasal cannula  -DM     Pre Patient Position Supine  -DM     Intra Patient Position Supine  -DM     Post Patient Position Supine  -DM       Row Name 08/12/23 1714          Positioning and Restraints    Pre-Treatment Position in bed  -DM     Post Treatment Position bed  -DM     In Bed notified nsg;supine;call light within reach;encouraged to call for assist;exit alarm on;heels elevated  -DM               User Key  (r) = Recorded By, (t) = Taken By, (c) = Cosigned By      Initials Name Provider Type    Lynne Bowers, PT Physical Therapist                   Outcome Measures       Row Name 08/12/23 1714          How much help from another person do you currently need...    Turning from your back to your side while in flat bed without using bedrails? 3  -DM     Moving from lying on back to sitting on the side of a flat bed without bedrails? 3  -DM     Moving to and from a bed to a chair (including a wheelchair)? 3  -DM     Standing up from a chair using your arms (e.g., wheelchair, bedside chair)? 3  -DM     Climbing 3-5 steps with a railing? 2  -DM     To walk in hospital room? 3  -DM     AM-PAC 6 Clicks Score (PT) 17  -DM     Highest level of mobility 5 --> Static standing  -DM       Row Name 08/12/23 1714 08/12/23 1421       Functional Assessment    Outcome Measure Options AM-PAC 6 Clicks Basic Mobility (PT)  -DM AM-PAC 6 Clicks Daily Activity (OT)  -JR              User Key  (r) = Recorded By, (t) = Taken By, (c) = Cosigned By      Initials Name Provider Type    Lynne Bowers, PT Physical Therapist    JR Paola Azar, OT Occupational Therapist                                  Physical Therapy Education       Title: PT OT SLP Therapies (In Progress)       Topic: Physical Therapy (In Progress)       Point: Mobility training (In Progress)       Learning Progress Summary             Patient Acceptance, E,D,H, NR by DM at 8/12/2023 1737                         Point: Home exercise program (In Progress)       Learning Progress Summary             Patient Acceptance, E,D,H, NR by DM at 8/12/2023 1737                         Point: Body mechanics (In Progress)       Learning Progress Summary             Patient Acceptance, E,D,H, NR by DM at 8/12/2023 1737                         Point: Precautions (In Progress)       Learning Progress Summary             Patient Acceptance, E,D,H, NR by DM at 8/12/2023 1737                                         User Key       Initials Effective Dates Name Provider Type Discipline    DM 02/03/23 -  Lynne Davison, PT Physical Therapist PT                  PT Recommendation and Plan  Planned Therapy Interventions (PT): balance training, bed mobility training, gait training, home exercise program, patient/family education, stair training, strengthening, transfer training  Plan of Care Reviewed With: patient  Progress: improving  Outcome Evaluation: PT eval completed. Presents w/ hypox resp fail., CHF, chr. BLE DVT's (hosp 7/'23 & placed on Eliquis), decr strength/ endurance & impaired funct mobil. Nsg req. defer mobil. d/t pt fatigued from [facility] > 4 Hr & just transf to bed, therefore performed ther exer in sup. w/ rests btwn sets d/t desat. 88% on 6 L, & HR to 99. Recommend home w/ dtr's assist & resuming HHPT at d/c.     Time Calculation:   PT Evaluation Complexity  History, PT Evaluation Complexity: 3 or more personal factors and/or comorbidities  Examination of Body Systems (PT Eval Complexity): total of 3 or more elements  Clinical Presentation (PT Evaluation Complexity): evolving  Clinical Decision Making (PT Evaluation Complexity): moderate  complexity  Overall Complexity (PT Evaluation Complexity): moderate complexity     PT Charges       Row Name 08/12/23 1749             Time Calculation    Start Time 1714  -DM      PT Received On 08/12/23  -DM      PT Goal Re-Cert Due Date 08/22/23  -DM         Time Calculation- PT    Total Timed Code Minutes- PT 36 minute(s)  -DM         Timed Charges    94252 - PT Therapeutic Exercise Minutes 8  -DM         Untimed Charges    PT Eval/Re-eval Minutes 28  -DM         Total Minutes    Timed Charges Total Minutes 8  -DM      Untimed Charges Total Minutes 28  -DM       Total Minutes 36  -DM                User Key  (r) = Recorded By, (t) = Taken By, (c) = Cosigned By      Initials Name Provider Type    DM Lynne Davison, PT Physical Therapist                  Therapy Charges for Today       Code Description Service Date Service Provider Modifiers Qty    73273375130 HC PT THER PROC EA 15 MIN 8/12/2023 Lynne Davison, PT GP 1    61756035026 HC PT EVAL MOD COMPLEXITY 2 8/12/2023 Lynne Davison, PT GP 1            PT G-Codes  Outcome Measure Options: AM-PAC 6 Clicks Basic Mobility (PT)  AM-PAC 6 Clicks Score (PT): 17  AM-PAC 6 Clicks Score (OT): 16  PT Discharge Summary  Anticipated Discharge Disposition (PT): home with assist, home with home health    Lynne Davison, PT  8/12/2023

## 2023-08-12 NOTE — THERAPY EVALUATION
Patient Name: Rose J Kellermann  : 1926    MRN: [id number]                              Today's Date: 2023       Admit Date: 8/10/2023    Visit Dx:     ICD-10-CM ICD-9-CM   1. Acute on chronic congestive heart failure, unspecified heart failure type  I50.9 428.0   2. Acute respiratory failure with hypoxia  J96.01 518.81   3. Chronic kidney disease, unspecified CKD stage  N18.9 585.9     Patient Active Problem List   Diagnosis    Essential hypertension    Vitamin D deficiency    Fatigue    Chronic kidney disease, stage III (moderate)     Osteoarthritis    Dyslipidemia    Post-menopausal bleeding    Endometrial cancer    Closed fracture of left hip    Asymptomatic bacteriuria    Post-traumatic osteoarthritis of left hip    Status post total replacement of left hip    Prediabetes    Acute blood loss anemia, asymptomatic    Postoperative urinary retention    Myopia    Posterior crocodile shagreen of cornea    Presbyopia    Ptosis of eyelid    Regular astigmatism    Retinal neovascularization    After cataract    Secondary cataract    Premature atrial contractions    Heart murmur    Nocturnal hypoxia    Acute hypoxemic respiratory failure    Acute heart failure with preserved ejection fraction (HFpEF)    Chronic deep vein thrombosis (DVT) of both lower extremities     Past Medical History:   Diagnosis Date    Abscess of back     Arrhythmia     frequent PVC    Cancer 2011    Endometrial cancer, status post robotically assisted hysterectomy with Dr. Haddad, 2011.      CKD (chronic kidney disease), stage III     no dilaysis     Dizzy     Dvt femoral (deep venous thrombosis) 2017    s/p hip surgery in . Took xarelto until 2018    Dyslipidemia     Dyslipidemia.  Observing.    Enthesopathy of hip region     Generalized osteoarthrosis, involving multiple sites     Headache     Hearing loss     Hypertension     Low backache     Needs flu shot     Osteoarthritis     Osteoarthritis with  questionable carpal tunnel syndrome bilaterally.     Otitis, serous     Pneumonia 1967    Remote pneumonia, 1967.     Retinal hemorrhage     SI joint arthritis     SOB (shortness of breath)     Syncope 2001    Remote syncope with working diagnosis of vasodepressor, 2001.     Venous insufficiency of leg     Wears glasses     readers     Past Surgical History:   Procedure Laterality Date    CATARACT EXTRACTION      bilat     COLONOSCOPY      HIP PERCUTANEOUS PINNING Left 11/24/2017    Procedure: HIP PERCUTANEOUS PINNING;  Surgeon: Lucas Swanson MD;  Location:  [facility];  Service:     HYSTERECTOMY      total? but unsure     KNEE SURGERY  2001    Remote knee surgery in 2001.- right     TOTAL HIP ARTHROPLASTY Left 10/26/2018    Procedure: TOTAL HIP ARTHROPLASTY LEFT;  Surgeon: Stephen Baker MD;  Location:  [facility];  Service: Orthopedics      General Information       Row Name 08/12/23 1411          OT Time and Intention    Document Type evaluation  -     Mode of Treatment occupational therapy  -[facility] Name 08/12/23 [facility]          General Information    Patient Profile Reviewed yes  -JR     Prior Level of Function independent:;gait;transfer;bed mobility;ADL's  -     Existing Precautions/Restrictions fall;oxygen therapy device and L/min  -     Barriers to Rehab medically complex;previous functional deficit;hearing deficit   -[facility] Name 08/12/23 1411          Occupational Profile    Occupational History/Life Experiences (Occupational Profile) Pt and daughter report pt lives alone. Dtr assists with errands, meal prep, laundry anything in the basement. Pt has been receiving  PT with a goal of being able to step over side of bathtub. Dtr reports w/c used for community mobility. Pt usually complete sponge bath.  -       Row Name 08/12/23 1411          Living Environment    People in Home alone  -[facility] Name 08/12/23 [facility]          Home Main Entrance    Number of Stairs, Main Entrance  three  -JR       Row Name 08/12/23 1411          Stairs Within Home, Primary    Number of Stairs, Within Home, Primary none  -       Row Name 08/12/23 1411          Cognition    Orientation Status (Cognition) oriented x 3  -JR       Row Name 08/12/23 1411          Safety Issues, Functional Mobility    Safety Issues Affecting Function (Mobility) awareness of need for assistance;insight into deficits/self-awareness  -     Impairments Affecting Function (Mobility) balance;endurance/activity tolerance;shortness of breath;strength  -               User Key  (r) = Recorded By, (t) = Taken By, (c) = Cosigned By      Initials Name Provider Type    JR Doe, Paola KEARNS, OT Occupational Therapist                     Mobility/ADL's       Row Name 08/12/23 1415          Bed Mobility    Bed Mobility supine-sit  -     Supine-Sit [place] (Bed Mobility) contact guard;verbal cues  -     Assistive Device (Bed Mobility) bed rails;head of bed elevated  -     Comment, (Bed Mobility) Pt required verbal cues for sequencing.  -       Row Name 08/12/23 1415          Transfers    Transfers bed-chair transfer  -[facility] Name 08/12/23 1415          Bed-Chair Transfer    Bed-Chair [place] (Transfers) contact guard;verbal cues  -     Assistive Device (Bed-Chair Transfers) walker, front-wheeled  -     Comment, (Bed-Chair Transfer) Pt required verbal cues for hand placement and safety with walker  -[facility] Name 08/12/23 1415          Activities of Daily Living    BADL Assessment/Intervention grooming;upper body dressing  -[facility] Name 08/12/23 1415          Grooming Assessment/Training    [place] Level (Grooming) wash face, hands;set up  -     Position (Grooming) supported sitting  -     Comment, (Grooming) Dtr combed hair  -[facility] Name 08/12/23 1415          Upper Body Dressing Assessment/Training    [place] Level (Upper Body Dressing) don;front opening garment  -     Position (Upper  Body Dressing) edge of bed sitting  -               User Key  (r) = Recorded By, (t) = Taken By, (c) = Cosigned By      Initials Name Provider Type    Paola Santiago OT Occupational Therapist                   Obj/Interventions       Row Name 08/12/23 1417          Sensory Assessment (Somatosensory)    Sensory Assessment (Somatosensory) sensation intact  -JR       Row Name 08/12/23 1417          Range of Motion Comprehensive    General Range of Motion no range of motion deficits identified  -JR       Row Name 08/12/23 1417          Strength Comprehensive (MMT)    General Manual Muscle Testing (MMT) Assessment no strength deficits identified  -JR       Row Name 08/12/23 1417          Shoulder (Therapeutic Exercise)    Shoulder (Therapeutic Exercise) AROM (active range of motion)  -     Shoulder AROM (Therapeutic Exercise) bilateral;flexion;extension;horizontal aBduction/aDduction;5 repetitions  -JR       Row Name 08/12/23 1417          Elbow/Forearm (Therapeutic Exercise)    Elbow/Forearm (Therapeutic Exercise) AROM (active range of motion)  -     Elbow/Forearm AROM (Therapeutic Exercise) bilateral;flexion;extension;5 repetitions  -JR       Row Name 08/12/23 1417          Motor Skills    Therapeutic Exercise shoulder;elbow/forearm  -JR       Row Name 08/12/23 1417          Balance    Balance Assessment sitting static balance;standing dynamic balance  -     Static Sitting Balance contact guard  -     Dynamic Standing Balance contact guard  -     Position/Device Used, Standing Balance walker, rolling  -               User Key  (r) = Recorded By, (t) = Taken By, (c) = Cosigned By      Initials Name Provider Type    Paola Santiago OT Occupational Therapist                   Goals/Plan       Row Name 08/12/23 1420          Bed Mobility Goal 1 (OT)    Activity/Assistive Device (Bed Mobility Goal 1, OT) bed mobility activities, all  -     [place] Level/Cues Needed (Bed Mobility Goal 1, OT)  modified independence  -JR     Time Frame (Bed Mobility Goal 1, OT) long term goal (LTG);by discharge  -JR     Progress/Outcomes (Bed Mobility Goal 1, OT) new goal;goal ongoing  -JR       Row Name 08/12/23 1420          Transfer Goal 1 (OT)    Activity/Assistive Device (Transfer Goal 1, OT) transfers, all;walker, rolling  -JR     [place] Level/Cues Needed (Transfer Goal 1, OT) modified independence  -JR     Time Frame (Transfer Goal 1, OT) long term goal (LTG);by discharge  -JR     Progress/Outcome (Transfer Goal 1, OT) new goal;goal ongoing  -JR       Row Name 08/12/23 1420          Toileting Goal 1 (OT)    Activity/Device (Toileting Goal 1, OT) toileting skills, all  -JR     [place] Level/Cues Needed (Toileting Goal 1, OT) modified independence;verbal cues required  -JR     Time Frame (Toileting Goal 1, OT) long term goal (LTG);by discharge  -JR     Progress/Outcome (Toileting Goal 1, OT) new goal;goal ongoing  -JR       Row Name 08/12/23 1420          Strength Goal 1 (OT)    Strength Goal 1 (OT) Pt to demo independence with B UE HEP to support activity tolerance for task.  -JR     Time Frame (Strength Goal 1, OT) long term goal (LTG);by discharge  -JR     Progress/Outcome (Strength Goal 1, OT) new goal;goal ongoing  -JR       Row Name 08/12/23 1420          Therapy Assessment/Plan (OT)    Planned Therapy Interventions (OT) activity tolerance training;adaptive equipment training;functional balance retraining;occupation/activity based interventions;ROM/therapeutic exercise;strengthening exercise;transfer/mobility retraining;patient/caregiver education/training;BADL retraining  -               User Key  (r) = Recorded By, (t) = Taken By, (c) = Cosigned By      Initials Name Provider Type    JR Paola Azar, OT Occupational Therapist                   Clinical Impression       Row Name 08/12/23 141          Pain Assessment    Pretreatment Pain Rating 0/10 - no pain  -JR     Posttreatment Pain Rating  0/10 - no pain  -       Row Name 08/12/23 1418          Plan of Care Review    Plan of Care Reviewed With patient;daughter  -     Outcome Evaluation OT initial eval and expanded chart review completed. Pt presents with multiple comorbidities and decreased independence with ADL's and mobilty from baseline. Recommend continued skilled OT services and d/c home with family and HH.  -       Row Name 08/12/23 1418          Therapy Assessment/Plan (OT)    Patient/Family Therapy Goal Statement (OT) go home  -     Rehab Potential (OT) good, to achieve stated therapy goals  -     Criteria for Skilled Therapeutic Interventions Met (OT) yes;meets criteria;skilled treatment is necessary  -     Therapy Frequency (OT) daily  -       Row Name 08/12/23 1418          Therapy Plan Review/Discharge Plan (OT)    Anticipated Discharge Disposition (OT) home with assist;home with home health  -       Row Name 08/12/23 1418          Vital Signs    Pre Systolic BP Rehab 145  -JR     Pre Treatment Diastolic BP 80  -     Post Systolic BP Rehab 139  -     Post Treatment Diastolic BP 67  -JR     Pretreatment Heart Rate (beats/min) 76  -     Posttreatment Heart Rate (beats/min) 72  -     Pre SpO2 (%) 94  -JR     O2 Delivery Pre Treatment supplemental O2  -JR     Post SpO2 (%) 95  -JR     O2 Delivery Post Treatment supplemental O2  -JR     Pre Patient Position Supine  -JR     Intra Patient Position Standing  -JR     Post Patient Position Sitting  -       Row Name 08/12/23 1418          Positioning and Restraints    Pre-Treatment Position in bed  -JR     Post Treatment Position chair  -JR     In Chair notified nsg;reclined;call light within reach;encouraged to call for assist;exit alarm on;waffle cushion;with family/caregiver  -               User Key  (r) = Recorded By, (t) = Taken By, (c) = Cosigned By      Initials Name Provider Type    Paola Santiago, OT Occupational Therapist                   Outcome Measures        Row Name 08/12/23 1421          How much help from another is currently needed...    Putting on and taking off regular lower body clothing? 2  -JR     Bathing (including washing, rinsing, and drying) 2  -JR     Toileting (which includes using toilet bed pan or urinal) 2  -JR     Putting on and taking off regular upper body clothing 3  -JR     Taking care of personal grooming (such as brushing teeth) 3  -JR     Eating meals 4  -JR     AM-PAC 6 Clicks Score (OT) 16  -JR       Row Name 08/12/23 1421          Functional Assessment    Outcome Measure Options AM-PAC 6 Clicks Daily Activity (OT)  -               User Key  (r) = Recorded By, (t) = Taken By, (c) = Cosigned By      Initials Name Provider Type    Paola Santiago OT Occupational Therapist                    Occupational Therapy Education       Title: PT OT SLP Therapies (In Progress)       Topic: Occupational Therapy (In Progress)       Point: ADL training (In Progress)       Description:   Instruct learner(s) on proper safety adaptation and remediation techniques during self care or transfers.   Instruct in proper use of assistive devices.                  Learning Progress Summary             Patient Acceptance, E,TB,D, NR by  at 8/12/2023 1110    Comment: Educted regarding role of therapy and therex   Family Acceptance, E,TB,D, NR by  at 8/12/2023 1110    Comment: Educted regarding role of therapy and therex                         Point: Precautions (Not Started)       Description:   Instruct learner(s) on prescribed precautions during self-care and functional transfers.                  Learner Progress:  Not documented in this visit.              Point: Body mechanics (Not Started)       Description:   Instruct learner(s) on proper positioning and spine alignment during self-care, functional mobility activities and/or exercises.                  Learner Progress:  Not documented in this visit.                              User Key        Initials Effective Dates Name Provider Type Discipline     02/03/23 -  Paola Azar OT Occupational Therapist OT                  OT Recommendation and Plan  Planned Therapy Interventions (OT): activity tolerance training, adaptive equipment training, functional balance retraining, occupation/activity based interventions, ROM/therapeutic exercise, strengthening exercise, transfer/mobility retraining, patient/caregiver education/training, BADL retraining  Therapy Frequency (OT): daily  Plan of Care Review  Plan of Care Reviewed With: patient, daughter  Outcome Evaluation: OT initial eval and expanded chart review completed. Pt presents with multiple comorbidities and decreased independence with ADL's and mobilty from baseline. Recommend continued skilled OT services and d/c home with family and HH.     Time Calculation:   Evaluation Complexity (OT)  Review Occupational Profile/Medical/Therapy History Complexity: expanded/moderate complexity  Assessment, Occupational Performance/Identification of Deficit Complexity: 3-5 performance deficits  Clinical Decision Making Complexity (OT): detailed assessment/moderate complexity  Overall Complexity of Evaluation (OT): moderate complexity     Time Calculation- OT       Row Name 08/12/23 1423             Time Calculation- OT    OT Start Time 1110  -JR      OT Received On 08/12/23  -      OT Goal Re-Cert Due Date 08/22/23  -         Timed Charges    42611 - OT Self Care/Mgmt Minutes 8  -JR         Untimed Charges    OT Eval/Re-eval Minutes 46  -JR         Total Minutes    Timed Charges Total Minutes 8  -JR      Untimed Charges Total Minutes 46  -JR       Total Minutes 54  -JR                User Key  (r) = Recorded By, (t) = Taken By, (c) = Cosigned By      Initials Name Provider Type    JR Paola Azar OT Occupational Therapist                  Therapy Charges for Today       Code Description Service Date Service Provider Modifiers Qty    [id number]  OT  SELF CARE/MGMT/TRAIN EA 15 MIN 8/12/2023 Doe, Paola KEARNS, OT GO 1    88942082490 HC OT EVAL MOD COMPLEXITY 4 8/12/2023 Doe, Paola KEARNS OT GO 1                 Paola KEARNS. Doe, OT  8/12/2023

## 2023-08-12 NOTE — PLAN OF CARE
Goal Outcome Evaluation:  Plan of Care Reviewed With: patient, daughter           Outcome Evaluation: OT initial eval and expanded chart review completed. Pt presents with multiple comorbidities and decreased independence with ADL's and mobilty from baseline. Recommend continued skilled OT services and d/c home with family and HH.      Anticipated Discharge Disposition (OT): home with assist, home with home health

## 2023-08-12 NOTE — PROGRESS NOTES
[facility]  Progress       Reason for visit:    Follow up CHF      Active Hospital Problems    Diagnosis  POA    **Acute hypoxemic respiratory failure [J96.01]  Yes    Acute heart failure with preserved ejection fraction (HFpEF) [I50.31]  Yes    Chronic deep vein thrombosis (DVT) of both lower extremities [I82.503]  Yes    Chronic kidney disease, stage III (moderate)  [N18.30]  Yes    Essential hypertension [I10]  Yes            Subjective:   Patient sitting up in bed, eating breakfast. Denies any shortness of breath, and has been able to ambulate to the bathroom without difficulty. Complains of sore throat this morning.            Vital Sign Min/Max for last 24 hours  Temp  Min: 97.6 øF (36.4 øC)  Max: 98.6 øF (37 øC)   BP  Min: 112/95  Max: 146/86   Pulse  Min: 58  Max: 90   Resp  Min: 18  Max: 20   SpO2  Min: 83 %  Max: 94 %   Flow (L/min)  Min: 6  Max: 6      Intake/Output Summary (Last 24 hours) at 2023 0849  Last data filed at 2023 2000  Gross per 24 hour   Intake 960 ml   Output 2000 ml   Net -1040 ml           Physical Exam  Vitals and nursing note reviewed.   Cardiovascular:      Rate and Rhythm: Normal rate and regular rhythm.      Heart sounds: Murmur heard.    with a grade of 2/6.   Pulmonary:      Breath sounds: Examination of the left-upper field reveals wheezing. Examination of the left-middle field reveals wheezing. Examination of the right-lower field reveals rales. Examination of the left-lower field reveals wheezing and rales. Wheezing and rales present.   Musculoskeletal:      Right lower leg: No edema.      Left lower le+ Edema present.   Neurological:      Mental Status: She is alert.       Tele:  SR    Results Review (reviewed the patient's recent labs in the electronic medical record):         Results from last 7 days   Lab Units 23  0405 23  0516 08/10/23  1121   SODIUM mmol/L 141 144 142   POTASSIUM mmol/L 3.9 3.9 4.2    CHLORIDE mmol/L 100 101 104   BUN mg/dL 29* 24* 21   CREATININE mg/dL 1.72* 1.84* 1.79*   MAGNESIUM mg/dL  --  1.8  --      Results from last 7 days   Lab Units 08/10/23  1121   HSTROP T ng/L 39*     Results from last 7 days   Lab Units 08/11/23  0516 08/10/23  1121   WBC 10*3/mm3 5.05 4.81   HEMOGLOBIN g/dL 11.3* 12.3   HEMATOCRIT % 35.6 39.5   PLATELETS 10*3/mm3 161 169       Lab Results   Component Value Date    HGBA1C 5.98 (H) 04/22/2019       Lab Results   Component Value Date    CHOL 169 08/11/2023    TRIG 73 08/11/2023    HDL 61 (H) 08/11/2023    LDL 94 08/11/2023              Acute hypoxemic respiratory failure  Essential hypertension  CKD, stage III  HFpEF  Chronic DVT             Renal function improved this morning, after decrease in [place]. Currently negative fluid balance, and patient without shortness of breath. Creatinine now 1.7.  Continue daily diuresis and continue to monitor renal function.    Patient agreeable to nocturnal home o2.  Will see again on Monday.   Electronically signed by CONNIE Gross, 08/12/23, 8:49 AM EDT.

## 2023-08-12 NOTE — PROGRESS NOTES
[facility]  PROGRESS NOTE    Patient Name: Rose J Kellermann  : 1926  MRN: [id number]    Date of Admission: 8/10/2023  Primary Care Physician: Sam Hung MD    Subjective   Subjective     CC:  dyspnea    HPI:  No acute overnight events. D/w RN abt new skin findings compatable w shingles scabbed over    ROS:  Gen- No fevers, chills  CV- No chest pain, palpitations  Resp- as above  GI- No N/V/D, abd pain       Objective   Objective     Vital Signs:   Temp:  [97.6 øF (36.4 øC)-98.6 øF (37 øC)] 97.6 øF (36.4 øC)  Heart Rate:  [58-98] 98  Resp:  [18-20] 18  BP: (136-146)/(74-86) 145/80  Flow (L/min):  [6] 6     Physical Exam:  Constitutional: No acute distress, awake, alert  HENT: NCAT, mucous membranes moist  Respiratory: resp effort fair. Bibasilar diminished sounds on 6L of NC. Appears comfortable at rest  Cardiovascular: RRR, no murmurs, rubs, or gallops  Gastrointestinal: Positive bowel sounds, soft, nontender, nondistended  Musculoskeletal: 1+ bilateral ankle edema is improving  Psychiatric: Appropriate affect, cooperative  Neurologic: Oriented x 3, no focal deficits, speech is clear  Skin: BLE venous stasis      Results Reviewed:  LAB RESULTS:      Lab 23  0516 08/10/23  1121   WBC 5.05 4.81   HEMOGLOBIN 11.3* 12.3   HEMATOCRIT 35.6 39.5   PLATELETS 161 169   NEUTROS ABS  --  3.14   IMMATURE GRANS (ABS)  --  0.01   LYMPHS ABS  --  0.94   MONOS ABS  --  0.52   EOS ABS  --  0.18   MCV 97.3* 98.3*   D DIMER QUANT  --  1.22*           Lab 23  0405 23  0516 08/10/23  1121   SODIUM 141 144 142   POTASSIUM 3.9 3.9 4.2   CHLORIDE 100 101 104   CO2 31.0* 32.0* 26.0   ANION GAP 10.0 11.0 12.0   BUN 29* 24* 21   CREATININE 1.72* 1.84* 1.79*   EGFR 26.9* 24.9* 25.7*   GLUCOSE 85 79 130*   CALCIUM 9.0 9.1 10.0*   MAGNESIUM  --  1.8  --            Lab 23  0516 08/10/23  1121   TOTAL PROTEIN 5.8* 7.1   ALBUMIN 3.3* 3.8   GLOBULIN 2.5  3.3   ALT (SGPT) 7 8   AST (SGOT) 12 15   BILIRUBIN 0.5 0.5   ALK PHOS 63 75           Lab 08/10/23  1121   PROBNP 2,658.0*   HSTROP T 39*           Lab 08/11/23  0516   CHOLESTEROL 169   LDL CHOL 94   HDL CHOL 61*   TRIGLYCERIDES 73           Lab 08/11/23  0516   IRON 41   IRON SATURATION (TSAT) 16*   TIBC 264*   TRANSFERRIN 177*           Brief Urine Lab Results  (Last result in the past 365 days)        Color   Clarity   Blood   Leuk Est   Nitrite   Protein   CREAT   Urine HCG        05/09/23 1137 Yellow   Cloudy   Negative   Small (1+)   Negative   100 mg/dL (2+)                   Microbiology Results Abnormal       Procedure Component Value - Date/Time    COVID PRE-OP / PRE-PROCEDURE SCREENING ORDER (NO ISOLATION) - Swab, Nasopharynx [[id number]]  (Normal) Collected: 08/10/23 1130    Lab Status: Final result Specimen: Swab from Nasopharynx Updated: 08/10/23 1206    Narrative:      The following orders were created for panel order COVID PRE-OP / PRE-PROCEDURE SCREENING ORDER (NO ISOLATION) - Swab, Nasopharynx.  Procedure                               Abnormality         Status                     ---------                               -----------         ------                     COVID-19 and FLU A/B PCR...[[id number]]  Normal              Final result                 Please view results for these tests on the individual orders.    COVID-19 and FLU A/B PCR - Swab, Nasopharynx [[id number]]  (Normal) Collected: 08/10/23 1130    Lab Status: Final result Specimen: Swab from Nasopharynx Updated: 08/10/23 1206     COVID19 Not Detected     Influenza A PCR Not Detected     Influenza B PCR Not Detected    Narrative:      Fact sheet for providers: https://www.fda.gov/media/527775/download    Fact sheet for patients: https://www.fda.gov/media/068914/download    Test performed by PCR.            XR Chest 1 View    Result Date: 8/10/2023  XR CHEST 1 VW Date of Exam: 8/10/2023 11:20 AM EDT Indication: SOA triage protocol  Comparison: 11/24/2017. Findings: There is continued moderately severe cardiomegaly. There is a component of underlying chronic interstitial lung disease. However, there are new and worsening infiltrates of the left lower lobe. There may be a small left effusion. Suggestion of minimal right effusion.     Impression: Impression: Findings suggest left lower lobe atelectasis and/or pneumonia superimposed over chronic lung disease, with small effusions. Electronically Signed: Sherley Sahu MD  8/10/2023 11:55 AM EDT  Workstation ID: [id number]    CT Angiogram Chest    Result Date: 8/10/2023  CT ANGIOGRAM CHEST Date of Exam: 8/10/2023 11:20 AM CDT Indication: hypoxia, elevated dimer, eval for pe. Comparison: None available. Technique: CTA of the chest was performed after the uneventful intravenous administration of 75 cc Isovue-370. Reconstructed coronal and sagittal images were also obtained. In addition, a 3-D volume rendered image was created for interpretation. Automated exposure control and iterative reconstruction methods were used. Findings: PULMONARY VASCULATURE: Pulmonary arteries are widely patent without evidence of embolus.Main pulmonary artery is normal in size. No evidence of right heart strain. MEDIASTINUM: Enlarged heart. Aortic and heart size are normal. No aortic dissection identified. No mass nor pericardial effusion. CORONARY ARTERIES: There is calcified atherosclerotic disease. LUNGS: There is bibasilar airspace disease, likely compressive atelectasis. There is intralobular septal thickening with mild central pulmonary edema. PLEURAL SPACE: There are small to moderate bilateral pleural effusions. LYMPH NODES: There are no pathologically enlarged lymph nodes. UPPER ABDOMEN: Unremarkable OSSEOUS STRUCTURES: There are subacute to more chronic posterior lateral left rib fractures. Correlate with history.     Impression: Impression: 1.No evidence of pulmonary embolism. 2.CHF features. Electronically  Signed: Nils Vides MD  8/10/2023 11:35 AM CDT  Workstation ID: [id number]     Results for orders placed during the hospital encounter of 06/01/23    Adult Transthoracic Echo Complete W/ Cont if Necessary Per Protocol    Interpretation Summary    Left ventricular systolic function is normal. Calculated left ventricular EF = 61.4% Left ventricular ejection fraction appears to be 61 - 65%.    Left ventricular diastolic function was indeterminate.    Left atrial volume is severely increased.    Mild aortic valve stenosis is present.    Aortic valve maximum pressure gradient is 26 mmHg. Aortic valve mean pressure gradient is 15 mmHg.    Mild mitral valve stenosis is present. The mitral valve peak gradient is 12 mmHg. The mitral valve mean gradient is 5 mmHg.    Moderate tricuspid valve regurgitation is present.    Estimated right ventricular systolic pressure from tricuspid regurgitation is markedly elevated (>55 mmHg).    There is a small left pleural effusion.      Current medications:  Scheduled Meds:amLODIPine, 5 mg, Oral, Daily  apixaban, 2.5 mg, Oral, BID  bumetanide, 2 mg, Intravenous, Daily  hydrALAZINE, 25 mg, Oral, BID  pharmacy consult - MTM, , Does not apply, Daily  senna-docusate sodium, 2 tablet, Oral, BID  sodium chloride, 10 mL, Intravenous, Q12H      Continuous Infusions:   PRN Meds:.  acetaminophen **OR** acetaminophen **OR** acetaminophen    senna-docusate sodium **AND** polyethylene glycol **AND** bisacodyl **AND** bisacodyl    ondansetron **OR** ondansetron    sodium chloride    sodium chloride    sodium chloride    Assessment & Plan   Assessment & Plan     Active Hospital Problems    Diagnosis  POA    **Acute hypoxemic respiratory failure [J96.01]  Yes    Acute heart failure with preserved ejection fraction (HFpEF) [I50.31]  Yes    Chronic deep vein thrombosis (DVT) of both lower extremities [I82.503]  Yes    Chronic kidney disease, stage III (moderate)  [N18.30]  Yes    Essential hypertension  [I10]  Yes      Resolved Hospital Problems   No resolved problems to display.        Brief Hospital Course to date:  Rose J Kellermann is a 89 y.o. female with a past medical history of HTN, b/l DVT diagnosis in July 2023 on Eliquis and HFrEF presented to the ED complaining of increased shortness of air over the last few days. She has been admitted for acute on chronic HFpEF and cardiology consulted.     Acute on chronic HFpEF  Acute hypoxemic respiratory failure  - CTA chest shows edema/CHF  - proBNP of 2,658  - patient currently on Eliquis for b/l DVT diagnosed outpatient in July 2023  - still on 6L NC, above home requirement  - diuresis per cardiology  - ECHO done in June 2023- reviewed. Defer obtaining another ECHO to cardiology  - strict intake/output  - cardiac diet with fluid restriction     B/l DVT  -patient currently on Eliquis for b/l DVT diagnosed outpatient in July 2023     CKD Stage 3  - creatinine appears to be around baseline~ 1.5-1.7  - monitor closely with IV diuresis     HTN  - continue home Hydralazine 50mg PO BID and amlodipine dose decreased bc of leg swelling (much improved)    Recent zoster infection-- rash appeared about 2-3 weeks ago and lesions appear healed over. Not currently required to be in precautions        Expected Discharge Location and Transportation: home  Expected Discharge   Expected Discharge Date: 8/14/2023; Expected Discharge Time:      DVT prophylaxis:  Medical DVT prophylaxis orders are present.     AM-PAC 6 Clicks Score (PT): 18 (08/10/23 2000)    CODE STATUS:   Code Status and Medical Interventions:   Ordered at: 08/10/23 1327     Level Of Support Discussed With:    Patient     Code Status (Patient has no pulse and is not breathing):    CPR (Attempt to Resuscitate)     Medical Interventions (Patient has pulse or is breathing):    Full Support       Chanel Noriega MD  08/12/23

## 2023-08-12 NOTE — PLAN OF CARE
Goal Outcome Evaluation:  Plan of Care Reviewed With: patient, daughter        Progress: no change  Outcome Evaluation: Patient had a good day. VSS, 6LNC. Up with x1 assist, up in chair. No complaints of pain or SOA. Family at bedside.

## 2023-08-12 NOTE — PLAN OF CARE
Goal Outcome Evaluation:  Plan of Care Reviewed With: patient        Progress: improving  Outcome Evaluation: PT eval completed. Presents w/ hypox resp fail., CHF, chr. BLE DVT's (hosp 7/'23 & placed on Eliquis), decr strength/ endurance & impaired funct mobil. Nsg req. defer mobil. d/t pt fatigued from UIC > 4 Hr & just transf to bed, therefore performed ther exer in sup. w/ rests btwn sets d/t desat. 88% on 6 L, & HR to 99. Recommend home w/ dtr's assist & resuming HHPT at d/c.

## 2023-08-13 LAB
ANION GAP SERPL CALCULATED.3IONS-SCNC: 14 MMOL/L (ref 5–15)
BUN SERPL-MCNC: 37 MG/DL (ref 8–23)
BUN/CREAT SERPL: 16.7 (ref 7–25)
CALCIUM SPEC-SCNC: 8.7 MG/DL (ref 8.2–9.6)
CHLORIDE SERPL-SCNC: 103 MMOL/L (ref 98–107)
CO2 SERPL-SCNC: 26 MMOL/L (ref 22–29)
CREAT SERPL-MCNC: 2.22 MG/DL (ref 0.57–1)
EGFRCR SERPLBLD CKD-EPI 2021: 19.8 ML/MIN/1.73
GLUCOSE SERPL-MCNC: 87 MG/DL (ref 65–99)
POTASSIUM SERPL-SCNC: 4.1 MMOL/L (ref 3.5–5.2)
SODIUM SERPL-SCNC: 143 MMOL/L (ref 136–145)

## 2023-08-13 PROCEDURE — 80048 BASIC METABOLIC PNL TOTAL CA: CPT | Performed by: STUDENT IN AN ORGANIZED HEALTH CARE EDUCATION/TRAINING PROGRAM

## 2023-08-13 PROCEDURE — 99232 SBSQ HOSP IP/OBS MODERATE 35: CPT | Performed by: PEDIATRICS

## 2023-08-13 RX ADMIN — Medication 10 ML: at 20:11

## 2023-08-13 RX ADMIN — SENNOSIDES AND DOCUSATE SODIUM 2 TABLET: 50; 8.6 TABLET ORAL at 20:11

## 2023-08-13 RX ADMIN — HYDRALAZINE HYDROCHLORIDE 25 MG: 25 TABLET, FILM COATED ORAL at 20:11

## 2023-08-13 RX ADMIN — HYDRALAZINE HYDROCHLORIDE 25 MG: 25 TABLET, FILM COATED ORAL at 09:16

## 2023-08-13 RX ADMIN — APIXABAN 2.5 MG: 2.5 TABLET, FILM COATED ORAL at 09:16

## 2023-08-13 RX ADMIN — APIXABAN 2.5 MG: 2.5 TABLET, FILM COATED ORAL at 20:11

## 2023-08-13 RX ADMIN — Medication 10 ML: at 09:21

## 2023-08-13 RX ADMIN — AMLODIPINE BESYLATE 5 MG: 5 TABLET ORAL at 09:16

## 2023-08-13 RX ADMIN — BUMETANIDE 2 MG: 0.25 INJECTION, SOLUTION INTRAMUSCULAR; INTRAVENOUS at 09:15

## 2023-08-13 NOTE — PROGRESS NOTES
[facility]  PROGRESS NOTE    Patient Name: Rose J Kellermann  : 1926  MRN: [id number]    Date of Admission: 8/10/2023  Primary Care Physician: Sam Hung MD    Subjective   Subjective     CC:  dyspnea    HPI:  No acute overnight events.  States her breathing seems to be doing a little better.  ROS:  Gen- No fevers, chills  CV- No chest pain, palpitations  GI- No N/V/D, abd pain       Objective   Objective     Vital Signs:   Temp:  [97.9 øF (36.6 øC)-98.4 øF (36.9 øC)] 97.9 øF (36.6 øC)  Heart Rate:  [55-97] 63  Resp:  [16-18] 18  BP: (110-142)/(74-85) 110/74  Flow (L/min):  [6] 6     Physical Exam:  Constitutional: No acute distress, awake, alert  HENT: NCAT, mucous membranes moist  Respiratory: resp effort fair. Bibasilar diminished sounds on 6L of NC--weaned to 5 L during my exam. Appears comfortable at rest  Cardiovascular: RRR, no murmurs, rubs, or gallops  Gastrointestinal: Positive bowel sounds, soft, nontender, nondistended  Musculoskeletal: Trace bilateral ankle edema with chronic venous stasis skin changes  Psychiatric: Appropriate affect, cooperative  Neurologic: Oriented x 3, no focal deficits, speech is clear  Skin: BLE venous stasis      Results Reviewed:  LAB RESULTS:      Lab 23  0516 08/10/23  1121   WBC 5.05 4.81   HEMOGLOBIN 11.3* 12.3   HEMATOCRIT 35.6 39.5   PLATELETS 161 169   NEUTROS ABS  --  3.14   IMMATURE GRANS (ABS)  --  0.01   LYMPHS ABS  --  0.94   MONOS ABS  --  0.52   EOS ABS  --  0.18   MCV 97.3* 98.3*   D DIMER QUANT  --  1.22*         Lab 23  0521 23  0405 23  0516 08/10/23  1121   SODIUM 143 141 144 142   POTASSIUM 4.1 3.9 3.9 4.2   CHLORIDE 103 100 101 104   CO2 26.0 31.0* 32.0* 26.0   ANION GAP 14.0 10.0 11.0 12.0   BUN 37* 29* 24* 21   CREATININE 2.22* 1.72* 1.84* 1.79*   EGFR 19.8* 26.9* 24.9* 25.7*   GLUCOSE 87 85 79 130*   CALCIUM 8.7 9.0 9.1 10.0*   MAGNESIUM  --   --  1.8  --           Lab 08/11/23  0516 08/10/23  1121   TOTAL PROTEIN 5.8* 7.1   ALBUMIN 3.3* 3.8   GLOBULIN 2.5 3.3   ALT (SGPT) 7 8   AST (SGOT) 12 15   BILIRUBIN 0.5 0.5   ALK PHOS 63 75         Lab 08/10/23  1121   PROBNP 2,658.0*   HSTROP T 39*         Lab 08/11/23  0516   CHOLESTEROL 169   LDL CHOL 94   HDL CHOL 61*   TRIGLYCERIDES 73         Lab 08/11/23  0516   IRON 41   IRON SATURATION (TSAT) 16*   TIBC 264*   TRANSFERRIN 177*         Brief Urine Lab Results  (Last result in the past 365 days)        Color   Clarity   Blood   Leuk Est   Nitrite   Protein   CREAT   Urine HCG        05/09/23 1137 Yellow   Cloudy   Negative   Small (1+)   Negative   100 mg/dL (2+)                   Microbiology Results Abnormal       Procedure Component Value - Date/Time    COVID PRE-OP / PRE-PROCEDURE SCREENING ORDER (NO ISOLATION) - Swab, Nasopharynx [[id number]]  (Normal) Collected: 08/10/23 1130    Lab Status: Final result Specimen: Swab from Nasopharynx Updated: 08/10/23 1206    Narrative:      The following orders were created for panel order COVID PRE-OP / PRE-PROCEDURE SCREENING ORDER (NO ISOLATION) - Swab, Nasopharynx.  Procedure                               Abnormality         Status                     ---------                               -----------         ------                     COVID-19 and FLU A/B PCR...[[id number]]  Normal              Final result                 Please view results for these tests on the individual orders.    COVID-19 and FLU A/B PCR - Swab, Nasopharynx [[id number]]  (Normal) Collected: 08/10/23 1130    Lab Status: Final result Specimen: Swab from Nasopharynx Updated: 08/10/23 1206     COVID19 Not Detected     Influenza A PCR Not Detected     Influenza B PCR Not Detected    Narrative:      Fact sheet for providers: https://www.fda.gov/media/674635/download    Fact sheet for patients: https://www.fda.gov/media/614276/download    Test performed by PCR.            No radiology results from the last 24  hrs    Results for orders placed during the hospital encounter of 06/01/23    Adult Transthoracic Echo Complete W/ Cont if Necessary Per Protocol    Interpretation Summary    Left ventricular systolic function is normal. Calculated left ventricular EF = 61.4% Left ventricular ejection fraction appears to be 61 - 65%.    Left ventricular diastolic function was indeterminate.    Left atrial volume is severely increased.    Mild aortic valve stenosis is present.    Aortic valve maximum pressure gradient is 26 mmHg. Aortic valve mean pressure gradient is 15 mmHg.    Mild mitral valve stenosis is present. The mitral valve peak gradient is 12 mmHg. The mitral valve mean gradient is 5 mmHg.    Moderate tricuspid valve regurgitation is present.    Estimated right ventricular systolic pressure from tricuspid regurgitation is markedly elevated (>55 mmHg).    There is a small left pleural effusion.      Current medications:  Scheduled Meds:amLODIPine, 5 mg, Oral, Daily  apixaban, 2.5 mg, Oral, BID  bumetanide, 2 mg, Intravenous, Daily  hydrALAZINE, 25 mg, Oral, BID  pharmacy consult - MTM, , Does not apply, Daily  senna-docusate sodium, 2 tablet, Oral, BID  sodium chloride, 10 mL, Intravenous, Q12H      Continuous Infusions:   PRN Meds:.  acetaminophen **OR** acetaminophen **OR** acetaminophen    senna-docusate sodium **AND** polyethylene glycol **AND** bisacodyl **AND** bisacodyl    ondansetron **OR** ondansetron    sodium chloride    sodium chloride    sodium chloride    Assessment & Plan   Assessment & Plan     Active Hospital Problems    Diagnosis  POA    **Acute hypoxemic respiratory failure [J96.01]  Yes    Acute heart failure with preserved ejection fraction (HFpEF) [I50.31]  Yes    Chronic deep vein thrombosis (DVT) of both lower extremities [I82.503]  Yes    Chronic kidney disease, stage III (moderate)  [N18.30]  Yes    Essential hypertension [I10]  Yes      Resolved Hospital Problems   No resolved problems to  display.        Brief Hospital Course to date:  Rose J Kellermann is a 89 y.o. female with a past medical history of HTN, b/l DVT diagnosis in July 2023 on Eliquis and HFrEF presented to the ED complaining of increased shortness of air over the last few days. She has been admitted for acute on chronic HFpEF and cardiology consulted.     Acute on chronic HFpEF  Acute hypoxemic respiratory failure  - CTA chest shows edema/CHF  - patient currently on Eliquis for b/l DVT diagnosed outpatient in July 2023  - cont wean--goal should be 90-94%, above home requirement  - diuresis per cardiology  - ECHO done in June 2023- reviewed. Defer obtaining another ECHO to cardiology  - strict intake/output  - cardiac diet with fluid restriction     B/l DVT  -patient currently on Eliquis for b/l DVT diagnosed outpatient in July 2023     CKD Stage 3  - creatinine appears to be around baseline~ 1.5-1.7  - monitor closely with IV diuresis     HTN  - continue home Hydralazine 50mg PO BID and amlodipine dose decreased bc of leg swelling (much improved)    Recent zoster infection-- rash appeared about 2-3 weeks ago and lesions appear healed over. Not currently required to be in precautions        Expected Discharge Location and Transportation: home  Expected Discharge   Expected Discharge Date: 8/14/2023; Expected Discharge Time:      DVT prophylaxis:  Medical DVT prophylaxis orders are present.     AM-PAC 6 Clicks Score (PT): 17 (08/12/23 8634)    CODE STATUS:   Code Status and Medical Interventions:   Ordered at: 08/10/23 1327     Level Of Support Discussed With:    Patient     Code Status (Patient has no pulse and is not breathing):    CPR (Attempt to Resuscitate)     Medical Interventions (Patient has pulse or is breathing):    Full Support       Tomasa Vega MD  08/13/23

## 2023-08-14 PROCEDURE — 97530 THERAPEUTIC ACTIVITIES: CPT

## 2023-08-14 PROCEDURE — 99232 SBSQ HOSP IP/OBS MODERATE 35: CPT | Performed by: PEDIATRICS

## 2023-08-14 PROCEDURE — 99232 SBSQ HOSP IP/OBS MODERATE 35: CPT

## 2023-08-14 RX ADMIN — SENNOSIDES AND DOCUSATE SODIUM 2 TABLET: 50; 8.6 TABLET ORAL at 09:24

## 2023-08-14 RX ADMIN — SENNOSIDES AND DOCUSATE SODIUM 2 TABLET: 50; 8.6 TABLET ORAL at 21:23

## 2023-08-14 RX ADMIN — Medication 10 ML: at 09:25

## 2023-08-14 RX ADMIN — AMLODIPINE BESYLATE 5 MG: 5 TABLET ORAL at 09:23

## 2023-08-14 RX ADMIN — APIXABAN 2.5 MG: 2.5 TABLET, FILM COATED ORAL at 09:24

## 2023-08-14 RX ADMIN — Medication 10 ML: at 21:23

## 2023-08-14 RX ADMIN — APIXABAN 5 MG: 5 TABLET, FILM COATED ORAL at 21:23

## 2023-08-14 RX ADMIN — HYDRALAZINE HYDROCHLORIDE 25 MG: 25 TABLET, FILM COATED ORAL at 09:24

## 2023-08-14 RX ADMIN — HYDRALAZINE HYDROCHLORIDE 25 MG: 25 TABLET, FILM COATED ORAL at 21:23

## 2023-08-14 NOTE — PLAN OF CARE
Goal Outcome Evaluation:   VSS.weaned O2 as tolerated to 3L nasal cannula. No complaints of pain or SOB. SR/SA. Fluid restriction maintained as ordered. No further complaints at this time.

## 2023-08-14 NOTE — PROGRESS NOTES
[facility] Medicine Services  PROGRESS NOTE    Patient Name: Rose J Kellermann  : 1926  MRN: [id number]    Date of Admission: 8/10/2023  Primary Care Physician: Sam Hung MD    Subjective   Subjective     CC:  dyspnea    HPI:  No acute overnight events.  States her breathing seems to be doing a little better.  Is not on any home oxygen and would prefer to try to not go home on any oxygen.  Tolerating her diet.  Denies any pain.    ROS:  Gen- No fevers, chills  CV- No chest pain, palpitations  GI- No N/V/D, abd pain       Objective   Objective     Vital Signs:   Temp:  [98.2 øF (36.8 øC)-99 øF (37.2 øC)] 98.2 øF (36.8 øC)  Heart Rate:  [] 75  Resp:  [18] 18  BP: (126-132)/(77-88) 130/77  Flow (L/min):  [2-4] 3     Physical Exam:  Constitutional: No acute distress, awake, alert  HENT: NCAT, mucous membranes moist  Respiratory: resp effort fair. Bibasilar diminished sounds on 2L of NC Appears comfortable at rest  Cardiovascular: RRR, no murmurs, rubs, or gallops  Gastrointestinal: Positive bowel sounds, soft, nontender, nondistended  Musculoskeletal: Trace bilateral ankle edema with chronic venous stasis skin changes  Psychiatric: Appropriate affect, cooperative  Neurologic: Oriented x 3, no focal deficits, speech is clear  Skin: BLE venous stasis      Results Reviewed:  LAB RESULTS:      Lab 23  0516 08/10/23  1121   WBC 5.05 4.81   HEMOGLOBIN 11.3* 12.3   HEMATOCRIT 35.6 39.5   PLATELETS 161 169   NEUTROS ABS  --  3.14   IMMATURE GRANS (ABS)  --  0.01   LYMPHS ABS  --  0.94   MONOS ABS  --  0.52   EOS ABS  --  0.18   MCV 97.3* 98.3*   D DIMER QUANT  --  1.22*         Lab 23  0521 23  0405 23  0516 08/10/23  1121   SODIUM 143 141 144 142   POTASSIUM 4.1 3.9 3.9 4.2   CHLORIDE 103 100 101 104   CO2 26.0 31.0* 32.0* 26.0   ANION GAP 14.0 10.0 11.0 12.0   BUN 37* 29* 24* 21   CREATININE 2.22* 1.72* 1.84* 1.79*   EGFR 19.8* 26.9* 24.9*  25.7*   GLUCOSE 87 85 79 130*   CALCIUM 8.7 9.0 9.1 10.0*   MAGNESIUM  --   --  1.8  --          Lab 08/11/23  0516 08/10/23  1121   TOTAL PROTEIN 5.8* 7.1   ALBUMIN 3.3* 3.8   GLOBULIN 2.5 3.3   ALT (SGPT) 7 8   AST (SGOT) 12 15   BILIRUBIN 0.5 0.5   ALK PHOS 63 75         Lab 08/10/23  1121   PROBNP 2,658.0*   HSTROP T 39*         Lab 08/11/23  0516   CHOLESTEROL 169   LDL CHOL 94   HDL CHOL 61*   TRIGLYCERIDES 73         Lab 08/11/23  0516   IRON 41   IRON SATURATION (TSAT) 16*   TIBC 264*   TRANSFERRIN 177*         Brief Urine Lab Results  (Last result in the past 365 days)        Color   Clarity   Blood   Leuk Est   Nitrite   Protein   CREAT   Urine HCG        05/09/23 1137 Yellow   Cloudy   Negative   Small (1+)   Negative   100 mg/dL (2+)                   Microbiology Results Abnormal       Procedure Component Value - Date/Time    COVID PRE-OP / PRE-PROCEDURE SCREENING ORDER (NO ISOLATION) - Swab, Nasopharynx [[id number]]  (Normal) Collected: 08/10/23 1130    Lab Status: Final result Specimen: Swab from Nasopharynx Updated: 08/10/23 1206    Narrative:      The following orders were created for panel order COVID PRE-OP / PRE-PROCEDURE SCREENING ORDER (NO ISOLATION) - Swab, Nasopharynx.  Procedure                               Abnormality         Status                     ---------                               -----------         ------                     COVID-19 and FLU A/B PCR...[[id number]]  Normal              Final result                 Please view results for these tests on the individual orders.    COVID-19 and FLU A/B PCR - Swab, Nasopharynx [[id number]]  (Normal) Collected: 08/10/23 1130    Lab Status: Final result Specimen: Swab from Nasopharynx Updated: 08/10/23 1206     COVID19 Not Detected     Influenza A PCR Not Detected     Influenza B PCR Not Detected    Narrative:      Fact sheet for providers: https://www.fda.gov/media/980345/download    Fact sheet for patients:  https://www.fda.gov/media/912638/download    Test performed by PCR.            No radiology results from the last 24 hrs    Results for orders placed during the hospital encounter of 06/01/23    Adult Transthoracic Echo Complete W/ Cont if Necessary Per Protocol    Interpretation Summary    Left ventricular systolic function is normal. Calculated left ventricular EF = 61.4% Left ventricular ejection fraction appears to be 61 - 65%.    Left ventricular diastolic function was indeterminate.    Left atrial volume is severely increased.    Mild aortic valve stenosis is present.    Aortic valve maximum pressure gradient is 26 mmHg. Aortic valve mean pressure gradient is 15 mmHg.    Mild mitral valve stenosis is present. The mitral valve peak gradient is 12 mmHg. The mitral valve mean gradient is 5 mmHg.    Moderate tricuspid valve regurgitation is present.    Estimated right ventricular systolic pressure from tricuspid regurgitation is markedly elevated (>55 mmHg).    There is a small left pleural effusion.      Current medications:  Scheduled Meds:amLODIPine, 5 mg, Oral, Daily  apixaban, 5 mg, Oral, BID  hydrALAZINE, 25 mg, Oral, BID  pharmacy consult - MTM, , Does not apply, Daily  senna-docusate sodium, 2 tablet, Oral, BID  sodium chloride, 10 mL, Intravenous, Q12H      Continuous Infusions:   PRN Meds:.  acetaminophen **OR** acetaminophen **OR** acetaminophen    senna-docusate sodium **AND** polyethylene glycol **AND** bisacodyl **AND** bisacodyl    ondansetron **OR** ondansetron    sodium chloride    sodium chloride    sodium chloride    Assessment & Plan   Assessment & Plan     Active Hospital Problems    Diagnosis  POA    **Acute hypoxemic respiratory failure [J96.01]  Yes    Acute heart failure with preserved ejection fraction (HFpEF) [I50.31]  Yes    Chronic deep vein thrombosis (DVT) of both lower extremities [I82.503]  Yes    Chronic kidney disease, stage III (moderate)  [N18.30]  Yes    Essential hypertension  [I10]  Yes      Resolved Hospital Problems   No resolved problems to display.        Brief Hospital Course to date:  Rose J Kellermann is a 89 y.o. female with a past medical history of HTN, b/l DVT diagnosis in July 2023 on Eliquis and HFrEF presented to the ED complaining of increased shortness of air over the last few days. She has been admitted for acute on chronic HFpEF and cardiology consulted.     Acute on chronic HFpEF  Acute hypoxemic respiratory failure  - CTA chest shows edema/CHF  - patient currently on Eliquis for b/l DVT diagnosed outpatient in July 2023  - cont wean--goal should be 90-94%, above home requirement  - holding diuresis due to inc in Cr.    - ECHO done in June 2023- reviewed.   - strict intake/output  - cardiac diet with fluid restriction     B/l DVT  -patient currently on Eliquis for b/l DVT diagnosed outpatient in July 2023.  Cont tx dose eliquis     CKD Stage 3--slightly inc today  - creatinine appears to be around baseline~ 1.5-1.7  - holding diuresis today     HTN  - continue home Hydralazine 50mg PO BID and amlodipine dose decreased bc of leg swelling (much improved)    Recent zoster infection-- rash appeared about 2-3 weeks ago and lesions appear healed over. Not currently required to be in precautions        Expected Discharge Location and Transportation: home  Expected Discharge   Expected Discharge Date: 8/14/2023; Expected Discharge Time:      DVT prophylaxis:  Medical DVT prophylaxis orders are present.     AM-PAC 6 Clicks Score (PT): 16 (08/14/23 4331)    CODE STATUS:   Code Status and Medical Interventions:   Ordered at: 08/10/23 1327     Level Of Support Discussed With:    Patient     Code Status (Patient has no pulse and is not breathing):    CPR (Attempt to Resuscitate)     Medical Interventions (Patient has pulse or is breathing):    Full Support       Tomasa Vega MD  08/14/23

## 2023-08-14 NOTE — THERAPY TREATMENT NOTE
Patient Name: Rose J Kellermann  : 1926    MRN: [id number]                              Today's Date: 2023       Admit Date: 8/10/2023    Visit Dx:     ICD-10-CM ICD-9-CM   1. Acute on chronic congestive heart failure, unspecified heart failure type  I50.9 428.0   2. Acute respiratory failure with hypoxia  J96.01 518.81   3. Chronic kidney disease, unspecified CKD stage  N18.9 585.9     Patient Active Problem List   Diagnosis    Essential hypertension    Vitamin D deficiency    Fatigue    Chronic kidney disease, stage III (moderate)     Osteoarthritis    Dyslipidemia    Post-menopausal bleeding    Endometrial cancer    Closed fracture of left hip    Asymptomatic bacteriuria    Post-traumatic osteoarthritis of left hip    Status post total replacement of left hip    Prediabetes    Acute blood loss anemia, asymptomatic    Postoperative urinary retention    Myopia    Posterior crocodile shagreen of cornea    Presbyopia    Ptosis of eyelid    Regular astigmatism    Retinal neovascularization    After cataract    Secondary cataract    Premature atrial contractions    Heart murmur    Nocturnal hypoxia    Acute hypoxemic respiratory failure    Acute heart failure with preserved ejection fraction (HFpEF)    Chronic deep vein thrombosis (DVT) of both lower extremities     Past Medical History:   Diagnosis Date    Abscess of back     Arrhythmia     frequent PVC    Cancer 2011    Endometrial cancer, status post robotically assisted hysterectomy with Dr. Haddad, 2011.      CKD (chronic kidney disease), stage III     no dilaysis     Dizzy     Dvt femoral (deep venous thrombosis) 2017    s/p hip surgery in . Took xarelto until 2018    Dyslipidemia     Dyslipidemia.  Observing.    Enthesopathy of hip region     Generalized osteoarthrosis, involving multiple sites     Headache     Hearing loss     Hypertension     Low backache     Needs flu shot     Osteoarthritis     Osteoarthritis with  questionable carpal tunnel syndrome bilaterally.     Otitis, serous     Pneumonia 1967    Remote pneumonia, 1967.     Retinal hemorrhage     SI joint arthritis     SOB (shortness of breath)     Syncope 2001    Remote syncope with working diagnosis of vasodepressor, 2001.     Venous insufficiency of leg     Wears glasses     readers     Past Surgical History:   Procedure Laterality Date    CATARACT EXTRACTION      bilat     COLONOSCOPY      HIP PERCUTANEOUS PINNING Left 11/24/2017    Procedure: HIP PERCUTANEOUS PINNING;  Surgeon: Lucas Swanson MD;  Location: [facility] OR;  Service:     HYSTERECTOMY      total? but unsure     KNEE SURGERY  2001    Remote knee surgery in 2001.- right     TOTAL HIP ARTHROPLASTY Left 10/26/2018    Procedure: TOTAL HIP ARTHROPLASTY LEFT;  Surgeon: Stephen Baker MD;  Location: [facility] OR;  Service: Orthopedics      General Information       Row Name 08/14/23 1317          Physical Therapy Time and Intention    Document Type therapy note (daily note)  -ML     Mode of Treatment physical therapy  -       Row Name 08/14/23 1317          General Information    Patient Profile Reviewed yes  -ML     Existing Precautions/Restrictions fall;oxygen therapy device and L/min;other (see comments)  DVT BLE (Eliquis 7/'23)  -ML     Barriers to Rehab medically complex;previous functional deficit;hearing deficit  -ML       Row Name 08/14/23 1317          Cognition    Orientation Status (Cognition) oriented x 3  -ML       Row Name 08/14/23 1317          Safety Issues, Functional Mobility    Safety Issues Affecting Function (Mobility) awareness of need for assistance;insight into deficits/self-awareness;positioning of assistive device;safety precaution awareness;safety precautions follow-through/compliance;sequencing abilities  -ML     Impairments Affecting Function (Mobility) endurance/activity tolerance;shortness of breath;strength;balance  -ML               User Key  (r) = Recorded By, (t) =  Taken By, (c) = Cosigned By      Initials Name Provider Type    Priscila Plaza Physical Therapist                   Mobility       Row Name 08/14/23 1318          Bed Mobility    Bed Mobility supine-sit  -ML     Supine-Sit [place] (Bed Mobility) verbal cues;contact guard  -ML     Assistive Device (Bed Mobility) bed rails;head of bed elevated  -ML     Comment, (Bed Mobility) additional time and effort required to complete supine to sit transfer  -ML       Row Name 08/14/23 1318          Sit-Stand Transfer    Sit-Stand [place] (Transfers) verbal cues;nonverbal cues (demo/gesture);moderate assist (50% patient effort);1 person assist  -ML     Assistive Device (Sit-Stand Transfers) walker, front-wheeled  -ML     Comment, (Sit-Stand Transfer) Patient completed 1 STS transfer from EOB with modA and 1 STS transfer from chair with Xiomara. Verbal cues for correct hand placement and forward weight shift  -ML       Row Name 08/14/23 1318          Gait/Stairs (Locomotion)    [place] Level (Gait) verbal cues;minimum assist (75% patient effort)  -ML     Assistive Device (Gait) walker, front-wheeled  -ML     Distance in Feet (Gait) 5 + 12  -ML     Deviations/Abnormal Patterns (Gait) bilateral deviations;mike decreased;gait speed decreased;stride length decreased  -ML     Bilateral Gait Deviations forward flexed posture;heel strike decreased  -ML     Comment, (Gait/Stairs) Patient required verbal cues to step into the RW and for forward weight shift  -ML               User Key  (r) = Recorded By, (t) = Taken By, (c) = Cosigned By      Initials Name Provider Type    Priscila Plaza Physical Therapist                   Obj/Interventions       Row Name 08/14/23 1322          Motor Skills    Therapeutic Exercise hip;knee;ankle  -ML       Row Name 08/14/23 1322          Hip (Therapeutic Exercise)    Hip (Therapeutic Exercise) strengthening exercise  -ML     Hip Strengthening (Therapeutic Exercise) bilateral;marching  while seated;10 repetitions  -ML       Row Name 08/14/23 1322          Knee (Therapeutic Exercise)    Knee (Therapeutic Exercise) strengthening exercise  -ML     Knee Strengthening (Therapeutic Exercise) bilateral;LAQ (long arc quad);10 repetitions  -ML       Row Name 08/14/23 1322          Ankle (Therapeutic Exercise)    Ankle (Therapeutic Exercise) AROM (active range of motion)  -ML     Ankle AROM (Therapeutic Exercise) bilateral;dorsiflexion;plantarflexion;10 repetitions  -ML       Row Name 08/14/23 1322          Balance    Balance Assessment sitting static balance;sitting dynamic balance;sit to stand dynamic balance;standing dynamic balance;standing static balance  -ML     Static Sitting Balance standby assist  -ML     Dynamic Sitting Balance contact guard  -ML     Position, Sitting Balance unsupported;sitting edge of bed  -ML     Sit to Stand Dynamic Balance verbal cues;non-verbal cues (demo/gesture);moderate assist;1-person assist  -ML     Static Standing Balance minimal assist  -ML     Dynamic Standing Balance minimal assist  -ML     Position/Device Used, Standing Balance supported;walker, front-wheeled  -ML     Balance Interventions sitting;standing;sit to stand;supported;occupation based/functional task  -ML     Comment, Balance Patient initially with posterior lean during sit to stand transfrer and with static standing balance, verbal and tactile cues to correct  -ML               User Key  (r) = Recorded By, (t) = Taken By, (c) = Cosigned By      Initials Name Provider Type    ML Priscila Grover Physical Therapist                   Goals/Plan    No documentation.                  Clinical Impression       Row Name 08/14/23 1324          Pain    Pretreatment Pain Rating 0/10 - no pain  -ML     Posttreatment Pain Rating 0/10 - no pain  -ML       Row Name 08/14/23 1324          Plan of Care Review    Plan of Care Reviewed With patient;daughter  -ML     Progress improving  -ML     Outcome Evaluation Physical  therapy treatment complete. The patient required increased assistance to complete sit to stand transfer and limited in ambulation distance by decreased activity tolerance and dyspnea. The patient initially on 2 LPM at rest, however, required up titration to 4 LPM to maintain oxygen saturation >90% during transfers and ambulation. The patient continues to present below baseline for mobility. At hospital discharge recommend home with 24/7 assist and HHPT, patient's daughter declines SNF.  -ML       Row Name 08/14/23 1324          Vital Signs    Pre SpO2 (%) 92  -ML     O2 Delivery Pre Treatment nasal cannula  -ML     Intra SpO2 (%) 86  oxygen desaturaiton to 86% on 2 LPM and desaturation to 88% on 3 LPM with ambulation/mobility  -ML     O2 Delivery Intra Treatment nasal cannula  -ML     Post SpO2 (%) 93  -ML     O2 Delivery Post Treatment nasal cannula  -ML     Pre Patient Position Supine  -ML     Intra Patient Position Standing  -ML     Post Patient Position Sitting  -ML       Row Name 08/14/23 1324          Positioning and Restraints    Pre-Treatment Position in bed  -ML     Post Treatment Position chair  -ML     In Chair notified nsg;call light within reach;encouraged to call for assist;exit alarm on;with family/caregiver;waffle cushion;legs elevated  -ML               User Key  (r) = Recorded By, (t) = Taken By, (c) = Cosigned By      Initials Name Provider Type    Priscila Plaza Physical Therapist                   Outcome Measures       Row Name 08/14/23 8292          How much help from another person do you currently need...    Turning from your back to your side while in flat bed without using bedrails? 3  -ML     Moving from lying on back to sitting on the side of a flat bed without bedrails? 3  -ML     Moving to and from a bed to a chair (including a wheelchair)? 3  -ML     Standing up from a chair using your arms (e.g., wheelchair, bedside chair)? 2  -ML     Climbing 3-5 steps with a railing? 2  -ML     To  walk in hospital room? 3  -ML     AM-PAC 6 Clicks Score (PT) 16  -ML     Highest level of mobility 5 --> Static standing  -ML       Row Name 08/14/23 [facility]          Functional Assessment    Outcome Measure Options AM-PAC 6 Clicks Basic Mobility (PT)  -ML               User Key  (r) = Recorded By, (t) = Taken By, (c) = Cosigned By      Initials Name Provider Type     Priscila Grover Physical Therapist                                 Physical Therapy Education       Title: PT OT SLP Therapies (In Progress)       Topic: Physical Therapy (In Progress)       Point: Mobility training (Done)       Learning Progress Summary             Patient Acceptance, E, VU,NR by  at 8/14/2023 1357    Acceptance, E,D,H, NR by DM at 8/12/2023 1737   Family Acceptance, E, VU,NR by  at 8/14/2023 1357                         Point: Home exercise program (In Progress)       Learning Progress Summary             Patient Acceptance, E,D,H, NR by DM at 8/12/2023 1737                         Point: Body mechanics (In Progress)       Learning Progress Summary             Patient Acceptance, E,D,H, NR by DM at 8/12/2023 1737                         Point: Precautions (Done)       Learning Progress Summary             Patient Acceptance, E, VU,NR by  at 8/14/2023 1357    Acceptance, E,D,H, NR by  at 8/12/2023 1737   Family Acceptance, E, VU,NR by  at 8/14/2023 1357                                         User Key       Initials Effective Dates Name Provider Type Discipline     02/03/23 -  Lynne Davison, PT Physical Therapist PT     04/22/21 -  Priscila Grover Physical Therapist PT                  PT Recommendation and Plan     Plan of Care Reviewed With: patient, daughter  Progress: improving  Outcome Evaluation: Physical therapy treatment complete. The patient required increased assistance to complete sit to stand transfer and limited in ambulation distance by decreased activity tolerance and dyspnea. The patient initially on 2 LPM at  rest, however, required up titration to 4 LPM to maintain oxygen saturation >90% during transfers and ambulation. The patient continues to present below baseline for mobility. At hospital discharge recommend home with 24/7 assist and HHPT, patient's daughter declines SNF.     Time Calculation:         PT Charges       Row Name 08/14/23 1358             Time Calculation    Start Time 1028  -ML      PT Received On 08/14/23  -ML         Timed Charges    77712 - PT Therapeutic Activity Minutes 40  -ML         Total Minutes    Timed Charges Total Minutes 40  -ML       Total Minutes 40  -ML                User Key  (r) = Recorded By, (t) = Taken By, (c) = Cosigned By      Initials Name Provider Type    Priscila Plaza Physical Therapist                  Therapy Charges for Today       Code Description Service Date Service Provider Modifiers Qty    [id number] HC PT THERAPEUTIC ACT EA 15 MIN 8/14/2023 Priscila Grover GP 3            PT G-Codes  Outcome Measure Options: AM-PAC 6 Clicks Basic Mobility (PT)  AM-PAC 6 Clicks Score (PT): 16  AM-PAC 6 Clicks Score (OT): 16  PT Discharge Summary  Anticipated Discharge Disposition (PT): home with 24/7 care, home with home health    Priscila Grover  8/14/2023

## 2023-08-14 NOTE — PLAN OF CARE
Goal Outcome Evaluation:  Plan of Care Reviewed With: patient, caregiver, daughter           Outcome Evaluation: VSS, Oxygen sats 92% on 2LNC. Non-telemetry ordered. Ambulated with PT this shift. Will Continue to monitor.

## 2023-08-14 NOTE — PROGRESS NOTES
"  [facility] at [facility]  PROGRESS NOTE    Date of Admission: 8/10/2023  Date of Service: 08/14/23    Primary Care Physician: Sam Hung MD    Chief Complaint: HFpEF        Subjective      HPI: Patient reports shortness of breath continues to improve and is barely bothering her currently. Still on 3 L/min O2 via NC however. Cr bumped to 2.2 this AM- holding diuretic      Objective   Vitals: /77 (BP Location: Right arm, Patient Position: Lying)   Pulse 75   Temp 98.2 øF (36.8 øC) (Oral)   Resp 18   Ht 162.6 cm (64\")   Wt 80 kg (176 lb 6.4 oz)   SpO2 95%   BMI 30.28 kg/mý     Physical Exam:   GENERAL: Alert, cooperative, in no acute distress.   HEART: Regular rate and rhythm with frequent ectopy; 2/6 systolic murmur  LUNGS: Clear to auscultation bilaterally. No wheezing, rales or rhonchi. Nonlabored breathing  ABDOMEN: Soft, nontender   NEUROLOGIC: No gross focal abnormalities  EXTREMITIES: No obvious deformities, cyanosis, or edema noted.   PSYCH: normal mood, behavior    Results:  Results from last 7 days   Lab Units 08/11/23  0516 08/10/23  1121   WBC 10*3/mm3 5.05 4.81   HEMOGLOBIN g/dL 11.3* 12.3   HEMATOCRIT % 35.6 39.5   PLATELETS 10*3/mm3 161 169     Results from last 7 days   Lab Units 08/13/23  0521 08/12/23  0405 08/11/23  0516   SODIUM mmol/L 143 141 144   POTASSIUM mmol/L 4.1 3.9 3.9   CHLORIDE mmol/L 103 100 101   CO2 mmol/L 26.0 31.0* 32.0*   BUN mg/dL 37* 29* 24*   CREATININE mg/dL 2.22* 1.72* 1.84*   GLUCOSE mg/dL 87 85 79      Lab Results   Component Value Date    CHOL 169 08/11/2023    TRIG 73 08/11/2023    HDL 61 (H) 08/11/2023    LDL 94 08/11/2023    AST 12 08/11/2023    ALT 7 08/11/2023         Results from last 7 days   Lab Units 08/11/23  0516   CHOLESTEROL mg/dL 169   TRIGLYCERIDES mg/dL 73   HDL CHOL mg/dL 61*   LDL CHOL mg/dL 94                 Results from last 7 days   Lab Units 08/10/23  1121   HSTROP T ng/L 39*     Results from last " 7 days   Lab Units 08/10/23  1121   PROBNP pg/mL 2,658.0*         Intake/Output Summary (Last 24 hours) at 8/14/2023 0759  Last data filed at 8/13/2023 2050  Gross per 24 hour   Intake 870 ml   Output --   Net 870 ml       I personally reviewed the patient's EKG/Telemetry data    Radiology Data:   CTA chest 8/10/2023:  Impression:  1.No evidence of pulmonary embolism.  2.CHF features.    Current Medications:  amLODIPine, 5 mg, Oral, Daily  apixaban, 2.5 mg, Oral, BID  bumetanide, 2 mg, Intravenous, Daily  hydrALAZINE, 25 mg, Oral, BID  pharmacy consult - MTM, , Does not apply, Daily  senna-docusate sodium, 2 tablet, Oral, BID  sodium chloride, 10 mL, Intravenous, Q12H           Assessment  Acute on chronic HFpEF/VHD  Echo 6/1/2023: EF 61%, LA volume severely increased, mild AS, mild MS, moderate TR, RVSP >55  Recent diagnosis bilateral DVTs 7/2023  On apixaban 2.5 mg bid (age, renal insufficiency)  ANGEL LUIS on CKD  Baseline cr ~1.6-1.8  Hypertension  Frequent PACs    Plan  We will hold diuretics today with BUN 37 and creatinine 2.2.  Continue Eliquis with bilateral DVTs at renal dosage 2.5 mg bid.  BP controlled. Continue amlodipine and hydralazine.   Patient with increased creatinine to 2.2, continued bibasilar rales and 3 L/min O2 demand. Would prefer to keep in hospital for 1-2 more days    Electronically signed by Zain Talamantes PA-C, 08/14/23, 9:08 AM EDT.

## 2023-08-14 NOTE — PLAN OF CARE
Goal Outcome Evaluation:  Plan of Care Reviewed With: patient, daughter        Progress: improving  Outcome Evaluation: Physical therapy treatment complete. The patient required increased assistance to complete sit to stand transfer and limited in ambulation distance by decreased activity tolerance and dyspnea. The patient initially on 2 LPM at rest, however, required up titration to 4 LPM to maintain oxygen saturation >90% during transfers and ambulation. The patient continues to present below baseline for mobility. At hospital discharge recommend home with 24/7 assist and HHPT, patient's daughter declines SNF.      Anticipated Discharge Disposition (PT): home with 24/7 care, home with home health

## 2023-08-14 NOTE — CASE MANAGEMENT/SOCIAL WORK
Continued Stay Note  [facility]     Patient Name: Rose J Kellermann  MRN: [id number]  Today's Date: 8/14/2023    Admit Date: 8/10/2023    Plan: discharge plan   Discharge Plan       Row Name 08/14/23 1722       Plan    Plan discharge plan    Plan Comments Per provider note, pt not medically ready for discharge and renal function is being monitored. Cards is following. Pt remains on 2 L O2 and does not wear home O2. CM will cont to follow for discharge needs. Pt may need home O2 arranged at discharge and .    Final Discharge Disposition Code 06 - home with home health care                   Discharge Codes    No documentation.                 Expected Discharge Date and Time       Expected Discharge Date Expected Discharge Time    Aug 15, 2023               Mary Borja RN

## 2023-08-15 LAB
ANION GAP SERPL CALCULATED.3IONS-SCNC: 9 MMOL/L (ref 5–15)
BUN SERPL-MCNC: 36 MG/DL (ref 8–23)
BUN/CREAT SERPL: 24.7 (ref 7–25)
CALCIUM SPEC-SCNC: 8.8 MG/DL (ref 8.2–9.6)
CHLORIDE SERPL-SCNC: 99 MMOL/L (ref 98–107)
CO2 SERPL-SCNC: 29 MMOL/L (ref 22–29)
CREAT SERPL-MCNC: 1.46 MG/DL (ref 0.57–1)
EGFRCR SERPLBLD CKD-EPI 2021: 32.8 ML/MIN/1.73
GLUCOSE SERPL-MCNC: 88 MG/DL (ref 65–99)
POTASSIUM SERPL-SCNC: 4.1 MMOL/L (ref 3.5–5.2)
SODIUM SERPL-SCNC: 137 MMOL/L (ref 136–145)

## 2023-08-15 PROCEDURE — 99232 SBSQ HOSP IP/OBS MODERATE 35: CPT | Performed by: PEDIATRICS

## 2023-08-15 PROCEDURE — 80048 BASIC METABOLIC PNL TOTAL CA: CPT

## 2023-08-15 PROCEDURE — 97530 THERAPEUTIC ACTIVITIES: CPT

## 2023-08-15 PROCEDURE — 97110 THERAPEUTIC EXERCISES: CPT

## 2023-08-15 PROCEDURE — 99232 SBSQ HOSP IP/OBS MODERATE 35: CPT | Performed by: INTERNAL MEDICINE

## 2023-08-15 RX ORDER — BUMETANIDE 0.25 MG/ML
1 INJECTION INTRAMUSCULAR; INTRAVENOUS EVERY 12 HOURS
Status: COMPLETED | OUTPATIENT
Start: 2023-08-15 | End: 2023-08-15

## 2023-08-15 RX ADMIN — AMLODIPINE BESYLATE 5 MG: 5 TABLET ORAL at 08:32

## 2023-08-15 RX ADMIN — SENNOSIDES AND DOCUSATE SODIUM 2 TABLET: 50; 8.6 TABLET ORAL at 22:04

## 2023-08-15 RX ADMIN — Medication 10 ML: at 22:04

## 2023-08-15 RX ADMIN — APIXABAN 5 MG: 5 TABLET, FILM COATED ORAL at 08:33

## 2023-08-15 RX ADMIN — HYDRALAZINE HYDROCHLORIDE 25 MG: 25 TABLET, FILM COATED ORAL at 22:04

## 2023-08-15 RX ADMIN — Medication 10 ML: at 08:33

## 2023-08-15 RX ADMIN — HYDRALAZINE HYDROCHLORIDE 25 MG: 25 TABLET, FILM COATED ORAL at 08:32

## 2023-08-15 RX ADMIN — BUMETANIDE 1 MG: 0.25 INJECTION, SOLUTION INTRAMUSCULAR; INTRAVENOUS at 22:04

## 2023-08-15 RX ADMIN — BUMETANIDE 1 MG: 0.25 INJECTION, SOLUTION INTRAMUSCULAR; INTRAVENOUS at 10:06

## 2023-08-15 RX ADMIN — SENNOSIDES AND DOCUSATE SODIUM 2 TABLET: 50; 8.6 TABLET ORAL at 08:33

## 2023-08-15 RX ADMIN — APIXABAN 5 MG: 5 TABLET, FILM COATED ORAL at 22:04

## 2023-08-15 NOTE — PLAN OF CARE
Goal Outcome Evaluation:  Plan of Care Reviewed With: patient, daughter        Progress: improving  Outcome Evaluation: Physical therapy treatment complete. The patient increased ambulation distance compared to previous treatment session, patient required less assistance with transfer.s Patient remained on 4 LPM during ambulation with oxygen saturation >90%. The patient continues to present below baseline for mobility. Continue current PT POC.      Anticipated Discharge Disposition (PT): home with 24/7 care, home with home health

## 2023-08-15 NOTE — PLAN OF CARE
Goal Outcome Evaluation:   VSS. Had to increase O2 back to 3L while asleep due to patient consistently desatting while asleep. Will continue to wean as tolerated. Fluid restriction maintained. No further complaints at this time.

## 2023-08-15 NOTE — PROGRESS NOTES
"  [facility] at [facility]  PROGRESS NOTE    Date of Admission: 8/10/2023  Date of Service: 08/15/23    Primary Care Physician: Sam Hung MD    Chief Complaint: Acute on Chronic HFpEF        Subjective      HPI: Patient reports mild shortness of breath. Denies pain, palpitations or lightheadedness      Objective   Vitals: /74 (BP Location: Right arm, Patient Position: Lying)   Pulse 82   Temp 98.2 øF (36.8 øC) (Oral)   Resp 18   Ht 162.6 cm (64\")   Wt 80 kg (176 lb 6.4 oz)   SpO2 92%   BMI 30.28 kg/mý     Physical Exam:   GENERAL: Alert, cooperative, in no acute distress.   HEART: Regular rate and rhythm;2/6 systolic murmur  LUNGS: Bibasilar rales. Nonlabored breathing on 3 L/min  ABDOMEN: Soft, nontender   NEUROLOGIC: No gross focal abnormalities  EXTREMITIES: trace nonpitting pedal edema, chronic pretibial discoloration and scaling of skin   PSYCH: normal mood, behavior    Results:  Results from last 7 days   Lab Units 08/11/23  0516 08/10/23  1121   WBC 10*3/mm3 5.05 4.81   HEMOGLOBIN g/dL 11.3* 12.3   HEMATOCRIT % 35.6 39.5   PLATELETS 10*3/mm3 161 169     Results from last 7 days   Lab Units 08/13/23  0521 08/12/23  0405 08/11/23  0516   SODIUM mmol/L 143 141 144   POTASSIUM mmol/L 4.1 3.9 3.9   CHLORIDE mmol/L 103 100 101   CO2 mmol/L 26.0 31.0* 32.0*   BUN mg/dL 37* 29* 24*   CREATININE mg/dL 2.22* 1.72* 1.84*   GLUCOSE mg/dL 87 85 79      Lab Results   Component Value Date    CHOL 169 08/11/2023    TRIG 73 08/11/2023    HDL 61 (H) 08/11/2023    LDL 94 08/11/2023    AST 12 08/11/2023    ALT 7 08/11/2023         Results from last 7 days   Lab Units 08/11/23  0516   CHOLESTEROL mg/dL 169   TRIGLYCERIDES mg/dL 73   HDL CHOL mg/dL 61*   LDL CHOL mg/dL 94                 Results from last 7 days   Lab Units 08/10/23  1121   HSTROP T ng/L 39*     Results from last 7 days   Lab Units 08/10/23  1121   PROBNP pg/mL 2,658.0*         Intake/Output Summary (Last 24 " hours) at 8/15/2023 0712  Last data filed at 8/14/2023 2042  Gross per 24 hour   Intake --   Output 400 ml   Net -400 ml       I personally reviewed the patient's EKG/Telemetry data    Radiology Data: no new data    Current Medications:  amLODIPine, 5 mg, Oral, Daily  apixaban, 5 mg, Oral, BID  hydrALAZINE, 25 mg, Oral, BID  pharmacy consult - MTM, , Does not apply, Daily  senna-docusate sodium, 2 tablet, Oral, BID  sodium chloride, 10 mL, Intravenous, Q12H           Assessment  Acute on chronic HFpEF/VHD  Echo 6/1/2023: EF 61%, LA volume severely increased, mild AS, mild MS, moderate TR, RVSP >55  Recent diagnosis bilateral DVTs 7/2023  On apixaban 2.5 mg bid (age, renal insufficiency)  ANGEL LUIS on CKD  Baseline cr ~1.6-1.8  Hypertension  Frequent PACs     Plan  Renal indices with plateau  Continue Eliquis with bilateral DVTs at renal dosage 2.5 mg bid.  BP controlled. Continue amlodipine and hydralazine.   Home with O2    Electronically signed by Zain Talamantes PA-C, 08/15/23, 7:36 AM EDT.

## 2023-08-15 NOTE — THERAPY TREATMENT NOTE
Patient Name: Rose J Kellermann  : 1926    MRN: [id number]                              Today's Date: 8/15/2023       Admit Date: 8/10/2023    Visit Dx:     ICD-10-CM ICD-9-CM   1. Acute on chronic congestive heart failure, unspecified heart failure type  I50.9 428.0   2. Acute respiratory failure with hypoxia  J96.01 518.81   3. Chronic kidney disease, unspecified CKD stage  N18.9 585.9     Patient Active Problem List   Diagnosis    Essential hypertension    Vitamin D deficiency    Fatigue    Chronic kidney disease, stage III (moderate)     Osteoarthritis    Dyslipidemia    Post-menopausal bleeding    Endometrial cancer    Closed fracture of left hip    Asymptomatic bacteriuria    Post-traumatic osteoarthritis of left hip    Status post total replacement of left hip    Prediabetes    Acute blood loss anemia, asymptomatic    Postoperative urinary retention    Myopia    Posterior crocodile shagreen of cornea    Presbyopia    Ptosis of eyelid    Regular astigmatism    Retinal neovascularization    After cataract    Secondary cataract    Premature atrial contractions    Heart murmur    Nocturnal hypoxia    Acute hypoxemic respiratory failure    Acute heart failure with preserved ejection fraction (HFpEF)    Chronic deep vein thrombosis (DVT) of both lower extremities     Past Medical History:   Diagnosis Date    Abscess of back     Arrhythmia     frequent PVC    Cancer 2011    Endometrial cancer, status post robotically assisted hysterectomy with Dr. Haddad, 2011.      CKD (chronic kidney disease), stage III     no dilaysis     Dizzy     Dvt femoral (deep venous thrombosis) 2017    s/p hip surgery in . Took xarelto until 2018    Dyslipidemia     Dyslipidemia.  Observing.    Enthesopathy of hip region     Generalized osteoarthrosis, involving multiple sites     Headache     Hearing loss     Hypertension     Low backache     Needs flu shot     Osteoarthritis     Osteoarthritis with  questionable carpal tunnel syndrome bilaterally.     Otitis, serous     Pneumonia 1967    Remote pneumonia, 1967.     Retinal hemorrhage     SI joint arthritis     SOB (shortness of breath)     Syncope 2001    Remote syncope with working diagnosis of vasodepressor, 2001.     Venous insufficiency of leg     Wears glasses     readers     Past Surgical History:   Procedure Laterality Date    CATARACT EXTRACTION      bilat     COLONOSCOPY      HIP PERCUTANEOUS PINNING Left 11/24/2017    Procedure: HIP PERCUTANEOUS PINNING;  Surgeon: Lucas Swanson MD;  Location: [facility] OR;  Service:     HYSTERECTOMY      total? but unsure     KNEE SURGERY  2001    Remote knee surgery in 2001.- right     TOTAL HIP ARTHROPLASTY Left 10/26/2018    Procedure: TOTAL HIP ARTHROPLASTY LEFT;  Surgeon: Stephen Baker MD;  Location: [facility] OR;  Service: Orthopedics      General Information       Row Name 08/15/23 1313          Physical Therapy Time and Intention    Document Type therapy note (daily note)  -ML     Mode of Treatment physical therapy  -       Row Name 08/15/23 1313          General Information    Patient Profile Reviewed yes  -ML     Existing Precautions/Restrictions fall;oxygen therapy device and L/min;other (see comments)  DVT BLE (Eliquis 7/'23)  -ML     Barriers to Rehab medically complex;previous functional deficit;hearing deficit  -ML       Row Name 08/15/23 1313          Cognition    Orientation Status (Cognition) oriented x 3  -ML       Row Name 08/15/23 1313          Safety Issues, Functional Mobility    Safety Issues Affecting Function (Mobility) awareness of need for assistance;insight into deficits/self-awareness;positioning of assistive device;safety precaution awareness;safety precautions follow-through/compliance;sequencing abilities  -ML     Impairments Affecting Function (Mobility) endurance/activity tolerance;shortness of breath;strength;balance  -ML               User Key  (r) = Recorded By, (t) =  Taken By, (c) = Cosigned By      Initials Name Provider Type    Priscila Plaza Physical Therapist                   Mobility       Row Name 08/15/23 1314          Bed Mobility    Bed Mobility supine-sit  -ML     Supine-Sit [place] (Bed Mobility) supervision  -ML     Assistive Device (Bed Mobility) bed rails;head of bed elevated  -ML       Row Name 08/15/23 1314          Sit-Stand Transfer    Sit-Stand [place] (Transfers) verbal cues;minimum assist (75% patient effort);1 person assist  -ML     Assistive Device (Sit-Stand Transfers) walker, front-wheeled  -ML     Comment, (Sit-Stand Transfer) patient completed 1 STS transfer from EOB and 1 from chair, verbal cues for correct hand placement during sit to stand and stand to sit transfer  -ML       Row Name 08/15/23 1314          Gait/Stairs (Locomotion)    [place] Level (Gait) verbal cues;contact guard  -ML     Assistive Device (Gait) walker, front-wheeled  -ML     Distance in Feet (Gait) 15 + 20  -ML     Deviations/Abnormal Patterns (Gait) bilateral deviations;mike decreased;gait speed decreased;stride length decreased  -ML     Bilateral Gait Deviations forward flexed posture;heel strike decreased  -ML     Comment, (Gait/Stairs) Patient required verbal cues to step into RW, able to correct. Patient remained on 4 LPM during ambulation, no increased work of breathing noted, oxygen saturaiton remained above 90%.  -ML               User Key  (r) = Recorded By, (t) = Taken By, (c) = Cosigned By      Initials Name Provider Type    Priscila Plaza Physical Therapist                   Obj/Interventions       Row Name 08/15/23 1316          Motor Skills    Therapeutic Exercise shoulder;hip;knee;ankle  -ML       Row Name 08/15/23 1316          Shoulder (Therapeutic Exercise)    Shoulder (Therapeutic Exercise) AROM (active range of motion)  -ML     Shoulder AROM (Therapeutic Exercise) bilateral;scapular retraction;10 repetitions;3 second hold  -ML       Row  Name 08/15/23 1316          Hip (Therapeutic Exercise)    Hip (Therapeutic Exercise) strengthening exercise  -ML     Hip Strengthening (Therapeutic Exercise) bilateral;marching while seated;20 repititions  -ML       Row Name 08/15/23 1316          Knee (Therapeutic Exercise)    Knee Strengthening (Therapeutic Exercise) bilateral;LAQ (long arc quad);20 repititions  -ML       Row Name 08/15/23 1316          Ankle (Therapeutic Exercise)    Ankle (Therapeutic Exercise) AROM (active range of motion)  -ML     Ankle AROM (Therapeutic Exercise) bilateral;plantarflexion;dorsiflexion;10 repetitions  -ML       Row Name 08/15/23 1316          Balance    Balance Assessment sitting static balance;sitting dynamic balance;sit to stand dynamic balance;standing static balance;standing dynamic balance  -ML     Static Sitting Balance standby assist  -ML     Dynamic Sitting Balance supervision  -ML     Position, Sitting Balance unsupported;sitting edge of bed;supported;sitting in chair  -ML     Sit to Stand Dynamic Balance verbal cues;minimal assist;1-person assist  -ML     Static Standing Balance contact guard  -ML     Dynamic Standing Balance contact guard  -ML     Position/Device Used, Standing Balance supported;walker, front-wheeled  -ML     Balance Interventions sitting;standing;sit to stand;supported;occupation based/functional task  -ML               User Key  (r) = Recorded By, (t) = Taken By, (c) = Cosigned By      Initials Name Provider Type    ML Priscila Grover Physical Therapist                   Goals/Plan    No documentation.                  Clinical Impression       Row Name 08/15/23 1318          Pain    Pretreatment Pain Rating 0/10 - no pain  -ML     Posttreatment Pain Rating 0/10 - no pain  -ML       Row Name 08/15/23 1318          Plan of Care Review    Plan of Care Reviewed With patient;daughter  -ML     Progress improving  -ML     Outcome Evaluation Physical therapy treatment complete. The patient increased  ambulation distance compared to previous treatment session, patient required less assistance with transfer.s Patient remained on 4 LPM during ambulation with oxygen saturation >90%. The patient continues to present below baseline for mobility. Continue current PT POC.  -ML       Row Name 08/15/23 1318          Vital Signs    Pre Patient Position Supine  -ML     Intra Patient Position Standing  -ML     Post Patient Position Sitting  -ML       Row Name 08/15/23 1318          Positioning and Restraints    Pre-Treatment Position in bed  -ML     Post Treatment Position chair  -ML     In Chair notified nsg;reclined;call light within reach;encouraged to call for assist;exit alarm on;with family/caregiver;waffle cushion;legs elevated  -ML               User Key  (r) = Recorded By, (t) = Taken By, (c) = Cosigned By      Initials Name Provider Type    Priscila Plaza Physical Therapist                   Outcome Measures       Row Name 08/15/23 1322          How much help from another person do you currently need...    Turning from your back to your side while in flat bed without using bedrails? 3  -ML     Moving from lying on back to sitting on the side of a flat bed without bedrails? 3  -ML     Moving to and from a bed to a chair (including a wheelchair)? 3  -ML     Standing up from a chair using your arms (e.g., wheelchair, bedside chair)? 3  -ML     Climbing 3-5 steps with a railing? 2  -ML     To walk in hospital room? 3  -ML     AM-PAC 6 Clicks Score (PT) 17  -ML     Highest level of mobility 5 --> Static standing  -ML       Row Name 08/15/23 1322          Functional Assessment    Outcome Measure Options AM-PAC 6 Clicks Basic Mobility (PT)  -ML               User Key  (r) = Recorded By, (t) = Taken By, (c) = Cosigned By      Initials Name Provider Type    Priscila Plaza Physical Therapist                                 Physical Therapy Education       Title: PT OT SLP Therapies (In Progress)       Topic: Physical  Therapy (Done)       Point: Mobility training (Done)       Learning Progress Summary             Patient Acceptance, E, VU,DU,NR by ML at 8/15/2023 1322    Acceptance, E, VU,NR by ML at 8/14/2023 1357    Acceptance, E,D,H, NR by DM at 8/12/2023 1737   Family Acceptance, E, VU,DU,NR by ML at 8/15/2023 1322    Acceptance, E, VU,NR by ML at 8/14/2023 1357                         Point: Home exercise program (Done)       Learning Progress Summary             Patient Acceptance, E, VU,DU,NR by ML at 8/15/2023 1322    Acceptance, E,D,H, NR by DM at 8/12/2023 1737   Family Acceptance, E, VU,DU,NR by ML at 8/15/2023 1322                         Point: Body mechanics (Done)       Learning Progress Summary             Patient Acceptance, E, VU,DU,NR by ML at 8/15/2023 1322    Acceptance, E,D,H, NR by DM at 8/12/2023 1737   Family Acceptance, E, VU,DU,NR by ML at 8/15/2023 1322                         Point: Precautions (Done)       Learning Progress Summary             Patient Acceptance, E, VU,DU,NR by ML at 8/15/2023 1322    Acceptance, E, VU,NR by ML at 8/14/2023 1357    Acceptance, E,D,H, NR by DM at 8/12/2023 1737   Family Acceptance, E, VU,DU,NR by ML at 8/15/2023 1322    Acceptance, E, VU,NR by ML at 8/14/2023 1357                                         User Key       Initials Effective Dates Name Provider Type Discipline    DM 02/03/23 -  Lynne Davison, PT Physical Therapist PT     04/22/21 -  Priscila Grover Physical Therapist PT                  PT Recommendation and Plan     Plan of Care Reviewed With: patient, daughter  Progress: improving  Outcome Evaluation: Physical therapy treatment complete. The patient increased ambulation distance compared to previous treatment session, patient required less assistance with transfer.s Patient remained on 4 LPM during ambulation with oxygen saturation >90%. The patient continues to present below baseline for mobility. Continue current PT POC.     Time Calculation:          PT Charges       Row Name 08/15/23 1323             Time Calculation    Start Time 1115  -ML      PT Received On 08/15/23  -ML         Timed Charges    64863 - PT Therapeutic Exercise Minutes 10  -ML      49238 - PT Therapeutic Activity Minutes 20  -ML         Total Minutes    Timed Charges Total Minutes 30  -ML       Total Minutes 30  -ML                User Key  (r) = Recorded By, (t) = Taken By, (c) = Cosigned By      Initials Name Provider Type    Priscila Plaza Physical Therapist                  Therapy Charges for Today       Code Description Service Date Service Provider Modifiers Qty    13606545230 HC PT THERAPEUTIC ACT EA 15 MIN 8/14/2023 Priscila Grover GP 3    31209272909 HC PT THER PROC EA 15 MIN 8/15/2023 Priscila Grover GP 1    25408301482 HC PT THERAPEUTIC ACT EA 15 MIN 8/15/2023 Priscila Grover GP 1            PT G-Codes  Outcome Measure Options: AM-PAC 6 Clicks Basic Mobility (PT)  AM-PAC 6 Clicks Score (PT): 17  AM-PAC 6 Clicks Score (OT): 16  PT Discharge Summary  Anticipated Discharge Disposition (PT): home with 24/7 care, home with home health    Priscila Grover  8/15/2023

## 2023-08-15 NOTE — PROGRESS NOTES
[facility] Medicine Services  PROGRESS NOTE    Patient Name: Rose J Kellermann  : 1926  MRN: [id number]    Date of Admission: 8/10/2023  Primary Care Physician: Sam Hung MD    Subjective   Subjective     CC:  dyspnea    HPI:  No acute overnight events.  Seems to drop her sats a little in the evening.  Tolerating her diet.  Denies any pain.    ROS:  Gen- No fevers, chills  CV- No chest pain, palpitations  GI- No N/V/D, abd pain       Objective   Objective     Vital Signs:   Temp:  [98.1 øF (36.7 øC)-98.7 øF (37.1 øC)] 98.6 øF (37 øC)  Heart Rate:  [72-87] 75  Resp:  [16-18] 18  BP: (113-140)/(73-94) 134/94  Flow (L/min):  [1-3] 3     Physical Exam:  Constitutional: No acute distress, awake, alert  HENT: NCAT, mucous membranes moist  Respiratory: resp effort fair. Bibasilar crackles posteriorly bilaterally.  On 3L of NC Appears comfortable at rest  Cardiovascular: RRR, 3 out of 6 holosystolic murmurs, rubs, or gallops  Gastrointestinal: Positive bowel sounds, soft, nontender, nondistended  Musculoskeletal: Trace bilateral ankle edema with chronic venous stasis skin changes  Psychiatric: Appropriate affect, cooperative  Neurologic: Oriented x 3, no focal deficits, speech is clear  Skin: BLE venous stasis      Results Reviewed:  LAB RESULTS:      Lab 23  0516 08/10/23  1121   WBC 5.05 4.81   HEMOGLOBIN 11.3* 12.3   HEMATOCRIT 35.6 39.5   PLATELETS 161 169   NEUTROS ABS  --  3.14   IMMATURE GRANS (ABS)  --  0.01   LYMPHS ABS  --  0.94   MONOS ABS  --  0.52   EOS ABS  --  0.18   MCV 97.3* 98.3*   D DIMER QUANT  --  1.22*         Lab 08/15/23  0810 23  0521 23  0405 23  0516 08/10/23  1121   SODIUM 137 143 141 144 142   POTASSIUM 4.1 4.1 3.9 3.9 4.2   CHLORIDE 99 103 100 101 104   CO2 29.0 26.0 31.0* 32.0* 26.0   ANION GAP 9.0 14.0 10.0 11.0 12.0   BUN 36* 37* 29* 24* 21   CREATININE 1.46* 2.22* 1.72* 1.84* 1.79*   EGFR 32.8* 19.8* 26.9* 24.9*  25.7*   GLUCOSE 88 87 85 79 130*   CALCIUM 8.8 8.7 9.0 9.1 10.0*   MAGNESIUM  --   --   --  1.8  --          Lab 08/11/23  0516 08/10/23  1121   TOTAL PROTEIN 5.8* 7.1   ALBUMIN 3.3* 3.8   GLOBULIN 2.5 3.3   ALT (SGPT) 7 8   AST (SGOT) 12 15   BILIRUBIN 0.5 0.5   ALK PHOS 63 75         Lab 08/10/23  1121   PROBNP 2,658.0*   HSTROP T 39*         Lab 08/11/23  0516   CHOLESTEROL 169   LDL CHOL 94   HDL CHOL 61*   TRIGLYCERIDES 73         Lab 08/11/23  0516   IRON 41   IRON SATURATION (TSAT) 16*   TIBC 264*   TRANSFERRIN 177*         Brief Urine Lab Results  (Last result in the past 365 days)        Color   Clarity   Blood   Leuk Est   Nitrite   Protein   CREAT   Urine HCG        05/09/23 1137 Yellow   Cloudy   Negative   Small (1+)   Negative   100 mg/dL (2+)                   Microbiology Results Abnormal       Procedure Component Value - Date/Time    COVID PRE-OP / PRE-PROCEDURE SCREENING ORDER (NO ISOLATION) - Swab, Nasopharynx [[id number]]  (Normal) Collected: 08/10/23 1130    Lab Status: Final result Specimen: Swab from Nasopharynx Updated: 08/10/23 1206    Narrative:      The following orders were created for panel order COVID PRE-OP / PRE-PROCEDURE SCREENING ORDER (NO ISOLATION) - Swab, Nasopharynx.  Procedure                               Abnormality         Status                     ---------                               -----------         ------                     COVID-19 and FLU A/B PCR...[[id number]]  Normal              Final result                 Please view results for these tests on the individual orders.    COVID-19 and FLU A/B PCR - Swab, Nasopharynx [[id number]]  (Normal) Collected: 08/10/23 1130    Lab Status: Final result Specimen: Swab from Nasopharynx Updated: 08/10/23 1206     COVID19 Not Detected     Influenza A PCR Not Detected     Influenza B PCR Not Detected    Narrative:      Fact sheet for providers: https://www.fda.gov/media/729908/download    Fact sheet for patients:  https://www.fda.gov/media/733799/download    Test performed by PCR.            No radiology results from the last 24 hrs    Results for orders placed during the hospital encounter of 06/01/23    Adult Transthoracic Echo Complete W/ Cont if Necessary Per Protocol    Interpretation Summary    Left ventricular systolic function is normal. Calculated left ventricular EF = 61.4% Left ventricular ejection fraction appears to be 61 - 65%.    Left ventricular diastolic function was indeterminate.    Left atrial volume is severely increased.    Mild aortic valve stenosis is present.    Aortic valve maximum pressure gradient is 26 mmHg. Aortic valve mean pressure gradient is 15 mmHg.    Mild mitral valve stenosis is present. The mitral valve peak gradient is 12 mmHg. The mitral valve mean gradient is 5 mmHg.    Moderate tricuspid valve regurgitation is present.    Estimated right ventricular systolic pressure from tricuspid regurgitation is markedly elevated (>55 mmHg).    There is a small left pleural effusion.      Current medications:  Scheduled Meds:amLODIPine, 5 mg, Oral, Daily  apixaban, 5 mg, Oral, BID  bumetanide, 1 mg, Intravenous, Q12H  hydrALAZINE, 25 mg, Oral, BID  pharmacy consult - MTM, , Does not apply, Daily  senna-docusate sodium, 2 tablet, Oral, BID  sodium chloride, 10 mL, Intravenous, Q12H      Continuous Infusions:   PRN Meds:.  acetaminophen **OR** acetaminophen **OR** acetaminophen    senna-docusate sodium **AND** polyethylene glycol **AND** bisacodyl **AND** bisacodyl    ondansetron **OR** ondansetron    sodium chloride    sodium chloride    sodium chloride    Assessment & Plan   Assessment & Plan     Active Hospital Problems    Diagnosis  POA    **Acute hypoxemic respiratory failure [J96.01]  Yes    Acute heart failure with preserved ejection fraction (HFpEF) [I50.31]  Yes    Chronic deep vein thrombosis (DVT) of both lower extremities [I82.503]  Yes    Chronic kidney disease, stage III (moderate)   [N18.30]  Yes    Essential hypertension [I10]  Yes      Resolved Hospital Problems   No resolved problems to display.        Brief Hospital Course to date:  Rose J Kellermann is a 89 y.o. female with a past medical history of HTN, b/l DVT diagnosis in July 2023 on Eliquis and HFrEF presented to the ED complaining of increased shortness of air over the last few days. She has been admitted for acute on chronic HFpEF and cardiology consulted.     Acute on chronic HFpEF  Acute hypoxemic respiratory failure  - CTA chest shows edema/CHF  - patient currently on Eliquis for b/l DVT diagnosed outpatient in July 2023  - cont wean--goal should be 90-94%, above home requirement  -Creatinine improved, per cardiology recommendations we will go ahead and give dose of Bumex but at a reduced dose today.  - ECHO done in June 2023- reviewed.   - strict intake/output  - cardiac diet with fluid restriction     B/l DVT  -patient currently on Eliquis for b/l DVT diagnosed outpatient in July 2023.  Cont tx dose eliquis     CKD Stage 3--improved today  - creatinine appears to be around baseline~ 1.5-1.7  -Restart diuresis, repeat BMP in a.m.     HTN  - continue home Hydralazine 50mg PO BID and amlodipine dose decreased bc of leg swelling (much improved)    Recent zoster infection-- rash appeared about 2-3 weeks ago and lesions appear healed over. Not currently required to be in precautions    Will discuss with case management that she most likely will require oxygen, possibly at night at a minimum.    Expected Discharge Location and Transportation: home  Expected Discharge   Expected Discharge Date: 8/16/2023; Expected Discharge Time:      DVT prophylaxis:  Medical DVT prophylaxis orders are present.     AM-PAC 6 Clicks Score (PT): 16 (08/14/23 9900)    CODE STATUS:   Code Status and Medical Interventions:   Ordered at: 08/10/23 1324     Level Of Support Discussed With:    Patient     Code Status (Patient has no pulse and is not breathing):     CPR (Attempt to Resuscitate)     Medical Interventions (Patient has pulse or is breathing):    Full Support       Tomasa Vega MD  08/15/23

## 2023-08-16 ENCOUNTER — HOME HEALTH ADMISSION (OUTPATIENT)
Dept: HOME HEALTH SERVICES | Facility: HOME HEALTHCARE | Age: 88
End: 2023-08-16
Payer: MEDICARE

## 2023-08-16 ENCOUNTER — READMISSION MANAGEMENT (OUTPATIENT)
Dept: CALL CENTER | Facility: HOSPITAL | Age: 88
End: 2023-08-16
Payer: MEDICARE

## 2023-08-16 VITALS
TEMPERATURE: 98.2 F | BODY MASS INDEX: 30.11 KG/M2 | SYSTOLIC BLOOD PRESSURE: 126 MMHG | RESPIRATION RATE: 17 BRPM | HEIGHT: 64 IN | DIASTOLIC BLOOD PRESSURE: 79 MMHG | WEIGHT: 176.4 LBS | HEART RATE: 74 BPM | OXYGEN SATURATION: 93 %

## 2023-08-16 PROBLEM — J96.01 ACUTE HYPOXEMIC RESPIRATORY FAILURE: Status: RESOLVED | Noted: 2023-08-10 | Resolved: 2023-08-16

## 2023-08-16 PROBLEM — I50.31 ACUTE HEART FAILURE WITH PRESERVED EJECTION FRACTION (HFPEF): Status: RESOLVED | Noted: 2023-08-10 | Resolved: 2023-08-16

## 2023-08-16 LAB
ANION GAP SERPL CALCULATED.3IONS-SCNC: 6 MMOL/L (ref 5–15)
BUN SERPL-MCNC: 40 MG/DL (ref 8–23)
BUN/CREAT SERPL: 23.4 (ref 7–25)
CALCIUM SPEC-SCNC: 8.3 MG/DL (ref 8.2–9.6)
CHLORIDE SERPL-SCNC: 100 MMOL/L (ref 98–107)
CO2 SERPL-SCNC: 31 MMOL/L (ref 22–29)
CREAT SERPL-MCNC: 1.71 MG/DL (ref 0.57–1)
DEPRECATED RDW RBC AUTO: 45 FL (ref 37–54)
EGFRCR SERPLBLD CKD-EPI 2021: 27.1 ML/MIN/1.73
ERYTHROCYTE [DISTWIDTH] IN BLOOD BY AUTOMATED COUNT: 12.6 % (ref 12.3–15.4)
GLUCOSE SERPL-MCNC: 83 MG/DL (ref 65–99)
HCT VFR BLD AUTO: 31.6 % (ref 34–46.6)
HGB BLD-MCNC: 9.9 G/DL (ref 12–15.9)
MAGNESIUM SERPL-MCNC: 1.8 MG/DL (ref 1.7–2.3)
MCH RBC QN AUTO: 30.3 PG (ref 26.6–33)
MCHC RBC AUTO-ENTMCNC: 31.3 G/DL (ref 31.5–35.7)
MCV RBC AUTO: 96.6 FL (ref 79–97)
PHOSPHATE SERPL-MCNC: 3.6 MG/DL (ref 2.5–4.5)
PLATELET # BLD AUTO: 141 10*3/MM3 (ref 140–450)
PMV BLD AUTO: 11.4 FL (ref 6–12)
POTASSIUM SERPL-SCNC: 3.8 MMOL/L (ref 3.5–5.2)
RBC # BLD AUTO: 3.27 10*6/MM3 (ref 3.77–5.28)
SODIUM SERPL-SCNC: 137 MMOL/L (ref 136–145)
WBC NRBC COR # BLD: 5.69 10*3/MM3 (ref 3.4–10.8)

## 2023-08-16 PROCEDURE — 97530 THERAPEUTIC ACTIVITIES: CPT

## 2023-08-16 PROCEDURE — 99232 SBSQ HOSP IP/OBS MODERATE 35: CPT

## 2023-08-16 PROCEDURE — 85027 COMPLETE CBC AUTOMATED: CPT | Performed by: PEDIATRICS

## 2023-08-16 PROCEDURE — 84100 ASSAY OF PHOSPHORUS: CPT | Performed by: PEDIATRICS

## 2023-08-16 PROCEDURE — 97535 SELF CARE MNGMENT TRAINING: CPT

## 2023-08-16 PROCEDURE — 99239 HOSP IP/OBS DSCHRG MGMT >30: CPT | Performed by: PEDIATRICS

## 2023-08-16 PROCEDURE — 80048 BASIC METABOLIC PNL TOTAL CA: CPT

## 2023-08-16 PROCEDURE — 83735 ASSAY OF MAGNESIUM: CPT | Performed by: PEDIATRICS

## 2023-08-16 RX ORDER — BUMETANIDE 1 MG/1
1 TABLET ORAL 3 TIMES WEEKLY
Qty: 15 TABLET | Refills: 0 | Status: SHIPPED | OUTPATIENT
Start: 2023-08-18

## 2023-08-16 RX ORDER — BUMETANIDE 1 MG/1
1 TABLET ORAL 3 TIMES WEEKLY
Status: DISCONTINUED | OUTPATIENT
Start: 2023-08-16 | End: 2023-08-16 | Stop reason: HOSPADM

## 2023-08-16 RX ORDER — BUMETANIDE 1 MG/1
1 TABLET ORAL DAILY
Status: DISCONTINUED | OUTPATIENT
Start: 2023-08-16 | End: 2023-08-16

## 2023-08-16 RX ADMIN — AMLODIPINE BESYLATE 5 MG: 5 TABLET ORAL at 08:52

## 2023-08-16 RX ADMIN — APIXABAN 5 MG: 5 TABLET, FILM COATED ORAL at 08:52

## 2023-08-16 RX ADMIN — Medication 10 ML: at 08:52

## 2023-08-16 RX ADMIN — SENNOSIDES AND DOCUSATE SODIUM 2 TABLET: 50; 8.6 TABLET ORAL at 08:52

## 2023-08-16 RX ADMIN — BUMETANIDE 1 MG: 1 TABLET ORAL at 08:52

## 2023-08-16 RX ADMIN — HYDRALAZINE HYDROCHLORIDE 25 MG: 25 TABLET, FILM COATED ORAL at 08:52

## 2023-08-16 NOTE — PROGRESS NOTES
"  [facility]  PROGRESS NOTE    Date of Admission: 8/10/2023  Date of Service: 08/16/23    Primary Care Physician: Sam Hung MD    Chief Complaint: A/C HFpEF        Subjective      HPI: Patient expresses that her shortness of breath has improved and she feels ready to go home. On 2 L/min O2      Objective   Vitals: /86 (BP Location: Right arm, Patient Position: Lying)   Pulse 74   Temp 98.2 øF (36.8 øC) (Oral)   Resp 18   Ht 162.6 cm (64\")   Wt 80 kg (176 lb 6.4 oz)   SpO2 92%   BMI 30.28 kg/mý     Physical Exam:   GENERAL: Alert, cooperative, in no acute distress.   HEART: Regular rate and rhythm; 2/6 systolic murmur  LUNGS: Some decreased basilar sounds bilaterally. Nonlabored breathing on 2 L/min  NEUROLOGIC: No gross focal abnormalities  EXTREMITIES: trace nonpitting pedal/pretibial edema, chronic venous stasis dermatitis in pretibial area  PSYCH: normal mood, behavior    Results:  Results from last 7 days   Lab Units 08/16/23  0441 08/11/23  0516 08/10/23  1121   WBC 10*3/mm3 5.69 5.05 4.81   HEMOGLOBIN g/dL 9.9* 11.3* 12.3   HEMATOCRIT % 31.6* 35.6 39.5   PLATELETS 10*3/mm3 141 161 169     Results from last 7 days   Lab Units 08/16/23  0441 08/15/23  0810 08/13/23  0521   SODIUM mmol/L 137 137 143   POTASSIUM mmol/L 3.8 4.1 4.1   CHLORIDE mmol/L 100 99 103   CO2 mmol/L 31.0* 29.0 26.0   BUN mg/dL 40* 36* 37*   CREATININE mg/dL 1.71* 1.46* 2.22*   GLUCOSE mg/dL 83 88 87      Lab Results   Component Value Date    CHOL 169 08/11/2023    TRIG 73 08/11/2023    HDL 61 (H) 08/11/2023    LDL 94 08/11/2023    AST 12 08/11/2023    ALT 7 08/11/2023         Results from last 7 days   Lab Units 08/11/23  0516   CHOLESTEROL mg/dL 169   TRIGLYCERIDES mg/dL 73   HDL CHOL mg/dL 61*   LDL CHOL mg/dL 94                 Results from last 7 days   Lab Units 08/10/23  1121   HSTROP T ng/L 39*     Results from last 7 days   Lab Units 08/10/23  1121   PROBNP pg/mL " 2,658.0*         Intake/Output Summary (Last 24 hours) at 8/16/2023 0813  Last data filed at 8/15/2023 2339  Gross per 24 hour   Intake 343 ml   Output 400 ml   Net -57 ml       I personally reviewed the patient's EKG/Telemetry data    Radiology Data: no new data    Current Medications:  amLODIPine, 5 mg, Oral, Daily  apixaban, 5 mg, Oral, BID  bumetanide, 1 mg, Oral, Daily  hydrALAZINE, 25 mg, Oral, BID  pharmacy consult - MTM, , Does not apply, Daily  senna-docusate sodium, 2 tablet, Oral, BID  sodium chloride, 10 mL, Intravenous, Q12H         Assessment  Acute on chronic HFpEF/VHD  Echo 6/1/2023: EF 61%, LA volume severely increased, mild AS, mild MS, moderate TR, RVSP >55  Recent diagnosis bilateral DVTs 7/2023  On apixaban 5 mg  ANGEL LUIS on CKD  Baseline cr ~1.6-1.8  Hypertension  Frequent PACs     Plan  Transition to oral diuretic 1 mg Bumex 3x weekly MWF  Continue Eliquis with bilateral DVTs at 5 mg bid  BP controlled. Continue amlodipine and hydralazine.   Home with O2. BMP in 1 week. Follow up with Dr. Xiao's office in 4-6 weeks    Electronically signed by Zain Talamantes PA-C, 08/16/23, 10:16 AM EDT.

## 2023-08-16 NOTE — DISCHARGE INSTRUCTIONS
Take all medications as prescribed.  Keep all follow up appointments as scheduled.  Seek medical care if any symptoms return or worsen.    Keep track of daily weights.  If the weight goes up by 2 pounds it is recommended that you take an extra dose of the Bumex 1 mg.  For any other questions regarding heart failure, it is recommended that you call the heart and valve clinic.

## 2023-08-16 NOTE — DISCHARGE SUMMARY
[facility] Medicine Services  DISCHARGE SUMMARY    Patient Name: Rose J Kellermann  : 1926  MRN: [id number]    Date of Admission: 8/10/2023 11:06 AM  Date of Discharge:  2023  Primary Care Physician: Sam Hung MD    Consults       Date and Time Order Name Status Description    8/10/2023  3:01 PM Inpatient Cardiology Consult Completed             Hospital Course     Presenting Problem: SOB    Active Hospital Problems    Diagnosis  POA    Chronic deep vein thrombosis (DVT) of both lower extremities [I82.503]  Yes    Chronic kidney disease, stage III (moderate)  [N18.30]  Yes    Essential hypertension [I10]  Yes      Resolved Hospital Problems    Diagnosis Date Resolved POA    Acute hypoxemic respiratory failure [J96.01] 2023 Yes    Acute heart failure with preserved ejection fraction (HFpEF) [I50.31] 2023 Yes          Hospital Course:  Rose J Kellermann is a 89 y.o. female with a past medical history of HTN, b/l DVT diagnosis in 2023 on Eliquis and HFrEF presented to the ED complaining of increased shortness of air over the last few days PTA. She was admitted for acute on chronic HFpEF and cardiology consulted.     Acute on chronic HFpEF  Acute hypoxemic respiratory failure  - CTA chest shows edema/CHF  - Cont Eliquis for b/l DVT diagnosed outpatient in 2023  -Was not on any oxygen at home, but per daughter's report their PCP had been talking about needing it possibly at night.  Despite diuresis we have been unable to get her off of oxygen.  She will be discharged with home oxygen.  Recommend follow-up with PCP.  -Cardiology recommended Bumex 1 mg 3 times a week which is different than her home dose and then to monitor daily weights and take an extra dose if the weight is up by 2 pounds.  She is to call the heart and valve clinic for any issues regarding her heart failure management.      B/l DVT    Cont tx dose eliquis     CKD Stage  3-stable at baseline     HTN  - continue home Hydralazine 50mg PO BID and amlodipine dose decreased bc of leg swelling (much improved)      Discharge Follow Up Recommendations for outpatient labs/diagnostics:  Follow-up with PCP in 7 to 10 days.  Would recommend a follow-up BMP    Day of Discharge     HPI:   Patient is doing well.  No issues overnight.  Continues to require 2 to 3 L of O2.  Tolerating her diet.    Review of Systems  No chest pain or shortness of breath    Vital Signs:   Temp:  [98.1 øF (36.7 øC)-99.4 øF (37.4 øC)] 98.1 øF (36.7 øC)  Heart Rate:  [67-98] 74  Resp:  [18-20] 20  BP: (125-144)/(71-86) 136/75  Flow (L/min):  [2] 2      Physical Exam:  Constitutional: No acute distress, awake, alert  HENT: NCAT, mucous membranes moist  Respiratory: resp effort fair. Minimal bibasilar crackles posteriorly bilaterally.  On 3L of NC Appears comfortable at rest  Cardiovascular: RRR, 3 out of 6 holosystolic murmurs, rubs, or gallops  Gastrointestinal: Positive bowel sounds, soft, nontender, nondistended  Musculoskeletal: Trace bilateral ankle edema with chronic venous stasis skin changes  Psychiatric: Appropriate affect, cooperative  Neurologic: Oriented x 3, no focal deficits, speech is clear  Skin: BLE venous stasis    Pertinent  and/or Most Recent Results     LAB RESULTS:      Lab 08/16/23  0441 08/11/23  0516 08/10/23  1121   WBC 5.69 5.05 4.81   HEMOGLOBIN 9.9* 11.3* 12.3   HEMATOCRIT 31.6* 35.6 39.5   PLATELETS 141 161 169   NEUTROS ABS  --   --  3.14   IMMATURE GRANS (ABS)  --   --  0.01   LYMPHS ABS  --   --  0.94   MONOS ABS  --   --  0.52   EOS ABS  --   --  0.18   MCV 96.6 97.3* 98.3*   D DIMER QUANT  --   --  1.22*         Lab 08/16/23  0441 08/15/23  0810 08/13/23  0521 08/12/23  0405 08/11/23  0516   SODIUM 137 137 143 141 144   POTASSIUM 3.8 4.1 4.1 3.9 3.9   CHLORIDE 100 99 103 100 101   CO2 31.0* 29.0 26.0 31.0* 32.0*   ANION GAP 6.0 9.0 14.0 10.0 11.0   BUN 40* 36* 37* 29* 24*   CREATININE  1.71* 1.46* 2.22* 1.72* 1.84*   EGFR 27.1* 32.8* 19.8* 26.9* 24.9*   GLUCOSE 83 88 87 85 79   CALCIUM 8.3 8.8 8.7 9.0 9.1   MAGNESIUM 1.8  --   --   --  1.8   PHOSPHORUS 3.6  --   --   --   --          Lab 08/11/23  0516 08/10/23  1121   TOTAL PROTEIN 5.8* 7.1   ALBUMIN 3.3* 3.8   GLOBULIN 2.5 3.3   ALT (SGPT) 7 8   AST (SGOT) 12 15   BILIRUBIN 0.5 0.5   ALK PHOS 63 75         Lab 08/10/23  1121   PROBNP 2,658.0*   HSTROP T 39*         Lab 08/11/23  0516   CHOLESTEROL 169   LDL CHOL 94   HDL CHOL 61*   TRIGLYCERIDES 73         Lab 08/11/23  0516   IRON 41   IRON SATURATION (TSAT) 16*   TIBC 264*   TRANSFERRIN 177*         Brief Urine Lab Results  (Last result in the past 365 days)        Color   Clarity   Blood   Leuk Est   Nitrite   Protein   CREAT   Urine HCG        05/09/23 1137 Yellow   Cloudy   Negative   Small (1+)   Negative   100 mg/dL (2+)                 Microbiology Results (last 10 days)       Procedure Component Value - Date/Time    COVID PRE-OP / PRE-PROCEDURE SCREENING ORDER (NO ISOLATION) - Swab, Nasopharynx [[id number]]  (Normal) Collected: 08/10/23 1130    Lab Status: Final result Specimen: Swab from Nasopharynx Updated: 08/10/23 1206    Narrative:      The following orders were created for panel order COVID PRE-OP / PRE-PROCEDURE SCREENING ORDER (NO ISOLATION) - Swab, Nasopharynx.  Procedure                               Abnormality         Status                     ---------                               -----------         ------                     COVID-19 and FLU A/B PCR...[[id number]]  Normal              Final result                 Please view results for these tests on the individual orders.    COVID-19 and FLU A/B PCR - Swab, Nasopharynx [[id number]]  (Normal) Collected: 08/10/23 1130    Lab Status: Final result Specimen: Swab from Nasopharynx Updated: 08/10/23 1206     COVID19 Not Detected     Influenza A PCR Not Detected     Influenza B PCR Not Detected    Narrative:      Fact sheet for  providers: https://www.fda.gov/media/549012/download    Fact sheet for patients: https://www.fda.gov/media/549911/download    Test performed by PCR.            XR Chest 1 View    Result Date: 8/10/2023  XR CHEST 1 VW Date of Exam: 8/10/2023 11:20 AM EDT Indication: SOA triage protocol Comparison: 11/24/2017. Findings: There is continued moderately severe cardiomegaly. There is a component of underlying chronic interstitial lung disease. However, there are new and worsening infiltrates of the left lower lobe. There may be a small left effusion. Suggestion of minimal right effusion.     Impression: Findings suggest left lower lobe atelectasis and/or pneumonia superimposed over chronic lung disease, with small effusions. Electronically Signed: Sherley Sahu MD  8/10/2023 11:55 AM EDT  Workstation ID: [id number]    CT Angiogram Chest    Result Date: 8/10/2023  CT ANGIOGRAM CHEST Date of Exam: 8/10/2023 11:20 AM CDT Indication: hypoxia, elevated dimer, eval for pe. Comparison: None available. Technique: CTA of the chest was performed after the uneventful intravenous administration of 75 cc Isovue-370. Reconstructed coronal and sagittal images were also obtained. In addition, a 3-D volume rendered image was created for interpretation. Automated exposure control and iterative reconstruction methods were used. Findings: PULMONARY VASCULATURE: Pulmonary arteries are widely patent without evidence of embolus.Main pulmonary artery is normal in size. No evidence of right heart strain. MEDIASTINUM: Enlarged heart. Aortic and heart size are normal. No aortic dissection identified. No mass nor pericardial effusion. CORONARY ARTERIES: There is calcified atherosclerotic disease. LUNGS: There is bibasilar airspace disease, likely compressive atelectasis. There is intralobular septal thickening with mild central pulmonary edema. PLEURAL SPACE: There are small to moderate bilateral pleural effusions. LYMPH NODES: There are no  pathologically enlarged lymph nodes. UPPER ABDOMEN: Unremarkable OSSEOUS STRUCTURES: There are subacute to more chronic posterior lateral left rib fractures. Correlate with history.     Impression: 1.No evidence of pulmonary embolism. 2.CHF features. Electronically Signed: Nils Vides MD  8/10/2023 11:35 AM CDT  Workstation ID: [id number]     Results for orders placed during the hospital encounter of 06/23/23    Duplex Venous Lower Extremity - Bilateral CAR    Interpretation Summary    Acute right lower extremity deep vein thrombosis noted in the common femoral, proximal femoral, mid femoral, distal femoral, popliteal, posterior tibial and peroneal.    Acute right lower extremity superficial thrombophlebitis noted in the saphenofemoral junction.    Acute left lower extremity deep vein thrombosis noted in the distal femoral, popliteal, posterial tibial, peroneal and gastrocnemius.    Chronic left lower extremity superficial thrombophlebitis noted in the small saphenous.    All other veins appeared normal bilaterally.      Results for orders placed during the hospital encounter of 06/23/23    Duplex Venous Lower Extremity - Bilateral CAR    Interpretation Summary    Acute right lower extremity deep vein thrombosis noted in the common femoral, proximal femoral, mid femoral, distal femoral, popliteal, posterior tibial and peroneal.    Acute right lower extremity superficial thrombophlebitis noted in the saphenofemoral junction.    Acute left lower extremity deep vein thrombosis noted in the distal femoral, popliteal, posterial tibial, peroneal and gastrocnemius.    Chronic left lower extremity superficial thrombophlebitis noted in the small saphenous.    All other veins appeared normal bilaterally.      Results for orders placed during the hospital encounter of 06/01/23    Adult Transthoracic Echo Complete W/ Cont if Necessary Per Protocol    Interpretation Summary    Left ventricular systolic function is normal.  Calculated left ventricular EF = 61.4% Left ventricular ejection fraction appears to be 61 - 65%.    Left ventricular diastolic function was indeterminate.    Left atrial volume is severely increased.    Mild aortic valve stenosis is present.    Aortic valve maximum pressure gradient is 26 mmHg. Aortic valve mean pressure gradient is 15 mmHg.    Mild mitral valve stenosis is present. The mitral valve peak gradient is 12 mmHg. The mitral valve mean gradient is 5 mmHg.    Moderate tricuspid valve regurgitation is present.    Estimated right ventricular systolic pressure from tricuspid regurgitation is markedly elevated (>55 mmHg).    There is a small left pleural effusion.      Plan for Follow-up of Pending Labs/Results:     Discharge Details        Discharge Medications        Changes to Medications        Instructions Start Date   bumetanide 1 MG tablet  Commonly known as: BUMEX  What changed:   how much to take  how to take this  when to take this  additional instructions   1 mg, Oral, 3 Times Weekly, Take an extra dose if you have a 2 pound weight gain from baseline   Start Date: August 18, 2023            Continue These Medications        Instructions Start Date   amLODIPine 5 MG tablet  Commonly known as: NORVASC   5 mg, Oral, Daily      apixaban 5 MG tablet tablet  Commonly known as: ELIQUIS   5 mg, Oral, 2 Times Daily      hydrALAZINE 25 MG tablet  Commonly known as: APRESOLINE   25 mg, Oral, 2 Times Daily               Allergies   Allergen Reactions    Sulfa Antibiotics Nausea Only and GI Intolerance         Discharge Disposition:  Home or Self Care    Diet:  Hospital:  Diet Order   Procedures    Diet: Cardiac Diets, Fluid Restriction (240 mL/tray) Diets; Healthy Heart (2-3 Na+); 1500 mL/day; Texture: Regular Texture (IDDSI 7); Fluid Consistency: Thin (IDDSI 0)       Activity:      Restrictions or Other Recommendations:  As tolerated       CODE STATUS:    Code Status and Medical Interventions:   Ordered at:  08/10/23 1327     Level Of Support Discussed With:    Patient     Code Status (Patient has no pulse and is not breathing):    CPR (Attempt to Resuscitate)     Medical Interventions (Patient has pulse or is breathing):    Full Support       Future Appointments   Date Time Provider Department Center   8/25/2023  3:00 PM Monica Hung MD [facility]   2/16/2024  2:15 PM Andrew Xiao MD [facility]       Additional Instructions for the Follow-ups that You Need to Schedule       Ambulatory Referral to Home Health   As directed      Face to Face Visit Date: 8/16/2023   Follow-up provider for Plan of Care?: I treated the patient in an acute care facility and will not continue treatment after discharge.   Follow-up provider: MONICA HUNG [[id number]]   Reason/Clinical Findings: Acute hypoxemic respiratory failure, CHF, DVT   Describe mobility limitations that make leaving home difficult: Acute hypoxemic respiratory failure, CHF, DVT   Nursing/Therapeutic Services Requested: Skilled Nursing Physical Therapy Occupational Therapy   Skilled nursing orders: O2 instruction Cardiopulmonary assessments Neurovascular assessments CHF management   PT orders: Home safety assessment Strengthening Therapeutic exercise   Occupational orders: Activities of daily living Strengthening Home safety assessment Energy conservation   Frequency: 1 Week 1                      Tomasa Vega MD  08/16/23      Time Spent on Discharge:  I spent  31  minutes on this discharge activity which included: face-to-face encounter with the patient, reviewing the data in the system, coordination of the care with the nursing staff as well as consultants, documentation, and entering orders.

## 2023-08-16 NOTE — PLAN OF CARE
Goal Outcome Evaluation:   VSS. 3L nasal cannula. Nontele. Will continue to wean as tolerated. Fluid restriction maintained. No further complaints at this time.

## 2023-08-16 NOTE — THERAPY TREATMENT NOTE
Patient Name: Rose J Kellermann  : 1926    MRN: [id number]                              Today's Date: 2023       Admit Date: 8/10/2023    Visit Dx:     ICD-10-CM ICD-9-CM   1. Acute on chronic congestive heart failure, unspecified heart failure type  I50.9 428.0   2. Acute respiratory failure with hypoxia  J96.01 518.81   3. Chronic kidney disease, unspecified CKD stage  N18.9 585.9   4. Chronic deep vein thrombosis (DVT) of proximal vein of both lower extremities  I82.5Y3 453.51     Patient Active Problem List   Diagnosis    Essential hypertension    Vitamin D deficiency    Fatigue    Chronic kidney disease, stage III (moderate)     Osteoarthritis    Dyslipidemia    Post-menopausal bleeding    Endometrial cancer    Closed fracture of left hip    Asymptomatic bacteriuria    Post-traumatic osteoarthritis of left hip    Status post total replacement of left hip    Prediabetes    Acute blood loss anemia, asymptomatic    Postoperative urinary retention    Myopia    Posterior crocodile shagreen of cornea    Presbyopia    Ptosis of eyelid    Regular astigmatism    Retinal neovascularization    After cataract    Secondary cataract    Premature atrial contractions    Heart murmur    Nocturnal hypoxia    Chronic deep vein thrombosis (DVT) of both lower extremities     Past Medical History:   Diagnosis Date    Abscess of back     Arrhythmia     frequent PVC    Cancer 2011    Endometrial cancer, status post robotically assisted hysterectomy with Dr. Haddad, 2011.      CKD (chronic kidney disease), stage III     no dilaysis     Dizzy     Dvt femoral (deep venous thrombosis) 2017    s/p hip surgery in . Took xarelto until 2018    Dyslipidemia     Dyslipidemia.  Observing.    Enthesopathy of hip region     Generalized osteoarthrosis, involving multiple sites     Headache     Hearing loss     Hypertension     Low backache     Needs flu shot     Osteoarthritis     Osteoarthritis with  questionable carpal tunnel syndrome bilaterally.     Otitis, serous     Pneumonia 1967    Remote pneumonia, 1967.     Retinal hemorrhage     SI joint arthritis     SOB (shortness of breath)     Syncope 2001    Remote syncope with working diagnosis of vasodepressor, 2001.     Venous insufficiency of leg     Wears glasses     readers     Past Surgical History:   Procedure Laterality Date    CATARACT EXTRACTION      bilat     COLONOSCOPY      HIP PERCUTANEOUS PINNING Left 11/24/2017    Procedure: HIP PERCUTANEOUS PINNING;  Surgeon: Lucas Swanson MD;  Location:  [facility];  Service:     HYSTERECTOMY      total? but unsure     KNEE SURGERY  2001    Remote knee surgery in 2001.- right     TOTAL HIP ARTHROPLASTY Left 10/26/2018    Procedure: TOTAL HIP ARTHROPLASTY LEFT;  Surgeon: Stephen Baker MD;  Location:  [facility];  Service: Orthopedics      General Information       Row Name 08/16/23 1000          OT Time and Intention    Document Type therapy note (daily note)  -AF     Mode of Treatment occupational therapy  -AF       Row Name 08/16/23 1000          General Information    Existing Precautions/Restrictions fall  -AF       Row Name 08/16/23 1000          Cognition    Orientation Status (Cognition) oriented x 3  -AF               User Key  (r) = Recorded By, (t) = Taken By, (c) = Cosigned By      Initials Name Provider Type    AF Regine Smalls OT Occupational Therapist                     Mobility/ADL's       Row Name 08/16/23 1149          Bed Mobility    Bed Mobility supine-sit;sit-supine  -AF     Supine-Sit [place] (Bed Mobility) standby assist  -AF     Sit-Supine [place] (Bed Mobility) minimum assist (75% patient effort)  -AF       Row Name 08/16/23 1149          Transfers    Transfers sit-stand transfer  -AF       Row Name 08/16/23 1149          Sit-Stand Transfer    Sit-Stand [place] (Transfers) minimum assist (75% patient effort)  -AF     Assistive Device (Sit-Stand Transfers) walker,  rolling platform  -AF       Row Name 08/16/23 1149          Activities of Daily Living    BADL Assessment/Intervention lower body dressing  -AF       Row Name 08/16/23 1149          Grooming Assessment/Training    [place] Level (Grooming) set up;wash face, hands  -AF     Position (Grooming) edge of bed sitting  -AF       Row Name 08/16/23 1149          Lower Body Dressing Assessment/Training    [place] Level (Lower Body Dressing) set up;socks  -AF     Position (Lower Body Dressing) edge of bed sitting  -AF               User Key  (r) = Recorded By, (t) = Taken By, (c) = Cosigned By      Initials Name Provider Type    AF Regine Smalls OT Occupational Therapist                   Obj/Interventions       [facility] Name 08/16/23 1152          Balance    Dynamic Sitting Balance standby assist  -AF     Static Standing Balance standby assist  -AF     Dynamic Standing Balance standby assist  -AF     Position/Device Used, Standing Balance walker, rolling  -AF               User Key  (r) = Recorded By, (t) = Taken By, (c) = Cosigned By      Initials Name Provider Type    AF Regine Smalls OT Occupational Therapist                   Goals/Plan    No documentation.                  Clinical Impression       Row Name 08/16/23 1155          Pain Assessment    Pretreatment Pain Rating 0/10 - no pain  -AF     Posttreatment Pain Rating 0/10 - no pain  -AF       Row Name 08/16/23 1155          Plan of Care Review    Plan of Care Reviewed With patient;daughter  -AF     Progress improving  -AF     Outcome Evaluation Pt showed improved ADL indp. by don/doffing socks seated EOB w/ SPV. Continue to demo deficits in strength and balance affecting fxnl tranfers/mobility for ADL/IADL indp. compared to baseline ADL indep.  -AF       [facility] Name 08/16/23 1155          Vital Signs    Pre SpO2 (%) 80  -AF     Post SpO2 (%) 93  -AF     O2 Delivery Post Treatment nasal cannula  -AF     Pre Patient Position Supine  -AF     Intra Patient Position  Standing  -AF     Post Patient Position Supine  -AF       Row Name 08/16/23 1155          Positioning and Restraints    Pre-Treatment Position in bed  -AF     Post Treatment Position bed  -AF     In Bed notified nsg;call light within reach;fowlers;encouraged to call for assist;exit alarm on;with family/caregiver  -AF               User Key  (r) = Recorded By, (t) = Taken By, (c) = Cosigned By      Initials Name Provider Type    Regine Walker OT Occupational Therapist                   Outcome Measures       Row Name 08/16/23 1202          How much help from another is currently needed...    Putting on and taking off regular lower body clothing? 2  -AF     Bathing (including washing, rinsing, and drying) 2  -AF     Toileting (which includes using toilet bed pan or urinal) 2  -AF     Putting on and taking off regular upper body clothing 3  -AF     Taking care of personal grooming (such as brushing teeth) 3  -AF     Eating meals 4  -AF     AM-PAC 6 Clicks Score (OT) 16  -AF       Row Name 08/16/23 1202          Functional Assessment    Outcome Measure Options AM-PAC 6 Clicks Daily Activity (OT)  -AF               User Key  (r) = Recorded By, (t) = Taken By, (c) = Cosigned By      Initials Name Provider Type    AF Regine Smalls OT Occupational Therapist                    Occupational Therapy Education       Title: PT OT SLP Therapies (Done)       Topic: Occupational Therapy (Done)       Point: ADL training (Done)       Description:   Instruct learner(s) on proper safety adaptation and remediation techniques during self care or transfers.   Instruct in proper use of assistive devices.                  Learning Progress Summary             Patient Acceptance, E,TB, VU by TORY at 8/16/2023 1204    Acceptance, E,TB,D, NR by  at 8/12/2023 1110    Comment: Educted regarding role of therapy and therex   Family Acceptance, E,TB, VU by TORY at 8/16/2023 1204    Acceptance, E,TB,D, NR by  at 8/12/2023 1110    Comment:  Educted regarding role of therapy and therex                         Point: Home exercise program (Done)       Description:   Instruct learner(s) on appropriate technique for monitoring, assisting and/or progressing therapeutic exercises/activities.                  Learning Progress Summary             Patient Acceptance, E,TB, VU by AF at 8/16/2023 1204   Family Acceptance, E,TB, VU by AF at 8/16/2023 1204                         Point: Precautions (Done)       Description:   Instruct learner(s) on prescribed precautions during self-care and functional transfers.                  Learning Progress Summary             Patient Acceptance, E,TB, VU by AF at 8/16/2023 1204   Family Acceptance, E,TB, VU by AF at 8/16/2023 1204                         Point: Body mechanics (Done)       Description:   Instruct learner(s) on proper positioning and spine alignment during self-care, functional mobility activities and/or exercises.                  Learning Progress Summary             Patient Acceptance, E,TB, VU by AF at 8/16/2023 1204   Family Acceptance, E,TB, VU by AF at 8/16/2023 1204                                         User Key       Initials Effective Dates Name Provider Type Discipline     02/03/23 -  Paola Azar, OT Occupational Therapist OT    AF 08/15/23 -  Regine Smalls OT Occupational Therapist OT                  OT Recommendation and Plan     Plan of Care Review  Plan of Care Reviewed With: patient, daughter  Progress: improving  Outcome Evaluation: Pt showed improved ADL indp. by don/doffing socks seated EOB w/ SPV. Continue to demo deficits in strength and balance affecting fxnl tranfers/mobility for ADL/IADL indp. compared to baseline ADL indep.     Time Calculation:         Time Calculation- OT       Row Name 08/16/23 1205             Time Calculation- OT    OT Start Time 1000  -AF      OT Received On 08/16/23  -AF      OT Goal Re-Cert Due Date 08/22/23  -AF         Timed Charges    32364 -  OT Therapeutic Activity Minutes 15  -AF      60398 - OT Self Care/Mgmt Minutes 15  -AF         Total Minutes    Timed Charges Total Minutes 30  -AF       Total Minutes 30  -AF                User Key  (r) = Recorded By, (t) = Taken By, (c) = Cosigned By      Initials Name Provider Type    AF Regine Smalls OT Occupational Therapist                  Therapy Charges for Today       Code Description Service Date Service Provider Modifiers Qty    [id number]  OT THERAPEUTIC ACT EA 15 MIN 8/16/2023 Regine Smalls OT GO 1    [id number] HC OT SELF CARE/MGMT/TRAIN EA 15 MIN 8/16/2023 Regine Smalls OT GO 1                 Regine Smalls OT  8/16/2023

## 2023-08-16 NOTE — PROGRESS NOTES
Spoke with daughter she is agreeable to [facility]. Verified PCP. Please call mobile number for visits. Tiffany RAM, [facility]-Liaison

## 2023-08-16 NOTE — PLAN OF CARE
Goal Outcome Evaluation:  Plan of Care Reviewed With: patient, daughter        Progress: improving  Outcome Evaluation: Pt showed improved ADL indp. by don/doffing socks seated EOB w/ SPV. Continue to demo deficits in strength and balance affecting fxnl tranfers/mobility for ADL/IADL indp. compared to baseline ADL indep.

## 2023-08-16 NOTE — OUTREACH NOTE
Prep Survey      Flowsheet Row Responses   [facility] patient discharged from? [facility]   Is LACE score < 7 ? No   Eligibility [facility]   Date of Admission 08/10/23   Date of Discharge 08/16/23   Discharge Disposition Home or Self Care   Discharge diagnosis Acute hypoxemic respiratory failure / Acute heart failure with preserved ejection fraction   Does the patient have one of the following disease processes/diagnoses(primary or secondary)? CHF   Does the patient have Home health ordered? Yes   What is the Home health agency?  [facility]  (SN,PT,OT)   Is there a DME ordered? Yes   What DME was ordered? Oxygen therapy--[facility]   Prep survey completed? Yes            Marcia KEARNS - Registered Nurse

## 2023-08-16 NOTE — CASE MANAGEMENT/SOCIAL WORK
Case Management Discharge Note      Final Note: Patient is being discharged home today with daughter to assist. Patient requested [facility] however they are not accepting her insurance at this time. Referral made to [facility] as patient has no preference to other home health agencies. [facility] accepted referral for nursing, PT/OT. Patient requiring oxygen at discharge and has no preference to a particular DME company, referral made to [facility] and spoke with Mukesh. [facility] accepted referral and will deliver a portable O2 tank to her room prior to discharge. [facility]. Patients daughter will provide transportation home and both patient/daughter are agreeable to discharge plan.         Selected Continued Care - Admitted Since 8/10/2023       Destination    No services have been selected for the patient.                Durable Medical Equipment       Service Provider Selected Services Address Phone Fax Patient Preferred    [facility] [address] [facility] [address] [phone] --              Dialysis/Infusion    No services have been selected for the patient.                Home Medical Care       Service Provider Selected Services Address Phone Fax Patient Preferred    formerly [facility] [address] [facility] [address] [phone] --              Therapy    No services have been selected for the patient.                Community Resources    No services have been selected for the patient.                Community & DME    No services have been selected for the patient.                         Final Discharge Disposition Code: 06 - home with home health care

## 2023-08-17 ENCOUNTER — HOME CARE VISIT (OUTPATIENT)
Dept: HOME HEALTH SERVICES | Facility: HOME HEALTHCARE | Age: 88
End: 2023-08-17
Payer: MEDICARE

## 2023-08-17 ENCOUNTER — TRANSITIONAL CARE MANAGEMENT TELEPHONE ENCOUNTER (OUTPATIENT)
Dept: CALL CENTER | Facility: HOSPITAL | Age: 88
End: 2023-08-17
Payer: MEDICARE

## 2023-08-17 VITALS
TEMPERATURE: 98.4 F | SYSTOLIC BLOOD PRESSURE: 120 MMHG | HEART RATE: 82 BPM | OXYGEN SATURATION: 95 % | RESPIRATION RATE: 16 BRPM | DIASTOLIC BLOOD PRESSURE: 68 MMHG

## 2023-08-17 PROCEDURE — G0299 HHS/HOSPICE OF RN EA 15 MIN: HCPCS

## 2023-08-17 NOTE — HOME HEALTH
"SOC Note:    Home Health ordered for: disciplines sn, pt, ot    Reason for Hosp/Primary Dx/Co-morbidities: Patient is a 89 yr old. female with a past medical history of HTN, BLE DVT diagnosis in July 2023.. She was admitted for acute on chronic heart failure.    Focus of Care: disease process CHF, Medication management,oxygen safety and falls prevention    Patient's goal(s):\"get stronger\"    Current Functional status/mobility/DME: walker, o2    HB status/Living Arrangements: lives alone but dtr has been staying with pt    Skin Integrity/wound status: CDI    Code Status: cpr    Fall Risk/Safety concerns:High risk for falls     Medication issues/Concerns:NA    Additional Problems/Concerns: NA    SDOH Barriers (i.e. caregiver concerns, social isolation, transportation, food insecurity, environment, income etc.)/Need for MSW: NA    Plan for next visit: disease process T/I"

## 2023-08-17 NOTE — OUTREACH NOTE
Call Center TCM Note      Flowsheet Row Responses   [facility] patient discharged from? [place]   Does the patient have one of the following disease processes/diagnoses(primary or secondary)? CHF   TCM attempt successful? Yes   Call start time 0936   Call end time 0940   Discharge diagnosis Acute hypoxemic respiratory failure / Acute heart failure with preserved ejection fraction   Is patient permission given to speak with other caregiver? Yes   Person spoke with today (if not patient) and relationship dtr Areli who was with pt at time of my call. NO CURRENT PCP VERBAL RELEASE ON FILE   Meds reviewed with patient/caregiver? Yes   Is the patient having any side effects they believe may be caused by any medication additions or changes? No   Does the patient have all medications ordered at discharge? Yes   Is the patient taking all medications as directed (includes completed medication regime)? Yes   Comments TCM APPT WITH PCP DR SIEGEL IS 08/25/2023   Does the patient have an appointment with their PCP within 7-14 days of discharge? Yes   What is the Home health agency?  [facility]  (SN,PT,OT)   Has home health visited the patient within 72 hours of discharge? Yes   What DME was ordered? Oxygen therapy--[facility]--[place]   Has all DME been delivered? Yes   Pulse Ox monitoring None   Psychosocial issues? No   Did the patient receive a copy of their discharge instructions? Yes   Nursing interventions Reviewed instructions with patient   What is the patient's perception of their health status since discharge? Improving   If the patient is a current smoker, are they able to teach back resources for cessation? Not a smoker   Is the patient/caregiver able to teach back the hierarchy of who to call/visit for symptoms/problems? PCP, Specialist, Home health nurse, Urgent Care, ED, 911 Yes   TCM call completed? Yes   Wrap up additional comments D/C DX: Acute hypoxemic respiratory failure / Acute  heart failure with preserved ejection fraction - Dtr Areli in home with pt, states she ate good breakfast and breathing is improved. Dtr vesta be obtaining scale for home to monitor pt weight. Change to Bumetanide in place. No questions at this time. [facility] sched to see pt today. TCM APPT with PCP Dr Hung is 08/25/2023.   Call end time 0940             Molly Walker MA    8/17/2023, 09:44 EDT

## 2023-08-21 ENCOUNTER — HOME CARE VISIT (OUTPATIENT)
Dept: HOME HEALTH SERVICES | Facility: HOME HEALTHCARE | Age: 88
End: 2023-08-21
Payer: MEDICARE

## 2023-08-21 VITALS
DIASTOLIC BLOOD PRESSURE: 84 MMHG | SYSTOLIC BLOOD PRESSURE: 153 MMHG | TEMPERATURE: 98.1 F | HEART RATE: 78 BPM | OXYGEN SATURATION: 100 % | RESPIRATION RATE: 18 BRPM

## 2023-08-21 PROCEDURE — G0299 HHS/HOSPICE OF RN EA 15 MIN: HCPCS

## 2023-08-21 NOTE — HOME HEALTH
SN visit for cardiopulmonary assessment, disease process education and incetive spirometry teaching. Patient doing well, performed IS with teach back.

## 2023-08-23 ENCOUNTER — HOME CARE VISIT (OUTPATIENT)
Dept: HOME HEALTH SERVICES | Facility: HOME HEALTHCARE | Age: 88
End: 2023-08-23
Payer: MEDICARE

## 2023-08-23 VITALS
TEMPERATURE: 98.1 F | SYSTOLIC BLOOD PRESSURE: 150 MMHG | OXYGEN SATURATION: 99 % | HEART RATE: 82 BPM | DIASTOLIC BLOOD PRESSURE: 78 MMHG | RESPIRATION RATE: 16 BRPM

## 2023-08-23 VITALS
OXYGEN SATURATION: 99 % | HEART RATE: 82 BPM | SYSTOLIC BLOOD PRESSURE: 150 MMHG | TEMPERATURE: 98 F | DIASTOLIC BLOOD PRESSURE: 78 MMHG | RESPIRATION RATE: 17 BRPM

## 2023-08-23 PROCEDURE — G0151 HHCP-SERV OF PT,EA 15 MIN: HCPCS

## 2023-08-23 PROCEDURE — G0152 HHCP-SERV OF OT,EA 15 MIN: HCPCS

## 2023-08-23 NOTE — Clinical Note
HHOT evaluation completed on 8/23/23. Pt presents with decreased strength, balance, and endurance affecting her independence ans safety with ADL skills, and functional mobility. OT to see an additional 1w4 in order to assist pt in returning to her PLOF.

## 2023-08-24 NOTE — HOME HEALTH
Patient is a 89 year old female with a past medical history of HTN, b/l DVT diagnosis in July 2023 on Eliquis and HFrEF presented to [facility] ED complaining of increased shortness of air over the last few days. She was admitted 8/10/23-8/16/23 for acute on chronic HFpEF. Patient discharged home and was seen today for an OT evaluation. She presents with decreased strength, balance, and endurance affecting her independence and safety with ADL skills, and functional mobility/transfers in the home. She alos is on oxygen continuously at this time. Her daughter has been staying with her 24/7 since discharge from hospital. PLOF includes mod I with BADL's, light IADL's, and mobility in the home with walker. OT to see an additional 1w4 in order to assist pt in her return to PLOF.

## 2023-08-24 NOTE — HOME HEALTH
Routine Visit Note:    Patient is a 89 year old female who lives alone but daughter checks on her daily.  Amb with 4WW, modified indep/indep with performance of functional mobility tasks, history of falls with the most recent fall trying to get david and out of the tub, currently on continuous oxygen via NC x 3 LPM.     Skill/education provided: Patient and caregiver educated regarding goals and role of home care PT services.     Patient/caregiver response: Patient and caregiver agreeable with initiation of PT services.     Plan for next visit: HEP initiation, bilateral LE strengthening

## 2023-08-25 ENCOUNTER — OFFICE VISIT (OUTPATIENT)
Dept: FAMILY MEDICINE CLINIC | Facility: CLINIC | Age: 88
End: 2023-08-25
Payer: MEDICARE

## 2023-08-25 VITALS
RESPIRATION RATE: 15 BRPM | OXYGEN SATURATION: 94 % | HEART RATE: 74 BPM | DIASTOLIC BLOOD PRESSURE: 82 MMHG | HEIGHT: 64 IN | SYSTOLIC BLOOD PRESSURE: 142 MMHG | TEMPERATURE: 96.8 F | WEIGHT: 168 LBS | BODY MASS INDEX: 28.68 KG/M2

## 2023-08-25 DIAGNOSIS — I50.33 ACUTE ON CHRONIC DIASTOLIC (CONGESTIVE) HEART FAILURE: ICD-10-CM

## 2023-08-25 DIAGNOSIS — I10 ESSENTIAL HYPERTENSION: ICD-10-CM

## 2023-08-25 DIAGNOSIS — I82.5Y3 CHRONIC DEEP VEIN THROMBOSIS (DVT) OF PROXIMAL VEIN OF BOTH LOWER EXTREMITIES: Primary | ICD-10-CM

## 2023-08-25 PROCEDURE — 99214 OFFICE O/P EST MOD 30 MIN: CPT | Performed by: FAMILY MEDICINE

## 2023-08-25 PROCEDURE — 1160F RVW MEDS BY RX/DR IN RCRD: CPT | Performed by: FAMILY MEDICINE

## 2023-08-25 PROCEDURE — 1159F MED LIST DOCD IN RCRD: CPT | Performed by: FAMILY MEDICINE

## 2023-08-25 NOTE — PROGRESS NOTES
Subjective   Rose J Kellermann is a 89 y.o. female      Chief Complaint  Hospital follow-up  Congestive heart failure  Hypertension  Bilateral deep vein thrombosis.       History of Present Illness  The patient was hospitalized at [facility] in [place] from 08/10/2023 through 08/16/2023 with bilateral lower extremity DVT, congestive heart failure, hypertension, and renal insufficiency. She has at home physical therapy and occupational therapy. She is using oxygen at night on an as needed basis. She is on Eliquis 5 mg twice a day. She is accompanied today by her daughter.    The patient's daughter notes that the patient's lower extremities became swollen again after she came home from the hospital, but not as bad as before. The patient elevates her lower extremities at home. The patient does not need the oxygen during the day, just at night. The patient's daughter states that the hospital goal was for her to be off oxygen and be at normal levels, but they never achieved that goal. The patient's daughter states that she has been on oxygen all the time until 08/23/2023 afternoon. Both PT and OT came to the home. They had her ambulate, and they determined that the patient did not need oxygen during the day. The patient's daughter states that for the last couple of days she has not been on oxygen during the day and just at night.  The patient continues her physical therapy and occupational therapy at home once a week. The patient's lower extremities were tight in the back and now they are loose. The patient has been on Eliquis for approximately 1.5 months. The patient has a living will.      The following portions of the patient's history were reviewed and updated as appropriate: allergies, current medications, past social history and problem list.    Review of Systems   Constitutional:  Negative for fatigue, fever and unexpected weight change.   HENT:  Negative for congestion and trouble swallowing.     Respiratory:  Negative for cough, choking, chest tightness and shortness of breath.    Cardiovascular:  Negative for chest pain, palpitations and leg swelling.   Gastrointestinal:  Negative for abdominal distention, abdominal pain and nausea.   Musculoskeletal:  Negative for back pain.   Skin:  Negative for color change and rash.   Neurological:  Negative for dizziness, syncope, weakness and headaches.   Psychiatric/Behavioral:  Negative for dysphoric mood. The patient is not nervous/anxious.        Objective         Vitals:    08/25/23 1452   BP: 142/82   Pulse: 74   Resp: 15   Temp: 96.8 °F (36 °C)   SpO2: 94%         Physical Exam  Vitals and nursing note reviewed.   Constitutional:       General: She is not in acute distress.     Appearance: Normal appearance. She is well-developed. She is not ill-appearing, toxic-appearing or diaphoretic.   Neck:      Vascular: No carotid bruit or JVD.   Cardiovascular:      Rate and Rhythm: Normal rate and regular rhythm.      Pulses: Normal pulses.      Heart sounds: Normal heart sounds. No murmur heard.  Pulmonary:      Effort: Pulmonary effort is normal. No respiratory distress.      Breath sounds: Normal breath sounds.   Abdominal:      Palpations: Abdomen is soft. There is no mass.      Tenderness: There is no abdominal tenderness.   Musculoskeletal:         General: No swelling.   Skin:     General: Skin is warm and dry.   Neurological:      Mental Status: She is alert and oriented to person, place, and time.   Psychiatric:         Mood and Affect: Mood normal.         Behavior: Behavior normal.            No results were obtained or interpreted today.      Assessment & Plan     Problems Addressed this Visit          Cardiac and Vasculature    Essential hypertension       Coag and Thromboembolic    Chronic deep vein thrombosis (DVT) of both lower extremities - Primary     Other Visit Diagnoses       Acute on chronic diastolic (congestive) heart failure               Diagnoses         Codes Comments    Chronic deep vein thrombosis (DVT) of proximal vein of both lower extremities    -  Primary ICD-10-CM: I82.5Y3  ICD-9-CM: 453.51     Essential hypertension     ICD-10-CM: I10  ICD-9-CM: 401.9     Acute on chronic diastolic (congestive) heart failure     ICD-10-CM: I50.33  ICD-9-CM: 428.33, 428.0           Plan    Continue current medications.    Continue with home health, home PT and OT.    Recheck 2 months.    I spent 35 minutes in patient care: Reviewing records prior to the visit, examining the patient, entering orders and documentation    Part of this note may be an electronic transcription/translation of spoken language to printed text using the Dragon Dictation System.                     Transcribed from ambient dictation for URMILA Hung MD by Elisabeth Merrill.  08/25/23   16:31 EDT    Patient or patient representative verbalized consent to the visit recording.  I have personally performed the services described in this document as transcribed by the above individual, and it is both accurate and complete.

## 2023-08-29 ENCOUNTER — HOME CARE VISIT (OUTPATIENT)
Dept: HOME HEALTH SERVICES | Facility: HOME HEALTHCARE | Age: 88
End: 2023-08-29
Payer: MEDICARE

## 2023-08-29 VITALS
RESPIRATION RATE: 20 BRPM | HEART RATE: 76 BPM | SYSTOLIC BLOOD PRESSURE: 140 MMHG | DIASTOLIC BLOOD PRESSURE: 84 MMHG | OXYGEN SATURATION: 92 %

## 2023-08-29 PROCEDURE — G0157 HHC PT ASSISTANT EA 15: HCPCS

## 2023-08-30 ENCOUNTER — HOME CARE VISIT (OUTPATIENT)
Dept: HOME HEALTH SERVICES | Facility: HOME HEALTHCARE | Age: 88
End: 2023-08-30
Payer: MEDICARE

## 2023-08-30 VITALS
TEMPERATURE: 98.6 F | HEART RATE: 78 BPM | OXYGEN SATURATION: 91 % | DIASTOLIC BLOOD PRESSURE: 82 MMHG | RESPIRATION RATE: 18 BRPM | SYSTOLIC BLOOD PRESSURE: 128 MMHG

## 2023-08-30 PROCEDURE — G0152 HHCP-SERV OF OT,EA 15 MIN: HCPCS

## 2023-08-31 ENCOUNTER — HOME CARE VISIT (OUTPATIENT)
Dept: HOME HEALTH SERVICES | Facility: HOME HEALTHCARE | Age: 88
End: 2023-08-31
Payer: MEDICARE

## 2023-08-31 VITALS
DIASTOLIC BLOOD PRESSURE: 74 MMHG | HEART RATE: 66 BPM | SYSTOLIC BLOOD PRESSURE: 150 MMHG | TEMPERATURE: 98.1 F | OXYGEN SATURATION: 93 % | RESPIRATION RATE: 18 BRPM

## 2023-08-31 PROCEDURE — G0299 HHS/HOSPICE OF RN EA 15 MIN: HCPCS

## 2023-08-31 NOTE — HOME HEALTH
SN routine visit  Patient no longer requiring oxygen during the day, only at night time.  Patient's BP slightly elevated. Message sent to MD.  Plan for SN discharge next week.

## 2023-08-31 NOTE — CASE COMMUNICATION
Anastasia Pearlgmnancy had a SN visit with home care today  Her BP was elevated 150/74. No complaints of symptoms.  No longer using oxygen during the day but still using at night time.  Plan for SN to discharge next week, therapy to remain in the home.    Thanks,  LEANNA Bonilla

## 2023-08-31 NOTE — HOME HEALTH
Routine Visit Note:    Skill/education provided: ADL retraining, establish and complete UB HEP, functional mobility/transfers     Patient/caregiver response: Patient tolerated session well, O2 sats remained 88-91% on room air with exertion. Pt's daughter reports she is only weaing oxygen at night and they are monitoring sats throughout day    Plan for next visit: ADL's, HEP    Other pertinent info:

## 2023-09-05 ENCOUNTER — HOME CARE VISIT (OUTPATIENT)
Dept: HOME HEALTH SERVICES | Facility: HOME HEALTHCARE | Age: 88
End: 2023-09-05
Payer: MEDICARE

## 2023-09-05 VITALS
OXYGEN SATURATION: 93 % | RESPIRATION RATE: 18 BRPM | TEMPERATURE: 97.3 F | SYSTOLIC BLOOD PRESSURE: 144 MMHG | HEART RATE: 88 BPM | DIASTOLIC BLOOD PRESSURE: 73 MMHG

## 2023-09-05 PROCEDURE — G0299 HHS/HOSPICE OF RN EA 15 MIN: HCPCS

## 2023-09-05 NOTE — CASE COMMUNICATION
Rose Kellermann has met all goals for skilled nursing at this time and has been discharged from nursing services effective today 9/5/23. Therapy will remain in the home until their goals are met.    Thank you,  LEANNA Bonilla

## 2023-09-06 ENCOUNTER — HOME CARE VISIT (OUTPATIENT)
Dept: HOME HEALTH SERVICES | Facility: HOME HEALTHCARE | Age: 88
End: 2023-09-06
Payer: MEDICARE

## 2023-09-06 VITALS
RESPIRATION RATE: 18 BRPM | OXYGEN SATURATION: 93 % | TEMPERATURE: 98.1 F | SYSTOLIC BLOOD PRESSURE: 150 MMHG | DIASTOLIC BLOOD PRESSURE: 82 MMHG | HEART RATE: 77 BPM

## 2023-09-06 PROCEDURE — G0152 HHCP-SERV OF OT,EA 15 MIN: HCPCS

## 2023-09-06 PROCEDURE — G0157 HHC PT ASSISTANT EA 15: HCPCS

## 2023-09-07 VITALS
RESPIRATION RATE: 20 BRPM | OXYGEN SATURATION: 92 % | DIASTOLIC BLOOD PRESSURE: 80 MMHG | SYSTOLIC BLOOD PRESSURE: 138 MMHG | HEART RATE: 89 BPM

## 2023-09-07 NOTE — HOME HEALTH
Routine Visit Note:    Skill/education provided: ADL retraining, education and review of EC/WS, and breathing techniques, review and completion of HEP    Patient/caregiver response: Patient tolerated session well, still has some decrease in O2 sats with exertion but is recovering in quicker time frame than on initial evaluation     Plan for next visit: ADL's, HEP    Other pertinent info:

## 2023-09-13 ENCOUNTER — HOME CARE VISIT (OUTPATIENT)
Dept: HOME HEALTH SERVICES | Facility: HOME HEALTHCARE | Age: 88
End: 2023-09-13
Payer: MEDICARE

## 2023-09-13 VITALS
OXYGEN SATURATION: 93 % | DIASTOLIC BLOOD PRESSURE: 79 MMHG | RESPIRATION RATE: 16 BRPM | SYSTOLIC BLOOD PRESSURE: 135 MMHG | HEART RATE: 61 BPM

## 2023-09-13 VITALS
TEMPERATURE: 98.6 F | SYSTOLIC BLOOD PRESSURE: 143 MMHG | DIASTOLIC BLOOD PRESSURE: 102 MMHG | HEART RATE: 74 BPM | RESPIRATION RATE: 18 BRPM | OXYGEN SATURATION: 92 %

## 2023-09-13 PROCEDURE — G0152 HHCP-SERV OF OT,EA 15 MIN: HCPCS

## 2023-09-13 PROCEDURE — G0157 HHC PT ASSISTANT EA 15: HCPCS

## 2023-09-13 NOTE — HOME HEALTH
Routine Visit Note:    Skill/education provided: ADL retraining, dynamic standing balance activities, functional transfers     Patient/caregiver response: Pt tolerated session well, oxygen is not dropping as low as it was initially and she is requiring less recovery time and rest breaks. Still does need cues for PLB technique    Plan for next visit: OT d/c vs reassessment     Other pertinent info:

## 2023-09-14 NOTE — TELEPHONE ENCOUNTER
Caller: KELLERMANN, KATHERINE    Relationship: Emergency Contact    Best call back number: [phone]     Requested Prescriptions:   Requested Prescriptions     Pending Prescriptions Disp Refills    apixaban (ELIQUIS) 5 MG tablet tablet 60 tablet 5     Sig: Take 1 tablet by mouth 2 (Two) Times a Day. Indications: DVT/PE (active thrombosis)        Pharmacy where request should be sent: [facility] [address] [phone] [facility] [phone] FX     Last office visit with prescribing clinician: 8/25/2023   Last telemedicine visit with prescribing clinician: Visit date not found   Next office visit with prescribing clinician: 10/25/2023     Additional details provided by patient: REQUEST 90 DAYS     Does the patient have less than a 3 day supply:  [] Yes  [x] No    Would you like a call back once the refill request has been completed: [] Yes [x] No    If the office needs to give you a call back, can they leave a voicemail: [] Yes [x] No    Laura Buckley Rep   09/14/23 15:13 EDT

## 2023-09-20 ENCOUNTER — HOME CARE VISIT (OUTPATIENT)
Dept: HOME HEALTH SERVICES | Facility: HOME HEALTHCARE | Age: 88
End: 2023-09-20
Payer: MEDICARE

## 2023-09-20 VITALS
HEART RATE: 74 BPM | TEMPERATURE: 97.8 F | SYSTOLIC BLOOD PRESSURE: 140 MMHG | DIASTOLIC BLOOD PRESSURE: 81 MMHG | OXYGEN SATURATION: 95 % | RESPIRATION RATE: 18 BRPM

## 2023-09-20 PROCEDURE — G0151 HHCP-SERV OF PT,EA 15 MIN: HCPCS

## 2023-09-20 PROCEDURE — G0152 HHCP-SERV OF OT,EA 15 MIN: HCPCS

## 2023-09-21 VITALS
TEMPERATURE: 97.8 F | SYSTOLIC BLOOD PRESSURE: 140 MMHG | HEART RATE: 64 BPM | RESPIRATION RATE: 16 BRPM | OXYGEN SATURATION: 95 % | DIASTOLIC BLOOD PRESSURE: 81 MMHG

## 2023-09-22 NOTE — HOME HEALTH
Routine Visit Note:    Skill/education provided: Patient and caregiver education focused on dynamic standing balance, gait techinque, bilateral LE strengthening and progression / finalization of HEP.     Patient/caregiver response: patient and daughter agreeable and motivated to continue with PT services to complete the end of the current certification period.     Plan for next visit: patient to continue with PT services for the next 3 weeks with discharge at the end of the current certification period.

## 2023-09-27 ENCOUNTER — HOME CARE VISIT (OUTPATIENT)
Dept: HOME HEALTH SERVICES | Facility: HOME HEALTHCARE | Age: 88
End: 2023-09-27
Payer: MEDICARE

## 2023-09-27 VITALS
HEART RATE: 78 BPM | DIASTOLIC BLOOD PRESSURE: 80 MMHG | SYSTOLIC BLOOD PRESSURE: 123 MMHG | OXYGEN SATURATION: 94 % | RESPIRATION RATE: 20 BRPM

## 2023-09-27 PROCEDURE — G0157 HHC PT ASSISTANT EA 15: HCPCS

## 2023-10-04 ENCOUNTER — HOME CARE VISIT (OUTPATIENT)
Dept: HOME HEALTH SERVICES | Facility: HOME HEALTHCARE | Age: 88
End: 2023-10-04
Payer: MEDICARE

## 2023-10-04 VITALS
HEART RATE: 69 BPM | OXYGEN SATURATION: 97 % | SYSTOLIC BLOOD PRESSURE: 119 MMHG | RESPIRATION RATE: 20 BRPM | DIASTOLIC BLOOD PRESSURE: 72 MMHG

## 2023-10-04 PROCEDURE — G0157 HHC PT ASSISTANT EA 15: HCPCS

## 2023-10-09 ENCOUNTER — TELEPHONE (OUTPATIENT)
Dept: FAMILY MEDICINE CLINIC | Facility: CLINIC | Age: 88
End: 2023-10-09

## 2023-10-09 NOTE — TELEPHONE ENCOUNTER
Caller: KELLERMANN, KATHERINE    Relationship: Emergency Contact    Best call back number: [phone]    What is the best time to reach you: ANYTIME     Who are you requesting to speak with (clinical staff, provider,  specific staff member): CLINICAL STAFF     What was the call regarding: PATIENT'S DAUGHTER IS CALLING TO SEE ABOUT GETTING IN THE PATIENT TO AN ENT. SHE IS WANTING TO HAVE THE PATIENT BE SEEN FOR HER HEARING.     Is it okay if the provider responds through MyChart: YES

## 2023-10-11 ENCOUNTER — HOME CARE VISIT (OUTPATIENT)
Dept: HOME HEALTH SERVICES | Facility: HOME HEALTHCARE | Age: 88
End: 2023-10-11
Payer: MEDICARE

## 2023-10-11 PROCEDURE — G0151 HHCP-SERV OF PT,EA 15 MIN: HCPCS

## 2023-10-12 VITALS
HEART RATE: 73 BPM | OXYGEN SATURATION: 94 % | TEMPERATURE: 98.2 F | DIASTOLIC BLOOD PRESSURE: 74 MMHG | RESPIRATION RATE: 16 BRPM | SYSTOLIC BLOOD PRESSURE: 142 MMHG

## 2023-10-12 NOTE — CASE COMMUNICATION
Patient discharged from home care services at this time secondary to met max potential, indep with HEP and modified indep/indep with performance of functional mobiltiy tasks within her home.

## 2023-10-16 NOTE — TELEPHONE ENCOUNTER
CALLER/DAUGHTER MADE CONTACT TO CHECK THE STATUS OF REFERRAL REQUEST MADE ON MONDAY, 10/9    PLEASE CONTACT CALLER WITH ANY UPDATES FOR REFERRAL REQUEST FOR ENT SPECIALIST    DR SIEGEL  REFERRAL COORDINATOR

## 2023-10-25 ENCOUNTER — LAB (OUTPATIENT)
Dept: FAMILY MEDICINE CLINIC | Facility: CLINIC | Age: 88
End: 2023-10-25
Payer: MEDICARE

## 2023-10-25 ENCOUNTER — OFFICE VISIT (OUTPATIENT)
Dept: FAMILY MEDICINE CLINIC | Facility: CLINIC | Age: 88
End: 2023-10-25
Payer: MEDICARE

## 2023-10-25 VITALS
OXYGEN SATURATION: 95 % | BODY MASS INDEX: 27.49 KG/M2 | WEIGHT: 161 LBS | RESPIRATION RATE: 16 BRPM | SYSTOLIC BLOOD PRESSURE: 130 MMHG | HEART RATE: 80 BPM | DIASTOLIC BLOOD PRESSURE: 70 MMHG | TEMPERATURE: 98 F | HEIGHT: 64 IN

## 2023-10-25 DIAGNOSIS — N17.9 ACUTE RENAL FAILURE, UNSPECIFIED ACUTE RENAL FAILURE TYPE: ICD-10-CM

## 2023-10-25 DIAGNOSIS — I50.33 ACUTE ON CHRONIC DIASTOLIC (CONGESTIVE) HEART FAILURE: ICD-10-CM

## 2023-10-25 DIAGNOSIS — I82.5Y3 CHRONIC DEEP VEIN THROMBOSIS (DVT) OF PROXIMAL VEIN OF BOTH LOWER EXTREMITIES: Primary | ICD-10-CM

## 2023-10-25 DIAGNOSIS — I10 ESSENTIAL HYPERTENSION: ICD-10-CM

## 2023-10-25 DIAGNOSIS — E55.9 VITAMIN D DEFICIENCY: ICD-10-CM

## 2023-10-25 LAB
25(OH)D3 SERPL-MCNC: 118 NG/ML (ref 30–100)
ANION GAP SERPL CALCULATED.3IONS-SCNC: 11.6 MMOL/L (ref 5–15)
BUN SERPL-MCNC: 26 MG/DL (ref 8–23)
BUN/CREAT SERPL: 11.7 (ref 7–25)
CALCIUM SPEC-SCNC: 10.1 MG/DL (ref 8.2–9.6)
CHLORIDE SERPL-SCNC: 102 MMOL/L (ref 98–107)
CO2 SERPL-SCNC: 26.4 MMOL/L (ref 22–29)
CREAT SERPL-MCNC: 2.23 MG/DL (ref 0.57–1)
EGFRCR SERPLBLD CKD-EPI 2021: 19.7 ML/MIN/1.73
GLUCOSE SERPL-MCNC: 102 MG/DL (ref 65–99)
POTASSIUM SERPL-SCNC: 4.2 MMOL/L (ref 3.5–5.2)
SODIUM SERPL-SCNC: 140 MMOL/L (ref 136–145)

## 2023-10-25 PROCEDURE — 36415 COLL VENOUS BLD VENIPUNCTURE: CPT | Performed by: FAMILY MEDICINE

## 2023-10-25 PROCEDURE — 80048 BASIC METABOLIC PNL TOTAL CA: CPT | Performed by: FAMILY MEDICINE

## 2023-10-25 PROCEDURE — 82306 VITAMIN D 25 HYDROXY: CPT | Performed by: FAMILY MEDICINE

## 2023-10-25 NOTE — PROGRESS NOTES
Subjective   Rose J Kellermann is a 89 y.o. female      Chief Complaint  Bilateral lower extremity DVT  Congestive heart failure  Fatigue  Chronic kidney disease  Vitamin D deficiency       History of Present Illness  Patient presents accompanied by her daughter for a 2-month follow-up. She was hospitalized on 07/2023 for bilateral lower extremity DVT and congestive heart failure. She is on Eliquis 5 mg 1 tablet 2 times a day for her DVT. Blood pressure today is 130/70 mmHg. Weight is not recorded as she was not weighed today. The patient is accompanied by her daughter. They have questions about vitamin D.    The patient states she is doing very well. She graduated from physical therapy and occupational therapy. Her daughter says they are struggling to continue with the exercises now that they do not have to attend therapy every Wednesday afternoon. She is still experiencing edema in her leg, but it has improved. She is complaint with all her medications.    The patient's breathing is doing well. She denies shortness of breath when laying down if she utilizes her oxygen. She lays on her lips to see if she can feel the air coming out.    The patient's daughter says the last time she bought vitamin D, she bought 250 International Units instead of 150 International Units that her kidney specialist recommended; as she has an excess of vitamin D.    The patient's daughter wants to focus on improving the patient's hearing, which has worsened. She was referred to [facility].     She has had the Influenza vaccine and the COVID-19 booster within the last several weeks.    The patient has an appointment with the cardiologist on 02/2024.    The following portions of the patient's history were reviewed and updated as appropriate: allergies, current medications, past social history and problem list.    Review of Systems   Constitutional:  Negative for fatigue, fever and unexpected weight change.   HENT:  Negative for  congestion and trouble swallowing.    Respiratory:  Negative for cough, choking, chest tightness and shortness of breath.    Cardiovascular:  Negative for chest pain, palpitations and leg swelling.   Gastrointestinal:  Negative for abdominal distention, abdominal pain and nausea.   Musculoskeletal:  Negative for back pain.   Skin:  Negative for color change and rash.   Neurological:  Negative for dizziness, syncope, weakness and headaches.   Psychiatric/Behavioral:  Negative for dysphoric mood. The patient is not nervous/anxious.          Objective         Vitals:    10/25/23 1331   BP: 130/70   Pulse: 80   Resp: 16   Temp: 98 °F (36.7 °C)   SpO2: 95%         Physical Exam  Vitals and nursing note reviewed.   Constitutional:       General: She is not in acute distress.     Appearance: Normal appearance. She is well-developed. She is not ill-appearing, toxic-appearing or diaphoretic.   Neck:      Vascular: No carotid bruit or JVD.   Cardiovascular:      Rate and Rhythm: Normal rate and regular rhythm.      Pulses: Normal pulses.      Heart sounds: Normal heart sounds. No murmur heard.  Pulmonary:      Effort: Pulmonary effort is normal. No respiratory distress.      Breath sounds: Normal breath sounds.   Abdominal:      Palpations: Abdomen is soft. There is no mass.      Tenderness: There is no abdominal tenderness.   Musculoskeletal:         General: No swelling.   Skin:     General: Skin is warm and dry.   Neurological:      Mental Status: She is alert and oriented to person, place, and time.   Psychiatric:         Mood and Affect: Mood normal.         Behavior: Behavior normal.              No results were obtained or interpreted today.      Assessment & Plan     Problems Addressed this Visit          Cardiac and Vasculature    Essential hypertension    Relevant Orders    Basic Metabolic Panel       Coag and Thromboembolic    Chronic deep vein thrombosis (DVT) of both lower extremities - Primary       Endocrine  and Metabolic    Vitamin D deficiency    Relevant Orders    Vitamin D,25-Hydroxy     Other Visit Diagnoses       Acute on chronic diastolic (congestive) heart failure        Relevant Orders    Basic Metabolic Panel    Acute renal failure, unspecified acute renal failure type        Relevant Orders    Basic Metabolic Panel          Diagnoses         Codes Comments    Chronic deep vein thrombosis (DVT) of proximal vein of both lower extremities    -  Primary ICD-10-CM: I82.5Y3  ICD-9-CM: 453.51     Essential hypertension     ICD-10-CM: I10  ICD-9-CM: 401.9     Acute on chronic diastolic (congestive) heart failure     ICD-10-CM: I50.33  ICD-9-CM: 428.33, 428.0     Acute renal failure, unspecified acute renal failure type     ICD-10-CM: N17.9  ICD-9-CM: 584.9     Vitamin D deficiency     ICD-10-CM: E55.9  ICD-9-CM: 268.9              I spent 30 minutes in patient care: Reviewing records prior to the visit, examining the patient, entering orders and documentation    Part of this note may be an electronic transcription/translation of spoken language to printed text using the Dragon Dictation System.       Transcribed from ambient dictation for URMILA Hung MD by Sheryl Barr.  10/25/23   16:09 EDT    Patient or patient representative verbalized consent to the visit recording.  I have personally performed the services described in this document as transcribed by the above individual, and it is both accurate and complete.

## 2023-10-26 NOTE — PROGRESS NOTES
Please contact patient's daughter.  Her vitamin D is still high I recommend she stop the vitamin D altogether for now.  Kidney function is worse.  They have an appointment next month with nephrology.  Need to make sure she keeps that appointment.

## 2023-12-15 ENCOUNTER — TELEPHONE (OUTPATIENT)
Dept: FAMILY MEDICINE CLINIC | Facility: CLINIC | Age: 88
End: 2023-12-15

## 2023-12-15 RX ORDER — BUMETANIDE 1 MG/1
1 TABLET ORAL 3 TIMES WEEKLY
Qty: 45 TABLET | Refills: 3 | Status: SHIPPED | OUTPATIENT
Start: 2023-12-15

## 2023-12-26 ENCOUNTER — OFFICE VISIT (OUTPATIENT)
Dept: FAMILY MEDICINE CLINIC | Facility: CLINIC | Age: 88
End: 2023-12-26
Payer: MEDICARE

## 2023-12-26 VITALS
HEART RATE: 83 BPM | SYSTOLIC BLOOD PRESSURE: 118 MMHG | TEMPERATURE: 97.5 F | OXYGEN SATURATION: 95 % | DIASTOLIC BLOOD PRESSURE: 78 MMHG

## 2023-12-26 DIAGNOSIS — N17.9 ACUTE RENAL FAILURE, UNSPECIFIED ACUTE RENAL FAILURE TYPE: ICD-10-CM

## 2023-12-26 DIAGNOSIS — R29.6 FREQUENT FALLS: ICD-10-CM

## 2023-12-26 DIAGNOSIS — I49.9 IRREGULAR HEART RATE: ICD-10-CM

## 2023-12-26 DIAGNOSIS — I50.33 ACUTE ON CHRONIC DIASTOLIC (CONGESTIVE) HEART FAILURE: ICD-10-CM

## 2023-12-26 DIAGNOSIS — R53.83 FATIGUE, UNSPECIFIED TYPE: ICD-10-CM

## 2023-12-26 DIAGNOSIS — I10 ESSENTIAL HYPERTENSION: ICD-10-CM

## 2023-12-26 DIAGNOSIS — I82.5Y3 CHRONIC DEEP VEIN THROMBOSIS (DVT) OF PROXIMAL VEIN OF BOTH LOWER EXTREMITIES: ICD-10-CM

## 2023-12-26 DIAGNOSIS — Z00.00 MEDICARE ANNUAL WELLNESS VISIT, SUBSEQUENT: Primary | ICD-10-CM

## 2023-12-26 DIAGNOSIS — E55.9 VITAMIN D DEFICIENCY: ICD-10-CM

## 2023-12-26 RX ORDER — AMLODIPINE BESYLATE 5 MG/1
5 TABLET ORAL DAILY
Qty: 90 TABLET | Refills: 3 | Status: SHIPPED | OUTPATIENT
Start: 2023-12-26

## 2024-01-21 NOTE — PROGRESS NOTES
Subjective   Rose J Kellermann is a 89 y.o. female    Chief Complaint    Annual physical exam  Annual Medicare wellness subsequent  Congestive heart failure  Bilateral DVT  Chronic kidney disease  Hypertension  Vitamin D deficiency  Frequent falls  Irregular heart rate    History of Present Illness  Patient presents today accompanied by her daughter for her annual physical examination and Medicare wellness visit.  The above problem list is reviewed and discussed.  She is doing relatively well at this point.  She is having no acute symptoms.  She is very compliant with her medications.  She reports continued difficulties with fatigue and completion of ADLs although she has done fairly well overall.  She is compliant with medications and medical office visits and follow-ups.    The following portions of the patient's history were reviewed and updated as appropriate: allergies, current medications, past social history and problem list    Review of Systems   Constitutional:  Positive for fatigue. Negative for unexpected weight change.   Eyes:  Negative for visual disturbance.   Respiratory: Negative.  Negative for cough, chest tightness and shortness of breath.    Cardiovascular:  Negative for chest pain, palpitations and leg swelling.   Gastrointestinal: Negative.  Negative for constipation, diarrhea and nausea.   Endocrine: Negative for cold intolerance and heat intolerance.   Musculoskeletal:  Positive for back pain. Negative for arthralgias, gait problem and myalgias.   Skin:  Negative for color change and rash.   Allergic/Immunologic: Negative for immunocompromised state.   Neurological:  Negative for dizziness, tremors, syncope, weakness, numbness and headaches.   Psychiatric/Behavioral:  Negative for agitation, behavioral problems and dysphoric mood. The patient is not nervous/anxious.        Objective     Vitals:    12/26/23 1433   BP: 118/78   Pulse: 83   Temp: 97.5 °F (36.4 °C)   SpO2: 95%       Physical  Exam  Vitals and nursing note reviewed.   Constitutional:       General: She is not in acute distress.     Appearance: Normal appearance. She is well-developed. She is not ill-appearing, toxic-appearing or diaphoretic.   HENT:      Head: Normocephalic and atraumatic.      Right Ear: Tympanic membrane and ear canal normal.      Left Ear: Tympanic membrane and ear canal normal.   Eyes:      General: No scleral icterus.     Conjunctiva/sclera: Conjunctivae normal.      Pupils: Pupils are equal, round, and reactive to light.   Neck:      Thyroid: No thyromegaly.      Vascular: No carotid bruit or JVD.   Cardiovascular:      Rate and Rhythm: Normal rate and regular rhythm.      Pulses: Normal pulses.      Heart sounds: Normal heart sounds. No murmur heard.  Pulmonary:      Effort: Pulmonary effort is normal. No respiratory distress.      Breath sounds: Normal breath sounds.   Abdominal:      General: Bowel sounds are normal.      Palpations: Abdomen is soft. There is no mass.      Tenderness: There is no abdominal tenderness.   Musculoskeletal:      Cervical back: Neck supple.      Lumbar back: Tenderness and bony tenderness present. No swelling, deformity or spasms. Decreased range of motion.      Right lower leg: Edema present.      Left lower leg: Edema present.   Lymphadenopathy:      Cervical: No cervical adenopathy.   Skin:     General: Skin is warm and dry.   Neurological:      Mental Status: She is alert and oriented to person, place, and time.      Sensory: No sensory deficit.      Deep Tendon Reflexes: Reflexes are normal and symmetric.   Psychiatric:         Mood and Affect: Mood normal.         Behavior: Behavior normal.         Assessment & Plan   Problems Addressed this Visit          Cardiac and Vasculature    Essential hypertension    Relevant Medications    amLODIPine (NORVASC) 5 MG tablet       Coag and Thromboembolic    Chronic deep vein thrombosis (DVT) of both lower extremities       Endocrine and  Metabolic    Vitamin D deficiency       Symptoms and Signs    Fatigue     Other Visit Diagnoses       Medicare annual wellness visit, subsequent    -  Primary    Acute on chronic diastolic (congestive) heart failure        Relevant Medications    amLODIPine (NORVASC) 5 MG tablet    Acute renal failure, unspecified acute renal failure type        Irregular heart rate        Frequent falls              Diagnoses         Codes Comments    Medicare annual wellness visit, subsequent    -  Primary ICD-10-CM: Z00.00  ICD-9-CM: V70.0     Chronic deep vein thrombosis (DVT) of proximal vein of both lower extremities [I82.5Y3]     ICD-10-CM: I82.5Y3  ICD-9-CM: 453.51     Essential hypertension     ICD-10-CM: I10  ICD-9-CM: 401.9     Acute on chronic diastolic (congestive) heart failure     ICD-10-CM: I50.33  ICD-9-CM: 428.33, 428.0     Acute renal failure, unspecified acute renal failure type     ICD-10-CM: N17.9  ICD-9-CM: 584.9     Vitamin D deficiency     ICD-10-CM: E55.9  ICD-9-CM: 268.9     Irregular heart rate     ICD-10-CM: I49.9  ICD-9-CM: 427.9     Fatigue, unspecified type     ICD-10-CM: R53.83  ICD-9-CM: 780.79     Frequent falls     ICD-10-CM: R29.6  ICD-9-CM: V15.88           Preventive medicine discussed, diet, exercise, healthy living discussed at length.  Discussed nutrition, physical activity, healthy weight, injury prevention, misuse of tobacco, alcohol and drugs, dental health, mental health, immunizations, screening    Part of this note may be an electronic transcription/translation of spoken language to printed text using the Dragon Dictation System.

## 2024-01-21 NOTE — PROGRESS NOTES
The ABCs of the Annual Wellness Visit  Subsequent Medicare Wellness Visit    Chief Complaint   Patient presents with    Annual Exam    Med Refill      Subjective   History of Present Illness:  Rose J Kellermann is a 89 y.o. female who presents for a Subsequent Medicare Wellness Visit.    The following portions of the patient's history were reviewed and   updated as appropriate: allergies, current medications, past family history, past medical history, past social history, past surgical history, and problem list.    Compared to one year ago, the patient feels her physical   health is worse.    Compared to one year ago, the patient feels her mental   health is the same.    Recent Hospitalizations:  This patient has had a [facility] admission record on file within the last 365 days.    Current Medical Providers:  Patient Care Team:  Sam Hung MD as PCP - General    Outpatient Medications Prior to Visit   Medication Sig Dispense Refill    apixaban (ELIQUIS) 5 MG tablet tablet Take 1 tablet by mouth 2 (Two) Times a Day. Indications: DVT/PE (active thrombosis) 180 tablet 1    bumetanide (BUMEX) 1 MG tablet Take 1 tablet by mouth 3 (Three) Times a Week. Take an extra dose if you have a 2 pound weight gain from baseline  Indications: Edema 45 tablet 3    hydrALAZINE (APRESOLINE) 25 MG tablet Take 1 tablet by mouth 2 (Two) Times a Day. 180 tablet 3    O2 (OXYGEN) Inhale 3 L/min Daily. Indications: Shortness of breath      vitamin D3 125 MCG (5000 UT) capsule capsule Take 2 capsules by mouth Daily. Indications: Vitamin D Deficiency      amLODIPine (NORVASC) 5 MG tablet Take 1 tablet by mouth Daily. Indications: High Blood Pressure Disorder       No facility-administered medications prior to visit.       No opioid medication identified on active medication list. I have reviewed chart for other potential  high risk medication/s and harmful drug interactions in the elderly.        Aspirin is not on active  medication list.  Aspirin use is not indicated based on review of current medical condition/s. Risk of harm outweighs potential benefits.  .    Patient Active Problem List   Diagnosis    Essential hypertension    Vitamin D deficiency    Fatigue    Chronic kidney disease, stage III (moderate)     Osteoarthritis    Dyslipidemia    Post-menopausal bleeding    Endometrial cancer    Closed fracture of left hip    Asymptomatic bacteriuria    Post-traumatic osteoarthritis of left hip    Status post total replacement of left hip    Prediabetes    Acute blood loss anemia, asymptomatic    Postoperative urinary retention    Myopia    Posterior crocodile shagreen of cornea    Presbyopia    Ptosis of eyelid    Regular astigmatism    Retinal neovascularization    After cataract    Secondary cataract    Premature atrial contractions    Heart murmur    Nocturnal hypoxia    Chronic deep vein thrombosis (DVT) of both lower extremities    Retinal neovascularization     Advance Care Planning  ACP discussion was declined by the patient. Patient has an advance directive in EMR which is still valid.     Review of Systems      Objective      Vitals:    23 1433   BP: 118/78   BP Location: Left arm   Patient Position: Sitting   Cuff Size: Adult   Pulse: 83   Temp: 97.5 °F (36.4 °C)   TempSrc: Temporal   SpO2: 95%     BMI Readings from Last 1 Encounters:   10/25/23 27.64 kg/m²   BMI is above normal parameters. Recommendations include: nutrition counseling    Does the patient have evidence of cognitive impairment? No    Physical Exam            HEALTH RISK ASSESSMENT    Smoking Status:  Social History     Tobacco Use   Smoking Status Never    Passive exposure: Never   Smokeless Tobacco Never     Alcohol Consumption:  Social History     Substance and Sexual Activity   Alcohol Use No     Fall Risk Screen:    STEADI Fall Risk Assessment has not been completed.    Depression Screenin/26/2023     2:40 PM   PHQ-2/PHQ-9 Depression  Screening   Little Interest or Pleasure in Doing Things 0-->not at all   Feeling Down, Depressed or Hopeless 0-->not at all   PHQ-9: Brief Depression Severity Measure Score 0       Health Habits and Functional and Cognitive Screenin/26/2023     2:37 PM   Functional & Cognitive Status   Do you have difficulty preparing food and eating? No   Do you have difficulty bathing yourself, getting dressed or grooming yourself? No   Do you have difficulty using the toilet? No   Do you have difficulty moving around from place to place? No   Do you have trouble with steps or getting out of a bed or a chair? No   Current Diet Well Balanced Diet   Dental Exam Not up to date   Eye Exam Not up to date   Exercise (times per week) 5 times per week   Do you need help using the phone?  No   Are you deaf or do you have serious difficulty hearing?  Yes   Do you need help to go to places out of walking distance? Yes   Do you need help shopping? No   Do you need help preparing meals?  No   Do you need help with housework?  Yes   Do you need help with laundry? Yes   Do you need help taking your medications? Yes   Do you need help managing money? No   Do you ever drive or ride in a car without wearing a seat belt? No   Who do you live with? Alone   If you need help, do you have trouble finding someone available to you? No   Have you been bothered in the last four weeks by sexual problems? No   Do you have difficulty concentrating, remembering or making decisions? No       Age-appropriate Screening Schedule:  Refer to the list below for future screening recommendations based on patient's age, sex and/or medical conditions. Orders for these recommended tests are listed in the plan section. The patient has been provided with a written plan.    Health Maintenance   Topic Date Due    DXA SCAN  Never done    BMI FOLLOWUP  Never done    Pneumococcal Vaccine 65+ (1 of 2 - PCV) Never done    ZOSTER VACCINE (1 of 2) Never done    LIPID PANEL   08/11/2024    ANNUAL WELLNESS VISIT  12/26/2024    TDAP/TD VACCINES (2 - Td or Tdap) 01/22/2031    COVID-19 Vaccine  Completed    INFLUENZA VACCINE  Completed              Assessment & Plan     CMS Preventative Services Quick Reference  Risk Factors Identified During Encounter  Fall Risk-High or Moderate: Discussed Fall Prevention in the home  The above risks/problems have been discussed with the patient.  Follow up actions/plans if indicated are seen below in the Assessment/Plan Section.  Pertinent information has been shared with the patient in the After Visit Summary.    Diagnoses and all orders for this visit:    1. Medicare annual wellness visit, subsequent (Primary)    2. Chronic deep vein thrombosis (DVT) of proximal vein of both lower extremities [I82.5Y3]    3. Essential hypertension    4. Acute on chronic diastolic (congestive) heart failure    5. Acute renal failure, unspecified acute renal failure type    6. Vitamin D deficiency    7. Irregular heart rate    8. Fatigue, unspecified type    9. Frequent falls    Other orders  -     amLODIPine (NORVASC) 5 MG tablet; Take 1 tablet by mouth Daily. Indications: High Blood Pressure Disorder  Dispense: 90 tablet; Refill: 3        Follow Up:  Return in about 4 months (around 4/26/2024) for Recheck.     An After Visit Summary and PPPS were given to the patient.

## 2024-02-16 ENCOUNTER — OFFICE VISIT (OUTPATIENT)
Dept: CARDIOLOGY | Facility: CLINIC | Age: 89
End: 2024-02-16
Payer: MEDICARE

## 2024-02-16 VITALS
WEIGHT: 161 LBS | BODY MASS INDEX: 28.53 KG/M2 | DIASTOLIC BLOOD PRESSURE: 82 MMHG | SYSTOLIC BLOOD PRESSURE: 122 MMHG | HEIGHT: 63 IN

## 2024-02-16 DIAGNOSIS — R60.0 LOWER LEG EDEMA: ICD-10-CM

## 2024-02-16 DIAGNOSIS — I05.0 MITRAL VALVE STENOSIS, UNSPECIFIED ETIOLOGY: ICD-10-CM

## 2024-02-16 DIAGNOSIS — I10 ESSENTIAL HYPERTENSION: Primary | ICD-10-CM

## 2024-02-16 DIAGNOSIS — I35.0 NONRHEUMATIC AORTIC VALVE STENOSIS: ICD-10-CM

## 2024-02-16 PROCEDURE — 99214 OFFICE O/P EST MOD 30 MIN: CPT | Performed by: INTERNAL MEDICINE

## 2024-02-27 RX ORDER — APIXABAN 5 MG/1
TABLET, FILM COATED ORAL
Qty: 180 TABLET | Refills: 3 | Status: SHIPPED | OUTPATIENT
Start: 2024-02-27

## 2024-06-18 ENCOUNTER — TELEPHONE (OUTPATIENT)
Dept: FAMILY MEDICINE CLINIC | Facility: CLINIC | Age: 89
End: 2024-06-18

## 2024-06-18 NOTE — TELEPHONE ENCOUNTER
Caller: KELLERMANN, KATHERINE    Relationship: Emergency Contact    Best call back number:     [phone] (Mobile)     Which medication are you concerned about:     bumetanide (BUMEX) 1 MG tablet     Who prescribed you this medication:     DR SIEGEL    What are your concerns:     CALLER STATED [facility] INSISTS THAT THE DOSAGE IS SUPPOSED TO BE (1) MG     CALLER STATED SHE WAS TO UNDERSTAND THAT THE MEDICATION DOSAGE WAS NOW (1/2) MG    CALLER REQUESTED A CALL BACK WITH CLARIFICATION FOR THE MEDICATION DOSAGE

## 2024-06-19 NOTE — TELEPHONE ENCOUNTER
As far as I know we have not decreased the dose.  It is supposed to be 1 mg 3 times a week.  I also reviewed the note from the kidney specialist at  and his note confirmed 1 mg 3 times weekly

## 2024-06-19 NOTE — TELEPHONE ENCOUNTER
Spoke with daughter and let her know that according to her nephrologist notes Ms. Kellerman is to be on Bumex 1mg 3 times a week; she said she would check with that doctor and let us know.

## 2024-06-26 ENCOUNTER — OFFICE VISIT (OUTPATIENT)
Dept: FAMILY MEDICINE CLINIC | Facility: CLINIC | Age: 89
End: 2024-06-26
Payer: MEDICARE

## 2024-06-26 VITALS
OXYGEN SATURATION: 100 % | TEMPERATURE: 98.5 F | RESPIRATION RATE: 16 BRPM | BODY MASS INDEX: 27.31 KG/M2 | DIASTOLIC BLOOD PRESSURE: 78 MMHG | HEIGHT: 64 IN | WEIGHT: 160 LBS | HEART RATE: 69 BPM | SYSTOLIC BLOOD PRESSURE: 120 MMHG

## 2024-06-26 DIAGNOSIS — I50.33 ACUTE ON CHRONIC DIASTOLIC (CONGESTIVE) HEART FAILURE: ICD-10-CM

## 2024-06-26 DIAGNOSIS — I10 ESSENTIAL HYPERTENSION: Primary | ICD-10-CM

## 2024-06-26 DIAGNOSIS — N18.4 CHRONIC KIDNEY DISEASE, STAGE IV (SEVERE): ICD-10-CM

## 2024-06-26 PROCEDURE — 1159F MED LIST DOCD IN RCRD: CPT | Performed by: FAMILY MEDICINE

## 2024-06-26 PROCEDURE — 1160F RVW MEDS BY RX/DR IN RCRD: CPT | Performed by: FAMILY MEDICINE

## 2024-06-26 PROCEDURE — 99213 OFFICE O/P EST LOW 20 MIN: CPT | Performed by: FAMILY MEDICINE

## 2024-06-26 NOTE — PROGRESS NOTES
Subjective   Rose J Kellermann is a 89 y.o. female    Chief Complaint    Hypertension  Congestive heart failure  Chronic kidney disease stage IV    History of Present Illness  History of Present Illness  The patient presents today for a follow-up visit. She is accompanied by her daughter.    The patient reports overall well-being, however, she continues to experience edema in her lower extremities, with the left leg being more affected than the right. She maintains good mobility at home, however, her daughter expresses a desire to enhance her daily exercise regimen. The patient's daughter has expressed concerns about the patient's fatigue by the time she returns home. The patient denies any recent respiratory issues.      The following portions of the patient's history were reviewed and updated as appropriate: allergies, current medications, past social history and problem list    Review of Systems   Constitutional:  Negative for chills, fatigue, fever and unexpected weight change.   HENT:  Negative for congestion, ear pain and sore throat.    Eyes:  Negative for pain and visual disturbance.   Respiratory:  Positive for shortness of breath. Negative for cough and chest tightness.    Cardiovascular:  Positive for leg swelling. Negative for chest pain and palpitations.   Gastrointestinal:  Negative for nausea.   Musculoskeletal:  Positive for arthralgias and gait problem.   Skin:  Negative for color change and rash.   Neurological:  Positive for weakness. Negative for dizziness, syncope, numbness and headaches.   Hematological:  Negative for adenopathy. Bruises/bleeds easily.   Psychiatric/Behavioral:  Negative for dysphoric mood. The patient is not nervous/anxious.        Objective     Vitals:    06/26/24 1429   BP: 120/78   Pulse: 69   Resp: 16   Temp: 98.5 °F (36.9 °C)   SpO2: 100%       Physical Exam  Vitals and nursing note reviewed.   Constitutional:       General: She is not in acute distress.     Appearance:  Normal appearance. She is well-developed. She is not ill-appearing, toxic-appearing or diaphoretic.   HENT:      Head: Normocephalic and atraumatic.   Eyes:      General: No scleral icterus.     Conjunctiva/sclera: Conjunctivae normal.   Neck:      Vascular: No carotid bruit or JVD.   Cardiovascular:      Rate and Rhythm: Normal rate and regular rhythm.      Pulses: Normal pulses.      Heart sounds: Normal heart sounds. No murmur heard.  Pulmonary:      Effort: Pulmonary effort is normal. No respiratory distress.      Breath sounds: Normal breath sounds.   Abdominal:      Palpations: Abdomen is soft.      Tenderness: There is no abdominal tenderness.   Musculoskeletal:      Right lower leg: Edema present.      Left lower leg: Edema present.   Skin:     General: Skin is warm and dry.   Neurological:      Mental Status: She is alert and oriented to person, place, and time.   Psychiatric:         Mood and Affect: Mood normal.         Behavior: Behavior normal.       Physical Exam  Clear lungs.    Vital Signs  Blood pressure reading today is 120/78.    Assessment & Plan   Assessment & Plan  1. Followup visit.  The patient's blood pressure is well-regulated, indicating improved kidney function. No alterations to the current medication regimen are deemed necessary at this time.    Follow-up  A follow-up visit is scheduled for 6 months from now.    Problems Addressed this Visit          Cardiac and Vasculature    Essential hypertension - Primary     Other Visit Diagnoses       Acute on chronic diastolic (congestive) heart failure        Chronic kidney disease, stage IV (severe)              Diagnoses         Codes Comments    Essential hypertension    -  Primary ICD-10-CM: I10  ICD-9-CM: 401.9     Acute on chronic diastolic (congestive) heart failure     ICD-10-CM: I50.33  ICD-9-CM: 428.33, 428.0     Chronic kidney disease, stage IV (severe)     ICD-10-CM: N18.4  ICD-9-CM: 585.4           I spent 20 minutes in patient  care: Reviewing records prior to the visit, examining the patient, entering orders and documentation    Part of this note may be an electronic transcription/translation of spoken language to printed text using the Dragon Dictation System.

## 2024-07-01 RX ORDER — HYDRALAZINE HYDROCHLORIDE 25 MG/1
25 TABLET, FILM COATED ORAL 2 TIMES DAILY
Qty: 180 TABLET | Refills: 3 | Status: SHIPPED | OUTPATIENT
Start: 2024-07-01

## 2024-07-01 NOTE — TELEPHONE ENCOUNTER
Caller: KELLERMANN, KATHERINE    Relationship: Emergency Contact    Best call back number: [phone]     Requested Prescriptions:   Requested Prescriptions     Pending Prescriptions Disp Refills    hydrALAZINE (APRESOLINE) 25 MG tablet 180 tablet 3     Sig: Take 1 tablet by mouth 2 (Two) Times a Day. Indications: High Blood Pressure Disorder        Pharmacy where request should be sent: [facility] [address] [phone] [facility] [phone]      Last office visit with prescribing clinician: 6/26/2024   Last telemedicine visit with prescribing clinician: Visit date not found   Next office visit with prescribing clinician: 12/31/2024     Additional details provided by patient: PATIENT HAS 2 WEEKS REMAINING. HER PHARMACY NEEDS A NEW PRESCRIPTION FOR 90 DAY SUPPLY WITH REFILLS. PLEASE ADVISE     Does the patient have less than a 3 day supply:  [] Yes  [x] No    Would you like a call back once the refill request has been completed: [x] Yes [] No    If the office needs to give you a call back, can they leave a voicemail: [x] Yes [] No    Laura Duncan Rep   07/01/24 10:07 EDT

## 2024-08-27 ENCOUNTER — TELEPHONE (OUTPATIENT)
Dept: FAMILY MEDICINE CLINIC | Facility: CLINIC | Age: 89
End: 2024-08-27
Payer: MEDICARE

## 2024-08-27 DIAGNOSIS — N18.4 CHRONIC KIDNEY DISEASE, STAGE IV (SEVERE): ICD-10-CM

## 2024-08-27 DIAGNOSIS — I50.33 ACUTE ON CHRONIC DIASTOLIC (CONGESTIVE) HEART FAILURE: ICD-10-CM

## 2024-08-27 DIAGNOSIS — R29.6 FREQUENT FALLS: ICD-10-CM

## 2024-08-27 DIAGNOSIS — I10 ESSENTIAL HYPERTENSION: Primary | ICD-10-CM

## 2024-08-27 DIAGNOSIS — R09.02 HYPOXIA: ICD-10-CM

## 2024-08-27 NOTE — TELEPHONE ENCOUNTER
Spoke with patient's daughter regarding oxygen levels. She is concerned that it keeps going up and down a lot. She is requesting to speak with someone to explain it in more detail that can help them. Please advise.

## 2024-08-27 NOTE — TELEPHONE ENCOUNTER
Spoke with daughter, she says since last year her mother had been using O2 only at night; she noticed yesterday she was having trouble breathing at times during the day so she has been giving her O2 during the day; the lowest it dropped was 84, after a few minutes on O2 it came up to upper 90s. She wants to know if she should keep her on it all the time now? If so- will she need a new order? Or is there any meds or anything she can give her?

## 2024-08-27 NOTE — TELEPHONE ENCOUNTER
Spoke with Areli; she said for now her mother only uses the O2 that plugs into the wall, they have a tank downstairs but she's never had to use it; she said she's hopefull that Anastasia will only have to use the O2 24/7 temporarily, like last time about a year ago. Said if needed, they use [employer]; she'll let us know if any orders or more O2 is needed. She said that she is the only one that takes care of her mother; I told her it might be a good idea to see if HH can come out and do a vitals check once or twice a week; please advise or put in referral.

## 2024-08-27 NOTE — TELEPHONE ENCOUNTER
She probably needs to leave the oxygen on all the time.  I do not know if the oxygen supplier needs a new order or what.  Beth perhaps you could help with this.

## 2024-09-04 ENCOUNTER — TELEPHONE (OUTPATIENT)
Dept: FAMILY MEDICINE CLINIC | Facility: CLINIC | Age: 89
End: 2024-09-04
Payer: MEDICARE

## 2024-09-04 NOTE — TELEPHONE ENCOUNTER
[facility], [facility], [facility], [facility] have all denied her referral for home health. Sending to ALBERTINA HADLEY.

## 2024-09-04 NOTE — TELEPHONE ENCOUNTER
[facility] declined stating they were full, referral was sent to [facility]- said they were out of network for her plan. Referral just now faxed to [facility]. Will call them later to verify.

## 2024-09-04 NOTE — TELEPHONE ENCOUNTER
Caller: KELLERMANN, KATHERINE    Relationship: Emergency Contact    Best call back number: [phone]     What was the call regarding:   PATIENT'S (DAUGHTER) ALONZO STATED THAT SHE WOULD LIKE A CALL BACK REGARDING NOT BEING CALLED FROM HOME HEALTH TO GET FIRST APPOINTMENT SCHEDULED FOR THE PATIENT

## 2024-09-06 ENCOUNTER — TELEPHONE (OUTPATIENT)
Dept: FAMILY MEDICINE CLINIC | Facility: CLINIC | Age: 89
End: 2024-09-06
Payer: MEDICARE

## 2024-09-06 NOTE — TELEPHONE ENCOUNTER
Verbal orders for the home health skilled nursing PT and OT is fine.  Also fine for CHF education for the daughter.  As far as I know I have not given any parameters for blood pressure or diuretic dosing.

## 2024-09-06 NOTE — TELEPHONE ENCOUNTER
Edel from Home health is requesting verbals for skilled nursing, pt and ot. For CHF education for patients daughter, she would like to verify the 2 lbs weight gain in 24 hours perimeters for Bumetanide and then what are the perimeters for blood pressure to know when to take her medication.      [phone] is a private line you can leave verbals there.

## 2024-09-20 ENCOUNTER — TELEPHONE (OUTPATIENT)
Dept: FAMILY MEDICINE CLINIC | Facility: CLINIC | Age: 89
End: 2024-09-20

## 2024-09-20 NOTE — TELEPHONE ENCOUNTER
JEB WITH [facility] HAS CALLED REQUESTING VERBAL ORDERS FOR OCCUPATIONAL THERAPY ONE TIME A WEEK FOR TWO WEEKS.    CALL BACK NUMBER [phone]

## 2024-10-09 ENCOUNTER — TELEPHONE (OUTPATIENT)
Dept: FAMILY MEDICINE CLINIC | Facility: CLINIC | Age: 89
End: 2024-10-09
Payer: MEDICARE

## 2024-10-09 NOTE — TELEPHONE ENCOUNTER
Osbalod from [facility] is requesting verbal orders to continue PT for 1 time a week for 3 weeks.     [phone], please advise and give Osbaldo a call back.

## 2024-10-30 ENCOUNTER — TELEPHONE (OUTPATIENT)
Dept: FAMILY MEDICINE CLINIC | Facility: CLINIC | Age: 89
End: 2024-10-30

## 2024-10-30 NOTE — TELEPHONE ENCOUNTER
AYUSH WITH [facility] HAS CALLED REQUESTING A CALL BACK WITH STATUS OF SEVERAL HOME HEALTH ORDERS THAT WERE SENT TO OFFICE AND HAVE NOT BEEN RETURNED.    CALL BACK NUMBER [phone]

## 2025-01-02 RX ORDER — BUMETANIDE 1 MG/1
TABLET ORAL
Qty: 45 TABLET | Refills: 3 | Status: SHIPPED | OUTPATIENT
Start: 2025-01-02

## 2025-01-02 NOTE — TELEPHONE ENCOUNTER
Rx Refill Note  Requested Prescriptions     Pending Prescriptions Disp Refills    bumetanide (BUMEX) 1 MG tablet [Pharmacy Med Name: BUMETANIDE TABS 1MG] 45 tablet 3     Sig: TAKE 1 TABLET THREE TIMES A WEEK. TAKE AN EXTRA DOSE IF HAVING A 2 POUND WEIGHT GAIN FROM BASELINE, FOR EDEMA      Last office visit with prescribing clinician: 6/26/2024   Last telemedicine visit with prescribing clinician: Visit date not found   Next office visit with prescribing clinician: 4/4/2025     Jade Teixeira MA  01/02/25, 13:15 EST

## 2025-01-20 ENCOUNTER — APPOINTMENT (OUTPATIENT)
Dept: GENERAL RADIOLOGY | Facility: HOSPITAL | Age: OVER 89
End: 2025-01-20
Payer: MEDICARE

## 2025-01-20 ENCOUNTER — HOSPITAL ENCOUNTER (INPATIENT)
Facility: HOSPITAL | Age: OVER 89
LOS: 17 days | Discharge: NURSING, REHAB OR LONG-TERM CARE | End: 2025-02-06
Attending: EMERGENCY MEDICINE | Admitting: HOSPITALIST
Payer: MEDICARE

## 2025-01-20 DIAGNOSIS — N18.9 CHRONIC KIDNEY DISEASE, UNSPECIFIED CKD STAGE: ICD-10-CM

## 2025-01-20 DIAGNOSIS — R09.02 HYPOXIA: ICD-10-CM

## 2025-01-20 DIAGNOSIS — J90 PLEURAL EFFUSION, LEFT: Primary | ICD-10-CM

## 2025-01-20 LAB
ALBUMIN SERPL-MCNC: 3.3 G/DL (ref 3.5–5.2)
ALBUMIN/GLOB SERPL: 0.8 G/DL
ALP SERPL-CCNC: 76 U/L (ref 39–117)
ALT SERPL W P-5'-P-CCNC: 11 U/L (ref 1–33)
ANION GAP SERPL CALCULATED.3IONS-SCNC: 14 MMOL/L (ref 5–15)
AST SERPL-CCNC: 19 U/L (ref 1–32)
B PARAPERT DNA SPEC QL NAA+PROBE: NOT DETECTED
B PERT DNA SPEC QL NAA+PROBE: NOT DETECTED
BASOPHILS # BLD AUTO: 0.03 10*3/MM3 (ref 0–0.2)
BASOPHILS NFR BLD AUTO: 0.3 % (ref 0–1.5)
BILIRUB SERPL-MCNC: 1.3 MG/DL (ref 0–1.2)
BUN SERPL-MCNC: 48 MG/DL (ref 8–23)
BUN/CREAT SERPL: 19.2 (ref 7–25)
C PNEUM DNA NPH QL NAA+NON-PROBE: NOT DETECTED
CALCIUM SPEC-SCNC: 9.1 MG/DL (ref 8.2–9.6)
CHLORIDE SERPL-SCNC: 97 MMOL/L (ref 98–107)
CO2 SERPL-SCNC: 23 MMOL/L (ref 22–29)
CREAT SERPL-MCNC: 2.5 MG/DL (ref 0.57–1)
D-LACTATE SERPL-SCNC: 1.7 MMOL/L (ref 0.5–2)
D-LACTATE SERPL-SCNC: 2.1 MMOL/L (ref 0.5–2)
DEPRECATED RDW RBC AUTO: 47.6 FL (ref 37–54)
EGFRCR SERPLBLD CKD-EPI 2021: 17 ML/MIN/1.73
EOSINOPHIL # BLD AUTO: 0.09 10*3/MM3 (ref 0–0.4)
EOSINOPHIL NFR BLD AUTO: 1 % (ref 0.3–6.2)
ERYTHROCYTE [DISTWIDTH] IN BLOOD BY AUTOMATED COUNT: 13.7 % (ref 12.3–15.4)
FLUAV SUBTYP SPEC NAA+PROBE: NOT DETECTED
FLUBV RNA ISLT QL NAA+PROBE: NOT DETECTED
GEN 5 1HR TROPONIN T REFLEX: 43 NG/L
GLOBULIN UR ELPH-MCNC: 3.9 GM/DL
GLUCOSE SERPL-MCNC: 131 MG/DL (ref 65–99)
HADV DNA SPEC NAA+PROBE: NOT DETECTED
HCOV 229E RNA SPEC QL NAA+PROBE: NOT DETECTED
HCOV HKU1 RNA SPEC QL NAA+PROBE: NOT DETECTED
HCOV NL63 RNA SPEC QL NAA+PROBE: NOT DETECTED
HCOV OC43 RNA SPEC QL NAA+PROBE: NOT DETECTED
HCT VFR BLD AUTO: 37.5 % (ref 34–46.6)
HGB BLD-MCNC: 12 G/DL (ref 12–15.9)
HMPV RNA NPH QL NAA+NON-PROBE: NOT DETECTED
HOLD SPECIMEN: NORMAL
HPIV1 RNA ISLT QL NAA+PROBE: NOT DETECTED
HPIV2 RNA SPEC QL NAA+PROBE: NOT DETECTED
HPIV3 RNA NPH QL NAA+PROBE: NOT DETECTED
HPIV4 P GENE NPH QL NAA+PROBE: NOT DETECTED
IMM GRANULOCYTES # BLD AUTO: 0.03 10*3/MM3 (ref 0–0.05)
IMM GRANULOCYTES NFR BLD AUTO: 0.3 % (ref 0–0.5)
LYMPHOCYTES # BLD AUTO: 1.03 10*3/MM3 (ref 0.7–3.1)
LYMPHOCYTES NFR BLD AUTO: 11.5 % (ref 19.6–45.3)
M PNEUMO IGG SER IA-ACNC: NOT DETECTED
MCH RBC QN AUTO: 30.3 PG (ref 26.6–33)
MCHC RBC AUTO-ENTMCNC: 32 G/DL (ref 31.5–35.7)
MCV RBC AUTO: 94.7 FL (ref 79–97)
MONOCYTES # BLD AUTO: 1.15 10*3/MM3 (ref 0.1–0.9)
MONOCYTES NFR BLD AUTO: 12.9 % (ref 5–12)
NEUTROPHILS NFR BLD AUTO: 6.59 10*3/MM3 (ref 1.7–7)
NEUTROPHILS NFR BLD AUTO: 74 % (ref 42.7–76)
NRBC BLD AUTO-RTO: 0 /100 WBC (ref 0–0.2)
NT-PROBNP SERPL-MCNC: 5330 PG/ML (ref 0–1800)
PLATELET # BLD AUTO: 235 10*3/MM3 (ref 140–450)
PMV BLD AUTO: 10.3 FL (ref 6–12)
POTASSIUM SERPL-SCNC: 3.8 MMOL/L (ref 3.5–5.2)
PROCALCITONIN SERPL-MCNC: 0.33 NG/ML (ref 0–0.25)
PROT SERPL-MCNC: 7.2 G/DL (ref 6–8.5)
QT INTERVAL: 342 MS
QTC INTERVAL: 401 MS
RBC # BLD AUTO: 3.96 10*6/MM3 (ref 3.77–5.28)
RHINOVIRUS RNA SPEC NAA+PROBE: NOT DETECTED
RSV RNA NPH QL NAA+NON-PROBE: NOT DETECTED
SARS-COV-2 RNA NPH QL NAA+NON-PROBE: NOT DETECTED
SODIUM SERPL-SCNC: 134 MMOL/L (ref 136–145)
TROPONIN T % DELTA: 13
TROPONIN T NUMERIC DELTA: 5 NG/L
TROPONIN T SERPL HS-MCNC: 38 NG/L
WBC NRBC COR # BLD AUTO: 8.92 10*3/MM3 (ref 3.4–10.8)
WHOLE BLOOD HOLD COAG: NORMAL
WHOLE BLOOD HOLD SPECIMEN: NORMAL

## 2025-01-20 PROCEDURE — 87040 BLOOD CULTURE FOR BACTERIA: CPT | Performed by: EMERGENCY MEDICINE

## 2025-01-20 PROCEDURE — 36415 COLL VENOUS BLD VENIPUNCTURE: CPT

## 2025-01-20 PROCEDURE — 99285 EMERGENCY DEPT VISIT HI MDM: CPT

## 2025-01-20 PROCEDURE — 99222 1ST HOSP IP/OBS MODERATE 55: CPT | Performed by: STUDENT IN AN ORGANIZED HEALTH CARE EDUCATION/TRAINING PROGRAM

## 2025-01-20 PROCEDURE — 83605 ASSAY OF LACTIC ACID: CPT | Performed by: EMERGENCY MEDICINE

## 2025-01-20 PROCEDURE — 71045 X-RAY EXAM CHEST 1 VIEW: CPT

## 2025-01-20 PROCEDURE — 25010000002 BUMETANIDE PER 0.5 MG: Performed by: STUDENT IN AN ORGANIZED HEALTH CARE EDUCATION/TRAINING PROGRAM

## 2025-01-20 PROCEDURE — 83880 ASSAY OF NATRIURETIC PEPTIDE: CPT | Performed by: EMERGENCY MEDICINE

## 2025-01-20 PROCEDURE — 25810000003 SODIUM CHLORIDE 0.9 % SOLUTION 250 ML FLEX CONT: Performed by: EMERGENCY MEDICINE

## 2025-01-20 PROCEDURE — 84145 PROCALCITONIN (PCT): CPT | Performed by: EMERGENCY MEDICINE

## 2025-01-20 PROCEDURE — 0202U NFCT DS 22 TRGT SARS-COV-2: CPT | Performed by: EMERGENCY MEDICINE

## 2025-01-20 PROCEDURE — 85025 COMPLETE CBC W/AUTO DIFF WBC: CPT | Performed by: EMERGENCY MEDICINE

## 2025-01-20 PROCEDURE — 25010000002 AZITHROMYCIN PER 500 MG: Performed by: EMERGENCY MEDICINE

## 2025-01-20 PROCEDURE — 25010000002 CEFTRIAXONE PER 250 MG: Performed by: EMERGENCY MEDICINE

## 2025-01-20 PROCEDURE — 93005 ELECTROCARDIOGRAM TRACING: CPT | Performed by: EMERGENCY MEDICINE

## 2025-01-20 PROCEDURE — 80053 COMPREHEN METABOLIC PANEL: CPT | Performed by: EMERGENCY MEDICINE

## 2025-01-20 PROCEDURE — 84484 ASSAY OF TROPONIN QUANT: CPT | Performed by: EMERGENCY MEDICINE

## 2025-01-20 RX ORDER — AMLODIPINE BESYLATE 5 MG/1
5 TABLET ORAL DAILY
Status: DISCONTINUED | OUTPATIENT
Start: 2025-01-20 | End: 2025-01-29

## 2025-01-20 RX ORDER — SODIUM CHLORIDE 0.9 % (FLUSH) 0.9 %
10 SYRINGE (ML) INJECTION EVERY 12 HOURS SCHEDULED
Status: DISCONTINUED | OUTPATIENT
Start: 2025-01-20 | End: 2025-02-06 | Stop reason: HOSPADM

## 2025-01-20 RX ORDER — AMOXICILLIN 250 MG
2 CAPSULE ORAL 2 TIMES DAILY PRN
Status: DISCONTINUED | OUTPATIENT
Start: 2025-01-20 | End: 2025-02-06 | Stop reason: HOSPADM

## 2025-01-20 RX ORDER — BISACODYL 5 MG/1
5 TABLET, DELAYED RELEASE ORAL DAILY PRN
Status: DISCONTINUED | OUTPATIENT
Start: 2025-01-20 | End: 2025-02-06 | Stop reason: HOSPADM

## 2025-01-20 RX ORDER — ACETAMINOPHEN 500 MG
500 TABLET ORAL EVERY 6 HOURS PRN
Status: ON HOLD | COMMUNITY

## 2025-01-20 RX ORDER — POLYETHYLENE GLYCOL 3350 17 G/17G
17 POWDER, FOR SOLUTION ORAL DAILY PRN
Status: DISCONTINUED | OUTPATIENT
Start: 2025-01-20 | End: 2025-02-06 | Stop reason: HOSPADM

## 2025-01-20 RX ORDER — SODIUM CHLORIDE 9 MG/ML
40 INJECTION, SOLUTION INTRAVENOUS AS NEEDED
Status: DISCONTINUED | OUTPATIENT
Start: 2025-01-20 | End: 2025-02-06 | Stop reason: HOSPADM

## 2025-01-20 RX ORDER — BISACODYL 10 MG
10 SUPPOSITORY, RECTAL RECTAL DAILY PRN
Status: DISCONTINUED | OUTPATIENT
Start: 2025-01-20 | End: 2025-02-06 | Stop reason: HOSPADM

## 2025-01-20 RX ORDER — SODIUM CHLORIDE 0.9 % (FLUSH) 0.9 %
10 SYRINGE (ML) INJECTION AS NEEDED
Status: DISCONTINUED | OUTPATIENT
Start: 2025-01-20 | End: 2025-02-06 | Stop reason: HOSPADM

## 2025-01-20 RX ORDER — BUMETANIDE 0.25 MG/ML
0.5 INJECTION, SOLUTION INTRAMUSCULAR; INTRAVENOUS ONCE
Status: COMPLETED | OUTPATIENT
Start: 2025-01-20 | End: 2025-01-20

## 2025-01-20 RX ORDER — HYDRALAZINE HYDROCHLORIDE 25 MG/1
25 TABLET, FILM COATED ORAL 2 TIMES DAILY
Status: DISCONTINUED | OUTPATIENT
Start: 2025-01-20 | End: 2025-02-04

## 2025-01-20 RX ORDER — BUMETANIDE 1 MG/1
1 TABLET ORAL DAILY
Status: DISCONTINUED | OUTPATIENT
Start: 2025-01-20 | End: 2025-01-29

## 2025-01-20 RX ADMIN — AZITHROMYCIN DIHYDRATE 500 MG: 500 INJECTION, POWDER, LYOPHILIZED, FOR SOLUTION INTRAVENOUS at 11:54

## 2025-01-20 RX ADMIN — HYDRALAZINE HYDROCHLORIDE 25 MG: 25 TABLET ORAL at 21:38

## 2025-01-20 RX ADMIN — BUMETANIDE 0.5 MG: 0.25 INJECTION INTRAMUSCULAR; INTRAVENOUS at 16:19

## 2025-01-20 RX ADMIN — CEFTRIAXONE 1000 MG: 1 INJECTION, POWDER, FOR SOLUTION INTRAMUSCULAR; INTRAVENOUS at 11:20

## 2025-01-20 NOTE — H&P
"    [facility] Medicine Services  HISTORY AND PHYSICAL    Patient Name: Rose J Kellermann  : 1926  MRN: [id number]  Primary Care Physician: Sam Hung MD  Date of admission: 2025      Subjective   Subjective     Chief Complaint:  dyspnea    HPI:  Rose J Kellermann is a 89 y.o. female with a history of DVT on Eliquis, HFpEF, CKD who is presenting with worsening dyspnea.  She has been brought in by her daughter who noted decreased oxygen saturations this past week and at home.  For the past several months she wears about 2 to 3 L of nasal cannula oxygen at home on and off, her home oxygen requirement segments have been increasing to the point yesterday where she continuously required it.  Currently the patient does not feel short of breath, denies any chest pain.  Daughter notes that she also noticed an increased cough, nonproductive.  Workup in the ED was notable for a left pleural effusion.  Daughter states that she has had problems in the past with \"fluid in her lungs\".  And her most recent hospital stay was related to that.  She takes Bumex 4 times a week and follows with cardiology also.      Personal History     Past Medical History:   Diagnosis Date    Abscess of back     Arrhythmia     frequent PVC    Cancer 2011    Endometrial cancer, status post robotically assisted hysterectomy with Dr. Haddad, 2011.      CKD (chronic kidney disease), stage III     no dilaysis     Dizzy     Dvt femoral (deep venous thrombosis) 2017    s/p hip surgery in . Took xarelto until 2018    Dyslipidemia     Dyslipidemia.  Observing.    Enthesopathy of hip region     Generalized osteoarthrosis, involving multiple sites     Headache     Hearing loss     Hypertension     Low backache     Needs flu shot     Osteoarthritis     Osteoarthritis with questionable carpal tunnel syndrome bilaterally.     Otitis, serous     Pneumonia     Remote pneumonia, .  "    Retinal hemorrhage     SI joint arthritis     SOB (shortness of breath)     Syncope 2001    Remote syncope with working diagnosis of vasodepressor, 2001.     Venous insufficiency of leg     Wears glasses     readers         Oncology Problem List:  Endometrial cancer (Status: Active)    Oncology/Hematology History   Endometrial cancer   12/12/2011 Initial Diagnosis    D&C and hysterectomy for PMB. 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 Stage IA grade 2 by final path       Past Surgical History:   Procedure Laterality Date    CATARACT EXTRACTION      bilat     COLONOSCOPY      HIP PERCUTANEOUS PINNING Left 11/24/2017    Procedure: HIP PERCUTANEOUS PINNING;  Surgeon: Lucas Swanson MD;  Location:  [facility];  Service:     HYSTERECTOMY      total? but unsure     KNEE SURGERY  2001    Remote knee surgery in 2001.- right     TOTAL HIP ARTHROPLASTY Left 10/26/2018    Procedure: TOTAL HIP ARTHROPLASTY LEFT;  Surgeon: Stephen Baker MD;  Location:  [facility];  Service: Orthopedics       Family History: family history includes Cancer in her brother; Heart attack in her father; Heart disease in her mother; Hypertension in her father and mother; Osteoarthritis in her father and mother.     Social History:  reports that she has never smoked. She has never been exposed to tobacco smoke. She has never used smokeless tobacco. She reports that she does not drink alcohol and does not use drugs.  Social History     Social History Narrative    Lives alone, 2 daughters, one here, one in [place]. Retired teacher       Medications:  Available home medication information reviewed.  O2, acetaminophen, amLODIPine, apixaban, bumetanide, and hydrALAZINE    Allergies   Allergen Reactions    Sulfa Antibiotics Nausea Only and GI Intolerance       Objective   Objective     Vital Signs:   Temp:  [98.2 °F (36.8 °C)] 98.2 °F (36.8 °C)  Heart Rate:   [79-95] 79  Resp:  [18] 18  BP: (120-145)/(78-95) 131/95  Flow (L/min) (Oxygen Therapy):  [2] 2       Physical Exam   Constitutional: No acute distress, awake, alert  HENT: NCAT, mucous membranes moist  Respiratory: Decreased lung sounds at the left lower lung base, diffuse slight crackles throughout, respiratory effort normal on 2 L nasal cannula  Cardiovascular: RRR, no murmurs, rubs, or gallops  Gastrointestinal: Positive bowel sounds, soft, nontender, nondistended  Musculoskeletal: No bilateral ankle edema  Psychiatric: Appropriate affect, cooperative  Neurologic: Oriented to person and place, hard of hearing, speech clear, strength symmetric, no focal deficits  Skin: Stasis dermatitis      Result Review:  I have personally reviewed the results from the time of this admission to 1/20/2025 14:27 EST and agree with these findings:  [x]  Laboratory list / accordion  [x]  Microbiology  [x]  Radiology  [x]  EKG/Telemetry   []  Cardiology/Vascular   []  Pathology  []  Old records  []  Other:  Most notable findings include:   Elevated creatinine  Elevated lactate now trended down  proBNP elevated from prior  Left pleural effusion    LAB RESULTS:      Lab 01/20/25  1315 01/20/25  1014   WBC  --  8.92   HEMOGLOBIN  --  12.0   HEMATOCRIT  --  37.5   PLATELETS  --  235   NEUTROS ABS  --  6.59   IMMATURE GRANS (ABS)  --  0.03   LYMPHS ABS  --  1.03   MONOS ABS  --  1.15*   EOS ABS  --  0.09   MCV  --  94.7   PROCALCITONIN  --  0.33*   LACTATE 1.7 2.1*         Lab 01/20/25  1014   SODIUM 134*   POTASSIUM 3.8   CHLORIDE 97*   CO2 23.0   ANION GAP 14.0   BUN 48*   CREATININE 2.50*   EGFR 17.0*   GLUCOSE 131*   CALCIUM 9.1         Lab 01/20/25  1014   TOTAL PROTEIN 7.2   ALBUMIN 3.3*   GLOBULIN 3.9   ALT (SGPT) 11   AST (SGOT) 19   BILIRUBIN 1.3*   ALK PHOS 76         Lab 01/20/25  1115 01/20/25  1014   PROBNP  --  5,330.0*   HSTROP T 43* 38*                     Microbiology Results (last 10 days)       Procedure Component  Value - Date/Time    Respiratory Panel PCR w/COVID-19(SARS-CoV-2) [facility] In-House, NP Swab in UTM/VTM, 2 HR TAT - Swab, Nasopharynx [[id number]]  (Normal) Collected: 01/20/25 1027    Lab Status: Final result Specimen: Swab from Nasopharynx Updated: 01/20/25 1128     ADENOVIRUS, PCR Not Detected     Coronavirus 229E Not Detected     Coronavirus HKU1 Not Detected     Coronavirus NL63 Not Detected     Coronavirus OC43 Not Detected     COVID19 Not Detected     Human Metapneumovirus Not Detected     Human Rhinovirus/Enterovirus Not Detected     Influenza A PCR Not Detected     Influenza B PCR Not Detected     Parainfluenza Virus 1 Not Detected     Parainfluenza Virus 2 Not Detected     Parainfluenza Virus 3 Not Detected     Parainfluenza Virus 4 Not Detected     RSV, PCR Not Detected     Bordetella pertussis pcr Not Detected     Bordetella parapertussis PCR Not Detected     Chlamydophila pneumoniae PCR Not Detected     Mycoplasma pneumo by PCR Not Detected    Narrative:      In the setting of a positive respiratory panel with a viral infection PLUS a negative procalcitonin without other underlying concern for bacterial infection, consider observing off antibiotics or discontinuation of antibiotics and continue supportive care. If the respiratory panel is positive for atypical bacterial infection (Bordetella pertussis, Chlamydophila pneumoniae, or Mycoplasma pneumoniae), consider antibiotic de-escalation to target atypical bacterial infection.            XR Chest 1 View    Result Date: 1/20/2025  XR CHEST 1 VW Date of Exam: 1/20/2025 10:03 AM EST Indication: SOA triage protocol Comparison: Chest CT 8/7/2024, chest radiograph 8/10/2024 Findings: Cardiomegaly. Moderate size left pleural effusion increasing from prior. Slightly blunted right costophrenic angle which could reflect trace pleural fluid. Prominent central vasculature without overt edema. Retrocardiac consolidation. No pneumothorax. Degenerative  related osseous change. Aortic atherosclerotic disease.     Impression: Impression: 1. Cardiomegaly with moderate size increasing left effusion and possible trace right pleural effusion. Associated presumed compressive atelectasis in the left lower lobe, differential includes infection in the right clinical setting. 2. Chronic appearing pulmonary vascular congestion without overt edema. Electronically Signed: Juice Martinez MD  1/20/2025 10:27 AM EST  Workstation ID: [id number]     Results for orders placed during the hospital encounter of 06/01/23    Adult Transthoracic Echo Complete W/ Cont if Necessary Per Protocol    Interpretation Summary    Left ventricular systolic function is normal. Calculated left ventricular EF = 61.4% Left ventricular ejection fraction appears to be 61 - 65%.    Left ventricular diastolic function was indeterminate.    Left atrial volume is severely increased.    Mild aortic valve stenosis is present.    Aortic valve maximum pressure gradient is 26 mmHg. Aortic valve mean pressure gradient is 15 mmHg.    Mild mitral valve stenosis is present. The mitral valve peak gradient is 12 mmHg. The mitral valve mean gradient is 5 mmHg.    Moderate tricuspid valve regurgitation is present.    Estimated right ventricular systolic pressure from tricuspid regurgitation is markedly elevated (>55 mmHg).    There is a small left pleural effusion.      Assessment & Plan   Assessment & Plan       Pleural effusion on left    Essential hypertension    Chronic kidney disease, stage III (moderate)         Rose J Kellermann is a 89-year-old female with a history of HFpEF, DVT on Eliquis, CKD who is here with a left pleural effusion    Acute on chronic hypoxic respiratory failure  Left pleural effusion  Suspected acute HFpEF exacerbation  - Imaging as above, chest x-ray with moderately sized left pleural effusion which looks increased from prior.  Also present is pulmonary vascular congestion but this appears  chronic  - Her creatinine is little bit elevated above baseline but she may also have a component of CRS.  Clinically appears volume overloaded so we will give a dose of IV diuresis tonight.  Monitor daily, repeat BMP in a.m.  - Last dose of Eliquis was this morning.  Continue to hold further doses, consult to IR for thoracentesis.  Obtain coags for the morning.  N.p.o. at midnight in case thoracentesis can be done tomorrow  - Pleural effusion likely a result of underlying HFpEF.  Clinically does not appear to have infectious etiology will observe off antibiotics for now.  Will obtain pleural fluid culture    ANGEL LUIS on CKD  - As above creatinine seems to be elevated above baseline which is around 2  - Monitor with diuresis    History of DVT  - Hold Eliquis for now  - SCDs for now    Hypertension  - Continue home medications          VTE Prophylaxis:  Mechanical & pharmacologic VTE prophylaxis orders are signed & held.           CODE STATUS:    Code Status and Medical Interventions: CPR (Attempt to Resuscitate); Full Support   Ordered at: 01/20/25 1424     Level Of Support Discussed With:    Patient     Code Status (Patient has no pulse and is not breathing):    CPR (Attempt to Resuscitate)     Medical Interventions (Patient has pulse or is breathing):    Full Support       Expected Discharge   Expected Discharge Date: 1/23/2025; Expected Discharge Time:      Chanel Noriega MD  01/20/25

## 2025-01-20 NOTE — PLAN OF CARE
Goal Outcome Evaluation:  Plan of Care Reviewed With: patient        Progress: no change  Outcome Evaluation: VSS on 2L NC. A&O x4. No complaints of pain. Fall and skin precautions in place. Will continue plan of care.

## 2025-01-20 NOTE — ED NOTES
Rose J Kellermann    Nursing Report ED to Floor:  Mental status: A&OX4  Ambulatory status: HAS NOT AMBULATED IN ED  Oxygen Therapy:  2LNC  Cardiac Rhythm: AFLUTTER  Admitted from: HOME  Safety Concerns:  FALL RISK   Social Issues: NA  ED Room #:  16    ED Nurse Phone Extension - [phone] or may call [phone].      HPI:   Chief Complaint   Patient presents with    Shortness of Breath       Past Medical History:  Past Medical History:   Diagnosis Date    Abscess of back     Arrhythmia     frequent PVC    Cancer 12/2011    Endometrial cancer, status post robotically assisted hysterectomy with Dr. Haddad, December 2011.      CKD (chronic kidney disease), stage III     no dilaysis     Dizzy     Dvt femoral (deep venous thrombosis) 2017    s/p hip surgery in 2017. Took xarelto until August 2018    Dyslipidemia     Dyslipidemia.  Observing.    Enthesopathy of hip region     Generalized osteoarthrosis, involving multiple sites     Headache     Hearing loss     Hypertension     Low backache     Needs flu shot     Osteoarthritis     Osteoarthritis with questionable carpal tunnel syndrome bilaterally.     Otitis, serous     Pneumonia 1967    Remote pneumonia, 1967.     Retinal hemorrhage     SI joint arthritis     SOB (shortness of breath)     Syncope 2001    Remote syncope with working diagnosis of vasodepressor, 2001.     Venous insufficiency of leg     Wears glasses     readers        Past Surgical History:  Past Surgical History:   Procedure Laterality Date    CATARACT EXTRACTION      bilat     COLONOSCOPY      HIP PERCUTANEOUS PINNING Left 11/24/2017    Procedure: HIP PERCUTANEOUS PINNING;  Surgeon: Lucas Swanson MD;  Location:  [facility];  Service:     HYSTERECTOMY      total? but unsure     KNEE SURGERY  2001    Remote knee surgery in 2001.- right     TOTAL HIP ARTHROPLASTY Left 10/26/2018    Procedure: TOTAL HIP ARTHROPLASTY LEFT;  Surgeon: Stephen Baker MD;  Location:  [facility];  Service: Orthopedics         Admitting Doctor:   No admitting provider for patient encounter.    Consulting Provider(s):  Consults       No orders found from 12/22/2024 to 1/21/2025.             Admitting Diagnosis:       Most Recent Vitals:   Vitals:    01/20/25 1202 01/20/25 1228 01/20/25 1301 01/20/25 1329   BP: 125/85 145/88 120/84 131/95   BP Location:       Patient Position:       Pulse: 86 88 82 79   Resp:       Temp:       TempSrc:       SpO2: 93% 94% 92% 93%   Weight:       Height:           Active LDAs/IV Access:   Lines, Drains & Airways       Active LDAs       Name Placement date Placement time Site Days    Peripheral IV 01/20/25 1002 Left Antecubital 01/20/25  1002  Antecubital  less than 1                    Labs (abnormal labs have a star):   Labs Reviewed   COMPREHENSIVE METABOLIC PANEL - Abnormal; Notable for the following components:       Result Value    Glucose 131 (*)     BUN 48 (*)     Creatinine 2.50 (*)     Sodium 134 (*)     Chloride 97 (*)     Albumin 3.3 (*)     Total Bilirubin 1.3 (*)     eGFR 17.0 (*)     All other components within normal limits    Narrative:     GFR Categories in Chronic Kidney Disease (CKD)      GFR Category          GFR (mL/min/1.73)    Interpretation  G1                     90 or greater         Normal or high (1)  G2                      60-89                Mild decrease (1)  G3a                   45-59                Mild to moderate decrease  G3b                   30-44                Moderate to severe decrease  G4                    15-29                Severe decrease  G5                    14 or less           Kidney failure          (1)In the absence of evidence of kidney disease, neither GFR category G1 or G2 fulfill the criteria for CKD.    eGFR calculation 2021 CKD-EPI creatinine equation, which does not include race as a factor   BNP (IN-HOUSE) - Abnormal; Notable for the following components:    proBNP 5,330.0 (*)     All other components within normal limits    Narrative:      This assay is used as an aid in the diagnosis of individuals suspected of having heart failure. It can be used as an aid in the diagnosis of acute decompensated heart failure (ADHF) in patients presenting with signs and symptoms of ADHF to the emergency department (ED). In addition, NT-proBNP of <300 pg/mL indicates ADHF is not likely.    Age Range Result Interpretation  NT-proBNP Concentration (pg/mL:      <50             Positive            >450                   Gray                 300-450                    Negative             <300    50-75           Positive            >900                  Gray                300-900                  Negative            <300      >75             Positive            >1800                  Gray                300-1800                  Negative            <300   TROPONIN - Abnormal; Notable for the following components:    HS Troponin T 38 (*)     All other components within normal limits    Narrative:     High Sensitive Troponin T Reference Range:  <14.0 ng/L- Negative Female for AMI  <22.0 ng/L- Negative Male for AMI  >=14 - Abnormal Female indicating possible myocardial injury.  >=22 - Abnormal Male indicating possible myocardial injury.   Clinicians would have to utilize clinical acumen, EKG, Troponin, and serial changes to determine if it is an Acute Myocardial Infarction or myocardial injury due to an underlying chronic condition.        CBC WITH AUTO DIFFERENTIAL - Abnormal; Notable for the following components:    Lymphocyte % 11.5 (*)     Monocyte % 12.9 (*)     Monocytes, Absolute 1.15 (*)     All other components within normal limits   LACTIC ACID, PLASMA - Abnormal; Notable for the following components:    Lactate 2.1 (*)     All other components within normal limits   PROCALCITONIN - Abnormal; Notable for the following components:    Procalcitonin 0.33 (*)     All other components within normal limits    Narrative:     As a Marker for Sepsis (Non-Neonates):    1.  "<0.5 ng/mL represents a low risk of severe sepsis and/or septic shock.  2. >2 ng/mL represents a high risk of severe sepsis and/or septic shock.    As a Marker for Lower Respiratory Tract Infections that require antibiotic therapy:    PCT on Admission    Antibiotic Therapy       6-12 Hrs later    >0.5                Strongly Recommended  >0.25 - <0.5        Recommended   0.1 - 0.25          Discouraged              Remeasure/reassess PCT  <0.1                Strongly Discouraged     Remeasure/reassess PCT    As 28 day mortality risk marker: \"Change in Procalcitonin Result\" (>80% or <=80%) if Day 0 (or Day 1) and Day 4 values are available. Refer to http://www.[employer]rogen[employer]sKitchensurfingpct-calculator.com    Change in PCT <=80%  A decrease of PCT levels below or equal to 80% defines a positive change in PCT test result representing a higher risk for 28-day all-cause mortality of patients diagnosed with severe sepsis for septic shock.    Change in PCT >80%  A decrease of PCT levels of more than 80% defines a negative change in PCT result representing a lower risk for 28-day all-cause mortality of patients diagnosed with severe sepsis or septic shock.      HIGH SENSITIVITIY TROPONIN T 1HR - Abnormal; Notable for the following components:    HS Troponin T 43 (*)     All other components within normal limits    Narrative:     High Sensitive Troponin T Reference Range:  <14.0 ng/L- Negative Female for AMI  <22.0 ng/L- Negative Male for AMI  >=14 - Abnormal Female indicating possible myocardial injury.  >=22 - Abnormal Male indicating possible myocardial injury.   Clinicians would have to utilize clinical acumen, EKG, Troponin, and serial changes to determine if it is an Acute Myocardial Infarction or myocardial injury due to an underlying chronic condition.        RESPIRATORY PANEL PCR W/ COVID-19 (SARS-COV-2), NP SWAB IN UTM/VTP, 2 HR TAT - Normal    Narrative:     In the setting of a positive respiratory panel with a viral infection PLUS a " negative procalcitonin without other underlying concern for bacterial infection, consider observing off antibiotics or discontinuation of antibiotics and continue supportive care. If the respiratory panel is positive for atypical bacterial infection (Bordetella pertussis, Chlamydophila pneumoniae, or Mycoplasma pneumoniae), consider antibiotic de-escalation to target atypical bacterial infection.   LACTIC ACID, REFLEX - Normal   BLOOD CULTURE   BLOOD CULTURE   RAINBOW DRAW    Narrative:     The following orders were created for panel order [place] Draw.  Procedure                               Abnormality         Status                     ---------                               -----------         ------                     Green Top (Gel)[[id number]]                                  Final result               Lavender Top[[id number]]                                     Final result               Gold Top - SST[[id number]]                                   Final result               Motley Top[[id number]]                                         Final result               Light Blue Top[[id number]]                                   Final result                 Please view results for these tests on the individual orders.   CBC AND DIFFERENTIAL    Narrative:     The following orders were created for panel order CBC & Differential.  Procedure                               Abnormality         Status                     ---------                               -----------         ------                     CBC Auto Differential[[id number]]        Abnormal            Final result                 Please view results for these tests on the individual orders.   GREEN TOP   LAVENDER TOP   GOLD TOP - SST   GRAY TOP   LIGHT BLUE TOP       Meds Given in ED:   Medications   sodium chloride 0.9 % flush 10 mL (has no administration in time range)   cefTRIAXone (ROCEPHIN) 1,000 mg in sodium chloride 0.9 % 100 mL MBP (0 mg Intravenous  Stopped 1/20/25 1154)   azithromycin (ZITHROMAX) 500 mg in sodium chloride 0.9 % 250 mL IVPB-VTB (0 mg Intravenous Stopped 1/20/25 1310)           Last NIH score:                                                          Dysphagia screening results:        [place] Coma Scale:  No data recorded     CIWA:        Restraint Type:            Isolation Status:  No active isolations

## 2025-01-20 NOTE — ED PROVIDER NOTES
Subjective   History of Present Illness  89-year-old female presents for evaluation of shortness of breath.  Before evaluating the patient I did a thorough review of her records.  I reviewed her echocardiogram from June 2023 which revealed a normal ejection fraction.  She presents via EMS.  Her daughter is at bedside helping corroborate her history as well.  According to EMS personnel, the patient wears home oxygen as needed.  She is anticoagulated and endorses compliance with all of her medications.  She tells me that for the past week or so she has been experiencing shortness of breath.  She denies any cough or fever.  However, the patient's daughter tells me that she has been experiencing a nagging cough for the past 2 days.  Since yesterday, she has been experiencing hypoxia as well with consistent oxygen saturations in the 80s on 3 L of oxygen at home.  Given her ongoing symptoms, EMS was called today and she was brought to our facility to be evaluated.      Review of Systems   Respiratory:  Positive for cough and shortness of breath.    All other systems reviewed and are negative.      Past Medical History:   Diagnosis Date    Abscess of back     Arrhythmia     frequent PVC    Cancer 12/2011    Endometrial cancer, status post robotically assisted hysterectomy with Dr. Haddad, December 2011.      CKD (chronic kidney disease), stage III     no dilaysis     Dizzy     Dvt femoral (deep venous thrombosis) 2017    s/p hip surgery in 2017. Took xarelto until August 2018    Dyslipidemia     Dyslipidemia.  Observing.    Enthesopathy of hip region     Generalized osteoarthrosis, involving multiple sites     Headache     Hearing loss     Hypertension     Low backache     Needs flu shot     Osteoarthritis     Osteoarthritis with questionable carpal tunnel syndrome bilaterally.     Otitis, serous     Pneumonia 1967    Remote pneumonia, 1967.     Retinal hemorrhage     SI joint arthritis     SOB (shortness of breath)      Syncope 2001    Remote syncope with working diagnosis of vasodepressor, 2001.     Venous insufficiency of leg     Wears glasses     readers       Allergies   Allergen Reactions    Sulfa Antibiotics Nausea Only and GI Intolerance       Past Surgical History:   Procedure Laterality Date    CATARACT EXTRACTION      bilat     COLONOSCOPY      HIP PERCUTANEOUS PINNING Left 11/24/2017    Procedure: HIP PERCUTANEOUS PINNING;  Surgeon: Lucas Swanson MD;  Location:  [facility];  Service:     HYSTERECTOMY      total? but unsure     KNEE SURGERY  2001    Remote knee surgery in 2001.- right     TOTAL HIP ARTHROPLASTY Left 10/26/2018    Procedure: TOTAL HIP ARTHROPLASTY LEFT;  Surgeon: Stephen Baker MD;  Location:  [facility];  Service: Orthopedics       Family History   Problem Relation Age of Onset    Heart disease Mother     Osteoarthritis Mother     Hypertension Mother     Hypertension Father     Heart attack Father     Osteoarthritis Father     Cancer Brother         lung       Social History     Socioeconomic History    Marital status:    Tobacco Use    Smoking status: Never     Passive exposure: Never    Smokeless tobacco: Never   Vaping Use    Vaping status: Never Used   Substance and Sexual Activity    Alcohol use: No    Drug use: No    Sexual activity: Defer     Birth control/protection: None           Objective   Physical Exam  Vitals and nursing note reviewed.   Constitutional:       General: She is not in acute distress.     Appearance: She is well-developed. She is not diaphoretic.      Comments: Nontoxic-appearing elderly female   HENT:      Head: Normocephalic and atraumatic.   Eyes:      Pupils: Pupils are equal, round, and reactive to light.   Cardiovascular:      Rate and Rhythm: Normal rate and regular rhythm.      Heart sounds: Normal heart sounds. No murmur heard.     No friction rub. No gallop.   Pulmonary:      Effort: Pulmonary effort is normal.      Breath sounds: No wheezing or rales.       Comments: Decreased breath sounds in left lung base compared to the right, speaking full sentences, no accessory muscle use or retractions noted  Abdominal:      General: Bowel sounds are normal. There is no distension.      Palpations: Abdomen is soft. There is no mass.      Tenderness: There is no abdominal tenderness. There is no guarding or rebound.   Musculoskeletal:         General: Normal range of motion.      Cervical back: Neck supple.      Right lower leg: No edema.      Left lower leg: No edema.   Skin:     General: Skin is warm and dry.      Findings: No erythema or rash.   Neurological:      Mental Status: She is alert and oriented to person, place, and time.   Psychiatric:         Mood and Affect: Mood normal.         Thought Content: Thought content normal.         Judgment: Judgment normal.         Procedures           ED Course  ED Course as of 01/20/25 1040   Mon Jan 20, 2025   1007 89-year-old female presents for evaluation of shortness of breath.  She presents via EMS.  Before evaluating the patient, I did a thorough review of her records.  I reviewed her echocardiogram from June 2023 which revealed a normal ejection fraction.  According to EMS personnel, the patient wears home oxygen as needed.  She is anticoagulated and endorses compliance with all of her medications.  She tells me that for the past week or so she has been experiencing shortness of breath.  She denies any cough or fever.  She denies any known sick contacts.  Since yesterday, she has been experiencing hypoxia as well with consistent oxygen saturations in the 80s on 3 L of oxygen at home.  Given her ongoing symptoms, EMS was called today and she was brought to our facility to be evaluated.  On arrival, the patient is nontoxic-appearing. [DD]   1008 No audible wheezes.  The patient is speaking in full sentences.  Supplemental oxygen given via nasal cannula.  Differential diagnosis is quite broad.  We will obtain labs and  imaging, and we will reassess following initial interventions. [DD]   1018 EKG interpreted independently by me revealed atrial flutter with a heart rate of 83 and is otherwise unrevealing. [DD]   1032 I personally and independently viewed the patient's x-ray images myself, and I am in agreement with the radiologist's reading for final interpretation, particularly regarding cardiomegaly and left pleural effusion. [DD]   1036 The patient's daughter arrived and tells me that she has been coughing for the past few days.  As a result, we will initiate antibiotic coverage to cover for community-acquired pneumonia.  Given the patient's oxygen requirement and overall clinical picture, feel that she warrants admission to the hospital at this point.  She will definitely need a thoracentesis.  Blood cultures obtained.  I discussed the patient's case with our hospitalist, Dr. Garcia, and the patient will be admitted under her care for further evaluation and treatment.  The patient is hemodynamically stable at this time and is aware/agreeable with the plan. [DD]      ED Course User Index  [DD] Lucas Sagastume MD                                             Recent Results (from the past 24 hours)   ECG 12 Lead ED Triage Standing Order; SOA    Collection Time: 01/20/25 10:08 AM   Result Value Ref Range    QT Interval 342 ms    QTC Interval 401 ms   Comprehensive Metabolic Panel    Collection Time: 01/20/25 10:14 AM    Specimen: Blood   Result Value Ref Range    Glucose 131 (H) 65 - 99 mg/dL    BUN 48 (H) 8 - 23 mg/dL    Creatinine 2.50 (H) 0.57 - 1.00 mg/dL    Sodium 134 (L) 136 - 145 mmol/L    Potassium 3.8 3.5 - 5.2 mmol/L    Chloride 97 (L) 98 - 107 mmol/L    CO2 23.0 22.0 - 29.0 mmol/L    Calcium 9.1 8.2 - 9.6 mg/dL    Total Protein 7.2 6.0 - 8.5 g/dL    Albumin 3.3 (L) 3.5 - 5.2 g/dL    ALT (SGPT) 11 1 - 33 U/L    AST (SGOT) 19 1 - 32 U/L    Alkaline Phosphatase 76 39 - 117 U/L    Total Bilirubin 1.3 (H) 0.0 - 1.2 mg/dL     Globulin 3.9 gm/dL    A/G Ratio 0.8 g/dL    BUN/Creatinine Ratio 19.2 7.0 - 25.0    Anion Gap 14.0 5.0 - 15.0 mmol/L    eGFR 17.0 (L) >60.0 mL/min/1.73   BNP    Collection Time: 01/20/25 10:14 AM    Specimen: Blood   Result Value Ref Range    proBNP 5,330.0 (H) 0.0 - 1,800.0 pg/mL   High Sensitivity Troponin T    Collection Time: 01/20/25 10:14 AM    Specimen: Blood   Result Value Ref Range    HS Troponin T 38 (H) <14 ng/L   Green Top (Gel)    Collection Time: 01/20/25 10:14 AM   Result Value Ref Range    Extra Tube Hold for add-ons.    Lavender Top    Collection Time: 01/20/25 10:14 AM   Result Value Ref Range    Extra Tube hold for add-on    Gold Top - SST    Collection Time: 01/20/25 10:14 AM   Result Value Ref Range    Extra Tube Hold for add-ons.    Gray Top    Collection Time: 01/20/25 10:14 AM   Result Value Ref Range    Extra Tube Hold for add-ons.    Light Blue Top    Collection Time: 01/20/25 10:14 AM   Result Value Ref Range    Extra Tube Hold for add-ons.    CBC Auto Differential    Collection Time: 01/20/25 10:14 AM    Specimen: Blood   Result Value Ref Range    WBC 8.92 3.40 - 10.80 10*3/mm3    RBC 3.96 3.77 - 5.28 10*6/mm3    Hemoglobin 12.0 12.0 - 15.9 g/dL    Hematocrit 37.5 34.0 - 46.6 %    MCV 94.7 79.0 - 97.0 fL    MCH 30.3 26.6 - 33.0 pg    MCHC 32.0 31.5 - 35.7 g/dL    RDW 13.7 12.3 - 15.4 %    RDW-SD 47.6 37.0 - 54.0 fl    MPV 10.3 6.0 - 12.0 fL    Platelets 235 140 - 450 10*3/mm3    Neutrophil % 74.0 42.7 - 76.0 %    Lymphocyte % 11.5 (L) 19.6 - 45.3 %    Monocyte % 12.9 (H) 5.0 - 12.0 %    Eosinophil % 1.0 0.3 - 6.2 %    Basophil % 0.3 0.0 - 1.5 %    Immature Grans % 0.3 0.0 - 0.5 %    Neutrophils, Absolute 6.59 1.70 - 7.00 10*3/mm3    Lymphocytes, Absolute 1.03 0.70 - 3.10 10*3/mm3    Monocytes, Absolute 1.15 (H) 0.10 - 0.90 10*3/mm3    Eosinophils, Absolute 0.09 0.00 - 0.40 10*3/mm3    Basophils, Absolute 0.03 0.00 - 0.20 10*3/mm3    Immature Grans, Absolute 0.03 0.00 - 0.05 10*3/mm3     nRBC 0.0 0.0 - 0.2 /100 WBC   Lactic Acid, Plasma    Collection Time: 01/20/25 10:14 AM    Specimen: Blood   Result Value Ref Range    Lactate 2.1 (C) 0.5 - 2.0 mmol/L   Procalcitonin    Collection Time: 01/20/25 10:14 AM    Specimen: Blood   Result Value Ref Range    Procalcitonin 0.33 (H) 0.00 - 0.25 ng/mL   Respiratory Panel PCR w/COVID-19(SARS-CoV-2) [facility] In-House, NP Swab in [facility]/VTM, 2 HR TAT - Swab, Nasopharynx    Collection Time: 01/20/25 10:27 AM    Specimen: Nasopharynx; Swab   Result Value Ref Range    ADENOVIRUS, PCR Not Detected Not Detected    Coronavirus 229E Not Detected Not Detected    Coronavirus HKU1 Not Detected Not Detected    Coronavirus NL63 Not Detected Not Detected    Coronavirus OC43 Not Detected Not Detected    COVID19 Not Detected Not Detected - Ref. Range    Human Metapneumovirus Not Detected Not Detected    Human Rhinovirus/Enterovirus Not Detected Not Detected    Influenza A PCR Not Detected Not Detected    Influenza B PCR Not Detected Not Detected    Parainfluenza Virus 1 Not Detected Not Detected    Parainfluenza Virus 2 Not Detected Not Detected    Parainfluenza Virus 3 Not Detected Not Detected    Parainfluenza Virus 4 Not Detected Not Detected    RSV, PCR Not Detected Not Detected    Bordetella pertussis pcr Not Detected Not Detected    Bordetella parapertussis PCR Not Detected Not Detected    Chlamydophila pneumoniae PCR Not Detected Not Detected    Mycoplasma pneumo by PCR Not Detected Not Detected   High Sensitivity Troponin T 1Hr    Collection Time: 01/20/25 11:15 AM    Specimen: Blood   Result Value Ref Range    HS Troponin T 43 (H) <14 ng/L    Troponin T Numeric Delta 5 ng/L    Troponin T % Delta 13 Abnormal if >/= 20%   STAT Lactic Acid, Reflex    Collection Time: 01/20/25  1:15 PM    Specimen: Blood   Result Value Ref Range    Lactate 1.7 0.5 - 2.0 mmol/L     Note: In addition to lab results from this visit, the labs listed above may include labs taken at  another facility or during a different encounter within the last 24 hours. Please correlate lab times with ED admission and discharge times for further clarification of the services performed during this visit.    XR Chest 1 View   Final Result   Impression:   1. Cardiomegaly with moderate size increasing left effusion and possible trace right pleural effusion. Associated presumed compressive atelectasis in the left lower lobe, differential includes infection in the right clinical setting.   2. Chronic appearing pulmonary vascular congestion without overt edema.         Electronically Signed: Juice Martinez MD     1/20/2025 10:27 AM EST     Workstation ID: [id number]      CT Guided Thoracentesis    (Results Pending)     Vitals:    01/20/25 1503 01/20/25 1618 01/20/25 1700 01/20/25 1800   BP: 123/87 122/83     BP Location: Right arm      Patient Position: Lying      Pulse: 93 79 83 90   Resp: 18      Temp: 98.1 °F (36.7 °C)      TempSrc: Oral      SpO2: 94% 96% 94% 93%   Weight:       Height:         Medications   sodium chloride 0.9 % flush 10 mL (has no administration in time range)   amLODIPine (NORVASC) tablet 5 mg (5 mg Oral Not Given 1/20/25 1618)   bumetanide (BUMEX) tablet 1 mg ( Oral Dose Auto Held 1/28/25 0900)   apixaban (ELIQUIS) tablet 5 mg ( Oral Dose Auto Held 1/28/25 2100)   hydrALAZINE (APRESOLINE) tablet 25 mg (has no administration in time range)   sodium chloride 0.9 % flush 10 mL (has no administration in time range)   sodium chloride 0.9 % flush 10 mL (has no administration in time range)   sodium chloride 0.9 % infusion 40 mL (has no administration in time range)   sennosides-docusate (PERICOLACE) 8.6-50 MG per tablet 2 tablet (has no administration in time range)     And   polyethylene glycol (MIRALAX) packet 17 g (has no administration in time range)     And   bisacodyl (DULCOLAX) EC tablet 5 mg (has no administration in time range)     And   bisacodyl (DULCOLAX) suppository 10 mg (has no  administration in time range)   cefTRIAXone (ROCEPHIN) 1,000 mg in sodium chloride 0.9 % 100 mL MBP (0 mg Intravenous Stopped 1/20/25 1154)   azithromycin (ZITHROMAX) 500 mg in sodium chloride 0.9 % 250 mL IVPB-VTB (0 mg Intravenous Stopped 1/20/25 1310)   bumetanide (BUMEX) injection 0.5 mg (0.5 mg Intravenous Given 1/20/25 1619)     ECG/EMG Results (last 24 hours)       Procedure Component Value Units Date/Time    ECG 12 Lead ED Triage Standing Order; SOA [[id number]] Collected: 01/20/25 1008     Updated: 01/20/25 1841     QT Interval 342 ms      QTC Interval 401 ms     Narrative:      Test Reason : SOB  Blood Pressure :   */*   mmHG  Vent. Rate :  83 BPM     Atrial Rate :  83 BPM     P-R Int : 268 ms          QRS Dur :  82 ms      QT Int : 342 ms       P-R-T Axes :   * -20 211 degrees    QTcB Int : 401 ms    atrial flutter  Low voltage QRS  Septal infarct (cited on or before 10-Aug-2023)  Abnormal ECG  Confirmed by MD Sagastume Michael ([id number]) on 1/20/2025 6:41:33 PM    Referred By: EDMD           Confirmed By: Stephen Sagastume MD          ECG 12 Lead ED Triage Standing Order; SOA   Final Result   Test Reason : SOB   Blood Pressure :   */*   mmHG   Vent. Rate :  83 BPM     Atrial Rate :  83 BPM      P-R Int : 268 ms          QRS Dur :  82 ms       QT Int : 342 ms       P-R-T Axes :   * -20 211 degrees     QTcB Int : 401 ms      atrial flutter   Low voltage QRS   Septal infarct (cited on or before 10-Aug-2023)   Abnormal ECG   Confirmed by MD Sagastume Michael ([id number]) on 1/20/2025 6:41:33 PM      Referred By: EDMD           Confirmed By: Stephen Sagastume MD                    Medical Decision Making      Final diagnoses:   Pleural effusion, left   Hypoxia   Chronic kidney disease, unspecified CKD stage       ED Disposition  ED Disposition       ED Disposition   Decision to Admit    Condition   --    Comment   --               No follow-up provider specified.       Medication List      No changes were made to your prescriptions  during this visit.            Lucas Sagastume MD  01/20/25 6774

## 2025-01-20 NOTE — CASE MANAGEMENT/SOCIAL WORK
Discharge Planning Assessment  [facility]     Patient Name: Rose J Kellermann  MRN: [id number]  Today's Date: 1/20/2025    Admit Date: 1/20/2025    Plan: home   Discharge Needs Assessment       Row Name 01/20/25 1526       Living Environment    People in Home alone    Primary Care Provided by self;child(malika)    Quality of Family Relationships helpful;involved;supportive       Transition Planning    Patient/Family Anticipates Transition to home       Discharge Needs Assessment    Readmission Within the Last 30 Days no previous admission in last 30 days    Equipment Currently Used at Home walker, rolling;wheelchair;oxygen  3L cont thru [employer]    Concerns to be Addressed basic needs;discharge planning                   Discharge Plan       Row Name 01/20/25 1527       Plan    Plan home    Patient/Family in Agreement with Plan yes    Plan Comments I spoke with this patient's daughter/POA bedside to complete the IDP. She lives alone in [place], but her daughter lives nearby and helps with her care. She is independent with self care, but needs assistance with all other activities of daily living. She is independent with moiblity with the use of a rolling walker and wheelchair, She does use  home oxygen 3L continuous through [employer]. She has had home health through [facility] in the past. She anticipates returning home after this hospitalization, and her daughter can transport. Case management will follow.    Final Discharge Disposition Code 01 - home or self-care                  Continued Care and Services - Admitted Since 1/20/2025    No active coordination exists for this encounter.       Expected Discharge Date and Time       Expected Discharge Date Expected Discharge Time    Jan 23, 2025            Demographic Summary       Row Name 01/20/25 1525       General Information    Admission Type inpatient    General Information Comments I confirmed that Sam Leblanc is Ms Kellerman's PCP and she has [employer] Medicare                   Functional Status       Row Name 01/20/25 1526       Functional Status, IADL    Medications completely dependent    Meal Preparation completely dependent    Housekeeping completely dependent    Laundry completely dependent    Shopping completely dependent                   Psychosocial    No documentation.                  Abuse/Neglect    No documentation.                  Legal    No documentation.                  Substance Abuse    No documentation.                  Patient Forms    No documentation.                     Floresita Dawson RN

## 2025-01-21 ENCOUNTER — APPOINTMENT (OUTPATIENT)
Dept: GENERAL RADIOLOGY | Facility: HOSPITAL | Age: OVER 89
End: 2025-01-21
Payer: MEDICARE

## 2025-01-21 LAB
ANION GAP SERPL CALCULATED.3IONS-SCNC: 16 MMOL/L (ref 5–15)
APTT PPP: 40.4 SECONDS (ref 22–39)
BASOPHILS # BLD AUTO: 0.03 10*3/MM3 (ref 0–0.2)
BASOPHILS NFR BLD AUTO: 0.3 % (ref 0–1.5)
BUN SERPL-MCNC: 58 MG/DL (ref 8–23)
BUN/CREAT SERPL: 20.2 (ref 7–25)
CALCIUM SPEC-SCNC: 8.7 MG/DL (ref 8.2–9.6)
CHLORIDE SERPL-SCNC: 98 MMOL/L (ref 98–107)
CO2 SERPL-SCNC: 20 MMOL/L (ref 22–29)
CREAT SERPL-MCNC: 2.87 MG/DL (ref 0.57–1)
DEPRECATED RDW RBC AUTO: 47.2 FL (ref 37–54)
EGFRCR SERPLBLD CKD-EPI 2021: 14.4 ML/MIN/1.73
EOSINOPHIL # BLD AUTO: 0.04 10*3/MM3 (ref 0–0.4)
EOSINOPHIL NFR BLD AUTO: 0.4 % (ref 0.3–6.2)
ERYTHROCYTE [DISTWIDTH] IN BLOOD BY AUTOMATED COUNT: 13.7 % (ref 12.3–15.4)
GLUCOSE SERPL-MCNC: 182 MG/DL (ref 65–99)
HCT VFR BLD AUTO: 35.5 % (ref 34–46.6)
HGB BLD-MCNC: 11.4 G/DL (ref 12–15.9)
IMM GRANULOCYTES # BLD AUTO: 0.03 10*3/MM3 (ref 0–0.05)
IMM GRANULOCYTES NFR BLD AUTO: 0.3 % (ref 0–0.5)
INR PPP: 1.82 (ref 0.89–1.12)
LDH SERPL-CCNC: 209 U/L (ref 135–214)
LYMPHOCYTES # BLD AUTO: 0.82 10*3/MM3 (ref 0.7–3.1)
LYMPHOCYTES NFR BLD AUTO: 8.3 % (ref 19.6–45.3)
MCH RBC QN AUTO: 30.2 PG (ref 26.6–33)
MCHC RBC AUTO-ENTMCNC: 32.1 G/DL (ref 31.5–35.7)
MCV RBC AUTO: 94.2 FL (ref 79–97)
MONOCYTES # BLD AUTO: 0.81 10*3/MM3 (ref 0.1–0.9)
MONOCYTES NFR BLD AUTO: 8.2 % (ref 5–12)
NEUTROPHILS NFR BLD AUTO: 8.15 10*3/MM3 (ref 1.7–7)
NEUTROPHILS NFR BLD AUTO: 82.5 % (ref 42.7–76)
NRBC BLD AUTO-RTO: 0 /100 WBC (ref 0–0.2)
PLATELET # BLD AUTO: 228 10*3/MM3 (ref 140–450)
PMV BLD AUTO: 10.7 FL (ref 6–12)
POTASSIUM SERPL-SCNC: 4.5 MMOL/L (ref 3.5–5.2)
PROT SERPL-MCNC: 6.1 G/DL (ref 6–8.5)
PROTHROMBIN TIME: 21.2 SECONDS (ref 12.2–14.5)
RBC # BLD AUTO: 3.77 10*6/MM3 (ref 3.77–5.28)
SODIUM SERPL-SCNC: 134 MMOL/L (ref 136–145)
WBC NRBC COR # BLD AUTO: 9.88 10*3/MM3 (ref 3.4–10.8)

## 2025-01-21 PROCEDURE — 84155 ASSAY OF PROTEIN SERUM: CPT | Performed by: STUDENT IN AN ORGANIZED HEALTH CARE EDUCATION/TRAINING PROGRAM

## 2025-01-21 PROCEDURE — 99232 SBSQ HOSP IP/OBS MODERATE 35: CPT | Performed by: INTERNAL MEDICINE

## 2025-01-21 PROCEDURE — 83615 LACTATE (LD) (LDH) ENZYME: CPT | Performed by: STUDENT IN AN ORGANIZED HEALTH CARE EDUCATION/TRAINING PROGRAM

## 2025-01-21 PROCEDURE — 80048 BASIC METABOLIC PNL TOTAL CA: CPT | Performed by: INTERNAL MEDICINE

## 2025-01-21 PROCEDURE — 85730 THROMBOPLASTIN TIME PARTIAL: CPT | Performed by: STUDENT IN AN ORGANIZED HEALTH CARE EDUCATION/TRAINING PROGRAM

## 2025-01-21 PROCEDURE — 71045 X-RAY EXAM CHEST 1 VIEW: CPT

## 2025-01-21 PROCEDURE — 85025 COMPLETE CBC W/AUTO DIFF WBC: CPT | Performed by: STUDENT IN AN ORGANIZED HEALTH CARE EDUCATION/TRAINING PROGRAM

## 2025-01-21 PROCEDURE — 97162 PT EVAL MOD COMPLEX 30 MIN: CPT

## 2025-01-21 PROCEDURE — 85610 PROTHROMBIN TIME: CPT | Performed by: STUDENT IN AN ORGANIZED HEALTH CARE EDUCATION/TRAINING PROGRAM

## 2025-01-21 RX ADMIN — Medication 10 ML: at 09:27

## 2025-01-21 RX ADMIN — AMLODIPINE BESYLATE 5 MG: 5 TABLET ORAL at 09:21

## 2025-01-21 RX ADMIN — HYDRALAZINE HYDROCHLORIDE 25 MG: 25 TABLET ORAL at 09:21

## 2025-01-21 RX ADMIN — HYDRALAZINE HYDROCHLORIDE 25 MG: 25 TABLET ORAL at 20:33

## 2025-01-21 NOTE — PLAN OF CARE
Goal Outcome Evaluation:  Plan of Care Reviewed With: patient        Progress: no change  Outcome Evaluation: VSS on 3L NC. A&O x4. No complaints of pain. Fall and skin precautions in place. Will continue plan of care.

## 2025-01-21 NOTE — NURSING NOTE
"WOC Consulted for \"LLE\". Pt with hemosiderin staining to gaiter region and dry, peeling skin all indicative of lower extremity venous disease.   LLE:     Partial-thickness ulcer noted to gaiter region. Serosanguinous drainage. Area cleansed with NS, multilayer xeroform applied and covered with ABD pad, wrapped with Kerlix. Optifoam was painful upon removal. Will recommend and place orders for dressing changes Q3 days and prn. Pt at risk for PI, recommend Allevyns to heels and sacrum, offload both, place waffle overlay and inflate.   WOC will sign off. Please re-consult if needed.   "

## 2025-01-21 NOTE — PLAN OF CARE
Goal Outcome Evaluation:  Plan of Care Reviewed With: patient           Outcome Evaluation: PT initial evaluation completed for pt presenting with generalized weakness, SOA, mild balance and coordination deficits, and decreased functional mobility. Pt ambulated 35ft with RW and Xiomara. Pt's decreased independence warrants PT skilled care. Recommend D/C to IP rehab facility, but will monitor progress closely.    Anticipated Discharge Disposition (PT): inpatient rehabilitation facility

## 2025-01-21 NOTE — H&P (VIEW-ONLY)
[facility] Medicine Services  PROGRESS NOTE    Patient Name: Rose J Kellermann  : 1926  MRN: [id number]    Date of Admission: 2025  Primary Care Physician: Sam Hung MD    Subjective   Subjective     CC:  progressive dyspnea     HPI:  alert, NAD on nc oxygen, amenable to plan of thoracentesis.       Objective   Objective     Vital Signs:   Temp:  [97.4 °F (36.3 °C)-98.6 °F (37 °C)] 98.4 °F (36.9 °C)  Heart Rate:  [79-95] 86  Resp:  [16-18] 16  BP: (120-145)/(78-95) 132/86  Flow (L/min) (Oxygen Therapy):  [2-4] 4     Physical Exam:  Gen:  alert, delightful, conversant, NAD   Neuro: alert and oriented, clear speech, follows commands, grossly nonfocal  HEENT:  NC/AT   Neck:  Supple, no LAD  Heart RRR   Lungs  anterior exam is clear   Abd:  Soft, nontender  Extrem:  No c/c/e      Results Reviewed:  LAB RESULTS:      Lab 25  1315 25  1115 25  1014   WBC  --   --  8.92   HEMOGLOBIN  --   --  12.0   HEMATOCRIT  --   --  37.5   PLATELETS  --   --  235   NEUTROS ABS  --   --  6.59   IMMATURE GRANS (ABS)  --   --  0.03   LYMPHS ABS  --   --  1.03   MONOS ABS  --   --  1.15*   EOS ABS  --   --  0.09   MCV  --   --  94.7   PROCALCITONIN  --   --  0.33*   LACTATE 1.7  --  2.1*   HSTROP T  --  43* 38*         Lab 25  1014   SODIUM 134*   POTASSIUM 3.8   CHLORIDE 97*   CO2 23.0   ANION GAP 14.0   BUN 48*   CREATININE 2.50*   EGFR 17.0*   GLUCOSE 131*   CALCIUM 9.1         Lab 25  1014   TOTAL PROTEIN 7.2   ALBUMIN 3.3*   GLOBULIN 3.9   ALT (SGPT) 11   AST (SGOT) 19   BILIRUBIN 1.3*   ALK PHOS 76         Lab 25  1115 25  1014   PROBNP  --  5,330.0*   HSTROP T 43* 38*                 Brief Urine Lab Results       None            Microbiology Results Abnormal       None            XR Chest 1 View    Result Date: 2025  XR CHEST 1 VW Date of Exam: 2025 10:03 AM EST Indication: SOA triage protocol Comparison: Chest CT 2024,  chest radiograph 8/10/2024 Findings: Cardiomegaly. Moderate size left pleural effusion increasing from prior. Slightly blunted right costophrenic angle which could reflect trace pleural fluid. Prominent central vasculature without overt edema. Retrocardiac consolidation. No pneumothorax. Degenerative related osseous change. Aortic atherosclerotic disease.     Impression: Impression: 1. Cardiomegaly with moderate size increasing left effusion and possible trace right pleural effusion. Associated presumed compressive atelectasis in the left lower lobe, differential includes infection in the right clinical setting. 2. Chronic appearing pulmonary vascular congestion without overt edema. Electronically Signed: Juice Martinez MD  1/20/2025 10:27 AM EST  Workstation ID: [id number]     Results for orders placed during the hospital encounter of 06/01/23    Adult Transthoracic Echo Complete W/ Cont if Necessary Per Protocol    Interpretation Summary    Left ventricular systolic function is normal. Calculated left ventricular EF = 61.4% Left ventricular ejection fraction appears to be 61 - 65%.    Left ventricular diastolic function was indeterminate.    Left atrial volume is severely increased.    Mild aortic valve stenosis is present.    Aortic valve maximum pressure gradient is 26 mmHg. Aortic valve mean pressure gradient is 15 mmHg.    Mild mitral valve stenosis is present. The mitral valve peak gradient is 12 mmHg. The mitral valve mean gradient is 5 mmHg.    Moderate tricuspid valve regurgitation is present.    Estimated right ventricular systolic pressure from tricuspid regurgitation is markedly elevated (>55 mmHg).    There is a small left pleural effusion.      Current medications:  Scheduled Meds:amLODIPine, 5 mg, Oral, Daily  [Held by provider] apixaban, 5 mg, Oral, Q12H  [Held by provider] bumetanide, 1 mg, Oral, Daily  hydrALAZINE, 25 mg, Oral, BID  sodium chloride, 10 mL, Intravenous, Q12H      Continuous  Infusions:   PRN Meds:.  senna-docusate sodium **AND** polyethylene glycol **AND** bisacodyl **AND** bisacodyl    sodium chloride    sodium chloride    sodium chloride    Assessment & Plan   Assessment & Plan     Active Hospital Problems    Diagnosis  POA    **Pleural effusion on left [J90]  Yes    Essential hypertension [I10]  Yes    Chronic kidney disease, stage III (moderate)  [N18.30]  Yes      Resolved Hospital Problems   No resolved problems to display.        Brief Hospital Course to date:  Rose J Kellermann is a 89 y.o. female with a history of DVT on Eliquis, HFpEF, CKD, lives at home, has been using home oxygen intermittently x several months, recently needing it constantly though denying dyspnea.      Progressive hypoxia  Left pleural effusion  Suspected acute HFpEF exacerbation  -  chest x-ray with moderate L pleural effusion  - Takes Bumex at home.  Continue. Watch creatinine   - Eliquis last given 1/20 AM.  Hold.  - Thoracentesis ordered:  await repeat CXR per IR request.         ANGEL LUIS on CKD  - Creat basealine 1.8, now 2.5  - Monitor with diuresis daily      History of DVT  - Hold Eliquis for now  - SCDs for now     Hypertension  - Continue home medications    Advanced age  - she is ambulatory  - OOB daily, pt should be up walking to BR w assist               Expected Discharge Location and Transportation: lives at home   Expected Discharge   Expected Discharge Date: 1/23/2025; Expected Discharge Time:      VTE Prophylaxis:  Pharmacologic & mechanical VTE prophylaxis orders are present.         AM-PAC 6 Clicks Score (PT): 17 (01/20/25 2000)    CODE STATUS:   Code Status and Medical Interventions: CPR (Attempt to Resuscitate); Full Support   Ordered at: 01/20/25 1424     Level Of Support Discussed With:    Patient     Code Status (Patient has no pulse and is not breathing):    CPR (Attempt to Resuscitate)     Medical Interventions (Patient has pulse or is breathing):    Full Support       Ayanna Garcia  MD  01/21/25

## 2025-01-21 NOTE — THERAPY EVALUATION
Patient Name: Rose J Kellermann  : 1926    MRN: [id number]                              Today's Date: 2025       Admit Date: 2025    Visit Dx:     ICD-10-CM ICD-9-CM   1. Pleural effusion, left  J90 511.9   2. Hypoxia  R09.02 799.02   3. Chronic kidney disease, unspecified CKD stage  N18.9 585.9     Patient Active Problem List   Diagnosis    Essential hypertension    Vitamin D deficiency    Fatigue    Chronic kidney disease, stage III (moderate)     Osteoarthritis    Dyslipidemia    Post-menopausal bleeding    Endometrial cancer    Closed fracture of left hip    Asymptomatic bacteriuria    Post-traumatic osteoarthritis of left hip    Status post total replacement of left hip    Prediabetes    Acute blood loss anemia, asymptomatic    Postoperative urinary retention    Myopia    Posterior crocodile shagreen of cornea    Presbyopia    Ptosis of eyelid    Regular astigmatism    Retinal neovascularization    After cataract    Secondary cataract    Premature atrial contractions    Heart murmur    Nocturnal hypoxia    Chronic deep vein thrombosis (DVT) of both lower extremities    Retinal neovascularization    Pleural effusion on left     Past Medical History:   Diagnosis Date    Abscess of back     Arrhythmia     frequent PVC    Cancer 2011    Endometrial cancer, status post robotically assisted hysterectomy with Dr. Haddad, 2011.      CKD (chronic kidney disease), stage III     no dilaysis     Dizzy     Dvt femoral (deep venous thrombosis) 2017    s/p hip surgery in 2017. Took xarelto until 2018    Dyslipidemia     Dyslipidemia.  Observing.    Enthesopathy of hip region     Generalized osteoarthrosis, involving multiple sites     Headache     Hearing loss     Hypertension     Low backache     Needs flu shot     Osteoarthritis     Osteoarthritis with questionable carpal tunnel syndrome bilaterally.     Otitis, serous     Pneumonia     Remote pneumonia, .     Retinal  hemorrhage     SI joint arthritis     SOB (shortness of breath)     Syncope 2001    Remote syncope with working diagnosis of vasodepressor, 2001.     Venous insufficiency of leg     Wears glasses     readers     Past Surgical History:   Procedure Laterality Date    CATARACT EXTRACTION      bilat     COLONOSCOPY      HIP PERCUTANEOUS PINNING Left 11/24/2017    Procedure: HIP PERCUTANEOUS PINNING;  Surgeon: Lucas Swanson MD;  Location:  [facility];  Service:     HYSTERECTOMY      total? but unsure     KNEE SURGERY  2001    Remote knee surgery in 2001.- right     TOTAL HIP ARTHROPLASTY Left 10/26/2018    Procedure: TOTAL HIP ARTHROPLASTY LEFT;  Surgeon: Stephen Baker MD;  Location: [facility];  Service: Orthopedics      General Information       Row Name 01/21/25 1603          Physical Therapy Time and Intention    Document Type evaluation  -KG     Mode of Treatment physical therapy  -KG       Row Name 01/21/25 1603          General Information    Patient Profile Reviewed yes  -KG     Prior Level of Function independent:;all household mobility;gait;transfer;ADL's;dressing;bathing  -KG     Existing Precautions/Restrictions fall;oxygen therapy device and L/min  -KG     Barriers to Rehab medically complex  -KG       Row Name 01/21/25 1603          Living Environment    People in Home alone  -KG       Row Name 01/21/25 1603          Home Main Entrance    Number of Stairs, Main Entrance three  -KG     Stair Railings, Main Entrance railings on both sides of stairs  -KG       Row Name 01/21/25 1603          Stairs Within Home, Primary    Number of Stairs, Within Home, Primary none  -KG       Row Name 01/21/25 1603          Cognition    Orientation Status (Cognition) oriented x 3  -KG       Row Name 01/21/25 1603          Safety Issues/Impairments Affecting Functional Mobility    Safety Issues Affecting Function (Mobility) awareness of need for assistance;insight into deficits/self-awareness;safety precaution  awareness;safety precautions follow-through/compliance  -KG     Impairments Affecting Function (Mobility) balance;coordination;endurance/activity tolerance;postural/trunk control;shortness of breath;strength  -KG               User Key  (r) = Recorded By, (t) = Taken By, (c) = Cosigned By      Initials Name Provider Type    KG Ann Nicole, PT Physical Therapist                   Mobility       Row Name 01/21/25 1603          Bed Mobility    Bed Mobility scooting/bridging;supine-sit;sit-supine  -KG     Scooting/Bridging Norman (Bed Mobility) maximum assist (25% patient effort);2 person assist;verbal cues  -KG     Supine-Sit [place] (Bed Mobility) minimum assist (75% patient effort);verbal cues  -KG     Sit-Supine [place] (Bed Mobility) minimum assist (75% patient effort);2 person assist;verbal cues  -KG     Assistive Device (Bed Mobility) bed rails;head of bed elevated;repositioning sheet  -KG     Comment, (Bed Mobility) VC's for sequencing and technique. Pt required assistance at trunk and BLEs.  -KG       Row Name 01/21/25 1603          Transfers    Comment, (Transfers) VC's for sequencing and safe hand placement.  -KG       Row Name 01/21/25 1603          Sit-Stand Transfer    Sit-Stand Norman (Transfers) minimum assist (75% patient effort);2 person assist;verbal cues  -KG     Assistive Device (Sit-Stand Transfers) walker, front-wheeled  -KG       Row Name 01/21/25 1603          Gait/Stairs (Locomotion)    Norman Level (Gait) minimum assist (75% patient effort);verbal cues  -KG     Assistive Device (Gait) walker, front-wheeled  -KG     Distance in Feet (Gait) 35  -KG     Deviations/Abnormal Patterns (Gait) bilateral deviations;mike decreased;stride length decreased  -KG     Bilateral Gait Deviations forward flexed posture;heel strike decreased  -KG     Comment, (Gait/Stairs) Pt demonstrated step to gait pattern with slow mike and forward flexed posture. Frequent cues for  upright posture with forward gaze. Pt with decreased stance time on the L contributing to decreased step length on the R. Pt with increased SOA; deferred additional mobility.  -KG               User Key  (r) = Recorded By, (t) = Taken By, (c) = Cosigned By      Initials Name Provider Type    Ann Elder PT Physical Therapist                   Obj/Interventions       Row Name 01/21/25 1605          Range of Motion Comprehensive    General Range of Motion no range of motion deficits identified  -KG     Comment, General Range of Motion B LE WFL  -KG       Row Name 01/21/25 1605          Strength Comprehensive (MMT)    Comment, General Manual Muscle Testing (MMT) Assessment B LE grossly 3+/5  -KG       Row Name 01/21/25 1605          Balance    Balance Assessment sitting static balance;standing static balance;standing dynamic balance  -KG     Static Sitting Balance standby assist  -KG     Position, Sitting Balance unsupported;sitting edge of bed  -KG     Static Standing Balance contact guard  -KG     Dynamic Standing Balance minimal assist  -KG     Position/Device Used, Standing Balance supported;walker, rolling  -KG       Row Name 01/21/25 1605          Sensory Assessment (Somatosensory)    Sensory Assessment (Somatosensory) LE sensation intact  -KG               User Key  (r) = Recorded By, (t) = Taken By, (c) = Cosigned By      Initials Name Provider Type    Ann Elder PT Physical Therapist                   Goals/Plan       Row Name 01/21/25 1606          Bed Mobility Goal 1 (PT)    Activity/Assistive Device (Bed Mobility Goal 1, PT) sit to supine;supine to sit  -KG     Edwards Level/Cues Needed (Bed Mobility Goal 1, PT) standby assist  -KG     Time Frame (Bed Mobility Goal 1, PT) short term goal (STG);3 days  -KG     Progress/Outcomes (Bed Mobility Goal 1, PT) goal ongoing  -KG       Row Name 01/21/25 1606          Transfer Goal 1 (PT)    Activity/Assistive Device (Transfer Goal 1,  PT) sit-to-stand/stand-to-sit;bed-to-chair/chair-to-bed;walker, rolling  -KG     [place] Level/Cues Needed (Transfer Goal 1, PT) contact guard required  -KG     Time Frame (Transfer Goal 1, PT) long term goal (LTG);1 week  -KG     Progress/Outcome (Transfer Goal 1, PT) goal ongoing  -KG       Row Name 01/21/25 1606          Gait Training Goal 1 (PT)    Activity/Assistive Device (Gait Training Goal 1, PT) gait (walking locomotion);assistive device use;walker, rolling  -KG     [place] Level (Gait Training Goal 1, PT) contact guard required  -KG     Distance (Gait Training Goal 1, PT) 150  -KG     Time Frame (Gait Training Goal 1, PT) long term goal (LTG);1 week  -KG     Progress/Outcome (Gait Training Goal 1, PT) goal ongoing  -KG       Row Name 01/21/25 1606          Therapy Assessment/Plan (PT)    Planned Therapy Interventions (PT) balance training;bed mobility training;gait training;strengthening;transfer training  -KG               User Key  (r) = Recorded By, (t) = Taken By, (c) = Cosigned By      Initials Name Provider Type    KG Ann Nicole N, PT Physical Therapist                   Clinical Impression       Row Name 01/21/25 1605          Pain    Pretreatment Pain Rating 0/10 - no pain  -KG     Posttreatment Pain Rating 0/10 - no pain  -KG       Row Name 01/21/25 1605          Plan of Care Review    Plan of Care Reviewed With patient  -KG     Outcome Evaluation PT initial evaluation completed for pt presenting with generalized weakness, SOA, mild balance and coordination deficits, and decreased functional mobility. Pt ambulated 35ft with RW and Xiomara. Pt's decreased independence warrants PT skilled care. Recommend D/C to  rehab facility, but will monitor progress closely.  -KG       Row Name 01/21/25 1605          Therapy Assessment/Plan (PT)    Patient/Family Therapy Goals Statement (PT) return to OF  -KG     Rehab Potential (PT) good  -KG     Criteria for Skilled Interventions Met (PT)  yes;skilled treatment is necessary  -KG     Therapy Frequency (PT) daily  -KG     Predicted Duration of Therapy Intervention (PT) 7 days  -KG       Row Name 01/21/25 1605          Vital Signs    Pre Systolic BP Rehab 110  -KG     Pre Treatment Diastolic BP 71  -KG     Pretreatment Heart Rate (beats/min) 95  -KG     Posttreatment Heart Rate (beats/min) 87  -KG     Pre SpO2 (%) 90  -KG     O2 Delivery Pre Treatment supplemental O2  -KG     Intra SpO2 (%) 85  -KG     O2 Delivery Intra Treatment supplemental O2  -KG     Post SpO2 (%) 90  -KG     O2 Delivery Post Treatment supplemental O2  -KG     Pre Patient Position Supine  -KG     Intra Patient Position Standing  -KG     Post Patient Position Supine  -KG       Row Name 01/21/25 1605          Positioning and Restraints    Pre-Treatment Position in bed  -KG     Post Treatment Position bed  -KG     In Bed notified nsg;supine;call light within reach;encouraged to call for assist;exit alarm on;side rails up x2  -KG               User Key  (r) = Recorded By, (t) = Taken By, (c) = Cosigned By      Initials Name Provider Type    KG Ann Nicole, PT Physical Therapist                   Outcome Measures       Row Name 01/21/25 1607 01/21/25 0800       How much help from another person do you currently need...    Turning from your back to your side while in flat bed without using bedrails? 3  -KG 3  -AL    Moving from lying on back to sitting on the side of a flat bed without bedrails? 3  -KG 3  -AL    Moving to and from a bed to a chair (including a wheelchair)? 3  -KG 3  -AL    Standing up from a chair using your arms (e.g., wheelchair, bedside chair)? 3  -KG 3  -AL    Climbing 3-5 steps with a railing? 2  -KG 2  -AL    To walk in hospital room? 3  -KG 3  -AL    AM-PAC 6 Clicks Score (PT) 17  -KG 17  -AL    Highest Level of Mobility Goal 5 --> Static standing  -KG 5 --> Static standing  -AL      Row Name 01/21/25 1607          Functional Assessment    Outcome Measure  Options AM-PAC 6 Clicks Basic Mobility (PT)  -KG               User Key  (r) = Recorded By, (t) = Taken By, (c) = Cosigned By      Initials Name Provider Type    KG Ann Nicole PT Physical Therapist    Khang Vlilalta, RN Registered Nurse                                 Physical Therapy Education       Title: PT OT SLP Therapies (In Progress)       Topic: Physical Therapy (In Progress)       Point: Mobility training (In Progress)       Learning Progress Summary            Patient Acceptance, E, NR by KG at 1/21/2025 1511                      Point: Home exercise program (Not Started)       Learner Progress:  Not documented in this visit.              Point: Body mechanics (In Progress)       Learning Progress Summary            Patient Acceptance, E, NR by KG at 1/21/2025 1511                      Point: Precautions (In Progress)       Learning Progress Summary            Patient Acceptance, E, NR by KG at 1/21/2025 1511                                      User Key       Initials Effective Dates Name Provider Type Discipline    ALOK 05/22/20 -  Ann Nicole PT Physical Therapist PT                  PT Recommendation and Plan  Planned Therapy Interventions (PT): balance training, bed mobility training, gait training, strengthening, transfer training  Outcome Evaluation: PT initial evaluation completed for pt presenting with generalized weakness, SOA, mild balance and coordination deficits, and decreased functional mobility. Pt ambulated 35ft with RW and Xiomara. Pt's decreased independence warrants PT skilled care. Recommend D/C to IP rehab facility, but will monitor progress closely.     Time Calculation:   PT Evaluation Complexity  History, PT Evaluation Complexity: 3 or more personal factors and/or comorbidities  Examination of Body Systems (PT Eval Complexity): total of 3 or more elements  Clinical Presentation (PT Evaluation Complexity): evolving  Clinical Decision Making (PT Evaluation  Complexity): moderate complexity  Overall Complexity (PT Evaluation Complexity): moderate complexity     PT Charges       Row Name 01/21/25 1511             Time Calculation    Start Time 1511  -KG      PT Received On 01/21/25  -KG      PT Goal Re-Cert Due Date 01/31/25  -KG         Untimed Charges    PT Eval/Re-eval Minutes 46  -KG         Total Minutes    Untimed Charges Total Minutes 46  -KG       Total Minutes 46  -KG                User Key  (r) = Recorded By, (t) = Taken By, (c) = Cosigned By      Initials Name Provider Type    KG Ann Nicole, PT Physical Therapist                  Therapy Charges for Today       Code Description Service Date Service Provider Modifiers Qty    38901679202 HC PT EVAL MOD COMPLEXITY 4 1/21/2025 Ann Nicole, PT GP 1    80075532001 HC PT THER SUPP EA 15 MIN 1/21/2025 Ann Nicole, PT GP 3            PT G-Codes  Outcome Measure Options: AM-PAC 6 Clicks Basic Mobility (PT)  AM-PAC 6 Clicks Score (PT): 17  PT Discharge Summary  Anticipated Discharge Disposition (PT): inpatient rehabilitation facility    Kelsey Nicole, ADAM  1/21/2025

## 2025-01-21 NOTE — PROGRESS NOTES
"          Clinical Nutrition Assessment     Patient Name: Rose J Kellermann  YOB: 1926  MRN: [id number]  Date of Encounter: 01/21/25 13:27 EST  Admission date: 1/20/2025  Reason for Visit: MST score 2+, Unintentional weight loss, Reduced oral intake    Assessment   Nutrition Assessment   Admission Diagnosis:  Pleural effusion on right [J90]    Problem List:    Pleural effusion on left    Essential hypertension    Chronic kidney disease, stage III (moderate)       PMH:   She  has a past medical history of Abscess of back, Arrhythmia, Cancer (12/2011), CKD (chronic kidney disease), stage III, Dizzy, Dvt femoral (deep venous thrombosis) (2017), Dyslipidemia, Enthesopathy of hip region, Generalized osteoarthrosis, involving multiple sites, Headache, Hearing loss, Hypertension, Low backache, Needs flu shot, Osteoarthritis, Otitis, serous, Pneumonia (1967), Retinal hemorrhage, SI joint arthritis, SOB (shortness of breath), Syncope (2001), Venous insufficiency of leg, and Wears glasses.    PSH:  She  has a past surgical history that includes Knee surgery (2001); Hip Percutaneous Pinning (Left, 11/24/2017); Cataract extraction; Hysterectomy; Colonoscopy; and Total hip arthroplasty (Left, 10/26/2018).    Applicable Nutrition History:       Anthropometrics     Height: Height: 162.6 cm (64\")  Last Filed Weight: Weight: 75.8 kg (167 lb 1.6 oz) (01/21/25 0345)  Method: Weight Method: Bed scale  BMI: BMI (Calculated): 28.7    UBW:     Weight      Weight (kg) Weight (lbs) Weight Method   2/16/2024 73.029 kg  161 lb     6/26/2024 72.576 kg  160 lb     1/20/2025 71.668 kg  158 lb  Stated    1/21/2025 75.796 kg  167 lb 1.6 oz  Bed scale      Weight change: No significant changes    Nutrition Focused Physical Exam    Date:  1/21       Pt with some visible wasting however based on PO and wt hx suspect age related sarcopenia over malnutrition      Subjective   Reported/Observed/Food/Nutrition Related History:     Pt " laying in bed at time of visit with daughter present - pt is somewhat [place] and daughter assisted in answering some nutrition related questions. Pt eats 2 meals/day usually - daughter prepares a large breakfast and then pt will eat small meal in late afternoon, early evening. Has tried ONS in the past - strong dislike. Inquired about NPO status - discussed planned procedure for today, verbalized understanding. Denies dysphagia. NKFA.     Current Nutrition Prescription   PO: NPO Diet NPO Type: Strict NPO  Oral Nutrition Supplement: N/A  Intake: N/A    Assessment & Plan   Nutrition Diagnosis   Date:  1/21            Updated:    Problem Inadequate oral intake    Etiology Pleural effusion   Signs/Symptoms NPO status   Status: New    Goal:   Nutrition to support treatment and Advance diet as medically feasible/appropriate      Nutrition Intervention      Follow treatment progress, Care plan reviewed, Advise alternate selection, Advised available snacks, Await begin PO    Advance diet as clinically indicated to a Regular diet  Therapeutic diet not indicated at this time.     Monitoring/Evaluation:   Per protocol, I&O, PO intake, Pertinent labs, Symptoms, POC/GOC    Yazmin Chandra MS,RD,LD  Time Spent: 25min

## 2025-01-21 NOTE — PROGRESS NOTES
[facility] Medicine Services  PROGRESS NOTE    Patient Name: Rose J Kellermann  : 1926  MRN: [id number]    Date of Admission: 2025  Primary Care Physician: Sam Hung MD    Subjective   Subjective     CC:  progressive dyspnea     HPI:  alert, NAD on nc oxygen, amenable to plan of thoracentesis.       Objective   Objective     Vital Signs:   Temp:  [97.4 °F (36.3 °C)-98.6 °F (37 °C)] 98.4 °F (36.9 °C)  Heart Rate:  [79-95] 86  Resp:  [16-18] 16  BP: (120-145)/(78-95) 132/86  Flow (L/min) (Oxygen Therapy):  [2-4] 4     Physical Exam:  Gen:  alert, delightful, conversant, NAD   Neuro: alert and oriented, clear speech, follows commands, grossly nonfocal  HEENT:  NC/AT   Neck:  Supple, no LAD  Heart RRR   Lungs  anterior exam is clear   Abd:  Soft, nontender  Extrem:  No c/c/e      Results Reviewed:  LAB RESULTS:      Lab 25  1315 25  1115 25  1014   WBC  --   --  8.92   HEMOGLOBIN  --   --  12.0   HEMATOCRIT  --   --  37.5   PLATELETS  --   --  235   NEUTROS ABS  --   --  6.59   IMMATURE GRANS (ABS)  --   --  0.03   LYMPHS ABS  --   --  1.03   MONOS ABS  --   --  1.15*   EOS ABS  --   --  0.09   MCV  --   --  94.7   PROCALCITONIN  --   --  0.33*   LACTATE 1.7  --  2.1*   HSTROP T  --  43* 38*         Lab 25  1014   SODIUM 134*   POTASSIUM 3.8   CHLORIDE 97*   CO2 23.0   ANION GAP 14.0   BUN 48*   CREATININE 2.50*   EGFR 17.0*   GLUCOSE 131*   CALCIUM 9.1         Lab 25  1014   TOTAL PROTEIN 7.2   ALBUMIN 3.3*   GLOBULIN 3.9   ALT (SGPT) 11   AST (SGOT) 19   BILIRUBIN 1.3*   ALK PHOS 76         Lab 25  1115 25  1014   PROBNP  --  5,330.0*   HSTROP T 43* 38*                 Brief Urine Lab Results       None            Microbiology Results Abnormal       None            XR Chest 1 View    Result Date: 2025  XR CHEST 1 VW Date of Exam: 2025 10:03 AM EST Indication: SOA triage protocol Comparison: Chest CT 2024,  chest radiograph 8/10/2024 Findings: Cardiomegaly. Moderate size left pleural effusion increasing from prior. Slightly blunted right costophrenic angle which could reflect trace pleural fluid. Prominent central vasculature without overt edema. Retrocardiac consolidation. No pneumothorax. Degenerative related osseous change. Aortic atherosclerotic disease.     Impression: Impression: 1. Cardiomegaly with moderate size increasing left effusion and possible trace right pleural effusion. Associated presumed compressive atelectasis in the left lower lobe, differential includes infection in the right clinical setting. 2. Chronic appearing pulmonary vascular congestion without overt edema. Electronically Signed: Juice Martinez MD  1/20/2025 10:27 AM EST  Workstation ID: [id number]     Results for orders placed during the hospital encounter of 06/01/23    Adult Transthoracic Echo Complete W/ Cont if Necessary Per Protocol    Interpretation Summary    Left ventricular systolic function is normal. Calculated left ventricular EF = 61.4% Left ventricular ejection fraction appears to be 61 - 65%.    Left ventricular diastolic function was indeterminate.    Left atrial volume is severely increased.    Mild aortic valve stenosis is present.    Aortic valve maximum pressure gradient is 26 mmHg. Aortic valve mean pressure gradient is 15 mmHg.    Mild mitral valve stenosis is present. The mitral valve peak gradient is 12 mmHg. The mitral valve mean gradient is 5 mmHg.    Moderate tricuspid valve regurgitation is present.    Estimated right ventricular systolic pressure from tricuspid regurgitation is markedly elevated (>55 mmHg).    There is a small left pleural effusion.      Current medications:  Scheduled Meds:amLODIPine, 5 mg, Oral, Daily  [Held by provider] apixaban, 5 mg, Oral, Q12H  [Held by provider] bumetanide, 1 mg, Oral, Daily  hydrALAZINE, 25 mg, Oral, BID  sodium chloride, 10 mL, Intravenous, Q12H      Continuous  Infusions:   PRN Meds:.  senna-docusate sodium **AND** polyethylene glycol **AND** bisacodyl **AND** bisacodyl    sodium chloride    sodium chloride    sodium chloride    Assessment & Plan   Assessment & Plan     Active Hospital Problems    Diagnosis  POA    **Pleural effusion on left [J90]  Yes    Essential hypertension [I10]  Yes    Chronic kidney disease, stage III (moderate)  [N18.30]  Yes      Resolved Hospital Problems   No resolved problems to display.        Brief Hospital Course to date:  Rose J Kellermann is a 89 y.o. female with a history of DVT on Eliquis, HFpEF, CKD, lives at home, has been using home oxygen intermittently x several months, recently needing it constantly though denying dyspnea.      Progressive hypoxia  Left pleural effusion  Suspected acute HFpEF exacerbation  -  chest x-ray with moderate L pleural effusion  - Takes Bumex at home.  Continue. Watch creatinine   - Eliquis last given 1/20 AM.  Hold.  - Thoracentesis ordered:  await repeat CXR per IR request.         ANGEL LUIS on CKD  - Creat basealine 1.8, now 2.5  - Monitor with diuresis daily      History of DVT  - Hold Eliquis for now  - SCDs for now     Hypertension  - Continue home medications    Advanced age  - she is ambulatory  - OOB daily, pt should be up walking to BR w assist               Expected Discharge Location and Transportation: lives at home   Expected Discharge   Expected Discharge Date: 1/23/2025; Expected Discharge Time:      VTE Prophylaxis:  Pharmacologic & mechanical VTE prophylaxis orders are present.         AM-PAC 6 Clicks Score (PT): 17 (01/20/25 2000)    CODE STATUS:   Code Status and Medical Interventions: CPR (Attempt to Resuscitate); Full Support   Ordered at: 01/20/25 1424     Level Of Support Discussed With:    Patient     Code Status (Patient has no pulse and is not breathing):    CPR (Attempt to Resuscitate)     Medical Interventions (Patient has pulse or is breathing):    Full Support       Ayanna Garcia  MD  01/21/25

## 2025-01-21 NOTE — PLAN OF CARE
Problem: Adult Inpatient Plan of Care  Goal: Plan of Care Review  Outcome: Progressing  Flowsheets (Taken 1/21/2025 0423)  Progress: no change  Plan of Care Reviewed With: patient  Goal: Patient-Specific Goal (Individualized)  Outcome: Progressing  Goal: Absence of Hospital-Acquired Illness or Injury  Outcome: Progressing  Intervention: Identify and Manage Fall Risk  Recent Flowsheet Documentation  Taken 1/21/2025 0207 by Carie Singh RN  Safety Promotion/Fall Prevention:   activity supervised   assistive device/personal items within reach   clutter free environment maintained   lighting adjusted   nonskid shoes/slippers when out of bed   room organization consistent   safety round/check completed  Taken 1/21/2025 0000 by Carie Singh RN  Safety Promotion/Fall Prevention:   activity supervised   assistive device/personal items within reach   clutter free environment maintained   lighting adjusted   nonskid shoes/slippers when out of bed   room organization consistent   safety round/check completed  Taken 1/20/2025 2200 by Carie Singh RN  Safety Promotion/Fall Prevention:   activity supervised   assistive device/personal items within reach   clutter free environment maintained   lighting adjusted   nonskid shoes/slippers when out of bed   room organization consistent   safety round/check completed  Taken 1/20/2025 2000 by Carie Singh RN  Safety Promotion/Fall Prevention:   activity supervised   assistive device/personal items within reach   clutter free environment maintained   lighting adjusted   nonskid shoes/slippers when out of bed   room organization consistent   safety round/check completed  Intervention: Prevent Skin Injury  Recent Flowsheet Documentation  Taken 1/21/2025 0207 by Carie Singh RN  Body Position:   right   side-lying  Skin Protection:   incontinence pads utilized   protective footwear used   silicone foam dressing in place  Taken 1/21/2025 0000 by Carie Singh RN  Body Position:    left   side-lying  Skin Protection:   incontinence pads utilized   protective footwear used   silicone foam dressing in place  Taken 1/20/2025 2200 by Carei Singh RN  Body Position:   left   side-lying  Skin Protection:   incontinence pads utilized   protective footwear used   silicone foam dressing in place  Taken 1/20/2025 2000 by Carie Singh RN  Body Position:   right   side-lying  Skin Protection: (silicone dressings peeled back, skin assessed)   incontinence pads utilized   protective footwear used   silicone foam dressing in place  Intervention: Prevent and Manage VTE (Venous Thromboembolism) Risk  Recent Flowsheet Documentation  Taken 1/20/2025 2000 by Carie Singh RN  VTE Prevention/Management: (pt unable to tolerate calf sleeves, foot pumps in place)   bilateral   foot pump device on   SCDs (sequential compression devices) on  Intervention: Prevent Infection  Recent Flowsheet Documentation  Taken 1/21/2025 0207 by Carie Singh RN  Infection Prevention: environmental surveillance performed  Taken 1/21/2025 0000 by Carie Singh RN  Infection Prevention: environmental surveillance performed  Taken 1/20/2025 2200 by Carie Singh RN  Infection Prevention: environmental surveillance performed  Taken 1/20/2025 2000 by Carie Singh RN  Infection Prevention: environmental surveillance performed  Goal: Optimal Comfort and Wellbeing  Outcome: Progressing  Intervention: Provide Person-Centered Care  Recent Flowsheet Documentation  Taken 1/20/2025 2000 by Carie Singh RN  Trust Relationship/Rapport:   care explained   choices provided  Goal: Readiness for Transition of Care  Outcome: Progressing     Problem: Skin Injury Risk Increased  Goal: Skin Health and Integrity  Outcome: Progressing  Intervention: Optimize Skin Protection  Recent Flowsheet Documentation  Taken 1/21/2025 0207 by Carie Singh RN  Activity Management: activity encouraged  Pressure Reduction Techniques:   heels elevated off bed   frequent  weight shift encouraged   weight shift assistance provided  Head of Bed (HOB) Positioning: HOB elevated  Pressure Reduction Devices:   pressure-redistributing mattress utilized   positioning supports utilized   heel offloading device utilized  Skin Protection:   incontinence pads utilized   protective footwear used   silicone foam dressing in place  Taken 1/21/2025 0000 by Carie Singh RN  Activity Management: activity encouraged  Pressure Reduction Techniques:   heels elevated off bed   frequent weight shift encouraged   weight shift assistance provided  Head of Bed (HOB) Positioning: HOB elevated  Pressure Reduction Devices:   pressure-redistributing mattress utilized   heel offloading device utilized   positioning supports utilized  Skin Protection:   incontinence pads utilized   protective footwear used   silicone foam dressing in place  Taken 1/20/2025 2200 by Carie Singh RN  Activity Management: activity encouraged  Pressure Reduction Techniques:   heels elevated off bed   frequent weight shift encouraged   weight shift assistance provided  Head of Bed (HOB) Positioning: HOB elevated  Pressure Reduction Devices:   pressure-redistributing mattress utilized   positioning supports utilized   heel offloading device utilized  Skin Protection:   incontinence pads utilized   protective footwear used   silicone foam dressing in place  Taken 1/20/2025 2000 by Carie Singh RN  Activity Management: activity encouraged  Pressure Reduction Techniques:   frequent weight shift encouraged   heels elevated off bed   weight shift assistance provided  Head of Bed ([facility]) Positioning: HOB elevated  Pressure Reduction Devices:   pressure-redistributing mattress utilized   positioning supports utilized   heel offloading device utilized  Skin Protection: (silicone dressings peeled back, skin assessed)   incontinence pads utilized   protective footwear used   silicone foam dressing in place     Problem: Comorbidity  Management  Goal: Maintenance of Heart Failure Symptom Control  Outcome: Progressing  Intervention: Maintain Heart Failure Management  Recent Flowsheet Documentation  Taken 1/21/2025 0207 by Carie Singh RN  Medication Review/Management: medications reviewed  Taken 1/21/2025 0000 by Carie Singh RN  Medication Review/Management: medications reviewed  Taken 1/20/2025 2200 by Carie Singh RN  Medication Review/Management: medications reviewed  Taken 1/20/2025 2000 by Carie Singh RN  Medication Review/Management: medications reviewed  Goal: Blood Pressure in Desired Range  Outcome: Progressing  Intervention: Maintain Blood Pressure Management  Recent Flowsheet Documentation  Taken 1/21/2025 0207 by Carie Singh RN  Medication Review/Management: medications reviewed  Taken 1/21/2025 0000 by Carie Singh RN  Medication Review/Management: medications reviewed  Taken 1/20/2025 2200 by Carie Singh RN  Medication Review/Management: medications reviewed  Taken 1/20/2025 2000 by Carie Singh RN  Medication Review/Management: medications reviewed   Goal Outcome Evaluation:  Plan of Care Reviewed With: patient        Progress: no change     -VSS on 2-4L NC   -A&O x4  -Skin and fall precautions in place  -Pt resting comfortably with no further complaints at this time

## 2025-01-22 ENCOUNTER — APPOINTMENT (OUTPATIENT)
Dept: GENERAL RADIOLOGY | Facility: HOSPITAL | Age: OVER 89
End: 2025-01-22
Payer: MEDICARE

## 2025-01-22 ENCOUNTER — APPOINTMENT (OUTPATIENT)
Dept: ULTRASOUND IMAGING | Facility: HOSPITAL | Age: OVER 89
End: 2025-01-22
Payer: MEDICARE

## 2025-01-22 LAB
ANION GAP SERPL CALCULATED.3IONS-SCNC: 14 MMOL/L (ref 5–15)
BUN SERPL-MCNC: 63 MG/DL (ref 8–23)
BUN/CREAT SERPL: 23 (ref 7–25)
CALCIUM SPEC-SCNC: 8.6 MG/DL (ref 8.2–9.6)
CHLORIDE SERPL-SCNC: 100 MMOL/L (ref 98–107)
CO2 SERPL-SCNC: 24 MMOL/L (ref 22–29)
CREAT SERPL-MCNC: 2.74 MG/DL (ref 0.57–1)
EGFRCR SERPLBLD CKD-EPI 2021: 15.2 ML/MIN/1.73
GLUCOSE SERPL-MCNC: 94 MG/DL (ref 65–99)
LDH FLD-CCNC: 166 U/L
PH FLD: 7.67 [PH]
POTASSIUM SERPL-SCNC: 4.4 MMOL/L (ref 3.5–5.2)
PROT FLD-MCNC: 2.9 G/DL
SODIUM SERPL-SCNC: 138 MMOL/L (ref 136–145)

## 2025-01-22 PROCEDURE — 99221 1ST HOSP IP/OBS SF/LOW 40: CPT | Performed by: THORACIC SURGERY (CARDIOTHORACIC VASCULAR SURGERY)

## 2025-01-22 PROCEDURE — 87205 SMEAR GRAM STAIN: CPT | Performed by: STUDENT IN AN ORGANIZED HEALTH CARE EDUCATION/TRAINING PROGRAM

## 2025-01-22 PROCEDURE — 99232 SBSQ HOSP IP/OBS MODERATE 35: CPT | Performed by: INTERNAL MEDICINE

## 2025-01-22 PROCEDURE — 97165 OT EVAL LOW COMPLEX 30 MIN: CPT

## 2025-01-22 PROCEDURE — C1729 CATH, DRAINAGE: HCPCS

## 2025-01-22 PROCEDURE — 83986 ASSAY PH BODY FLUID NOS: CPT | Performed by: STUDENT IN AN ORGANIZED HEALTH CARE EDUCATION/TRAINING PROGRAM

## 2025-01-22 PROCEDURE — 87015 SPECIMEN INFECT AGNT CONCNTJ: CPT | Performed by: STUDENT IN AN ORGANIZED HEALTH CARE EDUCATION/TRAINING PROGRAM

## 2025-01-22 PROCEDURE — 71045 X-RAY EXAM CHEST 1 VIEW: CPT

## 2025-01-22 PROCEDURE — 80048 BASIC METABOLIC PNL TOTAL CA: CPT | Performed by: INTERNAL MEDICINE

## 2025-01-22 PROCEDURE — 83615 LACTATE (LD) (LDH) ENZYME: CPT | Performed by: STUDENT IN AN ORGANIZED HEALTH CARE EDUCATION/TRAINING PROGRAM

## 2025-01-22 PROCEDURE — 84157 ASSAY OF PROTEIN OTHER: CPT | Performed by: STUDENT IN AN ORGANIZED HEALTH CARE EDUCATION/TRAINING PROGRAM

## 2025-01-22 PROCEDURE — C1769 GUIDE WIRE: HCPCS

## 2025-01-22 PROCEDURE — 87070 CULTURE OTHR SPECIMN AEROBIC: CPT | Performed by: STUDENT IN AN ORGANIZED HEALTH CARE EDUCATION/TRAINING PROGRAM

## 2025-01-22 PROCEDURE — 25010000002 LIDOCAINE 1 % SOLUTION: Performed by: RADIOLOGY

## 2025-01-22 PROCEDURE — 89051 BODY FLUID CELL COUNT: CPT | Performed by: STUDENT IN AN ORGANIZED HEALTH CARE EDUCATION/TRAINING PROGRAM

## 2025-01-22 RX ORDER — ACETAMINOPHEN 325 MG/1
650 TABLET ORAL EVERY 6 HOURS PRN
Status: DISCONTINUED | OUTPATIENT
Start: 2025-01-22 | End: 2025-02-06 | Stop reason: HOSPADM

## 2025-01-22 RX ORDER — LIDOCAINE HYDROCHLORIDE 10 MG/ML
5 INJECTION, SOLUTION INFILTRATION; PERINEURAL ONCE
Status: COMPLETED | OUTPATIENT
Start: 2025-01-22 | End: 2025-01-22

## 2025-01-22 RX ADMIN — HYDRALAZINE HYDROCHLORIDE 25 MG: 25 TABLET ORAL at 10:32

## 2025-01-22 RX ADMIN — ACETAMINOPHEN 650 MG: 325 TABLET ORAL at 12:15

## 2025-01-22 RX ADMIN — HYDRALAZINE HYDROCHLORIDE 25 MG: 25 TABLET ORAL at 20:23

## 2025-01-22 RX ADMIN — AMLODIPINE BESYLATE 5 MG: 5 TABLET ORAL at 10:32

## 2025-01-22 RX ADMIN — Medication 10 ML: at 10:32

## 2025-01-22 RX ADMIN — LIDOCAINE HYDROCHLORIDE 5 ML: 10 INJECTION, SOLUTION INFILTRATION; PERINEURAL at 09:18

## 2025-01-22 NOTE — CONSULTS
Cardiothoracic Surgery History & Physical           Chief complaint: Dyspnea    HPI:   Rose J Kellermann is a 89 y.o. female with history of DVT on Eliquis, HFpEF, CKD, on intermittent home oxygen therapy for the past several months.  She presented to the emergency department on 1/20/2025, brought in by her daughter for increased oxygen requirements over the past week, accompanied by nonproductive cough.  She takes Bumex at home.  Chest x-ray revealed moderate left pleural effusion with associated presumed compressive atelectasis of the left lower lobe, possible trace right pleural effusion, chronic appearing pulmonary vascular congestion.  Eliquis has been held.  She is now status post placement of an 8.5 Kinyarwanda left pleural drain performed 1/22 by Dr. Rdz, IR.  Cardiothoracic surgery is consulted for chest tube management.  Patient is resting comfortably in bed and denies chest discomfort or shortness of breath.       Past Medical History:   Diagnosis Date    Abscess of back     Arrhythmia     frequent PVC    Cancer 12/2011    Endometrial cancer, status post robotically assisted hysterectomy with Dr. Haddad, December 2011.      CKD (chronic kidney disease), stage III     no dilaysis     Dizzy     Dvt femoral (deep venous thrombosis) 2017    s/p hip surgery in 2017. Took xarelto until August 2018    Dyslipidemia     Dyslipidemia.  Observing.    Enthesopathy of hip region     Generalized osteoarthrosis, involving multiple sites     Headache     Hearing loss     Hypertension     Low backache     Needs flu shot     Osteoarthritis     Osteoarthritis with questionable carpal tunnel syndrome bilaterally.     Otitis, serous     Pneumonia 1967    Remote pneumonia, 1967.     Retinal hemorrhage     SI joint arthritis     SOB (shortness of breath)     Syncope 2001    Remote syncope with working diagnosis of vasodepressor, 2001.     Venous insufficiency of leg     Wears glasses     readers     Past Surgical History:    Procedure Laterality Date    CATARACT EXTRACTION      bilat     COLONOSCOPY      HIP PERCUTANEOUS PINNING Left 11/24/2017    Procedure: HIP PERCUTANEOUS PINNING;  Surgeon: Lucas Swanson MD;  Location: [facility] OR;  Service:     HYSTERECTOMY      total? but unsure     KNEE SURGERY  2001    Remote knee surgery in 2001.- right     TOTAL HIP ARTHROPLASTY Left 10/26/2018    Procedure: TOTAL HIP ARTHROPLASTY LEFT;  Surgeon: Stephen Baker MD;  Location:  [facility] OR;  Service: Orthopedics     Family History   Problem Relation Age of Onset    Heart disease Mother     Osteoarthritis Mother     Hypertension Mother     Hypertension Father     Heart attack Father     Osteoarthritis Father     Cancer Brother         lung     Social History     Tobacco Use    Smoking status: Never     Passive exposure: Never    Smokeless tobacco: Never   Vaping Use    Vaping status: Never Used   Substance Use Topics    Alcohol use: No    Drug use: No       Medications Prior to Admission   Medication Sig Dispense Refill Last Dose/Taking    amLODIPine (NORVASC) 5 MG tablet Take 1 tablet by mouth Daily. Indications: High Blood Pressure Disorder 90 tablet 3 1/19/2025    bumetanide (BUMEX) 1 MG tablet TAKE 1 TABLET THREE TIMES A WEEK. TAKE AN EXTRA DOSE IF HAVING A 2 POUND WEIGHT GAIN FROM BASELINE, FOR EDEMA (Patient taking differently: Take 1 tablet by mouth 4 times a week.) 45 tablet 3 Past Week    Eliquis 5 MG tablet tablet TAKE 1 TABLET TWICE A DAY FOR DEEP VEIN THROMBOSIS/PULMONARY EMBOLISM (ACTIVE THROMBOSIS) 180 tablet 3 1/19/2025    hydrALAZINE (APRESOLINE) 25 MG tablet Take 1 tablet by mouth 2 (Two) Times a Day. Indications: High Blood Pressure Disorder 180 tablet 3 1/19/2025    O2 (OXYGEN) Inhale 3 L/min Daily. Only at night  Indications: Shortness of breath   1/19/2025    acetaminophen (TYLENOL) 500 MG tablet Take 1 tablet by mouth Every 6 (Six) Hours As Needed for Mild Pain.   More than a month     Allergies:  Sulfa  antibiotics    Review of Systems:  A comprehensive review of systems was negative except for:   Constitutional: No fever, chills  Respiratory: Increased oxygen requirements, increased cough  Cardiovascular: No chest pain or palpitations    All other systems were reviewed and were negative.    Vital Signs:  Temp:  [97.5 °F (36.4 °C)-98.2 °F (36.8 °C)] 97.5 °F (36.4 °C)  Heart Rate:  [] 80  Resp:  [11-25] 17  BP: (110-136)/(69-88) 111/75    Physical Exam:  Physical Exam  Constitutional:       General: She is not in acute distress.  HENT:      Head: Normocephalic and atraumatic.   Eyes:      Extraocular Movements: Extraocular movements intact.      Conjunctiva/sclera: Conjunctivae normal.   Cardiovascular:      Rate and Rhythm: Normal rate and regular rhythm.      Pulses: Normal pulses.   Pulmonary:      Effort: Pulmonary effort is normal. No accessory muscle usage or respiratory distress.      Comments: Diminished breath sounds left base  Abdominal:      General: Abdomen is flat. There is no distension.      Palpations: Abdomen is soft.   Musculoskeletal:         General: No swelling.      Cervical back: Neck supple.   Skin:     General: Skin is warm and dry.      Capillary Refill: Capillary refill takes less than 2 seconds.   Neurological:      General: No focal deficit present.      Mental Status: She is alert. Mental status is at baseline.   Psychiatric:         Behavior: Behavior is cooperative.     CT: 350 cc serous fluid in atrium. No air leak.     Labs:  Results from last 7 days   Lab Units 01/21/25  1539   WBC 10*3/mm3 9.88   HEMOGLOBIN g/dL 11.4*   HEMATOCRIT % 35.5   PLATELETS 10*3/mm3 228     Results from last 7 days   Lab Units 01/22/25  0723   SODIUM mmol/L 138   POTASSIUM mmol/L 4.4   CHLORIDE mmol/L 100   CO2 mmol/L 24.0   BUN mg/dL 63*   CREATININE mg/dL 2.74*   GLUCOSE mg/dL 94   CALCIUM mg/dL 8.6         Coagulation:   INR   Date Value Ref Range Status   01/21/2025 1.82 (H) 0.89 - 1.12 Final  "    Cardiac markers:   Results from last 7 days   Lab Units 01/20/25  1115   HSTROP T ng/L 43*     ABGs:       Invalid input(s): \"PO2\"    Imaging:   XR Chest 1 View    Result Date: 1/21/2025  XR CHEST 1 VW Date of Exam: 1/21/2025 8:49 AM EST Indication: eval L effusion Comparison: 1/20/2025 Findings: Opacity of the lower left chest is less dense and a little less extensive than on yesterday's radiograph, whether resolving effusion or atelectasis. Earlier 8/10/2023 chest CT scan showed significant bibasilar effusion and left basilar atelectasis, and findings may be persistent or recurrent. The heart remains markedly enlarged and there is persistent mild perihilar edema. No new pulmonary parenchymal disease is seen. No pneumothorax is identified.     Impression: Impression: 1. Cardiomegaly and persistent mild pulmonary vascular congestion. 2. Mildly improved aeration of the left lung base as described. Electronically Signed: James Juarez MD  1/21/2025 9:23 AM EST  Workstation ID: [id number]    XR Chest 1 View    Result Date: 1/20/2025  XR CHEST 1 VW Date of Exam: 1/20/2025 10:03 AM EST Indication: SOA triage protocol Comparison: Chest CT 8/7/2024, chest radiograph 8/10/2024 Findings: Cardiomegaly. Moderate size left pleural effusion increasing from prior. Slightly blunted right costophrenic angle which could reflect trace pleural fluid. Prominent central vasculature without overt edema. Retrocardiac consolidation. No pneumothorax. Degenerative related osseous change. Aortic atherosclerotic disease.     Impression: Impression: 1. Cardiomegaly with moderate size increasing left effusion and possible trace right pleural effusion. Associated presumed compressive atelectasis in the left lower lobe, differential includes infection in the right clinical setting. 2. Chronic appearing pulmonary vascular congestion without overt edema. Electronically Signed: Juice Martinez MD  1/20/2025 10:27 AM EST  Workstation ID: [id number]  "       Assessment:     Pleural effusion on left    Essential hypertension    Chronic kidney disease, stage III (moderate)      This 89-year-old female who presented with increased oxygen requirements from home, and upon evaluation was found to have moderate size left pleural effusion.  She is now status post pleural drain placement by IR.  Approximately 350 cc serous fluid in atrium.       Plan:   Continue chest tube to -20 suction  Daily chest x-ray  Continue to hold Eliquis pending chest tube removal  Continue other medical care  We will continue to follow    Tanja Mcarthur PA-C  01/22/25  10:26 EST

## 2025-01-22 NOTE — THERAPY EVALUATION
Patient Name: Rose J Kellermann  : 1926    MRN: [id number]                              Today's Date: 2025       Admit Date: 2025    Visit Dx:     ICD-10-CM ICD-9-CM   1. Pleural effusion, left  J90 511.9   2. Hypoxia  R09.02 799.02   3. Chronic kidney disease, unspecified CKD stage  N18.9 585.9     Patient Active Problem List   Diagnosis    Essential hypertension    Vitamin D deficiency    Fatigue    Chronic kidney disease, stage III (moderate)     Osteoarthritis    Dyslipidemia    Post-menopausal bleeding    Endometrial cancer    Closed fracture of left hip    Asymptomatic bacteriuria    Post-traumatic osteoarthritis of left hip    Status post total replacement of left hip    Prediabetes    Acute blood loss anemia, asymptomatic    Postoperative urinary retention    Myopia    Posterior crocodile shagreen of cornea    Presbyopia    Ptosis of eyelid    Regular astigmatism    Retinal neovascularization    After cataract    Secondary cataract    Premature atrial contractions    Heart murmur    Nocturnal hypoxia    Chronic deep vein thrombosis (DVT) of both lower extremities    Retinal neovascularization    Pleural effusion on left     Past Medical History:   Diagnosis Date    Abscess of back     Arrhythmia     frequent PVC    Cancer 2011    Endometrial cancer, status post robotically assisted hysterectomy with Dr. Haddad, 2011.      CKD (chronic kidney disease), stage III     no dilaysis     Dizzy     Dvt femoral (deep venous thrombosis) 2017    s/p hip surgery in 2017. Took xarelto until 2018    Dyslipidemia     Dyslipidemia.  Observing.    Enthesopathy of hip region     Generalized osteoarthrosis, involving multiple sites     Headache     Hearing loss     Hypertension     Low backache     Needs flu shot     Osteoarthritis     Osteoarthritis with questionable carpal tunnel syndrome bilaterally.     Otitis, serous     Pneumonia     Remote pneumonia, .     Retinal  hemorrhage     SI joint arthritis     SOB (shortness of breath)     Syncope 2001    Remote syncope with working diagnosis of vasodepressor, 2001.     Venous insufficiency of leg     Wears glasses     readers     Past Surgical History:   Procedure Laterality Date    CATARACT EXTRACTION      bilat     COLONOSCOPY      HIP PERCUTANEOUS PINNING Left 11/24/2017    Procedure: HIP PERCUTANEOUS PINNING;  Surgeon: Lucas Swanson MD;  Location:  [facility];  Service:     HYSTERECTOMY      total? but unsure     KNEE SURGERY  2001    Remote knee surgery in 2001.- right     TOTAL HIP ARTHROPLASTY Left 10/26/2018    Procedure: TOTAL HIP ARTHROPLASTY LEFT;  Surgeon: Stephen Baker MD;  Location:  [facility];  Service: Orthopedics      General Information       Row Name 01/22/25 1504          OT Time and Intention    Document Type evaluation  -MR     Mode of Treatment occupational therapy  -MR       Row Name 01/22/25 1504          General Information    Patient Profile Reviewed yes  -MR     Prior Level of Function independent:;all household mobility;gait;transfer;ADL's;dressing;bathing  -MR     Existing Precautions/Restrictions fall;oxygen therapy device and L/min;other (see comments)  L sided chest tube  -MR     Barriers to Rehab medically complex  -MR       Row Name 01/22/25 1504          Living Environment    People in Home alone;other (see comments)  Daughter lives nearby and checks in daily.  -MR       Row Name 01/22/25 1504          Home Main Entrance    Number of Stairs, Main Entrance three  -MR     Stair Railings, Main Entrance railings on both sides of stairs  -MR       Row Name 01/22/25 1504          Stairs Within Home, Primary    Number of Stairs, Within Home, Primary none  -MR       Row Name 01/22/25 1504          Cognition    Orientation Status (Cognition) oriented x 3  -MR       Row Name 01/22/25 1504          Safety Issues/Impairments Affecting Functional Mobility    Safety Issues Affecting Function (Mobility)  awareness of need for assistance;insight into deficits/self-awareness;safety precaution awareness;safety precautions follow-through/compliance  -MR     Impairments Affecting Function (Mobility) balance;coordination;endurance/activity tolerance;postural/trunk control;shortness of breath;strength  -MR               User Key  (r) = Recorded By, (t) = Taken By, (c) = Cosigned By      Initials Name Provider Type    MR Kaye Espinal OT Occupational Therapist                     Mobility/ADL's       Row Name 01/22/25 1510          Bed Mobility    Bed Mobility supine-sit;sit-supine  -MR     Scooting/Bridging [place] (Bed Mobility) maximum assist (25% patient effort);2 person assist;verbal cues  -MR     Supine-Sit [place] (Bed Mobility) verbal cues;contact guard  -MR     Sit-Supine [place] (Bed Mobility) contact guard;verbal cues  -MR     Assistive Device (Bed Mobility) bed rails;head of bed elevated;repositioning sheet  -MR       Row Name 01/22/25 1510          Transfers    Transfers sit-stand transfer  -MR       Row Name 01/22/25 1510          Sit-Stand Transfer    Sit-Stand [place] (Transfers) minimum assist (75% patient effort);verbal cues  -MR     Assistive Device (Sit-Stand Transfers) other (see comments)  HHA  -MR       Row Name 01/22/25 1510          Activities of Daily Living    BADL Assessment/Intervention lower body dressing;upper body dressing  -MR       Row Name 01/22/25 1510          Lower Body Dressing Assessment/Training    [place] Level (Lower Body Dressing) don;other (see comments);maximum assist (25% patient effort);socks  -MR     Position (Lower Body Dressing) supine  -MR       Row Name 01/22/25 1510          Upper Body Dressing Assessment/Training    [place] Level (Upper Body Dressing) don;minimum assist (75% patient effort)  -MR     Position (Upper Body Dressing) edge of bed sitting  -MR               User Key  (r) = Recorded By, (t) = Taken By, (c) = Cosigned By       Initials Name Provider Type     Kaye Espinal OT Occupational Therapist                   Obj/Interventions       Row Name 01/22/25 1512          Sensory Assessment (Somatosensory)    Sensory Assessment (Somatosensory) UE sensation intact  -MR       Row Name 01/22/25 1512          Vision Assessment/Intervention    Visual Impairment/Limitations WFL  -MR       Row Name 01/22/25 1512          Range of Motion Comprehensive    General Range of Motion bilateral upper extremity ROM WFL  -MR       Row Name 01/22/25 1512          Strength Comprehensive (MMT)    Comment, General Manual Muscle Testing (MMT) Assessment BUE grossly 3+/5 in all functional planes  -MR       Row Name 01/22/25 1512          Balance    Balance Assessment sitting static balance;sitting dynamic balance;standing static balance;standing dynamic balance  -MR     Static Sitting Balance standby assist  -MR     Dynamic Sitting Balance contact guard  -MR     Position, Sitting Balance unsupported  -MR     Static Standing Balance contact guard  -MR     Dynamic Standing Balance minimal assist  -MR     Position/Device Used, Standing Balance supported;walker, front-wheeled  -MR     Balance Interventions sitting;standing;sit to stand;supported;static;dynamic;minimal challenge;occupation based/functional task  -MR     Comment, Balance No overt LOB noted w/ transfer, unsteady in standing d/t generalized weakness  -MR               User Key  (r) = Recorded By, (t) = Taken By, (c) = Cosigned By      Initials Name Provider Type    Kaye DickinsonSALVADOR Occupational Therapist                   Goals/Plan       Row Name 01/22/25 1518          Bed Mobility Goal 1 (OT)    Activity/Assistive Device (Bed Mobility Goal 1, OT) sit to supine  -MR     [place] Level/Cues Needed (Bed Mobility Goal 1, OT) standby assist  -MR     Time Frame (Bed Mobility Goal 1, OT) short term goal (STG);3 days  -MR     Progress/Outcomes (Bed Mobility Goal 1, OT) new goal  -MR       Row Name  01/22/25 1518          Transfer Goal 1 (OT)    Activity/Assistive Device (Transfer Goal 1, OT) sit-to-stand/stand-to-sit;toilet  -MR     [place] Level/Cues Needed (Transfer Goal 1, OT) contact guard required  -MR     Time Frame (Transfer Goal 1, OT) long term goal (LTG);5 days  -MR     Progress/Outcome (Transfer Goal 1, OT) new goal  -MR       Row Name 01/22/25 1518          Dressing Goal 1 (OT)    Activity/Device (Dressing Goal 1, OT) lower body dressing  -MR     [place]/Cues Needed (Dressing Goal 1, OT) moderate assist (50-74% patient effort)  -MR     Time Frame (Dressing Goal 1, OT) long term goal (LTG);5 days  -MR     Progress/Outcome (Dressing Goal 1, OT) new goal  -MR       Row Name 01/22/25 1518          Therapy Assessment/Plan (OT)    Planned Therapy Interventions (OT) activity tolerance training;BADL retraining;adaptive equipment training;functional balance retraining;IADL retraining;wheelchair assessment/training;transfer/mobility retraining;strengthening exercise;ROM/therapeutic exercise;patient/caregiver education/training;occupation/activity based interventions  -MR               User Key  (r) = Recorded By, (t) = Taken By, (c) = Cosigned By      Initials Name Provider Type     Teddy Kaye, OT Occupational Therapist                   Clinical Impression       Row Name 01/22/25 1513          Pain Assessment    Pretreatment Pain Rating 0/10 - no pain  -MR     Posttreatment Pain Rating 0/10 - no pain  -MR     Pain Side/Orientation left  -MR       Row Name 01/22/25 1513          Plan of Care Review    Plan of Care Reviewed With patient  -MR     Progress no change  -MR     Outcome Evaluation Patient presenting below her functional baseline w/ transfers, mobility and balance limiting independence w/ ADL based tasks. Pt limited this date by generalized weakness, deconditioning and desaturation to 88% on 3L via NC requiring increase to 4L. RN aware and managing. Pt's deficits warrant skilled OT  services. Recommend inpatient rehab at d/c for best functional outcome.  -MR       Row Name 01/22/25 1513          Therapy Assessment/Plan (OT)    Patient/Family Therapy Goal Statement (OT) Return to PLOF  -MR     Rehab Potential (OT) good  -MR     Criteria for Skilled Therapeutic Interventions Met (OT) yes;meets criteria;skilled treatment is necessary  -MR     Therapy Frequency (OT) daily  -MR     Predicted Duration of Therapy Intervention (OT) 5 days  -MR       Row Name 01/22/25 1513          Therapy Plan Review/Discharge Plan (OT)    Anticipated Discharge Disposition (OT) inpatient rehabilitation facility  -MR       Row Name 01/22/25 1513          Vital Signs    Pre Systolic BP Rehab 126  -MR     Pre Treatment Diastolic BP 80  -MR     Pre SpO2 (%) 89  -MR     O2 Delivery Pre Treatment nasal cannula  -MR     Intra SpO2 (%) 86  -MR     O2 Delivery Intra Treatment nasal cannula  -MR     Post SpO2 (%) 90  -MR     O2 Delivery Post Treatment nasal cannula  -MR     Pre Patient Position Supine  -MR     Intra Patient Position Standing  -MR     Post Patient Position Supine  -MR       Row Name 01/22/25 1513          Positioning and Restraints    Pre-Treatment Position in bed  -MR     Post Treatment Position bed  -MR     In Bed notified nsg;supine;call light within reach;encouraged to call for assist;exit alarm on;side rails up x2  -MR               User Key  (r) = Recorded By, (t) = Taken By, (c) = Cosigned By      Initials Name Provider Type    Kaye Dickinson, OT Occupational Therapist                   Outcome Measures       Row Name 01/22/25 1519          How much help from another is currently needed...    Putting on and taking off regular lower body clothing? 2  -MR     Bathing (including washing, rinsing, and drying) 2  -MR     Toileting (which includes using toilet bed pan or urinal) 3  -MR     Putting on and taking off regular upper body clothing 3  -MR     Taking care of personal grooming (such as brushing  teeth) 3  -MR     Eating meals 4  -MR     AM-PAC 6 Clicks Score (OT) 17  -MR       Row Name 01/22/25 0800          How much help from another person do you currently need...    Turning from your back to your side while in flat bed without using bedrails? 3  -AR     Moving from lying on back to sitting on the side of a flat bed without bedrails? 3  -AR     Moving to and from a bed to a chair (including a wheelchair)? 2  -AR     Standing up from a chair using your arms (e.g., wheelchair, bedside chair)? 2  -AR     Climbing 3-5 steps with a railing? 2  -AR     To walk in hospital room? 3  -AR     AM-PAC 6 Clicks Score (PT) 15  -AR     Highest Level of Mobility Goal 4 --> Transfer to chair/commode  -AR       Row Name 01/22/25 1519          Functional Assessment    Outcome Measure Options AM-PAC 6 Clicks Daily Activity (OT)  -MR               User Key  (r) = Recorded By, (t) = Taken By, (c) = Cosigned By      Initials Name Provider Type    Hai Manley, RN Registered Nurse    Kaye Dickinson, OT Occupational Therapist                    Occupational Therapy Education       Title: PT OT SLP Therapies (In Progress)       Topic: Occupational Therapy (In Progress)       Point: ADL training (Done)       Description:   Instruct learner(s) on proper safety adaptation and remediation techniques during self care or transfers.   Instruct in proper use of assistive devices.                  Learning Progress Summary            Patient Acceptance, E, VU,NR by MR at 1/22/2025 1519   Family Acceptance, E, VU,NR by MR at 1/22/2025 1519                      Point: Home exercise program (Not Started)       Description:   Instruct learner(s) on appropriate technique for monitoring, assisting and/or progressing therapeutic exercises/activities.                  Learner Progress:  Not documented in this visit.              Point: Precautions (Done)       Description:   Instruct learner(s) on prescribed precautions during self-care  and functional transfers.                  Learning Progress Summary            Patient Acceptance, E, VU,NR by MR at 1/22/2025 1519   Family Acceptance, E, VU,NR by MR at 1/22/2025 1519                      Point: Body mechanics (Done)       Description:   Instruct learner(s) on proper positioning and spine alignment during self-care, functional mobility activities and/or exercises.                  Learning Progress Summary            Patient Acceptance, E, VU,NR by MR at 1/22/2025 1519   Family Acceptance, E, VU,NR by MR at 1/22/2025 1519                                      User Key       Initials Effective Dates Name Provider Type Discipline    MR 09/22/22 -  Kaye Espinal, OT Occupational Therapist OT                  OT Recommendation and Plan  Planned Therapy Interventions (OT): activity tolerance training, BADL retraining, adaptive equipment training, functional balance retraining, IADL retraining, wheelchair assessment/training, transfer/mobility retraining, strengthening exercise, ROM/therapeutic exercise, patient/caregiver education/training, occupation/activity based interventions  Therapy Frequency (OT): daily  Plan of Care Review  Plan of Care Reviewed With: patient  Progress: no change  Outcome Evaluation: Patient presenting below her functional baseline w/ transfers, mobility and balance limiting independence w/ ADL based tasks. Pt limited this date by generalized weakness, deconditioning and desaturation to 88% on 3L via NC requiring increase to 4L. RN aware and managing. Pt's deficits warrant skilled OT services. Recommend inpatient rehab at d/c for best functional outcome.     Time Calculation:   Evaluation Complexity (OT)  Review Occupational Profile/Medical/Therapy History Complexity: brief/low complexity  Assessment, Occupational Performance/Identification of Deficit Complexity: 1-3 performance deficits  Clinical Decision Making Complexity (OT): problem focused assessment/low  complexity  Overall Complexity of Evaluation (OT): low complexity     Time Calculation- OT       Row Name 01/22/25 1519             Time Calculation- OT    OT Start Time 1357  -MR      OT Received On 01/22/25  -MR      OT Goal Re-Cert Due Date 02/01/25  -MR         Untimed Charges    OT Eval/Re-eval Minutes 46  -MR         Total Minutes    Untimed Charges Total Minutes 46  -MR       Total Minutes 46  -MR                User Key  (r) = Recorded By, (t) = Taken By, (c) = Cosigned By      Initials Name Provider Type    Kaye Dickinson OT Occupational Therapist                  Therapy Charges for Today       Code Description Service Date Service Provider Modifiers Qty    85965817352 HC OT EVAL LOW COMPLEXITY 4 1/22/2025 Kaye Espinal OT GO 1                 Kaye Espinal OT  1/22/2025

## 2025-01-22 NOTE — NURSING NOTE
8.5 [place] locking, Left sided Image Guided chest tube placed by Dr. Rdz . 60 mls removed from pleural space. Patient tolerated well. Report called to [facility].

## 2025-01-22 NOTE — PLAN OF CARE
Goal Outcome Evaluation:           Progress: no change  Outcome Evaluation: VSS on 3LNC. No complaints of pain or nausea. Skin and fall precautions in place. Patient resting comfortably at this time. Plan for thoracentesis. Will continue plan of care.

## 2025-01-22 NOTE — CASE MANAGEMENT/SOCIAL WORK
Continued Stay Note  [facility]     Patient Name: Rose J Kellermann  MRN: [id number]  Today's Date: 1/22/2025    Admit Date: 1/20/2025    Plan: rehab   Discharge Plan       Row Name 01/22/25 1001       Plan    Plan rehab    Plan Comments This patient had a chest tube placed today and a thoracentesis has been ordered per MD notes. OT needs to make recommendations, but PT recommends rehab. CM to call the daughter, per the patient's request, once all recommendations are in EPIC to get referrals. CM to follow.    Final Discharge Disposition Code 03 - skilled nursing facility (SNF)                   Discharge Codes    No documentation.                 Expected Discharge Date and Time       Expected Discharge Date Expected Discharge Time    Jan 23, 2025               Floresita Dawson RN

## 2025-01-22 NOTE — PLAN OF CARE
Goal Outcome Evaluation:  Plan of Care Reviewed With: patient        Progress: improving  Outcome Evaluation: VSS. 3-4L NC. Alert and oriented x4. Pt complaints of pain. PRN tylenol given. Left chest tube placed this AM with yellow tinged output. No complaints of nausea. Skin and fall precautions in place. Daughter at bedside. resting comfortably. no further complaints at this time.

## 2025-01-22 NOTE — PLAN OF CARE
Goal Outcome Evaluation:  Plan of Care Reviewed With: patient        Progress: no change  Outcome Evaluation: Patient presenting below her functional baseline w/ transfers, mobility and balance limiting independence w/ ADL based tasks. Pt limited this date by generalized weakness, deconditioning and desaturation to 88% on 3L via NC requiring increase to 4L. RN aware and managing. Pt's deficits warrant skilled OT services. Recommend inpatient rehab at d/c for best functional outcome.    Anticipated Discharge Disposition (OT): inpatient rehabilitation facility

## 2025-01-22 NOTE — PROCEDURES
The following procedure was performed: L pleural drain 8.5 Fr.     Please see corresponding Radiology report for in detail procedural information. The Radiology report will be dictated shortly, if not done so already. Please see the IR RN note for the information regarding medicines administered if any, lila-procedural vitals and I/O information.

## 2025-01-22 NOTE — PROGRESS NOTES
[facility] Medicine Services  PROGRESS NOTE    Patient Name: Rose J Kellermann  : 1926  MRN: [id number]    Date of Admission: 2025  Primary Care Physician: Sam Hung MD    Subjective   Subjective     CC:  progressive dyspnea     HPI:  Doing well this am, SOA improved.       Objective   Objective     Vital Signs:   Temp:  [97.5 °F (36.4 °C)-98.2 °F (36.8 °C)] 97.5 °F (36.4 °C)  Heart Rate:  [] 80  Resp:  [11-25] 17  BP: (110-136)/(69-88) 111/75  Flow (L/min) (Oxygen Therapy):  [2-3] 2     Physical Exam:  Gen:  alert, delightful, conversant, NAD   Neuro: alert and oriented, clear speech, follows commands, grossly nonfocal  HEENT:  NC/AT   Neck:  Supple, no LAD  Heart RRR   Lungs  anterior exam is clear, L sided chest tube in place   Abd:  Soft, nontender  Extrem:  No c/c/e      Results Reviewed:  LAB RESULTS:      Lab 25  1540 25  1539 25  1315 25  1115 25  1014   WBC  --  9.88  --   --  8.92   HEMOGLOBIN  --  11.4*  --   --  12.0   HEMATOCRIT  --  35.5  --   --  37.5   PLATELETS  --  228  --   --  235   NEUTROS ABS  --  8.15*  --   --  6.59   IMMATURE GRANS (ABS)  --  0.03  --   --  0.03   LYMPHS ABS  --  0.82  --   --  1.03   MONOS ABS  --  0.81  --   --  1.15*   EOS ABS  --  0.04  --   --  0.09   MCV  --  94.2  --   --  94.7   PROCALCITONIN  --   --   --   --  0.33*   LACTATE  --   --  1.7  --  2.1*   LDH  --  209  --   --   --    PROTIME 21.2*  --   --   --   --    APTT 40.4*  --   --   --   --    HSTROP T  --   --   --  43* 38*         Lab 25  0723 25  1539 25  1014   SODIUM 138 134* 134*   POTASSIUM 4.4 4.5 3.8   CHLORIDE 100 98 97*   CO2 24.0 20.0* 23.0   ANION GAP 14.0 16.0* 14.0   BUN 63* 58* 48*   CREATININE 2.74* 2.87* 2.50*   EGFR 15.2* 14.4* 17.0*   GLUCOSE 94 182* 131*   CALCIUM 8.6 8.7 9.1         Lab 25  1539 25  1014   TOTAL PROTEIN 6.1 7.2   ALBUMIN  --  3.3*   GLOBULIN  --  3.9    ALT (SGPT)  --  11   AST (SGOT)  --  19   BILIRUBIN  --  1.3*   ALK PHOS  --  76         Lab 01/21/25  1540 01/20/25  1115 01/20/25  1014   PROBNP  --   --  5,330.0*   HSTROP T  --  43* 38*   PROTIME 21.2*  --   --    INR 1.82*  --   --                  Brief Urine Lab Results       None            Microbiology Results Abnormal       None            XR Chest 1 View    Result Date: 1/22/2025  XR CHEST 1 VW Date of Exam: 1/22/2025 9:25 AM EST Indication: Post Left side Chest Tube Comparison: Chest radiograph 1/21/2025. Findings: Interval placement of a smallbore chest tube with pigtail tip projecting over the left costophrenic angle. Enlarged cardiac silhouette, unchanged. Persistent pulmonary vascular congestion. Moderate left and small right pleural effusions with adjacent airspace disease, not significantly changed. No distinct pneumothorax. Osseous structures are unchanged.     Impression: Impression: Interval placement of a left-sided chest tube without significant interval change otherwise. Electronically Signed: Adiel Mckeon MD  1/22/2025 11:06 AM EST  Workstation ID: [id number]    XR Chest 1 View    Result Date: 1/21/2025  XR CHEST 1 VW Date of Exam: 1/21/2025 8:49 AM EST Indication: eval L effusion Comparison: 1/20/2025 Findings: Opacity of the lower left chest is less dense and a little less extensive than on yesterday's radiograph, whether resolving effusion or atelectasis. Earlier 8/10/2023 chest CT scan showed significant bibasilar effusion and left basilar atelectasis, and findings may be persistent or recurrent. The heart remains markedly enlarged and there is persistent mild perihilar edema. No new pulmonary parenchymal disease is seen. No pneumothorax is identified.     Impression: Impression: 1. Cardiomegaly and persistent mild pulmonary vascular congestion. 2. Mildly improved aeration of the left lung base as described. Electronically Signed: James Juarez MD  1/21/2025 9:23 AM EST  Workstation ID:  [id number]     Results for orders placed during the hospital encounter of 06/01/23    Adult Transthoracic Echo Complete W/ Cont if Necessary Per Protocol    Interpretation Summary    Left ventricular systolic function is normal. Calculated left ventricular EF = 61.4% Left ventricular ejection fraction appears to be 61 - 65%.    Left ventricular diastolic function was indeterminate.    Left atrial volume is severely increased.    Mild aortic valve stenosis is present.    Aortic valve maximum pressure gradient is 26 mmHg. Aortic valve mean pressure gradient is 15 mmHg.    Mild mitral valve stenosis is present. The mitral valve peak gradient is 12 mmHg. The mitral valve mean gradient is 5 mmHg.    Moderate tricuspid valve regurgitation is present.    Estimated right ventricular systolic pressure from tricuspid regurgitation is markedly elevated (>55 mmHg).    There is a small left pleural effusion.      Current medications:  Scheduled Meds:amLODIPine, 5 mg, Oral, Daily  [Held by provider] apixaban, 5 mg, Oral, Q12H  [Held by provider] bumetanide, 1 mg, Oral, Daily  hydrALAZINE, 25 mg, Oral, BID  sodium chloride, 10 mL, Intravenous, Q12H      Continuous Infusions:   PRN Meds:.  acetaminophen    senna-docusate sodium **AND** polyethylene glycol **AND** bisacodyl **AND** bisacodyl    sodium chloride    sodium chloride    sodium chloride    Assessment & Plan   Assessment & Plan     Active Hospital Problems    Diagnosis  POA    **Pleural effusion on left [J90]  Yes    Essential hypertension [I10]  Yes    Chronic kidney disease, stage III (moderate)  [N18.30]  Yes      Resolved Hospital Problems   No resolved problems to display.        Brief Hospital Course to date:  Rose J Kellermann is a 89 y.o. female with a history of DVT on Eliquis, HFpEF, CKD, lives at home, has been using home oxygen intermittently x several months, recently needing it constantly though denying dyspnea.      Progressive hypoxia  Left pleural  effusion  Suspected acute HFpEF exacerbation  -  chest x-ray with moderate L pleural effusion  - Takes Bumex at home.  Continue. Watch creatinine   - Eliquis last given 1/20 AM.  Resume after CT is removed   - Thoracentesis ordered: had chest tube placed in IR (?).  Consulted CT to manage, appreciate their assistance        ANGEL LUIS on CKD  - Creat basealine 1.8, was 2.5  - Monitor with diuresis daily      History of DVT  - Hold Eliquis for now  - SCDs for now     Hypertension  - Continue home medications    Advanced age  - she is ambulatory  - OOB daily, pt should be up walking to BR w assist               Expected Discharge Location and Transportation: lives at home   Expected Discharge   Expected Discharge Date: 1/23/2025; Expected Discharge Time:      VTE Prophylaxis:  Pharmacologic & mechanical VTE prophylaxis orders are present.         AM-PAC 6 Clicks Score (PT): 17 (01/21/25 2000)    CODE STATUS:   Code Status and Medical Interventions: CPR (Attempt to Resuscitate); Full Support   Ordered at: 01/20/25 4234     Level Of Support Discussed With:    Patient     Code Status (Patient has no pulse and is not breathing):    CPR (Attempt to Resuscitate)     Medical Interventions (Patient has pulse or is breathing):    Full Support       Linda Felix MD  01/22/25

## 2025-01-23 ENCOUNTER — APPOINTMENT (OUTPATIENT)
Dept: GENERAL RADIOLOGY | Facility: HOSPITAL | Age: OVER 89
End: 2025-01-23
Payer: MEDICARE

## 2025-01-23 LAB
ANION GAP SERPL CALCULATED.3IONS-SCNC: 11 MMOL/L (ref 5–15)
APPEARANCE FLD: ABNORMAL
BASOPHILS # BLD AUTO: 0.02 10*3/MM3 (ref 0–0.2)
BASOPHILS NFR BLD AUTO: 0.3 % (ref 0–1.5)
BUN SERPL-MCNC: 68 MG/DL (ref 8–23)
BUN/CREAT SERPL: 23.9 (ref 7–25)
CALCIUM SPEC-SCNC: 8.9 MG/DL (ref 8.2–9.6)
CHLORIDE SERPL-SCNC: 99 MMOL/L (ref 98–107)
CO2 SERPL-SCNC: 24 MMOL/L (ref 22–29)
COLOR FLD: YELLOW
CREAT SERPL-MCNC: 2.84 MG/DL (ref 0.57–1)
DEPRECATED RDW RBC AUTO: 46.6 FL (ref 37–54)
EGFRCR SERPLBLD CKD-EPI 2021: 14.6 ML/MIN/1.73
EOSINOPHIL # BLD AUTO: 0.19 10*3/MM3 (ref 0–0.4)
EOSINOPHIL NFR BLD AUTO: 2.5 % (ref 0.3–6.2)
ERYTHROCYTE [DISTWIDTH] IN BLOOD BY AUTOMATED COUNT: 13.6 % (ref 12.3–15.4)
GLUCOSE SERPL-MCNC: 110 MG/DL (ref 65–99)
HCT VFR BLD AUTO: 34.4 % (ref 34–46.6)
HGB BLD-MCNC: 10.9 G/DL (ref 12–15.9)
IMM GRANULOCYTES # BLD AUTO: 0.03 10*3/MM3 (ref 0–0.05)
IMM GRANULOCYTES NFR BLD AUTO: 0.4 % (ref 0–0.5)
LYMPHOCYTES # BLD AUTO: 0.54 10*3/MM3 (ref 0.7–3.1)
LYMPHOCYTES NFR BLD AUTO: 7 % (ref 19.6–45.3)
LYMPHOCYTES NFR FLD MANUAL: 30 %
MACROPHAGE FLUID %: 18 %
MCH RBC QN AUTO: 29.8 PG (ref 26.6–33)
MCHC RBC AUTO-ENTMCNC: 31.7 G/DL (ref 31.5–35.7)
MCV RBC AUTO: 94 FL (ref 79–97)
MESOTHL CELL NFR FLD MANUAL: 3 %
MONOCYTES # BLD AUTO: 0.75 10*3/MM3 (ref 0.1–0.9)
MONOCYTES NFR BLD AUTO: 9.8 % (ref 5–12)
MONOCYTES NFR FLD: 1 %
NEUTROPHILS NFR BLD AUTO: 6.14 10*3/MM3 (ref 1.7–7)
NEUTROPHILS NFR BLD AUTO: 80 % (ref 42.7–76)
NEUTROPHILS NFR FLD MANUAL: 48 %
NRBC BLD AUTO-RTO: 0 /100 WBC (ref 0–0.2)
PLATELET # BLD AUTO: 237 10*3/MM3 (ref 140–450)
PMV BLD AUTO: 10.2 FL (ref 6–12)
POTASSIUM SERPL-SCNC: 4.1 MMOL/L (ref 3.5–5.2)
RBC # BLD AUTO: 3.66 10*6/MM3 (ref 3.77–5.28)
RBC # FLD AUTO: <2000 /MM3
REF LAB TEST METHOD: NORMAL
SODIUM SERPL-SCNC: 134 MMOL/L (ref 136–145)
WBC # FLD AUTO: 1088 /MM3
WBC NRBC COR # BLD AUTO: 7.67 10*3/MM3 (ref 3.4–10.8)

## 2025-01-23 PROCEDURE — 71045 X-RAY EXAM CHEST 1 VIEW: CPT

## 2025-01-23 PROCEDURE — 97116 GAIT TRAINING THERAPY: CPT

## 2025-01-23 PROCEDURE — 85025 COMPLETE CBC W/AUTO DIFF WBC: CPT | Performed by: INTERNAL MEDICINE

## 2025-01-23 PROCEDURE — 99232 SBSQ HOSP IP/OBS MODERATE 35: CPT | Performed by: INTERNAL MEDICINE

## 2025-01-23 PROCEDURE — 0W9B30Z DRAINAGE OF LEFT PLEURAL CAVITY WITH DRAINAGE DEVICE, PERCUTANEOUS APPROACH: ICD-10-PCS | Performed by: RADIOLOGY

## 2025-01-23 PROCEDURE — 80048 BASIC METABOLIC PNL TOTAL CA: CPT | Performed by: INTERNAL MEDICINE

## 2025-01-23 PROCEDURE — 99232 SBSQ HOSP IP/OBS MODERATE 35: CPT

## 2025-01-23 PROCEDURE — 97530 THERAPEUTIC ACTIVITIES: CPT

## 2025-01-23 PROCEDURE — 97110 THERAPEUTIC EXERCISES: CPT

## 2025-01-23 RX ADMIN — Medication 10 ML: at 09:01

## 2025-01-23 RX ADMIN — HYDRALAZINE HYDROCHLORIDE 25 MG: 25 TABLET ORAL at 20:42

## 2025-01-23 RX ADMIN — HYDRALAZINE HYDROCHLORIDE 25 MG: 25 TABLET ORAL at 09:00

## 2025-01-23 RX ADMIN — AMLODIPINE BESYLATE 5 MG: 5 TABLET ORAL at 09:00

## 2025-01-23 NOTE — CASE MANAGEMENT/SOCIAL WORK
Continued Stay Note  [facility]     Patient Name: Rose J Kellermann  MRN: [id number]  Today's Date: 1/23/2025    Admit Date: 1/20/2025    Plan: rehab   Discharge Plan       Row Name 01/23/25 1340       Plan    Plan rehab    Patient/Family in Agreement with Plan yes    Plan Comments I spoke with this patient's daughter, per the patient's request, regarding therapy recommending rehab. The daughter asked that CM make referrals to [facility] and [facility] , which I did. CM to follow.    Final Discharge Disposition Code 03 - skilled nursing facility (SNF)                   Discharge Codes    No documentation.                 Expected Discharge Date and Time       Expected Discharge Date Expected Discharge Time    Jan 23, 2025               Floresita Dawson RN

## 2025-01-23 NOTE — PLAN OF CARE
Goal Outcome Evaluation:  Plan of Care Reviewed With: patient        Progress: improving  Outcome Evaluation: Patient increased gait distance, requiring seated rest break retirement through. O2 sats 90% at that time. Increased independence with t/f and bed mobility today. Good effort with LE ther ex. Patient currently below functional baseline, demonstrating decreased functional mobility status, impaired balance, decreased endurance, and decreased LE strength. Will address these deficits to promote return to PLOF. Recommend IRF at d/c.    Anticipated Discharge Disposition (PT): inpatient rehabilitation facility

## 2025-01-23 NOTE — THERAPY TREATMENT NOTE
Patient Name: Rose J Kellermann  : 1926    MRN: [id number]                              Today's Date: 2025       Admit Date: 2025    Visit Dx:     ICD-10-CM ICD-9-CM   1. Pleural effusion, left  J90 511.9   2. Hypoxia  R09.02 799.02   3. Chronic kidney disease, unspecified CKD stage  N18.9 585.9     Patient Active Problem List   Diagnosis    Essential hypertension    Vitamin D deficiency    Fatigue    Chronic kidney disease, stage III (moderate)     Osteoarthritis    Dyslipidemia    Post-menopausal bleeding    Endometrial cancer    Closed fracture of left hip    Asymptomatic bacteriuria    Post-traumatic osteoarthritis of left hip    Status post total replacement of left hip    Prediabetes    Acute blood loss anemia, asymptomatic    Postoperative urinary retention    Myopia    Posterior crocodile shagreen of cornea    Presbyopia    Ptosis of eyelid    Regular astigmatism    Retinal neovascularization    After cataract    Secondary cataract    Premature atrial contractions    Heart murmur    Nocturnal hypoxia    Chronic deep vein thrombosis (DVT) of both lower extremities    Retinal neovascularization    Pleural effusion on left     Past Medical History:   Diagnosis Date    Abscess of back     Arrhythmia     frequent PVC    Cancer 2011    Endometrial cancer, status post robotically assisted hysterectomy with Dr. Haddad, 2011.      CKD (chronic kidney disease), stage III     no dilaysis     Dizzy     Dvt femoral (deep venous thrombosis) 2017    s/p hip surgery in 2017. Took xarelto until 2018    Dyslipidemia     Dyslipidemia.  Observing.    Enthesopathy of hip region     Generalized osteoarthrosis, involving multiple sites     Headache     Hearing loss     Hypertension     Low backache     Needs flu shot     Osteoarthritis     Osteoarthritis with questionable carpal tunnel syndrome bilaterally.     Otitis, serous     Pneumonia     Remote pneumonia, .     Retinal  hemorrhage     SI joint arthritis     SOB (shortness of breath)     Syncope 2001    Remote syncope with working diagnosis of vasodepressor, 2001.     Venous insufficiency of leg     Wears glasses     readers     Past Surgical History:   Procedure Laterality Date    CATARACT EXTRACTION      bilat     COLONOSCOPY      HIP PERCUTANEOUS PINNING Left 11/24/2017    Procedure: HIP PERCUTANEOUS PINNING;  Surgeon: Lucas Swanson MD;  Location: [facility] OR;  Service:     HYSTERECTOMY      total? but unsure     KNEE SURGERY  2001    Remote knee surgery in 2001.- right     TOTAL HIP ARTHROPLASTY Left 10/26/2018    Procedure: TOTAL HIP ARTHROPLASTY LEFT;  Surgeon: Stephen Baker MD;  Location: [facility] OR;  Service: Orthopedics      General Information       Row Name 01/23/25 1407          Physical Therapy Time and Intention    Document Type therapy note (daily note)  -LR     Mode of Treatment physical therapy;individual therapy  -LR       Row Name 01/23/25 1402          General Information    Patient Profile Reviewed yes  -LR     Existing Precautions/Restrictions fall;oxygen therapy device and L/min;other (see comments)  L sided chest tube, [place]  -LR     Barriers to Rehab medically complex;hearing deficit  -LR       Row Name 01/23/25 1409          Cognition    Orientation Status (Cognition) oriented x 3;verbal cues/prompts needed for orientation;other (see comments)  stated current year was 2024  -LR       Row Name 01/23/25 1401          Safety Issues/Impairments Affecting Functional Mobility    Safety Issues Affecting Function (Mobility) ability to follow commands;awareness of need for assistance;insight into deficits/self-awareness;positioning of assistive device;safety precautions follow-through/compliance;safety precaution awareness  -LR     Impairments Affecting Function (Mobility) balance;coordination;endurance/activity tolerance;postural/trunk control;shortness of breath;strength  -LR               User Key  (r) =  Recorded By, (t) = Taken By, (c) = Cosigned By      Initials Name Provider Type    LR Lola Finch, PT Physical Therapist                   Mobility       Row Name 01/23/25 1407          Bed Mobility    Bed Mobility supine-sit  -LR     Supine-Sit [place] (Bed Mobility) verbal cues;standby assist  -LR     Sit-Supine [place] (Bed Mobility) not tested  -LR     Assistive Device (Bed Mobility) head of bed elevated;bed rails  -LR     Comment, (Bed Mobility) Verbal cues to move LEs towards EOB and to reach across for bed rail to raise trunk into sitting. Required increased time to perform. Mild dizziness upon sitting up, improved with rest. Required prolonged seated rest break prior to OOB mobility.  -LR       Row Name 01/23/25 1407          Transfers    Comment, (Transfers) Verbal cues to push up from bed to stand and to reach back for chair to lower into sitting. Cues for locking and unlocking brakes prior to t/f.  -LR       Row Name 01/23/25 1407          Bed-Chair Transfer    Bed-Chair [place] (Transfers) not tested  -LR       Row Name 01/23/25 1407          Sit-Stand Transfer    Sit-Stand [place] (Transfers) verbal cues;minimum assist (75% patient effort)  -LR     Assistive Device (Sit-Stand Transfers) other (see comments)  rollator  -LR       Row Name 01/23/25 1407          Gait/Stairs (Locomotion)    [place] Level (Gait) verbal cues;minimum assist (75% patient effort)  -LR     Assistive Device (Gait) other (see comments)  rollator  -LR     Patient was able to Ambulate yes  -LR     Distance in Feet (Gait) 40  20+20  -LR     Deviations/Abnormal Patterns (Gait) bilateral deviations;mike decreased;gait speed decreased;stride length decreased;festinating/shuffling  -LR     Bilateral Gait Deviations forward flexed posture;heel strike decreased  -LR     [place] Level (Stairs) not tested  -LR     Comment, (Gait/Stairs) Patient ambulated with step through gait pattern at slow pace  with forward flexed posture. Verbal cues for upright posture, increased LE weight bearing, decreased UE weight bearing, and increased foot clearance. Slight improvement with cues for correction. Required prolonged seated rest break custodial through ambulation. O2 sats 90% at this time. Gait limited by fatigue, weakness, and SOA.  -               User Key  (r) = Recorded By, (t) = Taken By, (c) = Cosigned By      Initials Name Provider Type    LR Lola Finch, PT Physical Therapist                   Obj/Interventions       Row Name 01/23/25 1407          Motor Skills    Therapeutic Exercise ankle;knee;hip;other (see comments)  cues for technique; no assist required  -       Row Name 01/23/25 1407          Hip (Therapeutic Exercise)    Hip (Therapeutic Exercise) isometric exercises;strengthening exercise  -     Hip Isometrics (Therapeutic Exercise) bilateral;gluteal sets;supine;10 repetitions  -     Hip Strengthening (Therapeutic Exercise) bilateral;heel slides;aBduction;supine;10 repetitions  -       Row Name 01/23/25 1407          Knee (Therapeutic Exercise)    Knee (Therapeutic Exercise) strengthening exercise;isometric exercises  -     Knee Isometrics (Therapeutic Exercise) bilateral;quad sets;supine;10 repetitions  -     Knee Strengthening (Therapeutic Exercise) bilateral;SLR (straight leg raise);SAQ (short arc quad);LAQ (long arc quad);sitting;supine;10 repetitions  -       Row Name 01/23/25 1407          Ankle (Therapeutic Exercise)    Ankle (Therapeutic Exercise) AROM (active range of motion)  -     Ankle AROM (Therapeutic Exercise) bilateral;dorsiflexion;plantarflexion;supine;10 repetitions  -       Row Name 01/23/25 1407          Balance    Balance Assessment sitting static balance;sitting dynamic balance;standing static balance;standing dynamic balance  -LR     Static Sitting Balance standby assist  -LR     Dynamic Sitting Balance standby assist  -LR     Position, Sitting  Balance unsupported;sitting edge of bed  -LR     Static Standing Balance contact guard  -LR     Dynamic Standing Balance minimal assist  -LR     Position/Device Used, Standing Balance supported;other (see comments)  rollator  -LR               User Key  (r) = Recorded By, (t) = Taken By, (c) = Cosigned By      Initials Name Provider Type    LR Lola Finch, PT Physical Therapist                   Goals/Plan       Row Name 01/23/25 1407          Bed Mobility Goal 1 (PT)    Activity/Assistive Device (Bed Mobility Goal 1, PT) sit to supine;supine to sit  -LR     Time Frame (Bed Mobility Goal 1, PT) short term goal (STG);3 days  -LR     Progress/Outcomes (Bed Mobility Goal 1, PT) continuing progress toward goal;goal ongoing  -LR       Row Name 01/23/25 1407          Transfer Goal 1 (PT)    Activity/Assistive Device (Transfer Goal 1, PT) sit-to-stand/stand-to-sit;bed-to-chair/chair-to-bed;walker, rolling  -LR     [place] Level/Cues Needed (Transfer Goal 1, PT) contact guard required  -LR     Time Frame (Transfer Goal 1, PT) long term goal (LTG);1 week  -LR     Progress/Outcome (Transfer Goal 1, PT) continuing progress toward goal;goal ongoing  -LR       Row Name 01/23/25 1407          Gait Training Goal 1 (PT)    Activity/Assistive Device (Gait Training Goal 1, PT) gait (walking locomotion);assistive device use;walker, rolling  -LR     Distance (Gait Training Goal 1, PT) 150 feet  -LR     Time Frame (Gait Training Goal 1, PT) long term goal (LTG);1 week  -LR     Progress/Outcome (Gait Training Goal 1, PT) progress slower than expected;goal ongoing  -LR               User Key  (r) = Recorded By, (t) = Taken By, (c) = Cosigned By      Initials Name Provider Type    Lola Serrato, PT Physical Therapist                   Clinical Impression       Row Name 01/23/25 1407          Pain    Pretreatment Pain Rating 0/10 - no pain  -LR     Posttreatment Pain Rating 0/10 - no pain  -LR       Row Name  01/23/25 1407          Plan of Care Review    Plan of Care Reviewed With patient  -LR     Progress improving  -LR     Outcome Evaluation Patient increased gait distance, requiring seated rest break retirement through. O2 sats 90% at that time. Increased independence with t/f and bed mobility today. Good effort with LE ther ex. Patient currently below functional baseline, demonstrating decreased functional mobility status, impaired balance, decreased endurance, and decreased LE strength. Will address these deficits to promote return to PLOF. Recommend IRF at d/c.  -LR       Row Name 01/23/25 1407          Therapy Assessment/Plan (PT)    Rehab Potential (PT) good  -LR     Criteria for Skilled Interventions Met (PT) yes;skilled treatment is necessary;meets criteria  -LR     Therapy Frequency (PT) daily  -LR       Row Name 01/23/25 1407          Vital Signs    Pre SpO2 (%) 92  -LR     O2 Delivery Pre Treatment supplemental O2  -LR     Intra SpO2 (%) 90  -LR     O2 Delivery Intra Treatment supplemental O2  -LR     Post SpO2 (%) 93  -LR     O2 Delivery Post Treatment supplemental O2  -LR     Pre Patient Position Supine  -LR     Intra Patient Position Sitting  -LR     Post Patient Position Sitting  -LR       Row Name 01/23/25 1407          Positioning and Restraints    Pre-Treatment Position in bed  -LR     Post Treatment Position chair  -LR     In Chair notified nsg;reclined;sitting;call light within reach;encouraged to call for assist;exit alarm on;legs elevated;waffle cushion  -LR               User Key  (r) = Recorded By, (t) = Taken By, (c) = Cosigned By      Initials Name Provider Type    LR Lola Finch, PT Physical Therapist                   Outcome Measures       Row Name 01/23/25 1407 01/23/25 0800       How much help from another person do you currently need...    Turning from your back to your side while in flat bed without using bedrails? 3  -LR 2  -LB    Moving from lying on back to sitting on the  side of a flat bed without bedrails? 3  -LR 2  -LB    Moving to and from a bed to a chair (including a wheelchair)? 3  -LR 2  -LB    Standing up from a chair using your arms (e.g., wheelchair, bedside chair)? 3  -LR 2  -LB    Climbing 3-5 steps with a railing? 1  -LR 2  -LB    To walk in hospital room? 3  -LR 2  -LB    AM-PAC 6 Clicks Score (PT) 16  -LR 12  -LB    Highest Level of Mobility Goal 5 --> Static standing  -LR 4 --> Transfer to chair/commode  -LB      Row Name 01/23/25 1407          Functional Assessment    Outcome Measure Options AM-PAC 6 Clicks Basic Mobility (PT)  -LR               User Key  (r) = Recorded By, (t) = Taken By, (c) = Cosigned By      Initials Name Provider Type    LR Lola Finch, PT Physical Therapist    Cata Thomas, RN Registered Nurse                                 Physical Therapy Education       Title: PT OT SLP Therapies (In Progress)       Topic: Physical Therapy (Done)       Point: Mobility training (Done)       Learning Progress Summary            Patient Acceptance, E,D, VU,NR by LR at 1/23/2025 1407    Comment: Educated on benefits of mobility, safety with mobility, correct supine to sit t/f technique, correct sit<->stand t/f technique, correct gait mechanics, LE HEP, and progression of POC.    Acceptance, E, NR by KG at 1/21/2025 1511                      Point: Home exercise program (Done)       Learning Progress Summary            Patient Acceptance, E,D, VU,NR by LR at 1/23/2025 1407    Comment: Educated on benefits of mobility, safety with mobility, correct supine to sit t/f technique, correct sit<->stand t/f technique, correct gait mechanics, LE HEP, and progression of POC.                      Point: Body mechanics (Done)       Learning Progress Summary            Patient Acceptance, E,D, VU,NR by LR at 1/23/2025 1407    Comment: Educated on benefits of mobility, safety with mobility, correct supine to sit t/f technique, correct sit<->stand t/f  technique, correct gait mechanics, LE HEP, and progression of POC.    Acceptance, E, NR by KG at 1/21/2025 1511                      Point: Precautions (Done)       Learning Progress Summary            Patient Acceptance, E,D, VU,NR by LR at 1/23/2025 1407    Comment: Educated on benefits of mobility, safety with mobility, correct supine to sit t/f technique, correct sit<->stand t/f technique, correct gait mechanics, LE HEP, and progression of POC.    Acceptance, E, NR by KG at 1/21/2025 1511                                      User Key       Initials Effective Dates Name Provider Type Discipline    LR 02/03/23 -  Lola Finch, PT Physical Therapist PT    KG 05/22/20 -  Ann Nicole, PT Physical Therapist PT                  PT Recommendation and Plan     Progress: improving  Outcome Evaluation: Patient increased gait distance, requiring seated rest break shelter through. O2 sats 90% at that time. Increased independence with t/f and bed mobility today. Good effort with LE ther ex. Patient currently below functional baseline, demonstrating decreased functional mobility status, impaired balance, decreased endurance, and decreased LE strength. Will address these deficits to promote return to PLOF. Recommend IRF at d/c.     Time Calculation:         PT Charges       Row Name 01/23/25 1407             Time Calculation    Start Time 1407  -LR      PT Received On 01/23/25  -LR      PT Goal Re-Cert Due Date 01/31/25  -LR         Timed Charges    10802 - PT Therapeutic Exercise Minutes 15  -LR      23280 - Gait Training Minutes  10  -LR      82173 - PT Therapeutic Activity Minutes 15  -LR         Total Minutes    Timed Charges Total Minutes 40  -LR       Total Minutes 40  -LR                User Key  (r) = Recorded By, (t) = Taken By, (c) = Cosigned By      Initials Name Provider Type    LR Lola Finch, PT Physical Therapist                  Therapy Charges for Today       Code Description  Service Date Service Provider Modifiers Qty    86624131265  PT THER PROC EA 15 MIN 1/23/2025 Lola Finch, PT GP 1    18652410469 HC GAIT TRAINING EA 15 MIN 1/23/2025 Lola Finch, PT GP 1    [id number]  PT THERAPEUTIC ACT EA 15 MIN 1/23/2025 Lola Finch, PT GP 1            PT G-Codes  Outcome Measure Options: AM-PAC 6 Clicks Basic Mobility (PT)  AM-PAC 6 Clicks Score (PT): 16  AM-PAC 6 Clicks Score (OT): 17  PT Discharge Summary  Anticipated Discharge Disposition (PT): inpatient rehabilitation facility    Lola Finch, PT  1/23/2025

## 2025-01-23 NOTE — PLAN OF CARE
Goal Outcome Evaluation:  Plan of Care Reviewed With: patient        Progress: improving  Outcome Evaluation: VSS. 4L NC. A&Ox4. Pt is ambulating to toilet with assist. No complaints of pain or nausea. Chest tube in place to low wall suction. Fall and Skin precautions in place. Pt is resting comfortably at this time.

## 2025-01-23 NOTE — PROGRESS NOTES
[facility] Medicine Services  PROGRESS NOTE    Patient Name: Rose J Kellermann  : 1926  MRN: [id number]    Date of Admission: 2025  Primary Care Physician: Sam Hung MD    Subjective   Subjective     CC:  progressive dyspnea     HPI:  No new issues overnight, denies F/C. Still with CT in place.       Objective   Objective     Vital Signs:   Temp:  [97.4 °F (36.3 °C)-98.4 °F (36.9 °C)] 98.1 °F (36.7 °C)  Heart Rate:  [] 88  Resp:  [16-18] 18  BP: (126-155)/(80-91) 131/84  Flow (L/min) (Oxygen Therapy):  [3-4] 4     Physical Exam:  Gen:  alert, delightful, conversant, NAD   Neuro: alert and oriented, clear speech, follows commands, grossly nonfocal  HEENT:  NC/AT   Neck:  Supple, no LAD  Heart RRR   Lungs  anterior exam is clear, L sided chest tube in place with yellow appearing fluid present  Abd:  Soft, nontender  Extrem:  No c/c/e      Results Reviewed:  LAB RESULTS:      Lab 25  1540 25  1539 25  1315 25  1115 25  1014   WBC  --  9.88  --   --  8.92   HEMOGLOBIN  --  11.4*  --   --  12.0   HEMATOCRIT  --  35.5  --   --  37.5   PLATELETS  --  228  --   --  235   NEUTROS ABS  --  8.15*  --   --  6.59   IMMATURE GRANS (ABS)  --  0.03  --   --  0.03   LYMPHS ABS  --  0.82  --   --  1.03   MONOS ABS  --  0.81  --   --  1.15*   EOS ABS  --  0.04  --   --  0.09   MCV  --  94.2  --   --  94.7   PROCALCITONIN  --   --   --   --  0.33*   LACTATE  --   --  1.7  --  2.1*   LDH  --  209  --   --   --    PROTIME 21.2*  --   --   --   --    APTT 40.4*  --   --   --   --    HSTROP T  --   --   --  43* 38*         Lab 25  0723 25  1539 25  1014   SODIUM 138 134* 134*   POTASSIUM 4.4 4.5 3.8   CHLORIDE 100 98 97*   CO2 24.0 20.0* 23.0   ANION GAP 14.0 16.0* 14.0   BUN 63* 58* 48*   CREATININE 2.74* 2.87* 2.50*   EGFR 15.2* 14.4* 17.0*   GLUCOSE 94 182* 131*   CALCIUM 8.6 8.7 9.1         Lab 25  1539 25  1014    TOTAL PROTEIN 6.1 7.2   ALBUMIN  --  3.3*   GLOBULIN  --  3.9   ALT (SGPT)  --  11   AST (SGOT)  --  19   BILIRUBIN  --  1.3*   ALK PHOS  --  76         Lab 01/21/25  1540 01/20/25  1115 01/20/25  1014   PROBNP  --   --  5,330.0*   HSTROP T  --  43* 38*   PROTIME 21.2*  --   --    INR 1.82*  --   --                  Brief Urine Lab Results       None            Microbiology Results Abnormal       None            XR Chest 1 View    Result Date: 1/23/2025  XR CHEST 1 VW Date of Exam: 1/23/2025 1:15 AM EST Indication: Follow up L pleural effusion s/p Left pleural CT placement Comparison: 1/22/2025 at 0911 hours Findings: The left chest tube is stable in position. A residual small left effusion is seen. No significant pneumothorax is observed. Interstitial changes and patchy space infiltrates are seen throughout the lungs bilaterally. There is a small right pleural effusion. The cardiac silhouette and mediastinum are stable. There is stable cardiomegaly.     Impression: Impression: 1.The left chest tube is stable in position. There is a residual small left pleural effusion. No significant pneumothorax is seen. 2.Interstitial changes and patchy space infiltrates are seen throughout the lungs bilaterally. There is a small right pleural effusion. Electronically Signed: Spencer Delvalle MD  1/23/2025 7:09 AM EST  Workstation ID: [id number]    XR Chest 1 View    Result Date: 1/22/2025  XR CHEST 1 VW Date of Exam: 1/22/2025 9:25 AM EST Indication: Post Left side Chest Tube Comparison: Chest radiograph 1/21/2025. Findings: Interval placement of a smallbore chest tube with pigtail tip projecting over the left costophrenic angle. Enlarged cardiac silhouette, unchanged. Persistent pulmonary vascular congestion. Moderate left and small right pleural effusions with adjacent airspace disease, not significantly changed. No distinct pneumothorax. Osseous structures are unchanged.     Impression: Impression: Interval placement of a  left-sided chest tube without significant interval change otherwise. Electronically Signed: Adiel Mckeon MD  1/22/2025 11:06 AM EST  Workstation ID: [id number]     Results for orders placed during the hospital encounter of 06/01/23    Adult Transthoracic Echo Complete W/ Cont if Necessary Per Protocol    Interpretation Summary    Left ventricular systolic function is normal. Calculated left ventricular EF = 61.4% Left ventricular ejection fraction appears to be 61 - 65%.    Left ventricular diastolic function was indeterminate.    Left atrial volume is severely increased.    Mild aortic valve stenosis is present.    Aortic valve maximum pressure gradient is 26 mmHg. Aortic valve mean pressure gradient is 15 mmHg.    Mild mitral valve stenosis is present. The mitral valve peak gradient is 12 mmHg. The mitral valve mean gradient is 5 mmHg.    Moderate tricuspid valve regurgitation is present.    Estimated right ventricular systolic pressure from tricuspid regurgitation is markedly elevated (>55 mmHg).    There is a small left pleural effusion.      Current medications:  Scheduled Meds:amLODIPine, 5 mg, Oral, Daily  [Held by provider] apixaban, 5 mg, Oral, Q12H  [Held by provider] bumetanide, 1 mg, Oral, Daily  hydrALAZINE, 25 mg, Oral, BID  sodium chloride, 10 mL, Intravenous, Q12H      Continuous Infusions:   PRN Meds:.  acetaminophen    senna-docusate sodium **AND** polyethylene glycol **AND** bisacodyl **AND** bisacodyl    sodium chloride    sodium chloride    sodium chloride    Assessment & Plan   Assessment & Plan     Active Hospital Problems    Diagnosis  POA    **Pleural effusion on left [J90]  Yes    Essential hypertension [I10]  Yes    Chronic kidney disease, stage III (moderate)  [N18.30]  Yes      Resolved Hospital Problems   No resolved problems to display.        Brief Hospital Course to date:  Rose J Kellermann is a 89 y.o. female with a history of DVT on Eliquis, HFpEF, CKD, lives at home, has been using  home oxygen intermittently x several months, recently needing it constantly though denying dyspnea.      Progressive hypoxia  Left pleural effusion  Suspected acute HFpEF exacerbation  -  chest x-ray with moderate L pleural effusion  - Takes Bumex at home.  Continue. Watch creatinine   - Eliquis last given 1/20 AM.  Resume after CT is removed   - Thoracentesis ordered: had chest tube placed in IR (?).  Consulted CT to manage, appreciate their assistance   -- pleural fluid is c/w exudate (?), cultures/ cytology pending       ANGEL LUIS on CKD  - Creat baseline 1.8, was 2.87 on 1/21, improved to 2.74 on 1/22.   - Monitor with diuresis daily      History of DVT  - Hold Eliquis for now  - SCDs for now     Hypertension  - Continue home medications    Advanced age  - she is ambulatory  - OOB daily, pt should be up walking to BR w assist               Expected Discharge Location and Transportation: lives at home, PT/OT recommend inpatient rehab    Expected Discharge   Expected Discharge Date: 1/23/2025; Expected Discharge Time:      VTE Prophylaxis:  Pharmacologic & mechanical VTE prophylaxis orders are present.         AM-PAC 6 Clicks Score (PT): 15 (01/22/25 2000)    CODE STATUS:   Code Status and Medical Interventions: CPR (Attempt to Resuscitate); Full Support   Ordered at: 01/20/25 7874     Level Of Support Discussed With:    Patient     Code Status (Patient has no pulse and is not breathing):    CPR (Attempt to Resuscitate)     Medical Interventions (Patient has pulse or is breathing):    Full Support       Linda Felix MD  01/23/25

## 2025-01-23 NOTE — PLAN OF CARE
Goal Outcome Evaluation:           Progress: improving  Outcome Evaluation: VSS on 4LNC. A&Ox4. No complaints of pain or nausea. Chest tube in place draining yellow tinged fluid. Skin and fall precautions in place. Will continue plan of care.

## 2025-01-23 NOTE — PROGRESS NOTES
[facility] Cardiothoracic Surgery In-Patient Progress Note     LOS: 3 days     Chief Complaint: Left pleural effusion    Subjective  Breathing better.  On 4 L saturations 93%.      Objective  Vital Signs  Temp:  [97.4 °F (36.3 °C)-98.4 °F (36.9 °C)] 97.5 °F (36.4 °C)  Heart Rate:  [] 72  Resp:  [11-25] 16  BP: (111-155)/(69-91) 131/83        Physical Exam:   General Appearance: alert, appears stated age and cooperative   Lungs: Diminished bases bilaterally   Heart: regular rhythm & normal rate, normal S1, S2, no murmur, no gallop, no rub, and no click   CT: No air leak with cough; serous drainage in [facility]  Output by Drain (mL) 01/22/25 0701 - 01/22/25 1900 01/22/25 1901 - 01/23/25 0700 01/23/25 0701 - 01/23/25 0810 Range Total   Chest Tube Left Pleural 860 220  1080        Results     Results from last 7 days   Lab Units 01/21/25  1539   WBC 10*3/mm3 9.88   HEMOGLOBIN g/dL 11.4*   HEMATOCRIT % 35.5   PLATELETS 10*3/mm3 228     Results from last 7 days   Lab Units 01/22/25  0723   SODIUM mmol/L 138   POTASSIUM mmol/L 4.4   CHLORIDE mmol/L 100   CO2 mmol/L 24.0   BUN mg/dL 63*   CREATININE mg/dL 2.74*   GLUCOSE mg/dL 94   CALCIUM mg/dL 8.6     Imaging Results (Last 24 Hours)       Procedure Component Value Units Date/Time    XR Chest 1 View [[id number]] Collected: 01/23/25 0707     Updated: 01/23/25 0712    Narrative:      XR CHEST 1 VW    Date of Exam: 1/23/2025 1:15 AM EST    Indication: Follow up L pleural effusion s/p Left pleural CT placement    Comparison: 1/22/2025 at 0911 hours    Findings:  The left chest tube is stable in position. A residual small left effusion is seen. No significant pneumothorax is observed. Interstitial changes and patchy space infiltrates are seen throughout the lungs bilaterally. There is a small right pleural   effusion. The cardiac silhouette and mediastinum are stable. There is stable cardiomegaly.      Impression:      Impression:  1.The left chest tube is stable in  position. There is a residual small left pleural effusion. No significant pneumothorax is seen.  2.Interstitial changes and patchy space infiltrates are seen throughout the lungs bilaterally. There is a small right pleural effusion.        Electronically Signed: Spencer Delvalle MD    1/23/2025 7:09 AM EST    Workstation ID: [id number]    XR Chest 1 View [[id number]] Collected: 01/22/25 1104     Updated: 01/22/25 1109    Narrative:      XR CHEST 1 VW    Date of Exam: 1/22/2025 9:25 AM EST    Indication: Post Left side Chest Tube    Comparison: Chest radiograph 1/21/2025.    Findings:  Interval placement of a smallbore chest tube with pigtail tip projecting over the left costophrenic angle. Enlarged cardiac silhouette, unchanged. Persistent pulmonary vascular congestion. Moderate left and small right pleural effusions with adjacent   airspace disease, not significantly changed. No distinct pneumothorax. Osseous structures are unchanged.      Impression:      Impression:  Interval placement of a left-sided chest tube without significant interval change otherwise.      Electronically Signed: Adiel Mckeon MD    1/22/2025 11:06 AM EST    Workstation ID: [id number]    US Tube Placement [[id number]] Resulted: 01/22/25 0841     Updated: 01/22/25 0925            Assessment    Pleural effusion on left    Essential hypertension    Chronic kidney disease, stage III (moderate)       Plan   Continue chest tube to -20 suction until output down  Morning CXR  Wean oxygen as tolerated      Rianna Ford, CONNIE  01/23/25  08:10 EST

## 2025-01-24 ENCOUNTER — APPOINTMENT (OUTPATIENT)
Dept: GENERAL RADIOLOGY | Facility: HOSPITAL | Age: OVER 89
End: 2025-01-24
Payer: MEDICARE

## 2025-01-24 LAB
ANION GAP SERPL CALCULATED.3IONS-SCNC: 12 MMOL/L (ref 5–15)
BASOPHILS # BLD AUTO: 0.03 10*3/MM3 (ref 0–0.2)
BASOPHILS NFR BLD AUTO: 0.4 % (ref 0–1.5)
BUN SERPL-MCNC: 63 MG/DL (ref 8–23)
BUN/CREAT SERPL: 26.7 (ref 7–25)
CALCIUM SPEC-SCNC: 8.7 MG/DL (ref 8.2–9.6)
CHLORIDE SERPL-SCNC: 101 MMOL/L (ref 98–107)
CO2 SERPL-SCNC: 22 MMOL/L (ref 22–29)
CREAT SERPL-MCNC: 2.36 MG/DL (ref 0.57–1)
DEPRECATED RDW RBC AUTO: 48.4 FL (ref 37–54)
EGFRCR SERPLBLD CKD-EPI 2021: 18.2 ML/MIN/1.73
EOSINOPHIL # BLD AUTO: 0.14 10*3/MM3 (ref 0–0.4)
EOSINOPHIL NFR BLD AUTO: 1.6 % (ref 0.3–6.2)
ERYTHROCYTE [DISTWIDTH] IN BLOOD BY AUTOMATED COUNT: 13.4 % (ref 12.3–15.4)
GLUCOSE SERPL-MCNC: 108 MG/DL (ref 65–99)
HCT VFR BLD AUTO: 34.8 % (ref 34–46.6)
HGB BLD-MCNC: 10.8 G/DL (ref 12–15.9)
IMM GRANULOCYTES # BLD AUTO: 0.04 10*3/MM3 (ref 0–0.05)
IMM GRANULOCYTES NFR BLD AUTO: 0.5 % (ref 0–0.5)
LYMPHOCYTES # BLD AUTO: 0.74 10*3/MM3 (ref 0.7–3.1)
LYMPHOCYTES NFR BLD AUTO: 8.7 % (ref 19.6–45.3)
MCH RBC QN AUTO: 30.3 PG (ref 26.6–33)
MCHC RBC AUTO-ENTMCNC: 31 G/DL (ref 31.5–35.7)
MCV RBC AUTO: 97.5 FL (ref 79–97)
MONOCYTES # BLD AUTO: 0.83 10*3/MM3 (ref 0.1–0.9)
MONOCYTES NFR BLD AUTO: 9.7 % (ref 5–12)
NEUTROPHILS NFR BLD AUTO: 6.75 10*3/MM3 (ref 1.7–7)
NEUTROPHILS NFR BLD AUTO: 79.1 % (ref 42.7–76)
NRBC BLD AUTO-RTO: 0 /100 WBC (ref 0–0.2)
PLATELET # BLD AUTO: 240 10*3/MM3 (ref 140–450)
PMV BLD AUTO: 10 FL (ref 6–12)
POTASSIUM SERPL-SCNC: 4.3 MMOL/L (ref 3.5–5.2)
RBC # BLD AUTO: 3.57 10*6/MM3 (ref 3.77–5.28)
SODIUM SERPL-SCNC: 135 MMOL/L (ref 136–145)
WBC NRBC COR # BLD AUTO: 8.53 10*3/MM3 (ref 3.4–10.8)

## 2025-01-24 PROCEDURE — 85025 COMPLETE CBC W/AUTO DIFF WBC: CPT | Performed by: INTERNAL MEDICINE

## 2025-01-24 PROCEDURE — 71045 X-RAY EXAM CHEST 1 VIEW: CPT

## 2025-01-24 PROCEDURE — 80048 BASIC METABOLIC PNL TOTAL CA: CPT | Performed by: INTERNAL MEDICINE

## 2025-01-24 PROCEDURE — 99231 SBSQ HOSP IP/OBS SF/LOW 25: CPT | Performed by: INTERNAL MEDICINE

## 2025-01-24 PROCEDURE — 97530 THERAPEUTIC ACTIVITIES: CPT

## 2025-01-24 PROCEDURE — 99231 SBSQ HOSP IP/OBS SF/LOW 25: CPT | Performed by: PHYSICIAN ASSISTANT

## 2025-01-24 RX ADMIN — Medication 10 ML: at 20:09

## 2025-01-24 RX ADMIN — AMLODIPINE BESYLATE 5 MG: 5 TABLET ORAL at 10:07

## 2025-01-24 RX ADMIN — HYDRALAZINE HYDROCHLORIDE 25 MG: 25 TABLET ORAL at 10:06

## 2025-01-24 RX ADMIN — Medication 10 ML: at 10:05

## 2025-01-24 RX ADMIN — BUMETANIDE 1 MG: 1 TABLET ORAL at 10:07

## 2025-01-24 RX ADMIN — HYDRALAZINE HYDROCHLORIDE 25 MG: 25 TABLET ORAL at 20:09

## 2025-01-24 NOTE — PROGRESS NOTES
[facility] Medicine Services  PROGRESS NOTE    Patient Name: Rose J Kellermann  : 1926  MRN: [id number]    Date of Admission: 2025  Primary Care Physician: Sam Hung MD    Subjective   Subjective     CC:  progressive dyspnea     HPI: Feeling okay this am, seems confused when I ask her if the chest tube is causing any discomfort.       Objective   Objective     Vital Signs:   Temp:  [97.5 °F (36.4 °C)-98.4 °F (36.9 °C)] 97.5 °F (36.4 °C)  Heart Rate:  [71-89] 85  Resp:  [18] 18  BP: (113-132)/(73-90) 114/82  Flow (L/min) (Oxygen Therapy):  [4] 4     Physical Exam:  Gen:  alert, delightful, conversant, NAD   Neuro: alert and oriented, clear speech, follows commands, grossly nonfocal  HEENT:  NC/AT   Neck:  Supple, no LAD  Heart RRR   Lungs  anterior exam is clear, L sided chest tube in place with yellow appearing fluid present  Abd:  Soft, nontender  Extrem:  No c/c/e      Results Reviewed:  LAB RESULTS:      Lab 25  1138 25  1540 25  1539 25  1315 25  1115 25  1014   WBC 7.67  --  9.88  --   --  8.92   HEMOGLOBIN 10.9*  --  11.4*  --   --  12.0   HEMATOCRIT 34.4  --  35.5  --   --  37.5   PLATELETS 237  --  228  --   --  235   NEUTROS ABS 6.14  --  8.15*  --   --  6.59   IMMATURE GRANS (ABS) 0.03  --  0.03  --   --  0.03   LYMPHS ABS 0.54*  --  0.82  --   --  1.03   MONOS ABS 0.75  --  0.81  --   --  1.15*   EOS ABS 0.19  --  0.04  --   --  0.09   MCV 94.0  --  94.2  --   --  94.7   PROCALCITONIN  --   --   --   --   --  0.33*   LACTATE  --   --   --  1.7  --  2.1*   LDH  --   --  209  --   --   --    PROTIME  --  21.2*  --   --   --   --    APTT  --  40.4*  --   --   --   --    HSTROP T  --   --   --   --  43* 38*         Lab 25  1138 25  0723 25  1539 25  1014   SODIUM 134* 138 134* 134*   POTASSIUM 4.1 4.4 4.5 3.8   CHLORIDE 99 100 98 97*   CO2 24.0 24.0 20.0* 23.0   ANION GAP 11.0 14.0 16.0* 14.0    BUN 68* 63* 58* 48*   CREATININE 2.84* 2.74* 2.87* 2.50*   EGFR 14.6* 15.2* 14.4* 17.0*   GLUCOSE 110* 94 182* 131*   CALCIUM 8.9 8.6 8.7 9.1         Lab 01/21/25  1539 01/20/25  1014   TOTAL PROTEIN 6.1 7.2   ALBUMIN  --  3.3*   GLOBULIN  --  3.9   ALT (SGPT)  --  11   AST (SGOT)  --  19   BILIRUBIN  --  1.3*   ALK PHOS  --  76         Lab 01/21/25  1540 01/20/25  1115 01/20/25  1014   PROBNP  --   --  5,330.0*   HSTROP T  --  43* 38*   PROTIME 21.2*  --   --    INR 1.82*  --   --                  Brief Urine Lab Results       None            Microbiology Results Abnormal       None                Results for orders placed during the hospital encounter of 06/01/23    Adult Transthoracic Echo Complete W/ Cont if Necessary Per Protocol    Interpretation Summary    Left ventricular systolic function is normal. Calculated left ventricular EF = 61.4% Left ventricular ejection fraction appears to be 61 - 65%.    Left ventricular diastolic function was indeterminate.    Left atrial volume is severely increased.    Mild aortic valve stenosis is present.    Aortic valve maximum pressure gradient is 26 mmHg. Aortic valve mean pressure gradient is 15 mmHg.    Mild mitral valve stenosis is present. The mitral valve peak gradient is 12 mmHg. The mitral valve mean gradient is 5 mmHg.    Moderate tricuspid valve regurgitation is present.    Estimated right ventricular systolic pressure from tricuspid regurgitation is markedly elevated (>55 mmHg).    There is a small left pleural effusion.      Current medications:  Scheduled Meds:amLODIPine, 5 mg, Oral, Daily  [Held by provider] apixaban, 5 mg, Oral, Q12H  bumetanide, 1 mg, Oral, Daily  hydrALAZINE, 25 mg, Oral, BID  sodium chloride, 10 mL, Intravenous, Q12H      Continuous Infusions:   PRN Meds:.  acetaminophen    senna-docusate sodium **AND** polyethylene glycol **AND** bisacodyl **AND** bisacodyl    sodium chloride    sodium chloride    sodium chloride    Assessment & Plan    Assessment & Plan     Active Hospital Problems    Diagnosis  POA    **Pleural effusion on left [J90]  Yes    Essential hypertension [I10]  Yes    Chronic kidney disease, stage III (moderate)  [N18.30]  Yes      Resolved Hospital Problems   No resolved problems to display.        Brief Hospital Course to date:  Rose J Kellermann is a 89 y.o. female with a history of DVT on Eliquis, HFpEF, CKD, lives at home, has been using home oxygen intermittently x several months, recently needing it constantly though denying dyspnea.      Progressive hypoxia  Left pleural effusion  Suspected acute HFpEF exacerbation  -  chest x-ray with moderate L pleural effusion  - Takes Bumex at home.  Continue. Watch creatinine   - Eliquis last given 1/20 AM.  Resume after CT is removed   - Thoracentesis ordered: had chest tube placed in IR (?).  Consulted CT to manage, appreciate their assistance   -- pleural fluid is c/w exudate (?), cultures/ cytology pending       ANGEL LUIS on CKD  - Creat baseline 1.8, was 2.87 on 1/21, improved to 2.74 on 1/22, but back up to 2.84 yesterday, repeat labs remain PENDING.   - Monitor with diuresis daily      History of DVT  - Hold Eliquis for now  - SCDs for now     Hypertension  - Continue home medications    Advanced age  - she is ambulatory  - OOB daily, pt should be up walking to BR w assist               Expected Discharge Location and Transportation: lives at home, PT/OT recommend inpatient rehab, pt agreeable   Expected Discharge   Expected Discharge Date: 1/27/2025; Expected Discharge Time:      VTE Prophylaxis:  Pharmacologic & mechanical VTE prophylaxis orders are present.         AM-PAC 6 Clicks Score (PT): 17 (01/23/25 2000)    CODE STATUS:   Code Status and Medical Interventions: CPR (Attempt to Resuscitate); Full Support   Ordered at: 01/20/25 9797     Level Of Support Discussed With:    Patient     Code Status (Patient has no pulse and is not breathing):    CPR (Attempt to Resuscitate)      Medical Interventions (Patient has pulse or is breathing):    Full Support       Linda Felix MD  01/24/25

## 2025-01-24 NOTE — PLAN OF CARE
Goal Outcome Evaluation:  Plan of Care Reviewed With: patient        Progress: improving  Outcome Evaluation: patient was able to ambulate 20+20 ft with one sitting rest break patient is limited by fatigue and was really not feeling very well today. patient had stable O2 sats with activity but increased resp rate. anticipate patient to need IP rehab placement at D/C    Anticipated Discharge Disposition (PT): inpatient rehabilitation facility

## 2025-01-24 NOTE — CASE MANAGEMENT/SOCIAL WORK
Continued Stay Note   [place]     Patient Name: Rose J Kellermann  MRN: [id number]  Today's Date: 1/24/2025    Admit Date: 1/20/2025    Plan: rehab   Discharge Plan       Row Name 01/24/25 0824       Plan    Plan rehab    Patient/Family in Agreement with Plan yes    Plan Comments I met with this patient bedside. Her plan remains rehab at discharge. Referrals have been made per her daughter's request. Her daughter can transport. A chest tube remains in place and she is on 4L O2. CM to follow.    Final Discharge Disposition Code 03 - skilled nursing facility (SNF)                   Discharge Codes    No documentation.                 Expected Discharge Date and Time       Expected Discharge Date Expected Discharge Time    Jan 23, 2025               Floresita Dawson RN

## 2025-01-24 NOTE — THERAPY TREATMENT NOTE
Patient Name: Rose J Kellermann  : 1926    MRN: [id number]                              Today's Date: 2025       Admit Date: 2025    Visit Dx:     ICD-10-CM ICD-9-CM   1. Pleural effusion, left  J90 511.9   2. Hypoxia  R09.02 799.02   3. Chronic kidney disease, unspecified CKD stage  N18.9 585.9     Patient Active Problem List   Diagnosis    Essential hypertension    Vitamin D deficiency    Fatigue    Chronic kidney disease, stage III (moderate)     Osteoarthritis    Dyslipidemia    Post-menopausal bleeding    Endometrial cancer    Closed fracture of left hip    Asymptomatic bacteriuria    Post-traumatic osteoarthritis of left hip    Status post total replacement of left hip    Prediabetes    Acute blood loss anemia, asymptomatic    Postoperative urinary retention    Myopia    Posterior crocodile shagreen of cornea    Presbyopia    Ptosis of eyelid    Regular astigmatism    Retinal neovascularization    After cataract    Secondary cataract    Premature atrial contractions    Heart murmur    Nocturnal hypoxia    Chronic deep vein thrombosis (DVT) of both lower extremities    Retinal neovascularization    Pleural effusion on left     Past Medical History:   Diagnosis Date    Abscess of back     Arrhythmia     frequent PVC    Cancer 2011    Endometrial cancer, status post robotically assisted hysterectomy with Dr. Haddad, 2011.      CKD (chronic kidney disease), stage III     no dilaysis     Dizzy     Dvt femoral (deep venous thrombosis) 2017    s/p hip surgery in 2017. Took xarelto until 2018    Dyslipidemia     Dyslipidemia.  Observing.    Enthesopathy of hip region     Generalized osteoarthrosis, involving multiple sites     Headache     Hearing loss     Hypertension     Low backache     Needs flu shot     Osteoarthritis     Osteoarthritis with questionable carpal tunnel syndrome bilaterally.     Otitis, serous     Pneumonia     Remote pneumonia, .     Retinal  hemorrhage     SI joint arthritis     SOB (shortness of breath)     Syncope 2001    Remote syncope with working diagnosis of vasodepressor, 2001.     Venous insufficiency of leg     Wears glasses     readers     Past Surgical History:   Procedure Laterality Date    CATARACT EXTRACTION      bilat     COLONOSCOPY      HIP PERCUTANEOUS PINNING Left 11/24/2017    Procedure: HIP PERCUTANEOUS PINNING;  Surgeon: Lucas Swanson MD;  Location: [facility] OR;  Service:     HYSTERECTOMY      total? but unsure     KNEE SURGERY  2001    Remote knee surgery in 2001.- right     TOTAL HIP ARTHROPLASTY Left 10/26/2018    Procedure: TOTAL HIP ARTHROPLASTY LEFT;  Surgeon: Stephen Baker MD;  Location: [facility] OR;  Service: Orthopedics      General Information       Row Name 01/24/25 1553          Physical Therapy Time and Intention    Document Type therapy note (daily note)  -GUSTAVO     Mode of Treatment physical therapy  -GUSTAVO       Row Name 01/24/25 1553          General Information    Patient Profile Reviewed yes  -GUSTAVO     Existing Precautions/Restrictions fall;oxygen therapy device and L/min  -GUSTAVO     Barriers to Rehab medically complex  -GUSTAVO       Row Name 01/24/25 1553          Living Environment    People in Home alone  -GUSTAVO       Row Name 01/24/25 1553          Home Main Entrance    Number of Stairs, Main Entrance three  -GUSTAVO       Row Name 01/24/25 1553          Cognition    Orientation Status (Cognition) oriented x 3  -GUSTAVO       Row Name 01/24/25 1553          Safety Issues/Impairments Affecting Functional Mobility    Safety Issues Affecting Function (Mobility) safety precautions follow-through/compliance;safety precaution awareness  -GUSTAVO     Impairments Affecting Function (Mobility) balance;endurance/activity tolerance;shortness of breath;strength  -GUSTAVO               User Key  (r) = Recorded By, (t) = Taken By, (c) = Cosigned By      Initials Name Provider Type    Audrey Ogden PT Physical Therapist                    Mobility       Row Name 01/24/25 1554          Bed Mobility    Bed Mobility rolling left;rolling right;scooting/bridging;supine-sit;sit-supine  -GUSTAVO     Rolling Left [place] (Bed Mobility) minimum assist (75% patient effort)  -GUSTAVO     Rolling Right [place] (Bed Mobility) minimum assist (75% patient effort)  -GUSTAVO     Scooting/Bridging [place] (Bed Mobility) moderate assist (50% patient effort)  -GUSTAVO     Supine-Sit [place] (Bed Mobility) standby assist  -GUSTAVO     Sit-Supine [place] (Bed Mobility) minimum assist (75% patient effort)  -GUSTAVO     Assistive Device (Bed Mobility) bed rails;head of bed elevated  -GUSTAVO     Comment, (Bed Mobility) patient needs assist getting legs back into bed  -GUSTAVO       Row Name 01/24/25 1554          Transfers    Comment, (Transfers) patient using breaks on the rollator appropriately for safety. patient transfers on and off the commode. she has a little more trouble getting up from the low toilet.  -GUSTAVO       Row Name 01/24/25 1554          Sit-Stand Transfer    Sit-Stand [place] (Transfers) minimum assist (75% patient effort)  -GUSTAVO     Assistive Device (Sit-Stand Transfers) walker, 4-wheeled  -GUSTAVO       Row Name 01/24/25 1554          Gait/Stairs (Locomotion)    [place] Level (Gait) minimum assist (75% patient effort)  -GUSTAVO     Assistive Device (Gait) walker, 4-wheeled  -GUSTAVO     Distance in Feet (Gait) 40  20+20 ft with sitting rest break  -GUSTAVO     Deviations/Abnormal Patterns (Gait) bilateral deviations;mike decreased;gait speed decreased;stride length decreased;festinating/shuffling  -GUSTAVO     Bilateral Gait Deviations forward flexed posture;heel strike decreased  -GUSTAVO     Comment, (Gait/Stairs) patient needs cues to stay close to the walker. patient was limited by fatigue not feeling well today  -GUSTAVO               User Key  (r) = Recorded By, (t) = Taken By, (c) = Cosigned By      Initials Name Provider Type    Audrey Ogden, PT Physical Therapist                    Obj/Interventions       Row Name 01/24/25 1605          Balance    Balance Assessment sitting static balance;sitting dynamic balance;standing static balance;standing dynamic balance  -GUSTAVO     Static Sitting Balance independent  -GUSTAVO     Dynamic Sitting Balance independent  -GUSTAVO     Position, Sitting Balance unsupported;sitting edge of bed  -GUSTAVO     Static Standing Balance contact guard  -GUSTAVO     Dynamic Standing Balance minimal assist  -GUSTAVO     Position/Device Used, Standing Balance supported;walker, 4-wheeled  -GUSTAVO               User Key  (r) = Recorded By, (t) = Taken By, (c) = Cosigned By      Initials Name Provider Type    Audrey Ogden PT Physical Therapist                   Goals/Plan       Row Name 01/24/25 1609          Therapy Assessment/Plan (PT)    Planned Therapy Interventions (PT) balance training;bed mobility training;gait training;home exercise program;strengthening;transfer training  -GUSTAVO               User Key  (r) = Recorded By, (t) = Taken By, (c) = Cosigned By      Initials Name Provider Type    Audrey Ogden PT Physical Therapist                   Clinical Impression       Row Name 01/24/25 1605          Pain    Pretreatment Pain Rating 0/10 - no pain  -GUSTAVO     Posttreatment Pain Rating 0/10 - no pain  -GUSTAVO       Row Name 01/24/25 1605          Plan of Care Review    Plan of Care Reviewed With patient  -GUSTAVO     Progress improving  -GUSTAVO     Outcome Evaluation patient was able to ambulate 20+20 ft with one sitting rest break patient is limited by fatigue and was really not feeling very well today. patient had stable O2 sats with activity but increased resp rate. anticipate patient to need IP rehab placement at D/C  -       Row Name 01/24/25 1605          Therapy Assessment/Plan (PT)    Patient/Family Therapy Goals Statement (PT) return to PLOF  -GUSTAVO     Rehab Potential (PT) good  -GUSTAVO     Criteria for Skilled Interventions Met (PT) yes;skilled treatment is necessary  -GUSTAVO     Therapy  Frequency (PT) daily  -GUSTAVO       Row Name 01/24/25 1605          Vital Signs    Pre Systolic BP Rehab 124  -GUSTAVO     Pre Treatment Diastolic BP 75  -GUSTAVO     Post Systolic BP Rehab 140  -GUSTAVO     Post Treatment Diastolic BP 80  -GUSTAVO     Pretreatment Heart Rate (beats/min) 82  -GUSTAVO     Posttreatment Heart Rate (beats/min) 96  -GUSTAVO     Pre SpO2 (%) 95  -GUSTAVO     O2 Delivery Pre Treatment nasal cannula  -GUSTAVO     Post SpO2 (%) 94  -GUSTAVO     O2 Delivery Post Treatment nasal cannula  -GUSTAVO     Pre Patient Position Supine  -GUSTAVO     Intra Patient Position Standing  -GUSTAVO     Post Patient Position Supine  -GUSTAVO       Row Name 01/24/25 1605          Positioning and Restraints    Pre-Treatment Position in bed  -GUSTAVO     Post Treatment Position bed  -GUSTAVO     In Bed notified nsg;supine;call light within reach;encouraged to call for assist;exit alarm on;heels elevated  -GUSTAVO               User Key  (r) = Recorded By, (t) = Taken By, (c) = Cosigned By      Initials Name Provider Type    Audrey Ogden, PT Physical Therapist                   Outcome Measures       Row Name 01/24/25 1609 01/24/25 0800       How much help from another person do you currently need...    Turning from your back to your side while in flat bed without using bedrails? 3  -GUSTAVO 2  -LB    Moving from lying on back to sitting on the side of a flat bed without bedrails? 3  -GUSTAVO 2  -LB    Moving to and from a bed to a chair (including a wheelchair)? 3  -GUSTAVO 2  -LB    Standing up from a chair using your arms (e.g., wheelchair, bedside chair)? 3  -GUSTAVO 2  -LB    Climbing 3-5 steps with a railing? 1  -GUSTAVO 2  -LB    To walk in hospital room? 3  -GUSTAVO 2  -LB    AM-PAC 6 Clicks Score (PT) 16  -GUSTAVO 12  -LB    Highest Level of Mobility Goal 5 --> Static standing  -GUSTAVO 4 --> Transfer to chair/commode  -LB              User Key  (r) = Recorded By, (t) = Taken By, (c) = Cosigned By      Initials Name Provider Type    Audrey Ogden, PT Physical Therapist    Cata Thomas RN Registered  Nurse                                 Physical Therapy Education       Title: PT OT SLP Therapies (In Progress)       Topic: Physical Therapy (In Progress)       Point: Mobility training (In Progress)       Learning Progress Summary            Patient Acceptance, E, NR by GUSTAVO at 1/24/2025 1430    Acceptance, E,D, VU,NR by LR at 1/23/2025 1407    Comment: Educated on benefits of mobility, safety with mobility, correct supine to sit t/f technique, correct sit<->stand t/f technique, correct gait mechanics, LE HEP, and progression of POC.    Acceptance, E, NR by KG at 1/21/2025 1511                      Point: Home exercise program (In Progress)       Learning Progress Summary            Patient Acceptance, E, NR by GUSTAVO at 1/24/2025 1430    Acceptance, E,D, VU,NR by LR at 1/23/2025 1407    Comment: Educated on benefits of mobility, safety with mobility, correct supine to sit t/f technique, correct sit<->stand t/f technique, correct gait mechanics, LE HEP, and progression of POC.                      Point: Body mechanics (In Progress)       Learning Progress Summary            Patient Acceptance, E, NR by GUSTAVO at 1/24/2025 1430    Acceptance, E,D, VU,NR by LR at 1/23/2025 1407    Comment: Educated on benefits of mobility, safety with mobility, correct supine to sit t/f technique, correct sit<->stand t/f technique, correct gait mechanics, LE HEP, and progression of POC.    Acceptance, E, NR by KG at 1/21/2025 1511                      Point: Precautions (In Progress)       Learning Progress Summary            Patient Acceptance, E, NR by GUSTAVO at 1/24/2025 1430    Acceptance, E,D, VU,NR by LR at 1/23/2025 1407    Comment: Educated on benefits of mobility, safety with mobility, correct supine to sit t/f technique, correct sit<->stand t/f technique, correct gait mechanics, LE HEP, and progression of POC.    Acceptance, E, NR by KG at 1/21/2025 1511                                      User Key       Initials Effective Dates Name  Provider Type Discipline    GUSTAVO 02/03/23 -  Audrey Gore, PT Physical Therapist PT    LR 02/03/23 -  Lola Finch, PT Physical Therapist PT    KG 05/22/20 -  Ann Nicole PT Physical Therapist PT                  PT Recommendation and Plan  Planned Therapy Interventions (PT): balance training, bed mobility training, gait training, home exercise program, strengthening, transfer training  Progress: improving  Outcome Evaluation: patient was able to ambulate 20+20 ft with one sitting rest break patient is limited by fatigue and was really not feeling very well today. patient had stable O2 sats with activity but increased resp rate. anticipate patient to need IP rehab placement at D/C     Time Calculation:         PT Charges       Row Name 01/24/25 1610             Time Calculation    Start Time 1430  -GUSTAVO      PT Received On 01/24/25  -GUSTAVO      PT Goal Re-Cert Due Date 01/31/25  -GUSTAVO         Time Calculation- PT    Total Timed Code Minutes- PT 25 minute(s)  -GUSTAVO         Timed Charges    45458 - PT Therapeutic Activity Minutes 25  -GUSTAVO         Total Minutes    Timed Charges Total Minutes 25  -GUSTAVO       Total Minutes 25  -GUSTAVO                User Key  (r) = Recorded By, (t) = Taken By, (c) = Cosigned By      Initials Name Provider Type    GUSTAVO Audrey Gore, PT Physical Therapist                  Therapy Charges for Today       Code Description Service Date Service Provider Modifiers Qty    33957559415 HC PT THERAPEUTIC ACT EA 15 MIN 1/24/2025 Audrey Gore, PT GP 2            PT G-Codes  Outcome Measure Options: AM-PAC 6 Clicks Basic Mobility (PT)  AM-PAC 6 Clicks Score (PT): 16  AM-PAC 6 Clicks Score (OT): 17  PT Discharge Summary  Anticipated Discharge Disposition (PT): inpatient rehabilitation facility    Audrey Gore, PT  1/24/2025

## 2025-01-24 NOTE — PLAN OF CARE
Goal Outcome Evaluation:  Plan of Care Reviewed With: patient        Progress: improving  Outcome Evaluation: VSS. 3-4L NC. Alert and oriented x4. No complaints of pain or nausea. Ambulating more frequently with assist. Chest tube remains with minimal output. Daughter at bedside. Skin and fall precautions in place. Resting comfortably. no further complaints at this time.

## 2025-01-24 NOTE — PROGRESS NOTES
[facility] Cardiothoracic Surgery In-Patient Progress Note     LOS: 4 days     Chief Complaint: Left pleural effusion    Subjective  No acute events overnight.  Patient still admits to some dyspnea however denies any chest pain.      Objective  Vital Signs  Temp:  [97.5 °F (36.4 °C)-98.4 °F (36.9 °C)] 97.5 °F (36.4 °C)  Heart Rate:  [71-89] 75  Resp:  [18] 18  BP: (113-132)/(73-90) 114/82        Physical Exam:   General Appearance: Well-developed, well-nourished no acute distress   Lungs: Respirations even and unlabored with diminished left basilar breath sounds   Heart: Regular rate and rhythm no murmurs, rubs or gallops   CT: 275 mL / 24 hours.  No airleak  Output by Drain (mL) 01/23/25 0701 - 01/23/25 1900 01/23/25 1901 - 01/24/25 0700 01/24/25 0701 - 01/24/25 0752 Range Total   Chest Tube Left Pleural 225 50  275        Results     Results from last 7 days   Lab Units 01/23/25  1138   WBC 10*3/mm3 7.67   HEMOGLOBIN g/dL 10.9*   HEMATOCRIT % 34.4   PLATELETS 10*3/mm3 237     Results from last 7 days   Lab Units 01/23/25  1138   SODIUM mmol/L 134*   POTASSIUM mmol/L 4.1   CHLORIDE mmol/L 99   CO2 mmol/L 24.0   BUN mg/dL 68*   CREATININE mg/dL 2.84*   GLUCOSE mg/dL 110*   CALCIUM mg/dL 8.9       CXR:  Findings:  Left lateral basilar pleural drainage catheter appears in similar position. No significant new or enlarging pneumothorax. There are suspected bilateral pleural effusions, small to moderate in size, similar to the prior exam. Diffuse interstitial and   patchy airspace opacities appear grossly unchanged. Stable cardiomegaly.     IMPRESSION:  Impression:  1.Left pleural drainage catheter appears in similar position. No significant new or enlarging pneumothorax.  2.Small to moderate pleural effusions and diffuse interstitial and patchy airspace opacities, as before.    Assessment    Pleural effusion on left    Essential hypertension    Chronic kidney disease, stage III (moderate)   Left pleural  effusion    Plan   Continue chest tube to -20 suction until output down  Morning CXR  Wean oxygen as tolerated    GELY Kruse  01/24/25  07:52 EST

## 2025-01-25 ENCOUNTER — APPOINTMENT (OUTPATIENT)
Dept: GENERAL RADIOLOGY | Facility: HOSPITAL | Age: OVER 89
End: 2025-01-25
Payer: MEDICARE

## 2025-01-25 LAB
BACTERIA FLD CULT: NORMAL
BACTERIA SPEC AEROBE CULT: NORMAL
BACTERIA SPEC AEROBE CULT: NORMAL
GRAM STN SPEC: NORMAL
GRAM STN SPEC: NORMAL

## 2025-01-25 PROCEDURE — 99231 SBSQ HOSP IP/OBS SF/LOW 25: CPT | Performed by: INTERNAL MEDICINE

## 2025-01-25 PROCEDURE — 99232 SBSQ HOSP IP/OBS MODERATE 35: CPT | Performed by: THORACIC SURGERY (CARDIOTHORACIC VASCULAR SURGERY)

## 2025-01-25 PROCEDURE — 71045 X-RAY EXAM CHEST 1 VIEW: CPT

## 2025-01-25 RX ADMIN — HYDRALAZINE HYDROCHLORIDE 25 MG: 25 TABLET ORAL at 09:34

## 2025-01-25 RX ADMIN — HYDRALAZINE HYDROCHLORIDE 25 MG: 25 TABLET ORAL at 20:38

## 2025-01-25 RX ADMIN — AMLODIPINE BESYLATE 5 MG: 5 TABLET ORAL at 09:34

## 2025-01-25 RX ADMIN — BUMETANIDE 1 MG: 1 TABLET ORAL at 09:34

## 2025-01-25 NOTE — PLAN OF CARE
Goal Outcome Evaluation:  Plan of Care Reviewed With: patient        Progress: improving  Outcome Evaluation: VSS on 3L NC. Alert and disoriented to time. No complaints of pain. CT to -20mmHg suction with minimal serous output. Fall and skin precautions in place. Will continue plan of care.

## 2025-01-25 NOTE — PROGRESS NOTES
CTS Progress Note       LOS: 5 days   Patient Care Team:  Sam Hung MD as PCP - Elver Johnson MD as Consulting Physician (Otolaryngology)    Chief Complaint: Pleural effusion on left    Vital Signs:  Temp:  [97.2 °F (36.2 °C)-98.5 °F (36.9 °C)] 97.6 °F (36.4 °C)  Heart Rate:  [75-97] 76  Resp:  [18] 18  BP: (114-141)/(75-99) 135/90    Physical Exam: No respiratory distress       Results:     Results from last 7 days   Lab Units 01/24/25  1040   WBC 10*3/mm3 8.53   HEMOGLOBIN g/dL 10.8*   HEMATOCRIT % 34.8   PLATELETS 10*3/mm3 240     Results from last 7 days   Lab Units 01/24/25  1040   SODIUM mmol/L 135*   POTASSIUM mmol/L 4.3   CHLORIDE mmol/L 101   CO2 mmol/L 22.0   BUN mg/dL 63*   CREATININE mg/dL 2.36*   GLUCOSE mg/dL 108*   CALCIUM mg/dL 8.7           Imaging Results (Last 24 Hours)       Procedure Component Value Units Date/Time    XR Chest 1 View [[id number]] Resulted: 01/25/25 0350     Updated: 01/25/25 0555    XR Chest 1 View [[id number]] Collected: 01/24/25 0747     Updated: 01/24/25 0754    Narrative:      XR CHEST 1 VW    Date of Exam: 1/24/2025 4:03 AM EST    Indication: Follow up L pleural effusion s/p Left pleural CT placement    Comparison:  1/23/2025    Findings:  Left lateral basilar pleural drainage catheter appears in similar position. No significant new or enlarging pneumothorax. There are suspected bilateral pleural effusions, small to moderate in size, similar to the prior exam. Diffuse interstitial and   patchy airspace opacities appear grossly unchanged. Stable cardiomegaly.      Impression:      Impression:  1.Left pleural drainage catheter appears in similar position. No significant new or enlarging pneumothorax.  2.Small to moderate pleural effusions and diffuse interstitial and patchy airspace opacities, as before.          Electronically Signed: Juan Barnes    1/24/2025 7:51 AM EST    Workstation ID: [id number]            Assessment      Pleural effusion on  left    Essential hypertension    Chronic kidney disease, stage III (moderate)     Chest tube drainage has decreased in the last 24 hours.  And I have reviewed today's chest x-ray there is no significant residual effusion.  Will plan to remove chest tube tomorrow    Plan   Chest x-ray in the a.m.  Anticipate chest tube removal tomorrow    Please note that portions of this note were completed with a voice recognition program. Efforts were made to edit the dictations, but occasionally words are mistranscribed.    Sam Jackson MD  01/25/25  06:48 EST

## 2025-01-25 NOTE — PROGRESS NOTES
[facility] Medicine Services  PROGRESS NOTE    Patient Name: Rose J Kellermann  : 1926  MRN: [id number]    Date of Admission: 2025  Primary Care Physician: Sam Hung MD    Subjective   Subjective     CC:  progressive dyspnea     HPI: No new issues overnight.       Objective   Objective     Vital Signs:   Temp:  [96.8 °F (36 °C)-98.5 °F (36.9 °C)] 96.8 °F (36 °C)  Heart Rate:  [76-97] 88  Resp:  [18] 18  BP: (111-141)/(75-99) 111/79  Flow (L/min) (Oxygen Therapy):  [3-4] 3     Physical Exam:  Gen:  alert, delightful, conversant, NAD   Neuro: alert and oriented, clear speech, follows commands, grossly nonfocal  HEENT:  NC/AT   Neck:  Supple, no LAD  Heart RRR   Lungs  anterior exam is clear, L sided chest tube in place with yellow appearing fluid present  Abd:  Soft, nontender  Extrem:  No c/c/e      Results Reviewed:  LAB RESULTS:      Lab 25  1040 25  1138 25  1540 25  1539 25  1315 25  1115 25  1014   WBC 8.53 7.67  --  9.88  --   --  8.92   HEMOGLOBIN 10.8* 10.9*  --  11.4*  --   --  12.0   HEMATOCRIT 34.8 34.4  --  35.5  --   --  37.5   PLATELETS 240 237  --  228  --   --  235   NEUTROS ABS 6.75 6.14  --  8.15*  --   --  6.59   IMMATURE GRANS (ABS) 0.04 0.03  --  0.03  --   --  0.03   LYMPHS ABS 0.74 0.54*  --  0.82  --   --  1.03   MONOS ABS 0.83 0.75  --  0.81  --   --  1.15*   EOS ABS 0.14 0.19  --  0.04  --   --  0.09   MCV 97.5* 94.0  --  94.2  --   --  94.7   PROCALCITONIN  --   --   --   --   --   --  0.33*   LACTATE  --   --   --   --  1.7  --  2.1*   LDH  --   --   --  209  --   --   --    PROTIME  --   --  21.2*  --   --   --   --    APTT  --   --  40.4*  --   --   --   --    HSTROP T  --   --   --   --   --  43* 38*         Lab 25  1040 25  1138 25  0723 25  1539 25  1014   SODIUM 135* 134* 138 134* 134*   POTASSIUM 4.3 4.1 4.4 4.5 3.8   CHLORIDE 101 99 100 98 97*   CO2 22.0  24.0 24.0 20.0* 23.0   ANION GAP 12.0 11.0 14.0 16.0* 14.0   BUN 63* 68* 63* 58* 48*   CREATININE 2.36* 2.84* 2.74* 2.87* 2.50*   EGFR 18.2* 14.6* 15.2* 14.4* 17.0*   GLUCOSE 108* 110* 94 182* 131*   CALCIUM 8.7 8.9 8.6 8.7 9.1         Lab 01/21/25  1539 01/20/25  1014   TOTAL PROTEIN 6.1 7.2   ALBUMIN  --  3.3*   GLOBULIN  --  3.9   ALT (SGPT)  --  11   AST (SGOT)  --  19   BILIRUBIN  --  1.3*   ALK PHOS  --  76         Lab 01/21/25  1540 01/20/25  1115 01/20/25  1014   PROBNP  --   --  5,330.0*   HSTROP T  --  43* 38*   PROTIME 21.2*  --   --    INR 1.82*  --   --                  Brief Urine Lab Results       None            Microbiology Results Abnormal       None                Results for orders placed during the hospital encounter of 06/01/23    Adult Transthoracic Echo Complete W/ Cont if Necessary Per Protocol    Interpretation Summary    Left ventricular systolic function is normal. Calculated left ventricular EF = 61.4% Left ventricular ejection fraction appears to be 61 - 65%.    Left ventricular diastolic function was indeterminate.    Left atrial volume is severely increased.    Mild aortic valve stenosis is present.    Aortic valve maximum pressure gradient is 26 mmHg. Aortic valve mean pressure gradient is 15 mmHg.    Mild mitral valve stenosis is present. The mitral valve peak gradient is 12 mmHg. The mitral valve mean gradient is 5 mmHg.    Moderate tricuspid valve regurgitation is present.    Estimated right ventricular systolic pressure from tricuspid regurgitation is markedly elevated (>55 mmHg).    There is a small left pleural effusion.      Current medications:  Scheduled Meds:amLODIPine, 5 mg, Oral, Daily  [Held by provider] apixaban, 5 mg, Oral, Q12H  bumetanide, 1 mg, Oral, Daily  hydrALAZINE, 25 mg, Oral, BID  sodium chloride, 10 mL, Intravenous, Q12H      Continuous Infusions:   PRN Meds:.  acetaminophen    senna-docusate sodium **AND** polyethylene glycol **AND** bisacodyl **AND**  bisacodyl    sodium chloride    sodium chloride    sodium chloride    Assessment & Plan   Assessment & Plan     Active Hospital Problems    Diagnosis  POA    **Pleural effusion on left [J90]  Yes    Essential hypertension [I10]  Yes    Chronic kidney disease, stage III (moderate)  [N18.30]  Yes      Resolved Hospital Problems   No resolved problems to display.        Brief Hospital Course to date:  Rose J Kellermann is a 89 y.o. female with a history of DVT on Eliquis, HFpEF, CKD, lives at home, has been using home oxygen intermittently x several months, recently needing it constantly though denying dyspnea.      Progressive hypoxia  Left pleural effusion  Suspected acute HFpEF exacerbation  -  chest x-ray with moderate L pleural effusion  - Takes Bumex at home.  Continue. Watch creatinine   - Eliquis last given 1/20 AM.  Resume after CT is removed   - Thoracentesis ordered: had chest tube placed in IR (?).  Consulted CT to manage, appreciate their assistance   -- pleural fluid is c/w exudate (?), cultures/ cytology negative   -- likely to remove CT in am per CTS.        ANGEL LUIS on CKD  - Creat baseline 1.8, was 2.87 on 1/21, improved to 2.74 on 1/22, but back up to 2.84 now improving to 2.36  - Monitor with diuresis daily      History of DVT  - Hold Eliquis for now  - SCDs for now     Hypertension  - Continue home medications    Advanced age  - she is ambulatory  - OOB daily, pt should be up walking to BR w assist               Expected Discharge Location and Transportation: lives at home, PT/OT recommend inpatient rehab, pt agreeable   Expected Discharge   Expected Discharge Date: 1/27/2025; Expected Discharge Time:      VTE Prophylaxis:  Pharmacologic & mechanical VTE prophylaxis orders are present.         AM-PAC 6 Clicks Score (PT): 16 (01/24/25 2000)    CODE STATUS:   Code Status and Medical Interventions: CPR (Attempt to Resuscitate); Full Support   Ordered at: 01/20/25 7265     Level Of Support Discussed With:     Patient     Code Status (Patient has no pulse and is not breathing):    CPR (Attempt to Resuscitate)     Medical Interventions (Patient has pulse or is breathing):    Full Support       Linda Felix MD  01/25/25

## 2025-01-25 NOTE — PLAN OF CARE
Problem: Adult Inpatient Plan of Care  Goal: Plan of Care Review  Outcome: Progressing  Flowsheets (Taken 1/25/2025 0433)  Progress: improving  Outcome Evaluation: VSS. Weaned down from 4L O2 to 3L which is pt's baseline. Minimal output from chest tube. Skin, fall, and DVT precautions in place. Possible removal of chest tube later today.  Plan of Care Reviewed With: patient  Goal: Patient-Specific Goal (Individualized)  Outcome: Progressing  Goal: Absence of Hospital-Acquired Illness or Injury  Outcome: Progressing  Intervention: Identify and Manage Fall Risk  Recent Flowsheet Documentation  Taken 1/25/2025 0400 by Tomasa Hoyt RN  Safety Promotion/Fall Prevention:   assistive device/personal items within reach   clutter free environment maintained   fall prevention program maintained   gait belt   lighting adjusted   nonskid shoes/slippers when out of bed   room organization consistent   safety round/check completed   toileting scheduled  Taken 1/25/2025 0200 by Tomasa Hoyt, RN  Safety Promotion/Fall Prevention:   assistive device/personal items within reach   clutter free environment maintained   fall prevention program maintained   gait belt   lighting adjusted   nonskid shoes/slippers when out of bed   room organization consistent   safety round/check completed   toileting scheduled  Taken 1/25/2025 0000 by Tomasa Hoyt, RN  Safety Promotion/Fall Prevention:   assistive device/personal items within reach   clutter free environment maintained   fall prevention program maintained   gait belt   lighting adjusted   nonskid shoes/slippers when out of bed   room organization consistent   safety round/check completed   toileting scheduled  Taken 1/24/2025 2200 by Tomasa Hoyt, RN  Safety Promotion/Fall Prevention:   assistive device/personal items within reach   clutter free environment maintained   fall prevention program maintained   gait belt   lighting adjusted   nonskid shoes/slippers  when out of bed   room organization consistent   safety round/check completed   toileting scheduled  Taken 1/24/2025 2000 by Tomasa Hoyt RN  Safety Promotion/Fall Prevention:   assistive device/personal items within reach   clutter free environment maintained   fall prevention program maintained   gait belt   lighting adjusted   nonskid shoes/slippers when out of bed   room organization consistent   safety round/check completed   toileting scheduled  Intervention: Prevent Skin Injury  Recent Flowsheet Documentation  Taken 1/25/2025 0400 by Tomasa Hoyt RN  Body Position:   left   side-lying  Skin Protection:   incontinence pads utilized   protective footwear used   silicone foam dressing in place   transparent dressing maintained  Taken 1/25/2025 0200 by Tomasa Hoyt RN  Body Position: supine  Skin Protection:   incontinence pads utilized   protective footwear used   silicone foam dressing in place   transparent dressing maintained  Taken 1/25/2025 0000 by Tomasa Hoyt RN  Body Position:   right   side-lying  Skin Protection:   incontinence pads utilized   protective footwear used   silicone foam dressing in place   transparent dressing maintained  Taken 1/24/2025 2200 by Tomasa Hoyt RN  Body Position: supine  Skin Protection:   incontinence pads utilized   protective footwear used   silicone foam dressing in place   transparent dressing maintained  Taken 1/24/2025 2000 by Tomasa Hoyt RN  Body Position:   left   side-lying  Skin Protection: (silicone dressings pulled back and skin assessed)   incontinence pads utilized   protective footwear used   silicone foam dressing in place   transparent dressing maintained   other (see comments)  Intervention: Prevent and Manage VTE (Venous Thromboembolism) Risk  Recent Flowsheet Documentation  Taken 1/25/2025 0400 by Tomasa Hoyt RN  VTE Prevention/Management:   bilateral   foot pump device on  Taken 1/25/2025 0200 by  Colliver, Tomasa, RN  VTE Prevention/Management:   bilateral   foot pump device on  Taken 1/25/2025 0000 by Tomasa Hoyt RN  VTE Prevention/Management:   bilateral   foot pump device on  Taken 1/24/2025 2200 by Tomasa Hoyt RN  VTE Prevention/Management:   bilateral   foot pump device on  Taken 1/24/2025 2000 by Tomasa Hoyt RN  VTE Prevention/Management:   bilateral   foot pump device on  Intervention: Prevent Infection  Recent Flowsheet Documentation  Taken 1/25/2025 0400 by Tomasa Hoyt RN  Infection Prevention:   environmental surveillance performed   hand hygiene promoted   personal protective equipment utilized   rest/sleep promoted  Taken 1/25/2025 0200 by Tomasa Hoyt RN  Infection Prevention:   environmental surveillance performed   hand hygiene promoted   personal protective equipment utilized   rest/sleep promoted  Taken 1/25/2025 0000 by Tomasa Hoyt RN  Infection Prevention:   environmental surveillance performed   hand hygiene promoted   personal protective equipment utilized   rest/sleep promoted  Taken 1/24/2025 2200 by Tomasa Hoyt RN  Infection Prevention:   environmental surveillance performed   hand hygiene promoted   rest/sleep promoted   personal protective equipment utilized  Taken 1/24/2025 2000 by Tomasa Hoyt RN  Infection Prevention:   environmental surveillance performed   hand hygiene promoted   rest/sleep promoted   personal protective equipment utilized  Goal: Optimal Comfort and Wellbeing  Outcome: Progressing  Goal: Readiness for Transition of Care  Outcome: Progressing     Problem: Skin Injury Risk Increased  Goal: Skin Health and Integrity  Outcome: Progressing  Intervention: Optimize Skin Protection  Recent Flowsheet Documentation  Taken 1/25/2025 0400 by Tomasa Hoyt RN  Activity Management: activity encouraged  Pressure Reduction Techniques:   frequent weight shift encouraged   pressure points protected   weight  shift assistance provided  Head of Bed ([facility]) Positioning: [facility] elevated  Pressure Reduction Devices:   foam padding utilized   positioning supports utilized   pressure-redistributing mattress utilized  Skin Protection:   incontinence pads utilized   protective footwear used   silicone foam dressing in place   transparent dressing maintained  Taken 1/25/2025 0200 by Tomasa Hoyt RN  Activity Management: activity encouraged  Pressure Reduction Techniques:   frequent weight shift encouraged   pressure points protected   weight shift assistance provided  Head of Bed ([facility]) Positioning: [facility] elevated  Pressure Reduction Devices:   foam padding utilized   positioning supports utilized   pressure-redistributing mattress utilized  Skin Protection:   incontinence pads utilized   protective footwear used   silicone foam dressing in place   transparent dressing maintained  Taken 1/25/2025 0000 by Tomasa Hoyt RN  Activity Management: activity encouraged  Pressure Reduction Techniques:   frequent weight shift encouraged   pressure points protected   weight shift assistance provided  Head of Bed ([facility]) Positioning: [facility] elevated  Pressure Reduction Devices:   foam padding utilized   positioning supports utilized   pressure-redistributing mattress utilized  Skin Protection:   incontinence pads utilized   protective footwear used   silicone foam dressing in place   transparent dressing maintained  Taken 1/24/2025 2200 by Tomasa Hoyt RN  Activity Management: activity encouraged  Pressure Reduction Techniques:   frequent weight shift encouraged   pressure points protected   weight shift assistance provided  Head of Bed ([facility]) Positioning: [facility] elevated  Pressure Reduction Devices:   foam padding utilized   heel offloading device utilized   pressure-redistributing mattress utilized  Skin Protection:   incontinence pads utilized   protective footwear used   silicone foam dressing in place   transparent dressing  maintained  Taken 1/24/2025 2000 by Tomasa Hoyt RN  Activity Management: activity encouraged  Pressure Reduction Techniques:   frequent weight shift encouraged   pressure points protected   weight shift assistance provided  Head of Bed ([facility]) Positioning: [facility] elevated  Pressure Reduction Devices:   foam padding utilized   positioning supports utilized   pressure-redistributing mattress utilized  Skin Protection: (silicone dressings pulled back and skin assessed)   incontinence pads utilized   protective footwear used   silicone foam dressing in place   transparent dressing maintained   other (see comments)     Problem: Comorbidity Management  Goal: Maintenance of Heart Failure Symptom Control  Outcome: Progressing  Intervention: Maintain Heart Failure Management  Recent Flowsheet Documentation  Taken 1/25/2025 0400 by Tomasa Hoyt RN  Medication Review/Management: medications reviewed  Taken 1/25/2025 0200 by Tomasa Hoyt RN  Medication Review/Management: medications reviewed  Taken 1/25/2025 0000 by Tomasa Hoyt RN  Medication Review/Management: medications reviewed  Taken 1/24/2025 2200 by Tomasa Hoyt RN  Medication Review/Management: medications reviewed  Taken 1/24/2025 2000 by Tomasa Hoyt RN  Medication Review/Management: medications reviewed  Goal: Blood Pressure in Desired Range  Outcome: Progressing  Intervention: Maintain Blood Pressure Management  Recent Flowsheet Documentation  Taken 1/25/2025 0400 by Tomasa Hoyt RN  Medication Review/Management: medications reviewed  Taken 1/25/2025 0200 by Tomasa Hoyt RN  Medication Review/Management: medications reviewed  Taken 1/25/2025 0000 by Tomasa Hoyt RN  Medication Review/Management: medications reviewed  Taken 1/24/2025 2200 by Tomasa Hoyt RN  Medication Review/Management: medications reviewed  Taken 1/24/2025 2000 by Tomasa Hoyt RN  Medication Review/Management: medications  reviewed     Problem: Fall Injury Risk  Goal: Absence of Fall and Fall-Related Injury  Outcome: Progressing  Intervention: Identify and Manage Contributors  Recent Flowsheet Documentation  Taken 1/25/2025 0400 by Tomasa Hoyt RN  Medication Review/Management: medications reviewed  Taken 1/25/2025 0200 by Tomasa Hoyt RN  Medication Review/Management: medications reviewed  Taken 1/25/2025 0000 by Tomasa Hoyt RN  Medication Review/Management: medications reviewed  Taken 1/24/2025 2200 by Tomasa Hoyt RN  Medication Review/Management: medications reviewed  Taken 1/24/2025 2000 by Tomasa Hoyt RN  Medication Review/Management: medications reviewed  Intervention: Promote Injury-Free Environment  Recent Flowsheet Documentation  Taken 1/25/2025 0400 by Tomasa Hoyt RN  Safety Promotion/Fall Prevention:   assistive device/personal items within reach   clutter free environment maintained   fall prevention program maintained   gait belt   lighting adjusted   nonskid shoes/slippers when out of bed   room organization consistent   safety round/check completed   toileting scheduled  Taken 1/25/2025 0200 by Tomasa Hoyt RN  Safety Promotion/Fall Prevention:   assistive device/personal items within reach   clutter free environment maintained   fall prevention program maintained   gait belt   lighting adjusted   nonskid shoes/slippers when out of bed   room organization consistent   safety round/check completed   toileting scheduled  Taken 1/25/2025 0000 by Tomasa Hoyt RN  Safety Promotion/Fall Prevention:   assistive device/personal items within reach   clutter free environment maintained   fall prevention program maintained   gait belt   lighting adjusted   nonskid shoes/slippers when out of bed   room organization consistent   safety round/check completed   toileting scheduled  Taken 1/24/2025 2200 by Tomasa Hoyt RN  Safety Promotion/Fall Prevention:   assistive  device/personal items within reach   clutter free environment maintained   fall prevention program maintained   gait belt   lighting adjusted   nonskid shoes/slippers when out of bed   room organization consistent   safety round/check completed   toileting scheduled  Taken 1/24/2025 2000 by Tomasa Hoyt RN  Safety Promotion/Fall Prevention:   assistive device/personal items within reach   clutter free environment maintained   fall prevention program maintained   gait belt   lighting adjusted   nonskid shoes/slippers when out of bed   room organization consistent   safety round/check completed   toileting scheduled   Goal Outcome Evaluation:  Plan of Care Reviewed With: patient        Progress: improving  Outcome Evaluation: VSS. Weaned down from 4L O2 to 3L which is pt's baseline. Minimal output from chest tube. Skin, fall, and DVT precautions in place. Possible removal of chest tube later today.

## 2025-01-26 ENCOUNTER — APPOINTMENT (OUTPATIENT)
Dept: GENERAL RADIOLOGY | Facility: HOSPITAL | Age: OVER 89
End: 2025-01-26
Payer: MEDICARE

## 2025-01-26 LAB
ANION GAP SERPL CALCULATED.3IONS-SCNC: 12 MMOL/L (ref 5–15)
BUN SERPL-MCNC: 71 MG/DL (ref 8–23)
BUN/CREAT SERPL: 27.7 (ref 7–25)
CALCIUM SPEC-SCNC: 8.8 MG/DL (ref 8.2–9.6)
CHLORIDE SERPL-SCNC: 98 MMOL/L (ref 98–107)
CO2 SERPL-SCNC: 22 MMOL/L (ref 22–29)
CREAT SERPL-MCNC: 2.56 MG/DL (ref 0.57–1)
EGFRCR SERPLBLD CKD-EPI 2021: 16.5 ML/MIN/1.73
GLUCOSE SERPL-MCNC: 102 MG/DL (ref 65–99)
POTASSIUM SERPL-SCNC: 4.7 MMOL/L (ref 3.5–5.2)
SODIUM SERPL-SCNC: 132 MMOL/L (ref 136–145)

## 2025-01-26 PROCEDURE — 99232 SBSQ HOSP IP/OBS MODERATE 35: CPT | Performed by: INTERNAL MEDICINE

## 2025-01-26 PROCEDURE — 80048 BASIC METABOLIC PNL TOTAL CA: CPT | Performed by: INTERNAL MEDICINE

## 2025-01-26 PROCEDURE — 71045 X-RAY EXAM CHEST 1 VIEW: CPT

## 2025-01-26 PROCEDURE — 99232 SBSQ HOSP IP/OBS MODERATE 35: CPT | Performed by: THORACIC SURGERY (CARDIOTHORACIC VASCULAR SURGERY)

## 2025-01-26 RX ADMIN — POLYETHYLENE GLYCOL 3350 17 G: 17 POWDER, FOR SOLUTION ORAL at 09:25

## 2025-01-26 RX ADMIN — AMLODIPINE BESYLATE 5 MG: 5 TABLET ORAL at 08:53

## 2025-01-26 RX ADMIN — Medication 10 ML: at 09:26

## 2025-01-26 RX ADMIN — BUMETANIDE 1 MG: 1 TABLET ORAL at 08:53

## 2025-01-26 RX ADMIN — HYDRALAZINE HYDROCHLORIDE 25 MG: 25 TABLET ORAL at 20:15

## 2025-01-26 RX ADMIN — HYDRALAZINE HYDROCHLORIDE 25 MG: 25 TABLET ORAL at 08:53

## 2025-01-26 RX ADMIN — SENNOSIDES AND DOCUSATE SODIUM 2 TABLET: 50; 8.6 TABLET ORAL at 09:25

## 2025-01-26 NOTE — PLAN OF CARE
Goal Outcome Evaluation:    Disoriented to time. Up to BR with one assist. Aflutter per tele. Rate controlled. Pleural tube removed. Dressing saturated, reinforced with abd pad. Sats > 90% on 3L NC.

## 2025-01-26 NOTE — PROGRESS NOTES
[facility] Medicine Services  PROGRESS NOTE    Patient Name: Rose J Kellermann  : 1926  MRN: [id number]    Date of Admission: 2025  Primary Care Physician: Sam Hung MD    Subjective   Subjective     CC:  progressive dyspnea     HPI: Doing okay this am, daughter at bedside.       Objective   Objective     Vital Signs:   Temp:  [97.3 °F (36.3 °C)-98.9 °F (37.2 °C)] 97.8 °F (36.6 °C)  Heart Rate:  [] 95  Resp:  [16-18] 16  BP: (110-134)/(71-92) 116/76  Flow (L/min) (Oxygen Therapy):  [3] 3     Physical Exam:  Gen:  alert, delightful, conversant, NAD   Neuro: alert and oriented, clear speech, follows commands, grossly nonfocal  HEENT:  NC/AT   Neck:  Supple, no LAD  Heart RRR   Lungs  anterior exam is clear, L sided chest tube in place with yellow appearing fluid present  Abd:  Soft, nontender  Extrem:  No c/c/e      Results Reviewed:  LAB RESULTS:      Lab 25  1040 25  1138 25  1540 25  1539 25  1315 25  1115 25  1014   WBC 8.53 7.67  --  9.88  --   --  8.92   HEMOGLOBIN 10.8* 10.9*  --  11.4*  --   --  12.0   HEMATOCRIT 34.8 34.4  --  35.5  --   --  37.5   PLATELETS 240 237  --  228  --   --  235   NEUTROS ABS 6.75 6.14  --  8.15*  --   --  6.59   IMMATURE GRANS (ABS) 0.04 0.03  --  0.03  --   --  0.03   LYMPHS ABS 0.74 0.54*  --  0.82  --   --  1.03   MONOS ABS 0.83 0.75  --  0.81  --   --  1.15*   EOS ABS 0.14 0.19  --  0.04  --   --  0.09   MCV 97.5* 94.0  --  94.2  --   --  94.7   PROCALCITONIN  --   --   --   --   --   --  0.33*   LACTATE  --   --   --   --  1.7  --  2.1*   LDH  --   --   --  209  --   --   --    PROTIME  --   --  21.2*  --   --   --   --    APTT  --   --  40.4*  --   --   --   --    HSTROP T  --   --   --   --   --  43* 38*         Lab 25  1040 25  1138 25  0723 25  1539 25  1014   SODIUM 135* 134* 138 134* 134*   POTASSIUM 4.3 4.1 4.4 4.5 3.8   CHLORIDE 101 99  100 98 97*   CO2 22.0 24.0 24.0 20.0* 23.0   ANION GAP 12.0 11.0 14.0 16.0* 14.0   BUN 63* 68* 63* 58* 48*   CREATININE 2.36* 2.84* 2.74* 2.87* 2.50*   EGFR 18.2* 14.6* 15.2* 14.4* 17.0*   GLUCOSE 108* 110* 94 182* 131*   CALCIUM 8.7 8.9 8.6 8.7 9.1         Lab 01/21/25  1539 01/20/25  1014   TOTAL PROTEIN 6.1 7.2   ALBUMIN  --  3.3*   GLOBULIN  --  3.9   ALT (SGPT)  --  11   AST (SGOT)  --  19   BILIRUBIN  --  1.3*   ALK PHOS  --  76         Lab 01/21/25  1540 01/20/25  1115 01/20/25  1014   PROBNP  --   --  5,330.0*   HSTROP T  --  43* 38*   PROTIME 21.2*  --   --    INR 1.82*  --   --                  Brief Urine Lab Results       None            Microbiology Results Abnormal       None                Results for orders placed during the hospital encounter of 06/01/23    Adult Transthoracic Echo Complete W/ Cont if Necessary Per Protocol    Interpretation Summary    Left ventricular systolic function is normal. Calculated left ventricular EF = 61.4% Left ventricular ejection fraction appears to be 61 - 65%.    Left ventricular diastolic function was indeterminate.    Left atrial volume is severely increased.    Mild aortic valve stenosis is present.    Aortic valve maximum pressure gradient is 26 mmHg. Aortic valve mean pressure gradient is 15 mmHg.    Mild mitral valve stenosis is present. The mitral valve peak gradient is 12 mmHg. The mitral valve mean gradient is 5 mmHg.    Moderate tricuspid valve regurgitation is present.    Estimated right ventricular systolic pressure from tricuspid regurgitation is markedly elevated (>55 mmHg).    There is a small left pleural effusion.      Current medications:  Scheduled Meds:amLODIPine, 5 mg, Oral, Daily  [Held by provider] apixaban, 5 mg, Oral, Q12H  bumetanide, 1 mg, Oral, Daily  hydrALAZINE, 25 mg, Oral, BID  sodium chloride, 10 mL, Intravenous, Q12H      Continuous Infusions:   PRN Meds:.  acetaminophen    senna-docusate sodium **AND** polyethylene glycol **AND**  bisacodyl **AND** bisacodyl    sodium chloride    sodium chloride    sodium chloride    Assessment & Plan   Assessment & Plan     Active Hospital Problems    Diagnosis  POA    **Pleural effusion on left [J90]  Yes    Essential hypertension [I10]  Yes    Chronic kidney disease, stage III (moderate)  [N18.30]  Yes      Resolved Hospital Problems   No resolved problems to display.        Brief Hospital Course to date:  Rose J Kellermann is a 89 y.o. female with a history of DVT on Eliquis, HFpEF, CKD, lives at home, has been using home oxygen intermittently x several months, recently needing it constantly though denying dyspnea.      Progressive hypoxia  Left pleural effusion  Suspected acute HFpEF exacerbation  -  chest x-ray with moderate L pleural effusion  - Takes Bumex at home.  Continue. Watch creatinine   - Eliquis last given 1/20 AM.  Resume after CT is removed   - Thoracentesis ordered: had chest tube placed in IR (?).  Consulted CT to manage, appreciate their assistance   -- pleural fluid is c/w exudate (?), cultures/ cytology negative   -- CT to be removed today. Repeat CXR in am        ANGEL LUIS on CKD  - Creat baseline 1.8, was 2.87 on 1/21, improved to 2.74 on 1/22, but back up to 2.84 then improved to 2.36  - Monitor with PO diuretics daily      History of DVT  - Hold Eliquis for now  - SCDs for now     Hypertension  - Continue home medications    Advanced age  - she is ambulatory  - OOB daily, pt should be up walking to BR w assist               Expected Discharge Location and Transportation: lives at home, PT/OT recommend inpatient rehab, pt agreeable   Expected Discharge   Expected Discharge Date: 1/27/2025; Expected Discharge Time:      VTE Prophylaxis:  Pharmacologic & mechanical VTE prophylaxis orders are present.         AM-PAC 6 Clicks Score (PT): 16 (01/25/25 2000)    CODE STATUS:   Code Status and Medical Interventions: CPR (Attempt to Resuscitate); Full Support   Ordered at: 01/20/25 1424     Level  Of Support Discussed With:    Patient     Code Status (Patient has no pulse and is not breathing):    CPR (Attempt to Resuscitate)     Medical Interventions (Patient has pulse or is breathing):    Full Support       Linda Felix MD  01/26/25

## 2025-01-26 NOTE — PLAN OF CARE
Problem: Adult Inpatient Plan of Care  Goal: Plan of Care Review  Outcome: Progressing  Flowsheets (Taken 1/26/2025 0322)  Progress: improving  Plan of Care Reviewed With: patient  Goal: Patient-Specific Goal (Individualized)  Outcome: Progressing  Goal: Absence of Hospital-Acquired Illness or Injury  Outcome: Progressing  Intervention: Identify and Manage Fall Risk  Recent Flowsheet Documentation  Taken 1/26/2025 0212 by Carie Singh RN  Safety Promotion/Fall Prevention:   activity supervised   assistive device/personal items within reach   clutter free environment maintained   lighting adjusted   nonskid shoes/slippers when out of bed   room organization consistent   safety round/check completed  Taken 1/26/2025 0003 by Carie Singh RN  Safety Promotion/Fall Prevention:   activity supervised   assistive device/personal items within reach   clutter free environment maintained   lighting adjusted   nonskid shoes/slippers when out of bed   room organization consistent   safety round/check completed  Taken 1/25/2025 2200 by Carie Singh RN  Safety Promotion/Fall Prevention:   activity supervised   clutter free environment maintained   assistive device/personal items within reach   lighting adjusted   nonskid shoes/slippers when out of bed   room organization consistent   safety round/check completed  Taken 1/25/2025 2000 by Carie Singh RN  Safety Promotion/Fall Prevention:   activity supervised   assistive device/personal items within reach   clutter free environment maintained   lighting adjusted   nonskid shoes/slippers when out of bed   room organization consistent   safety round/check completed  Intervention: Prevent Skin Injury  Recent Flowsheet Documentation  Taken 1/26/2025 0212 by Carie Singh RN  Body Position:   left   side-lying  Skin Protection:   incontinence pads utilized   protective footwear used   silicone foam dressing in place  Taken 1/26/2025 0003 by Carie Singh RN  Body Position:    right   side-lying  Skin Protection:   incontinence pads utilized   protective footwear used   silicone foam dressing in place  Taken 1/25/2025 2200 by Carie Singh RN  Body Position:   turned   left  Skin Protection:   incontinence pads utilized   protective footwear used   silicone foam dressing in place  Taken 1/25/2025 2000 by Carie Singh RN  Body Position: supine  Skin Protection: (silicone dressings peeled back, skin assessed)   incontinence pads utilized   protective footwear used   silicone foam dressing in place  Intervention: Prevent and Manage VTE (Venous Thromboembolism) Risk  Recent Flowsheet Documentation  Taken 1/25/2025 2000 by Carie Singh RN  VTE Prevention/Management:   bilateral   foot pump device on  Intervention: Prevent Infection  Recent Flowsheet Documentation  Taken 1/26/2025 0212 by Carie Singh RN  Infection Prevention: environmental surveillance performed  Taken 1/26/2025 0003 by Carie Singh RN  Infection Prevention: environmental surveillance performed  Taken 1/25/2025 2200 by Carie Singh RN  Infection Prevention: environmental surveillance performed  Taken 1/25/2025 2000 by Carie Singh RN  Infection Prevention: environmental surveillance performed  Goal: Optimal Comfort and Wellbeing  Outcome: Progressing  Intervention: Provide Person-Centered Care  Recent Flowsheet Documentation  Taken 1/25/2025 2000 by Carie Singh RN  Trust Relationship/Rapport:   care explained   choices provided  Goal: Readiness for Transition of Care  Outcome: Progressing     Problem: Skin Injury Risk Increased  Goal: Skin Health and Integrity  Outcome: Progressing  Intervention: Optimize Skin Protection  Recent Flowsheet Documentation  Taken 1/26/2025 0212 by Carie Singh RN  Activity Management: activity encouraged  Pressure Reduction Techniques:   frequent weight shift encouraged   heels elevated off bed   weight shift assistance provided  Head of Bed (HOB) Positioning: HOB elevated  Pressure  Reduction Devices:   pressure-redistributing mattress utilized   positioning supports utilized   heel offloading device utilized  Skin Protection:   incontinence pads utilized   protective footwear used   silicone foam dressing in place  Taken 1/26/2025 0003 by Carie Singh RN  Activity Management: activity encouraged  Pressure Reduction Techniques:   frequent weight shift encouraged   heels elevated off bed   weight shift assistance provided  Head of Bed (HOB) Positioning: [facility] elevated  Pressure Reduction Devices:   positioning supports utilized   heel offloading device utilized   pressure-redistributing mattress utilized  Skin Protection:   incontinence pads utilized   protective footwear used   silicone foam dressing in place  Taken 1/25/2025 2200 by Carie Singh RN  Activity Management: activity encouraged  Pressure Reduction Techniques:   frequent weight shift encouraged   heels elevated off bed   weight shift assistance provided  Head of Bed (HOB) Positioning: HOB elevated  Pressure Reduction Devices:   pressure-redistributing mattress utilized   positioning supports utilized   heel offloading device utilized  Skin Protection:   incontinence pads utilized   protective footwear used   silicone foam dressing in place  Taken 1/25/2025 2000 by Carie Singh RN  Activity Management: activity encouraged  Pressure Reduction Techniques:   frequent weight shift encouraged   heels elevated off bed   weight shift assistance provided  Head of Bed (HOB) Positioning: HOB elevated  Pressure Reduction Devices:   positioning supports utilized   pressure-redistributing mattress utilized   heel offloading device utilized  Skin Protection: (silicone dressings peeled back, skin assessed)   incontinence pads utilized   protective footwear used   silicone foam dressing in place     Problem: Comorbidity Management  Goal: Maintenance of Heart Failure Symptom Control  Outcome: Progressing  Intervention: Maintain Heart Failure  Management  Recent Flowsheet Documentation  Taken 1/26/2025 0212 by Carie Singh RN  Medication Review/Management: medications reviewed  Taken 1/26/2025 0003 by Carie Singh RN  Medication Review/Management: medications reviewed  Taken 1/25/2025 2200 by Carie Snigh RN  Medication Review/Management: medications reviewed  Taken 1/25/2025 2000 by Carie Singh RN  Medication Review/Management: medications reviewed  Goal: Blood Pressure in Desired Range  Outcome: Progressing  Intervention: Maintain Blood Pressure Management  Recent Flowsheet Documentation  Taken 1/26/2025 0212 by Carie Singh RN  Medication Review/Management: medications reviewed  Taken 1/26/2025 0003 by Carie Singh RN  Medication Review/Management: medications reviewed  Taken 1/25/2025 2200 by Carie Singh RN  Medication Review/Management: medications reviewed  Taken 1/25/2025 2000 by Carie Singh RN  Medication Review/Management: medications reviewed     Problem: Fall Injury Risk  Goal: Absence of Fall and Fall-Related Injury  Outcome: Progressing  Intervention: Identify and Manage Contributors  Recent Flowsheet Documentation  Taken 1/26/2025 0212 by Carie Singh RN  Medication Review/Management: medications reviewed  Taken 1/26/2025 0003 by Carie Singh RN  Medication Review/Management: medications reviewed  Taken 1/25/2025 2200 by Carie Singh RN  Medication Review/Management: medications reviewed  Taken 1/25/2025 2000 by Carie Singh RN  Medication Review/Management: medications reviewed  Intervention: Promote Injury-Free Environment  Recent Flowsheet Documentation  Taken 1/26/2025 0212 by Carie Singh RN  Safety Promotion/Fall Prevention:   activity supervised   assistive device/personal items within reach   clutter free environment maintained   lighting adjusted   nonskid shoes/slippers when out of bed   room organization consistent   safety round/check completed  Taken 1/26/2025 0003 by Carie Singh RN  Safety Promotion/Fall  Prevention:   activity supervised   assistive device/personal items within reach   clutter free environment maintained   lighting adjusted   nonskid shoes/slippers when out of bed   room organization consistent   safety round/check completed  Taken 1/25/2025 2200 by Carie Singh RN  Safety Promotion/Fall Prevention:   activity supervised   clutter free environment maintained   assistive device/personal items within reach   lighting adjusted   nonskid shoes/slippers when out of bed   room organization consistent   safety round/check completed  Taken 1/25/2025 2000 by Carie Singh RN  Safety Promotion/Fall Prevention:   activity supervised   assistive device/personal items within reach   clutter free environment maintained   lighting adjusted   nonskid shoes/slippers when out of bed   room organization consistent   safety round/check completed   Goal Outcome Evaluation:  Plan of Care Reviewed With: patient        Progress: improving     -VSS on 3L NC   -A&O to self, place, and situation   -Chest tube in place to suction per order with minimal output so far this shift   -Up to toilet with x1 assistance, gait belt, and walker   -Skin and fall precautions in place   -Pt resting comfortably, no further complaints voiced at this time

## 2025-01-26 NOTE — PROGRESS NOTES
CTS Progress Note       LOS: 6 days   Patient Care Team:  Sam Hung MD as PCP - Elver Johnson MD as Consulting Physician (Otolaryngology)    Chief Complaint: Pleural effusion on left    Vital Signs:  Temp:  [96.8 °F (36 °C)-98.9 °F (37.2 °C)] 97.5 °F (36.4 °C)  Heart Rate:  [] 90  Resp:  [16-18] 16  BP: (110-134)/(71-92) 126/89    Physical Exam: No respiratory distress       Results:     Results from last 7 days   Lab Units 01/24/25  1040   WBC 10*3/mm3 8.53   HEMOGLOBIN g/dL 10.8*   HEMATOCRIT % 34.8   PLATELETS 10*3/mm3 240     Results from last 7 days   Lab Units 01/24/25  1040   SODIUM mmol/L 135*   POTASSIUM mmol/L 4.3   CHLORIDE mmol/L 101   CO2 mmol/L 22.0   BUN mg/dL 63*   CREATININE mg/dL 2.36*   GLUCOSE mg/dL 108*   CALCIUM mg/dL 8.7           Imaging Results (Last 24 Hours)       Procedure Component Value Units Date/Time    XR Chest 1 View [[id number]] Resulted: 01/26/25 0359     Updated: 01/26/25 0600    XR Chest 1 View [[id number]] Collected: 01/25/25 0746     Updated: 01/25/25 0750    Narrative:      XR CHEST 1 VW    Date of Exam: 1/25/2025 3:50 AM EST    Indication: Follow up L pleural effusion s/p Left pleural CT placement    Comparison: 1/24/2025    Findings:  Cardiomediastinal silhouette is enlarged, unchanged. Bibasilar airspace disease with small to moderate effusions appear to be similar. Similar position left chest tube. No upper lung airspace disease nor pneumothorax. No acute osseous abnormality   identified.      Impression:      Impression:  No significant change      Electronically Signed: Nils Vides MD    1/25/2025 7:47 AM EST    Workstation ID: [id number]            Assessment      Pleural effusion on left    Essential hypertension    Chronic kidney disease, stage III (moderate)     No significant drainage from left pleural tube.  And chest x-ray on the left satisfactory.  Will remove the pigtail catheter today.  However, I do notice some blunting of  the right sided costophrenic angle    Plan   Physicians assistant to remove left-sided chest tube  Chest x-ray in the a.m.    Please note that portions of this note were completed with a voice recognition program. Efforts were made to edit the dictations, but occasionally words are mistranscribed.    Sam Jackson MD  01/26/25  06:37 EST

## 2025-01-27 ENCOUNTER — APPOINTMENT (OUTPATIENT)
Dept: GENERAL RADIOLOGY | Facility: HOSPITAL | Age: OVER 89
End: 2025-01-27
Payer: MEDICARE

## 2025-01-27 LAB
ANION GAP SERPL CALCULATED.3IONS-SCNC: 14 MMOL/L (ref 5–15)
BASOPHILS # BLD AUTO: 0.02 10*3/MM3 (ref 0–0.2)
BASOPHILS NFR BLD AUTO: 0.2 % (ref 0–1.5)
BUN SERPL-MCNC: 74 MG/DL (ref 8–23)
BUN/CREAT SERPL: 28.4 (ref 7–25)
CALCIUM SPEC-SCNC: 8.9 MG/DL (ref 8.2–9.6)
CHLORIDE SERPL-SCNC: 99 MMOL/L (ref 98–107)
CO2 SERPL-SCNC: 22 MMOL/L (ref 22–29)
CREAT SERPL-MCNC: 2.61 MG/DL (ref 0.57–1)
DEPRECATED RDW RBC AUTO: 48.4 FL (ref 37–54)
EGFRCR SERPLBLD CKD-EPI 2021: 16.1 ML/MIN/1.73
EOSINOPHIL # BLD AUTO: 0.17 10*3/MM3 (ref 0–0.4)
EOSINOPHIL NFR BLD AUTO: 1.7 % (ref 0.3–6.2)
ERYTHROCYTE [DISTWIDTH] IN BLOOD BY AUTOMATED COUNT: 13.4 % (ref 12.3–15.4)
GLUCOSE SERPL-MCNC: 89 MG/DL (ref 65–99)
HCT VFR BLD AUTO: 38.1 % (ref 34–46.6)
HGB BLD-MCNC: 11.9 G/DL (ref 12–15.9)
IMM GRANULOCYTES # BLD AUTO: 0.04 10*3/MM3 (ref 0–0.05)
IMM GRANULOCYTES NFR BLD AUTO: 0.4 % (ref 0–0.5)
LYMPHOCYTES # BLD AUTO: 0.6 10*3/MM3 (ref 0.7–3.1)
LYMPHOCYTES NFR BLD AUTO: 5.8 % (ref 19.6–45.3)
MCH RBC QN AUTO: 30.3 PG (ref 26.6–33)
MCHC RBC AUTO-ENTMCNC: 31.2 G/DL (ref 31.5–35.7)
MCV RBC AUTO: 96.9 FL (ref 79–97)
MONOCYTES # BLD AUTO: 1.05 10*3/MM3 (ref 0.1–0.9)
MONOCYTES NFR BLD AUTO: 10.2 % (ref 5–12)
NEUTROPHILS NFR BLD AUTO: 8.41 10*3/MM3 (ref 1.7–7)
NEUTROPHILS NFR BLD AUTO: 81.7 % (ref 42.7–76)
NRBC BLD AUTO-RTO: 0 /100 WBC (ref 0–0.2)
PLATELET # BLD AUTO: 243 10*3/MM3 (ref 140–450)
PMV BLD AUTO: 10.3 FL (ref 6–12)
POTASSIUM SERPL-SCNC: 4.8 MMOL/L (ref 3.5–5.2)
RBC # BLD AUTO: 3.93 10*6/MM3 (ref 3.77–5.28)
SODIUM SERPL-SCNC: 135 MMOL/L (ref 136–145)
WBC NRBC COR # BLD AUTO: 10.29 10*3/MM3 (ref 3.4–10.8)

## 2025-01-27 PROCEDURE — 85025 COMPLETE CBC W/AUTO DIFF WBC: CPT | Performed by: INTERNAL MEDICINE

## 2025-01-27 PROCEDURE — 99232 SBSQ HOSP IP/OBS MODERATE 35: CPT

## 2025-01-27 PROCEDURE — 99232 SBSQ HOSP IP/OBS MODERATE 35: CPT | Performed by: INTERNAL MEDICINE

## 2025-01-27 PROCEDURE — 97530 THERAPEUTIC ACTIVITIES: CPT

## 2025-01-27 PROCEDURE — 80048 BASIC METABOLIC PNL TOTAL CA: CPT | Performed by: INTERNAL MEDICINE

## 2025-01-27 PROCEDURE — 71045 X-RAY EXAM CHEST 1 VIEW: CPT

## 2025-01-27 RX ADMIN — HYDRALAZINE HYDROCHLORIDE 25 MG: 25 TABLET ORAL at 08:42

## 2025-01-27 RX ADMIN — HYDRALAZINE HYDROCHLORIDE 25 MG: 25 TABLET ORAL at 20:16

## 2025-01-27 RX ADMIN — AMLODIPINE BESYLATE 5 MG: 5 TABLET ORAL at 08:42

## 2025-01-27 RX ADMIN — Medication 10 ML: at 08:43

## 2025-01-27 NOTE — PLAN OF CARE
Goal Outcome Evaluation:  Plan of Care Reviewed With: patient        Progress: declining  Outcome Evaluation: Pt ambulated 15ft +15ft with rollator and Xiomara. Max cueing for upright posture with forward gaze and to keep rollator close with feet inside middle. Pt required one prolonged seated rest break due to c/o increased SOA and fatigue. Pt with worsening balance and coordination during second bout of ambulation. Continue to recommend PT skilled care and D/C to SNF.    Anticipated Discharge Disposition (PT): skilled nursing facility

## 2025-01-27 NOTE — THERAPY TREATMENT NOTE
Patient Name: Rose J Kellermann  : 1926    MRN: [id number]                              Today's Date: 2025       Admit Date: 2025    Visit Dx:     ICD-10-CM ICD-9-CM   1. Pleural effusion, left  J90 511.9   2. Hypoxia  R09.02 799.02   3. Chronic kidney disease, unspecified CKD stage  N18.9 585.9     Patient Active Problem List   Diagnosis    Essential hypertension    Vitamin D deficiency    Fatigue    Chronic kidney disease, stage III (moderate)     Osteoarthritis    Dyslipidemia    Post-menopausal bleeding    Endometrial cancer    Closed fracture of left hip    Asymptomatic bacteriuria    Post-traumatic osteoarthritis of left hip    Status post total replacement of left hip    Prediabetes    Acute blood loss anemia, asymptomatic    Postoperative urinary retention    Myopia    Posterior crocodile shagreen of cornea    Presbyopia    Ptosis of eyelid    Regular astigmatism    Retinal neovascularization    After cataract    Secondary cataract    Premature atrial contractions    Heart murmur    Nocturnal hypoxia    Chronic deep vein thrombosis (DVT) of both lower extremities    Retinal neovascularization    Pleural effusion on left     Past Medical History:   Diagnosis Date    Abscess of back     Arrhythmia     frequent PVC    Cancer 2011    Endometrial cancer, status post robotically assisted hysterectomy with Dr. Haddad, 2011.      CKD (chronic kidney disease), stage III     no dilaysis     Dizzy     Dvt femoral (deep venous thrombosis) 2017    s/p hip surgery in 2017. Took xarelto until 2018    Dyslipidemia     Dyslipidemia.  Observing.    Enthesopathy of hip region     Generalized osteoarthrosis, involving multiple sites     Headache     Hearing loss     Hypertension     Low backache     Needs flu shot     Osteoarthritis     Osteoarthritis with questionable carpal tunnel syndrome bilaterally.     Otitis, serous     Pneumonia     Remote pneumonia, .     Retinal  hemorrhage     SI joint arthritis     SOB (shortness of breath)     Syncope 2001    Remote syncope with working diagnosis of vasodepressor, 2001.     Venous insufficiency of leg     Wears glasses     readers     Past Surgical History:   Procedure Laterality Date    CATARACT EXTRACTION      bilat     COLONOSCOPY      HIP PERCUTANEOUS PINNING Left 11/24/2017    Procedure: HIP PERCUTANEOUS PINNING;  Surgeon: Lucas Swanson MD;  Location: [facility] OR;  Service:     HYSTERECTOMY      total? but unsure     KNEE SURGERY  2001    Remote knee surgery in 2001.- right     TOTAL HIP ARTHROPLASTY Left 10/26/2018    Procedure: TOTAL HIP ARTHROPLASTY LEFT;  Surgeon: Stephen Baker MD;  Location: [facility] OR;  Service: Orthopedics      General Information       Row Name 01/27/25 1114          Physical Therapy Time and Intention    Document Type therapy note (daily note)  -KG     Mode of Treatment physical therapy  -KG       Row Name 01/27/25 1114          General Information    Existing Precautions/Restrictions fall;oxygen therapy device and L/min  -KG     Barriers to Rehab medically complex;previous functional deficit;cognitive status  -KG       Row Name 01/27/25 1114          Cognition    Orientation Status (Cognition) oriented to;person;place  -KG       Row Name 01/27/25 1114          Safety Issues/Impairments Affecting Functional Mobility    Safety Issues Affecting Function (Mobility) awareness of need for assistance;insight into deficits/self-awareness;safety precaution awareness;safety precautions follow-through/compliance;sequencing abilities  -KG     Impairments Affecting Function (Mobility) balance;cognition;coordination;endurance/activity tolerance;postural/trunk control;shortness of breath;strength  -KG     Cognitive Impairments, Mobility Safety/Performance awareness, need for assistance;insight into deficits/self-awareness;safety precaution awareness;safety precaution follow-through;sequencing abilities  -KG                User Key  (r) = Recorded By, (t) = Taken By, (c) = Cosigned By      Initials Name Provider Type    KG Ann Nicole PT Physical Therapist                   Mobility       Row Name 01/27/25 1115          Bed Mobility    Comment, (Bed Mobility) UIC  -KG       Row Name 01/27/25 1115          Transfers    Comment, (Transfers) VC's for sequencing and safe hand placement. Pt required increased assistance for STS transfer from lower height of rollator.  -KG       Row Name 01/27/25 1115          Sit-Stand Transfer    Sit-Stand Jerauld (Transfers) minimum assist (75% patient effort);verbal cues  required modA for STS from rollator  -KG     Assistive Device (Sit-Stand Transfers) walker, 4-wheeled  -KG       Row Name 01/27/25 1115          Gait/Stairs (Locomotion)    Jerauld Level (Gait) minimum assist (75% patient effort);verbal cues  -KG     Assistive Device (Gait) walker, 4-wheeled  -KG     Distance in Feet (Gait) 15  +15  -KG     Deviations/Abnormal Patterns (Gait) bilateral deviations;base of support, narrow;mike decreased;festinating/shuffling;stride length decreased  -KG     Bilateral Gait Deviations forward flexed posture;heel strike decreased  -KG     Comment, (Gait/Stairs) Pt demonstrated step to gait pattern with very slow mike and short, shuffled steps. Pt with very forward flexed posture; required max cueing for upright posture and to keep rollator close. Minimal to no improvement despite cues. Pt required one prolonged seated rest break due to c/o increased SOA and fatigue. Pt with worsening balance and coordination during second half of ambulation. Tendency to reach out for furniture while ambulating, requiring maxA to maintain hold on rollator with both hands.  -KG               User Key  (r) = Recorded By, (t) = Taken By, (c) = Cosigned By      Initials Name Provider Type    Ann Elder PT Physical Therapist                   Obj/Interventions       Row Name  01/27/25 1117          Balance    Balance Assessment sitting static balance;standing static balance;standing dynamic balance  -KG     Static Sitting Balance supervision  -KG     Position, Sitting Balance unsupported;sitting in chair  -KG     Static Standing Balance minimal assist  -KG     Dynamic Standing Balance moderate assist  -KG     Position/Device Used, Standing Balance supported;walker, 4-wheeled  -KG               User Key  (r) = Recorded By, (t) = Taken By, (c) = Cosigned By      Initials Name Provider Type    KG Ann Nicole N, PT Physical Therapist                   Goals/Plan    No documentation.                  Clinical Impression       Row Name 01/27/25 1118          Pain    Pretreatment Pain Rating 0/10 - no pain  -KG     Posttreatment Pain Rating 0/10 - no pain  -KG       Row Name 01/27/25 1118          Plan of Care Review    Plan of Care Reviewed With patient  -KG     Progress declining  -KG     Outcome Evaluation Pt ambulated 15ft +15ft with rollator and Xiomara. Max cueing for upright posture with forward gaze and to keep rollator close with feet inside middle. Pt required one prolonged seated rest break due to c/o increased SOA and fatigue. Pt with worsening balance and coordination during second bout of ambulation. Continue to recommend PT skilled care and D/C to SNF.  -KG       Row Name 01/27/25 1118          Vital Signs    Pre Systolic BP Rehab 116  -KG     Pre Treatment Diastolic BP 83  -KG     Pretreatment Heart Rate (beats/min) 91  -KG     Posttreatment Heart Rate (beats/min) 103  -KG     Pre SpO2 (%) 92  -KG     O2 Delivery Pre Treatment supplemental O2  -KG     Post SpO2 (%) 88  -KG     O2 Delivery Post Treatment supplemental O2  -KG     Pre Patient Position Sitting  -KG     Intra Patient Position Standing  -KG     Post Patient Position Sitting  -KG       Row Name 01/27/25 1118          Positioning and Restraints    Pre-Treatment Position sitting in chair/recliner  -KG     Post  Treatment Position chair  -KG     In Chair notified nsg;reclined;call light within reach;encouraged to call for assist;exit alarm on;with family/caregiver;RUE elevated;LUE elevated;legs elevated  -KG               User Key  (r) = Recorded By, (t) = Taken By, (c) = Cosigned By      Initials Name Provider Type    Ann Elder, PT Physical Therapist                   Outcome Measures       Row Name 01/27/25 1120 01/27/25 0800       How much help from another person do you currently need...    Turning from your back to your side while in flat bed without using bedrails? 3  -KG 3  -AR    Moving from lying on back to sitting on the side of a flat bed without bedrails? 3  -KG 3  -AR    Moving to and from a bed to a chair (including a wheelchair)? 3  -KG 3  -AR    Standing up from a chair using your arms (e.g., wheelchair, bedside chair)? 3  -KG 3  -AR    Climbing 3-5 steps with a railing? 2  -KG 2  -AR    To walk in hospital room? 2  -KG 2  -AR    AM-PAC 6 Clicks Score (PT) 16  -KG 16  -AR    Highest Level of Mobility Goal 5 --> Static standing  -KG 5 --> Static standing  -AR      Row Name 01/27/25 1120          Functional Assessment    Outcome Measure Options AM-PAC 6 Clicks Basic Mobility (PT)  -KG               User Key  (r) = Recorded By, (t) = Taken By, (c) = Cosigned By      Initials Name Provider Type    Ann Elder, PT Physical Therapist    Hai Manley, RN Registered Nurse                                 Physical Therapy Education       Title: PT OT SLP Therapies (In Progress)       Topic: Physical Therapy (In Progress)       Point: Mobility training (In Progress)       Learning Progress Summary            Patient Acceptance, E, NR by KG at 1/27/2025 1031    Acceptance, E, NR by GUSTAVO at 1/24/2025 1430    Acceptance, E,D, VU,NR by LR at 1/23/2025 1407    Comment: Educated on benefits of mobility, safety with mobility, correct supine to sit t/f technique, correct sit<->stand t/f  technique, correct gait mechanics, LE HEP, and progression of POC.    Acceptance, E, NR by KG at 1/21/2025 1511                      Point: Home exercise program (In Progress)       Learning Progress Summary            Patient Acceptance, E, NR by KG at 1/27/2025 1031    Acceptance, E, NR by GUSTAVO at 1/24/2025 1430    Acceptance, E,D, VU,NR by LR at 1/23/2025 1407    Comment: Educated on benefits of mobility, safety with mobility, correct supine to sit t/f technique, correct sit<->stand t/f technique, correct gait mechanics, LE HEP, and progression of POC.                      Point: Body mechanics (In Progress)       Learning Progress Summary            Patient Acceptance, E, NR by KG at 1/27/2025 1031    Acceptance, E, NR by GUSTAVO at 1/24/2025 1430    Acceptance, E,D, VU,NR by LR at 1/23/2025 1407    Comment: Educated on benefits of mobility, safety with mobility, correct supine to sit t/f technique, correct sit<->stand t/f technique, correct gait mechanics, LE HEP, and progression of POC.    Acceptance, E, NR by KG at 1/21/2025 1511                      Point: Precautions (In Progress)       Learning Progress Summary            Patient Acceptance, E, NR by KG at 1/27/2025 1031    Acceptance, E, NR by GUSTAVO at 1/24/2025 1430    Acceptance, E,D, VU,NR by LR at 1/23/2025 1407    Comment: Educated on benefits of mobility, safety with mobility, correct supine to sit t/f technique, correct sit<->stand t/f technique, correct gait mechanics, LE HEP, and progression of POC.    Acceptance, E, NR by KG at 1/21/2025 1511                                      User Key       Initials Effective Dates Name Provider Type Discipline    GUSTAVO 02/03/23 -  Audrey Gore, PT Physical Therapist PT    LR 02/03/23 -  Lola Finch, PT Physical Therapist PT    KG 05/22/20 -  Ann Nicole, PT Physical Therapist PT                  PT Recommendation and Plan  Planned Therapy Interventions (PT): balance training, bed mobility  training, gait training, strengthening, transfer training  Progress: declining  Outcome Evaluation: Pt ambulated 15ft +15ft with rollator and Xiomara. Max cueing for upright posture with forward gaze and to keep rollator close with feet inside middle. Pt required one prolonged seated rest break due to c/o increased SOA and fatigue. Pt with worsening balance and coordination during second bout of ambulation. Continue to recommend PT skilled care and D/C to SNF.     Time Calculation:   PT Evaluation Complexity  History, PT Evaluation Complexity: 3 or more personal factors and/or comorbidities  Examination of Body Systems (PT Eval Complexity): total of 3 or more elements  Clinical Presentation (PT Evaluation Complexity): evolving  Clinical Decision Making (PT Evaluation Complexity): moderate complexity  Overall Complexity (PT Evaluation Complexity): moderate complexity     PT Charges       Row Name 01/27/25 1031             Time Calculation    Start Time 1031  -KG      PT Received On 01/27/25  -KG      PT Goal Re-Cert Due Date 01/31/25  -KG         Time Calculation- PT    Total Timed Code Minutes- PT 24 minute(s)  -KG         Timed Charges    13716 - PT Therapeutic Activity Minutes 24  -KG         Total Minutes    Timed Charges Total Minutes 24  -KG       Total Minutes 24  -KG                User Key  (r) = Recorded By, (t) = Taken By, (c) = Cosigned By      Initials Name Provider Type    KG Ann Nicole, PT Physical Therapist                  Therapy Charges for Today       Code Description Service Date Service Provider Modifiers Qty    71208037829 HC PT THERAPEUTIC ACT EA 15 MIN 1/27/2025 Ann Nicole, PT GP 2            PT G-Codes  Outcome Measure Options: AM-PAC 6 Clicks Basic Mobility (PT)  AM-PAC 6 Clicks Score (PT): 16  AM-PAC 6 Clicks Score (OT): 17  PT Discharge Summary  Anticipated Discharge Disposition (PT): skilled nursing facility    Kelsey Nicole PT  1/27/2025

## 2025-01-27 NOTE — PROGRESS NOTES
[facility] Cardiothoracic Surgery In-Patient Progress Note     LOS: 7 days     Chief Complaint: Left pleural effusion    Subjective  No complaints. On 3L NC saturations 93%.     Objective  Vital Signs  Temp:  [97.4 °F (36.3 °C)-98.3 °F (36.8 °C)] 97.9 °F (36.6 °C)  Heart Rate:  [79-96] 79  Resp:  [14-16] 14  BP: (104-122)/(68-87) 116/83        Physical Exam:   General Appearance: Well-developed, well-nourished no acute distress   Lungs: Respirations even and unlabored with diminished left basilar breath sounds   Heart: Aflutter   Results     Results from last 7 days   Lab Units 01/27/25  0641   WBC 10*3/mm3 10.29   HEMOGLOBIN g/dL 11.9*   HEMATOCRIT % 38.1   PLATELETS 10*3/mm3 243     Results from last 7 days   Lab Units 01/27/25  0641   SODIUM mmol/L 135*   POTASSIUM mmol/L 4.8   CHLORIDE mmol/L 99   CO2 mmol/L 22.0   BUN mg/dL 74*   CREATININE mg/dL 2.61*   GLUCOSE mg/dL 89   CALCIUM mg/dL 8.9       CXR:  Date of Exam: 1/27/2025 3:21 AM EST     Indication: Follow up L pleural effusion s/p Left pleural CT placement     Comparison: Chest radiograph 1/26/2025.     Findings:  Removal of left-sided chest tube. Enlarged cardiac silhouette, unchanged. Small/moderate bilateral pleural effusions with bibasilar airspace disease, unchanged. No distinct pneumothorax on this exam, with note made of patient's head/neck partially   obscuring the lung apices. Osseous structures are unchanged.     IMPRESSION:  Impression:  Removal of left-sided chest tube without significant interval change otherwise.        Electronically Signed: Adiel Mckeon MD    1/27/2025 7:30 AM EST    Workstation ID: [id number]    Assessment    Pleural effusion on left    Essential hypertension    Chronic kidney disease, stage III (moderate)       Plan   Nothing further to add from our standpoint, we will sign off and be available as needed    Rianna Ford, CONNIE  01/27/25  08:45 EST

## 2025-01-27 NOTE — PROGRESS NOTES
[facility] Medicine Services  PROGRESS NOTE    Patient Name: Rose J Kellermann  : 1926  MRN: [id number]    Date of Admission: 2025  Primary Care Physician: Sam Hung MD    Subjective   Subjective     CC:  progressive dyspnea     HPI: Chest tube removed. No new issues overnight.       Objective   Objective     Vital Signs:   Temp:  [97.4 °F (36.3 °C)-98.3 °F (36.8 °C)] 97.9 °F (36.6 °C)  Heart Rate:  [79-96] 79  Resp:  [14-16] 14  BP: (104-122)/(68-87) 116/83  Flow (L/min) (Oxygen Therapy):  [3-4.5] 4.5     Physical Exam:  Gen:  alert, delightful, conversant, NAD   Neuro: alert and oriented, clear speech, follows commands, grossly nonfocal  HEENT:  NC/AT   Neck:  Supple, no LAD  Heart RRR   Lungs  anterior exam is clear, posterior rhonchi in LLL   Abd:  Soft, nontender  Extrem:  No c/c/e      Results Reviewed:  LAB RESULTS:      Lab 25  0641 25  1040 25  1138 25  1540 25  1539 25  1315   WBC 10.29 8.53 7.67  --  9.88  --    HEMOGLOBIN 11.9* 10.8* 10.9*  --  11.4*  --    HEMATOCRIT 38.1 34.8 34.4  --  35.5  --    PLATELETS 243 240 237  --  228  --    NEUTROS ABS 8.41* 6.75 6.14  --  8.15*  --    IMMATURE GRANS (ABS) 0.04 0.04 0.03  --  0.03  --    LYMPHS ABS 0.60* 0.74 0.54*  --  0.82  --    MONOS ABS 1.05* 0.83 0.75  --  0.81  --    EOS ABS 0.17 0.14 0.19  --  0.04  --    MCV 96.9 97.5* 94.0  --  94.2  --    LACTATE  --   --   --   --   --  1.7   LDH  --   --   --   --  209  --    PROTIME  --   --   --  21.2*  --   --    APTT  --   --   --  40.4*  --   --          Lab 25  0641 25  2244 25  1040 25  1138 25  0723   SODIUM 135* 132* 135* 134* 138   POTASSIUM 4.8 4.7 4.3 4.1 4.4   CHLORIDE 99 98 101 99 100   CO2 22.0 22.0 22.0 24.0 24.0   ANION GAP 14.0 12.0 12.0 11.0 14.0   BUN 74* 71* 63* 68* 63*   CREATININE 2.61* 2.56* 2.36* 2.84* 2.74*   EGFR 16.1* 16.5* 18.2* 14.6* 15.2*   GLUCOSE 89 102*  108* 110* 94   CALCIUM 8.9 8.8 8.7 8.9 8.6         Lab 01/21/25  1539   TOTAL PROTEIN 6.1         Lab 01/21/25  1540   PROTIME 21.2*   INR 1.82*                 Brief Urine Lab Results       None            Microbiology Results Abnormal       None                Results for orders placed during the hospital encounter of 06/01/23    Adult Transthoracic Echo Complete W/ Cont if Necessary Per Protocol    Interpretation Summary    Left ventricular systolic function is normal. Calculated left ventricular EF = 61.4% Left ventricular ejection fraction appears to be 61 - 65%.    Left ventricular diastolic function was indeterminate.    Left atrial volume is severely increased.    Mild aortic valve stenosis is present.    Aortic valve maximum pressure gradient is 26 mmHg. Aortic valve mean pressure gradient is 15 mmHg.    Mild mitral valve stenosis is present. The mitral valve peak gradient is 12 mmHg. The mitral valve mean gradient is 5 mmHg.    Moderate tricuspid valve regurgitation is present.    Estimated right ventricular systolic pressure from tricuspid regurgitation is markedly elevated (>55 mmHg).    There is a small left pleural effusion.      Current medications:  Scheduled Meds:amLODIPine, 5 mg, Oral, Daily  [Held by provider] apixaban, 5 mg, Oral, Q12H  [Held by provider] bumetanide, 1 mg, Oral, Daily  hydrALAZINE, 25 mg, Oral, BID  sodium chloride, 10 mL, Intravenous, Q12H      Continuous Infusions:   PRN Meds:.  acetaminophen    senna-docusate sodium **AND** polyethylene glycol **AND** bisacodyl **AND** bisacodyl    sodium chloride    sodium chloride    sodium chloride    Assessment & Plan   Assessment & Plan     Active Hospital Problems    Diagnosis  POA    **Pleural effusion on left [J90]  Yes    Essential hypertension [I10]  Yes    Chronic kidney disease, stage III (moderate)  [N18.30]  Yes      Resolved Hospital Problems   No resolved problems to display.        Brief Hospital Course to date:  Anastasia PATRICK  Kellermann is a 89 y.o. female with a history of DVT on Eliquis, HFpEF, CKD, lives at home, has been using home oxygen intermittently x several months, recently needing it constantly though denying dyspnea.      Progressive hypoxia  Left pleural effusion  Suspected acute HFpEF exacerbation  -  chest x-ray with moderate L pleural effusion  - Takes Bumex at home.  Holding due to increasing creatinine   - Eliquis last given 1/20 AM.  Resume today  - Thoracentesis ordered: had chest tube placed in IR (?).  Consulted CT to manage, appreciate their assistance   -- pleural fluid is c/w exudate (?), cultures/ cytology negative   -- CT to be removed today. Repeat CXR in am        ANGEL LUIS on CKD  - Creat baseline 1.8, was 2.87 on 1/21, improved to 2.74 on 1/22, but back up to 2.84 then improved to 2.36, now trending back up  -- will hold PO diuretics for today, repeat labs in am  -- may be her new baseline?      History of DVT  - Resume Eliquis  - SCDs for now     Hypertension  - Continue home medications    Advanced age  - she is ambulatory  - OOB daily, pt should be up walking to BR w assist               Expected Discharge Location and Transportation: lives at home, PT/OT recommend inpatient rehab, pt agreeable   Expected Discharge   Expected Discharge Date: 1/27/2025; Expected Discharge Time:      VTE Prophylaxis:  Pharmacologic & mechanical VTE prophylaxis orders are present.         AM-PAC 6 Clicks Score (PT): 16 (01/27/25 1120)    CODE STATUS:   Code Status and Medical Interventions: CPR (Attempt to Resuscitate); Full Support   Ordered at: 01/20/25 4025     Level Of Support Discussed With:    Patient     Code Status (Patient has no pulse and is not breathing):    CPR (Attempt to Resuscitate)     Medical Interventions (Patient has pulse or is breathing):    Full Support       Linda Felix MD  01/27/25

## 2025-01-27 NOTE — PLAN OF CARE
Goal Outcome Evaluation:  Plan of Care Reviewed With: patient        Progress: no change  Outcome Evaluation: VSS. 4-4.5L NC. Dioriented to time occasionally. No complaints of pain or nausea. Drsg changed at chest tube puncture site along with wound care complete to LLE ulcer. Ambulated in room with therapy. SOB with exertion and activity. Update given to daughter. Skin and fall precautions in place. Resting comfortably. No further complaints at this time. Plan to discharge to [facility] tomorrow by 1300 with daughter to transport.

## 2025-01-27 NOTE — DISCHARGE PLACEMENT REQUEST
"Kellermann, Rose J (89 y.o. Female)       Date of Birth   1926    Social Security Number       Address   [address] [place] [address]    Home Phone   [phone]    MRN   [id number]       Religious   Samia    Marital Status                               Admission Date   25    Admission Type   Emergency    Admitting Provider   Linda Felix MD    Attending Provider   Linda Felix MD    Department, Room/Bed   [address], [address]/       Discharge Date       Discharge Disposition       Discharge Destination                                 Attending Provider: Linda Felix MD    Allergies: Sulfa Antibiotics    Isolation: None   Infection: None   Code Status: CPR    Ht: 162.6 cm (64\")   Wt: 75.4 kg (166 lb 4.8 oz)    Admission Cmt: None   Principal Problem: Pleural effusion on left [J90]                   Active Insurance as of 2025       Primary Coverage       Payor Plan Insurance Group Employer/Plan Group    [facility] MED[facility] REPLACEMENT [facility] [employer] [employer] [id number]       Payor Plan Address Payor Plan Phone Number Payor Plan Fax Number Effective Dates    [address] [address]   2023 - None Entered    [facility] [id number]         Subscriber Name Subscriber Birth Date Member ID       KELLERMANN,ROSE J 1926 [id number]                     Emergency Contacts        (Rel.) Home Phone Work Phone Mobile Phone    PARRISALONZO DEL OTRO (Power of ) [phone] -- [phone]                 History & Physical        Darrell Rdz MD at 25 0838            H&P reviewed. The patient was examined and there are no changes to the H&P.          Electronically signed by Darrell Rdz MD at 25 0838   Source Note: H&P (View-Only)              [facility] Medicine Services  PROGRESS NOTE    Patient Name: Rose J Kellermann  : 1926  MRN: " [id number]    Date of Admission: 1/20/2025  Primary Care Physician: Sam Hung MD    Subjective  Subjective     CC:  progressive dyspnea     HPI:  alert, NAD on nc oxygen, amenable to plan of thoracentesis.       Objective  Objective     Vital Signs:   Temp:  [97.4 °F (36.3 °C)-98.6 °F (37 °C)] 98.4 °F (36.9 °C)  Heart Rate:  [79-95] 86  Resp:  [16-18] 16  BP: (120-145)/(78-95) 132/86  Flow (L/min) (Oxygen Therapy):  [2-4] 4     Physical Exam:  Gen:  alert, delightful, conversant, NAD   Neuro: alert and oriented, clear speech, follows commands, grossly nonfocal  HEENT:  NC/AT   Neck:  Supple, no LAD  Heart RRR   Lungs  anterior exam is clear   Abd:  Soft, nontender  Extrem:  No c/c/e      Results Reviewed:  LAB RESULTS:      Lab 01/20/25  1315 01/20/25  1115 01/20/25  1014   WBC  --   --  8.92   HEMOGLOBIN  --   --  12.0   HEMATOCRIT  --   --  37.5   PLATELETS  --   --  235   NEUTROS ABS  --   --  6.59   IMMATURE GRANS (ABS)  --   --  0.03   LYMPHS ABS  --   --  1.03   MONOS ABS  --   --  1.15*   EOS ABS  --   --  0.09   MCV  --   --  94.7   PROCALCITONIN  --   --  0.33*   LACTATE 1.7  --  2.1*   HSTROP T  --  43* 38*         Lab 01/20/25  1014   SODIUM 134*   POTASSIUM 3.8   CHLORIDE 97*   CO2 23.0   ANION GAP 14.0   BUN 48*   CREATININE 2.50*   EGFR 17.0*   GLUCOSE 131*   CALCIUM 9.1         Lab 01/20/25  1014   TOTAL PROTEIN 7.2   ALBUMIN 3.3*   GLOBULIN 3.9   ALT (SGPT) 11   AST (SGOT) 19   BILIRUBIN 1.3*   ALK PHOS 76         Lab 01/20/25  1115 01/20/25  1014   PROBNP  --  5,330.0*   HSTROP T 43* 38*                 Brief Urine Lab Results       None            Microbiology Results Abnormal       None            XR Chest 1 View    Result Date: 1/20/2025  XR CHEST 1 VW Date of Exam: 1/20/2025 10:03 AM EST Indication: SOA triage protocol Comparison: Chest CT 8/7/2024, chest radiograph 8/10/2024 Findings: Cardiomegaly. Moderate size left pleural effusion increasing from prior. Slightly blunted  right costophrenic angle which could reflect trace pleural fluid. Prominent central vasculature without overt edema. Retrocardiac consolidation. No pneumothorax. Degenerative related osseous change. Aortic atherosclerotic disease.     Impression: Impression: 1. Cardiomegaly with moderate size increasing left effusion and possible trace right pleural effusion. Associated presumed compressive atelectasis in the left lower lobe, differential includes infection in the right clinical setting. 2. Chronic appearing pulmonary vascular congestion without overt edema. Electronically Signed: Juice Martinez MD  1/20/2025 10:27 AM EST  Workstation ID: [id number]     Results for orders placed during the hospital encounter of 06/01/23    Adult Transthoracic Echo Complete W/ Cont if Necessary Per Protocol    Interpretation Summary    Left ventricular systolic function is normal. Calculated left ventricular EF = 61.4% Left ventricular ejection fraction appears to be 61 - 65%.    Left ventricular diastolic function was indeterminate.    Left atrial volume is severely increased.    Mild aortic valve stenosis is present.    Aortic valve maximum pressure gradient is 26 mmHg. Aortic valve mean pressure gradient is 15 mmHg.    Mild mitral valve stenosis is present. The mitral valve peak gradient is 12 mmHg. The mitral valve mean gradient is 5 mmHg.    Moderate tricuspid valve regurgitation is present.    Estimated right ventricular systolic pressure from tricuspid regurgitation is markedly elevated (>55 mmHg).    There is a small left pleural effusion.      Current medications:  Scheduled Meds:amLODIPine, 5 mg, Oral, Daily  [Held by provider] apixaban, 5 mg, Oral, Q12H  [Held by provider] bumetanide, 1 mg, Oral, Daily  hydrALAZINE, 25 mg, Oral, BID  sodium chloride, 10 mL, Intravenous, Q12H      Continuous Infusions:   PRN Meds:.  senna-docusate sodium **AND** polyethylene glycol **AND** bisacodyl **AND** bisacodyl    sodium chloride     sodium chloride    sodium chloride    Assessment & Plan  Assessment & Plan     Active Hospital Problems    Diagnosis  POA    **Pleural effusion on left [J90]  Yes    Essential hypertension [I10]  Yes    Chronic kidney disease, stage III (moderate)  [N18.30]  Yes      Resolved Hospital Problems   No resolved problems to display.        Brief Hospital Course to date:  Rose J Kellermann is a 89 y.o. female with a history of DVT on Eliquis, HFpEF, CKD, lives at home, has been using home oxygen intermittently x several months, recently needing it constantly though denying dyspnea.      Progressive hypoxia  Left pleural effusion  Suspected acute HFpEF exacerbation  -  chest x-ray with moderate L pleural effusion  - Takes Bumex at home.  Continue. Watch creatinine   - Eliquis last given 1/20 AM.  Hold.  - Thoracentesis ordered:  await repeat CXR per IR request.         ANGEL LUIS on CKD  - Creat basealine 1.8, now 2.5  - Monitor with diuresis daily      History of DVT  - Hold Eliquis for now  - SCDs for now     Hypertension  - Continue home medications    Advanced age  - she is ambulatory  - OOB daily, pt should be up walking to BR w assist               Expected Discharge Location and Transportation: lives at home   Expected Discharge   Expected Discharge Date: 1/23/2025; Expected Discharge Time:      VTE Prophylaxis:  Pharmacologic & mechanical VTE prophylaxis orders are present.         AM-PAC 6 Clicks Score (PT): 17 (01/20/25 2000)    CODE STATUS:   Code Status and Medical Interventions: CPR (Attempt to Resuscitate); Full Support   Ordered at: 01/20/25 2131     Level Of Support Discussed With:    Patient     Code Status (Patient has no pulse and is not breathing):    CPR (Attempt to Resuscitate)     Medical Interventions (Patient has pulse or is breathing):    Full Support       Ayanna Garcia MD  01/21/25        Electronically signed by Ayanna Garcia MD at 01/21/25 6136                 Ayanan Garcia MD at 01/21/25 1792               [facility] Medicine Services  PROGRESS NOTE    Patient Name: Rose J Kellermann  : 1926  MRN: [id number]    Date of Admission: 2025  Primary Care Physician: Sam Hung MD    Subjective  Subjective     CC:  progressive dyspnea     HPI:  alert, NAD on nc oxygen, amenable to plan of thoracentesis.       Objective  Objective     Vital Signs:   Temp:  [97.4 °F (36.3 °C)-98.6 °F (37 °C)] 98.4 °F (36.9 °C)  Heart Rate:  [79-95] 86  Resp:  [16-18] 16  BP: (120-145)/(78-95) 132/86  Flow (L/min) (Oxygen Therapy):  [2-4] 4     Physical Exam:  Gen:  alert, delightful, conversant, NAD   Neuro: alert and oriented, clear speech, follows commands, grossly nonfocal  HEENT:  NC/AT   Neck:  Supple, no LAD  Heart RRR   Lungs  anterior exam is clear   Abd:  Soft, nontender  Extrem:  No c/c/e      Results Reviewed:  LAB RESULTS:      Lab 25  1315 25  1115 25  1014   WBC  --   --  8.92   HEMOGLOBIN  --   --  12.0   HEMATOCRIT  --   --  37.5   PLATELETS  --   --  235   NEUTROS ABS  --   --  6.59   IMMATURE GRANS (ABS)  --   --  0.03   LYMPHS ABS  --   --  1.03   MONOS ABS  --   --  1.15*   EOS ABS  --   --  0.09   MCV  --   --  94.7   PROCALCITONIN  --   --  0.33*   LACTATE 1.7  --  2.1*   HSTROP T  --  43* 38*         Lab 25  1014   SODIUM 134*   POTASSIUM 3.8   CHLORIDE 97*   CO2 23.0   ANION GAP 14.0   BUN 48*   CREATININE 2.50*   EGFR 17.0*   GLUCOSE 131*   CALCIUM 9.1         Lab 25  1014   TOTAL PROTEIN 7.2   ALBUMIN 3.3*   GLOBULIN 3.9   ALT (SGPT) 11   AST (SGOT) 19   BILIRUBIN 1.3*   ALK PHOS 76         Lab 25  1115 25  1014   PROBNP  --  5,330.0*   HSTROP T 43* 38*                 Brief Urine Lab Results       None            Microbiology Results Abnormal       None            XR Chest 1 View    Result Date: 2025  XR CHEST 1 VW Date of Exam: 2025 10:03 AM EST Indication: SOA triage protocol Comparison: Chest CT  8/7/2024, chest radiograph 8/10/2024 Findings: Cardiomegaly. Moderate size left pleural effusion increasing from prior. Slightly blunted right costophrenic angle which could reflect trace pleural fluid. Prominent central vasculature without overt edema. Retrocardiac consolidation. No pneumothorax. Degenerative related osseous change. Aortic atherosclerotic disease.     Impression: Impression: 1. Cardiomegaly with moderate size increasing left effusion and possible trace right pleural effusion. Associated presumed compressive atelectasis in the left lower lobe, differential includes infection in the right clinical setting. 2. Chronic appearing pulmonary vascular congestion without overt edema. Electronically Signed: Juice Martinez MD  1/20/2025 10:27 AM EST  Workstation ID: [id number]     Results for orders placed during the hospital encounter of 06/01/23    Adult Transthoracic Echo Complete W/ Cont if Necessary Per Protocol    Interpretation Summary    Left ventricular systolic function is normal. Calculated left ventricular EF = 61.4% Left ventricular ejection fraction appears to be 61 - 65%.    Left ventricular diastolic function was indeterminate.    Left atrial volume is severely increased.    Mild aortic valve stenosis is present.    Aortic valve maximum pressure gradient is 26 mmHg. Aortic valve mean pressure gradient is 15 mmHg.    Mild mitral valve stenosis is present. The mitral valve peak gradient is 12 mmHg. The mitral valve mean gradient is 5 mmHg.    Moderate tricuspid valve regurgitation is present.    Estimated right ventricular systolic pressure from tricuspid regurgitation is markedly elevated (>55 mmHg).    There is a small left pleural effusion.      Current medications:  Scheduled Meds:amLODIPine, 5 mg, Oral, Daily  [Held by provider] apixaban, 5 mg, Oral, Q12H  [Held by provider] bumetanide, 1 mg, Oral, Daily  hydrALAZINE, 25 mg, Oral, BID  sodium chloride, 10 mL, Intravenous,  Q12H      Continuous Infusions:   PRN Meds:.  senna-docusate sodium **AND** polyethylene glycol **AND** bisacodyl **AND** bisacodyl    sodium chloride    sodium chloride    sodium chloride    Assessment & Plan  Assessment & Plan     Active Hospital Problems    Diagnosis  POA    **Pleural effusion on left [J90]  Yes    Essential hypertension [I10]  Yes    Chronic kidney disease, stage III (moderate)  [N18.30]  Yes      Resolved Hospital Problems   No resolved problems to display.        Brief Hospital Course to date:  Rose J Kellermann is a 89 y.o. female with a history of DVT on Eliquis, HFpEF, CKD, lives at home, has been using home oxygen intermittently x several months, recently needing it constantly though denying dyspnea.      Progressive hypoxia  Left pleural effusion  Suspected acute HFpEF exacerbation  -  chest x-ray with moderate L pleural effusion  - Takes Bumex at home.  Continue. Watch creatinine   - Eliquis last given 1/20 AM.  Hold.  - Thoracentesis ordered:  await repeat CXR per IR request.         ANGEL LUIS on CKD  - Creat basealine 1.8, now 2.5  - Monitor with diuresis daily      History of DVT  - Hold Eliquis for now  - SCDs for now     Hypertension  - Continue home medications    Advanced age  - she is ambulatory  - OOB daily, pt should be up walking to BR w assist               Expected Discharge Location and Transportation: lives at home   Expected Discharge   Expected Discharge Date: 1/23/2025; Expected Discharge Time:      VTE Prophylaxis:  Pharmacologic & mechanical VTE prophylaxis orders are present.         AM-PAC 6 Clicks Score (PT): 17 (01/20/25 2000)    CODE STATUS:   Code Status and Medical Interventions: CPR (Attempt to Resuscitate); Full Support   Ordered at: 01/20/25 1421     Level Of Support Discussed With:    Patient     Code Status (Patient has no pulse and is not breathing):    CPR (Attempt to Resuscitate)     Medical Interventions (Patient has pulse or is breathing):    Full Support  "      Ayanna Garcia MD  25        Electronically signed by Ayanna Garcia MD at 25 1020       Chanel Noriega MD at 25 1414              [facility] Medicine Services  HISTORY AND PHYSICAL    Patient Name: Rose J Kellermann  : 1926  MRN: [id number]  Primary Care Physician: Sam Hung MD  Date of admission: 2025      Subjective  Subjective     Chief Complaint:  dyspnea    HPI:  Rose J Kellermann is a 89 y.o. female with a history of DVT on Eliquis, HFpEF, CKD who is presenting with worsening dyspnea.  She has been brought in by her daughter who noted decreased oxygen saturations this past week and at home.  For the past several months she wears about 2 to 3 L of nasal cannula oxygen at home on and off, her home oxygen requirement segments have been increasing to the point yesterday where she continuously required it.  Currently the patient does not feel short of breath, denies any chest pain.  Daughter notes that she also noticed an increased cough, nonproductive.  Workup in the ED was notable for a left pleural effusion.  Daughter states that she has had problems in the past with \"fluid in her lungs\".  And her most recent hospital stay was related to that.  She takes Bumex 4 times a week and follows with cardiology also.      Personal History     Past Medical History:   Diagnosis Date    Abscess of back     Arrhythmia     frequent PVC    Cancer 2011    Endometrial cancer, status post robotically assisted hysterectomy with Dr. Haddad, 2011.      CKD (chronic kidney disease), stage III     no dilaysis     Dizzy     Dvt femoral (deep venous thrombosis) 2017    s/p hip surgery in . Took xarelto until 2018    Dyslipidemia     Dyslipidemia.  Observing.    Enthesopathy of hip region     Generalized osteoarthrosis, involving multiple sites     Headache     Hearing loss     Hypertension     Low backache     Needs flu shot     " Osteoarthritis     Osteoarthritis with questionable carpal tunnel syndrome bilaterally.     Otitis, serous     Pneumonia 1967    Remote pneumonia, 1967.     Retinal hemorrhage     SI joint arthritis     SOB (shortness of breath)     Syncope 2001    Remote syncope with working diagnosis of vasodepressor, 2001.     Venous insufficiency of leg     Wears glasses     readers         Oncology Problem List:  Endometrial cancer (Status: Active)    Oncology/Hematology History   Endometrial cancer   12/12/2011 Initial Diagnosis    D&C and hysterectomy for PMB. 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 Stage IA grade 2 by final path       Past Surgical History:   Procedure Laterality Date    CATARACT EXTRACTION      bilat     COLONOSCOPY      HIP PERCUTANEOUS PINNING Left 11/24/2017    Procedure: HIP PERCUTANEOUS PINNING;  Surgeon: Lucas Swanson MD;  Location:  [facility];  Service:     HYSTERECTOMY      total? but unsure     KNEE SURGERY  2001    Remote knee surgery in 2001.- right     TOTAL HIP ARTHROPLASTY Left 10/26/2018    Procedure: TOTAL HIP ARTHROPLASTY LEFT;  Surgeon: Stephen Baker MD;  Location:  [facility];  Service: Orthopedics       Family History: family history includes Cancer in her brother; Heart attack in her father; Heart disease in her mother; Hypertension in her father and mother; Osteoarthritis in her father and mother.     Social History:  reports that she has never smoked. She has never been exposed to tobacco smoke. She has never used smokeless tobacco. She reports that she does not drink alcohol and does not use drugs.  Social History     Social History Narrative    Lives alone, 2 daughters, one here, one in [place]. Retired teacher       Medications:  Available home medication information reviewed.  O2, acetaminophen, amLODIPine, apixaban, bumetanide, and hydrALAZINE    Allergies   Allergen Reactions    Sulfa  Antibiotics Nausea Only and GI Intolerance       Objective  Objective     Vital Signs:   Temp:  [98.2 °F (36.8 °C)] 98.2 °F (36.8 °C)  Heart Rate:  [79-95] 79  Resp:  [18] 18  BP: (120-145)/(78-95) 131/95  Flow (L/min) (Oxygen Therapy):  [2] 2       Physical Exam   Constitutional: No acute distress, awake, alert  HENT: NCAT, mucous membranes moist  Respiratory: Decreased lung sounds at the left lower lung base, diffuse slight crackles throughout, respiratory effort normal on 2 L nasal cannula  Cardiovascular: RRR, no murmurs, rubs, or gallops  Gastrointestinal: Positive bowel sounds, soft, nontender, nondistended  Musculoskeletal: No bilateral ankle edema  Psychiatric: Appropriate affect, cooperative  Neurologic: Oriented to person and place, hard of hearing, speech clear, strength symmetric, no focal deficits  Skin: Stasis dermatitis      Result Review:  I have personally reviewed the results from the time of this admission to 1/20/2025 14:27 EST and agree with these findings:  [x]  Laboratory list / accordion  [x]  Microbiology  [x]  Radiology  [x]  EKG/Telemetry   []  Cardiology/Vascular   []  Pathology  []  Old records  []  Other:  Most notable findings include:   Elevated creatinine  Elevated lactate now trended down  proBNP elevated from prior  Left pleural effusion    LAB RESULTS:      Lab 01/20/25  1315 01/20/25  1014   WBC  --  8.92   HEMOGLOBIN  --  12.0   HEMATOCRIT  --  37.5   PLATELETS  --  235   NEUTROS ABS  --  6.59   IMMATURE GRANS (ABS)  --  0.03   LYMPHS ABS  --  1.03   MONOS ABS  --  1.15*   EOS ABS  --  0.09   MCV  --  94.7   PROCALCITONIN  --  0.33*   LACTATE 1.7 2.1*         Lab 01/20/25  1014   SODIUM 134*   POTASSIUM 3.8   CHLORIDE 97*   CO2 23.0   ANION GAP 14.0   BUN 48*   CREATININE 2.50*   EGFR 17.0*   GLUCOSE 131*   CALCIUM 9.1         Lab 01/20/25  1014   TOTAL PROTEIN 7.2   ALBUMIN 3.3*   GLOBULIN 3.9   ALT (SGPT) 11   AST (SGOT) 19   BILIRUBIN 1.3*   ALK PHOS 76         Lab  01/20/25  1115 01/20/25  1014   PROBNP  --  5,330.0*   HSTROP T 43* 38*                     Microbiology Results (last 10 days)       Procedure Component Value - Date/Time    Respiratory Panel PCR w/COVID-19(SARS-CoV-2) [facility] In-House, NP Swab in UTM/VTM, 2 HR TAT - Swab, Nasopharynx [[id number]]  (Normal) Collected: 01/20/25 1027    Lab Status: Final result Specimen: Swab from Nasopharynx Updated: 01/20/25 1128     ADENOVIRUS, PCR Not Detected     Coronavirus 229E Not Detected     Coronavirus HKU1 Not Detected     Coronavirus NL63 Not Detected     Coronavirus OC43 Not Detected     COVID19 Not Detected     Human Metapneumovirus Not Detected     Human Rhinovirus/Enterovirus Not Detected     Influenza A PCR Not Detected     Influenza B PCR Not Detected     Parainfluenza Virus 1 Not Detected     Parainfluenza Virus 2 Not Detected     Parainfluenza Virus 3 Not Detected     Parainfluenza Virus 4 Not Detected     RSV, PCR Not Detected     Bordetella pertussis pcr Not Detected     Bordetella parapertussis PCR Not Detected     Chlamydophila pneumoniae PCR Not Detected     Mycoplasma pneumo by PCR Not Detected    Narrative:      In the setting of a positive respiratory panel with a viral infection PLUS a negative procalcitonin without other underlying concern for bacterial infection, consider observing off antibiotics or discontinuation of antibiotics and continue supportive care. If the respiratory panel is positive for atypical bacterial infection (Bordetella pertussis, Chlamydophila pneumoniae, or Mycoplasma pneumoniae), consider antibiotic de-escalation to target atypical bacterial infection.            XR Chest 1 View    Result Date: 1/20/2025  XR CHEST 1 VW Date of Exam: 1/20/2025 10:03 AM EST Indication: SOA triage protocol Comparison: Chest CT 8/7/2024, chest radiograph 8/10/2024 Findings: Cardiomegaly. Moderate size left pleural effusion increasing from prior. Slightly blunted right costophrenic  angle which could reflect trace pleural fluid. Prominent central vasculature without overt edema. Retrocardiac consolidation. No pneumothorax. Degenerative related osseous change. Aortic atherosclerotic disease.     Impression: Impression: 1. Cardiomegaly with moderate size increasing left effusion and possible trace right pleural effusion. Associated presumed compressive atelectasis in the left lower lobe, differential includes infection in the right clinical setting. 2. Chronic appearing pulmonary vascular congestion without overt edema. Electronically Signed: Juice Martinez MD  1/20/2025 10:27 AM EST  Workstation ID: [id number]     Results for orders placed during the hospital encounter of 06/01/23    Adult Transthoracic Echo Complete W/ Cont if Necessary Per Protocol    Interpretation Summary    Left ventricular systolic function is normal. Calculated left ventricular EF = 61.4% Left ventricular ejection fraction appears to be 61 - 65%.    Left ventricular diastolic function was indeterminate.    Left atrial volume is severely increased.    Mild aortic valve stenosis is present.    Aortic valve maximum pressure gradient is 26 mmHg. Aortic valve mean pressure gradient is 15 mmHg.    Mild mitral valve stenosis is present. The mitral valve peak gradient is 12 mmHg. The mitral valve mean gradient is 5 mmHg.    Moderate tricuspid valve regurgitation is present.    Estimated right ventricular systolic pressure from tricuspid regurgitation is markedly elevated (>55 mmHg).    There is a small left pleural effusion.      Assessment & Plan  Assessment & Plan       Pleural effusion on left    Essential hypertension    Chronic kidney disease, stage III (moderate)         Rose J Kellermann is a 89-year-old female with a history of HFpEF, DVT on Eliquis, CKD who is here with a left pleural effusion    Acute on chronic hypoxic respiratory failure  Left pleural effusion  Suspected acute HFpEF exacerbation  - Imaging as above,  chest x-ray with moderately sized left pleural effusion which looks increased from prior.  Also present is pulmonary vascular congestion but this appears chronic  - Her creatinine is little bit elevated above baseline but she may also have a component of CRS.  Clinically appears volume overloaded so we will give a dose of IV diuresis tonight.  Monitor daily, repeat BMP in a.m.  - Last dose of Eliquis was this morning.  Continue to hold further doses, consult to IR for thoracentesis.  Obtain coags for the morning.  N.p.o. at midnight in case thoracentesis can be done tomorrow  - Pleural effusion likely a result of underlying HFpEF.  Clinically does not appear to have infectious etiology will observe off antibiotics for now.  Will obtain pleural fluid culture    ANGEL LUIS on CKD  - As above creatinine seems to be elevated above baseline which is around 2  - Monitor with diuresis    History of DVT  - Hold Eliquis for now  - SCDs for now    Hypertension  - Continue home medications          VTE Prophylaxis:  Mechanical & pharmacologic VTE prophylaxis orders are signed & held.           CODE STATUS:    Code Status and Medical Interventions: CPR (Attempt to Resuscitate); Full Support   Ordered at: 25 1424     Level Of Support Discussed With:    Patient     Code Status (Patient has no pulse and is not breathing):    CPR (Attempt to Resuscitate)     Medical Interventions (Patient has pulse or is breathing):    Full Support       Expected Discharge   Expected Discharge Date: 2025; Expected Discharge Time:      Chanel Noriega MD  25      Electronically signed by Chanel Noriega MD at 25 1427          Physician Progress Notes (most recent note)        Linda Felix MD at 25 1129              [facility] Medicine Services  PROGRESS NOTE    Patient Name: Rose J Kellermann  : 1926  MRN: [id number]    Date of Admission: 2025  Primary Care Physician: Kin Ziegler  Sam Matt MD    Subjective   Subjective     CC:  progressive dyspnea     HPI: Chest tube removed. No new issues overnight.       Objective   Objective     Vital Signs:   Temp:  [97.4 °F (36.3 °C)-98.3 °F (36.8 °C)] 97.9 °F (36.6 °C)  Heart Rate:  [79-96] 79  Resp:  [14-16] 14  BP: (104-122)/(68-87) 116/83  Flow (L/min) (Oxygen Therapy):  [3-4.5] 4.5     Physical Exam:  Gen:  alert, delightful, conversant, NAD   Neuro: alert and oriented, clear speech, follows commands, grossly nonfocal  HEENT:  NC/AT   Neck:  Supple, no LAD  Heart RRR   Lungs  anterior exam is clear, posterior rhonchi in LLL   Abd:  Soft, nontender  Extrem:  No c/c/e      Results Reviewed:  LAB RESULTS:      Lab 01/27/25  0641 01/24/25  1040 01/23/25  1138 01/21/25  1540 01/21/25  1539 01/20/25  1315   WBC 10.29 8.53 7.67  --  9.88  --    HEMOGLOBIN 11.9* 10.8* 10.9*  --  11.4*  --    HEMATOCRIT 38.1 34.8 34.4  --  35.5  --    PLATELETS 243 240 237  --  228  --    NEUTROS ABS 8.41* 6.75 6.14  --  8.15*  --    IMMATURE GRANS (ABS) 0.04 0.04 0.03  --  0.03  --    LYMPHS ABS 0.60* 0.74 0.54*  --  0.82  --    MONOS ABS 1.05* 0.83 0.75  --  0.81  --    EOS ABS 0.17 0.14 0.19  --  0.04  --    MCV 96.9 97.5* 94.0  --  94.2  --    LACTATE  --   --   --   --   --  1.7   LDH  --   --   --   --  209  --    PROTIME  --   --   --  21.2*  --   --    APTT  --   --   --  40.4*  --   --          Lab 01/27/25  0641 01/26/25  2244 01/24/25  1040 01/23/25  1138 01/22/25  0723   SODIUM 135* 132* 135* 134* 138   POTASSIUM 4.8 4.7 4.3 4.1 4.4   CHLORIDE 99 98 101 99 100   CO2 22.0 22.0 22.0 24.0 24.0   ANION GAP 14.0 12.0 12.0 11.0 14.0   BUN 74* 71* 63* 68* 63*   CREATININE 2.61* 2.56* 2.36* 2.84* 2.74*   EGFR 16.1* 16.5* 18.2* 14.6* 15.2*   GLUCOSE 89 102* 108* 110* 94   CALCIUM 8.9 8.8 8.7 8.9 8.6         Lab 01/21/25  1539   TOTAL PROTEIN 6.1         Lab 01/21/25  1540   PROTIME 21.2*   INR 1.82*                 Brief Urine Lab Results       None             Microbiology Results Abnormal       None                Results for orders placed during the hospital encounter of 06/01/23    Adult Transthoracic Echo Complete W/ Cont if Necessary Per Protocol    Interpretation Summary    Left ventricular systolic function is normal. Calculated left ventricular EF = 61.4% Left ventricular ejection fraction appears to be 61 - 65%.    Left ventricular diastolic function was indeterminate.    Left atrial volume is severely increased.    Mild aortic valve stenosis is present.    Aortic valve maximum pressure gradient is 26 mmHg. Aortic valve mean pressure gradient is 15 mmHg.    Mild mitral valve stenosis is present. The mitral valve peak gradient is 12 mmHg. The mitral valve mean gradient is 5 mmHg.    Moderate tricuspid valve regurgitation is present.    Estimated right ventricular systolic pressure from tricuspid regurgitation is markedly elevated (>55 mmHg).    There is a small left pleural effusion.      Current medications:  Scheduled Meds:amLODIPine, 5 mg, Oral, Daily  [Held by provider] apixaban, 5 mg, Oral, Q12H  [Held by provider] bumetanide, 1 mg, Oral, Daily  hydrALAZINE, 25 mg, Oral, BID  sodium chloride, 10 mL, Intravenous, Q12H      Continuous Infusions:   PRN Meds:.  acetaminophen    senna-docusate sodium **AND** polyethylene glycol **AND** bisacodyl **AND** bisacodyl    sodium chloride    sodium chloride    sodium chloride    Assessment & Plan   Assessment & Plan     Active Hospital Problems    Diagnosis  POA    **Pleural effusion on left [J90]  Yes    Essential hypertension [I10]  Yes    Chronic kidney disease, stage III (moderate)  [N18.30]  Yes      Resolved Hospital Problems   No resolved problems to display.        Brief Hospital Course to date:  Rose J Kellermann is a 89 y.o. female with a history of DVT on Eliquis, HFpEF, CKD, lives at home, has been using home oxygen intermittently x several months, recently needing it constantly though denying  dyspnea.      Progressive hypoxia  Left pleural effusion  Suspected acute HFpEF exacerbation  -  chest x-ray with moderate L pleural effusion  - Takes Bumex at home.  Holding due to increasing creatinine   - Eliquis last given  AM.  Resume today  - Thoracentesis ordered: had chest tube placed in IR (?).  Consulted CT to manage, appreciate their assistance   -- pleural fluid is c/w exudate (?), cultures/ cytology negative   -- CT to be removed today. Repeat CXR in am        ANGEL LUIS on CKD  - Creat baseline 1.8, was 2.87 on , improved to 2.74 on , but back up to 2.84 then improved to 2.36, now trending back up  -- will hold PO diuretics for today, repeat labs in am  -- may be her new baseline?      History of DVT  - Resume Eliquis  - SCDs for now     Hypertension  - Continue home medications    Advanced age  - she is ambulatory  - OOB daily, pt should be up walking to BR w assist               Expected Discharge Location and Transportation: lives at home, PT/OT recommend inpatient rehab, pt agreeable   Expected Discharge   Expected Discharge Date: 2025; Expected Discharge Time:      VTE Prophylaxis:  Pharmacologic & mechanical VTE prophylaxis orders are present.         AM-PAC 6 Clicks Score (PT): 16 (25 1120)    CODE STATUS:   Code Status and Medical Interventions: CPR (Attempt to Resuscitate); Full Support   Ordered at: 25 1424     Level Of Support Discussed With:    Patient     Code Status (Patient has no pulse and is not breathing):    CPR (Attempt to Resuscitate)     Medical Interventions (Patient has pulse or is breathing):    Full Support       Linda Felix MD  25        Electronically signed by Linda Felix MD at 25 1131          Physical Therapy Notes (most recent note)        Ann Nicole, PT at 25 1031  Version 1 of 1         Patient Name: Rose J Kellermann  : 1926    MRN: [id number]                              Today's Date:  1/27/2025       Admit Date: 1/20/2025    Visit Dx:     ICD-10-CM ICD-9-CM   1. Pleural effusion, left  J90 511.9   2. Hypoxia  R09.02 799.02   3. Chronic kidney disease, unspecified CKD stage  N18.9 585.9     Patient Active Problem List   Diagnosis    Essential hypertension    Vitamin D deficiency    Fatigue    Chronic kidney disease, stage III (moderate)     Osteoarthritis    Dyslipidemia    Post-menopausal bleeding    Endometrial cancer    Closed fracture of left hip    Asymptomatic bacteriuria    Post-traumatic osteoarthritis of left hip    Status post total replacement of left hip    Prediabetes    Acute blood loss anemia, asymptomatic    Postoperative urinary retention    Myopia    Posterior crocodile shagreen of cornea    Presbyopia    Ptosis of eyelid    Regular astigmatism    Retinal neovascularization    After cataract    Secondary cataract    Premature atrial contractions    Heart murmur    Nocturnal hypoxia    Chronic deep vein thrombosis (DVT) of both lower extremities    Retinal neovascularization    Pleural effusion on left     Past Medical History:   Diagnosis Date    Abscess of back     Arrhythmia     frequent PVC    Cancer 12/2011    Endometrial cancer, status post robotically assisted hysterectomy with Dr. Haddad, December 2011.      CKD (chronic kidney disease), stage III     no dilaysis     Dizzy     Dvt femoral (deep venous thrombosis) 2017    s/p hip surgery in 2017. Took xarelto until August 2018    Dyslipidemia     Dyslipidemia.  Observing.    Enthesopathy of hip region     Generalized osteoarthrosis, involving multiple sites     Headache     Hearing loss     Hypertension     Low backache     Needs flu shot     Osteoarthritis     Osteoarthritis with questionable carpal tunnel syndrome bilaterally.     Otitis, serous     Pneumonia 1967    Remote pneumonia, 1967.     Retinal hemorrhage     SI joint arthritis     SOB (shortness of breath)     Syncope 2001    Remote syncope with working  diagnosis of vasodepressor, 2001.     Venous insufficiency of leg     Wears glasses     readers     Past Surgical History:   Procedure Laterality Date    CATARACT EXTRACTION      bilat     COLONOSCOPY      HIP PERCUTANEOUS PINNING Left 11/24/2017    Procedure: HIP PERCUTANEOUS PINNING;  Surgeon: Lucas Swanson MD;  Location: [facility] OR;  Service:     HYSTERECTOMY      total? but unsure     KNEE SURGERY  2001    Remote knee surgery in 2001.- right     TOTAL HIP ARTHROPLASTY Left 10/26/2018    Procedure: TOTAL HIP ARTHROPLASTY LEFT;  Surgeon: Stephen Baker MD;  Location: [facility] OR;  Service: Orthopedics      General Information       Row Name 01/27/25 1114          Physical Therapy Time and Intention    Document Type therapy note (daily note)  -KG     Mode of Treatment physical therapy  -KG       Row Name 01/27/25 1114          General Information    Existing Precautions/Restrictions fall;oxygen therapy device and L/min  -KG     Barriers to Rehab medically complex;previous functional deficit;cognitive status  -KG       Row Name 01/27/25 1114          Cognition    Orientation Status (Cognition) oriented to;person;place  -KG       Row Name 01/27/25 1114          Safety Issues/Impairments Affecting Functional Mobility    Safety Issues Affecting Function (Mobility) awareness of need for assistance;insight into deficits/self-awareness;safety precaution awareness;safety precautions follow-through/compliance;sequencing abilities  -KG     Impairments Affecting Function (Mobility) balance;cognition;coordination;endurance/activity tolerance;postural/trunk control;shortness of breath;strength  -KG     Cognitive Impairments, Mobility Safety/Performance awareness, need for assistance;insight into deficits/self-awareness;safety precaution awareness;safety precaution follow-through;sequencing abilities  -KG               User Key  (r) = Recorded By, (t) = Taken By, (c) = Cosigned By      Initials Name Provider Type    KG  Ann Nicole, PT Physical Therapist                   Mobility       Row Name 01/27/25 1115          Bed Mobility    Comment, (Bed Mobility) UIC  -KG       Row Name 01/27/25 1115          Transfers    Comment, (Transfers) VC's for sequencing and safe hand placement. Pt required increased assistance for STS transfer from lower height of rollator.  -KG       Row Name 01/27/25 1115          Sit-Stand Transfer    Sit-Stand [place] (Transfers) minimum assist (75% patient effort);verbal cues  required modA for STS from rollator  -KG     Assistive Device (Sit-Stand Transfers) walker, 4-wheeled  -KG       Row Name 01/27/25 1115          Gait/Stairs (Locomotion)    [place] Level (Gait) minimum assist (75% patient effort);verbal cues  -KG     Assistive Device (Gait) walker, 4-wheeled  -KG     Distance in Feet (Gait) 15  +15  -KG     Deviations/Abnormal Patterns (Gait) bilateral deviations;base of support, narrow;mike decreased;festinating/shuffling;stride length decreased  -KG     Bilateral Gait Deviations forward flexed posture;heel strike decreased  -KG     Comment, (Gait/Stairs) Pt demonstrated step to gait pattern with very slow mike and short, shuffled steps. Pt with very forward flexed posture; required max cueing for upright posture and to keep rollator close. Minimal to no improvement despite cues. Pt required one prolonged seated rest break due to c/o increased SOA and fatigue. Pt with worsening balance and coordination during second half of ambulation. Tendency to reach out for furniture while ambulating, requiring maxA to maintain hold on rollator with both hands.  -KG               User Key  (r) = Recorded By, (t) = Taken By, (c) = Cosigned By      Initials Name Provider Type    KG Ann Nicole, PT Physical Therapist                   Obj/Interventions       Row Name 01/27/25 1117          Balance    Balance Assessment sitting static balance;standing static balance;standing  dynamic balance  -KG     Static Sitting Balance supervision  -KG     Position, Sitting Balance unsupported;sitting in chair  -KG     Static Standing Balance minimal assist  -KG     Dynamic Standing Balance moderate assist  -KG     Position/Device Used, Standing Balance supported;walker, 4-wheeled  -KG               User Key  (r) = Recorded By, (t) = Taken By, (c) = Cosigned By      Initials Name Provider Type    KG Ann Nicole N, PT Physical Therapist                   Goals/Plan    No documentation.                  Clinical Impression       Row Name 01/27/25 1118          Pain    Pretreatment Pain Rating 0/10 - no pain  -KG     Posttreatment Pain Rating 0/10 - no pain  -KG       Row Name 01/27/25 1118          Plan of Care Review    Plan of Care Reviewed With patient  -KG     Progress declining  -KG     Outcome Evaluation Pt ambulated 15ft +15ft with rollator and Xiomara. Max cueing for upright posture with forward gaze and to keep rollator close with feet inside middle. Pt required one prolonged seated rest break due to c/o increased SOA and fatigue. Pt with worsening balance and coordination during second bout of ambulation. Continue to recommend PT skilled care and D/C to SNF.  -KG       Row Name 01/27/25 1118          Vital Signs    Pre Systolic BP Rehab 116  -KG     Pre Treatment Diastolic BP 83  -KG     Pretreatment Heart Rate (beats/min) 91  -KG     Posttreatment Heart Rate (beats/min) 103  -KG     Pre SpO2 (%) 92  -KG     O2 Delivery Pre Treatment supplemental O2  -KG     Post SpO2 (%) 88  -KG     O2 Delivery Post Treatment supplemental O2  -KG     Pre Patient Position Sitting  -KG     Intra Patient Position Standing  -KG     Post Patient Position Sitting  -KG       Row Name 01/27/25 1118          Positioning and Restraints    Pre-Treatment Position sitting in chair/recliner  -KG     Post Treatment Position chair  -KG     In Chair notified nsg;reclined;call light within reach;encouraged to call for  assist;exit alarm on;with family/caregiver;RUE elevated;LUE elevated;legs elevated  -KG               User Key  (r) = Recorded By, (t) = Taken By, (c) = Cosigned By      Initials Name Provider Type    Ann Elder PT Physical Therapist                   Outcome Measures       Row Name 01/27/25 1120 01/27/25 0800       How much help from another person do you currently need...    Turning from your back to your side while in flat bed without using bedrails? 3  -KG 3  -AR    Moving from lying on back to sitting on the side of a flat bed without bedrails? 3  -KG 3  -AR    Moving to and from a bed to a chair (including a wheelchair)? 3  -KG 3  -AR    Standing up from a chair using your arms (e.g., wheelchair, bedside chair)? 3  -KG 3  -AR    Climbing 3-5 steps with a railing? 2  -KG 2  -AR    To walk in hospital room? 2  -KG 2  -AR    AM-PAC 6 Clicks Score (PT) 16  -KG 16  -AR    Highest Level of Mobility Goal 5 --> Static standing  -KG 5 --> Static standing  -AR      Row Name 01/27/25 1120          Functional Assessment    Outcome Measure Options AM-PAC 6 Clicks Basic Mobility (PT)  -KG               User Key  (r) = Recorded By, (t) = Taken By, (c) = Cosigned By      Initials Name Provider Type    Ann Elder PT Physical Therapist    Hai Manley, RN Registered Nurse                                 Physical Therapy Education       Title: PT OT SLP Therapies (In Progress)       Topic: Physical Therapy (In Progress)       Point: Mobility training (In Progress)       Learning Progress Summary            Patient Acceptance, E, NR by KG at 1/27/2025 1031    Acceptance, E, NR by GUSTAVO at 1/24/2025 1430    Acceptance, E,D, VU,NR by LR at 1/23/2025 1407    Comment: Educated on benefits of mobility, safety with mobility, correct supine to sit t/f technique, correct sit<->stand t/f technique, correct gait mechanics, LE HEP, and progression of POC.    Acceptance, E, NR by KG at 1/21/2025 1511                       Point: Home exercise program (In Progress)       Learning Progress Summary            Patient Acceptance, E, NR by KG at 1/27/2025 1031    Acceptance, E, NR by GUSTAVO at 1/24/2025 1430    Acceptance, E,D, VU,NR by LR at 1/23/2025 1407    Comment: Educated on benefits of mobility, safety with mobility, correct supine to sit t/f technique, correct sit<->stand t/f technique, correct gait mechanics, LE HEP, and progression of POC.                      Point: Body mechanics (In Progress)       Learning Progress Summary            Patient Acceptance, E, NR by KG at 1/27/2025 1031    Acceptance, E, NR by GUSTAVO at 1/24/2025 1430    Acceptance, E,D, VU,NR by LR at 1/23/2025 1407    Comment: Educated on benefits of mobility, safety with mobility, correct supine to sit t/f technique, correct sit<->stand t/f technique, correct gait mechanics, LE HEP, and progression of POC.    Acceptance, E, NR by KG at 1/21/2025 1511                      Point: Precautions (In Progress)       Learning Progress Summary            Patient Acceptance, E, NR by KG at 1/27/2025 1031    Acceptance, E, NR by GUSTAVO at 1/24/2025 1430    Acceptance, E,D, VU,NR by LR at 1/23/2025 1407    Comment: Educated on benefits of mobility, safety with mobility, correct supine to sit t/f technique, correct sit<->stand t/f technique, correct gait mechanics, LE HEP, and progression of POC.    Acceptance, E, NR by KG at 1/21/2025 1511                                      User Key       Initials Effective Dates Name Provider Type Discipline    GUSTAVO 02/03/23 -  Audrey Gore, PT Physical Therapist PT    LR 02/03/23 -  Lola Finch, PT Physical Therapist PT    KG 05/22/20 -  Ann Nicole, PT Physical Therapist PT                  PT Recommendation and Plan  Planned Therapy Interventions (PT): balance training, bed mobility training, gait training, strengthening, transfer training  Progress: declining  Outcome Evaluation: Pt ambulated 15ft +15ft  with rollator and Xiomara. Max cueing for upright posture with forward gaze and to keep rollator close with feet inside middle. Pt required one prolonged seated rest break due to c/o increased SOA and fatigue. Pt with worsening balance and coordination during second bout of ambulation. Continue to recommend PT skilled care and D/C to SNF.     Time Calculation:   PT Evaluation Complexity  History, PT Evaluation Complexity: 3 or more personal factors and/or comorbidities  Examination of Body Systems (PT Eval Complexity): total of 3 or more elements  Clinical Presentation (PT Evaluation Complexity): evolving  Clinical Decision Making (PT Evaluation Complexity): moderate complexity  Overall Complexity (PT Evaluation Complexity): moderate complexity     PT Charges       Row Name 01/27/25 1031             Time Calculation    Start Time 1031  -KG      PT Received On 01/27/25  -KG      PT Goal Re-Cert Due Date 01/31/25  -KG         Time Calculation- PT    Total Timed Code Minutes- PT 24 minute(s)  -KG         Timed Charges    33304 - PT Therapeutic Activity Minutes 24  -KG         Total Minutes    Timed Charges Total Minutes 24  -KG       Total Minutes 24  -KG                User Key  (r) = Recorded By, (t) = Taken By, (c) = Cosigned By      Initials Name Provider Type    KG Ann Nicole, PT Physical Therapist                  Therapy Charges for Today       Code Description Service Date Service Provider Modifiers Qty    [id number] HC PT THERAPEUTIC ACT EA 15 MIN 1/27/2025 Ann Nicole, PT GP 2            PT G-Codes  Outcome Measure Options: AM-PAC 6 Clicks Basic Mobility (PT)  AM-PAC 6 Clicks Score (PT): 16  AM-PAC 6 Clicks Score (OT): 17  PT Discharge Summary  Anticipated Discharge Disposition (PT): skilled nursing facility    Kelsey Nicole PT  1/27/2025      Electronically signed by Ann Nicole, PT at 01/27/25 1121          Occupational Therapy Notes (most recent note)        Teddy  Kaye, OT at 25 2306          Patient Name: Rose J Kellermann  : 1926    MRN: [id number]                              Today's Date: 2025       Admit Date: 2025    Visit Dx:     ICD-10-CM ICD-9-CM   1. Pleural effusion, left  J90 511.9   2. Hypoxia  R09.02 799.02   3. Chronic kidney disease, unspecified CKD stage  N18.9 585.9     Patient Active Problem List   Diagnosis    Essential hypertension    Vitamin D deficiency    Fatigue    Chronic kidney disease, stage III (moderate)     Osteoarthritis    Dyslipidemia    Post-menopausal bleeding    Endometrial cancer    Closed fracture of left hip    Asymptomatic bacteriuria    Post-traumatic osteoarthritis of left hip    Status post total replacement of left hip    Prediabetes    Acute blood loss anemia, asymptomatic    Postoperative urinary retention    Myopia    Posterior crocodile shagreen of cornea    Presbyopia    Ptosis of eyelid    Regular astigmatism    Retinal neovascularization    After cataract    Secondary cataract    Premature atrial contractions    Heart murmur    Nocturnal hypoxia    Chronic deep vein thrombosis (DVT) of both lower extremities    Retinal neovascularization    Pleural effusion on left     Past Medical History:   Diagnosis Date    Abscess of back     Arrhythmia     frequent PVC    Cancer 2011    Endometrial cancer, status post robotically assisted hysterectomy with Dr. Haddad, 2011.      CKD (chronic kidney disease), stage III     no dilaysis     Dizzy     Dvt femoral (deep venous thrombosis) 2017    s/p hip surgery in . Took xarelto until 2018    Dyslipidemia     Dyslipidemia.  Observing.    Enthesopathy of hip region     Generalized osteoarthrosis, involving multiple sites     Headache     Hearing loss     Hypertension     Low backache     Needs flu shot     Osteoarthritis     Osteoarthritis with questionable carpal tunnel syndrome bilaterally.     Otitis, serous     Pneumonia 1967    Remote  pneumonia, 1967.     Retinal hemorrhage     SI joint arthritis     SOB (shortness of breath)     Syncope 2001    Remote syncope with working diagnosis of vasodepressor, 2001.     Venous insufficiency of leg     Wears glasses     readers     Past Surgical History:   Procedure Laterality Date    CATARACT EXTRACTION      bilat     COLONOSCOPY      HIP PERCUTANEOUS PINNING Left 11/24/2017    Procedure: HIP PERCUTANEOUS PINNING;  Surgeon: Lucas Swanson MD;  Location:  [facility] OR;  Service:     HYSTERECTOMY      total? but unsure     KNEE SURGERY  2001    Remote knee surgery in 2001.- right     TOTAL HIP ARTHROPLASTY Left 10/26/2018    Procedure: TOTAL HIP ARTHROPLASTY LEFT;  Surgeon: Stephen Baker MD;  Location:  [facility] OR;  Service: Orthopedics      General Information       Row Name 01/22/25 1504          OT Time and Intention    Document Type evaluation  -MR     Mode of Treatment occupational therapy  -       Row Name 01/22/25 1504          General Information    Patient Profile Reviewed yes  -MR     Prior Level of Function independent:;all household mobility;gait;transfer;ADL's;dressing;bathing  -MR     Existing Precautions/Restrictions fall;oxygen therapy device and L/min;other (see comments)  L sided chest tube  -MR     Barriers to Rehab medically complex  -MR       Row Name 01/22/25 1504          Living Environment    People in Home alone;other (see comments)  Daughter lives nearby and checks in daily.  -MR       Row Name 01/22/25 1504          Home Main Entrance    Number of Stairs, Main Entrance three  -MR     Stair Railings, Main Entrance railings on both sides of stairs  -MR       Row Name 01/22/25 1504          Stairs Within Home, Primary    Number of Stairs, Within Home, Primary none  -MR       Row Name 01/22/25 1504          Cognition    Orientation Status (Cognition) oriented x 3  -MR       Row Name 01/22/25 1504          Safety Issues/Impairments Affecting Functional Mobility    Safety Issues  Affecting Function (Mobility) awareness of need for assistance;insight into deficits/self-awareness;safety precaution awareness;safety precautions follow-through/compliance  -MR     Impairments Affecting Function (Mobility) balance;coordination;endurance/activity tolerance;postural/trunk control;shortness of breath;strength  -MR               User Key  (r) = Recorded By, (t) = Taken By, (c) = Cosigned By      Initials Name Provider Type    MR Kaye Espinal OT Occupational Therapist                     Mobility/ADL's       Row Name 01/22/25 1510          Bed Mobility    Bed Mobility supine-sit;sit-supine  -MR     Scooting/Bridging [place] (Bed Mobility) maximum assist (25% patient effort);2 person assist;verbal cues  -MR     Supine-Sit [place] (Bed Mobility) verbal cues;contact guard  -MR     Sit-Supine [place] (Bed Mobility) contact guard;verbal cues  -MR     Assistive Device (Bed Mobility) bed rails;head of bed elevated;repositioning sheet  -MR       Row Name 01/22/25 1510          Transfers    Transfers sit-stand transfer  -MR       Row Name 01/22/25 1510          Sit-Stand Transfer    Sit-Stand [place] (Transfers) minimum assist (75% patient effort);verbal cues  -MR     Assistive Device (Sit-Stand Transfers) other (see comments)  HHA  -MR       Row Name 01/22/25 1510          Activities of Daily Living    BADL Assessment/Intervention lower body dressing;upper body dressing  -MR       Row Name 01/22/25 1510          Lower Body Dressing Assessment/Training    [place] Level (Lower Body Dressing) don;other (see comments);maximum assist (25% patient effort);socks  -MR     Position (Lower Body Dressing) supine  -MR       Row Name 01/22/25 1510          Upper Body Dressing Assessment/Training    [place] Level (Upper Body Dressing) don;minimum assist (75% patient effort)  -MR     Position (Upper Body Dressing) edge of bed sitting  -MR               User Key  (r) = Recorded By, (t) = Taken  By, (c) = Cosigned By      Initials Name Provider Type     Teddy KayeSALVADOR Occupational Therapist                   Obj/Interventions       Row Name 01/22/25 1512          Sensory Assessment (Somatosensory)    Sensory Assessment (Somatosensory) UE sensation intact  -MR       Row Name 01/22/25 1512          Vision Assessment/Intervention    Visual Impairment/Limitations WFL  -MR       Row Name 01/22/25 1512          Range of Motion Comprehensive    General Range of Motion bilateral upper extremity ROM WFL  -MR       Row Name 01/22/25 1512          Strength Comprehensive (MMT)    Comment, General Manual Muscle Testing (MMT) Assessment BUE grossly 3+/5 in all functional planes  -MR       Row Name 01/22/25 1512          Balance    Balance Assessment sitting static balance;sitting dynamic balance;standing static balance;standing dynamic balance  -MR     Static Sitting Balance standby assist  -MR     Dynamic Sitting Balance contact guard  -MR     Position, Sitting Balance unsupported  -MR     Static Standing Balance contact guard  -MR     Dynamic Standing Balance minimal assist  -MR     Position/Device Used, Standing Balance supported;walker, front-wheeled  -MR     Balance Interventions sitting;standing;sit to stand;supported;static;dynamic;minimal challenge;occupation based/functional task  -MR     Comment, Balance No overt LOB noted w/ transfer, unsteady in standing d/t generalized weakness  -MR               User Key  (r) = Recorded By, (t) = Taken By, (c) = Cosigned By      Initials Name Provider Type    Kaye DickinsonSALVADOR Occupational Therapist                   Goals/Plan       Row Name 01/22/25 1518          Bed Mobility Goal 1 (OT)    Activity/Assistive Device (Bed Mobility Goal 1, OT) sit to supine  -MR     [place] Level/Cues Needed (Bed Mobility Goal 1, OT) standby assist  -MR     Time Frame (Bed Mobility Goal 1, OT) short term goal (STG);3 days  -MR     Progress/Outcomes (Bed Mobility Goal 1, OT)  new goal  -MR       Row Name 01/22/25 1518          Transfer Goal 1 (OT)    Activity/Assistive Device (Transfer Goal 1, OT) sit-to-stand/stand-to-sit;toilet  -MR     [place] Level/Cues Needed (Transfer Goal 1, OT) contact guard required  -MR     Time Frame (Transfer Goal 1, OT) long term goal (LTG);5 days  -MR     Progress/Outcome (Transfer Goal 1, OT) new goal  -MR       Row Name 01/22/25 1518          Dressing Goal 1 (OT)    Activity/Device (Dressing Goal 1, OT) lower body dressing  -MR     [place]/Cues Needed (Dressing Goal 1, OT) moderate assist (50-74% patient effort)  -MR     Time Frame (Dressing Goal 1, OT) long term goal (LTG);5 days  -MR     Progress/Outcome (Dressing Goal 1, OT) new goal  -MR       Row Name 01/22/25 1518          Therapy Assessment/Plan (OT)    Planned Therapy Interventions (OT) activity tolerance training;BADL retraining;adaptive equipment training;functional balance retraining;IADL retraining;wheelchair assessment/training;transfer/mobility retraining;strengthening exercise;ROM/therapeutic exercise;patient/caregiver education/training;occupation/activity based interventions  -MR               User Key  (r) = Recorded By, (t) = Taken By, (c) = Cosigned By      Initials Name Provider Type     TeddyKaye, OT Occupational Therapist                   Clinical Impression       Row Name 01/22/25 1513          Pain Assessment    Pretreatment Pain Rating 0/10 - no pain  -MR     Posttreatment Pain Rating 0/10 - no pain  -MR     Pain Side/Orientation left  -MR       Row Name 01/22/25 1513          Plan of Care Review    Plan of Care Reviewed With patient  -MR     Progress no change  -MR     Outcome Evaluation Patient presenting below her functional baseline w/ transfers, mobility and balance limiting independence w/ ADL based tasks. Pt limited this date by generalized weakness, deconditioning and desaturation to 88% on 3L via NC requiring increase to 4L. RN aware and managing. Pt's  deficits warrant skilled OT services. Recommend inpatient rehab at d/c for best functional outcome.  -MR       Row Name 01/22/25 1513          Therapy Assessment/Plan (OT)    Patient/Family Therapy Goal Statement (OT) Return to PLOF  -MR     Rehab Potential (OT) good  -MR     Criteria for Skilled Therapeutic Interventions Met (OT) yes;meets criteria;skilled treatment is necessary  -MR     Therapy Frequency (OT) daily  -MR     Predicted Duration of Therapy Intervention (OT) 5 days  -MR       Row Name 01/22/25 1513          Therapy Plan Review/Discharge Plan (OT)    Anticipated Discharge Disposition (OT) inpatient rehabilitation facility  -MR       Row Name 01/22/25 1513          Vital Signs    Pre Systolic BP Rehab 126  -MR     Pre Treatment Diastolic BP 80  -MR     Pre SpO2 (%) 89  -MR     O2 Delivery Pre Treatment nasal cannula  -MR     Intra SpO2 (%) 86  -MR     O2 Delivery Intra Treatment nasal cannula  -MR     Post SpO2 (%) 90  -MR     O2 Delivery Post Treatment nasal cannula  -MR     Pre Patient Position Supine  -MR     Intra Patient Position Standing  -MR     Post Patient Position Supine  -MR       Row Name 01/22/25 1513          Positioning and Restraints    Pre-Treatment Position in bed  -MR     Post Treatment Position bed  -MR     In Bed notified nsg;supine;call light within reach;encouraged to call for assist;exit alarm on;side rails up x2  -MR               User Key  (r) = Recorded By, (t) = Taken By, (c) = Cosigned By      Initials Name Provider Type    Kaye Dickinson, OT Occupational Therapist                   Outcome Measures       Row Name 01/22/25 1519          How much help from another is currently needed...    Putting on and taking off regular lower body clothing? 2  -MR     Bathing (including washing, rinsing, and drying) 2  -MR     Toileting (which includes using toilet bed pan or urinal) 3  -MR     Putting on and taking off regular upper body clothing 3  -MR     Taking care of personal  grooming (such as brushing teeth) 3  -MR     Eating meals 4  -MR     AM-PAC 6 Clicks Score (OT) 17  -MR       Row Name 01/22/25 0800          How much help from another person do you currently need...    Turning from your back to your side while in flat bed without using bedrails? 3  -AR     Moving from lying on back to sitting on the side of a flat bed without bedrails? 3  -AR     Moving to and from a bed to a chair (including a wheelchair)? 2  -AR     Standing up from a chair using your arms (e.g., wheelchair, bedside chair)? 2  -AR     Climbing 3-5 steps with a railing? 2  -AR     To walk in hospital room? 3  -AR     AM-PAC 6 Clicks Score (PT) 15  -AR     Highest Level of Mobility Goal 4 --> Transfer to chair/commode  -AR       Row Name 01/22/25 1519          Functional Assessment    Outcome Measure Options AM-PAC 6 Clicks Daily Activity (OT)  -MR               User Key  (r) = Recorded By, (t) = Taken By, (c) = Cosigned By      Initials Name Provider Type    Hai Manley, RN Registered Nurse    Kaye Dickinson, OT Occupational Therapist                    Occupational Therapy Education       Title: PT OT SLP Therapies (In Progress)       Topic: Occupational Therapy (In Progress)       Point: ADL training (Done)       Description:   Instruct learner(s) on proper safety adaptation and remediation techniques during self care or transfers.   Instruct in proper use of assistive devices.                  Learning Progress Summary            Patient Acceptance, E, VU,NR by MR at 1/22/2025 1519   Family Acceptance, E, VU,NR by MR at 1/22/2025 1519                      Point: Home exercise program (Not Started)       Description:   Instruct learner(s) on appropriate technique for monitoring, assisting and/or progressing therapeutic exercises/activities.                  Learner Progress:  Not documented in this visit.              Point: Precautions (Done)       Description:   Instruct learner(s) on prescribed  precautions during self-care and functional transfers.                  Learning Progress Summary            Patient Acceptance, E, VU,NR by MR at 1/22/2025 1519   Family Acceptance, E, VU,NR by MR at 1/22/2025 1519                      Point: Body mechanics (Done)       Description:   Instruct learner(s) on proper positioning and spine alignment during self-care, functional mobility activities and/or exercises.                  Learning Progress Summary            Patient Acceptance, E, VU,NR by MR at 1/22/2025 1519   Family Acceptance, E, VU,NR by MR at 1/22/2025 1519                                      User Key       Initials Effective Dates Name Provider Type Discipline    MR 09/22/22 -  Kaye Espinal, OT Occupational Therapist OT                  OT Recommendation and Plan  Planned Therapy Interventions (OT): activity tolerance training, BADL retraining, adaptive equipment training, functional balance retraining, IADL retraining, wheelchair assessment/training, transfer/mobility retraining, strengthening exercise, ROM/therapeutic exercise, patient/caregiver education/training, occupation/activity based interventions  Therapy Frequency (OT): daily  Plan of Care Review  Plan of Care Reviewed With: patient  Progress: no change  Outcome Evaluation: Patient presenting below her functional baseline w/ transfers, mobility and balance limiting independence w/ ADL based tasks. Pt limited this date by generalized weakness, deconditioning and desaturation to 88% on 3L via NC requiring increase to 4L. RN aware and managing. Pt's deficits warrant skilled OT services. Recommend inpatient rehab at d/c for best functional outcome.     Time Calculation:   Evaluation Complexity (OT)  Review Occupational Profile/Medical/Therapy History Complexity: brief/low complexity  Assessment, Occupational Performance/Identification of Deficit Complexity: 1-3 performance deficits  Clinical Decision Making Complexity (OT): problem focused  assessment/low complexity  Overall Complexity of Evaluation (OT): low complexity     Time Calculation- OT       Row Name 01/22/25 1519             Time Calculation- OT    OT Start Time 1357  -MR      OT Received On 01/22/25  -MR      OT Goal Re-Cert Due Date 02/01/25  -MR         Untimed Charges    OT Eval/Re-eval Minutes 46  -MR         Total Minutes    Untimed Charges Total Minutes 46  -MR       Total Minutes 46  -MR                User Key  (r) = Recorded By, (t) = Taken By, (c) = Cosigned By      Initials Name Provider Type    Kaye Dickinson OT Occupational Therapist                  Therapy Charges for Today       Code Description Service Date Service Provider Modifiers Qty    [id number] HC OT EVAL LOW COMPLEXITY 4 1/22/2025 Kaye Espinal OT GO 1                 Kaye Espinal OT  1/22/2025    Electronically signed by Kaye Espinal OT at 01/22/25 1521

## 2025-01-27 NOTE — CASE MANAGEMENT/SOCIAL WORK
Case Management Discharge Note      Final Note: Pt is being discharged to [facility] on 1/28/25.  updated Dr. Felix and pt's RN/Hai through messaging. CM updated pt's daughter, Areli, by phone. Areli will transport pt. Pt needs to arrive at  at 1300. RN call report to [phone]. Brenda/[facility]ha will pull discharge summary. Pharmacy updated in [facility].         Selected Continued Care - Admitted Since 1/20/2025       Destination Coordination complete.      Service Provider Services Address Phone Fax Patient Preferred    [facility] [place] [facility] [address] [place] [address] [phone] --              Durable Medical Equipment    No services have been selected for the patient.                Dialysis/Infusion    No services have been selected for the patient.                Home Medical Care    No services have been selected for the patient.                Therapy    No services have been selected for the patient.                Community Resources    No services have been selected for the patient.                Community & DME    No services have been selected for the patient.                         Final Discharge Disposition Code: 03 - skilled nursing facility (SNF)

## 2025-01-27 NOTE — CASE MANAGEMENT/SOCIAL WORK
Continued Stay Note  [facility]     Patient Name: Rose J Kellermann  MRN: [id number]  Today's Date: 1/27/2025    Admit Date: 1/20/2025    Plan: Rehab   Discharge Plan       Row Name 01/27/25 1130       Plan    Plan Rehab    Patient/Family in Agreement with Plan yes    Plan Comments  received a call from pt's daughter, Areli, stating that she would like rehab referral first choice to be [facility].  spoke with Brenda/Radha. She is checking on bed availability. CM spoke with Cedrick/[facility], she is checking on bed availability on [facility]. Pt needs updated OT notes. CM sent an email to the rehab department today. Pt was discussed in Medical Rounds. Pt is medically ready for rehab. Pt will require an insurance precert once a bed is accepted.  will continue to follow.     received phone call from [facility]. Brenda spoke with pt's dtr/Areli and offered a rehab bed. Dtr accepted. CM initiated insurance precert today. CM updated Areli, by phone. Areli states that she can transport pt, at discharge. CM updated pt's RN/Hai, by phone.     Final Discharge Disposition Code 03 - skilled nursing facility (SNF)                   Discharge Codes    No documentation.                 Expected Discharge Date and Time       Expected Discharge Date Expected Discharge Time    Jan 27, 2025               Charo Tomas, RN

## 2025-01-28 ENCOUNTER — APPOINTMENT (OUTPATIENT)
Dept: GENERAL RADIOLOGY | Facility: HOSPITAL | Age: OVER 89
End: 2025-01-28
Payer: MEDICARE

## 2025-01-28 ENCOUNTER — APPOINTMENT (OUTPATIENT)
Dept: CT IMAGING | Facility: HOSPITAL | Age: OVER 89
End: 2025-01-28
Payer: MEDICARE

## 2025-01-28 ENCOUNTER — APPOINTMENT (OUTPATIENT)
Dept: CARDIOLOGY | Facility: HOSPITAL | Age: OVER 89
End: 2025-01-28
Payer: MEDICARE

## 2025-01-28 LAB
ANION GAP SERPL CALCULATED.3IONS-SCNC: 17 MMOL/L (ref 5–15)
ARTERIAL PATENCY WRIST A: ABNORMAL
ASCENDING AORTA: 3.7 CM
ATMOSPHERIC PRESS: ABNORMAL MM[HG]
AV MEAN PRESS GRAD SYS DOP V1V2: 13 MMHG
AV VMAX SYS DOP: 245.6 CM/SEC
BASE EXCESS BLDA CALC-SCNC: -2.9 MMOL/L (ref 0–2)
BASOPHILS # BLD AUTO: 0.03 10*3/MM3 (ref 0–0.2)
BASOPHILS NFR BLD AUTO: 0.3 % (ref 0–1.5)
BDY SITE: ABNORMAL
BH CV ECHO MEAS - AO MAX PG: 24.1 MMHG
BH CV ECHO MEAS - AO ROOT DIAM: 3.6 CM
BH CV ECHO MEAS - AO V2 VTI: 37.6 CM
BH CV ECHO MEAS - AVA(I,D): 1.17 CM2
BH CV ECHO MEAS - EDV(CUBED): 24.4 ML
BH CV ECHO MEAS - EDV(MOD-SP2): 40.1 ML
BH CV ECHO MEAS - EDV(MOD-SP4): 40 ML
BH CV ECHO MEAS - EF(MOD-SP2): 56.9 %
BH CV ECHO MEAS - EF(MOD-SP4): 70 %
BH CV ECHO MEAS - ESV(CUBED): 6.9 ML
BH CV ECHO MEAS - ESV(MOD-SP2): 17.3 ML
BH CV ECHO MEAS - ESV(MOD-SP4): 12 ML
BH CV ECHO MEAS - FS: 34.5 %
BH CV ECHO MEAS - IVS/LVPW: 1 CM
BH CV ECHO MEAS - IVSD: 1.1 CM
BH CV ECHO MEAS - LA DIMENSION: 4 CM
BH CV ECHO MEAS - LAT PEAK E' VEL: 6.6 CM/SEC
BH CV ECHO MEAS - LV MASS(C)D: 90.7 GRAMS
BH CV ECHO MEAS - LV MAX PG: 3.7 MMHG
BH CV ECHO MEAS - LV MEAN PG: 2 MMHG
BH CV ECHO MEAS - LV V1 MAX: 96.2 CM/SEC
BH CV ECHO MEAS - LV V1 VTI: 14 CM
BH CV ECHO MEAS - LVIDD: 2.9 CM
BH CV ECHO MEAS - LVIDS: 1.9 CM
BH CV ECHO MEAS - LVOT AREA: 3.1 CM2
BH CV ECHO MEAS - LVOT DIAM: 2 CM
BH CV ECHO MEAS - LVPWD: 1.1 CM
BH CV ECHO MEAS - MED PEAK E' VEL: 7.6 CM/SEC
BH CV ECHO MEAS - MV DEC SLOPE: 504 CM/SEC2
BH CV ECHO MEAS - MV E MAX VEL: 147 CM/SEC
BH CV ECHO MEAS - MV MAX PG: 8.6 MMHG
BH CV ECHO MEAS - MV MEAN PG: 4.2 MMHG
BH CV ECHO MEAS - MV P1/2T: 102.3 MSEC
BH CV ECHO MEAS - MV V2 VTI: 33.4 CM
BH CV ECHO MEAS - MVA(P1/2T): 2.15 CM2
BH CV ECHO MEAS - MVA(VTI): 1.31 CM2
BH CV ECHO MEAS - PA ACC TIME: 0.07 SEC
BH CV ECHO MEAS - RAP SYSTOLE: 15 MMHG
BH CV ECHO MEAS - RVSP: 57 MMHG
BH CV ECHO MEAS - SV(LVOT): 43.8 ML
BH CV ECHO MEAS - SV(MOD-SP2): 22.8 ML
BH CV ECHO MEAS - SV(MOD-SP4): 28 ML
BH CV ECHO MEAS - TAPSE (>1.6): 1.15 CM
BH CV ECHO MEAS - TR MAX PG: 41.5 MMHG
BH CV ECHO MEAS - TR MAX VEL: 322 CM/SEC
BH CV ECHO MEASUREMENTS AVERAGE E/E' RATIO: 20.7
BH CV XLRA - RV BASE: 3.4 CM
BH CV XLRA - RV LENGTH: 6.2 CM
BH CV XLRA - RV MID: 2.2 CM
BH CV XLRA - TDI S': 11.9 CM/SEC
BODY TEMPERATURE: 37
BUN SERPL-MCNC: 75 MG/DL (ref 8–23)
BUN/CREAT SERPL: 25.9 (ref 7–25)
CALCIUM SPEC-SCNC: 8.6 MG/DL (ref 8.2–9.6)
CHLORIDE SERPL-SCNC: 97 MMOL/L (ref 98–107)
CO2 BLDA-SCNC: 22.5 MMOL/L (ref 22–33)
CO2 SERPL-SCNC: 19 MMOL/L (ref 22–29)
COHGB MFR BLD: 1.1 % (ref 0–2)
CREAT SERPL-MCNC: 2.9 MG/DL (ref 0.57–1)
DEPRECATED RDW RBC AUTO: 47.5 FL (ref 37–54)
EGFRCR SERPLBLD CKD-EPI 2021: 14.2 ML/MIN/1.73
EOSINOPHIL # BLD AUTO: 0.21 10*3/MM3 (ref 0–0.4)
EOSINOPHIL NFR BLD AUTO: 2.1 % (ref 0.3–6.2)
EPAP: 0
ERYTHROCYTE [DISTWIDTH] IN BLOOD BY AUTOMATED COUNT: 13.5 % (ref 12.3–15.4)
GLUCOSE BLDC GLUCOMTR-MCNC: 126 MG/DL (ref 70–130)
GLUCOSE SERPL-MCNC: 134 MG/DL (ref 65–99)
HCO3 BLDA-SCNC: 21.4 MMOL/L (ref 20–26)
HCT VFR BLD AUTO: 37.4 % (ref 34–46.6)
HCT VFR BLD CALC: 39.4 % (ref 38–51)
HGB BLD-MCNC: 11.9 G/DL (ref 12–15.9)
HGB BLDA-MCNC: 12.9 G/DL (ref 14–18)
IMM GRANULOCYTES # BLD AUTO: 0.03 10*3/MM3 (ref 0–0.05)
IMM GRANULOCYTES NFR BLD AUTO: 0.3 % (ref 0–0.5)
INHALED O2 CONCENTRATION: 40 %
IPAP: 0
LEFT ATRIUM VOLUME INDEX: 65 ML/M2
LV EF BIPLANE MOD: 64.4 %
LYMPHOCYTES # BLD AUTO: 0.64 10*3/MM3 (ref 0.7–3.1)
LYMPHOCYTES NFR BLD AUTO: 6.4 % (ref 19.6–45.3)
MCH RBC QN AUTO: 30.5 PG (ref 26.6–33)
MCHC RBC AUTO-ENTMCNC: 31.8 G/DL (ref 31.5–35.7)
MCV RBC AUTO: 95.9 FL (ref 79–97)
METHGB BLD QL: 0 % (ref 0–1.5)
MODALITY: ABNORMAL
MONOCYTES # BLD AUTO: 0.91 10*3/MM3 (ref 0.1–0.9)
MONOCYTES NFR BLD AUTO: 9.1 % (ref 5–12)
NEUTROPHILS NFR BLD AUTO: 8.17 10*3/MM3 (ref 1.7–7)
NEUTROPHILS NFR BLD AUTO: 81.8 % (ref 42.7–76)
NRBC BLD AUTO-RTO: 0 /100 WBC (ref 0–0.2)
OXYHGB MFR BLDV: 98.9 % (ref 94–99)
PAW @ PEAK INSP FLOW SETTING VENT: 0 CMH2O
PCO2 BLDA: 35.2 MM HG (ref 35–45)
PCO2 TEMP ADJ BLD: 35.2 MM HG (ref 35–45)
PH BLDA: 7.39 PH UNITS (ref 7.35–7.45)
PH, TEMP CORRECTED: 7.39 PH UNITS
PLATELET # BLD AUTO: 244 10*3/MM3 (ref 140–450)
PMV BLD AUTO: 10.3 FL (ref 6–12)
PO2 BLDA: 145 MM HG (ref 83–108)
PO2 TEMP ADJ BLD: 145 MM HG (ref 83–108)
POTASSIUM SERPL-SCNC: 4.8 MMOL/L (ref 3.5–5.2)
RBC # BLD AUTO: 3.9 10*6/MM3 (ref 3.77–5.28)
SODIUM SERPL-SCNC: 133 MMOL/L (ref 136–145)
TOTAL RATE: 0 BREATHS/MINUTE
WBC NRBC COR # BLD AUTO: 9.99 10*3/MM3 (ref 3.4–10.8)

## 2025-01-28 PROCEDURE — 70450 CT HEAD/BRAIN W/O DYE: CPT

## 2025-01-28 PROCEDURE — 93306 TTE W/DOPPLER COMPLETE: CPT | Performed by: INTERNAL MEDICINE

## 2025-01-28 PROCEDURE — 82805 BLOOD GASES W/O2 SATURATION: CPT

## 2025-01-28 PROCEDURE — 93306 TTE W/DOPPLER COMPLETE: CPT

## 2025-01-28 PROCEDURE — 36600 WITHDRAWAL OF ARTERIAL BLOOD: CPT

## 2025-01-28 PROCEDURE — 97530 THERAPEUTIC ACTIVITIES: CPT

## 2025-01-28 PROCEDURE — 80048 BASIC METABOLIC PNL TOTAL CA: CPT | Performed by: INTERNAL MEDICINE

## 2025-01-28 PROCEDURE — 93005 ELECTROCARDIOGRAM TRACING: CPT | Performed by: HOSPITALIST

## 2025-01-28 PROCEDURE — 82948 REAGENT STRIP/BLOOD GLUCOSE: CPT

## 2025-01-28 PROCEDURE — 82375 ASSAY CARBOXYHB QUANT: CPT

## 2025-01-28 PROCEDURE — 25010000002 BUMETANIDE PER 0.5 MG

## 2025-01-28 PROCEDURE — 93010 ELECTROCARDIOGRAM REPORT: CPT | Performed by: INTERNAL MEDICINE

## 2025-01-28 PROCEDURE — 71045 X-RAY EXAM CHEST 1 VIEW: CPT

## 2025-01-28 PROCEDURE — 85025 COMPLETE CBC W/AUTO DIFF WBC: CPT | Performed by: INTERNAL MEDICINE

## 2025-01-28 PROCEDURE — 94799 UNLISTED PULMONARY SVC/PX: CPT

## 2025-01-28 PROCEDURE — 99232 SBSQ HOSP IP/OBS MODERATE 35: CPT

## 2025-01-28 PROCEDURE — 83050 HGB METHEMOGLOBIN QUAN: CPT

## 2025-01-28 RX ORDER — BUMETANIDE 0.25 MG/ML
2 INJECTION, SOLUTION INTRAMUSCULAR; INTRAVENOUS EVERY 12 HOURS
Status: COMPLETED | OUTPATIENT
Start: 2025-01-29 | End: 2025-01-30

## 2025-01-28 RX ORDER — SODIUM CHLORIDE, SODIUM LACTATE, POTASSIUM CHLORIDE, CALCIUM CHLORIDE 600; 310; 30; 20 MG/100ML; MG/100ML; MG/100ML; MG/100ML
30 INJECTION, SOLUTION INTRAVENOUS CONTINUOUS
Status: DISCONTINUED | OUTPATIENT
Start: 2025-01-28 | End: 2025-01-28

## 2025-01-28 RX ORDER — BUMETANIDE 0.25 MG/ML
2 INJECTION, SOLUTION INTRAMUSCULAR; INTRAVENOUS ONCE
Status: COMPLETED | OUTPATIENT
Start: 2025-01-28 | End: 2025-01-28

## 2025-01-28 RX ADMIN — HYDRALAZINE HYDROCHLORIDE 25 MG: 25 TABLET ORAL at 08:24

## 2025-01-28 RX ADMIN — Medication 10 ML: at 08:25

## 2025-01-28 RX ADMIN — Medication 10 ML: at 20:23

## 2025-01-28 RX ADMIN — HYDRALAZINE HYDROCHLORIDE 25 MG: 25 TABLET ORAL at 20:23

## 2025-01-28 RX ADMIN — AMLODIPINE BESYLATE 5 MG: 5 TABLET ORAL at 08:24

## 2025-01-28 RX ADMIN — BUMETANIDE 2 MG: 0.25 INJECTION INTRAMUSCULAR; INTRAVENOUS at 18:06

## 2025-01-28 RX ADMIN — APIXABAN 5 MG: 5 TABLET, FILM COATED ORAL at 12:06

## 2025-01-28 NOTE — CASE MANAGEMENT/SOCIAL WORK
Continued Stay Note  [facility]     Patient Name: Rose J Kellermann  MRN: [id number]  Today's Date: 1/28/2025    Admit Date: 1/20/2025    Plan: rehab   Discharge Plan       Row Name 01/28/25 1002       Plan    Plan rehab    Patient/Family in Agreement with Plan yes    Plan Comments CM received a message from the PA. This patient's kidney function is worse today and will not be transferring to [facility] today. Gifty/C[place]on and her daughter were notified. Her insurance precert is good through 1/29. MD aware. CM to follow.    Final Discharge Disposition Code 03 - skilled nursing facility (SNF)                   Discharge Codes    No documentation.                 Expected Discharge Date and Time       Expected Discharge Date Expected Discharge Time    Jan 28, 2025               Floresita Dawson RN

## 2025-01-28 NOTE — THERAPY TREATMENT NOTE
Patient Name: Rose J Kellermann  : 1926    MRN: [id number]                              Today's Date: 2025       Admit Date: 2025    Visit Dx:     ICD-10-CM ICD-9-CM   1. Pleural effusion, left  J90 511.9   2. Hypoxia  R09.02 799.02   3. Chronic kidney disease, unspecified CKD stage  N18.9 585.9     Patient Active Problem List   Diagnosis    Essential hypertension    Vitamin D deficiency    Fatigue    Chronic kidney disease, stage III (moderate)     Osteoarthritis    Dyslipidemia    Post-menopausal bleeding    Endometrial cancer    Closed fracture of left hip    Asymptomatic bacteriuria    Post-traumatic osteoarthritis of left hip    Status post total replacement of left hip    Prediabetes    Acute blood loss anemia, asymptomatic    Postoperative urinary retention    Myopia    Posterior crocodile shagreen of cornea    Presbyopia    Ptosis of eyelid    Regular astigmatism    Retinal neovascularization    After cataract    Secondary cataract    Premature atrial contractions    Heart murmur    Nocturnal hypoxia    Chronic deep vein thrombosis (DVT) of both lower extremities    Retinal neovascularization    Pleural effusion on left     Past Medical History:   Diagnosis Date    Abscess of back     Arrhythmia     frequent PVC    Cancer 2011    Endometrial cancer, status post robotically assisted hysterectomy with Dr. Haddad, 2011.      CKD (chronic kidney disease), stage III     no dilaysis     Dizzy     Dvt femoral (deep venous thrombosis) 2017    s/p hip surgery in 2017. Took xarelto until 2018    Dyslipidemia     Dyslipidemia.  Observing.    Enthesopathy of hip region     Generalized osteoarthrosis, involving multiple sites     Headache     Hearing loss     Hypertension     Low backache     Needs flu shot     Osteoarthritis     Osteoarthritis with questionable carpal tunnel syndrome bilaterally.     Otitis, serous     Pneumonia     Remote pneumonia, .     Retinal  hemorrhage     SI joint arthritis     SOB (shortness of breath)     Syncope 2001    Remote syncope with working diagnosis of vasodepressor, 2001.     Venous insufficiency of leg     Wears glasses     readers     Past Surgical History:   Procedure Laterality Date    CATARACT EXTRACTION      bilat     COLONOSCOPY      HIP PERCUTANEOUS PINNING Left 11/24/2017    Procedure: HIP PERCUTANEOUS PINNING;  Surgeon: Lucas Swanson MD;  Location: [facility] OR;  Service:     HYSTERECTOMY      total? but unsure     KNEE SURGERY  2001    Remote knee surgery in 2001.- right     TOTAL HIP ARTHROPLASTY Left 10/26/2018    Procedure: TOTAL HIP ARTHROPLASTY LEFT;  Surgeon: Stephen Baker MD;  Location: [facility] OR;  Service: Orthopedics      General Information       Row Name 01/28/25 1540          OT Time and Intention    Document Type therapy note (daily note)  -     Mode of Treatment occupational therapy  -       Row Name 01/28/25 1540          General Information    Patient Profile Reviewed yes  -LC     Prior Level of Function --  See IE  -     Existing Precautions/Restrictions fall;oxygen therapy device and L/min  -     Barriers to Rehab medically complex;cognitive status;previous functional deficit  -       Row Name 01/28/25 1540          Cognition    Orientation Status (Cognition) oriented to;person;place  -       Row Name 01/28/25 1540          Safety Issues/Impairments Affecting Functional Mobility    Safety Issues Affecting Function (Mobility) awareness of need for assistance;insight into deficits/self-awareness;safety precaution awareness;safety precautions follow-through/compliance;sequencing abilities  -     Impairments Affecting Function (Mobility) balance;cognition;coordination;endurance/activity tolerance;postural/trunk control;shortness of breath;strength  -     Cognitive Impairments, Mobility Safety/Performance awareness, need for assistance;insight into deficits/self-awareness;safety precaution  awareness;safety precaution follow-through;sequencing abilities  -     Comment, Safety Issues/Impairments (Mobility) Limited by nausea and lethargy this session. RN present and aware.  -               User Key  (r) = Recorded By, (t) = Taken By, (c) = Cosigned By      Initials Name Provider Type    Lola Sellers OT Occupational Therapist                     Mobility/ADL's       Row Name 01/28/25 1541          Bed Mobility    Bed Mobility scooting/bridging;supine-sit;sit-supine  -     Scooting/Bridging [place] (Bed Mobility) moderate assist (50% patient effort);verbal cues  -     Supine-Sit [place] (Bed Mobility) moderate assist (50% patient effort);verbal cues  -     Sit-Supine [place] (Bed Mobility) verbal cues;2 person assist;maximum assist (25% patient effort)  -     Assistive Device (Bed Mobility) bed rails;head of bed elevated  -     Comment, (Bed Mobility) Assist to bring trunk into upright position. Pt. became nauseated EOB and emesis produced. Notified RN and came to patient's room. Assisted to return pt. to supine/fowlers position once emesis decreased. BP stable.  -       Row Name 01/28/25 1541          Transfers    Comment, (Transfers) Deferred secondary to nausea/emesis  -               User Key  (r) = Recorded By, (t) = Taken By, (c) = Cosigned By      Initials Name Provider Type    Lola Sellers OT Occupational Therapist                   Obj/Interventions       Row Name 01/28/25 1543          Balance    Balance Assessment sitting static balance  -     Static Sitting Balance contact guard  -     Comment, Balance CGA sitting EOB needed for safety  -               User Key  (r) = Recorded By, (t) = Taken By, (c) = Cosigned By      Initials Name Provider Type    Lola Sellers OT Occupational Therapist                   Goals/Plan    No documentation.                  Clinical Impression       Row Name 01/28/25 5498          Pain Assessment     Pretreatment Pain Rating 0/10 - no pain  -LC     Posttreatment Pain Rating 0/10 - no pain  -LC       Row Name 01/28/25 1545          Plan of Care Review    Plan of Care Reviewed With patient  -LC     Progress declining  -LC     Outcome Evaluation Pt. limited this session by N/V and required increased assist with mobility. Mod A needed for supine to sit and Max A x 2 for sit to supine. RN notified of change in participation and present. Will check back when medically appropriate.  -       Row Name 01/28/25 1545          Vital Signs    Pre Systolic BP Rehab 142  -LC     Pre Treatment Diastolic BP 94  -LC     Post Systolic BP Rehab 149  -LC     Post Treatment Diastolic BP 85  -LC     Pretreatment Heart Rate (beats/min) 92  -LC     Intratreatment Heart Rate (beats/min) 98  -LC     Posttreatment Heart Rate (beats/min) 90  -LC     Pre SpO2 (%) 92  -LC     O2 Delivery Pre Treatment nasal cannula  -LC     Intra SpO2 (%) 89  -LC     O2 Delivery Intra Treatment nasal cannula  -LC     Post SpO2 (%) 89  -LC     O2 Delivery Post Treatment nasal cannula  -LC     Pre Patient Position Supine  -LC     Intra Patient Position Sitting  -LC     Post Patient Position Supine  -LC       Row Name 01/28/25 1545          Positioning and Restraints    Pre-Treatment Position in bed  -LC     Post Treatment Position bed  -LC     In Bed notified nsg;fowlers;call light within reach;with nsg;with other staff  -               User Key  (r) = Recorded By, (t) = Taken By, (c) = Cosigned By      Initials Name Provider Type     Lola Snow OT Occupational Therapist                   Outcome Measures       Row Name 01/28/25 155          How much help from another is currently needed...    Putting on and taking off regular lower body clothing? 1  -LC     Bathing (including washing, rinsing, and drying) 2  -LC     Toileting (which includes using toilet bed pan or urinal) 2  -LC     Putting on and taking off regular upper body clothing 2  -LC      Taking care of personal grooming (such as brushing teeth) 2  -LC     Eating meals 3  -LC     AM-PAC 6 Clicks Score (OT) 12  -       Row Name 01/28/25 0800          How much help from another person do you currently need...    Turning from your back to your side while in flat bed without using bedrails? 3  -AR     Moving from lying on back to sitting on the side of a flat bed without bedrails? 3  -AR     Moving to and from a bed to a chair (including a wheelchair)? 3  -AR     Standing up from a chair using your arms (e.g., wheelchair, bedside chair)? 3  -AR     Climbing 3-5 steps with a railing? 2  -AR     To walk in hospital room? 3  -AR     AM-PAC 6 Clicks Score (PT) 17  -AR     Highest Level of Mobility Goal 5 --> Static standing  -AR       Row Name 01/28/25 1553          Functional Assessment    Outcome Measure Options AM-PAC 6 Clicks Daily Activity (OT)  -               User Key  (r) = Recorded By, (t) = Taken By, (c) = Cosigned By      Initials Name Provider Type     Lola Snow OT Occupational Therapist    Hai Manley, RN Registered Nurse                    Occupational Therapy Education       Title: PT OT SLP Therapies (In Progress)       Topic: Occupational Therapy (In Progress)       Point: ADL training (In Progress)       Description:   Instruct learner(s) on proper safety adaptation and remediation techniques during self care or transfers.   Instruct in proper use of assistive devices.                  Learning Progress Summary            Patient Acceptance, E, NR by  at 1/28/2025 1513    Acceptance, E, VU,NR by MR at 1/22/2025 1519   Family Acceptance, E, VU,NR by  at 1/22/2025 1519                      Point: Home exercise program (Not Started)       Description:   Instruct learner(s) on appropriate technique for monitoring, assisting and/or progressing therapeutic exercises/activities.                  Learner Progress:  Not documented in this visit.              Point: Precautions  (In Progress)       Description:   Instruct learner(s) on prescribed precautions during self-care and functional transfers.                  Learning Progress Summary            Patient Acceptance, E, NR by  at 1/28/2025 1513    Acceptance, E, VU,NR by MR at 1/22/2025 1519   Family Acceptance, E, VU,NR by MR at 1/22/2025 1519                      Point: Body mechanics (In Progress)       Description:   Instruct learner(s) on proper positioning and spine alignment during self-care, functional mobility activities and/or exercises.                  Learning Progress Summary            Patient Acceptance, E, NR by  at 1/28/2025 1513    Acceptance, E, VU,NR by MR at 1/22/2025 1519   Family Acceptance, E, VU,NR by MR at 1/22/2025 1519                                      User Key       Initials Effective Dates Name Provider Type Discipline     06/16/21 -  Lola Snow, OT Occupational Therapist OT    MR 09/22/22 -  Kaye Espinal OT Occupational Therapist OT                  OT Recommendation and Plan     Plan of Care Review  Plan of Care Reviewed With: patient  Progress: declining  Outcome Evaluation: Pt. limited this session by N/V and required increased assist with mobility. Mod A needed for supine to sit and Max A x 2 for sit to supine. RN notified of change in participation and present. Will check back when medically appropriate.     Time Calculation:         Time Calculation- OT       Row Name 01/28/25 1555             Time Calculation- OT    OT Start Time 1513  -      OT Received On 01/28/25  -      OT Goal Re-Cert Due Date 02/01/25  -         Timed Charges    18957 - OT Therapeutic Activity Minutes 10  -         Total Minutes    Timed Charges Total Minutes 10  -LC       Total Minutes 10  -LC                User Key  (r) = Recorded By, (t) = Taken By, (c) = Cosigned By      Initials Name Provider Type     Lola Snow, OT Occupational Therapist                  Therapy Charges for Today        Code Description Service Date Service Provider Modifiers Qty    [id number]  OT THERAPEUTIC ACT EA 15 MIN 1/28/2025 Lola Snow, [place] GO 1                 Lola Snow OT  1/28/2025

## 2025-01-28 NOTE — PLAN OF CARE
Goal Outcome Evaluation:  Plan of Care Reviewed With: patient        Progress: declining  Outcome Evaluation: VSS. 4-5L NC. Disoriented to time occasionally. No complaints of pain. Rapid Response called at 1540. Called to room by therapy with reports of pt vomiting. on assessment, pt lethargic, flushed to face and chest, complaints of SOB, increased O2 to 5L, difficult to arouse and slow to follow commands. Dr. Eid stat paged. Rapid Response called and PA at bedside. ABG's obtained. stat CT head and chest X-Ray complete. Daughter updated by PA. Skin and fall precautions remain in place. Resting comfortably. No further complaints at this time.

## 2025-01-28 NOTE — PLAN OF CARE
Problem: Adult Inpatient Plan of Care  Goal: Plan of Care Review  Outcome: Progressing  Flowsheets (Taken 1/28/2025 0440)  Progress: no change  Outcome Evaluation: VSS. 4LNC. No complaints of pain or nausea. Skin and fall precautions in place. Resting comfortably. Will continue plan of care. Plan to d/c today.  Goal: Patient-Specific Goal (Individualized)  Outcome: Progressing  Goal: Absence of Hospital-Acquired Illness or Injury  Outcome: Progressing  Intervention: Identify and Manage Fall Risk  Recent Flowsheet Documentation  Taken 1/28/2025 0400 by Kahlil Bowman, LEANNA  Safety Promotion/Fall Prevention:   activity supervised   assistive device/personal items within reach   clutter free environment maintained   nonskid shoes/slippers when out of bed   room organization consistent   safety round/check completed  Taken 1/28/2025 0200 by Kahlil Bowman, LEANNA  Safety Promotion/Fall Prevention:   activity supervised   assistive device/personal items within reach   clutter free environment maintained   nonskid shoes/slippers when out of bed   room organization consistent   safety round/check completed  Taken 1/28/2025 0000 by Kahlil Bwoman, LEANNA  Safety Promotion/Fall Prevention:   activity supervised   assistive device/personal items within reach   clutter free environment maintained   nonskid shoes/slippers when out of bed   room organization consistent   safety round/check completed  Taken 1/27/2025 2215 by Kahlil Bowman, LEANNA  Safety Promotion/Fall Prevention:   activity supervised   assistive device/personal items within reach   clutter free environment maintained   nonskid shoes/slippers when out of bed   room organization consistent   safety round/check completed  Taken 1/27/2025 2000 by Kahlil Bowman, LEANNA  Safety Promotion/Fall Prevention:   activity supervised   assistive device/personal items within reach   clutter free environment maintained   nonskid shoes/slippers when out of bed   safety round/check completed    room organization consistent  Intervention: Prevent Skin Injury  Recent Flowsheet Documentation  Taken 1/28/2025 0400 by Kahlil Bowman RN  Body Position:   turned   right  Skin Protection:   incontinence pads utilized   silicone foam dressing in place   transparent dressing maintained  Taken 1/28/2025 0200 by Kahlil Bowman RN  Body Position:   turned   left  Skin Protection:   incontinence pads utilized   silicone foam dressing in place   transparent dressing maintained  Taken 1/28/2025 0000 by Kahlil Bowman RN  Body Position:   turned   right  Skin Protection:   incontinence pads utilized   silicone foam dressing in place   transparent dressing maintained  Taken 1/27/2025 2215 by Kahlil Bowman RN  Body Position:   turned   left  Skin Protection:   incontinence pads utilized   silicone foam dressing in place   transparent dressing maintained  Taken 1/27/2025 2000 by Kahlil Bowman RN  Body Position: supine  Skin Protection: (silicone dressings peeled back and skin assessed)   incontinence pads utilized   silicone foam dressing in place   transparent dressing maintained  Intervention: Prevent and Manage VTE (Venous Thromboembolism) Risk  Recent Flowsheet Documentation  Taken 1/27/2025 2000 by Kahlil Bowman RN  VTE Prevention/Management: (foot pumps on d/t LLE ulcer)   bilateral   foot pump device on  Intervention: Prevent Infection  Recent Flowsheet Documentation  Taken 1/28/2025 0400 by Kahlil Bowman RN  Infection Prevention:   cohorting utilized   environmental surveillance performed   rest/sleep promoted   single patient room provided  Taken 1/28/2025 0200 by Kahlil Bowman RN  Infection Prevention:   cohorting utilized   environmental surveillance performed   rest/sleep promoted   single patient room provided  Taken 1/28/2025 0000 by Kahlil Bowman RN  Infection Prevention:   cohorting utilized   environmental surveillance performed   single patient room provided   rest/sleep promoted  Taken 1/27/2025 2215  by Gaunce, Kahlil, RN  Infection Prevention:   cohorting utilized   environmental surveillance performed   rest/sleep promoted   single patient room provided  Taken 1/27/2025 2000 by Kahlil Bowman RN  Infection Prevention:   cohorting utilized   environmental surveillance performed   single patient room provided   rest/sleep promoted  Goal: Optimal Comfort and Wellbeing  Outcome: Progressing  Intervention: Provide Person-Centered Care  Recent Flowsheet Documentation  Taken 1/27/2025 2000 by Kahlil Bowman RN  Trust Relationship/Rapport:   care explained   choices provided  Goal: Readiness for Transition of Care  Outcome: Progressing     Problem: Skin Injury Risk Increased  Goal: Skin Health and Integrity  Outcome: Progressing  Intervention: Optimize Skin Protection  Recent Flowsheet Documentation  Taken 1/28/2025 0400 by Kahlil Bowman RN  Activity Management: activity encouraged  Pressure Reduction Techniques:   positioned off wounds   pressure points protected  Head of Bed (HOB) Positioning: HOB elevated  Pressure Reduction Devices:   foam padding utilized   positioning supports utilized   pressure-redistributing mattress utilized  Skin Protection:   incontinence pads utilized   silicone foam dressing in place   transparent dressing maintained  Taken 1/28/2025 0200 by Kahlil Bowman RN  Activity Management: activity encouraged  Pressure Reduction Techniques:   positioned off wounds   pressure points protected  Head of Bed (HOB) Positioning: HOB elevated  Pressure Reduction Devices:   foam padding utilized   positioning supports utilized   pressure-redistributing mattress utilized  Skin Protection:   incontinence pads utilized   silicone foam dressing in place   transparent dressing maintained  Taken 1/28/2025 0000 by Kahlil Bowman RN  Activity Management: activity encouraged  Pressure Reduction Techniques:   positioned off wounds   pressure points protected  Head of Bed (HOB) Positioning: HOB elevated  Pressure  Reduction Devices:   foam padding utilized   positioning supports utilized   pressure-redistributing mattress utilized  Skin Protection:   incontinence pads utilized   silicone foam dressing in place   transparent dressing maintained  Taken 1/27/2025 2215 by Kahlil Bowman RN  Activity Management: activity encouraged  Pressure Reduction Techniques:   positioned off wounds   pressure points protected  Head of Bed (HOB) Positioning: [facility] elevated  Pressure Reduction Devices:   foam padding utilized   positioning supports utilized   pressure-redistributing mattress utilized  Skin Protection:   incontinence pads utilized   silicone foam dressing in place   transparent dressing maintained  Taken 1/27/2025 2000 by Kahlil Bowman, RN  Activity Management: activity encouraged  Pressure Reduction Techniques:   positioned off wounds   pressure points protected  Head of Bed (HOB) Positioning: [facility] elevated  Pressure Reduction Devices:   foam padding utilized   positioning supports utilized   pressure-redistributing mattress utilized  Skin Protection: (silicone dressings peeled back and skin assessed)   incontinence pads utilized   silicone foam dressing in place   transparent dressing maintained     Problem: Comorbidity Management  Goal: Maintenance of Heart Failure Symptom Control  Outcome: Progressing  Goal: Blood Pressure in Desired Range  Outcome: Progressing     Problem: Fall Injury Risk  Goal: Absence of Fall and Fall-Related Injury  Outcome: Progressing  Intervention: Promote Injury-Free Environment  Recent Flowsheet Documentation  Taken 1/28/2025 0400 by Kahlil Bowman, RN  Safety Promotion/Fall Prevention:   activity supervised   assistive device/personal items within reach   clutter free environment maintained   nonskid shoes/slippers when out of bed   room organization consistent   safety round/check completed  Taken 1/28/2025 0200 by Kahlil Bowman, RN  Safety Promotion/Fall Prevention:   activity supervised   assistive  device/personal items within reach   clutter free environment maintained   nonskid shoes/slippers when out of bed   room organization consistent   safety round/check completed  Taken 1/28/2025 0000 by Kahlil Bowman, LEANNA  Safety Promotion/Fall Prevention:   activity supervised   assistive device/personal items within reach   clutter free environment maintained   nonskid shoes/slippers when out of bed   room organization consistent   safety round/check completed  Taken 1/27/2025 2215 by Kahlil Bowman, LEANNA  Safety Promotion/Fall Prevention:   activity supervised   assistive device/personal items within reach   clutter free environment maintained   nonskid shoes/slippers when out of bed   room organization consistent   safety round/check completed  Taken 1/27/2025 2000 by Kahlil Bowman, LEANNA  Safety Promotion/Fall Prevention:   activity supervised   assistive device/personal items within reach   clutter free environment maintained   nonskid shoes/slippers when out of bed   safety round/check completed   room organization consistent   Goal Outcome Evaluation:  Plan of Care Reviewed With: patient        Progress: no change  Outcome Evaluation: VSS. 4LNC. No complaints of pain or nausea. Skin and fall precautions in place. Resting comfortably. Will continue plan of care. Plan to d/c today.

## 2025-01-28 NOTE — SIGNIFICANT NOTE
Rapid response called by nursing for dyspnea and acute decompensation. When I arrived, patient appeared diaphoretic and lethargic. Patient's O2 sats remain in the low 90s, now on 5L NC (baseline 4L NC). Diuretic therapy has been on hold due to worsening Cr (2.90). BP normotensive. Tachycardic. She is able to answer appropriately however, her head is lowered and appears pale and diaphoretic.     Discussed with MD. EKG in Afib (same as previous). ABG unremarkable. CXR pending. CT head pending.     Addendum 1624: Patient's daughter updated via telephone. All questions answered.

## 2025-01-28 NOTE — PLAN OF CARE
Goal Outcome Evaluation:  Plan of Care Reviewed With: patient        Progress: declining  Outcome Evaluation: Pt. limited this session by N/V and required increased assist with mobility. Mod A needed for supine to sit and Max A x 2 for sit to supine. RN notified of change in participation and present. Will check back when medically appropriate.

## 2025-01-28 NOTE — PROGRESS NOTES
[facility]  PROGRESS NOTE    Patient Name: Rose J Kellermann  : 1926  MRN: [id number]    Date of Admission: 2025  Primary Care Physician: Sam Hung MD    Subjective   Subjective     CC:    Follow up progressive dyspnea     HPI:   Patient reports she feels much better this morning, she denies shortness of breath while wearing supplemental O2. Patient's discharge postponed today due to worsening kidney function. Nephrology consulted for assistance.       Objective   Objective     Vital Signs:   Temp:  [97.4 °F (36.3 °C)-98.5 °F (36.9 °C)] 97.4 °F (36.3 °C)  Heart Rate:  [80-94] 86  Resp:  [16-17] 17  BP: (103-124)/(72-85) 124/78  Flow (L/min) (Oxygen Therapy):  [4-6] 4     Physical Exam:  Constitutional: Chronically ill appearing female lying in bed in NAD  HENT: NCAT, mucous membranes moist  Respiratory: Diminished at bases bilaterally, nonlabored respirations on 4L NC, O2 sats in low 90s  Cardiovascular: IRIR, no murmurs, rubs, or gallops  Gastrointestinal: Positive bowel sounds, soft, nontender, nondistended  Musculoskeletal: Muscle tone consistent throughout  Psychiatric: Appropriate affect, cooperative  Neurologic: Oriented x 3, follows commands, speech clear  Skin: No rashes on exposed skin    Results Reviewed:  LAB RESULTS:      Lab 25  0838 25  0641 25  1040 25  1138 25  1540 25  1539   WBC 9.99 10.29 8.53 7.67  --  9.88   HEMOGLOBIN 11.9* 11.9* 10.8* 10.9*  --  11.4*   HEMATOCRIT 37.4 38.1 34.8 34.4  --  35.5   PLATELETS 244 243 240 237  --  228   NEUTROS ABS 8.17* 8.41* 6.75 6.14  --  8.15*   IMMATURE GRANS (ABS) 0.03 0.04 0.04 0.03  --  0.03   LYMPHS ABS 0.64* 0.60* 0.74 0.54*  --  0.82   MONOS ABS 0.91* 1.05* 0.83 0.75  --  0.81   EOS ABS 0.21 0.17 0.14 0.19  --  0.04   MCV 95.9 96.9 97.5* 94.0  --  94.2   LDH  --   --   --   --   --  209   PROTIME  --   --   --   --  21.2*  --    APTT  --   --    --   --  40.4*  --          Lab 01/28/25  0838 01/27/25  0641 01/26/25  2244 01/24/25  1040 01/23/25  1138   SODIUM 133* 135* 132* 135* 134*   POTASSIUM 4.8 4.8 4.7 4.3 4.1   CHLORIDE 97* 99 98 101 99   CO2 19.0* 22.0 22.0 22.0 24.0   ANION GAP 17.0* 14.0 12.0 12.0 11.0   BUN 75* 74* 71* 63* 68*   CREATININE 2.90* 2.61* 2.56* 2.36* 2.84*   EGFR 14.2* 16.1* 16.5* 18.2* 14.6*   GLUCOSE 134* 89 102* 108* 110*   CALCIUM 8.6 8.9 8.8 8.7 8.9         Lab 01/21/25  1539   TOTAL PROTEIN 6.1         Lab 01/21/25  1540   PROTIME 21.2*   INR 1.82*                 Brief Urine Lab Results       None            Microbiology Results Abnormal       None                Results for orders placed during the hospital encounter of 06/01/23    Adult Transthoracic Echo Complete W/ Cont if Necessary Per Protocol    Interpretation Summary    Left ventricular systolic function is normal. Calculated left ventricular EF = 61.4% Left ventricular ejection fraction appears to be 61 - 65%.    Left ventricular diastolic function was indeterminate.    Left atrial volume is severely increased.    Mild aortic valve stenosis is present.    Aortic valve maximum pressure gradient is 26 mmHg. Aortic valve mean pressure gradient is 15 mmHg.    Mild mitral valve stenosis is present. The mitral valve peak gradient is 12 mmHg. The mitral valve mean gradient is 5 mmHg.    Moderate tricuspid valve regurgitation is present.    Estimated right ventricular systolic pressure from tricuspid regurgitation is markedly elevated (>55 mmHg).    There is a small left pleural effusion.      Current medications:  Scheduled Meds:amLODIPine, 5 mg, Oral, Daily  [START ON 1/29/2025] apixaban, 5 mg, Oral, Q12H  [Held by provider] bumetanide, 1 mg, Oral, Daily  hydrALAZINE, 25 mg, Oral, BID  sodium chloride, 10 mL, Intravenous, Q12H      Continuous Infusions:   PRN Meds:.  acetaminophen    senna-docusate sodium **AND** polyethylene glycol **AND** bisacodyl **AND** bisacodyl     sodium chloride    sodium chloride    sodium chloride    Assessment & Plan   Assessment & Plan     Active Hospital Problems    Diagnosis  POA    **Pleural effusion on left [J90]  Yes    Essential hypertension [I10]  Yes    Chronic kidney disease, stage III (moderate)  [N18.30]  Yes      Resolved Hospital Problems   No resolved problems to display.        Brief Hospital Course to date:  Rose J Kellermann is a 89 y.o. female with a history of DVT on Eliquis, HFpEF, CKD, lives at home, has been using home oxygen intermittently x several months, recently needing it constantly though denying dyspnea.    This patient's problems and plans were partially entered by my partner and updated as appropriate by me 01/28/25.     Progressive hypoxia  Left pleural effusion  Suspected acute HFpEF exacerbation  - CXR with moderate L pleural effusion  - Takes Bumex at home. Holding due to increasing creatinine   - Eliquis resumed 1/28  - Thoracentesis ordered: had chest tube placed in IR (?). Consulted CT to manage, appreciate their assistance   - Pleural fluid is c/w exudate (?), cultures/cytology negative   - CT to be removed 1/27/25, post-pull CXR stable  Addendum:  - Rapid response called 1/28/25, please see significant event note    ANGEL LUIS on CKD  - Creat baseline 1.8, was 2.87 on 1/21, improved to 2.74 on 1/22, but back up to 2.84 then improved to 2.36, now up 2.90 on 1/28/25  - Will continue to hold PO diuretics, repeat labs in am  - May be her new baseline?   - Nephrology consulted     History of DVT  - Eliquis resumed 1/28     Hypertension  - Continue amlodipine and hydralazine    Advanced age  - Ambulatory  - OOB daily, pt should be up walking to BR w assist        Expected Discharge Location and Transportation: Rehab when medically ready, pre-cert good through tomorrow, may require new pre-cert    Patient's daughter updated with plan by case management on 1/28/25    Expected Discharge   Expected Discharge Date: 1/29/2025;  Expected Discharge Time:      VTE Prophylaxis:  Pharmacologic VTE prophylaxis orders are present.      AM-PAC 6 Clicks Score (PT): 17 (01/28/25 0800)    CODE STATUS:   Code Status and Medical Interventions: CPR (Attempt to Resuscitate); Full Support   Ordered at: 01/20/25 1424     Level Of Support Discussed With:    Patient     Code Status (Patient has no pulse and is not breathing):    CPR (Attempt to Resuscitate)     Medical Interventions (Patient has pulse or is breathing):    Full Support       Maryjane Sesay PA-C  01/28/25

## 2025-01-28 NOTE — CONSULTS
Referring Provider: Dr. Eid  Reason for Consultation: Acute on chronic renal failure CKD stage IV: Followed with [facility] nephrology Dr. Hernandez previous creatinine 2.15, with history of chronic hypertension.  Dr. Hernandez has discussed with the patient and the family in the past regards to dialysis and they have declined..  Medical management was done.  Current labs shows creatinine 2.90, BUN 75, sodium 133 potassium 4.8 bicarb, proteinuria: 4.9 g,     Patient mated with shortness of breath.  Patient has been on oxygen at home.  She also complained increasing cough with history of pulmonary edema in the past.  Patient has been taking Bumex 4 times a week and followed also with a cardiology.  PMH include DVT on Eliquis, heart failure with preserved EF,  She denies any nausea, vomiting, diarrhea, dysuria, hematuria, fever, chills, no epistaxis or hemoptysis.  Subjective     Chief complaint shortness of breath    History of present illness:    89-year-old  female with history of    History  Past Medical History:   Diagnosis Date    Abscess of back     Arrhythmia     frequent PVC    Cancer 12/2011    Endometrial cancer, status post robotically assisted hysterectomy with Dr. Haddad, December 2011.      CKD (chronic kidney disease), stage III     no dilaysis     Dizzy     Dvt femoral (deep venous thrombosis) 2017    s/p hip surgery in 2017. Took xarelto until August 2018    Dyslipidemia     Dyslipidemia.  Observing.    Enthesopathy of hip region     Generalized osteoarthrosis, involving multiple sites     Headache     Hearing loss     Hypertension     Low backache     Needs flu shot     Osteoarthritis     Osteoarthritis with questionable carpal tunnel syndrome bilaterally.     Otitis, serous     Pneumonia 1967    Remote pneumonia, 1967.     Retinal hemorrhage     SI joint arthritis     SOB (shortness of breath)     Syncope 2001    Remote syncope with working diagnosis of vasodepressor, 2001.     Venous insufficiency of  leg     Wears glasses     readers   ,   Past Surgical History:   Procedure Laterality Date    CATARACT EXTRACTION      bilat     COLONOSCOPY      HIP PERCUTANEOUS PINNING Left 11/24/2017    Procedure: HIP PERCUTANEOUS PINNING;  Surgeon: Lucas Swanson MD;  Location:  [facility];  Service:     HYSTERECTOMY      total? but unsure     KNEE SURGERY  2001    Remote knee surgery in 2001.- right     TOTAL HIP ARTHROPLASTY Left 10/26/2018    Procedure: TOTAL HIP ARTHROPLASTY LEFT;  Surgeon: Stephen Baker MD;  Location:  [facility];  Service: Orthopedics   ,   Family History   Problem Relation Age of Onset    Heart disease Mother     Osteoarthritis Mother     Hypertension Mother     Hypertension Father     Heart attack Father     Osteoarthritis Father     Cancer Brother         lung   ,   Social History     Socioeconomic History    Marital status:    Tobacco Use    Smoking status: Never     Passive exposure: Never    Smokeless tobacco: Never   Vaping Use    Vaping status: Never Used   Substance and Sexual Activity    Alcohol use: No    Drug use: No    Sexual activity: Defer     Birth control/protection: None     E-cigarette/Vaping    E-cigarette/Vaping Use Never User     Passive Exposure No     Counseling Given No      E-cigarette/Vaping Substances    Nicotine No     THC No     CBD No     Flavoring No      E-cigarette/Vaping Devices    Disposable No     Pre-filled or Refillable Cartridge No     Refillable Tank No     Pre-filled Pod No          ,   Medications Prior to Admission   Medication Sig Dispense Refill Last Dose/Taking    amLODIPine (NORVASC) 5 MG tablet Take 1 tablet by mouth Daily. Indications: High Blood Pressure Disorder 90 tablet 3 1/19/2025    bumetanide (BUMEX) 1 MG tablet TAKE 1 TABLET THREE TIMES A WEEK. TAKE AN EXTRA DOSE IF HAVING A 2 POUND WEIGHT GAIN FROM BASELINE, FOR EDEMA (Patient taking differently: Take 1 tablet by mouth 4 times a week.) 45 tablet 3 Past Week    Eliquis 5 MG tablet  tablet TAKE 1 TABLET TWICE A DAY FOR DEEP VEIN THROMBOSIS/PULMONARY EMBOLISM (ACTIVE THROMBOSIS) 180 tablet 3 1/19/2025    hydrALAZINE (APRESOLINE) 25 MG tablet Take 1 tablet by mouth 2 (Two) Times a Day. Indications: High Blood Pressure Disorder 180 tablet 3 1/19/2025    O2 (OXYGEN) Inhale 3 L/min Daily. Only at night  Indications: Shortness of breath   1/19/2025    acetaminophen (TYLENOL) 500 MG tablet Take 1 tablet by mouth Every 6 (Six) Hours As Needed for Mild Pain.   More than a month   , Scheduled Meds:  amLODIPine, 5 mg, Oral, Daily  [START ON 1/29/2025] apixaban, 5 mg, Oral, Q12H  [Held by provider] bumetanide, 1 mg, Oral, Daily  hydrALAZINE, 25 mg, Oral, BID  sodium chloride, 10 mL, Intravenous, Q12H   , Continuous Infusions:   , PRN Meds:    acetaminophen    senna-docusate sodium **AND** polyethylene glycol **AND** bisacodyl **AND** bisacodyl    sodium chloride    sodium chloride    sodium chloride, and Allergies:  Sulfa antibiotics    Review of Systems  Pertinent items are noted in HPI, all other systems reviewed and negative    Objective     Vital Signs  Temp:  [97.4 °F (36.3 °C)-98.5 °F (36.9 °C)] 98.4 °F (36.9 °C)  Heart Rate:  [] 90  Resp:  [16-23] 23  BP: (103-149)/(72-94) 139/84    I/O this shift:  In: 120 [P.O.:120]  Out: 350 [Urine:350]  I/O last 3 completed shifts:  In: 170 [P.O.:170]  Out: 100 [Urine:100]    Physical Exam:  General appearance:  female awake alert no obvious distress.  HEENT: Atraumatic normocephalic head, eyes pupil reactive, extraocular muscle intact, nose no bleed, oropharynx clear, neck is supple, no JVD, no lymph node enlargement, trachea midline.  Lungs: Few wheezes, equal chest movement, no crepitation.  Heart: Normal S1, S2, no gallop, murmur, RRR.  Abdomen: Soft, nontender, positive bowel sounds, morbid obesity  Extremities: Positive dependent lower extremity edema, no cyanosis, no joint swelling.  Neuro: Alert, oriented x4, no focal deficit.  Psych: Mood  "and affect are normal and appropriate.  Skin: Skin is warm and dry.  : No suprapubic fullness or tenderness, no Padilla catheter.    Results Review:   I reviewed the patient's new clinical results.    WBC WBC   Date Value Ref Range Status   01/28/2025 9.99 3.40 - 10.80 10*3/mm3 Final   01/27/2025 10.29 3.40 - 10.80 10*3/mm3 Final      HGB Hemoglobin   Date Value Ref Range Status   01/28/2025 11.9 (L) 12.0 - 15.9 g/dL Final   01/27/2025 11.9 (L) 12.0 - 15.9 g/dL Final      HCT Hematocrit   Date Value Ref Range Status   01/28/2025 37.4 34.0 - 46.6 % Final   01/27/2025 38.1 34.0 - 46.6 % Final      Platlets No results found for: \"LABPLAT\"   MCV MCV   Date Value Ref Range Status   01/28/2025 95.9 79.0 - 97.0 fL Final   01/27/2025 96.9 79.0 - 97.0 fL Final          Sodium Sodium   Date Value Ref Range Status   01/28/2025 133 (L) 136 - 145 mmol/L Final   01/27/2025 135 (L) 136 - 145 mmol/L Final   01/26/2025 132 (L) 136 - 145 mmol/L Final      Potassium Potassium   Date Value Ref Range Status   01/28/2025 4.8 3.5 - 5.2 mmol/L Final   01/27/2025 4.8 3.5 - 5.2 mmol/L Final   01/26/2025 4.7 3.5 - 5.2 mmol/L Final      Chloride Chloride   Date Value Ref Range Status   01/28/2025 97 (L) 98 - 107 mmol/L Final   01/27/2025 99 98 - 107 mmol/L Final   01/26/2025 98 98 - 107 mmol/L Final      CO2 CO2   Date Value Ref Range Status   01/28/2025 19.0 (L) 22.0 - 29.0 mmol/L Final   01/27/2025 22.0 22.0 - 29.0 mmol/L Final   01/26/2025 22.0 22.0 - 29.0 mmol/L Final      BUN BUN   Date Value Ref Range Status   01/28/2025 75 (H) 8 - 23 mg/dL Final   01/27/2025 74 (H) 8 - 23 mg/dL Final   01/26/2025 71 (H) 8 - 23 mg/dL Final      Creatinine Creatinine   Date Value Ref Range Status   01/28/2025 2.90 (H) 0.57 - 1.00 mg/dL Final   01/27/2025 2.61 (H) 0.57 - 1.00 mg/dL Final   01/26/2025 2.56 (H) 0.57 - 1.00 mg/dL Final      Calcium Calcium   Date Value Ref Range Status   01/28/2025 8.6 8.2 - 9.6 mg/dL Final   01/27/2025 8.9 8.2 - 9.6 mg/dL " "Final   01/26/2025 8.8 8.2 - 9.6 mg/dL Final      PO4 No results found for: \"CAPO4\"   Albumin No results found for: \"ALBUMIN\"   Magnesium No results found for: \"MG\"   Uric Acid No results found for: \"URICACID\"         amLODIPine, 5 mg, Oral, Daily  [START ON 1/29/2025] apixaban, 5 mg, Oral, Q12H  [Held by provider] bumetanide, 1 mg, Oral, Daily  hydrALAZINE, 25 mg, Oral, BID  sodium chloride, 10 mL, Intravenous, Q12H           Assessment & Plan       Pleural effusion on left    Essential hypertension    Chronic kidney disease, stage III (moderate)       1.  CKD stage IV: Followed with [facility] nephrology Dr. Hernandez previous creatinine 2.15, with history of chronic hypertension.  Dr. Hernandez has discussed with the patient and the family in the past regards to dialysis and they have declined..  Medical management was done.  Current labs shows creatinine 2.90, BUN 75, sodium 133 potassium 4.8 bicarb 19  2.  Proteinuria: 4.9 g, patient has been not a candidate for kidney biopsy or aggressive intervention.  3.  Chronic diastolic heart failure failure.  4.  CKD bone and mineral disease.  5.  Hypertension  6.  History of ovarian cancer in remission status post surgical removal.  7.  Progressive shortness of breath: Left pleural effusion.  Plan:  Avoid nephrotoxic medications.  Keep systolic blood pressure greater than 100.  Check volume status.  Check labs in the morning.  Check intact PTH, vitamin D, iron.  Daily evaluation for renal replacement therapy will be done.  No dialysis indicated at this time.  Adjust medication for the new GFR.  Case discussed with the medical staff taking care of the patient and the staff       Shannan Ferro MD  01/28/25  @NOW             "

## 2025-01-29 ENCOUNTER — APPOINTMENT (OUTPATIENT)
Dept: GENERAL RADIOLOGY | Facility: HOSPITAL | Age: OVER 89
End: 2025-01-29
Payer: MEDICARE

## 2025-01-29 LAB
ALBUMIN SERPL-MCNC: 3.1 G/DL (ref 3.5–5.2)
ANION GAP SERPL CALCULATED.3IONS-SCNC: 13 MMOL/L (ref 5–15)
BASOPHILS # BLD AUTO: 0.02 10*3/MM3 (ref 0–0.2)
BASOPHILS NFR BLD AUTO: 0.2 % (ref 0–1.5)
BUN SERPL-MCNC: 77 MG/DL (ref 8–23)
BUN/CREAT SERPL: 26.6 (ref 7–25)
CALCIUM SPEC-SCNC: 8.9 MG/DL (ref 8.2–9.6)
CHLORIDE SERPL-SCNC: 98 MMOL/L (ref 98–107)
CO2 SERPL-SCNC: 26 MMOL/L (ref 22–29)
CREAT SERPL-MCNC: 2.89 MG/DL (ref 0.57–1)
DEPRECATED RDW RBC AUTO: 45.5 FL (ref 37–54)
EGFRCR SERPLBLD CKD-EPI 2021: 14.3 ML/MIN/1.73
EOSINOPHIL # BLD AUTO: 0.13 10*3/MM3 (ref 0–0.4)
EOSINOPHIL NFR BLD AUTO: 1.3 % (ref 0.3–6.2)
ERYTHROCYTE [DISTWIDTH] IN BLOOD BY AUTOMATED COUNT: 13.3 % (ref 12.3–15.4)
GLUCOSE SERPL-MCNC: 154 MG/DL (ref 65–99)
HCT VFR BLD AUTO: 40.4 % (ref 34–46.6)
HGB BLD-MCNC: 13.1 G/DL (ref 12–15.9)
IMM GRANULOCYTES # BLD AUTO: 0.05 10*3/MM3 (ref 0–0.05)
IMM GRANULOCYTES NFR BLD AUTO: 0.5 % (ref 0–0.5)
IRON 24H UR-MRATE: 26 MCG/DL (ref 37–145)
IRON SATN MFR SERPL: 12 % (ref 20–50)
LYMPHOCYTES # BLD AUTO: 0.55 10*3/MM3 (ref 0.7–3.1)
LYMPHOCYTES NFR BLD AUTO: 5.5 % (ref 19.6–45.3)
MCH RBC QN AUTO: 30.3 PG (ref 26.6–33)
MCHC RBC AUTO-ENTMCNC: 32.4 G/DL (ref 31.5–35.7)
MCV RBC AUTO: 93.3 FL (ref 79–97)
MONOCYTES # BLD AUTO: 0.82 10*3/MM3 (ref 0.1–0.9)
MONOCYTES NFR BLD AUTO: 8.1 % (ref 5–12)
NEUTROPHILS NFR BLD AUTO: 8.52 10*3/MM3 (ref 1.7–7)
NEUTROPHILS NFR BLD AUTO: 84.4 % (ref 42.7–76)
NRBC BLD AUTO-RTO: 0 /100 WBC (ref 0–0.2)
PHOSPHATE SERPL-MCNC: 4.6 MG/DL (ref 2.5–4.5)
PLATELET # BLD AUTO: 291 10*3/MM3 (ref 140–450)
PMV BLD AUTO: 10.4 FL (ref 6–12)
POTASSIUM SERPL-SCNC: 4.6 MMOL/L (ref 3.5–5.2)
RBC # BLD AUTO: 4.33 10*6/MM3 (ref 3.77–5.28)
SODIUM SERPL-SCNC: 137 MMOL/L (ref 136–145)
TIBC SERPL-MCNC: 209 MCG/DL (ref 298–536)
TRANSFERRIN SERPL-MCNC: 140 MG/DL (ref 200–360)
WBC NRBC COR # BLD AUTO: 10.09 10*3/MM3 (ref 3.4–10.8)

## 2025-01-29 PROCEDURE — 84466 ASSAY OF TRANSFERRIN: CPT | Performed by: INTERNAL MEDICINE

## 2025-01-29 PROCEDURE — 82306 VITAMIN D 25 HYDROXY: CPT | Performed by: INTERNAL MEDICINE

## 2025-01-29 PROCEDURE — 83970 ASSAY OF PARATHORMONE: CPT | Performed by: INTERNAL MEDICINE

## 2025-01-29 PROCEDURE — 99222 1ST HOSP IP/OBS MODERATE 55: CPT | Performed by: INTERNAL MEDICINE

## 2025-01-29 PROCEDURE — 83540 ASSAY OF IRON: CPT | Performed by: INTERNAL MEDICINE

## 2025-01-29 PROCEDURE — 25010000002 BUMETANIDE PER 0.5 MG: Performed by: INTERNAL MEDICINE

## 2025-01-29 PROCEDURE — 82310 ASSAY OF CALCIUM: CPT | Performed by: INTERNAL MEDICINE

## 2025-01-29 PROCEDURE — 71045 X-RAY EXAM CHEST 1 VIEW: CPT

## 2025-01-29 PROCEDURE — 99233 SBSQ HOSP IP/OBS HIGH 50: CPT | Performed by: HOSPITALIST

## 2025-01-29 PROCEDURE — 85025 COMPLETE CBC W/AUTO DIFF WBC: CPT

## 2025-01-29 PROCEDURE — 80069 RENAL FUNCTION PANEL: CPT | Performed by: INTERNAL MEDICINE

## 2025-01-29 RX ORDER — AMLODIPINE BESYLATE 5 MG/1
TABLET ORAL
Qty: 90 TABLET | Refills: 3 | Status: SHIPPED | OUTPATIENT
Start: 2025-01-29

## 2025-01-29 RX ADMIN — Medication 10 ML: at 20:28

## 2025-01-29 RX ADMIN — APIXABAN 5 MG: 5 TABLET, FILM COATED ORAL at 08:16

## 2025-01-29 RX ADMIN — HYDRALAZINE HYDROCHLORIDE 25 MG: 25 TABLET ORAL at 08:16

## 2025-01-29 RX ADMIN — AMLODIPINE BESYLATE 5 MG: 5 TABLET ORAL at 08:16

## 2025-01-29 RX ADMIN — HYDRALAZINE HYDROCHLORIDE 25 MG: 25 TABLET ORAL at 20:27

## 2025-01-29 RX ADMIN — BUMETANIDE 2 MG: 0.25 INJECTION INTRAMUSCULAR; INTRAVENOUS at 08:16

## 2025-01-29 RX ADMIN — BUMETANIDE 2 MG: 0.25 INJECTION INTRAMUSCULAR; INTRAVENOUS at 20:28

## 2025-01-29 NOTE — PLAN OF CARE
Goal Outcome Evaluation:  Plan of Care Reviewed With: patient      - VSS, 5L NC, disoriented to time, place, and situation  - No complaints of pain or nausea  - Fall and skin precautions in place  - No other concerns at this time    Progress: no change     Problem: Adult Inpatient Plan of Care  Goal: Plan of Care Review  1/29/2025 1839 by Sowmya Ugalde RN  Outcome: Progressing  Flowsheets (Taken 1/29/2025 1839)  Progress: no change  Plan of Care Reviewed With: patient  1/29/2025 1552 by Sowmya Ugalde RN  Outcome: Progressing  Flowsheets (Taken 1/29/2025 1552)  Progress: no change  Plan of Care Reviewed With: patient  Goal: Patient-Specific Goal (Individualized)  1/29/2025 1839 by Sowmya Ugalde RN  Outcome: Progressing  1/29/2025 1552 by Sowmya Ugalde RN  Outcome: Progressing  Goal: Absence of Hospital-Acquired Illness or Injury  1/29/2025 1839 by Sowmya Ugalde RN  Outcome: Progressing  1/29/2025 1552 by Sowmya Ugalde RN  Outcome: Progressing  Intervention: Identify and Manage Fall Risk  Recent Flowsheet Documentation  Taken 1/29/2025 1800 by Sowmya Ugalde RN  Safety Promotion/Fall Prevention:   activity supervised   assistive device/personal items within reach   clutter free environment maintained   fall prevention program maintained   lighting adjusted   mobility aid in reach   nonskid shoes/slippers when out of bed   room organization consistent   safety round/check completed  Taken 1/29/2025 1600 by Sowmya Ugalde RN  Safety Promotion/Fall Prevention:   activity supervised   assistive device/personal items within reach   clutter free environment maintained   fall prevention program maintained   lighting adjusted   mobility aid in reach   nonskid shoes/slippers when out of bed   room organization consistent   safety round/check completed  Taken 1/29/2025 1400 by Sowmya Ugalde RN  Safety Promotion/Fall Prevention:   activity supervised   assistive device/personal items within reach   clutter free  environment maintained   fall prevention program maintained   lighting adjusted   mobility aid in reach   nonskid shoes/slippers when out of bed   room organization consistent   safety round/check completed  Taken 1/29/2025 1200 by Sowmya Ugalde RN  Safety Promotion/Fall Prevention:   activity supervised   assistive device/personal items within reach   clutter free environment maintained   fall prevention program maintained   lighting adjusted   mobility aid in reach   nonskid shoes/slippers when out of bed   room organization consistent   safety round/check completed  Taken 1/29/2025 1000 by Sowmya Ugalde RN  Safety Promotion/Fall Prevention:   activity supervised   assistive device/personal items within reach   clutter free environment maintained   fall prevention program maintained   lighting adjusted   mobility aid in reach   nonskid shoes/slippers when out of bed   room organization consistent   safety round/check completed  Taken 1/29/2025 0800 by Sowmya Ugalde RN  Safety Promotion/Fall Prevention:   activity supervised   assistive device/personal items within reach   clutter free environment maintained   fall prevention program maintained   lighting adjusted   mobility aid in reach   nonskid shoes/slippers when out of bed   room organization consistent   safety round/check completed  Intervention: Prevent Skin Injury  Recent Flowsheet Documentation  Taken 1/29/2025 1800 by Sowmya Ugalde RN  Body Position:   supine   legs elevated  Skin Protection:   incontinence pads utilized   silicone foam dressing in place   skin sealant/moisture barrier applied   transparent dressing maintained  Taken 1/29/2025 1600 by Sowmya Ugalde RN  Body Position:   supine   legs elevated  Skin Protection:   incontinence pads utilized   silicone foam dressing in place   skin sealant/moisture barrier applied   transparent dressing maintained  Taken 1/29/2025 1400 by Sowmya Ugalde RN  Body Position:   left   side-lying  Skin  Protection:   incontinence pads utilized   silicone foam dressing in place   skin sealant/moisture barrier applied   transparent dressing maintained  Taken 1/29/2025 1200 by Sowmya Ugalde RN  Body Position: sitting up in bed  Skin Protection:   incontinence pads utilized   silicone foam dressing in place   skin sealant/moisture barrier applied   transparent dressing maintained  Taken 1/29/2025 1000 by Sowmya Ugalde RN  Skin Protection:   incontinence pads utilized   silicone foam dressing in place   transparent dressing maintained   skin sealant/moisture barrier applied  Taken 1/29/2025 0800 by Sowmya Ugalde RN  Skin Protection: (silicone dressings peeled back, skin assessed)   incontinence pads utilized   silicone foam dressing in place   skin sealant/moisture barrier applied   transparent dressing maintained  Intervention: Prevent and Manage VTE (Venous Thromboembolism) Risk  Recent Flowsheet Documentation  Taken 1/29/2025 0800 by Sowmya Ugalde RN  VTE Prevention/Management:   bilateral   foot pump device on  Intervention: Prevent Infection  Recent Flowsheet Documentation  Taken 1/29/2025 1800 by Sowmya Ugalde RN  Infection Prevention: environmental surveillance performed  Taken 1/29/2025 1600 by Sowmya Ugalde RN  Infection Prevention: environmental surveillance performed  Taken 1/29/2025 1400 by Sowmya Ugalde RN  Infection Prevention: environmental surveillance performed  Taken 1/29/2025 1200 by Sowmya Ugalde RN  Infection Prevention: environmental surveillance performed  Taken 1/29/2025 1000 by Sowmya Ugalde RN  Infection Prevention: environmental surveillance performed  Taken 1/29/2025 0800 by Sowmya Ugalde RN  Infection Prevention: environmental surveillance performed  Goal: Optimal Comfort and Wellbeing  1/29/2025 1839 by Sowmya Ugalde RN  Outcome: Progressing  1/29/2025 1552 by Sowmya Ugalde RN  Outcome: Progressing  Intervention: Provide Person-Centered Care  Recent Flowsheet  Documentation  Taken 1/29/2025 0800 by Sowmya Ugalde RN  Trust Relationship/Rapport:   care explained   questions answered   thoughts/feelings acknowledged  Goal: Readiness for Transition of Care  1/29/2025 1839 by Sowmya Ugalde RN  Outcome: Progressing  1/29/2025 1552 by Sowmya Ugalde RN  Outcome: Progressing     Problem: Skin Injury Risk Increased  Goal: Skin Health and Integrity  1/29/2025 1839 by Sowmya Ugalde RN  Outcome: Progressing  1/29/2025 1552 by Sowmya Ugalde RN  Outcome: Progressing  Intervention: Optimize Skin Protection  Recent Flowsheet Documentation  Taken 1/29/2025 1800 by Sowmya Ugalde RN  Activity Management: activity encouraged  Pressure Reduction Techniques:   frequent weight shift encouraged   heels elevated off bed   positioned off wounds   pressure points protected  Head of Bed (HOB) Positioning: HOB elevated  Pressure Reduction Devices:   heel offloading device utilized   positioning supports utilized   pressure-redistributing mattress utilized  Skin Protection:   incontinence pads utilized   silicone foam dressing in place   skin sealant/moisture barrier applied   transparent dressing maintained  Taken 1/29/2025 1600 by Sowmya Ugalde RN  Activity Management: activity encouraged  Pressure Reduction Techniques:   frequent weight shift encouraged   heels elevated off bed   positioned off wounds   pressure points protected  Head of Bed (HOB) Positioning: HOB elevated  Pressure Reduction Devices:   heel offloading device utilized   positioning supports utilized   pressure-redistributing mattress utilized  Skin Protection:   incontinence pads utilized   silicone foam dressing in place   skin sealant/moisture barrier applied   transparent dressing maintained  Taken 1/29/2025 1400 by Sowmya Ugalde RN  Activity Management: activity encouraged  Pressure Reduction Techniques:   frequent weight shift encouraged   heels elevated off bed   positioned off wounds   pressure points  protected  Head of Bed (HOB) Positioning: HOB elevated  Pressure Reduction Devices:   heel offloading device utilized   positioning supports utilized   pressure-redistributing mattress utilized  Skin Protection:   incontinence pads utilized   silicone foam dressing in place   skin sealant/moisture barrier applied   transparent dressing maintained  Taken 1/29/2025 1200 by Sowmya Ugalde RN  Activity Management: activity encouraged  Pressure Reduction Techniques:   frequent weight shift encouraged   heels elevated off bed   positioned off wounds   pressure points protected  Head of Bed (HOB) Positioning: HOB elevated  Pressure Reduction Devices:   heel offloading device utilized   positioning supports utilized   pressure-redistributing mattress utilized  Skin Protection:   incontinence pads utilized   silicone foam dressing in place   skin sealant/moisture barrier applied   transparent dressing maintained  Taken 1/29/2025 1000 by Sowmya Ugalde RN  Activity Management: activity encouraged  Pressure Reduction Techniques:   frequent weight shift encouraged   heels elevated off bed   positioned off wounds   pressure points protected  Pressure Reduction Devices:   heel offloading device utilized   positioning supports utilized   pressure-redistributing mattress utilized  Skin Protection:   incontinence pads utilized   silicone foam dressing in place   transparent dressing maintained   skin sealant/moisture barrier applied  Taken 1/29/2025 0800 by Sowmya Ugalde RN  Activity Management: activity encouraged  Pressure Reduction Techniques:   frequent weight shift encouraged   heels elevated off bed   positioned off wounds   pressure points protected  Pressure Reduction Devices:   heel offloading device utilized   positioning supports utilized   pressure-redistributing mattress utilized  Skin Protection: (silicone dressings peeled back, skin assessed)   incontinence pads utilized   silicone foam dressing in place   skin  sealant/moisture barrier applied   transparent dressing maintained     Problem: Comorbidity Management  Goal: Maintenance of Heart Failure Symptom Control  1/29/2025 1839 by Sowmya Ugalde RN  Outcome: Progressing  1/29/2025 1552 by Sowmya Ugalde RN  Outcome: Progressing  Intervention: Maintain Heart Failure Management  Recent Flowsheet Documentation  Taken 1/29/2025 1800 by Sowmya Ugalde RN  Medication Review/Management: medications reviewed  Taken 1/29/2025 1600 by Sowmya Ugalde RN  Medication Review/Management: medications reviewed  Taken 1/29/2025 1400 by Sowmya Ugalde RN  Medication Review/Management: medications reviewed  Taken 1/29/2025 1200 by Sowmya Ugalde RN  Medication Review/Management: medications reviewed  Taken 1/29/2025 1000 by Sowmya Ugalde RN  Medication Review/Management: medications reviewed  Taken 1/29/2025 0800 by Sowmya Ugalde RN  Medication Review/Management: medications reviewed  Goal: Blood Pressure in Desired Range  1/29/2025 1839 by Sowmya Ugalde RN  Outcome: Progressing  1/29/2025 1552 by Sowmya Ugalde RN  Outcome: Progressing  Intervention: Maintain Blood Pressure Management  Recent Flowsheet Documentation  Taken 1/29/2025 1800 by Sowmya Ugalde RN  Medication Review/Management: medications reviewed  Taken 1/29/2025 1600 by Sowmya Ugalde RN  Medication Review/Management: medications reviewed  Taken 1/29/2025 1400 by Sowmya Ugalde RN  Medication Review/Management: medications reviewed  Taken 1/29/2025 1200 by Sowmya Ugalde RN  Medication Review/Management: medications reviewed  Taken 1/29/2025 1000 by Sowmya Ugalde RN  Medication Review/Management: medications reviewed  Taken 1/29/2025 0800 by Sowmya Ugalde RN  Medication Review/Management: medications reviewed     Problem: Fall Injury Risk  Goal: Absence of Fall and Fall-Related Injury  1/29/2025 1839 by Sowmya Ugalde RN  Outcome: Progressing  1/29/2025 1552 by Sowmya Ugalde RN  Outcome:  Progressing  Intervention: Identify and Manage Contributors  Recent Flowsheet Documentation  Taken 1/29/2025 1800 by Sowmya Uglade RN  Medication Review/Management: medications reviewed  Taken 1/29/2025 1600 by Sowmya Ugalde RN  Medication Review/Management: medications reviewed  Taken 1/29/2025 1400 by Sowmya Ugalde RN  Medication Review/Management: medications reviewed  Taken 1/29/2025 1200 by Sowmya Ugalde RN  Medication Review/Management: medications reviewed  Taken 1/29/2025 1000 by Sowmya Ugalde RN  Medication Review/Management: medications reviewed  Taken 1/29/2025 0800 by Sowmya Ugalde RN  Medication Review/Management: medications reviewed  Intervention: Promote Injury-Free Environment  Recent Flowsheet Documentation  Taken 1/29/2025 1800 by Sowmya Ugalde RN  Safety Promotion/Fall Prevention:   activity supervised   assistive device/personal items within reach   clutter free environment maintained   fall prevention program maintained   lighting adjusted   mobility aid in reach   nonskid shoes/slippers when out of bed   room organization consistent   safety round/check completed  Taken 1/29/2025 1600 by Sowmya Ugalde RN  Safety Promotion/Fall Prevention:   activity supervised   assistive device/personal items within reach   clutter free environment maintained   fall prevention program maintained   lighting adjusted   mobility aid in reach   nonskid shoes/slippers when out of bed   room organization consistent   safety round/check completed  Taken 1/29/2025 1400 by Sowmya Ugalde RN  Safety Promotion/Fall Prevention:   activity supervised   assistive device/personal items within reach   clutter free environment maintained   fall prevention program maintained   lighting adjusted   mobility aid in reach   nonskid shoes/slippers when out of bed   room organization consistent   safety round/check completed  Taken 1/29/2025 1200 by Sowmya Ugalde RN  Safety Promotion/Fall Prevention:   activity  supervised   assistive device/personal items within reach   clutter free environment maintained   fall prevention program maintained   lighting adjusted   mobility aid in reach   nonskid shoes/slippers when out of bed   room organization consistent   safety round/check completed  Taken 1/29/2025 1000 by Sowmya Ugalde, RN  Safety Promotion/Fall Prevention:   activity supervised   assistive device/personal items within reach   clutter free environment maintained   fall prevention program maintained   lighting adjusted   mobility aid in reach   nonskid shoes/slippers when out of bed   room organization consistent   safety round/check completed  Taken 1/29/2025 0800 by Sowmya Ugalde, RN  Safety Promotion/Fall Prevention:   activity supervised   assistive device/personal items within reach   clutter free environment maintained   fall prevention program maintained   lighting adjusted   mobility aid in reach   nonskid shoes/slippers when out of bed   room organization consistent   safety round/check completed

## 2025-01-29 NOTE — PROGRESS NOTES
" LOS: 9 days   Patient Care Team:  Sam Hung MD as PCP - General  Sullivan, Elver MCADAMS MD as Consulting Physician (Otolaryngology)    Chief Complaint: ANGEL LUIS on CKD stage IV  SOB    Subjective     Shortness of Breath        Subjective:  Symptoms:  Stable.  She reports shortness of breath.        History taken from: patient family    Objective     Vital Sign Min/Max for last 24 hours  Temp  Min: 97.3 °F (36.3 °C)  Max: 98.6 °F (37 °C)   BP  Min: 115/75  Max: 137/93   Pulse  Min: 86  Max: 101   Resp  Min: 16  Max: 22   SpO2  Min: 90 %  Max: 93 %   Flow (L/min) (Oxygen Therapy)  Min: 5  Max: 5   Weight  Min: 74.6 kg (164 lb 8 oz)  Max: 74.6 kg (164 lb 8 oz)     Flowsheet Rows      Flowsheet Row First Filed Value   Admission Height 162.6 cm (64\") Documented at 01/20/2025 1006   Admission Weight 71.7 kg (158 lb) Documented at 01/20/2025 1006            I/O this shift:  In: 120 [P.O.:120]  Out: 500 [Urine:500]  I/O last 3 completed shifts:  In: 360 [P.O.:360]  Out: 1500 [Urine:1500]    Objective:  General Appearance:  Comfortable.    Vital signs: (most recent): Blood pressure 137/93, pulse 101, temperature 98.1 °F (36.7 °C), temperature source Axillary, resp. rate 16, height 162.6 cm (64.02\"), weight 74.6 kg (164 lb 8 oz), SpO2 93%, not currently breastfeeding.  Vital signs are normal.    Output: Producing urine.    HEENT: Normal HEENT exam.    Lungs:  Normal effort.    Heart: Normal rate.  Regular rhythm.  S1 normal and S2 normal.    Abdomen: Abdomen is soft.    Extremities: Normal range of motion.  There is no dependent edema or local swelling.    Pulses: Distal pulses are intact.    Neurological: Patient is alert and oriented to person, place and time.                Results Review:     I reviewed the patient's new clinical results.    WBC WBC   Date Value Ref Range Status   01/29/2025 10.09 3.40 - 10.80 10*3/mm3 Final   01/28/2025 9.99 3.40 - 10.80 10*3/mm3 Final   01/27/2025 10.29 3.40 - 10.80 10*3/mm3 " "Final      HGB Hemoglobin   Date Value Ref Range Status   01/29/2025 13.1 12.0 - 15.9 g/dL Final   01/28/2025 11.9 (L) 12.0 - 15.9 g/dL Final   01/27/2025 11.9 (L) 12.0 - 15.9 g/dL Final      HCT Hematocrit   Date Value Ref Range Status   01/29/2025 40.4 34.0 - 46.6 % Final   01/28/2025 37.4 34.0 - 46.6 % Final   01/27/2025 38.1 34.0 - 46.6 % Final      Platlets No results found for: \"LABPLAT\"   MCV MCV   Date Value Ref Range Status   01/29/2025 93.3 79.0 - 97.0 fL Final   01/28/2025 95.9 79.0 - 97.0 fL Final   01/27/2025 96.9 79.0 - 97.0 fL Final          Sodium Sodium   Date Value Ref Range Status   01/29/2025 137 136 - 145 mmol/L Final   01/28/2025 133 (L) 136 - 145 mmol/L Final   01/27/2025 135 (L) 136 - 145 mmol/L Final   01/26/2025 132 (L) 136 - 145 mmol/L Final      Potassium Potassium   Date Value Ref Range Status   01/29/2025 4.6 3.5 - 5.2 mmol/L Final     Comment:     Slight hemolysis detected by analyzer. Result may be falsely elevated.   01/28/2025 4.8 3.5 - 5.2 mmol/L Final   01/27/2025 4.8 3.5 - 5.2 mmol/L Final   01/26/2025 4.7 3.5 - 5.2 mmol/L Final      Chloride Chloride   Date Value Ref Range Status   01/29/2025 98 98 - 107 mmol/L Final   01/28/2025 97 (L) 98 - 107 mmol/L Final   01/27/2025 99 98 - 107 mmol/L Final   01/26/2025 98 98 - 107 mmol/L Final      CO2 CO2   Date Value Ref Range Status   01/29/2025 26.0 22.0 - 29.0 mmol/L Final   01/28/2025 19.0 (L) 22.0 - 29.0 mmol/L Final   01/27/2025 22.0 22.0 - 29.0 mmol/L Final   01/26/2025 22.0 22.0 - 29.0 mmol/L Final      BUN BUN   Date Value Ref Range Status   01/29/2025 77 (H) 8 - 23 mg/dL Final   01/28/2025 75 (H) 8 - 23 mg/dL Final   01/27/2025 74 (H) 8 - 23 mg/dL Final   01/26/2025 71 (H) 8 - 23 mg/dL Final      Creatinine Creatinine   Date Value Ref Range Status   01/29/2025 2.89 (H) 0.57 - 1.00 mg/dL Final   01/28/2025 2.90 (H) 0.57 - 1.00 mg/dL Final   01/27/2025 2.61 (H) 0.57 - 1.00 mg/dL Final   01/26/2025 2.56 (H) 0.57 - 1.00 mg/dL Final " "     Calcium Calcium   Date Value Ref Range Status   01/29/2025 8.9 8.2 - 9.6 mg/dL Final   01/28/2025 8.6 8.2 - 9.6 mg/dL Final   01/27/2025 8.9 8.2 - 9.6 mg/dL Final   01/26/2025 8.8 8.2 - 9.6 mg/dL Final      PO4 No results found for: \"CAPO4\"   Albumin Albumin   Date Value Ref Range Status   01/29/2025 3.1 (L) 3.5 - 5.2 g/dL Final      Magnesium No results found for: \"MG\"   Uric Acid No results found for: \"URICACID\"     Medication Review: Yes    Assessment & Plan       Pleural effusion on left    Essential hypertension    Chronic kidney disease, stage III (moderate)       Assessment & Plan    1.  CKD stage IV: Followed with [facility] nephrology Dr. Hernandez previous creatinine 2.15, with history of chronic hypertension.  Dr. Hernandez has discussed with the patient and the family in the past regards to dialysis and they have declined..  Medical management was done.  Current labs shows creatinine 2.90, BUN 75, sodium 133 potassium 4.8 bicarb 19  2.  Proteinuria: 4.9 g, patient has been not a candidate for kidney biopsy or aggressive intervention.  3.  Chronic diastolic heart failure failure.  4.  CKD bone and mineral disease. Last PTH ~ 84.Phos ~ 4.6  5.  Hypertension  6.  History of ovarian cancer in remission status post surgical removal.  7.  Progressive shortness of breath: Left pleural effusion. Found to have PNA on this admission.     Plan:  Continue with diuretics bumex 2 mg BID for optimization of volume status  Daily labs.   Daily evaluation for renal replacement therapy will be done.  No dialysis indicated at this time.  Adjust medication for the new GFR.  Case discussed with the medical staff taking care of the patient and the staff    Victoriano Webster MD  01/29/25  18:44 EST            "

## 2025-01-29 NOTE — PROGRESS NOTES
[facility] Medicine Services  PROGRESS NOTE    Patient Name: Rose J Kellermann  : 1926  MRN: [id number]    Date of Admission: 2025  Primary Care Physician: Sam Hung MD    Subjective   Subjective     CC:  F/U dyspnea    HPI:  Seen this morning. Doing better. Has no complaints at this time. Remains on 5 liters.       Objective   Objective     Vital Signs:   Temp:  [97.3 °F (36.3 °C)-98.6 °F (37 °C)] 98.1 °F (36.7 °C)  Heart Rate:  [] 99  Resp:  [16-23] 16  BP: (115-149)/(75-94) 137/93  Flow (L/min) (Oxygen Therapy):  [4-5] 5     Physical Exam:  Gen-no acute distress, sitting up in bed  HENT-NCAT, mucous membranes moist  CV-irregular, S1 S2 normal, no m/r/g  Resp-decreased bilaterally, nonlabored currently  Abd-soft, NT, ND, +BS  Ext-no edema  Neuro-awake, alert, conversant, no focal deficits  Skin-no rashes  Psych-appropriate mood      Results Reviewed:  LAB RESULTS:      Lab 25  0838 25  0641 25  1040 25  1138   WBC 9.99 10.29 8.53 7.67   HEMOGLOBIN 11.9* 11.9* 10.8* 10.9*   HEMATOCRIT 37.4 38.1 34.8 34.4   PLATELETS 244 243 240 237   NEUTROS ABS 8.17* 8.41* 6.75 6.14   IMMATURE GRANS (ABS) 0.03 0.04 0.04 0.03   LYMPHS ABS 0.64* 0.60* 0.74 0.54*   MONOS ABS 0.91* 1.05* 0.83 0.75   EOS ABS 0.21 0.17 0.14 0.19   MCV 95.9 96.9 97.5* 94.0         Lab 25  0838 25  0641 25  2244 25  1040 25  1138   SODIUM 133* 135* 132* 135* 134*   POTASSIUM 4.8 4.8 4.7 4.3 4.1   CHLORIDE 97* 99 98 101 99   CO2 19.0* 22.0 22.0 22.0 24.0   ANION GAP 17.0* 14.0 12.0 12.0 11.0   BUN 75* 74* 71* 63* 68*   CREATININE 2.90* 2.61* 2.56* 2.36* 2.84*   EGFR 14.2* 16.1* 16.5* 18.2* 14.6*   GLUCOSE 134* 89 102* 108* 110*   CALCIUM 8.6 8.9 8.8 8.7 8.9                         Lab 25  1552   PH, ARTERIAL 7.393   PCO2, ARTERIAL 35.2   PO2 .0*   FIO2 40   HCO3 ART 21.4   BASE EXCESS ART -2.9*   CARBOXYHEMOGLOBIN 1.1     Brief  Urine Lab Results       None            Microbiology Results Abnormal       None            XR Chest 1 View    Result Date: 1/29/2025  XR CHEST 1 VW Date of Exam: 1/29/2025 4:18 AM EST Indication: Follow up L pleural effusion s/p Left pleural CT placement Comparison: Chest radiograph 1/28/2025. Findings: Enlarged cardiac silhouette, unchanged. Persistent pulmonary vascular congestion. Small/moderate bilateral pleural effusions with bibasilar airspace disease, unchanged. No pneumothorax. Osseous structures are unchanged.     Impression: Impression: No significant interval change. Electronically Signed: Adiel Mckeon MD  1/29/2025 8:16 AM EST  Workstation ID: [id number]    XR Chest 1 View    Result Date: 1/28/2025  XR CHEST 1 VW Date of Exam: 1/28/2025 4:05 PM EST Indication: worsening SOB Comparison: 1/27/2025 11:06 p.m. Findings: Current image is timed 4:14 p.m. 1/28/2025. Heart shadow is markedly enlarged. Vasculature is cephalized, but pulmonary edema is improved from the prior exam. There is persistent bibasilar effusion and atelectasis. No pneumothorax is seen. Reviewing multiple recent images back to 1/25/2025, there appears to be increasing size of the cardiomediastinal silhouette, and gradually increasing bibasilar effusion and atelectasis.     Impression: Impression: 1. Marked heart shadow enlargement, which appears further increased, but with improved pulmonary vascular congestion compared to yesterday's 11:06 p.m. exam. 2. Persistent, extensive bibasilar atelectasis and effusion which appears to be gradually increasing over the past several days. Electronically Signed: James Juarez MD  1/28/2025 4:31 PM EST  Workstation ID: [id number]    CT Head Without Contrast    Result Date: 1/28/2025  CT HEAD WO CONTRAST Date of Exam: 1/28/2025 4:00 PM EST Indication: Rapid response. Comparison: CT head without contrast 1/21/2021 Technique: Axial CT images were obtained of the head without contrast administration.  Automated  exposure control and iterative construction methods were used. Findings: Negative for intracranial hemorrhage. Extensive white matter findings most compatible with chronic microvascular disease. Generalized cerebral volume loss. Small areas of parenchymal loss in the left cerebellum and the right cerebellum related to remote infarcts, stable. No intraventricular hemorrhage. No midline shift or mass effect. No abnormal extra-axial fluid collection. The calvarium intact. Bilateral mastoid effusions. Visualized sinuses demonstrate suspected chronic sinus thickening at the right  maxillary sinus with mucoperiosteal thickening unchanged.     Impression: Impression: 1. No acute intracranial abnormality. 2. Extensive white matter findings most compatible with chronic microvascular disease. 3. Generalized cerebral atrophy and additional chronic findings above. 4. Bilateral mastoid effusions. Electronically Signed: Jhonny Barron MD  1/28/2025 4:21 PM EST  Workstation ID: [id number]    XR Chest 1 View    Result Date: 1/28/2025  XR CHEST 1 VW Date of Exam: 1/28/2025 2:05 AM EST Indication: Follow up L pleural effusion s/p Left pleural CT placement Comparison: January 27, 2025 Findings: The heart is enlarged. There are bibasilar effusions. There are interstitial changes which could reflect edema. The findings are similar to the prior study when taking differences in technique into consideration. There are degenerative changes involving both shoulders.     Impression: Impression: 1.Cardiomegaly. 2.Bibasilar effusions. 3.Interstitial changes which could reflect edema. Electronically Signed: Lanre Sifuentes MD  1/28/2025 8:04 AM EST  Workstation ID: [id number]     Results for orders placed during the hospital encounter of 01/20/25    Adult Transthoracic Echo Complete W/ Cont if Necessary Per Protocol    Interpretation Summary    Left ventricular ejection fraction appears to be 61 - 65%.    Left ventricular diastolic function was  indeterminate.    Mild aortic valve stenosis is present.    Peak velocity of the flow distal to the aortic valve is 245.6 cm/s. Aortic valve maximum pressure gradient is 24 mmHg. Aortic valve mean pressure gradient is 13 mmHg.    The mitral valve mean gradient is 4 mmHg.    Moderate tricuspid valve regurgitation is present.    Estimated right ventricular systolic pressure from tricuspid regurgitation is markedly elevated (>55 mmHg). Calculated right ventricular systolic pressure from tricuspid regurgitation is 57 mmHg.    There is a moderate 2cm  pericardial effusion with no tamponade    There is a left pleural effusion.      Current medications:  Scheduled Meds:[Held by provider] apixaban, 5 mg, Oral, Q12H  bumetanide, 2 mg, Intravenous, Q12H  [Held by provider] bumetanide, 1 mg, Oral, Daily  hydrALAZINE, 25 mg, Oral, BID  sodium chloride, 10 mL, Intravenous, Q12H      Continuous Infusions:   PRN Meds:.  acetaminophen    senna-docusate sodium **AND** polyethylene glycol **AND** bisacodyl **AND** bisacodyl    sodium chloride    sodium chloride    sodium chloride    Assessment & Plan   Assessment & Plan     Active Hospital Problems    Diagnosis  POA    **Pleural effusion on left [J90]  Yes    Essential hypertension [I10]  Yes    Chronic kidney disease, stage III (moderate)  [N18.30]  Yes      Resolved Hospital Problems   No resolved problems to display.        Brief Hospital Course to date:  Rose J Kellermann is a 89 y.o. female with history of DVT on Eliquis, HFpEF, HTN, and CKD IV who presents due to dyspnea and hypoxia. Had been wearing 2-3 liters home oxygen intermittently over the past several months, now requiring continuously. Workup in the ED notable for a left pleural effusion. Admitted for further management.     This patient's problems and plans were partially entered by my partner and updated as appropriate by me 01/29/25. Copied text in this note has been reviewed and is accurate as of today's date.      Progressive hypoxia  Left pleural effusion  Acute on chronic HFpEF exacerbation  Valvular heart disease  Pulmonary HTN  Pericardial effusion  - CXR with moderate L pleural effusion  - Thoracentesis ordered, IR ended up placing a chest tube on the left 1/23/25 - consulted CT Surgery to manage, tube was pulled 1/26/25  - Pleural fluid appears mixed exudate and transudate?, cultures/cytology negative   - RRT 1/28/25 due to worsening dyspnea and hypoxia - IV diuresis resumed with some improvement today, remains on 5 liters however (baseline 3 liters)  - Echo 1/28/25: EF 61-65%, moderate TR, RVSP >55 mmHg, moderate 2 cm pericardial effusion without tamponade, left pleural effusion  - Cardiology consulted 1/29/25: agree with diuresis, defer pericardiocentesis or pericardial window as suspect etiology of pericardial effusion may be multifactorial but in large part related to poor nutritional status/low protein     ANGEL LUIS on CKD IV  - Baseline Cr ~1.8, initially 2.5 and has trended up to 2.9  - Nephrology consulted, started IV Bumex 2 mg Q12H 1/28/25  - Repeat labs for today are still pending    Afib  - Cardiology following  - Has been persistent since admission but she appears asymptomatic from this  - Eliquis on hold currently      History of DVT  - Will hold Eliquis until we can be sure no further interventions needed     Hypertension  - Amlodipine held per Cardiology  - Continue Hydralazine    [facility] Discharge Location and Transportation: [facility]  Expected Discharge   Expected Discharge Date: 1/31/2025; Expected Discharge Time:      VTE Prophylaxis:  Pharmacologic VTE prophylaxis orders are present.         AM-PAC 6 Clicks Score (PT): 17 (01/28/25 2016)    CODE STATUS:   Code Status and Medical Interventions: CPR (Attempt to Resuscitate); Full Support   Ordered at: 01/20/25 0434     Level Of Support Discussed With:    Patient     Code Status (Patient has no pulse and is not breathing):    CPR (Attempt to Resuscitate)      Medical Interventions (Patient has pulse or is breathing):    Full Support       Yenifer Eid MD  01/29/25

## 2025-01-29 NOTE — PLAN OF CARE
Goal Outcome Evaluation:  Plan of Care Reviewed With: patient        Progress: no change  Outcome Evaluation: rested comfortably; 5L n.c; no events overnight; will continue POC            Problem: Adult Inpatient Plan of Care  Goal: Absence of Hospital-Acquired Illness or Injury  Intervention: Identify and Manage Fall Risk  Recent Flowsheet Documentation  Taken 1/29/2025 0600 by Aftab Bolton RN  Safety Promotion/Fall Prevention:   assistive device/personal items within reach   clutter free environment maintained   activity supervised   fall prevention program maintained   nonskid shoes/slippers when out of bed   room organization consistent   safety round/check completed  Taken 1/29/2025 0400 by Aftab Bolton RN  Safety Promotion/Fall Prevention:   activity supervised   assistive device/personal items within reach   clutter free environment maintained   fall prevention program maintained   nonskid shoes/slippers when out of bed   room organization consistent   safety round/check completed  Taken 1/29/2025 0200 by Aftab Bolton RN  Safety Promotion/Fall Prevention:   activity supervised   assistive device/personal items within reach   clutter free environment maintained   fall prevention program maintained   nonskid shoes/slippers when out of bed   room organization consistent   safety round/check completed  Taken 1/29/2025 0000 by Aftab Bolton RN  Safety Promotion/Fall Prevention:   assistive device/personal items within reach   clutter free environment maintained   activity supervised   fall prevention program maintained   nonskid shoes/slippers when out of bed   room organization consistent   safety round/check completed  Taken 1/28/2025 2200 by Aftab Bolton RN  Safety Promotion/Fall Prevention:   assistive device/personal items within reach   clutter free environment maintained   activity supervised   fall prevention program maintained   nonskid shoes/slippers when out of bed    room organization consistent   safety round/check completed  Taken 1/28/2025 2016 by Aftab Bolton RN  Safety Promotion/Fall Prevention:   activity supervised   assistive device/personal items within reach   clutter free environment maintained   fall prevention program maintained   nonskid shoes/slippers when out of bed   safety round/check completed   room organization consistent     Problem: Adult Inpatient Plan of Care  Goal: Absence of Hospital-Acquired Illness or Injury  Intervention: Prevent Skin Injury  Recent Flowsheet Documentation  Taken 1/29/2025 0600 by Aftab Bolton RN  Body Position:   turned   supine  Skin Protection:   incontinence pads utilized   silicone foam dressing in place   transparent dressing maintained  Taken 1/29/2025 0400 by Aftab Bolton RN  Body Position:   turned   right  Skin Protection:   incontinence pads utilized   silicone foam dressing in place   transparent dressing maintained  Taken 1/29/2025 0200 by Aftab Bolton RN  Body Position:   turned   supine  Skin Protection:   incontinence pads utilized   silicone foam dressing in place   transparent dressing maintained  Taken 1/29/2025 0000 by Aftab Bolton RN  Body Position:   turned   left  Skin Protection:   incontinence pads utilized   silicone foam dressing in place   transparent dressing maintained  Taken 1/28/2025 2200 by Aftab Bolton RN  Body Position:   position maintained   supine  Skin Protection:   incontinence pads utilized   silicone foam dressing in place   transparent dressing maintained  Taken 1/28/2025 2152 by Aftab Bolton RN  Body Position:   turned   supine  Taken 1/28/2025 2016 by Aftab Bolton RN  Body Position: (patient was supine prior to this turn)   turned   right  Skin Protection:   incontinence pads utilized   silicone foam dressing in place   transparent dressing maintained

## 2025-01-29 NOTE — PROGRESS NOTES
"          Clinical Nutrition Assessment     Patient Name: Rose J Kellermann  YOB: 1926  MRN: [id number]  Date of Encounter: 01/29/25 13:11 EST  Admission date: 1/20/2025  Reason for Visit: Follow-up protocol    Assessment   Nutrition Assessment   Admission Diagnosis:  Pleural effusion on right [J90]    Problem List:    Pleural effusion on left    Essential hypertension    Chronic kidney disease, stage III (moderate)       PMH:   She  has a past medical history of Abscess of back, Arrhythmia, Cancer (12/2011), CKD (chronic kidney disease), stage III, Dizzy, Dvt femoral (deep venous thrombosis) (2017), Dyslipidemia, Enthesopathy of hip region, Generalized osteoarthrosis, involving multiple sites, Headache, Hearing loss, Hypertension, Low backache, Needs flu shot, Osteoarthritis, Otitis, serous, Pneumonia (1967), Retinal hemorrhage, SI joint arthritis, SOB (shortness of breath), Syncope (2001), Venous insufficiency of leg, and Wears glasses.    PSH:  She  has a past surgical history that includes Knee surgery (2001); Hip Percutaneous Pinning (Left, 11/24/2017); Cataract extraction; Hysterectomy; Colonoscopy; and Total hip arthroplasty (Left, 10/26/2018).    Applicable Nutrition History:   1/22) s/p L pleural drain placement    Anthropometrics     Height: Height: 162.6 cm (64.02\")  Last Filed Weight: Weight: 74.6 kg (164 lb 8 oz) (01/29/25 0355)  Method: Weight Method: Bed scale  BMI: BMI (Calculated): 28.2    UBW:     Weight      Weight (kg) Weight (lbs) Weight Method   2/16/2024 73.029 kg  161 lb     6/26/2024 72.576 kg  160 lb     1/20/2025 71.668 kg  158 lb  Stated    1/21/2025 75.796 kg  167 lb 1.6 oz  Bed scale      Weight change: No significant changes    Nutrition Focused Physical Exam    Date:  1/21       Pt with some visible wasting however based on PO and wt hx suspect age related sarcopenia over malnutrition      Subjective   Reported/Observed/Food/Nutrition Related History:   1/29  Good PO " intake noted - correlates with PO hx obtained on admission. Per RN, pt had episode of emesis with therapy yesterday - ended in rapid response team being called. No acute nutrition related concerns indicated at this time. ? LBM 1/20 1/21  Pt laying in bed at time of visit with daughter present - pt is somewhat [place] and daughter assisted in answering some nutrition related questions. Pt eats 2 meals/day usually - daughter prepares a large breakfast and then pt will eat small meal in late afternoon, early evening. Has tried ONS in the past - strong dislike. Inquired about NPO status - discussed planned procedure for today, verbalized understanding. Denies dysphagia. NKFA.     Current Nutrition Prescription   PO: Diet: Cardiac; Healthy Heart (2-3 Na+); Fluid Consistency: Thin (IDDSI 0)  Oral Nutrition Supplement: N/A  Intake:  38.6% x last 12 meals documented    Assessment & Plan   Nutrition Diagnosis   Date:  1/29 Updated:  Problem Inadequate oral intake   Etiology Advanced age   Signs/Symptoms <50% at meals   Status: New    Date:  1/21            Updated: 1/29   Problem Inadequate oral intake    Etiology Pleural effusion   Signs/Symptoms NPO status   Status: Discontinued    Goal:   Nutrition to support treatment and Tolerate PO, Increase intake      Nutrition Intervention      Follow treatment progress, Care plan reviewed, Menu adjusted    Encourage PO intake at meals  Liberalized diet to Regular  Therapeutic diet not indicated in advanced age  Pt dislikes ONS    Consider scheduling bowel regimen 2/2 no BM x9d    Monitoring/Evaluation:   Per protocol, I&O, PO intake, Pertinent labs, Symptoms, POC/GOC    Yazmin Chandra, MS,RD,LD  Time Spent: 25min

## 2025-01-29 NOTE — CONSULTS
Rose J Kellermann  [id number]  12/9/1926   LOS: 9 days   Patient Care Team:  Sam Hung MD as PCP - General  Elver Sullivan MD as Consulting Physician (Otolaryngology)    ID: 89-year-old  white female retired  from [employer], [place] admitted from [facility] ED.    Chief Complaint:  SOB    Problem List:    Senile fibrocalcific valvular heart disease            A. 6/23 echo EF >60% mild AS mean 15, mild MS mean 5 rvsp >55            B. 1/25 Roger NL echocardiographic LVEF with moderate pericardial effusion without tamponade with mild-moderate AS and mild functional MS with severe PH             C.  Residual CCS 1 angina pectoral/NYHA class III-IV  biventricular congestive heart failure, January 2025  Fatigue, dyspnea and frequent premature atrial contractions:  A. September 2010, echocardiogram, mild concentric LVH, mild MR, TR, normal LVEF, mildly elevated RVSP.    B. September 2010, Holter with greater 18,000 PACs, improved with Zebeta   Chronic hypertension-probably essential  Remote DVT post left hip surgery (2017) with documented resolution, September 2018   CKD Stage IV followed at [facility] by Dr.Tyler Hernandez with consideration of previous hemodialysis declined  Remote hypercalcemia  A. 5/23 Calcium 12.7, PTHi 7.4 (low)  B. Continued workup by Dr. MACHUCA  Remote elevated TSH without recent reassessment, January 2025  Stress hyperglycemia with acceptable remote hemoglobin A1c (5.9% April 2019)  Chronic exertional dyspnea-probable HFpEF with abnormal chest CTA consistent with heart failure (August 2023), January 2025   Possible osteoarthritis with questionable carpal tunnel syndrome bilaterally.    Endometrial cancer, status post robotically assisted hysterectomy with Dr. Haddad, December 2011.    Additional remote operations:  A.  Cataract extraction, O.U.  B.  Colonoscopy  C.  Left hip percutaneous pin placement, November 2017  D.  Knee surgery-data deficit (2001)  E.   Hysterectomy-data deficit  F.  Left total hip arthroplasty, October 2018   13.  H/O prominent proteinuria-probable nephrotic syndrome   14.  Atrial fibrillation of uncertain duration ([id number] score 6; 9.8% annual stroke risk) maintained on apixaban, January 2025   15.  [facility] hospitalization x 7 days for acute hypoxic respiratory failure/HFpEF exacerbation, August 2023   16.  Chronic hypoxia on supplemental oxygen therapy with pulmonary hypertension without remote tobacco use/PFTs, January 2025      Allergies   Allergen Reactions    Sulfa Antibiotics Nausea Only and GI Intolerance     Medications Prior to Admission   Medication Sig Dispense Refill Last Dose/Taking    amLODIPine (NORVASC) 5 MG tablet Take 1 tablet by mouth Daily. Indications: High Blood Pressure Disorder 90 tablet 3 1/19/2025    bumetanide (BUMEX) 1 MG tablet TAKE 1 TABLET THREE TIMES A WEEK. TAKE AN EXTRA DOSE IF HAVING A 2 POUND WEIGHT GAIN FROM BASELINE, FOR EDEMA (Patient taking differently: Take 1 tablet by mouth 4 times a week.) 45 tablet 3 Past Week    Eliquis 5 MG tablet tablet TAKE 1 TABLET TWICE A DAY FOR DEEP VEIN THROMBOSIS/PULMONARY EMBOLISM (ACTIVE THROMBOSIS) 180 tablet 3 1/19/2025    hydrALAZINE (APRESOLINE) 25 MG tablet Take 1 tablet by mouth 2 (Two) Times a Day. Indications: High Blood Pressure Disorder 180 tablet 3 1/19/2025    O2 (OXYGEN) Inhale 3 L/min Daily. Only at night  Indications: Shortness of breath   1/19/2025    acetaminophen (TYLENOL) 500 MG tablet Take 1 tablet by mouth Every 6 (Six) Hours As Needed for Mild Pain.   More than a month     Scheduled Meds:amLODIPine, 5 mg, Oral, Daily  apixaban, 5 mg, Oral, Q12H  bumetanide, 2 mg, Intravenous, Q12H  [Held by provider] bumetanide, 1 mg, Oral, Daily  hydrALAZINE, 25 mg, Oral, BID  sodium chloride, 10 mL, Intravenous, Q12H      Continuous Infusions:   PRN Meds:.  acetaminophen    senna-docusate sodium **AND** polyethylene glycol **AND** bisacodyl **AND** bisacodyl     sodium chloride    sodium chloride    sodium chloride       History of Present Illness:      This elderly female is followed intermittently by [facility] (Adnrew Xiao MD, [facility]) for the above-noted medical problems with last outpatient office assessment without further diagnostic or therapeutic intervention in 26 February 2024.  She was admitted to the hospital on the afternoon of 20 January 2025 for increasing tachypnea and dyspnea over a week with nonproductive cough and increasing hypoxemia with continued use of as needed home oxygen therapy.  The patient was noted to have increasing left pleural effusion on admission chest x-ray.  The patient underwent left chest tube placement on the morning of 22 January 2024 with cardiothoracic surgical assessment the same morning.  Patient has been maintained on 4 L/min nasal cannula with oximetry marginal at 93-94%).  The chest tube was removed on 26 January 2024 patient was noted to have persistent hypoxemia with supplemental oxygen therapy and intermittent confusion as well as atrial flutter on telemetry.  A rapid response was called yesterday afternoon for increasing tachypnea and dyspnea diaphoresis and tachycardia in the setting of atrial fibrillation with cardiology consultation requested this morning.  Her diuretic therapy had been on hold and nephrology was consulted additionally yesterday evening.  IV diuretic therapy with Bumex was initiated and apixaban was placed on hold.  This morning the patient is sitting semisupine in bed states she is comfortable without cardiopulmonary complaints or awareness of her atrial fibrillation.  She denies cough, sputum production, pleurisy, hemoptysis, or increased air hunger.  No current GI or  difficulty.  Oximetry 91-93% on 5 L/min nasal cannula.    Cardiac risk factors: advanced age (older than 55 for men, 65 for women), dyslipidemia, family history of premature cardiovascular disease, hypertension, and sedentary  "lifestyle.    Social History     Socioeconomic History    Marital status:    Tobacco Use    Smoking status: Never     Passive exposure: Never    Smokeless tobacco: Never   Vaping Use    Vaping status: Never Used   Substance and Sexual Activity    Alcohol use: No    Drug use: No    Sexual activity: Defer     Birth control/protection: None     Family History   Problem Relation Age of Onset    Heart disease Mother     Osteoarthritis Mother     Hypertension Mother     Hypertension Father     Heart attack Father     Osteoarthritis Father     Cancer Brother         lung       Review of Systems  10 point review of systems was completed, positives outlined in the HPI, and otherwise all other systems are negative.      Objective:       Physical Exam  /75 (BP Location: Right arm, Patient Position: Lying)   Pulse 86   Temp 97.3 °F (36.3 °C) (Axillary)   Resp 16   Ht 162.6 cm (64.02\")   Wt 74.6 kg (164 lb 8 oz)   SpO2 92%   BMI 28.22 kg/m²       01/28/25  1743 01/29/25  0355   Weight: 74.4 kg (164 lb 0.4 oz) 74.6 kg (164 lb 8 oz)     Body mass index is 28.22 kg/m².    Intake/Output Summary (Last 24 hours) at 1/29/2025 0814  Last data filed at 1/29/2025 0300  Gross per 24 hour   Intake --   Output 1400 ml   Net -1400 ml       General Appearance:  Alert, cooperative, no distress, appears stated age and chronically ill but nontoxic   Head:  Normocephalic, without obvious abnormality, atraumatic   Eyes:  PERRL, conjunctivae/corneas clear, EOM's intact   Throat: Lips, mucosa, and tongue normal; teeth and gums normal   Neck: Supple, symmetrical, trachea midline, no adenopathy, thyroid: not enlarged, symmetric, no tenderness/mass/nodules, no carotid bruit or JVD   Lungs:   Overall diminished breath sounds with basilar crackles/dullness bilaterally, respirations unlabored on 5 L/min nasal cannula   Heart:  Irregular rate and rhythm, variable S1, S2 normal, grade 2/6 systolic murmur, no rub or gallop   Abdomen:   " Soft, nontender, no masses, no organomegaly, bowel sounds audible x4   Extremities: 1-2+ generalized edema, normal range of motion   Pulses: 1+ and symmetric   Skin: Skin color, texture, turgor normal, no rashes or lesions   Neurologic: Normal; no focal changes noted and gait not tested     Cardiographics:    EKG (1/20/2025):    atrial flutter  Low voltage QRS  Septal infarct (cited on or before 10-Aug-2023)  Abnormal ECG  Confirmed by MD Sagastume Michael ([id number]) on 1/20/2025 6:41:33 PM    EKG (1/28/2025):    Atrial fibrillation  Low voltage QRS  Possible Anterolateral infarct (cited on or before 10-Aug-2023)  Abnormal ECG  When compared with ECG of 20-Jan-2025 10:08,  Atrial fibrillation has replaced Sinus rhythm    ECHO:      Left ventricular ejection fraction appears to be 61 - 65%.    Left ventricular diastolic function was indeterminate.    Mild aortic valve stenosis is present.    Peak velocity of the flow distal to the aortic valve is 245.6 cm/s. Aortic valve maximum pressure gradient is 24 mmHg. Aortic valve mean pressure gradient is 13 mmHg.    The mitral valve mean gradient is 4 mmHg.    Moderate tricuspid valve regurgitation is present.    Estimated right ventricular systolic pressure from tricuspid regurgitation is markedly elevated (>55 mmHg). Calculated right ventricular systolic pressure from tricuspid regurgitation is 57 mmHg.    There is a moderate 2cm  pericardial effusion with no tamponade    There is a left pleural effusion.    Imaging:    Chest x-ray (1/20/2025):    Findings:    Cardiomegaly. Moderate size left pleural effusion increasing from prior. Slightly blunted right costophrenic angle which could reflect trace pleural fluid. Prominent central vasculature without overt edema. Retrocardiac consolidation. No pneumothorax.   Degenerative related osseous change. Aortic atherosclerotic disease.     IMPRESSIONS:    1. Cardiomegaly with moderate size increasing left effusion and possible trace right  pleural effusion. Associated presumed compressive atelectasis in the left lower lobe, differential includes infection in the right clinical setting.  2. Chronic appearing pulmonary vascular congestion without overt edema.    CXR:    Findings:    Enlarged cardiac silhouette, unchanged. Persistent pulmonary vascular congestion. Small/moderate bilateral pleural effusions with bibasilar airspace disease, unchanged. No pneumothorax. Osseous structures are unchanged.     IMPRESSION: No significant interval change.    HEAD CT SCAN:    Findings:    Negative for intracranial hemorrhage. Extensive white matter findings most compatible with chronic microvascular disease. Generalized cerebral volume loss. Small areas of parenchymal loss in the left cerebellum and the right cerebellum related to remote   infarcts, stable. No intraventricular hemorrhage. No midline shift or mass effect. No abnormal extra-axial fluid collection. The calvarium intact. Bilateral mastoid effusions. Visualized sinuses demonstrate suspected chronic sinus thickening at the right maxillary sinus with mucoperiosteal thickening unchanged.     IMPRESSIONS:    1. No acute intracranial abnormality.  2. Extensive white matter findings most compatible with chronic microvascular disease.  3. Generalized cerebral atrophy and additional chronic findings above.  4. Bilateral mastoid effusions.    Lab Review   Results from last 7 days   Lab Units 01/28/25  0838 01/27/25  0641 01/26/25  2244   SODIUM mmol/L 133* 135* 132*   POTASSIUM mmol/L 4.8 4.8 4.7   CHLORIDE mmol/L 97* 99 98   CO2 mmol/L 19.0* 22.0 22.0   BUN mg/dL 75* 74* 71*   CREATININE mg/dL 2.90* 2.61* 2.56*   GLUCOSE mg/dL 134* 89 102*   CALCIUM mg/dL 8.6 8.9 8.8     Results from last 7 days   Lab Units 01/28/25  0838 01/27/25  0641 01/24/25  1040   WBC 10*3/mm3 9.99 10.29 8.53   HEMOGLOBIN g/dL 11.9* 11.9* 10.8*   HEMATOCRIT % 37.4 38.1 34.8   PLATELETS 10*3/mm3 244 243 240     ABGs (nasal cannula; FiO2  0.40): pH 7.39, pCO2 35, pO2 145 (99% saturation)  LDH: WNL  NO CK / URINALYSIS / PFTs / THYROID PANEL / Hgb A1c / IRON STUDIES / FLP / CRP / ESR  proBNP: 5,330.0  RESPIRATORY PANEL PCR: NEGATIVE  ALBUMIN: 3.3  LFTs: WNL  HS TROPONON: 43  LACTATE: 1.7  PROCALCITONIN: 0.33  INR: 1.82  NO DRIPS.     Assessment:      Elderly female with acute on chronic hypoxic respiratory failure and probable HFpEF in the setting of probable persistent atrial fibrillation of uncertain duration but maintained until yesterday on apixaban.  Her pleural fluid studies to me suggest transudate.  I am concerned about underlying pulmonary dysfunction in view of her chronic oxygen use and significant pulmonary hypertension on echocardiogram.  I doubt her valvular heart disease is producing this degree of pulmonary hypertension.  Her significant renal dysfunction of uncertain etiology makes further treatment of HFpEF quite problematic, particularly in the view of the inability to use SGLT2i therapy at this time as well as selective aldosterone receptor antagonist therapy.  I do not find features on her cardiac examination to suggest impending cardiac tamponade.  She states she wishes to be maintained on full support and CODE STATUS.  In view of the degree of her renal dysfunction and hypoxemia, prognosis remains quite guarded.  She is asymptomatic with her atrial fibrillation that has been persistent since admission and appears to be tolerating the controlled ventricular response rate quite acceptably.      Plan:     1.  Defer MPS/LHC/HARJIT/ECV  2.  Defer workup for possible cardiac amyloidosis  3.  Concur with IV diuretic therapy and would hold amlodipine at this time and concur with holding apixaban for additional 24 hours until her cardiac status remains stable  4.  Her remote urinalysis does not suggest significant proteinuria but apparently she does have possible nephrotic level protein output in the past;  will defer to nephrology  5.   Defer consideration of pericardiocentesis as well as pericardial window at this time; I suspect the etiology of pericardial effusion is multifactorial but large part related to diminished protein and nutritional intake as well as significant renal dysfunction, although recent subclinical pericarditis from occult viral inflammation may have been a factor as well  6.  Daily assessment of GFR    Discussed with patient.

## 2025-01-29 NOTE — CASE MANAGEMENT/SOCIAL WORK
Continued Stay Note  [facility]     Patient Name: Rose J Kellermann  MRN: [id number]  Today's Date: 1/29/2025    Admit Date: 1/20/2025    Plan: rehab   Discharge Plan       Row Name 01/29/25 0824       Plan    Plan rehab    Plan Comments The plan for this patient remains [facility], but her insurance precertification ends by the end of today. A new one will need started after today. [facility] called and stated that her room will now need to be semi-private. She is now on 5L O2. Gif[facility] stated that she needs to be stable on 4L or less to transfer. Her daughter can transport.  to follow.    Final Discharge Disposition Code 03 - skilled nursing facility (SNF)                   Discharge Codes    No documentation.                 Expected Discharge Date and Time       Expected Discharge Date Expected Discharge Time    Jan 29, 2025               Floresita Dawson RN

## 2025-01-30 ENCOUNTER — APPOINTMENT (OUTPATIENT)
Dept: GENERAL RADIOLOGY | Facility: HOSPITAL | Age: OVER 89
End: 2025-01-30
Payer: MEDICARE

## 2025-01-30 LAB
25(OH)D3 SERPL-MCNC: 40 NG/ML (ref 30–100)
ALBUMIN SERPL-MCNC: 2.8 G/DL (ref 3.5–5.2)
ANION GAP SERPL CALCULATED.3IONS-SCNC: 13 MMOL/L (ref 5–15)
BUN SERPL-MCNC: 72 MG/DL (ref 8–23)
BUN/CREAT SERPL: 28.6 (ref 7–25)
CALCIUM SPEC-SCNC: 8.4 MG/DL (ref 8.2–9.6)
CALCIUM SPEC-SCNC: 8.9 MG/DL (ref 8.2–9.6)
CHLORIDE SERPL-SCNC: 96 MMOL/L (ref 98–107)
CO2 SERPL-SCNC: 25 MMOL/L (ref 22–29)
CREAT SERPL-MCNC: 2.52 MG/DL (ref 0.57–1)
DEPRECATED RDW RBC AUTO: 46.2 FL (ref 37–54)
EGFRCR SERPLBLD CKD-EPI 2021: 16.8 ML/MIN/1.73
ERYTHROCYTE [DISTWIDTH] IN BLOOD BY AUTOMATED COUNT: 13.4 % (ref 12.3–15.4)
GLUCOSE SERPL-MCNC: 152 MG/DL (ref 65–99)
HCT VFR BLD AUTO: 39.5 % (ref 34–46.6)
HGB BLD-MCNC: 12.8 G/DL (ref 12–15.9)
MAGNESIUM SERPL-MCNC: 2.4 MG/DL (ref 1.7–2.3)
MCH RBC QN AUTO: 30.3 PG (ref 26.6–33)
MCHC RBC AUTO-ENTMCNC: 32.4 G/DL (ref 31.5–35.7)
MCV RBC AUTO: 93.4 FL (ref 79–97)
PHOSPHATE SERPL-MCNC: 4.1 MG/DL (ref 2.5–4.5)
PLATELET # BLD AUTO: 272 10*3/MM3 (ref 140–450)
PMV BLD AUTO: 10.1 FL (ref 6–12)
POTASSIUM SERPL-SCNC: 4.3 MMOL/L (ref 3.5–5.2)
PTH-INTACT SERPL-MCNC: 47.4 PG/ML (ref 15–65)
QT INTERVAL: 268 MS
QT INTERVAL: 308 MS
QTC INTERVAL: 336 MS
QTC INTERVAL: 397 MS
RBC # BLD AUTO: 4.23 10*6/MM3 (ref 3.77–5.28)
SODIUM SERPL-SCNC: 134 MMOL/L (ref 136–145)
WBC NRBC COR # BLD AUTO: 11.02 10*3/MM3 (ref 3.4–10.8)

## 2025-01-30 PROCEDURE — 80069 RENAL FUNCTION PANEL: CPT | Performed by: INTERNAL MEDICINE

## 2025-01-30 PROCEDURE — 83735 ASSAY OF MAGNESIUM: CPT | Performed by: INTERNAL MEDICINE

## 2025-01-30 PROCEDURE — 99232 SBSQ HOSP IP/OBS MODERATE 35: CPT | Performed by: HOSPITALIST

## 2025-01-30 PROCEDURE — 93005 ELECTROCARDIOGRAM TRACING: CPT | Performed by: INTERNAL MEDICINE

## 2025-01-30 PROCEDURE — 97110 THERAPEUTIC EXERCISES: CPT

## 2025-01-30 PROCEDURE — 85027 COMPLETE CBC AUTOMATED: CPT | Performed by: HOSPITALIST

## 2025-01-30 PROCEDURE — 99232 SBSQ HOSP IP/OBS MODERATE 35: CPT

## 2025-01-30 PROCEDURE — 71045 X-RAY EXAM CHEST 1 VIEW: CPT

## 2025-01-30 PROCEDURE — 93010 ELECTROCARDIOGRAM REPORT: CPT | Performed by: INTERNAL MEDICINE

## 2025-01-30 PROCEDURE — 25010000002 BUMETANIDE PER 0.5 MG: Performed by: INTERNAL MEDICINE

## 2025-01-30 RX ORDER — ALBUMIN (HUMAN) 12.5 G/50ML
25 SOLUTION INTRAVENOUS ONCE
Status: COMPLETED | OUTPATIENT
Start: 2025-01-31 | End: 2025-01-31

## 2025-01-30 RX ORDER — FUROSEMIDE 10 MG/ML
60 INJECTION INTRAMUSCULAR; INTRAVENOUS ONCE
Status: COMPLETED | OUTPATIENT
Start: 2025-01-31 | End: 2025-01-31

## 2025-01-30 RX ADMIN — HYDRALAZINE HYDROCHLORIDE 25 MG: 25 TABLET ORAL at 08:34

## 2025-01-30 RX ADMIN — Medication 10 ML: at 21:20

## 2025-01-30 RX ADMIN — HYDRALAZINE HYDROCHLORIDE 25 MG: 25 TABLET ORAL at 21:20

## 2025-01-30 RX ADMIN — BUMETANIDE 2 MG: 0.25 INJECTION INTRAMUSCULAR; INTRAVENOUS at 08:34

## 2025-01-30 NOTE — PROGRESS NOTES
"[facility]  Hospital Progress Note     LOS: 10 days   Patient Care Team:  Sam Hung MD as PCP - Elver Johnson MD as Consulting Physician (Otolaryngology)  PCP:  Sam Hung MD    Chief Complaint: short of breath    SUBJECTIVE:  In bed, not conversant. Family at bedside. On O2 at 4L. Family notes significant functional and cognitive decline in the last several days.       OBJECTIVE:         Vital Sign Min/Max for last 24 hours  Temp  Min: 98.6 °F (37 °C)  Max: 99.2 °F (37.3 °C)   BP  Min: 101/77  Max: 140/91   Pulse  Min: 82  Max: 101   Resp  Min: 18  Max: 20   SpO2  Min: 90 %  Max: 96 %   No data recorded   Weight  Min: 76.3 kg (168 lb 3.2 oz)  Max: 76.3 kg (168 lb 3.2 oz)     Flowsheet Rows      Flowsheet Row First Filed Value   Admission Height 162.6 cm (64\") Documented at 01/20/2025 1006   Admission Weight 71.7 kg (158 lb) Documented at 01/20/2025 1006            Telemetry: atrial fib/flutter, controlled VR      Intake/Output Summary (Last 24 hours) at 1/30/2025 1218  Last data filed at 1/30/2025 0500  Gross per 24 hour   Intake 120 ml   Output 400 ml   Net -280 ml     Intake & Output (last 3 days)         01/27 0701  01/28 0700 01/28 0701  01/29 0700 01/29 0701  01/30 0700 01/30 0701  01/31 0700    P.O. 170 360 120     Total Intake(mL/kg) 170 (2.3) 360 (4.8) 120 (1.6)     Urine (mL/kg/hr) 100 (0.1) 1400 (0.8) 900 (0.5)     Chest Tube        Total Output 100 1400 900     Net +70 -1040 -780             Urine Unmeasured Occurrence  1 x               Physical Exam:  Vitals reviewed.   Constitutional:       General: Not in acute distress.     Interventions: Nasal cannula in place.      Comments: Moves all extremities however generalized weakness noted, could not lift arms for assessment   Pulmonary:      Breath sounds: Scattered Wheezing present.      Comments: Diminished effort  Cardiovascular:      Normal rate. Irregularly irregular " rhythm. Normal S1. Normal S2.       Murmurs: There is a systolic murmur.      No rub.   Edema:     Peripheral edema present.  Skin:     General: Skin is warm and dry.   Neurological:      Mental Status: Lethargic.          LABS/DIAGNOSTIC DATA:  Results from last 7 days   Lab Units 01/29/25  1502 01/28/25  0838 01/27/25  0641   WBC 10*3/mm3 10.09 9.99 10.29   HEMOGLOBIN g/dL 13.1 11.9* 11.9*   HEMATOCRIT % 40.4 37.4 38.1   PLATELETS 10*3/mm3 291 244 243     Lab Results   Lab Value Date/Time    TROPONINT 43 (H) 01/20/2025 1115    TROPONINT 38 (H) 01/20/2025 1014    TROPONINT 39 (H) 08/10/2023 1121         Results from last 7 days   Lab Units 01/29/25  1404 01/28/25  0838 01/27/25  0641   SODIUM mmol/L 137 133* 135*   POTASSIUM mmol/L 4.6 4.8 4.8   CHLORIDE mmol/L 98 97* 99   CO2 mmol/L 26.0 19.0* 22.0   BUN mg/dL 77* 75* 74*   CREATININE mg/dL 2.89* 2.90* 2.61*   CALCIUM mg/dL 8.9  8.9 8.6 8.9   GLUCOSE mg/dL 154* 134* 89   Albumin  3.1 1/29/25                      Medication Review:   [Held by provider] apixaban, 5 mg, Oral, Q12H  hydrALAZINE, 25 mg, Oral, BID  sodium chloride, 10 mL, Intravenous, Q12H             ASSESSMENT:    Chronic hypoxic respiratory failure - multifactorial - chronic O2 use  Chronic HFpEF/pulm HTN/valvular heart disease  Pleural effusion, left - CT discontinued 1/26/25 1/28/25 Echo: LVEF 61-65%, mild AS, RVSP 57 mmHg  Pericardial effusion, moderate (2 cm) - no evidence of tamponade  Defer pericardiocentesis or pericardial window as suspect etiology of pericardial effusion may be multifactorial but in large part related to poor nutritional status/low protein   Likely persistent atrial fibrillation/flutter, rate controlled  Not documented prior to admission (history of PACs)  PACs previously suppressed on bisoprolol however 6/2023 Holter showed significant pauses and average HR 55 and beta blocker was weaned and discontinued  on Eliquis 5 mg BID prior to admission for history of DVT -  currently on hold with pleural and pericardial effusion.   Should be Eliquis 2.5 mg BID if restarted with age and renal function  CKD IV/proteinuria- has deferred dialysis previously - nephrology following  Significant functional decline this admission    PLAN:  Defer ischemic testing, HARJIT/ECV, pericardiocentesis - would not likely improve overall status  Diuresis per nephrology  Continue to hold anticoagulation        Kajal Amaya, CONNIE   01/30/25  12:18 EST

## 2025-01-30 NOTE — PLAN OF CARE
Goal Outcome Evaluation:         -VSS. 4LNC-5LNC.   -Pt confused. No complaints of pain or nausea during the shift.  -Bumex given. Cr trending down.  -Wound care provided.  -Fall and skin precautions in place.     Problem: Adult Inpatient Plan of Care  Goal: Plan of Care Review  Outcome: Not Progressing  Goal: Patient-Specific Goal (Individualized)  Outcome: Not Progressing  Goal: Absence of Hospital-Acquired Illness or Injury  Outcome: Not Progressing  Intervention: Identify and Manage Fall Risk  Recent Flowsheet Documentation  Taken 1/30/2025 1443 by Franny Barrera RN  Safety Promotion/Fall Prevention:   activity supervised   safety round/check completed  Taken 1/30/2025 1200 by Franny Barrera RN  Safety Promotion/Fall Prevention:   activity supervised   safety round/check completed  Taken 1/30/2025 1000 by Franny Barrera RN  Safety Promotion/Fall Prevention:   activity supervised   safety round/check completed  Taken 1/30/2025 0800 by Franny Barrera RN  Safety Promotion/Fall Prevention:   activity supervised   safety round/check completed  Intervention: Prevent Skin Injury  Recent Flowsheet Documentation  Taken 1/30/2025 1443 by Franny Barrera RN  Body Position:   turned   supine  Skin Protection:   silicone foam dressing in place   incontinence pads utilized   transparent dressing maintained  Taken 1/30/2025 1200 by Franny Barrera RN  Body Position:   turned   right  Skin Protection:   incontinence pads utilized   silicone foam dressing in place   transparent dressing maintained  Taken 1/30/2025 1000 by Franny Barrera RN  Body Position: sitting up in bed  Skin Protection:   silicone foam dressing in place   incontinence pads utilized   transparent dressing maintained  Taken 1/30/2025 0800 by Franny Barrera RN  Body Position:   turned   left  Skin Protection: (silicone dressing peeled back and skin assessed)   transparent dressing maintained   silicone foam dressing in place   incontinence  pads utilized  Intervention: Prevent and Manage VTE (Venous Thromboembolism) Risk  Recent Flowsheet Documentation  Taken 1/30/2025 1232 by Franny Barrera RN  VTE Prevention/Management:   bilateral   SCDs (sequential compression devices) on  Goal: Optimal Comfort and Wellbeing  Outcome: Not Progressing  Intervention: Provide Person-Centered Care  Recent Flowsheet Documentation  Taken 1/30/2025 0800 by Franny Barrera RN  Trust Relationship/Rapport:   care explained   choices provided  Goal: Readiness for Transition of Care  Outcome: Not Progressing     Problem: Skin Injury Risk Increased  Goal: Skin Health and Integrity  Outcome: Not Progressing  Intervention: Optimize Skin Protection  Recent Flowsheet Documentation  Taken 1/30/2025 1443 by Franny Barrera RN  Activity Management: activity encouraged  Pressure Reduction Techniques:   frequent weight shift encouraged   heels elevated off bed   positioned off wounds  Head of Bed (HOB) Positioning: HOB elevated  Pressure Reduction Devices:   heel offloading device utilized   positioning supports utilized   pressure-redistributing mattress utilized  Skin Protection:   silicone foam dressing in place   incontinence pads utilized   transparent dressing maintained  Taken 1/30/2025 1200 by Farnny Barrera RN  Activity Management: activity encouraged  Pressure Reduction Techniques:   frequent weight shift encouraged   heels elevated off bed   weight shift assistance provided  Head of Bed (HOB) Positioning: HOB elevated  Pressure Reduction Devices:   heel offloading device utilized   positioning supports utilized   pressure-redistributing mattress utilized  Skin Protection:   incontinence pads utilized   silicone foam dressing in place   transparent dressing maintained  Taken 1/30/2025 1000 by Franny Barrera RN  Activity Management: activity encouraged  Pressure Reduction Techniques:   frequent weight shift encouraged   heels elevated off bed   positioned off  wounds  Head of Bed (HOB) Positioning: [facility] elevated  Pressure Reduction Devices:   heel offloading device utilized   positioning supports utilized   pressure-redistributing mattress utilized  Skin Protection:   silicone foam dressing in place   incontinence pads utilized   transparent dressing maintained  Taken 1/30/2025 0800 by Franny Barrera RN  Activity Management: activity encouraged  Pressure Reduction Techniques:   frequent weight shift encouraged   heels elevated off bed   positioned off wounds  Head of Bed ([facility]) Positioning: [facility] elevated  Pressure Reduction Devices:   heel offloading device utilized   positioning supports utilized   pressure-redistributing mattress utilized  Skin Protection: (silicone dressing peeled back and skin assessed)   transparent dressing maintained   silicone foam dressing in place   incontinence pads utilized     Problem: Comorbidity Management  Goal: Maintenance of Heart Failure Symptom Control  Outcome: Not Progressing  Intervention: Maintain Heart Failure Management  Recent Flowsheet Documentation  Taken 1/30/2025 1443 by Franny Barrera RN  Medication Review/Management: medications reviewed  Taken 1/30/2025 1200 by Franny Barrera RN  Medication Review/Management: medications reviewed  Taken 1/30/2025 1000 by Franny Barrera RN  Medication Review/Management: medications reviewed  Taken 1/30/2025 0800 by Franny Barrera RN  Medication Review/Management: medications reviewed  Goal: Blood Pressure in Desired Range  Outcome: Not Progressing  Intervention: Maintain Blood Pressure Management  Recent Flowsheet Documentation  Taken 1/30/2025 1443 by Franny Barrera RN  Medication Review/Management: medications reviewed  Taken 1/30/2025 1200 by Franny Barrera RN  Medication Review/Management: medications reviewed  Taken 1/30/2025 1000 by Franny Barrera RN  Medication Review/Management: medications reviewed  Taken 1/30/2025 0800 by Franny Barrera RN  Medication  Review/Management: medications reviewed     Problem: Fall Injury Risk  Goal: Absence of Fall and Fall-Related Injury  Outcome: Not Progressing  Intervention: Identify and Manage Contributors  Recent Flowsheet Documentation  Taken 1/30/2025 1443 by Franny Barrera RN  Medication Review/Management: medications reviewed  Taken 1/30/2025 1200 by Franny Barrera, RN  Medication Review/Management: medications reviewed  Taken 1/30/2025 1000 by Franny Barrera, RN  Medication Review/Management: medications reviewed  Taken 1/30/2025 0800 by Franny Barrera RN  Medication Review/Management: medications reviewed  Intervention: Promote Injury-Free Environment  Recent Flowsheet Documentation  Taken 1/30/2025 1443 by Franny Barrera RN  Safety Promotion/Fall Prevention:   activity supervised   safety round/check completed  Taken 1/30/2025 1200 by Franny Barrera RN  Safety Promotion/Fall Prevention:   activity supervised   safety round/check completed  Taken 1/30/2025 1000 by Franny Barrera, RN  Safety Promotion/Fall Prevention:   activity supervised   safety round/check completed  Taken 1/30/2025 0800 by Franny Barrera RN  Safety Promotion/Fall Prevention:   activity supervised   safety round/check completed

## 2025-01-30 NOTE — PROGRESS NOTES
"Enter Query Response Below      Query Response: Heart failure due to hypertension, pulmonary hypertension, valvular heart disease             If applicable, please update the problem list.     Patient: Kellermann, Rose J        : 1926  Account: [id number]           Admit Date: 2025        How to Respond to this query:       a. Click New Note     b. Answer query within the yellow box.                c. Update the Problem List, if applicable.      If you have any questions about this query contact me at: [email or web address]     Dr. Milton,     Risk factors: 89-year-old female with a history of \"HFpEF, DVT on Eliquis, CKD who is here with a left pleural effusion and pleural effusion likely a result of underlying HFpEF\" per H&P.    Clinical indicators:  - Hospitalist progress notes  to  document \" to  Suspected acute HFpEF exacerbation\" then \" Acute on chronic HFpEF exacerbation - Valvular heart disease & Pulmonary HTN.\"  - Cardiology consult  documents \"probable HFpEF in the setting of probable persistent atrial fibrillation of uncertain duration; pleural fluid studies to me suggest transudate. concerned about underlying pulmonary dysfunction in view of her chronic oxygen use and significant pulmonary hypertension on echocardiogram. doubt her valvular heart disease is producing this degree of pulmonary hypertension.\"  - Echo  showed \"EF 61-65%, diastolic dysfunction indeterminate, normal left ventricular cavity size and wall thickness, mild aortic stenosis, moderate tricuspid regurgitation, right ventricular systolic pressure markedly elevated 57mmHg, moderate 2 cm pericardial effusion with no tamponade & left pleural effusion.\"  - Home medications include Bumex 1mg PO 4 times a week, Eliquis & Apresoline.    Treatment: Bumex 0.5mg IV x1 dose () then Bumex PO held ( to  &  to ), Bumex 1mg PO ( to ), Bumex 2mg IV every 12 hrs ( to present " (1/30)), Eliquis on hold, Apresoline PO & stopped Norvasc.    Please clarify the etiology of the patient’s heart failure:    - Heart failure due to hypertension  - Heart failure due to pulmonary hypertension  - Heart failure due to valvular disease  - Heart failure due to hypertension, pulmonary hypertension and valvular disease  - Other- specify__________    By submitting this query, we are merely seeking further clarification of documentation to accurately reflect all conditions that you are monitoring, evaluating, treating or that extend the hospitalization or utilize additional resources of care. Please utilize your independent clinical judgment when addressing the question(s) above.     This query and your response, once completed, will be entered into the legal medical record.    Sincerely,  Addie Bass RN  Clinical Documentation Integr[facility]

## 2025-01-30 NOTE — PLAN OF CARE
Problem: Adult Inpatient Plan of Care  Goal: Absence of Hospital-Acquired Illness or Injury  Intervention: Identify and Manage Fall Risk  Recent Flowsheet Documentation  Taken 1/30/2025 0400 by Schilder, Claire, RN  Safety Promotion/Fall Prevention:   activity supervised   assistive device/personal items within reach   room organization consistent   safety round/check completed   nonskid shoes/slippers when out of bed  Taken 1/30/2025 0200 by Schilder, Claire, RN  Safety Promotion/Fall Prevention:   activity supervised   assistive device/personal items within reach   room organization consistent   safety round/check completed   nonskid shoes/slippers when out of bed  Taken 1/30/2025 0000 by Schilder, Claire, RN  Safety Promotion/Fall Prevention:   activity supervised   assistive device/personal items within reach   room organization consistent   safety round/check completed   nonskid shoes/slippers when out of bed  Taken 1/29/2025 2200 by Schilder, Claire, RN  Safety Promotion/Fall Prevention:   activity supervised   assistive device/personal items within reach   room organization consistent   safety round/check completed   nonskid shoes/slippers when out of bed  Taken 1/29/2025 2000 by Schilder, Claire, RN  Safety Promotion/Fall Prevention:   activity supervised   assistive device/personal items within reach   room organization consistent   safety round/check completed   nonskid shoes/slippers when out of bed     Problem: Adult Inpatient Plan of Care  Goal: Absence of Hospital-Acquired Illness or Injury  Intervention: Prevent and Manage VTE (Venous Thromboembolism) Risk  Recent Flowsheet Documentation  Taken 1/29/2025 2000 by Schilder, Claire, RN  VTE Prevention/Management: bilateral   Goal Outcome Evaluation:

## 2025-01-30 NOTE — PROGRESS NOTES
[facility] Medicine Services  PROGRESS NOTE    Patient Name: Rose J Kellermann  : 1926  MRN: [id number]    Date of Admission: 2025  Primary Care Physician: Sam Hung MD    Subjective   Subjective     CC:  F/U dyspnea    HPI:  Seen this morning. Down to 4 liters. She has no       Objective   Objective     Vital Signs:   Temp:  [98.6 °F (37 °C)-99.2 °F (37.3 °C)] 98.7 °F (37.1 °C)  Heart Rate:  [] 91  Resp:  [18-20] 18  BP: (101-140)/(77-91) 124/81  Flow (L/min) (Oxygen Therapy):  [4-5] 4     Physical Exam:  Gen-no acute distress, sitting up in bed  HENT-NCAT, mucous membranes moist  CV-irregular, S1 S2 normal, no m/r/g  Resp-decreased bilaterally, nonlabored currently  Abd-soft, NT, ND, +BS  Ext-no edema  Neuro-awake, alert, conversant, no focal deficits  Skin-no rashes  Psych-appropriate mood  No change from 25      Results Reviewed:  LAB RESULTS:      Lab 25  1259 25  1502 25  0838 25  0641 25  1040   WBC 11.02* 10.09 9.99 10.29 8.53   HEMOGLOBIN 12.8 13.1 11.9* 11.9* 10.8*   HEMATOCRIT 39.5 40.4 37.4 38.1 34.8   PLATELETS 272 291 244 243 240   NEUTROS ABS  --  8.52* 8.17* 8.41* 6.75   IMMATURE GRANS (ABS)  --  0.05 0.03 0.04 0.04   LYMPHS ABS  --  0.55* 0.64* 0.60* 0.74   MONOS ABS  --  0.82 0.91* 1.05* 0.83   EOS ABS  --  0.13 0.21 0.17 0.14   MCV 93.4 93.3 95.9 96.9 97.5*         Lab 25  1259 25  1404 25  0838 25  0641 25  2244   SODIUM 134* 137 133* 135* 132*   POTASSIUM 4.3 4.6 4.8 4.8 4.7   CHLORIDE 96* 98 97* 99 98   CO2 25.0 26.0 19.0* 22.0 22.0   ANION GAP 13.0 13.0 17.0* 14.0 12.0   BUN 72* 77* 75* 74* 71*   CREATININE 2.52* 2.89* 2.90* 2.61* 2.56*   EGFR 16.8* 14.3* 14.2* 16.1* 16.5*   GLUCOSE 152* 154* 134* 89 102*   CALCIUM 8.4 8.9  8.9 8.6 8.9 8.8   MAGNESIUM 2.4*  --   --   --   --    PHOSPHORUS 4.1 4.6*  --   --   --          Lab 25  1259 25  1404   ALBUMIN  2.8* 3.1*                 Lab 01/29/25  1404   IRON 26*   IRON SATURATION (TSAT) 12*   TIBC 209*   TRANSFERRIN 140*         Lab 01/28/25  1552   PH, ARTERIAL 7.393   PCO2, ARTERIAL 35.2   PO2 .0*   FIO2 40   HCO3 ART 21.4   BASE EXCESS ART -2.9*   CARBOXYHEMOGLOBIN 1.1     Brief Urine Lab Results       None            Microbiology Results Abnormal       None            XR Chest 1 View    Result Date: 1/30/2025  XR CHEST 1 VW Date of Exam: 1/30/2025 2:01 AM EST Indication: Follow up L pleural effusion s/p Left pleural CT placement Comparison: Chest radiograph 1/30/2025 Findings: Reported drainage catheter not visualized. Grossly stable enlarged cardiac silhouette, moderate size left greater than right pleural effusions and increased interstitial opacities suspicious for edema. Similar bibasilar consolidation favoring compressive  atelectasis, versus infection in the right clinical setting. Aortic atherosclerotic disease. No pneumothorax. Degenerative elated osseous change. Left rib deformities similar to prior.     Impression: Impression: No significant interval change. Provided clinical information states left pleural drainage catheter however this is not visualized. Electronically Signed: Juice Martinez MD  1/30/2025 8:10 AM EST  Workstation ID: [id number]    XR Chest 1 View    Result Date: 1/29/2025  XR CHEST 1 VW Date of Exam: 1/29/2025 4:18 AM EST Indication: Follow up L pleural effusion s/p Left pleural CT placement Comparison: Chest radiograph 1/28/2025. Findings: Enlarged cardiac silhouette, unchanged. Persistent pulmonary vascular congestion. Small/moderate bilateral pleural effusions with bibasilar airspace disease, unchanged. No pneumothorax. Osseous structures are unchanged.     Impression: Impression: No significant interval change. Electronically Signed: Adiel Mckeon MD  1/29/2025 8:16 AM EST  Workstation ID: [id number]    XR Chest 1 View    Result Date: 1/28/2025  XR CHEST 1 VW Date of Exam:  1/28/2025 4:05 PM EST Indication: worsening SOB Comparison: 1/27/2025 11:06 p.m. Findings: Current image is timed 4:14 p.m. 1/28/2025. Heart shadow is markedly enlarged. Vasculature is cephalized, but pulmonary edema is improved from the prior exam. There is persistent bibasilar effusion and atelectasis. No pneumothorax is seen. Reviewing multiple recent images back to 1/25/2025, there appears to be increasing size of the cardiomediastinal silhouette, and gradually increasing bibasilar effusion and atelectasis.     Impression: Impression: 1. Marked heart shadow enlargement, which appears further increased, but with improved pulmonary vascular congestion compared to yesterday's 11:06 p.m. exam. 2. Persistent, extensive bibasilar atelectasis and effusion which appears to be gradually increasing over the past several days. Electronically Signed: James Juarez MD  1/28/2025 4:31 PM EST  Workstation ID: [id number]    CT Head Without Contrast    Result Date: 1/28/2025  CT HEAD WO CONTRAST Date of Exam: 1/28/2025 4:00 PM EST Indication: Rapid response. Comparison: CT head without contrast 1/21/2021 Technique: Axial CT images were obtained of the head without contrast administration.  Automated exposure control and iterative construction methods were used. Findings: Negative for intracranial hemorrhage. Extensive white matter findings most compatible with chronic microvascular disease. Generalized cerebral volume loss. Small areas of parenchymal loss in the left cerebellum and the right cerebellum related to remote infarcts, stable. No intraventricular hemorrhage. No midline shift or mass effect. No abnormal extra-axial fluid collection. The calvarium intact. Bilateral mastoid effusions. Visualized sinuses demonstrate suspected chronic sinus thickening at the right  maxillary sinus with mucoperiosteal thickening unchanged.     Impression: Impression: 1. No acute intracranial abnormality. 2. Extensive white matter findings most  compatible with chronic microvascular disease. 3. Generalized cerebral atrophy and additional chronic findings above. 4. Bilateral mastoid effusions. Electronically Signed: Jhonny Barron MD  1/28/2025 4:21 PM EST  Workstation ID: [id number]     Results for orders placed during the hospital encounter of 01/20/25    Adult Transthoracic Echo Complete W/ Cont if Necessary Per Protocol    Interpretation Summary    Left ventricular ejection fraction appears to be 61 - 65%.    Left ventricular diastolic function was indeterminate.    Mild aortic valve stenosis is present.    Peak velocity of the flow distal to the aortic valve is 245.6 cm/s. Aortic valve maximum pressure gradient is 24 mmHg. Aortic valve mean pressure gradient is 13 mmHg.    The mitral valve mean gradient is 4 mmHg.    Moderate tricuspid valve regurgitation is present.    Estimated right ventricular systolic pressure from tricuspid regurgitation is markedly elevated (>55 mmHg). Calculated right ventricular systolic pressure from tricuspid regurgitation is 57 mmHg.    There is a moderate 2cm  pericardial effusion with no tamponade    There is a left pleural effusion.      Current medications:  Scheduled Meds:[Held by provider] apixaban, 5 mg, Oral, Q12H  hydrALAZINE, 25 mg, Oral, BID  sodium chloride, 10 mL, Intravenous, Q12H      Continuous Infusions:   PRN Meds:.  acetaminophen    senna-docusate sodium **AND** polyethylene glycol **AND** bisacodyl **AND** bisacodyl    sodium chloride    sodium chloride    sodium chloride    Assessment & Plan   Assessment & Plan     Active Hospital Problems    Diagnosis  POA    **Pleural effusion on left [J90]  Yes    Essential hypertension [I10]  Yes    Chronic kidney disease, stage III (moderate)  [N18.30]  Yes      Resolved Hospital Problems   No resolved problems to display.        Brief Hospital Course to date:  Rose J Kellermann is a 89 y.o. female with history of DVT on Eliquis, HFpEF, HTN, and CKD IV who presents due  to dyspnea and hypoxia. Had been wearing 2-3 liters home oxygen intermittently over the past several months, now requiring continuously. Workup in the ED notable for a left pleural effusion. Admitted for further management.     This patient's problems and plans were partially entered by my partner and updated as appropriate by me 01/30/25. Copied text in this note has been reviewed and is accurate as of today's date.     Progressive hypoxia, acute on chronic  Left pleural effusion  Acute on chronic HFpEF exacerbation  Valvular heart disease  Pulmonary HTN  Pericardial effusion  - CXR with moderate L pleural effusion  - Thoracentesis ordered, IR ended up placing a chest tube on the left 1/23/25 - consulted CT Surgery to manage, tube was pulled 1/26/25  - Pleural fluid appears mixed exudate and transudate?, cultures/cytology negative   - RRT 1/28/25 due to worsening dyspnea and hypoxia - IV diuresis resumed with improvement today, down to 4 liters (baseline 3 liters)  - Echo 1/28/25: EF 61-65%, moderate TR, RVSP >55 mmHg, moderate 2 cm pericardial effusion without tamponade, left pleural effusion  - Cardiology consulted 1/29/25: agree with diuresis, defer pericardiocentesis or pericardial window as suspect etiology of pericardial effusion may be multifactorial but in large part related to poor nutritional status/low protein     ANGEL LUIS on CKD IV  - Baseline Cr ~1.8, initially 2.5 and has trended up to 2.9  - Nephrology consulted, started IV Bumex 2 mg Q12H 1/28/25  - Repeat labs improved, continue to monitor    Afib  - Cardiology following  - Has been persistent since admission but she appears asymptomatic from this  - Eliquis on hold currently      History of DVT  - Will hold Eliquis until we can be sure no further interventions needed (like another chest tube, etc.)     Hypertension  - Amlodipine held per Cardiology  - Continue Hydralazine    Expected Discharge Location and Transportation: [facility]  Expected Discharge    Expected Discharge Date: 2/3/2025; Expected Discharge Time:      VTE Prophylaxis:  Pharmacologic & mechanical VTE prophylaxis orders are present.         AM-PAC 6 Clicks Score (PT): 11 (01/30/25 0700)    CODE STATUS:   Code Status and Medical Interventions: CPR (Attempt to Resuscitate); Full Support   Ordered at: 01/20/25 7355     Level Of Support Discussed With:    Patient     Code Status (Patient has no pulse and is not breathing):    CPR (Attempt to Resuscitate)     Medical Interventions (Patient has pulse or is breathing):    Full Support       Yenifer Eid MD  01/30/25

## 2025-01-30 NOTE — THERAPY TREATMENT NOTE
Patient Name: Rose J Kellermann  : 1926    MRN: [id number]                              Today's Date: 2025       Admit Date: 2025    Visit Dx:     ICD-10-CM ICD-9-CM   1. Pleural effusion, left  J90 511.9   2. Hypoxia  R09.02 799.02   3. Chronic kidney disease, unspecified CKD stage  N18.9 585.9     Patient Active Problem List   Diagnosis    Essential hypertension    Vitamin D deficiency    Fatigue    Chronic kidney disease, stage III (moderate)     Osteoarthritis    Dyslipidemia    Post-menopausal bleeding    Endometrial cancer    Closed fracture of left hip    Asymptomatic bacteriuria    Post-traumatic osteoarthritis of left hip    Status post total replacement of left hip    Prediabetes    Acute blood loss anemia, asymptomatic    Postoperative urinary retention    Myopia    Posterior crocodile shagreen of cornea    Presbyopia    Ptosis of eyelid    Regular astigmatism    Retinal neovascularization    After cataract    Secondary cataract    Premature atrial contractions    Heart murmur    Nocturnal hypoxia    Chronic deep vein thrombosis (DVT) of both lower extremities    Retinal neovascularization    Pleural effusion on left     Past Medical History:   Diagnosis Date    Abscess of back     Arrhythmia     frequent PVC    Cancer 2011    Endometrial cancer, status post robotically assisted hysterectomy with Dr. Haddad, 2011.      CKD (chronic kidney disease), stage III     no dilaysis     Dizzy     Dvt femoral (deep venous thrombosis) 2017    s/p hip surgery in 2017. Took xarelto until 2018    Dyslipidemia     Dyslipidemia.  Observing.    Enthesopathy of hip region     Generalized osteoarthrosis, involving multiple sites     Headache     Hearing loss     Hypertension     Low backache     Needs flu shot     Osteoarthritis     Osteoarthritis with questionable carpal tunnel syndrome bilaterally.     Otitis, serous     Pneumonia     Remote pneumonia, .     Retinal  hemorrhage     SI joint arthritis     SOB (shortness of breath)     Syncope 2001    Remote syncope with working diagnosis of vasodepressor, 2001.     Venous insufficiency of leg     Wears glasses     readers     Past Surgical History:   Procedure Laterality Date    CATARACT EXTRACTION      bilat     COLONOSCOPY      HIP PERCUTANEOUS PINNING Left 11/24/2017    Procedure: HIP PERCUTANEOUS PINNING;  Surgeon: Lucas Swanson MD;  Location: [facility] OR;  Service:     HYSTERECTOMY      total? but unsure     KNEE SURGERY  2001    Remote knee surgery in 2001.- right     TOTAL HIP ARTHROPLASTY Left 10/26/2018    Procedure: TOTAL HIP ARTHROPLASTY LEFT;  Surgeon: Stephen Baker MD;  Location: [facility] OR;  Service: Orthopedics      General Information       Row Name 01/30/25 1404          Physical Therapy Time and Intention    Document Type therapy note (daily note)  -     Mode of Treatment physical therapy  -       Row Name 01/30/25 1404          General Information    Patient Profile Reviewed yes  -     Existing Precautions/Restrictions fall;oxygen therapy device and L/min  -     Barriers to Rehab medically complex;previous functional deficit;cognitive status  -       Row Name 01/30/25 1404          Cognition    Orientation Status (Cognition) oriented to;person  -       Row Name 01/30/25 1404          Safety Issues/Impairments Affecting Functional Mobility    Safety Issues Affecting Function (Mobility) awareness of need for assistance;insight into deficits/self-awareness;safety precaution awareness;safety precautions follow-through/compliance;sequencing abilities  -     Impairments Affecting Function (Mobility) balance;cognition;coordination;endurance/activity tolerance;postural/trunk control;shortness of breath;strength  -     Cognitive Impairments, Mobility Safety/Performance awareness, need for assistance;insight into deficits/self-awareness;judgment;problem-solving/reasoning;safety precaution  awareness;safety precaution follow-through;sequencing abilities  -               User Key  (r) = Recorded By, (t) = Taken By, (c) = Cosigned By      Initials Name Provider Type     Justa Bedoya PT Physical Therapist                   Mobility       Row Name 01/30/25 1404          Bed Mobility    Comment, (Bed Mobility) Pt's family in room requested pt stay in bed and not perform mobility this date d/t medical status despite education by PT. Agreeable to bed level ther ex  -       Row Name 01/30/25 1404          Transfers    Comment, (Transfers) deferred per family's request despite education  -               User Key  (r) = Recorded By, (t) = Taken By, (c) = Cosigned By      Initials Name Provider Type     Justa Bedoya PT Physical Therapist                   Obj/Interventions       Row Name 01/30/25 1409          Motor Skills    Therapeutic Exercise hip;knee;ankle;shoulder;other (see comments)  modA for all BLE exercises  -[facility] Name 01/30/25 1409          Shoulder (Therapeutic Exercise)    Shoulder (Therapeutic Exercise) AROM (active range of motion)  -     Shoulder AROM (Therapeutic Exercise) bilateral;flexion;extension;10 repetitions;other (see comments)  bicep flexion/extension  -[facility] Name 01/30/25 1409          Hip (Therapeutic Exercise)    Hip (Therapeutic Exercise) AAROM (active assistive range of motion)  -     Hip AAROM (Therapeutic Exercise) bilateral;flexion;extension;aBduction;aDduction;10 repetitions  -[facility] Name 01/30/25 1409          Knee (Therapeutic Exercise)    Knee (Therapeutic Exercise) AAROM (active assistive range of motion)  -     Knee AAROM (Therapeutic Exercise) bilateral;flexion;extension;10 repetitions  -[facility] Name 01/30/25 1409          Ankle (Therapeutic Exercise)    Ankle (Therapeutic Exercise) AAROM (active assistive range of motion)  -     Ankle AAROM (Therapeutic Exercise) bilateral;dorsiflexion;plantarflexion;10 repetitions  -                User Key  (r) = Recorded By, (t) = Taken By, (c) = Cosigned By      Initials Name Provider Type     Justa Bedoya PT Physical Therapist                   Goals/Plan    No documentation.                  Clinical Impression       Row Name 01/30/25 1411          Pain    Pretreatment Pain Rating 0/10 - no pain  -     Posttreatment Pain Rating 0/10 - no pain  -     Pre/Posttreatment Pain Comment pt denies pain at rest. grimaces during BLE ther ex  -       Row Name 01/30/25 1411          Plan of Care Review    Plan of Care Reviewed With patient;family  -     Progress declining  -     Outcome Evaluation Pt continues to present below baseline function d/t lethargy, generalized weakness, and decreased activity tolerance. Per pt family's request, PT deferred mobility and had pt perform bed level exercises. She required increased assist w/ BLE ther ex compared to previous sessions. Pt would continue to benefit from IP PT services while hospitalized. Recommend SNF at d/c.  -       Row Name 01/30/25 1411          Vital Signs    Pre SpO2 (%) 91  -     O2 Delivery Pre Treatment nasal cannula  -     Intra SpO2 (%) 89  -     O2 Delivery Intra Treatment nasal cannula  -     Post SpO2 (%) 90  -     O2 Delivery Post Treatment nasal cannula  -LH     Pre Patient Position Supine  -     Intra Patient Position Supine  -     Post Patient Position Supine  -       Row Name 01/30/25 1411          Positioning and Restraints    Pre-Treatment Position in bed  -     Post Treatment Position bed  -     In Bed notified nsg;fowlers;call light within reach;encouraged to call for assist;exit alarm on;with family/caregiver;SCD pump applied;legs elevated;heels elevated  -               User Key  (r) = Recorded By, (t) = Taken By, (c) = Cosigned By      Initials Name Provider Type     Justa Bedoya PT Physical Therapist                   Outcome Measures       Row Name 01/30/25 1415 01/30/25 0800        How much help from another person do you currently need...    Turning from your back to your side while in flat bed without using bedrails? 2  - 3  -MH    Moving from lying on back to sitting on the side of a flat bed without bedrails? 2  - 3  -MH    Moving to and from a bed to a chair (including a wheelchair)? 2  - 2  -MH    Standing up from a chair using your arms (e.g., wheelchair, bedside chair)? 2  - 2  -MH    Climbing 3-5 steps with a railing? 1  - 2  -MH    To walk in hospital room? 2  - 2  -MH    AM-PAC 6 Clicks Score (PT) 11  - 14  -    Highest Level of Mobility Goal 4 --> Transfer to chair/commode  - 4 --> Transfer to chair/commode  -      Row Name 01/30/25 1415          Functional Assessment    Outcome Measure Options AM-PAC 6 Clicks Basic Mobility (PT)  -               User Key  (r) = Recorded By, (t) = Taken By, (c) = Cosigned By      Initials Name Provider Type     Franny Barrera, RN Registered Nurse     Justa Bedoya, PT Physical Therapist                                 Physical Therapy Education       Title: PT OT SLP Therapies (In Progress)       Topic: Physical Therapy (In Progress)       Point: Mobility training (In Progress)       Learning Progress Summary            Patient Acceptance, E, NR by KG at 1/27/2025 1031    Acceptance, E, NR by GUSTAVO at 1/24/2025 1430    Acceptance, E,D, VU,NR by LR at 1/23/2025 1407    Comment: Educated on benefits of mobility, safety with mobility, correct supine to sit t/f technique, correct sit<->stand t/f technique, correct gait mechanics, LE HEP, and progression of POC.    Acceptance, E, NR by KG at 1/21/2025 1511                      Point: Home exercise program (In Progress)       Learning Progress Summary            Patient Acceptance, E, NR by  at 1/30/2025 1132    Acceptance, E, NR by KG at 1/27/2025 1031    Acceptance, E, NR by GUSTAVO at 1/24/2025 1430    Acceptance, E,D, VU,NR by LR at 1/23/2025 1407    Comment: Educated on  benefits of mobility, safety with mobility, correct supine to sit t/f technique, correct sit<->stand t/f technique, correct gait mechanics, LE HEP, and progression of POC.   Family Acceptance, E, NR by LH at 1/30/2025 1132                      Point: Body mechanics (In Progress)       Learning Progress Summary            Patient Acceptance, E, NR by KG at 1/27/2025 1031    Acceptance, E, NR by GUSTAVO at 1/24/2025 1430    Acceptance, E,D, VU,NR by LR at 1/23/2025 1407    Comment: Educated on benefits of mobility, safety with mobility, correct supine to sit t/f technique, correct sit<->stand t/f technique, correct gait mechanics, LE HEP, and progression of POC.    Acceptance, E, NR by KG at 1/21/2025 1511                      Point: Precautions (In Progress)       Learning Progress Summary            Patient Acceptance, E, NR by KG at 1/27/2025 1031    Acceptance, E, NR by GUSTAVO at 1/24/2025 1430    Acceptance, E,D, VU,NR by LR at 1/23/2025 1407    Comment: Educated on benefits of mobility, safety with mobility, correct supine to sit t/f technique, correct sit<->stand t/f technique, correct gait mechanics, LE HEP, and progression of POC.    Acceptance, E, NR by KG at 1/21/2025 1511                                      User Key       Initials Effective Dates Name Provider Type Discipline    GUSTAVO 02/03/23 -  Audrey Gore, PT Physical Therapist PT    LR 02/03/23 -  Lola Finch, PT Physical Therapist PT     05/22/20 -  Ann Nicole PT Physical Therapist PT     09/21/23 -  Justa Bedoya, PT Physical Therapist PT                  PT Recommendation and Plan     Progress: declining  Outcome Evaluation: Pt continues to present below baseline function d/t lethargy, generalized weakness, and decreased activity tolerance. Per pt family's request, PT deferred mobility and had pt perform bed level exercises. She required increased assist w/ BLE ther ex compared to previous sessions. Pt would continue to  benefit from IP PT services while hospitalized. Recommend SNF at d/c.     Time Calculation:         PT Charges       Row Name 01/30/25 1415             Time Calculation    Start Time 1132  -      PT Received On 01/30/25  -      PT Goal Re-Cert Due Date 01/31/25  -         Timed Charges    66203 - PT Therapeutic Exercise Minutes 14  -         Total Minutes    Timed Charges Total Minutes 14  -       Total Minutes 14  -                User Key  (r) = Recorded By, (t) = Taken By, (c) = Cosigned By      Initials Name Provider Type     Justa Bedoya, ADAM Physical Therapist                  Therapy Charges for Today       Code Description Service Date Service Provider Modifiers Qty    15511559792 HC PT THER PROC EA 15 MIN 1/30/2025 Justa Bedoya, PT GP 1            PT G-Codes  Outcome Measure Options: AM-PAC 6 Clicks Basic Mobility (PT)  AM-PAC 6 Clicks Score (PT): 11  AM-PAC 6 Clicks Score (OT): 12  PT Discharge Summary  Anticipated Discharge Disposition (PT): skilled nursing facility    Justa Bedoya PT  1/30/2025

## 2025-01-30 NOTE — PROGRESS NOTES
" LOS: 10 days   Patient Care Team:  Sam Hung MD as PCP - General  Laurie, Elver MCADAMS MD as Consulting Physician (Otolaryngology)    Chief Complaint: ANGEL LUIS on CKD stage IV  SOB    Subjective     Shortness of Breath        Subjective:  Symptoms:  Stable.  She reports shortness of breath.        History taken from: patient family    Objective     Vital Sign Min/Max for last 24 hours  Temp  Min: 98.6 °F (37 °C)  Max: 99.2 °F (37.3 °C)   BP  Min: 101/77  Max: 140/91   Pulse  Min: 82  Max: 101   Resp  Min: 18  Max: 20   SpO2  Min: 90 %  Max: 96 %   Flow (L/min) (Oxygen Therapy)  Min: 4  Max: 5   Weight  Min: 76.3 kg (168 lb 3.2 oz)  Max: 76.3 kg (168 lb 3.2 oz)     Flowsheet Rows      Flowsheet Row First Filed Value   Admission Height 162.6 cm (64\") Documented at 01/20/2025 1006   Admission Weight 71.7 kg (158 lb) Documented at 01/20/2025 1006            I/O this shift:  In: -   Out: 550 [Urine:550]  I/O last 3 completed shifts:  In: 360 [P.O.:360]  Out: 1500 [Urine:1500]    Objective:  General Appearance:  Comfortable.    Vital signs: (most recent): Blood pressure 139/96, pulse 96, temperature 98.8 °F (37.1 °C), temperature source Oral, resp. rate 18, height 162.6 cm (64.02\"), weight 76.3 kg (168 lb 3.2 oz), SpO2 93%, not currently breastfeeding.  Vital signs are normal.    Output: Producing urine.    HEENT: Normal HEENT exam.    Lungs:  Normal effort.    Heart: Normal rate.  Regular rhythm.  S1 normal and S2 normal.    Abdomen: Abdomen is soft.    Extremities: Normal range of motion.  There is no dependent edema or local swelling.    Pulses: Distal pulses are intact.    Neurological: Patient is alert and oriented to person, place and time.                Results Review:     I reviewed the patient's new clinical results.    WBC WBC   Date Value Ref Range Status   01/30/2025 11.02 (H) 3.40 - 10.80 10*3/mm3 Final   01/29/2025 10.09 3.40 - 10.80 10*3/mm3 Final   01/28/2025 9.99 3.40 - 10.80 10*3/mm3 Final " "     HGB Hemoglobin   Date Value Ref Range Status   01/30/2025 12.8 12.0 - 15.9 g/dL Final   01/29/2025 13.1 12.0 - 15.9 g/dL Final   01/28/2025 11.9 (L) 12.0 - 15.9 g/dL Final      HCT Hematocrit   Date Value Ref Range Status   01/30/2025 39.5 34.0 - 46.6 % Final   01/29/2025 40.4 34.0 - 46.6 % Final   01/28/2025 37.4 34.0 - 46.6 % Final      Platlets No results found for: \"LABPLAT\"   MCV MCV   Date Value Ref Range Status   01/30/2025 93.4 79.0 - 97.0 fL Final   01/29/2025 93.3 79.0 - 97.0 fL Final   01/28/2025 95.9 79.0 - 97.0 fL Final          Sodium Sodium   Date Value Ref Range Status   01/30/2025 134 (L) 136 - 145 mmol/L Final   01/29/2025 137 136 - 145 mmol/L Final   01/28/2025 133 (L) 136 - 145 mmol/L Final      Potassium Potassium   Date Value Ref Range Status   01/30/2025 4.3 3.5 - 5.2 mmol/L Final     Comment:     Slight hemolysis detected by analyzer. Result may be falsely elevated.   01/29/2025 4.6 3.5 - 5.2 mmol/L Final     Comment:     Slight hemolysis detected by analyzer. Result may be falsely elevated.   01/28/2025 4.8 3.5 - 5.2 mmol/L Final      Chloride Chloride   Date Value Ref Range Status   01/30/2025 96 (L) 98 - 107 mmol/L Final   01/29/2025 98 98 - 107 mmol/L Final   01/28/2025 97 (L) 98 - 107 mmol/L Final      CO2 CO2   Date Value Ref Range Status   01/30/2025 25.0 22.0 - 29.0 mmol/L Final   01/29/2025 26.0 22.0 - 29.0 mmol/L Final   01/28/2025 19.0 (L) 22.0 - 29.0 mmol/L Final      BUN BUN   Date Value Ref Range Status   01/30/2025 72 (H) 8 - 23 mg/dL Final   01/29/2025 77 (H) 8 - 23 mg/dL Final   01/28/2025 75 (H) 8 - 23 mg/dL Final      Creatinine Creatinine   Date Value Ref Range Status   01/30/2025 2.52 (H) 0.57 - 1.00 mg/dL Final   01/29/2025 2.89 (H) 0.57 - 1.00 mg/dL Final   01/28/2025 2.90 (H) 0.57 - 1.00 mg/dL Final      Calcium Calcium   Date Value Ref Range Status   01/30/2025 8.4 8.2 - 9.6 mg/dL Final   01/29/2025 8.9 8.2 - 9.6 mg/dL Final   01/29/2025 8.9 8.2 - 9.6 mg/dL Final " "  01/28/2025 8.6 8.2 - 9.6 mg/dL Final      PO4 No results found for: \"CAPO4\"   Albumin Albumin   Date Value Ref Range Status   01/30/2025 2.8 (L) 3.5 - 5.2 g/dL Final   01/29/2025 3.1 (L) 3.5 - 5.2 g/dL Final      Magnesium Magnesium   Date Value Ref Range Status   01/30/2025 2.4 (H) 1.7 - 2.3 mg/dL Final      Uric Acid No results found for: \"URICACID\"     Medication Review: Yes    Assessment & Plan       Pleural effusion on left    Essential hypertension    Chronic kidney disease, stage III (moderate)       Assessment & Plan    1.  CKD stage IV: Followed with [facility] nephrology Dr. Hernandez previous creatinine 2.15, with history of chronic hypertension.  Dr. Hernandez has discussed with the patient and the family in the past regards to dialysis and they have declined..  Medical management was done.  Current labs shows creatinine 2.90, BUN 75, sodium 133 potassium 4.8 bicarb 19  2.  Proteinuria: 4.9 g, patient has been not a candidate for kidney biopsy or aggressive intervention.  3.  Chronic diastolic heart failure failure.  4.  CKD bone and mineral disease. Last PTH ~ 84.Phos ~ 4.6  5.  Hypertension  6.  History of ovarian cancer in remission status post surgical removal.  7.  Progressive shortness of breath: Left pleural effusion. Found to have PNA on this admission.     Plan:  Continue with diuretics bumex 2 mg BID for optimization of volume status  Some improvement in renal function noted.   Daily evaluation for renal replacement therapy will be done.  No dialysis indicated at this time.  Adjust medication for the new GFR.  Case discussed with the medical staff taking care of the patient and the staff    Victoriano Webster MD  01/30/25  16:38 EST            "

## 2025-01-30 NOTE — PLAN OF CARE
Goal Outcome Evaluation:  Plan of Care Reviewed With: patient, family        Progress: declining  Outcome Evaluation: Pt continues to present below baseline function d/t lethargy, generalized weakness, and decreased activity tolerance. Per pt family's request, PT deferred mobility and had pt perform bed level exercises. She required increased assist w/ BLE ther ex compared to previous sessions. Pt would continue to benefit from IP PT services while hospitalized. Recommend SNF at d/c.    Anticipated Discharge Disposition (PT): skilled nursing facility

## 2025-01-31 ENCOUNTER — APPOINTMENT (OUTPATIENT)
Dept: GENERAL RADIOLOGY | Facility: HOSPITAL | Age: OVER 89
End: 2025-01-31
Payer: MEDICARE

## 2025-01-31 LAB
ANION GAP SERPL CALCULATED.3IONS-SCNC: 11 MMOL/L (ref 5–15)
BUN SERPL-MCNC: 68 MG/DL (ref 8–23)
BUN/CREAT SERPL: 30.2 (ref 7–25)
CALCIUM SPEC-SCNC: 8.8 MG/DL (ref 8.2–9.6)
CHLORIDE SERPL-SCNC: 97 MMOL/L (ref 98–107)
CO2 SERPL-SCNC: 28 MMOL/L (ref 22–29)
CREAT SERPL-MCNC: 2.25 MG/DL (ref 0.57–1)
DEPRECATED RDW RBC AUTO: 48.7 FL (ref 37–54)
EGFRCR SERPLBLD CKD-EPI 2021: 19.3 ML/MIN/1.73
ERYTHROCYTE [DISTWIDTH] IN BLOOD BY AUTOMATED COUNT: 13.4 % (ref 12.3–15.4)
GLUCOSE SERPL-MCNC: 131 MG/DL (ref 65–99)
HCT VFR BLD AUTO: 39.3 % (ref 34–46.6)
HGB BLD-MCNC: 12 G/DL (ref 12–15.9)
MCH RBC QN AUTO: 30.3 PG (ref 26.6–33)
MCHC RBC AUTO-ENTMCNC: 30.5 G/DL (ref 31.5–35.7)
MCV RBC AUTO: 99.2 FL (ref 79–97)
PLATELET # BLD AUTO: 220 10*3/MM3 (ref 140–450)
PMV BLD AUTO: 10.1 FL (ref 6–12)
POTASSIUM SERPL-SCNC: 3.9 MMOL/L (ref 3.5–5.2)
RBC # BLD AUTO: 3.96 10*6/MM3 (ref 3.77–5.28)
SODIUM SERPL-SCNC: 136 MMOL/L (ref 136–145)
WBC NRBC COR # BLD AUTO: 8.62 10*3/MM3 (ref 3.4–10.8)

## 2025-01-31 PROCEDURE — 71045 X-RAY EXAM CHEST 1 VIEW: CPT

## 2025-01-31 PROCEDURE — 80048 BASIC METABOLIC PNL TOTAL CA: CPT | Performed by: HOSPITALIST

## 2025-01-31 PROCEDURE — 99232 SBSQ HOSP IP/OBS MODERATE 35: CPT | Performed by: HOSPITALIST

## 2025-01-31 PROCEDURE — P9047 ALBUMIN (HUMAN), 25%, 50ML: HCPCS | Performed by: FAMILY MEDICINE

## 2025-01-31 PROCEDURE — 85027 COMPLETE CBC AUTOMATED: CPT | Performed by: HOSPITALIST

## 2025-01-31 PROCEDURE — 25010000002 FUROSEMIDE PER 20 MG: Performed by: FAMILY MEDICINE

## 2025-01-31 PROCEDURE — 25010000002 ALBUMIN HUMAN 25% PER 50 ML: Performed by: FAMILY MEDICINE

## 2025-01-31 PROCEDURE — 25010000002 BUMETANIDE PER 0.5 MG: Performed by: INTERNAL MEDICINE

## 2025-01-31 RX ORDER — BUMETANIDE 0.25 MG/ML
2 INJECTION, SOLUTION INTRAMUSCULAR; INTRAVENOUS EVERY 12 HOURS
Status: DISCONTINUED | OUTPATIENT
Start: 2025-01-31 | End: 2025-02-04

## 2025-01-31 RX ADMIN — ALBUMIN (HUMAN) 25 G: 0.25 INJECTION, SOLUTION INTRAVENOUS at 03:35

## 2025-01-31 RX ADMIN — Medication 10 ML: at 20:36

## 2025-01-31 RX ADMIN — POLYETHYLENE GLYCOL 3350 17 G: 17 POWDER, FOR SOLUTION ORAL at 08:36

## 2025-01-31 RX ADMIN — SENNOSIDES AND DOCUSATE SODIUM 2 TABLET: 50; 8.6 TABLET ORAL at 08:36

## 2025-01-31 RX ADMIN — HYDRALAZINE HYDROCHLORIDE 25 MG: 25 TABLET ORAL at 20:36

## 2025-01-31 RX ADMIN — HYDRALAZINE HYDROCHLORIDE 25 MG: 25 TABLET ORAL at 08:36

## 2025-01-31 RX ADMIN — ACETAMINOPHEN 650 MG: 325 TABLET ORAL at 11:12

## 2025-01-31 RX ADMIN — BUMETANIDE 2 MG: 0.25 INJECTION INTRAMUSCULAR; INTRAVENOUS at 17:01

## 2025-01-31 RX ADMIN — APIXABAN 5 MG: 5 TABLET, FILM COATED ORAL at 20:36

## 2025-01-31 RX ADMIN — FUROSEMIDE 60 MG: 10 INJECTION, SOLUTION INTRAMUSCULAR; INTRAVENOUS at 03:35

## 2025-01-31 NOTE — PLAN OF CARE
Goal Outcome Evaluation:  Plan of Care Reviewed With: patient        Progress: declining  Outcome Evaluation: VSS, pt requiring more oxygen at 6L NC. more work of breathing. hospitalist  called, order for chest xray. xray showed worsening bilateral pleural effusions. one dose of IV lasix and albumin ordered. chest tube site continues to leak serous fluid. dressing changed (petroleum gauze and mepilex per Dr. Clancy). Pt O2 sat stabilized and pt was able to rest better with 5L NC, sating at 94%. Q2H turns.

## 2025-01-31 NOTE — PROGRESS NOTES
Psychiatric Medicine Services  PROGRESS NOTE    Patient Name: Rose J Kellermann  : 1926  MRN: [id number]    Date of Admission: 2025  Primary Care Physician: Sam Hung MD    Subjective   Subjective     CC:  F/U dyspnea    HPI:  Seen this morning. Increased O2 requirements to 6 liters overnight. Given IV Lasix 60 mg x 1. Says she feels a little better today. Daughter at bedside.       Objective   Objective     Vital Signs:   Temp:  [96.9 °F (36.1 °C)-98.8 °F (37.1 °C)] 98.1 °F (36.7 °C)  Heart Rate:  [83-96] 94  Resp:  [16-22] 16  BP: (113-142)/(72-97) 128/87  Flow (L/min) (Oxygen Therapy):  [4-6] 5     Physical Exam:  Gen-ill and fatigued appearance  HENT-NCAT, mucous membranes moist  CV-irregular, S1 S2 normal, no m/r/g  Resp-decreased bilaterally, nonlabored currently  Abd-soft, NT, ND, +BS  Neuro-awake, alert, conversant, no focal deficits  Psych-appropriate mood      Results Reviewed:  LAB RESULTS:      Lab 25  0926 25  1259 25  1502 25  0838 25  0641   WBC 8.62 11.02* 10.09 9.99 10.29   HEMOGLOBIN 12.0 12.8 13.1 11.9* 11.9*   HEMATOCRIT 39.3 39.5 40.4 37.4 38.1   PLATELETS 220 272 291 244 243   NEUTROS ABS  --   --  8.52* 8.17* 8.41*   IMMATURE GRANS (ABS)  --   --  0.05 0.03 0.04   LYMPHS ABS  --   --  0.55* 0.64* 0.60*   MONOS ABS  --   --  0.82 0.91* 1.05*   EOS ABS  --   --  0.13 0.21 0.17   MCV 99.2* 93.4 93.3 95.9 96.9         Lab 25  0926 25  1259 25  1404 25  0838 25  0641   SODIUM 136 134* 137 133* 135*   POTASSIUM 3.9 4.3 4.6 4.8 4.8   CHLORIDE 97* 96* 98 97* 99   CO2 28.0 25.0 26.0 19.0* 22.0   ANION GAP 11.0 13.0 13.0 17.0* 14.0   BUN 68* 72* 77* 75* 74*   CREATININE 2.25* 2.52* 2.89* 2.90* 2.61*   EGFR 19.3* 16.8* 14.3* 14.2* 16.1*   GLUCOSE 131* 152* 154* 134* 89   CALCIUM 8.8 8.4 8.9  8.9 8.6 8.9   MAGNESIUM  --  2.4*  --   --   --    PHOSPHORUS  --  4.1 4.6*  --   --          Lab  01/30/25  1259 01/29/25  1404   ALBUMIN 2.8* 3.1*                 Lab 01/29/25  1404   IRON 26*   IRON SATURATION (TSAT) 12*   TIBC 209*   TRANSFERRIN 140*         Lab 01/28/25  1552   PH, ARTERIAL 7.393   PCO2, ARTERIAL 35.2   PO2 .0*   FIO2 40   HCO3 ART 21.4   BASE EXCESS ART -2.9*   CARBOXYHEMOGLOBIN 1.1     Brief Urine Lab Results       None            Microbiology Results Abnormal       None            XR Chest 1 View    Result Date: 1/31/2025  XR CHEST 1 VW Date of Exam: 1/31/2025 4:48 AM EST Indication: Follow up L pleural effusion s/p Left pleural CT placement Comparison: Chest x-ray 1/30/2025 Findings: Indication of the exam states left chest tube placement, however no chest tube is seen. Stable cardiomegaly. Similar dense bibasilar opacities compatible with combination of atelectasis and layering pleural effusions. Similar diffuse interstitial prominence and perihilar vascular congestion consistent with a component of interstitial edema.  No pneumothorax.     Impression: Impression: Clinical indication of the exam states left chest tube placement, however no chest tube is seen. Similar appearance of interstitial pulmonary edema and hazy and dense bibasilar opacities likely reflecting combination of atelectasis and layering pleural effusions. Overall no significant interval change. Electronically Signed: Arya Vazquez MD  1/31/2025 8:09 AM EST  Workstation ID: [id number]    XR Chest 1 View    Result Date: 1/30/2025  XR CHEST 1 VW Date of Exam: 1/30/2025 11:03 PM EST Indication: shortness of breath Comparison: Chest radiograph 1/30/2025 Findings: The heart is enlarged. There is pulmonary vascular congestion. There are large bilateral pleural fluid collections. There is bilateral basilar atelectasis. There is no pneumothorax. There are degenerative changes of both shoulders.     Impression: Impression: Cardiomegaly with pulmonary vascular congestion and large bilateral pleural fluid collections.  Electronically Signed: Aj Ramos MD  1/30/2025 11:06 PM EST  Workstation ID: [id number]    XR Chest 1 View    Result Date: 1/30/2025  XR CHEST 1 VW Date of Exam: 1/30/2025 2:01 AM EST Indication: Follow up L pleural effusion s/p Left pleural CT placement Comparison: Chest radiograph 1/30/2025 Findings: Reported drainage catheter not visualized. Grossly stable enlarged cardiac silhouette, moderate size left greater than right pleural effusions and increased interstitial opacities suspicious for edema. Similar bibasilar consolidation favoring compressive  atelectasis, versus infection in the right clinical setting. Aortic atherosclerotic disease. No pneumothorax. Degenerative elated osseous change. Left rib deformities similar to prior.     Impression: Impression: No significant interval change. Provided clinical information states left pleural drainage catheter however this is not visualized. Electronically Signed: Juice Martinez MD  1/30/2025 8:10 AM EST  Workstation ID: [id number]     Results for orders placed during the hospital encounter of 01/20/25    Adult Transthoracic Echo Complete W/ Cont if Necessary Per Protocol    Interpretation Summary    Left ventricular ejection fraction appears to be 61 - 65%.    Left ventricular diastolic function was indeterminate.    Mild aortic valve stenosis is present.    Peak velocity of the flow distal to the aortic valve is 245.6 cm/s. Aortic valve maximum pressure gradient is 24 mmHg. Aortic valve mean pressure gradient is 13 mmHg.    The mitral valve mean gradient is 4 mmHg.    Moderate tricuspid valve regurgitation is present.    Estimated right ventricular systolic pressure from tricuspid regurgitation is markedly elevated (>55 mmHg). Calculated right ventricular systolic pressure from tricuspid regurgitation is 57 mmHg.    There is a moderate 2cm  pericardial effusion with no tamponade    There is a left pleural effusion.      Current medications:  Scheduled  Meds:apixaban, 5 mg, Oral, Q12H  hydrALAZINE, 25 mg, Oral, BID  sodium chloride, 10 mL, Intravenous, Q12H      Continuous Infusions:   PRN Meds:.  acetaminophen    senna-docusate sodium **AND** polyethylene glycol **AND** bisacodyl **AND** bisacodyl    sodium chloride    sodium chloride    sodium chloride    Assessment & Plan   Assessment & Plan     Active Hospital Problems    Diagnosis  POA    **Pleural effusion on left [J90]  Yes    Essential hypertension [I10]  Yes    Chronic kidney disease, stage III (moderate)  [N18.30]  Yes      Resolved Hospital Problems   No resolved problems to display.        Brief Hospital Course to date:  Rose J Kellermann is a 89 y.o. female with history of DVT on Eliquis, HFpEF, HTN, and CKD IV who presents due to dyspnea and hypoxia. Had been wearing 2-3 liters home oxygen intermittently over the past several months, now requiring continuously. Workup in the ED notable for a left pleural effusion. Admitted for further management.     This patient's problems and plans were partially entered by my partner and updated as appropriate by me 01/31/25. Copied text in this note has been reviewed and is accurate as of today's date.     Progressive hypoxia, acute on chronic  Left pleural effusion  Acute on chronic HFpEF exacerbation  Valvular heart disease  Pulmonary HTN  Pericardial effusion  - CXR with moderate L pleural effusion  - Thoracentesis ordered, IR ended up placing a chest tube on the left 1/23/25 - consulted CT Surgery to manage, tube was pulled 1/26/25  - Pleural fluid appears mixed exudate and transudate?, cultures/cytology negative   - RRT 1/28/25 due to worsening dyspnea and hypoxia - IV diuresis resumed with improvement today, down to 4 liters (baseline 3 liters)  - Echo 1/28/25: EF 61-65%, moderate TR, RVSP >55 mmHg, moderate 2 cm pericardial effusion without tamponade, left pleural effusion  - Cardiology consulted 1/29/25: agree with diuresis, defer pericardiocentesis or  pericardial window as suspect etiology of pericardial effusion may be multifactorial but in large part related to poor nutritional status/low protein  - Despite diuresis, she does not appear to be making much progress and in fact appears to be declining - discussed Palliative Care with patient and daughter today, agreeable      ANGEL LUIS on CKD IV  - Baseline Cr ~1.8, initially 2.5 and trended up to 2.9  - Nephrology consulted, started IV Bumex 2 mg Q12H 1/28/25  - Repeat labs improved, continue to monitor  - Patient does not want dialysis    Afib  - Cardiology following  - Has been persistent since admission but she appears asymptomatic from this  - Resumed Eliquis     History of DVT  - Resumed Eliquis     Hypertension  - Amlodipine held per Cardiology  - Continue Hydralazine    Goals of care  - Discussed with patient and daughter this morning, agreeable to Palliative Care +/- Hospice referral  - DNR/DNI now    Expected Discharge Location and Transportation: [facility]  Expected Discharge   Expected Discharge Date: 2/5/2025; Expected Discharge Time:      VTE Prophylaxis:  Pharmacologic & mechanical VTE prophylaxis orders are present.         AM-PAC 6 Clicks Score (PT): 10 (01/31/25 9097)    CODE STATUS:   Code Status and Medical Interventions: No CPR (Do Not Attempt to Resuscitate); Limited Support; No intubation (DNI), No dialysis   Ordered at: 01/31/25 1140     Medical Intervention Limits:    No intubation (DNI)    No dialysis     Level Of Support Discussed With:    Health Care Surrogate     Code Status (Patient has no pulse and is not breathing):    No CPR (Do Not Attempt to Resuscitate)     Medical Interventions (Patient has pulse or is breathing):    Limited Support       Yenifer Eid MD  01/31/25

## 2025-01-31 NOTE — CASE MANAGEMENT/SOCIAL WORK
Continued Stay Note   [facility]     Patient Name: Rose J Kellermann  MRN: [id number]  Today's Date: 1/31/2025    Admit Date: 1/20/2025    Plan: rehab   Discharge Plan       Row Name 01/31/25 0828       Plan    Plan rehab    Patient/Family in Agreement with Plan no    Plan Comments I spoke with Gifty, from the [facility], yesterday to update her that she will not be ready for discharge until next week per the MD. Last night she required increased O2. She will need a new precertification started for [facility] once medically ready. Her daughter can transport or she may require transport to be arranged. CM to follow.    Final Discharge Disposition Code 03 - skilled nursing facility (SNF)                   Discharge Codes    No documentation.                 Expected Discharge Date and Time       Expected Discharge Date Expected Discharge Time    Feb 3, 2025               Floresita Dawson RN

## 2025-01-31 NOTE — PLAN OF CARE
Problem: Adult Inpatient Plan of Care  Goal: Plan of Care Review  Outcome: Progressing  Goal: Patient-Specific Goal (Individualized)  Outcome: Progressing  Goal: Absence of Hospital-Acquired Illness or Injury  Outcome: Progressing  Intervention: Identify and Manage Fall Risk  Recent Flowsheet Documentation  Taken 1/31/2025 1600 by Franny Barrera RN  Safety Promotion/Fall Prevention:   activity supervised   safety round/check completed  Taken 1/31/2025 1400 by Franny Barrera RN  Safety Promotion/Fall Prevention:   activity supervised   safety round/check completed  Taken 1/31/2025 1200 by Franny Barrera RN  Safety Promotion/Fall Prevention:   activity supervised   safety round/check completed  Taken 1/31/2025 1000 by Franny Barrera RN  Safety Promotion/Fall Prevention:   activity supervised   safety round/check completed  Taken 1/31/2025 0836 by Franny Barrera RN  Safety Promotion/Fall Prevention:   activity supervised   safety round/check completed  Intervention: Prevent Skin Injury  Recent Flowsheet Documentation  Taken 1/31/2025 1600 by Franny Barrera RN  Body Position: sitting up in bed  Skin Protection:   transparent dressing maintained   silicone foam dressing in place   incontinence pads utilized  Taken 1/31/2025 1400 by Franny Barrera RN  Skin Protection:   transparent dressing maintained   silicone foam dressing in place   incontinence pads utilized  Taken 1/31/2025 1200 by Franny Barrera RN  Body Position: sitting up in bed  Skin Protection:   silicone foam dressing in place   transparent dressing maintained   incontinence pads utilized  Taken 1/31/2025 1000 by Franny Barrera RN  Skin Protection:   silicone foam dressing in place   transparent dressing maintained   incontinence pads utilized  Taken 1/31/2025 0836 by Franny Barrera RN  Body Position:   turned   right  Skin Protection: (silicone dressings peeled back and skin assessed)   incontinence pads utilized   silicone  foam dressing in place   transparent dressing maintained  Intervention: Prevent and Manage VTE (Venous Thromboembolism) Risk  Recent Flowsheet Documentation  Taken 1/31/2025 0836 by Franny Barrera RN  VTE Prevention/Management:   bilateral   SCDs (sequential compression devices) on  Goal: Optimal Comfort and Wellbeing  Outcome: Progressing  Intervention: Provide Person-Centered Care  Recent Flowsheet Documentation  Taken 1/31/2025 0836 by Franny Barrera RN  Trust Relationship/Rapport:   choices provided   care explained  Goal: Readiness for Transition of Care  Outcome: Progressing     Problem: Skin Injury Risk Increased  Goal: Skin Health and Integrity  Outcome: Progressing  Intervention: Optimize Skin Protection  Recent Flowsheet Documentation  Taken 1/31/2025 1600 by Franny Barrera RN  Activity Management: activity encouraged  Pressure Reduction Techniques:   frequent weight shift encouraged   heels elevated off bed   positioned off wounds  Head of Bed (HOB) Positioning: HOB elevated  Pressure Reduction Devices:   heel offloading device utilized   positioning supports utilized   specialty bed utilized  Skin Protection:   transparent dressing maintained   silicone foam dressing in place   incontinence pads utilized  Taken 1/31/2025 1400 by Franny Barrera RN  Activity Management: activity encouraged  Pressure Reduction Techniques:   frequent weight shift encouraged   heels elevated off bed   positioned off wounds  Pressure Reduction Devices:   heel offloading device utilized   specialty bed utilized   positioning supports utilized  Skin Protection:   transparent dressing maintained   silicone foam dressing in place   incontinence pads utilized  Taken 1/31/2025 1200 by Franny Barrera RN  Activity Management: activity encouraged  Pressure Reduction Techniques:   weight shift assistance provided   heels elevated off bed   positioned off wounds  Head of Bed (HOB) Positioning: HOB elevated  Pressure  Reduction Devices:   positioning supports utilized   pressure-redistributing mattress utilized   heel offloading device utilized  Skin Protection:   silicone foam dressing in place   transparent dressing maintained   incontinence pads utilized  Taken 1/31/2025 1000 by Franny Barrera RN  Activity Management: activity encouraged  Pressure Reduction Techniques:   weight shift assistance provided   heels elevated off bed   positioned off wounds  Head of Bed ([facility]) Positioning: [facility] elevated  Pressure Reduction Devices:   heel offloading device utilized   positioning supports utilized   pressure-redistributing mattress utilized  Skin Protection:   silicone foam dressing in place   transparent dressing maintained   incontinence pads utilized  Taken 1/31/2025 0836 by Franny Barrera RN  Activity Management: activity encouraged  Pressure Reduction Techniques:   frequent weight shift encouraged   heels elevated off bed   positioned off wounds  Head of Bed (HOB) Positioning: [facility] elevated  Pressure Reduction Devices:   heel offloading device utilized   positioning supports utilized   pressure-redistributing mattress utilized  Skin Protection: (silicone dressings peeled back and skin assessed)   incontinence pads utilized   silicone foam dressing in place   transparent dressing maintained     Problem: Comorbidity Management  Goal: Maintenance of Heart Failure Symptom Control  Outcome: Progressing  Intervention: Maintain Heart Failure Management  Recent Flowsheet Documentation  Taken 1/31/2025 1600 by Franny Barrera RN  Medication Review/Management: medications reviewed  Taken 1/31/2025 1400 by Franny Barrera RN  Medication Review/Management: medications reviewed  Taken 1/31/2025 1200 by Franny Barrera RN  Medication Review/Management: medications reviewed  Taken 1/31/2025 1000 by Franny Barrera RN  Medication Review/Management: medications reviewed  Taken 1/31/2025 0836 by Franny Barrera, LEANNA  Medication  Review/Management: medications reviewed  Goal: Blood Pressure in Desired Range  Outcome: Progressing  Intervention: Maintain Blood Pressure Management  Recent Flowsheet Documentation  Taken 1/31/2025 1600 by Franny Barrera RN  Medication Review/Management: medications reviewed  Taken 1/31/2025 1400 by Franny Barrera RN  Medication Review/Management: medications reviewed  Taken 1/31/2025 1200 by Franny Barrera RN  Medication Review/Management: medications reviewed  Taken 1/31/2025 1000 by Franny Barrera RN  Medication Review/Management: medications reviewed  Taken 1/31/2025 0836 by Franny Barrera RN  Medication Review/Management: medications reviewed     Problem: Fall Injury Risk  Goal: Absence of Fall and Fall-Related Injury  Outcome: Progressing  Intervention: Identify and Manage Contributors  Recent Flowsheet Documentation  Taken 1/31/2025 1600 by Franny Barrera RN  Medication Review/Management: medications reviewed  Taken 1/31/2025 1400 by Franny Barrera RN  Medication Review/Management: medications reviewed  Taken 1/31/2025 1200 by Franny Barrera RN  Medication Review/Management: medications reviewed  Taken 1/31/2025 1000 by Franny Barrera RN  Medication Review/Management: medications reviewed  Taken 1/31/2025 0836 by Franny Barrera RN  Medication Review/Management: medications reviewed  Intervention: Promote Injury-Free Environment  Recent Flowsheet Documentation  Taken 1/31/2025 1600 by Franny Barrera RN  Safety Promotion/Fall Prevention:   activity supervised   safety round/check completed  Taken 1/31/2025 1400 by Franny Barrera RN  Safety Promotion/Fall Prevention:   activity supervised   safety round/check completed  Taken 1/31/2025 1200 by Franny Barrera RN  Safety Promotion/Fall Prevention:   activity supervised   safety round/check completed  Taken 1/31/2025 1000 by Franny Barrear RN  Safety Promotion/Fall Prevention:   activity supervised   safety round/check  completed  Taken 1/31/2025 0836 by Franny Barrera, RN  Safety Promotion/Fall Prevention:   activity supervised   safety round/check completed     Problem: Palliative Care  Goal: Enhanced Quality of Life  Outcome: Progressing   Goal Outcome Evaluation:      -VSS. 4-5LNC.   -PRN tylenol given for knee discomfort.  -PRN's given for bowels.  -Fall and skin precautions in place.   -Specialty bed in use.   -UOP adequate.  -

## 2025-01-31 NOTE — NURSING NOTE
WOC consult for left hip ruptured blister.    Patient has a superficial ruptured blister to her left hip.  The wound measures approximately 0.5 x 0.4 x 0.1 cm.  Dry dermis noted.  No signs of infection.  Periwound skin is edematous.    Recommendation:  -Cleanse wound with normal saline and 4 x 4 gauze  -Apply multilayer Xeroform to the wound and cover with a small Optifoam dressing.  Change every 2 days    Pressure injury prevention protocol throughout hospitalization.  Turn patient every 2 hours using a foam wedge.  Offload heels from bed.    Will order specialty bed from [facility].    WOC will sign off.    Zander Noble RN, BSN, CCRN, CWOCN  Wound, Ostomy and Continence (WOC) Department  [facility]

## 2025-01-31 NOTE — CONSULTS
Palliative Care Initial Consult   Attending Physician: Yenifer Eid MD  Referring Provider: Yenifer Eid      Reason for Referral:  assistance with clarification of goals of care    Code Status:   Code Status and Medical Interventions: No CPR (Do Not Attempt to Resuscitate); Limited Support; No intubation (DNI), No dialysis   Ordered at: 01/31/25 1140     Medical Intervention Limits:    No intubation (DNI)    No dialysis     Level Of Support Discussed With:    Health Care Surrogate     Code Status (Patient has no pulse and is not breathing):    No CPR (Do Not Attempt to Resuscitate)     Medical Interventions (Patient has pulse or is breathing):    Limited Support      Advanced Directives: Advance Directive Status: Patient has advance directive, copy in chart   Family/Support: dtr     Goals of Care:    Goals of Care/Treatment Preferences:    Treat what we can       HPI:   89 yo female with known heart dz and CKD on home O2 presented to ED with increasing SOA.  She was found with pleural effusion and had CT placement that was subsequently removed 1/26.  Echo demonstrated multivalvular heart dz with pulmonary htn and pericardial effusion. EKG with afib. Albumin is decreased at 2.8.  Was planned for rehab but with further renal compromise on planned day of discharge and discharge held.  Dtr reports pt eating less.    ROS:   + constipation - received meds today  -N/V    Palliative Pain Assessment:   Are you having pain at the present time or have you recently taken pain medication to treat pain? No       Palliative Dyspnea Assessment:   Are you short of breath at the present time or have you recently taken medication to treat shortness of breath?  Yes    Dyspnea Assessment:   Symptoms: Anastasia J Kellermann c/o shortness of breath    Signs: tachypnea and use of accessory muscles with breathing    Aggravating Factors: exertion    Alleviating Factors: rest and oxygen    Pulmonary History: none                    Past  Medical History:   Diagnosis Date    Abscess of back     Arrhythmia     frequent PVC    Cancer 12/2011    Endometrial cancer, status post robotically assisted hysterectomy with Dr. Haddad, December 2011.      CKD (chronic kidney disease), stage III     no dilaysis     Dizzy     Dvt femoral (deep venous thrombosis) 2017    s/p hip surgery in 2017. Took xarelto until August 2018    Dyslipidemia     Dyslipidemia.  Observing.    Enthesopathy of hip region     Generalized osteoarthrosis, involving multiple sites     Headache     Hearing loss     Hypertension     Low backache     Needs flu shot     Osteoarthritis     Osteoarthritis with questionable carpal tunnel syndrome bilaterally.     Otitis, serous     Pneumonia 1967    Remote pneumonia, 1967.     Retinal hemorrhage     SI joint arthritis     SOB (shortness of breath)     Syncope 2001    Remote syncope with working diagnosis of vasodepressor, 2001.     Venous insufficiency of leg     Wears glasses     readers     Past Surgical History:   Procedure Laterality Date    CATARACT EXTRACTION      bilat     COLONOSCOPY      HIP PERCUTANEOUS PINNING Left 11/24/2017    Procedure: HIP PERCUTANEOUS PINNING;  Surgeon: Lucas Swanson MD;  Location:  [facility] OR;  Service:     HYSTERECTOMY      total? but unsure     KNEE SURGERY  2001    Remote knee surgery in 2001.- right     TOTAL HIP ARTHROPLASTY Left 10/26/2018    Procedure: TOTAL HIP ARTHROPLASTY LEFT;  Surgeon: Stephen Baker MD;  Location:  [facility] OR;  Service: Orthopedics     Social History     Socioeconomic History    Marital status:    Tobacco Use    Smoking status: Never     Passive exposure: Never    Smokeless tobacco: Never   Vaping Use    Vaping status: Never Used   Substance and Sexual Activity    Alcohol use: No    Drug use: No    Sexual activity: Defer     Birth control/protection: None     Family History   Problem Relation Age of Onset    Heart disease Mother     Osteoarthritis Mother      "Hypertension Mother     Hypertension Father     Heart attack Father     Osteoarthritis Father     Cancer Brother         lung       Allergies   Allergen Reactions    Sulfa Antibiotics Nausea Only and GI Intolerance         Current Facility-Administered Medications:     acetaminophen (TYLENOL) tablet 650 mg, 650 mg, Oral, Q6H PRN, Linda Felix MD, 650 mg at 01/31/25 1112    [Held by provider] apixaban (ELIQUIS) tablet 5 mg, 5 mg, Oral, Q12H, Yenifer Eid MD, 5 mg at 01/29/25 0816    sennosides-docusate (PERICOLACE) 8.6-50 MG per tablet 2 tablet, 2 tablet, Oral, BID PRN, 2 tablet at 01/31/25 0836 **AND** polyethylene glycol (MIRALAX) packet 17 g, 17 g, Oral, Daily PRN, 17 g at 01/31/25 0836 **AND** bisacodyl (DULCOLAX) EC tablet 5 mg, 5 mg, Oral, Daily PRN **AND** bisacodyl (DULCOLAX) suppository 10 mg, 10 mg, Rectal, Daily PRN, Chanel Noriega MD    hydrALAZINE (APRESOLINE) tablet 25 mg, 25 mg, Oral, BID, Chanel Noriega MD, 25 mg at 01/31/25 0836    sodium chloride 0.9 % flush 10 mL, 10 mL, Intravenous, PRN, Chanel Noriega MD    sodium chloride 0.9 % flush 10 mL, 10 mL, Intravenous, Q12H, Chanel Noriega MD, 10 mL at 01/30/25 2120    sodium chloride 0.9 % flush 10 mL, 10 mL, Intravenous, PRN, Chanel Noriega MD    sodium chloride 0.9 % infusion 40 mL, 40 mL, Intravenous, PRN, Chanel Noriega MD     Palliative Performance Scale Score:      /87   Pulse 84   Temp 98.1 °F (36.7 °C) (Oral)   Resp 16   Ht 162.6 cm (64.02\")   Wt 72.8 kg (160 lb 8 oz)   SpO2 94%   BMI 27.54 kg/m²     Intake/Output Summary (Last 24 hours) at 1/31/2025 1204  Last data filed at 1/31/2025 1115  Gross per 24 hour   Intake --   Output 1800 ml   Net -1800 ml       Physical Exam:    General Appearance:    Alert, cooperative, NAD   HEENT:    NC/AT, EOMI, anicteric, MMM, face relaxed   Neck:   supple, trachea midline, no JVD   Lungs:     CTA bilat, diminished in bases; respirations symmetric with mild increased work of breathing    " Heart:    RRR, normal S1 and S2, +M/-R/-G   Abdomen:     Normal bowel sounds, soft, nontender, nondistended   G/U:   Deferred   MSK/Extremities:   No clubbing , cyanosis or edema, No wasting   Pulses:   Pulses palpable and equal bilaterally   Skin:   Warm, dry, no mottling   Neurologic:   A/Ox2, cooperative, moves extremities x 4, no tremor, nl     tone   Psych:   Calm, appropriate         Labs:   Results from last 7 days   Lab Units 01/31/25  0926   WBC 10*3/mm3 8.62   HEMOGLOBIN g/dL 12.0   HEMATOCRIT % 39.3   PLATELETS 10*3/mm3 220     Results from last 7 days   Lab Units 01/31/25  0926   SODIUM mmol/L 136   POTASSIUM mmol/L 3.9   CHLORIDE mmol/L 97*   CO2 mmol/L 28.0   BUN mg/dL 68*   CREATININE mg/dL 2.25*   CALCIUM mg/dL 8.8   GLUCOSE mg/dL 131*     Imaging Results (Last 72 Hours)       Procedure Component Value Units Date/Time    XR Chest 1 View [[id number]] Collected: 01/31/25 0755     Updated: 01/31/25 0812    Narrative:      XR CHEST 1 VW    Date of Exam: 1/31/2025 4:48 AM EST    Indication: Follow up L pleural effusion s/p Left pleural CT placement    Comparison: Chest x-ray 1/30/2025    Findings:  Indication of the exam states left chest tube placement, however no chest tube is seen.    Stable cardiomegaly. Similar dense bibasilar opacities compatible with combination of atelectasis and layering pleural effusions. Similar diffuse interstitial prominence and perihilar vascular congestion consistent with a component of interstitial edema.   No pneumothorax.      Impression:      Impression:  Clinical indication of the exam states left chest tube placement, however no chest tube is seen.    Similar appearance of interstitial pulmonary edema and hazy and dense bibasilar opacities likely reflecting combination of atelectasis and layering pleural effusions. Overall no significant interval change.      Electronically Signed: Arya Vazquez MD    1/31/2025 8:09 AM EST    Workstation ID: [id number]    XR Chest 1  View [[id number]] Collected: 01/30/25 2305     Updated: 01/30/25 2309    Narrative:      XR CHEST 1 VW    Date of Exam: 1/30/2025 11:03 PM EST    Indication: shortness of breath    Comparison: Chest radiograph 1/30/2025    Findings:  The heart is enlarged. There is pulmonary vascular congestion. There are large bilateral pleural fluid collections. There is bilateral basilar atelectasis. There is no pneumothorax. There are degenerative changes of both shoulders.      Impression:      Impression:  Cardiomegaly with pulmonary vascular congestion and large bilateral pleural fluid collections.          Electronically Signed: Aj Ramos MD    1/30/2025 11:06 PM EST    Workstation ID: [id number]    XR Chest 1 View [[id number]] Collected: 01/30/25 0807     Updated: 01/30/25 0813    Narrative:      XR CHEST 1 VW    Date of Exam: 1/30/2025 2:01 AM EST    Indication: Follow up L pleural effusion s/p Left pleural CT placement    Comparison: Chest radiograph 1/30/2025    Findings:  Reported drainage catheter not visualized. Grossly stable enlarged cardiac silhouette, moderate size left greater than right pleural effusions and increased interstitial opacities suspicious for edema. Similar bibasilar consolidation favoring compressive   atelectasis, versus infection in the right clinical setting. Aortic atherosclerotic disease. No pneumothorax. Degenerative elated osseous change. Left rib deformities similar to prior.      Impression:      Impression:  No significant interval change.     Provided clinical information states left pleural drainage catheter however this is not visualized.      Electronically Signed: Juice Martinez MD    1/30/2025 8:10 AM EST    Workstation ID: [id number]    XR Chest 1 View [[id number]] Collected: 01/29/25 0813     Updated: 01/29/25 0819    Narrative:      XR CHEST 1 VW    Date of Exam: 1/29/2025 4:18 AM EST    Indication: Follow up L pleural effusion s/p Left pleural CT placement    Comparison: Chest  radiograph 1/28/2025.    Findings:  Enlarged cardiac silhouette, unchanged. Persistent pulmonary vascular congestion. Small/moderate bilateral pleural effusions with bibasilar airspace disease, unchanged. No pneumothorax. Osseous structures are unchanged.      Impression:      Impression:  No significant interval change.      Electronically Signed: Adiel Mckeon MD    1/29/2025 8:16 AM EST    Workstation ID: [id number]    XR Chest 1 View [[id number]] Collected: 01/28/25 1627     Updated: 01/28/25 1634    Narrative:      XR CHEST 1 VW    Date of Exam: 1/28/2025 4:05 PM EST    Indication: worsening SOB    Comparison: 1/27/2025 11:06 p.m.    Findings: Current image is timed 4:14 p.m. 1/28/2025.    Heart shadow is markedly enlarged. Vasculature is cephalized, but pulmonary edema is improved from the prior exam. There is persistent bibasilar effusion and atelectasis. No pneumothorax is seen.    Reviewing multiple recent images back to 1/25/2025, there appears to be increasing size of the cardiomediastinal silhouette, and gradually increasing bibasilar effusion and atelectasis.      Impression:      Impression:    1. Marked heart shadow enlargement, which appears further increased, but with improved pulmonary vascular congestion compared to yesterday's 11:06 p.m. exam.    2. Persistent, extensive bibasilar atelectasis and effusion which appears to be gradually increasing over the past several days.      Electronically Signed: James Juarez MD    1/28/2025 4:31 PM EST    Workstation ID: [id number]    CT Head Without Contrast [[id number]] Collected: 01/28/25 1618     Updated: 01/28/25 1624    Narrative:      CT HEAD WO CONTRAST    Date of Exam: 1/28/2025 4:00 PM EST    Indication: Rapid response.    Comparison: CT head without contrast 1/21/2021    Technique: Axial CT images were obtained of the head without contrast administration.  Automated exposure control and iterative construction methods were used.      Findings:  Negative  for intracranial hemorrhage. Extensive white matter findings most compatible with chronic microvascular disease. Generalized cerebral volume loss. Small areas of parenchymal loss in the left cerebellum and the right cerebellum related to remote   infarcts, stable. No intraventricular hemorrhage. No midline shift or mass effect. No abnormal extra-axial fluid collection. The calvarium intact. Bilateral mastoid effusions. Visualized sinuses demonstrate suspected chronic sinus thickening at the right   maxillary sinus with mucoperiosteal thickening unchanged.      Impression:      Impression:  1. No acute intracranial abnormality.  2. Extensive white matter findings most compatible with chronic microvascular disease.  3. Generalized cerebral atrophy and additional chronic findings above.  4. Bilateral mastoid effusions.            Electronically Signed: Jhonny Barron MD    1/28/2025 4:21 PM EST    Workstation ID: [id number]                Diagnostics:   Echo:    Left ventricular ejection fraction appears to be 61 - 65%.    Left ventricular diastolic function was indeterminate.    Mild aortic valve stenosis is present.    Peak velocity of the flow distal to the aortic valve is 245.6 cm/s. Aortic valve maximum pressure gradient is 24 mmHg. Aortic valve mean pressure gradient is 13 mmHg.    The mitral valve mean gradient is 4 mmHg.    Moderate tricuspid valve regurgitation is present.    Estimated right ventricular systolic pressure from tricuspid regurgitation is markedly elevated (>55 mmHg). Calculated right ventricular systolic pressure from tricuspid regurgitation is 57 mmHg.    There is a moderate 2cm  pericardial effusion with no tamponade    There is a left pleural effusion.    A:     Pleural effusion on left    Essential hypertension    Chronic kidney disease, stage III (moderate)          Impression:   HFpEF  Valvular heart disease  Pulmonary HTN  ANGEL LUIS on CKD IV per  renal  Hypoalbuminemia      Symptoms:  Dyspnea  Debility       P:    Met in room with pt and dtr/HCS.  Reviewed current status and previously completed LW.  Decision made for no CPR/DNI and no HD.  Does want to continue to treat. Will monitor for further needs. Thank you for this consult and allowing us to participate in patient's plan of care. Palliative Care Team will continue to follow patient.       Kriss Arboleda MD, 1/31/2025, 12:04 EST

## 2025-01-31 NOTE — ACP (ADVANCE CARE PLANNING)
Advance Care Planning     Date of Shared Decision Making Discussion: 01/31/25    Pt, dtr Areli and palliative RN was/were present for the discussion. Dtr participated willingly.     Decision Maker: HCS    SDMSI SUMMARY:    Patient/family describes current medical condition as Slowly getting worse.      Patient/family identified the following as being most important to living well: Ability to eat and participate      Explored understanding, benefits and burdens, goals for treatment, fears and concerns of  interventions as resuscitation, HD, tube feeding.    Reviewed goals of care for additional medical interventions using decision making framework:      MOST form, Living Will and EMS DNR  introduced if not previously completed.    DECISIONS/RECOMMENDATIONS for FOLLOW-UP:   Dtr with decision for no CPR, intubation or dialysis as consistent with decision made previously by pt.  Is still considering if pt would want TF or not.      Time started: 1124     Time ended: 1140    Total time: 16  minutes

## 2025-01-31 NOTE — PROGRESS NOTES
" LOS: 11 days   Patient Care Team:  Sam Hung MD as PCP - General  Laurie, Elver MCADAMS MD as Consulting Physician (Otolaryngology)    Chief Complaint: ANGEL LUIS on CKD stage IV  SOB    Subjective     Shortness of Breath        Subjective:  Symptoms:  Stable.  She reports shortness of breath.        History taken from: patient family    Objective     Vital Sign Min/Max for last 24 hours  Temp  Min: 96.9 °F (36.1 °C)  Max: 98.1 °F (36.7 °C)   BP  Min: 108/81  Max: 142/97   Pulse  Min: 80  Max: 94   Resp  Min: 16  Max: 22   SpO2  Min: 90 %  Max: 97 %   Flow (L/min) (Oxygen Therapy)  Min: 5  Max: 6   Weight  Min: 72.8 kg (160 lb 8 oz)  Max: 72.8 kg (160 lb 8 oz)     Flowsheet Rows      Flowsheet Row First Filed Value   Admission Height 162.6 cm (64\") Documented at 01/20/2025 1006   Admission Weight 71.7 kg (158 lb) Documented at 01/20/2025 1006            I/O this shift:  In: -   Out: 850 [Urine:850]  I/O last 3 completed shifts:  In: -   Out: 1500 [Urine:1500]    Objective:  General Appearance:  Comfortable.    Vital signs: (most recent): Blood pressure 108/81, pulse 87, temperature 97.3 °F (36.3 °C), temperature source Axillary, resp. rate 16, height 162.6 cm (64.02\"), weight 72.8 kg (160 lb 8 oz), SpO2 94%, not currently breastfeeding.  Vital signs are normal.    Output: Producing urine.    HEENT: Normal HEENT exam.    Lungs:  Normal effort.    Heart: Normal rate.  Regular rhythm.  S1 normal and S2 normal.    Abdomen: Abdomen is soft.    Extremities: Normal range of motion.  There is no dependent edema or local swelling.    Pulses: Distal pulses are intact.    Neurological: Patient is alert and oriented to person, place and time.                Results Review:     I reviewed the patient's new clinical results.    WBC WBC   Date Value Ref Range Status   01/31/2025 8.62 3.40 - 10.80 10*3/mm3 Final   01/30/2025 11.02 (H) 3.40 - 10.80 10*3/mm3 Final   01/29/2025 10.09 3.40 - 10.80 10*3/mm3 Final      HGB " "Hemoglobin   Date Value Ref Range Status   01/31/2025 12.0 12.0 - 15.9 g/dL Final   01/30/2025 12.8 12.0 - 15.9 g/dL Final   01/29/2025 13.1 12.0 - 15.9 g/dL Final      HCT Hematocrit   Date Value Ref Range Status   01/31/2025 39.3 34.0 - 46.6 % Final   01/30/2025 39.5 34.0 - 46.6 % Final   01/29/2025 40.4 34.0 - 46.6 % Final      Platlets No results found for: \"LABPLAT\"   MCV MCV   Date Value Ref Range Status   01/31/2025 99.2 (H) 79.0 - 97.0 fL Final   01/30/2025 93.4 79.0 - 97.0 fL Final   01/29/2025 93.3 79.0 - 97.0 fL Final          Sodium Sodium   Date Value Ref Range Status   01/31/2025 136 136 - 145 mmol/L Final   01/30/2025 134 (L) 136 - 145 mmol/L Final   01/29/2025 137 136 - 145 mmol/L Final      Potassium Potassium   Date Value Ref Range Status   01/31/2025 3.9 3.5 - 5.2 mmol/L Final   01/30/2025 4.3 3.5 - 5.2 mmol/L Final     Comment:     Slight hemolysis detected by analyzer. Result may be falsely elevated.   01/29/2025 4.6 3.5 - 5.2 mmol/L Final     Comment:     Slight hemolysis detected by analyzer. Result may be falsely elevated.      Chloride Chloride   Date Value Ref Range Status   01/31/2025 97 (L) 98 - 107 mmol/L Final   01/30/2025 96 (L) 98 - 107 mmol/L Final   01/29/2025 98 98 - 107 mmol/L Final      CO2 CO2   Date Value Ref Range Status   01/31/2025 28.0 22.0 - 29.0 mmol/L Final   01/30/2025 25.0 22.0 - 29.0 mmol/L Final   01/29/2025 26.0 22.0 - 29.0 mmol/L Final      BUN BUN   Date Value Ref Range Status   01/31/2025 68 (H) 8 - 23 mg/dL Final   01/30/2025 72 (H) 8 - 23 mg/dL Final   01/29/2025 77 (H) 8 - 23 mg/dL Final      Creatinine Creatinine   Date Value Ref Range Status   01/31/2025 2.25 (H) 0.57 - 1.00 mg/dL Final   01/30/2025 2.52 (H) 0.57 - 1.00 mg/dL Final   01/29/2025 2.89 (H) 0.57 - 1.00 mg/dL Final      Calcium Calcium   Date Value Ref Range Status   01/31/2025 8.8 8.2 - 9.6 mg/dL Final   01/30/2025 8.4 8.2 - 9.6 mg/dL Final   01/29/2025 8.9 8.2 - 9.6 mg/dL Final   01/29/2025 8.9 " "8.2 - 9.6 mg/dL Final      PO4 No results found for: \"CAPO4\"   Albumin Albumin   Date Value Ref Range Status   01/30/2025 2.8 (L) 3.5 - 5.2 g/dL Final   01/29/2025 3.1 (L) 3.5 - 5.2 g/dL Final      Magnesium Magnesium   Date Value Ref Range Status   01/30/2025 2.4 (H) 1.7 - 2.3 mg/dL Final      Uric Acid No results found for: \"URICACID\"     Medication Review: Yes    Assessment & Plan       Pleural effusion on left    Essential hypertension    Chronic kidney disease, stage III (moderate)       Assessment & Plan    1.  CKD stage IV: Followed with [facility] nephrology Dr. Hernandez previous creatinine 2.15, with history of chronic hypertension.  Dr. Hernandez has discussed with the patient and the family in the past regards to dialysis and they have declined..  Medical management was done.  Current labs shows creatinine 2.90, BUN 75, sodium 133 potassium 4.8 bicarb 19  2.  Proteinuria: 4.9 g, patient has been not a candidate for kidney biopsy or aggressive intervention.  3.  Chronic diastolic heart failure failure.  4.  CKD bone and mineral disease. Last PTH ~ 84.Phos ~ 4.6  5.  Hypertension  6.  History of ovarian cancer in remission status post surgical removal.  7.  Progressive shortness of breath: Left pleural effusion. Found to have PNA on this admission.     Plan:  Continue with diuretics bumex 2 mg BID for optimization of volume status  Some improvement in renal function noted.   Daily evaluation for renal replacement therapy will be done.  No dialysis indicated at this time.  Adjust medication for the new GFR.  Case discussed with the medical staff taking care of the patient and the staff    Victoriano Webster MD  01/31/25  16:03 EST            "

## 2025-01-31 NOTE — PLAN OF CARE
"Goal Outcome Evaluation:  Plan of Care Reviewed With: patient, child (Daughter Areli present)        Progress: no change  Outcome Evaluation: Palliative consult for GOC/ACP, support to pt and family and hospice referral and discussion. Seen by Dr. ADIS Arboleda and Palliative RN at 1120, dtr Areli present. Code status updated per discussion with pt and dtr with review of LW on file. Dtr tearful, stated she is \"not ready.\" Would consider artificial nutrition depending on the circumstance. Dr. Arboleda mentioned that Hospice services may be an option if pt is unable to adequately participate in rehab/PT/OT; no consult at this time. Pt denied pain or dyspnea; observed increased work of breathing when talking, resolved at rest on O2nc 5L. Last BM documented 1/21/25, received bowel meds this morning; monitor, prn regimen available. Palliative following for continued support in ongoing GOC/POC discussion.       Problem: Palliative Care  Goal: Enhanced Quality of Life  Outcome: Progressing  Intervention: Maximize Comfort  Flowsheets (Taken 1/31/2025 1207)  Pain Management Interventions: (pt denied pain when asked; observed increased work of breathing when talking) other (see comments)  Intervention: Optimize Function  Flowsheets (Taken 1/31/2025 1207)  Fatigue Management:   frequent rest breaks encouraged   paced activity encouraged  Sleep/Rest Enhancement:   consistent schedule promoted   family presence promoted  Intervention: Promote Advance Care Planning  Flowsheets (Taken 1/31/2025 1207)  Life Transition/Adjustment: (code status adjusted)   palliative care discussed   palliative care initiated   other (see comments)  Intervention: Optimize Psychosocial Wellbeing  Flowsheets (Taken 1/31/2025 1207)  Supportive Measures:   active listening utilized   decision-making supported   positive reinforcement provided   goal-setting facilitated   verbalization of feelings encouraged  Grieving Process Facilitation: reaction to " loss explored  Spiritual Activities Assistance: (Spiritual Care consult) other (see comments)  Family/Support System Care:   caregiver stress acknowledged   involvement promoted   presence promoted   self-care encouraged   support provided

## 2025-02-01 ENCOUNTER — APPOINTMENT (OUTPATIENT)
Dept: GENERAL RADIOLOGY | Facility: HOSPITAL | Age: OVER 89
End: 2025-02-01
Payer: MEDICARE

## 2025-02-01 LAB
ANION GAP SERPL CALCULATED.3IONS-SCNC: 15 MMOL/L (ref 5–15)
BUN SERPL-MCNC: 65 MG/DL (ref 8–23)
BUN/CREAT SERPL: 28.5 (ref 7–25)
CALCIUM SPEC-SCNC: 8.7 MG/DL (ref 8.2–9.6)
CHLORIDE SERPL-SCNC: 94 MMOL/L (ref 98–107)
CO2 SERPL-SCNC: 28 MMOL/L (ref 22–29)
CREAT SERPL-MCNC: 2.28 MG/DL (ref 0.57–1)
EGFRCR SERPLBLD CKD-EPI 2021: 19 ML/MIN/1.73
GLUCOSE SERPL-MCNC: 112 MG/DL (ref 65–99)
POTASSIUM SERPL-SCNC: 4.1 MMOL/L (ref 3.5–5.2)
SODIUM SERPL-SCNC: 137 MMOL/L (ref 136–145)

## 2025-02-01 PROCEDURE — 25010000002 BUMETANIDE PER 0.5 MG: Performed by: INTERNAL MEDICINE

## 2025-02-01 PROCEDURE — 80048 BASIC METABOLIC PNL TOTAL CA: CPT | Performed by: HOSPITALIST

## 2025-02-01 PROCEDURE — 99232 SBSQ HOSP IP/OBS MODERATE 35: CPT | Performed by: HOSPITALIST

## 2025-02-01 PROCEDURE — 71045 X-RAY EXAM CHEST 1 VIEW: CPT

## 2025-02-01 RX ADMIN — POLYETHYLENE GLYCOL 3350 17 G: 17 POWDER, FOR SOLUTION ORAL at 08:14

## 2025-02-01 RX ADMIN — Medication 10 ML: at 08:14

## 2025-02-01 RX ADMIN — SENNOSIDES AND DOCUSATE SODIUM 2 TABLET: 50; 8.6 TABLET ORAL at 08:14

## 2025-02-01 RX ADMIN — HYDRALAZINE HYDROCHLORIDE 25 MG: 25 TABLET ORAL at 20:12

## 2025-02-01 RX ADMIN — Medication 10 ML: at 20:12

## 2025-02-01 RX ADMIN — BISACODYL 10 MG: 10 SUPPOSITORY RECTAL at 05:06

## 2025-02-01 RX ADMIN — BUMETANIDE 2 MG: 0.25 INJECTION INTRAMUSCULAR; INTRAVENOUS at 05:06

## 2025-02-01 RX ADMIN — BUMETANIDE 2 MG: 0.25 INJECTION INTRAMUSCULAR; INTRAVENOUS at 18:00

## 2025-02-01 RX ADMIN — APIXABAN 5 MG: 5 TABLET, FILM COATED ORAL at 08:14

## 2025-02-01 RX ADMIN — HYDRALAZINE HYDROCHLORIDE 25 MG: 25 TABLET ORAL at 08:14

## 2025-02-01 RX ADMIN — APIXABAN 5 MG: 5 TABLET, FILM COATED ORAL at 20:12

## 2025-02-01 NOTE — PROGRESS NOTES
[facility] Medicine Services  PROGRESS NOTE    Patient Name: Rose J Kellermann  : 1926  MRN: [id number]    Date of Admission: 2025  Primary Care Physician: Sam Hung MD    Subjective   Subjective     CC:  F/U dyspnea    HPI:  Seen this morning. She is down to 4 liters now. No complaints at this time.       Objective   Objective     Vital Signs:   Temp:  [97.4 °F (36.3 °C)-98 °F (36.7 °C)] 97.6 °F (36.4 °C)  Heart Rate:  [74-99] 92  Resp:  [14-16] 14  BP: (105-129)/(75-92) 122/92  Flow (L/min) (Oxygen Therapy):  [4-5] 4     Physical Exam:  Gen-ill and fatigued appearance  HENT-NCAT, mucous membranes moist  CV-irregular, S1 S2 normal, no m/r/g  Resp-decreased bilaterally, nonlabored currently  Abd-soft, NT, ND, +BS  Neuro-awake, alert, conversant, no focal deficits  Psych-appropriate mood  No change from 25      Results Reviewed:  LAB RESULTS:      Lab 25  0926 25  1259 25  1502 25  0838 25  0641   WBC 8.62 11.02* 10.09 9.99 10.29   HEMOGLOBIN 12.0 12.8 13.1 11.9* 11.9*   HEMATOCRIT 39.3 39.5 40.4 37.4 38.1   PLATELETS 220 272 291 244 243   NEUTROS ABS  --   --  8.52* 8.17* 8.41*   IMMATURE GRANS (ABS)  --   --  0.05 0.03 0.04   LYMPHS ABS  --   --  0.55* 0.64* 0.60*   MONOS ABS  --   --  0.82 0.91* 1.05*   EOS ABS  --   --  0.13 0.21 0.17   MCV 99.2* 93.4 93.3 95.9 96.9         Lab 25  0837 25  0926 25  1259 25  1404 25  0838   SODIUM 137 136 134* 137 133*   POTASSIUM 4.1 3.9 4.3 4.6 4.8   CHLORIDE 94* 97* 96* 98 97*   CO2 28.0 28.0 25.0 26.0 19.0*   ANION GAP 15.0 11.0 13.0 13.0 17.0*   BUN 65* 68* 72* 77* 75*   CREATININE 2.28* 2.25* 2.52* 2.89* 2.90*   EGFR 19.0* 19.3* 16.8* 14.3* 14.2*   GLUCOSE 112* 131* 152* 154* 134*   CALCIUM 8.7 8.8 8.4 8.9  8.9 8.6   MAGNESIUM  --   --  2.4*  --   --    PHOSPHORUS  --   --  4.1 4.6*  --          Lab 25  1259 25  1404   ALBUMIN 2.8* 3.1*                  Lab 01/29/25  1404   IRON 26*   IRON SATURATION (TSAT) 12*   TIBC 209*   TRANSFERRIN 140*         Lab 01/28/25  1552   PH, ARTERIAL 7.393   PCO2, ARTERIAL 35.2   PO2 .0*   FIO2 40   HCO3 ART 21.4   BASE EXCESS ART -2.9*   CARBOXYHEMOGLOBIN 1.1     Brief Urine Lab Results       None            Microbiology Results Abnormal       None            XR Chest 1 View    Result Date: 2/1/2025  XR CHEST 1 VW Date of Exam: 2/1/2025 12:26 AM EST Indication: Follow up L pleural effusion s/p Left pleural CT placement Comparison: Chest x-ray 1/31/2025 Findings: Low lung volumes. Cardiomegaly. Moderate bilateral pleural effusions. Septal thickening. Probable atelectasis right middle lobe.     Impression: Impression: No significant change from previous exam. Electronically Signed: Brenda Mackey MD  2/1/2025 8:00 AM EST  Workstation ID: [id number]    XR Chest 1 View    Result Date: 1/31/2025  XR CHEST 1 VW Date of Exam: 1/31/2025 4:48 AM EST Indication: Follow up L pleural effusion s/p Left pleural CT placement Comparison: Chest x-ray 1/30/2025 Findings: Indication of the exam states left chest tube placement, however no chest tube is seen. Stable cardiomegaly. Similar dense bibasilar opacities compatible with combination of atelectasis and layering pleural effusions. Similar diffuse interstitial prominence and perihilar vascular congestion consistent with a component of interstitial edema.  No pneumothorax.     Impression: Impression: Clinical indication of the exam states left chest tube placement, however no chest tube is seen. Similar appearance of interstitial pulmonary edema and hazy and dense bibasilar opacities likely reflecting combination of atelectasis and layering pleural effusions. Overall no significant interval change. Electronically Signed: Arya Vazquez MD  1/31/2025 8:09 AM EST  Workstation ID: [id number]    XR Chest 1 View    Result Date: 1/30/2025  XR CHEST 1 VW Date of Exam: 1/30/2025 11:03 PM  EST Indication: shortness of breath Comparison: Chest radiograph 1/30/2025 Findings: The heart is enlarged. There is pulmonary vascular congestion. There are large bilateral pleural fluid collections. There is bilateral basilar atelectasis. There is no pneumothorax. There are degenerative changes of both shoulders.     Impression: Impression: Cardiomegaly with pulmonary vascular congestion and large bilateral pleural fluid collections. Electronically Signed: Aj Ramos MD  1/30/2025 11:06 PM EST  Workstation ID: [id number]     Results for orders placed during the hospital encounter of 01/20/25    Adult Transthoracic Echo Complete W/ Cont if Necessary Per Protocol    Interpretation Summary    Left ventricular ejection fraction appears to be 61 - 65%.    Left ventricular diastolic function was indeterminate.    Mild aortic valve stenosis is present.    Peak velocity of the flow distal to the aortic valve is 245.6 cm/s. Aortic valve maximum pressure gradient is 24 mmHg. Aortic valve mean pressure gradient is 13 mmHg.    The mitral valve mean gradient is 4 mmHg.    Moderate tricuspid valve regurgitation is present.    Estimated right ventricular systolic pressure from tricuspid regurgitation is markedly elevated (>55 mmHg). Calculated right ventricular systolic pressure from tricuspid regurgitation is 57 mmHg.    There is a moderate 2cm  pericardial effusion with no tamponade    There is a left pleural effusion.      Current medications:  Scheduled Meds:apixaban, 5 mg, Oral, Q12H  bumetanide, 2 mg, Intravenous, Q12H  hydrALAZINE, 25 mg, Oral, BID  sodium chloride, 10 mL, Intravenous, Q12H      Continuous Infusions:   PRN Meds:.  acetaminophen    senna-docusate sodium **AND** polyethylene glycol **AND** bisacodyl **AND** bisacodyl    sodium chloride    sodium chloride    sodium chloride    Assessment & Plan   Assessment & Plan     Active Hospital Problems    Diagnosis  POA    **Pleural effusion on left [J90]  Yes     Essential hypertension [I10]  Yes    Chronic kidney disease, stage III (moderate)  [N18.30]  Yes      Resolved Hospital Problems   No resolved problems to display.        Brief Hospital Course to date:  Rose J Kellermann is a 89 y.o. female with history of DVT on Eliquis, HFpEF, HTN, and CKD IV who presents due to dyspnea and hypoxia. Had been wearing 2-3 liters home oxygen intermittently over the past several months, now requiring continuously. Workup in the ED notable for a left pleural effusion. Admitted for further management.     This patient's problems and plans were partially entered by my partner and updated as appropriate by me 02/01/25. Copied text in this note has been reviewed and is accurate as of today's date.     Progressive hypoxia, acute on chronic  Left pleural effusion  Acute on chronic HFpEF exacerbation  Valvular heart disease  Pulmonary HTN  Pericardial effusion  - CXR with moderate L pleural effusion  - Thoracentesis ordered, IR ended up placing a chest tube on the left 1/23/25 - consulted CT Surgery to manage, tube was pulled 1/26/25  - Pleural fluid appears mixed exudate and transudate?, cultures/cytology negative   - RRT 1/28/25 due to worsening dyspnea and hypoxia - IV diuresis resumed with improvement today, down to 4 liters (baseline 3 liters)  - Echo 1/28/25: EF 61-65%, moderate TR, RVSP >55 mmHg, moderate 2 cm pericardial effusion without tamponade, left pleural effusion  - Cardiology consulted 1/29/25: agree with diuresis, defer pericardiocentesis or pericardial window as suspect etiology of pericardial effusion may be multifactorial but in large part related to poor nutritional status/low protein  - Despite diuresis, she does not appear to be making much progress, discussed with patient/family, Palliative Care now following     ANGEL LUIS on CKD IV  - Baseline Cr ~1.8, initially 2.5 and trended up to 2.9  - Nephrology consulted, started IV Bumex 2 mg Q12H 1/28/25  - Repeat labs improved,  continue to monitor  - Patient does not want dialysis    Afib  - Cardiology following  - Has been persistent since admission but she appears asymptomatic from this  - Resumed Eliquis     History of DVT  - Resumed Eliquis     Hypertension  - Amlodipine held per Cardiology  - Continue Hydralazine    Goals of care  - Discussed with patient and daughter 1/31/25, agreeable to Palliative Care +/- Hospice referral  - DNR/DNI now  - Continue to discuss goals of care, for now will continue IV diuretics and monitor    Expected Discharge Location and Transportation: [facility]  Expected Discharge   Expected Discharge Date: 2/5/2025; Expected Discharge Time:      VTE Prophylaxis:  Pharmacologic & mechanical VTE prophylaxis orders are present.         AM-PAC 6 Clicks Score (PT): 10 (02/01/25 0800)    CODE STATUS:   Code Status and Medical Interventions: No CPR (Do Not Attempt to Resuscitate); Limited Support; No intubation (DNI), No dialysis   Ordered at: 01/31/25 1140     Medical Intervention Limits:    No intubation (DNI)    No dialysis     Level Of Support Discussed With:    Health Care Surrogate     Code Status (Patient has no pulse and is not breathing):    No CPR (Do Not Attempt to Resuscitate)     Medical Interventions (Patient has pulse or is breathing):    Limited Support       Yenifer Eid MD  02/01/25

## 2025-02-01 NOTE — PROGRESS NOTES
" LOS: 12 days   Patient Care Team:  Sam Hung MD as PCP - General  Laurie, Elver MCADAMS MD as Consulting Physician (Otolaryngology)    Chief Complaint: ANGEL LUIS on CKD stage IV  SOB    Subjective     Shortness of Breath        Subjective:  Symptoms:  Stable.  She reports shortness of breath.        History taken from: patient family    Objective     Vital Sign Min/Max for last 24 hours  Temp  Min: 97.4 °F (36.3 °C)  Max: 98 °F (36.7 °C)   BP  Min: 114/82  Max: 129/89   Pulse  Min: 74  Max: 99   Resp  Min: 14  Max: 16   SpO2  Min: 84 %  Max: 96 %   Flow (L/min) (Oxygen Therapy)  Min: 4  Max: 4   Weight  Min: 72.6 kg (160 lb)  Max: 72.6 kg (160 lb)     Flowsheet Rows      Flowsheet Row First Filed Value   Admission Height 162.6 cm (64\") Documented at 01/20/2025 1006   Admission Weight 71.7 kg (158 lb) Documented at 01/20/2025 1006            I/O this shift:  In: 500 [P.O.:500]  Out: -   I/O last 3 completed shifts:  In: -   Out: 2300 [Urine:2300]    Objective:  General Appearance:  Comfortable.    Vital signs: (most recent): Blood pressure 122/92, pulse 92, temperature 97.6 °F (36.4 °C), temperature source Oral, resp. rate 14, height 162.6 cm (64.02\"), weight 72.6 kg (160 lb), SpO2 93%, not currently breastfeeding.  Vital signs are normal.    Output: Producing urine.    HEENT: Normal HEENT exam.    Lungs:  Normal effort.    Heart: Normal rate.  Regular rhythm.  S1 normal and S2 normal.    Abdomen: Abdomen is soft.    Extremities: Normal range of motion.  There is no dependent edema or local swelling.    Pulses: Distal pulses are intact.    Neurological: Patient is alert and oriented to person, place and time.                Results Review:     I reviewed the patient's new clinical results.    WBC WBC   Date Value Ref Range Status   01/31/2025 8.62 3.40 - 10.80 10*3/mm3 Final   01/30/2025 11.02 (H) 3.40 - 10.80 10*3/mm3 Final      HGB Hemoglobin   Date Value Ref Range Status   01/31/2025 12.0 12.0 - 15.9 " "g/dL Final   01/30/2025 12.8 12.0 - 15.9 g/dL Final      HCT Hematocrit   Date Value Ref Range Status   01/31/2025 39.3 34.0 - 46.6 % Final   01/30/2025 39.5 34.0 - 46.6 % Final      Platlets No results found for: \"LABPLAT\"   MCV MCV   Date Value Ref Range Status   01/31/2025 99.2 (H) 79.0 - 97.0 fL Final   01/30/2025 93.4 79.0 - 97.0 fL Final          Sodium Sodium   Date Value Ref Range Status   02/01/2025 137 136 - 145 mmol/L Final   01/31/2025 136 136 - 145 mmol/L Final   01/30/2025 134 (L) 136 - 145 mmol/L Final      Potassium Potassium   Date Value Ref Range Status   02/01/2025 4.1 3.5 - 5.2 mmol/L Final   01/31/2025 3.9 3.5 - 5.2 mmol/L Final   01/30/2025 4.3 3.5 - 5.2 mmol/L Final     Comment:     Slight hemolysis detected by analyzer. Result may be falsely elevated.      Chloride Chloride   Date Value Ref Range Status   02/01/2025 94 (L) 98 - 107 mmol/L Final   01/31/2025 97 (L) 98 - 107 mmol/L Final   01/30/2025 96 (L) 98 - 107 mmol/L Final      CO2 CO2   Date Value Ref Range Status   02/01/2025 28.0 22.0 - 29.0 mmol/L Final   01/31/2025 28.0 22.0 - 29.0 mmol/L Final   01/30/2025 25.0 22.0 - 29.0 mmol/L Final      BUN BUN   Date Value Ref Range Status   02/01/2025 65 (H) 8 - 23 mg/dL Final   01/31/2025 68 (H) 8 - 23 mg/dL Final   01/30/2025 72 (H) 8 - 23 mg/dL Final      Creatinine Creatinine   Date Value Ref Range Status   02/01/2025 2.28 (H) 0.57 - 1.00 mg/dL Final   01/31/2025 2.25 (H) 0.57 - 1.00 mg/dL Final   01/30/2025 2.52 (H) 0.57 - 1.00 mg/dL Final      Calcium Calcium   Date Value Ref Range Status   02/01/2025 8.7 8.2 - 9.6 mg/dL Final   01/31/2025 8.8 8.2 - 9.6 mg/dL Final   01/30/2025 8.4 8.2 - 9.6 mg/dL Final      PO4 No results found for: \"CAPO4\"   Albumin Albumin   Date Value Ref Range Status   01/30/2025 2.8 (L) 3.5 - 5.2 g/dL Final      Magnesium Magnesium   Date Value Ref Range Status   01/30/2025 2.4 (H) 1.7 - 2.3 mg/dL Final      Uric Acid No results found for: \"URICACID\"     Medication " Review: Yes    Assessment & Plan       Pleural effusion on left    Essential hypertension    Chronic kidney disease, stage III (moderate)       Assessment & Plan    1.  CKD stage IV: Followed with [facility] nephrology Dr. Hernandez previous creatinine 2.15, with history of chronic hypertension.  Dr. Hernandez has discussed with the patient and the family in the past regards to dialysis and they have declined..  Medical management was done.  Current labs shows creatinine 2.90, BUN 75, sodium 133 potassium 4.8 bicarb 19  2.  Proteinuria: 4.9 g, patient has been not a candidate for kidney biopsy or aggressive intervention.  3.  Chronic diastolic heart failure failure.  4.  CKD bone and mineral disease. Last PTH ~ 84.Phos ~ 4.6  5.  Hypertension  6.  History of ovarian cancer in remission status post surgical removal.  7.  Progressive shortness of breath: Left pleural effusion. Found to have PNA on this admission.     Plan:  Continue with diuretics bumex 2 mg BID for optimization of volume status  Some improvement in renal function noted. Function back at baseline.  Adjust medication for the new GFR.  Case discussed with the medical staff taking care of the patient and the staff    Victoriano Webster MD  02/01/25  15:13 EST

## 2025-02-01 NOTE — PLAN OF CARE
Problem: Adult Inpatient Plan of Care  Goal: Plan of Care Review  Outcome: Progressing  Flowsheets  Taken 2/1/2025 1711 by Cecil Ramirez RN  Progress: no change  Outcome Evaluation: Patient has had a decent shift, sleeping on and off today. VSS, and patient has been alert and oriented to person and place. No signs of pain today. Nursing staff will continue to monitor and assess the patient.  Taken 2/1/2025 0414 by Maricruz Roberto RN  Plan of Care Reviewed With: patient  Goal: Patient-Specific Goal (Individualized)  Outcome: Progressing  Flowsheets (Taken 2/1/2025 1713)  Patient/Family-Specific Goals (Include Timeframe): Monitor and assess for pain q2hrs and prn  Goal: Absence of Hospital-Acquired Illness or Injury  Outcome: Progressing  Intervention: Identify and Manage Fall Risk  Recent Flowsheet Documentation  Taken 2/1/2025 1615 by Cecil Ramirez RN  Safety Promotion/Fall Prevention:   activity supervised   assistive device/personal items within reach   clutter free environment maintained   elopement precautions   fall prevention program maintained   gait belt   lighting adjusted   muscle strengthening facilitated   nonskid shoes/slippers when out of bed   room organization consistent   safety round/check completed   toileting scheduled  Intervention: Prevent Skin Injury  Recent Flowsheet Documentation  Taken 2/1/2025 1615 by Cecil Ramirez RN  Skin Protection:   drying agents applied   pectin skin barriers applied   pulse oximeter probe site changed   silicone foam dressing in place   transparent dressing maintained  Taken 2/1/2025 1001 by Cecil Ramirez RN  Body Position:   turned   right  Intervention: Prevent and Manage VTE (Venous Thromboembolism) Risk  Recent Flowsheet Documentation  Taken 2/1/2025 1615 by Cecil Ramirez RN  VTE Prevention/Management:   bilateral   SCDs (sequential compression devices) on  Intervention: Prevent Infection  Recent  Flowsheet Documentation  Taken 2/1/2025 1615 by Cecil Ramirez RN  Infection Prevention:   rest/sleep promoted   single patient room provided  Goal: Optimal Comfort and Wellbeing  Outcome: Progressing  Intervention: Provide Person-Centered Care  Recent Flowsheet Documentation  Taken 2/1/2025 1615 by Cecil Ramirez RN  Trust Relationship/Rapport:   care explained   choices provided   questions answered   questions encouraged  Goal: Readiness for Transition of Care  Outcome: Progressing     Problem: Skin Injury Risk Increased  Goal: Skin Health and Integrity  Outcome: Progressing  Intervention: Optimize Skin Protection  Recent Flowsheet Documentation  Taken 2/1/2025 1615 by Cecil Ramirez RN  Activity Management: activity encouraged  Pressure Reduction Techniques:   frequent weight shift encouraged   heels elevated off bed   weight shift assistance provided  Head of Bed (HOB) Positioning: [facility] [place] elevated  Pressure Reduction Devices:   heel offloading device utilized   positioning supports utilized   pressure-redistributing mattress utilized  Skin Protection:   drying agents applied   pectin skin barriers applied   pulse oximeter probe site changed   silicone foam dressing in place   transparent dressing maintained  Taken 2/1/2025 1001 by Cecil Ramirez RN  Head of Bed (HOB) Positioning: HOB elevated     Problem: Comorbidity Management  Goal: Maintenance of Heart Failure Symptom Control  Outcome: Progressing  Intervention: Maintain Heart Failure Management  Recent Flowsheet Documentation  Taken 2/1/2025 1615 by Cecil Ramirez RN  Medication Review/Management: medications reviewed  Goal: Blood Pressure in Desired Range  Outcome: Progressing  Intervention: Maintain Blood Pressure Management  Recent Flowsheet Documentation  Taken 2/1/2025 1615 by Cecil Ramirez RN  Medication Review/Management: medications reviewed     Problem: Fall Injury Risk  Goal:  Absence of Fall and Fall-Related Injury  Outcome: Progressing  Intervention: Identify and Manage Contributors  Recent Flowsheet Documentation  Taken 2/1/2025 1615 by Cecil Ramirez RN  Medication Review/Management: medications reviewed  Intervention: Promote Injury-Free Environment  Recent Flowsheet Documentation  Taken 2/1/2025 1615 by Cecil Ramirez RN  Safety Promotion/Fall Prevention:   activity supervised   assistive device/personal items within reach   clutter free environment maintained   elopement precautions   fall prevention program maintained   gait belt   lighting adjusted   muscle strengthening facilitated   nonskid shoes/slippers when out of bed   room organization consistent   safety round/check completed   toileting scheduled     Problem: Palliative Care  Goal: Enhanced Quality of Life  Outcome: Progressing  Intervention: Optimize Function  Recent Flowsheet Documentation  Taken 2/1/2025 1615 by Cecil Ramirez RN  Sensory Stimulation Regulation:   auditory stimulation minimized   care clustered   lighting decreased  Sleep/Rest Enhancement:   awakenings minimized   noise level reduced   regular sleep/rest pattern promoted   relaxation techniques promoted  Intervention: Optimize Psychosocial Wellbeing  Recent Flowsheet Documentation  Taken 2/1/2025 1615 by Cecil Ramirez RN  Supportive Measures:   active listening utilized   goal-setting facilitated  Family/Support System Care:   self-care encouraged   support provided   Goal Outcome Evaluation:           Progress: no change  Outcome Evaluation: Patient has had a decent shift, sleeping on and off today. VSS, and patient has been alert and oriented to person and place. No signs of pain today. Nursing staff will continue to monitor and assess the patient.

## 2025-02-01 NOTE — PLAN OF CARE
Goal Outcome Evaluation:  Plan of Care Reviewed With: patient        Progress: no change  Outcome Evaluation: VSS, on 4L NC. pallative consulted yesterday. chest tube site is still leaking serous fluid, xeroform and mepliex applied. pt maintained O2 sat >90 on 4L. Pt rested well through the night.

## 2025-02-02 ENCOUNTER — APPOINTMENT (OUTPATIENT)
Dept: GENERAL RADIOLOGY | Facility: HOSPITAL | Age: OVER 89
End: 2025-02-02
Payer: MEDICARE

## 2025-02-02 PROBLEM — I27.20 PULMONARY HYPERTENSION: Status: ACTIVE | Noted: 2025-02-02

## 2025-02-02 PROBLEM — I50.33 ACUTE ON CHRONIC HEART FAILURE WITH PRESERVED EJECTION FRACTION (HFPEF): Status: ACTIVE | Noted: 2023-08-10

## 2025-02-02 LAB
ANION GAP SERPL CALCULATED.3IONS-SCNC: 13 MMOL/L (ref 5–15)
BUN SERPL-MCNC: 68 MG/DL (ref 8–23)
BUN/CREAT SERPL: 27.9 (ref 7–25)
CALCIUM SPEC-SCNC: 8.8 MG/DL (ref 8.2–9.6)
CHLORIDE SERPL-SCNC: 95 MMOL/L (ref 98–107)
CO2 SERPL-SCNC: 27 MMOL/L (ref 22–29)
CREAT SERPL-MCNC: 2.44 MG/DL (ref 0.57–1)
EGFRCR SERPLBLD CKD-EPI 2021: 17.5 ML/MIN/1.73
GLUCOSE SERPL-MCNC: 116 MG/DL (ref 65–99)
POTASSIUM SERPL-SCNC: 4.3 MMOL/L (ref 3.5–5.2)
SODIUM SERPL-SCNC: 135 MMOL/L (ref 136–145)

## 2025-02-02 PROCEDURE — 71045 X-RAY EXAM CHEST 1 VIEW: CPT

## 2025-02-02 PROCEDURE — 80048 BASIC METABOLIC PNL TOTAL CA: CPT | Performed by: HOSPITALIST

## 2025-02-02 PROCEDURE — 25010000002 BUMETANIDE PER 0.5 MG: Performed by: INTERNAL MEDICINE

## 2025-02-02 PROCEDURE — 99232 SBSQ HOSP IP/OBS MODERATE 35: CPT | Performed by: HOSPITALIST

## 2025-02-02 RX ADMIN — HYDRALAZINE HYDROCHLORIDE 25 MG: 25 TABLET ORAL at 09:33

## 2025-02-02 RX ADMIN — BUMETANIDE 2 MG: 0.25 INJECTION INTRAMUSCULAR; INTRAVENOUS at 17:45

## 2025-02-02 RX ADMIN — HYDRALAZINE HYDROCHLORIDE 25 MG: 25 TABLET ORAL at 21:39

## 2025-02-02 RX ADMIN — APIXABAN 5 MG: 5 TABLET, FILM COATED ORAL at 09:33

## 2025-02-02 RX ADMIN — BUMETANIDE 2 MG: 0.25 INJECTION INTRAMUSCULAR; INTRAVENOUS at 05:12

## 2025-02-02 RX ADMIN — APIXABAN 5 MG: 5 TABLET, FILM COATED ORAL at 21:39

## 2025-02-02 RX ADMIN — ACETAMINOPHEN 650 MG: 325 TABLET ORAL at 10:36

## 2025-02-02 RX ADMIN — Medication 10 ML: at 09:33

## 2025-02-02 NOTE — PROGRESS NOTES
[facility] Medicine Services  PROGRESS NOTE    Patient Name: Rose J Kellermann  : 1926  MRN: [id number]    Date of Admission: 2025  Primary Care Physician: Sam Hung MD    Subjective   Subjective     CC:  F/U dyspnea    HPI:  No complaints this morning, feels much less SOA.     Objective   Objective     Vital Signs:   Temp:  [97.2 °F (36.2 °C)-98.2 °F (36.8 °C)] 97.3 °F (36.3 °C)  Heart Rate:  [84-98] 89  Resp:  [12-16] 12  BP: (118-141)/(82-96) 132/91  Flow (L/min) (Oxygen Therapy):  [4] 4     Physical Exam:  Gen-ill and fatigued appearance although looks a little better today  HENT-NCAT, mucous membranes moist  CV-irregular, S1 S2 normal, no m/r/g  Resp-decreased bilaterally, nonlabored currently on 4 liters  Abd-soft, NT, ND, +BS  Neuro-awake, alert, conversant, no focal deficits  Psych-appropriate mood      Results Reviewed:  LAB RESULTS:      Lab 25  0926 25  1259 25  1502 25  0838 25  0641   WBC 8.62 11.02* 10.09 9.99 10.29   HEMOGLOBIN 12.0 12.8 13.1 11.9* 11.9*   HEMATOCRIT 39.3 39.5 40.4 37.4 38.1   PLATELETS 220 272 291 244 243   NEUTROS ABS  --   --  8.52* 8.17* 8.41*   IMMATURE GRANS (ABS)  --   --  0.05 0.03 0.04   LYMPHS ABS  --   --  0.55* 0.64* 0.60*   MONOS ABS  --   --  0.82 0.91* 1.05*   EOS ABS  --   --  0.13 0.21 0.17   MCV 99.2* 93.4 93.3 95.9 96.9         Lab 25  0837 25  0926 25  1259 25  1404 25  0838   SODIUM 137 136 134* 137 133*   POTASSIUM 4.1 3.9 4.3 4.6 4.8   CHLORIDE 94* 97* 96* 98 97*   CO2 28.0 28.0 25.0 26.0 19.0*   ANION GAP 15.0 11.0 13.0 13.0 17.0*   BUN 65* 68* 72* 77* 75*   CREATININE 2.28* 2.25* 2.52* 2.89* 2.90*   EGFR 19.0* 19.3* 16.8* 14.3* 14.2*   GLUCOSE 112* 131* 152* 154* 134*   CALCIUM 8.7 8.8 8.4 8.9  8.9 8.6   MAGNESIUM  --   --  2.4*  --   --    PHOSPHORUS  --   --  4.1 4.6*  --          Lab 25  1259 25  1404   ALBUMIN 2.8* 3.1*                  Lab 01/29/25  1404   IRON 26*   IRON SATURATION (TSAT) 12*   TIBC 209*   TRANSFERRIN 140*         Lab 01/28/25  1552   PH, ARTERIAL 7.393   PCO2, ARTERIAL 35.2   PO2 .0*   FIO2 40   HCO3 ART 21.4   BASE EXCESS ART -2.9*   CARBOXYHEMOGLOBIN 1.1     Brief Urine Lab Results       None            Microbiology Results Abnormal       None            XR Chest 1 View    Result Date: 2/2/2025  XR CHEST 1 VW Date of Exam: 2/2/2025 5:55 AM EST Indication: Follow up L pleural effusion s/p Left pleural CT placement Comparison: Chest radiograph 2/1/2025 Findings: Stable cardiomediastinal silhouette including cardiomegaly. Moderate right probably small to moderate left pleural effusions without significant change with adjacent compressive atelectasis versus airspace disease. Minimal interstitial thickening which could reflect vascular congestion or edema similar to prior. No worsening consolidation or pneumothorax. Degenerative related osseous change. Lung apices partially obscured by patient's chin.     Impression: Impression: No significant interval change. Electronically Signed: Juice Martinez MD  2/2/2025 7:50 AM EST  Workstation ID: [id number]    XR Chest 1 View    Result Date: 2/1/2025  XR CHEST 1 VW Date of Exam: 2/1/2025 12:26 AM EST Indication: Follow up L pleural effusion s/p Left pleural CT placement Comparison: Chest x-ray 1/31/2025 Findings: Low lung volumes. Cardiomegaly. Moderate bilateral pleural effusions. Septal thickening. Probable atelectasis right middle lobe.     Impression: Impression: No significant change from previous exam. Electronically Signed: Brenda Mackey MD  2/1/2025 8:00 AM EST  Workstation ID: [id number]     Results for orders placed during the hospital encounter of 01/20/25    Adult Transthoracic Echo Complete W/ Cont if Necessary Per Protocol    Interpretation Summary    Left ventricular ejection fraction appears to be 61 - 65%.    Left ventricular diastolic function was  indeterminate.    Mild aortic valve stenosis is present.    Peak velocity of the flow distal to the aortic valve is 245.6 cm/s. Aortic valve maximum pressure gradient is 24 mmHg. Aortic valve mean pressure gradient is 13 mmHg.    The mitral valve mean gradient is 4 mmHg.    Moderate tricuspid valve regurgitation is present.    Estimated right ventricular systolic pressure from tricuspid regurgitation is markedly elevated (>55 mmHg). Calculated right ventricular systolic pressure from tricuspid regurgitation is 57 mmHg.    There is a moderate 2cm  pericardial effusion with no tamponade    There is a left pleural effusion.      Current medications:  Scheduled Meds:apixaban, 5 mg, Oral, Q12H  bumetanide, 2 mg, Intravenous, Q12H  hydrALAZINE, 25 mg, Oral, BID  sodium chloride, 10 mL, Intravenous, Q12H      Continuous Infusions:   PRN Meds:.  acetaminophen    senna-docusate sodium **AND** polyethylene glycol **AND** bisacodyl **AND** bisacodyl    sodium chloride    sodium chloride    sodium chloride    Assessment & Plan   Assessment & Plan     Active Hospital Problems    Diagnosis  POA    **Pleural effusion on left [J90]  Yes    Pulmonary hypertension [I27.20]  Yes    Acute on chronic heart failure with preserved ejection fraction (HFpEF) [I50.33]  Yes    Essential hypertension [I10]  Yes    Chronic kidney disease, stage III (moderate)  [N18.30]  Yes      Resolved Hospital Problems   No resolved problems to display.        Brief Hospital Course to date:  Rose J Kellermann is a 89 y.o. female with history of DVT on Eliquis, HFpEF, HTN, and CKD IV who presents due to dyspnea and hypoxia. Had been wearing 2-3 liters home oxygen intermittently over the past several months, now requiring continuously. Workup in the ED notable for a left pleural effusion. Admitted for further management.     This patient's problems and plans were partially entered by my partner and updated as appropriate by me 02/02/25. Copied text in this  note has been reviewed and is accurate as of today's date.     Progressive hypoxia, acute on chronic  Left pleural effusion  Acute on chronic HFpEF exacerbation  Valvular heart disease  Pulmonary HTN  Pericardial effusion  - CXR with moderate L pleural effusion  - Thoracentesis ordered, IR ended up placing a chest tube on the left 1/23/25 - consulted CT Surgery to manage, tube was pulled 1/26/25  - Pleural fluid appears mixed exudate and transudate?, cultures/cytology negative   - RRT 1/28/25 due to worsening dyspnea and hypoxia - IV diuresis resumed with improvement   - Echo 1/28/25: EF 61-65%, moderate TR, RVSP >55 mmHg, moderate 2 cm pericardial effusion without tamponade, left pleural effusion  - Cardiology consulted 1/29/25: agree with diuresis, defer pericardiocentesis or pericardial window as suspect etiology of pericardial effusion may be multifactorial but in large part related to poor nutritional status/low protein  - Despite diuresis, she did not appear to be making much progress, discussed with patient/family, Palliative Care now following  - Today she is on 4 liters (3 liters is her baseline) and looks to be doing a little better, continue IV Bumex per Nephrology     ANGEL LUIS on CKD IV  - Baseline Cr ~1.8, initially 2.5 and trended up to 2.9  - Nephrology consulted, started IV Bumex 2 mg Q12H - continue  - Repeat labs initially improved but trending back up a bit today, continue to monitor closely  - Patient does not want dialysis    Afib/Aflutter, new?   Hx of PAC's  - Cardiology following, not documented previously, does have hx of PAC's  - Afib/Aflutter been persistent since admission but she appears asymptomatic from this, rate controlled currently  - No beta blocker due to significant pauses and bradycardia on Holter monitor June 2023 - Bisoprolol was stopped at that time  - Continue Eliquis     History of BLE DVT (June 2023)  - Continue Eliquis     Hypertension  - Amlodipine held per Cardiology  -  Continue Hydralazine    Goals of care  - Discussed with patient and daughter 1/31/25, agreeable to Palliative Care consult  - DNR/DNI now  - Continue to discuss goals of care, for now will continue IV diuretics and monitor    Expected Discharge Location and Transportation: SNF  Expected Discharge she appears to be improving, hopefully will be able to go to SNF this week  Expected Discharge Date: 2/5/2025; Expected Discharge Time:      VTE Prophylaxis:  Pharmacologic & mechanical VTE prophylaxis orders are present.         AM-PAC 6 Clicks Score (PT): 10 (02/02/25 0800)    CODE STATUS:   Code Status and Medical Interventions: No CPR (Do Not Attempt to Resuscitate); Limited Support; No intubation (DNI), No dialysis   Ordered at: 01/31/25 1140     Medical Intervention Limits:    No intubation (DNI)    No dialysis     Level Of Support Discussed With:    Health Care Surrogate     Code Status (Patient has no pulse and is not breathing):    No CPR (Do Not Attempt to Resuscitate)     Medical Interventions (Patient has pulse or is breathing):    Limited Support       Yenifer Eid MD  02/02/25

## 2025-02-02 NOTE — PLAN OF CARE
Problem: Adult Inpatient Plan of Care  Goal: Plan of Care Review  Outcome: Progressing  Flowsheets (Taken 2/2/2025 1542)  Progress: no change  Outcome Evaluation: Patient has had a decent shift, awake for most of today. VSS, and patient has been alert and oriented to self only. Patient has taken tylenol once for generalized pain. Daughter at bedside. Nursing staff will continue to monitor and assess the patient.  Plan of Care Reviewed With: patient  Goal: Patient-Specific Goal (Individualized)  Outcome: Progressing  Flowsheets (Taken 2/2/2025 1543)  Patient/Family-Specific Goals (Include Timeframe): Monitor and assess for pain q2hrs and prn  Goal: Absence of Hospital-Acquired Illness or Injury  Outcome: Progressing  Intervention: Identify and Manage Fall Risk  Recent Flowsheet Documentation  Taken 2/2/2025 1400 by Cecil Ramirez, RN  Safety Promotion/Fall Prevention:   activity supervised   assistive device/personal items within reach   clutter free environment maintained   elopement precautions   fall prevention program maintained   gait belt   lighting adjusted   muscle strengthening facilitated   nonskid shoes/slippers when out of bed   room organization consistent   safety round/check completed   toileting scheduled  Taken 2/2/2025 1200 by Cecil Ramirez, RN  Safety Promotion/Fall Prevention:   activity supervised   assistive device/personal items within reach   clutter free environment maintained   elopement precautions   fall prevention program maintained   gait belt   lighting adjusted   muscle strengthening facilitated   nonskid shoes/slippers when out of bed   room organization consistent   safety round/check completed   toileting scheduled  Taken 2/2/2025 1000 by Cecil Ramirez, RN  Safety Promotion/Fall Prevention:   activity supervised   assistive device/personal items within reach   clutter free environment maintained   elopement precautions   fall prevention program  maintained   gait belt   lighting adjusted   muscle strengthening facilitated   nonskid shoes/slippers when out of bed   room organization consistent   safety round/check completed   toileting scheduled  Taken 2/2/2025 0800 by Cecil Ramirez RN  Safety Promotion/Fall Prevention:   activity supervised   assistive device/personal items within reach   clutter free environment maintained   elopement precautions   fall prevention program maintained   gait belt   lighting adjusted   muscle strengthening facilitated   nonskid shoes/slippers when out of bed   room organization consistent   safety round/check completed   toileting scheduled  Intervention: Prevent Skin Injury  Recent Flowsheet Documentation  Taken 2/2/2025 1400 by Cecil Ramirez RN  Body Position: (Up to chair) other (see comments)  Skin Protection:   drying agents applied   incontinence pads utilized   pulse oximeter probe site changed   silicone foam dressing in place   transparent dressing maintained  Taken 2/2/2025 1200 by Cecil Ramirez RN  Body Position: (Up to chair) other (see comments)  Skin Protection:   drying agents applied   incontinence pads utilized   pulse oximeter probe site changed   silicone foam dressing in place   transparent dressing maintained  Taken 2/2/2025 1000 by Cecil Ramirez RN  Skin Protection:   drying agents applied   incontinence pads utilized   pulse oximeter probe site changed   silicone foam dressing in place   transparent dressing maintained  Taken 2/2/2025 0800 by Cecil Ramirez RN  Skin Protection: (Silicone dressings on bilateral heels and coccyx pulled back to assess skin integrity, and replaced)   drying agents applied   incontinence pads utilized   pulse oximeter probe site changed   silicone foam dressing in place   transparent dressing maintained  Intervention: Prevent and Manage VTE (Venous Thromboembolism) Risk  Recent Flowsheet Documentation  Taken  2/2/2025 0800 by Cecil Ramirez RN  VTE Prevention/Management:   bilateral   SCDs (sequential compression devices) on  Intervention: Prevent Infection  Recent Flowsheet Documentation  Taken 2/2/2025 1400 by Cecil Ramirez RN  Infection Prevention:   rest/sleep promoted   single patient room provided  Taken 2/2/2025 1200 by Cecil Ramirez RN  Infection Prevention:   rest/sleep promoted   single patient room provided  Taken 2/2/2025 1000 by Cecil Ramirez RN  Infection Prevention:   rest/sleep promoted   single patient room provided  Taken 2/2/2025 0800 by Cecil Ramirez RN  Infection Prevention:   rest/sleep promoted   single patient room provided  Goal: Optimal Comfort and Wellbeing  Outcome: Progressing  Intervention: Provide Person-Centered Care  Recent Flowsheet Documentation  Taken 2/2/2025 0800 by Cecil Ramirez RN  Trust Relationship/Rapport:   care explained   choices provided   questions answered   questions encouraged  Goal: Readiness for Transition of Care  Outcome: Progressing     Problem: Skin Injury Risk Increased  Goal: Skin Health and Integrity  Outcome: Progressing  Intervention: Optimize Skin Protection  Recent Flowsheet Documentation  Taken 2/2/2025 1400 by Cecil Ramirez RN  Activity Management:   up in chair   activity encouraged  Pressure Reduction Techniques:   frequent weight shift encouraged   heels elevated off bed   weight shift assistance provided  Head of Bed (HOB) Positioning: (Up to chair) other (see comments)  Pressure Reduction Devices:   foam padding utilized   heel offloading device utilized   positioning supports utilized   pressure-redistributing mattress utilized  Skin Protection:   drying agents applied   incontinence pads utilized   pulse oximeter probe site changed   silicone foam dressing in place   transparent dressing maintained  Taken 2/2/2025 1200 by Cecil Ramirez RN  Activity  Management:   up in chair   activity encouraged  Pressure Reduction Techniques:   frequent weight shift encouraged   heels elevated off bed   sit time limited to 2 hours  Head of Bed (HOB) Positioning: (Up to chair) other (see comments)  Pressure Reduction Devices:   foam padding utilized   heel offloading device utilized   positioning supports utilized   pressure-redistributing mattress utilized   specialty bed utilized  Skin Protection:   drying agents applied   incontinence pads utilized   pulse oximeter probe site changed   silicone foam dressing in place   transparent dressing maintained  Taken 2/2/2025 1000 by Cecil Ramirez RN  Activity Management: activity encouraged  Pressure Reduction Techniques:   frequent weight shift encouraged   heels elevated off bed   weight shift assistance provided  Head of Bed (HOB) Positioning: hospitals elevated  Pressure Reduction Devices:   foam padding utilized   heel offloading device utilized   positioning supports utilized   pressure-redistributing mattress utilized   specialty bed utilized  Skin Protection:   drying agents applied   incontinence pads utilized   pulse oximeter probe site changed   silicone foam dressing in place   transparent dressing maintained  Taken 2/2/2025 0800 by Cecil Ramirez RN  Activity Management: activity encouraged  Pressure Reduction Techniques:   frequent weight shift encouraged   heels elevated off bed   weight shift assistance provided  Head of Bed (HOB) Positioning: HOB elevated  Pressure Reduction Devices:   foam padding utilized   heel offloading device utilized   positioning supports utilized   pressure-redistributing mattress utilized   specialty bed utilized  Skin Protection: (Silicone dressings on bilateral heels and coccyx pulled back to assess skin integrity, and replaced)   drying agents applied   incontinence pads utilized   pulse oximeter probe site changed   silicone foam dressing in place   transparent  dressing maintained     Problem: Comorbidity Management  Goal: Maintenance of Heart Failure Symptom Control  Outcome: Progressing  Intervention: Maintain Heart Failure Management  Recent Flowsheet Documentation  Taken 2/2/2025 1400 by Cecil Ramirez RN  Medication Review/Management: medications reviewed  Taken 2/2/2025 1200 by Cecil Ramirez RN  Medication Review/Management: medications reviewed  Taken 2/2/2025 1000 by Cecil Ramirez RN  Medication Review/Management: medications reviewed  Taken 2/2/2025 0800 by Cecil Ramirez RN  Medication Review/Management: medications reviewed  Goal: Blood Pressure in Desired Range  Outcome: Progressing  Intervention: Maintain Blood Pressure Management  Recent Flowsheet Documentation  Taken 2/2/2025 1400 by Cecil Ramirez RN  Medication Review/Management: medications reviewed  Taken 2/2/2025 1200 by Cecil Ramirez RN  Medication Review/Management: medications reviewed  Taken 2/2/2025 1000 by Cecil Ramirez RN  Medication Review/Management: medications reviewed  Taken 2/2/2025 0800 by Cecli Ramirez RN  Medication Review/Management: medications reviewed     Problem: Fall Injury Risk  Goal: Absence of Fall and Fall-Related Injury  Outcome: Progressing  Intervention: Identify and Manage Contributors  Recent Flowsheet Documentation  Taken 2/2/2025 1400 by Cecil Ramirez RN  Medication Review/Management: medications reviewed  Taken 2/2/2025 1200 by Cecil Ramirez RN  Medication Review/Management: medications reviewed  Taken 2/2/2025 1000 by Cecil Ramirez RN  Medication Review/Management: medications reviewed  Taken 2/2/2025 0800 by Cecil Ramirez RN  Medication Review/Management: medications reviewed  Intervention: Promote Injury-Free Environment  Recent Flowsheet Documentation  Taken 2/2/2025 1400 by Cecil Ramirez RN  Safety  Promotion/Fall Prevention:   activity supervised   assistive device/personal items within reach   clutter free environment maintained   elopement precautions   fall prevention program maintained   gait belt   lighting adjusted   muscle strengthening facilitated   nonskid shoes/slippers when out of bed   room organization consistent   safety round/check completed   toileting scheduled  Taken 2/2/2025 1200 by Cecil Ramirez, RN  Safety Promotion/Fall Prevention:   activity supervised   assistive device/personal items within reach   clutter free environment maintained   elopement precautions   fall prevention program maintained   gait belt   lighting adjusted   muscle strengthening facilitated   nonskid shoes/slippers when out of bed   room organization consistent   safety round/check completed   toileting scheduled  Taken 2/2/2025 1000 by Cecil Ramirez, RN  Safety Promotion/Fall Prevention:   activity supervised   assistive device/personal items within reach   clutter free environment maintained   elopement precautions   fall prevention program maintained   gait belt   lighting adjusted   muscle strengthening facilitated   nonskid shoes/slippers when out of bed   room organization consistent   safety round/check completed   toileting scheduled  Taken 2/2/2025 0800 by Cecil Ramirez, RN  Safety Promotion/Fall Prevention:   activity supervised   assistive device/personal items within reach   clutter free environment maintained   elopement precautions   fall prevention program maintained   gait belt   lighting adjusted   muscle strengthening facilitated   nonskid shoes/slippers when out of bed   room organization consistent   safety round/check completed   toileting scheduled     Problem: Palliative Care  Goal: Enhanced Quality of Life  Outcome: Progressing  Intervention: Optimize Function  Recent Flowsheet Documentation  Taken 2/2/2025 0800 by Cecil Ramirez, LEANNA  Sensory  Stimulation Regulation:   auditory stimulation minimized   care clustered   lighting decreased  Sleep/Rest Enhancement:   awakenings minimized   noise level reduced   regular sleep/rest pattern promoted   relaxation techniques promoted  Intervention: Optimize Psychosocial Wellbeing  Recent Flowsheet Documentation  Taken 2/2/2025 0800 by Cecil Ramirez RN  Supportive Measures:   active listening utilized   goal-setting facilitated  Family/Support System Care:   self-care encouraged   support provided   Goal Outcome Evaluation:  Plan of Care Reviewed With: patient        Progress: no change  Outcome Evaluation: Patient has had a decent shift, awake for most of today. VSS, and patient has been alert and oriented to self only. Patient has taken tylenol once for generalized pain. Daughter at bedside. Nursing staff will continue to monitor and assess the patient.

## 2025-02-02 NOTE — PROGRESS NOTES
" LOS: 13 days   Patient Care Team:  Sam Hung MD as PCP - General  Laurie, Elver MCADAMS MD as Consulting Physician (Otolaryngology)    Chief Complaint: ANGEL LUIS on CKD stage IV  SOB    Subjective     History of Present IllnessSitting in chair. Appears weak and lethargic     Subjective:  Symptoms:  Stable.        History taken from: patient family    Objective     Vital Sign Min/Max for last 24 hours  Temp  Min: 97.2 °F (36.2 °C)  Max: 98.2 °F (36.8 °C)   BP  Min: 118/84  Max: 141/96   Pulse  Min: 84  Max: 98   Resp  Min: 12  Max: 16   SpO2  Min: 91 %  Max: 96 %   Flow (L/min) (Oxygen Therapy)  Min: 4  Max: 4   Weight  Min: 71.1 kg (156 lb 12.8 oz)  Max: 71.1 kg (156 lb 12.8 oz)     Flowsheet Rows      Flowsheet Row First Filed Value   Admission Height 162.6 cm (64\") Documented at 01/20/2025 1006   Admission Weight 71.7 kg (158 lb) Documented at 01/20/2025 1006            I/O this shift:  In: 460 [P.O.:460]  Out: -   I/O last 3 completed shifts:  In: 500 [P.O.:500]  Out: 950 [Urine:950]    Objective:  General Appearance:  Comfortable.    Vital signs: (most recent): Blood pressure 132/91, pulse 89, temperature 97.3 °F (36.3 °C), temperature source Oral, resp. rate 12, height 162.6 cm (64.02\"), weight 71.1 kg (156 lb 12.8 oz), SpO2 93%, not currently breastfeeding.  Vital signs are normal.    Output: Producing urine.    HEENT: Normal HEENT exam.    Lungs:  Normal effort.    Heart: Normal rate.  Regular rhythm.  S1 normal and S2 normal.    Abdomen: Abdomen is soft.    Extremities: Normal range of motion.  There is no dependent edema or local swelling.    Pulses: Distal pulses are intact.    Neurological: Patient is alert and oriented to person, place and time.                Results Review:     I reviewed the patient's new clinical results.    WBC WBC   Date Value Ref Range Status   01/31/2025 8.62 3.40 - 10.80 10*3/mm3 Final   01/30/2025 11.02 (H) 3.40 - 10.80 10*3/mm3 Final      HGB Hemoglobin   Date Value " "Ref Range Status   01/31/2025 12.0 12.0 - 15.9 g/dL Final   01/30/2025 12.8 12.0 - 15.9 g/dL Final      HCT Hematocrit   Date Value Ref Range Status   01/31/2025 39.3 34.0 - 46.6 % Final   01/30/2025 39.5 34.0 - 46.6 % Final      Platlets No results found for: \"LABPLAT\"   MCV MCV   Date Value Ref Range Status   01/31/2025 99.2 (H) 79.0 - 97.0 fL Final   01/30/2025 93.4 79.0 - 97.0 fL Final          Sodium Sodium   Date Value Ref Range Status   02/02/2025 135 (L) 136 - 145 mmol/L Final   02/01/2025 137 136 - 145 mmol/L Final   01/31/2025 136 136 - 145 mmol/L Final   01/30/2025 134 (L) 136 - 145 mmol/L Final      Potassium Potassium   Date Value Ref Range Status   02/02/2025 4.3 3.5 - 5.2 mmol/L Final     Comment:     Slight hemolysis detected by analyzer. Result may be falsely elevated.   02/01/2025 4.1 3.5 - 5.2 mmol/L Final   01/31/2025 3.9 3.5 - 5.2 mmol/L Final   01/30/2025 4.3 3.5 - 5.2 mmol/L Final     Comment:     Slight hemolysis detected by analyzer. Result may be falsely elevated.      Chloride Chloride   Date Value Ref Range Status   02/02/2025 95 (L) 98 - 107 mmol/L Final   02/01/2025 94 (L) 98 - 107 mmol/L Final   01/31/2025 97 (L) 98 - 107 mmol/L Final   01/30/2025 96 (L) 98 - 107 mmol/L Final      CO2 CO2   Date Value Ref Range Status   02/02/2025 27.0 22.0 - 29.0 mmol/L Final   02/01/2025 28.0 22.0 - 29.0 mmol/L Final   01/31/2025 28.0 22.0 - 29.0 mmol/L Final   01/30/2025 25.0 22.0 - 29.0 mmol/L Final      BUN BUN   Date Value Ref Range Status   02/02/2025 68 (H) 8 - 23 mg/dL Final   02/01/2025 65 (H) 8 - 23 mg/dL Final   01/31/2025 68 (H) 8 - 23 mg/dL Final   01/30/2025 72 (H) 8 - 23 mg/dL Final      Creatinine Creatinine   Date Value Ref Range Status   02/02/2025 2.44 (H) 0.57 - 1.00 mg/dL Final   02/01/2025 2.28 (H) 0.57 - 1.00 mg/dL Final   01/31/2025 2.25 (H) 0.57 - 1.00 mg/dL Final   01/30/2025 2.52 (H) 0.57 - 1.00 mg/dL Final      Calcium Calcium   Date Value Ref Range Status   02/02/2025 8.8 " "8.2 - 9.6 mg/dL Final   02/01/2025 8.7 8.2 - 9.6 mg/dL Final   01/31/2025 8.8 8.2 - 9.6 mg/dL Final   01/30/2025 8.4 8.2 - 9.6 mg/dL Final      PO4 No results found for: \"CAPO4\"   Albumin Albumin   Date Value Ref Range Status   01/30/2025 2.8 (L) 3.5 - 5.2 g/dL Final      Magnesium Magnesium   Date Value Ref Range Status   01/30/2025 2.4 (H) 1.7 - 2.3 mg/dL Final      Uric Acid No results found for: \"URICACID\"     Medication Review: Yes    Assessment & Plan       Pleural effusion on left    Essential hypertension    Chronic kidney disease, stage III (moderate)     Acute on chronic heart failure with preserved ejection fraction (HFpEF)    Pulmonary hypertension      Assessment & Plan    1.  CKD stage IV: Followed with [facility] nephrology Dr. Hernandez previous creatinine 2.15, with history of chronic hypertension.  Dr. Hernandez has discussed with the patient and the family in the past regards to dialysis and they have declined..  Medical management was done.  Current labs shows creatinine 2.90, BUN 75, sodium 133 potassium 4.8 bicarb 19  2.  Proteinuria: 4.9 g, patient has been not a candidate for kidney biopsy or aggressive intervention.  3.  Chronic diastolic heart failure failure.  4.  CKD bone and mineral disease. Last PTH ~ 84.Phos ~ 4.6  5.  Hypertension  6.  History of ovarian cancer in remission status post surgical removal.  7.  Progressive shortness of breath: Left pleural effusion. Found to have PNA on this admission.     Plan:  Continue with diuretics bumex 2 mg BID for optimization of volume status  Renal function back at baseline.  Adjust medication for the new GFR.  Family considering palliative/hospice service     Victoriano Webster MD  02/02/25  12:06 EST            "

## 2025-02-02 NOTE — PLAN OF CARE
Goal Outcome Evaluation:  Plan of Care Reviewed With: patient        Progress: no change  Outcome Evaluation: VSS, on 4L NC. pt seems more alert tonight. work of breathing seems to be less also. chest tube site has remained dry, dressing intact. alert to person and place. no complaints of pain.                              4 3

## 2025-02-03 ENCOUNTER — APPOINTMENT (OUTPATIENT)
Dept: GENERAL RADIOLOGY | Facility: HOSPITAL | Age: OVER 89
End: 2025-02-03
Payer: MEDICARE

## 2025-02-03 ENCOUNTER — TELEPHONE (OUTPATIENT)
Dept: FAMILY MEDICINE CLINIC | Facility: CLINIC | Age: OVER 89
End: 2025-02-03

## 2025-02-03 LAB
ANION GAP SERPL CALCULATED.3IONS-SCNC: 14 MMOL/L (ref 5–15)
BUN SERPL-MCNC: 79 MG/DL (ref 8–23)
BUN/CREAT SERPL: 28.8 (ref 7–25)
CALCIUM SPEC-SCNC: 8.4 MG/DL (ref 8.2–9.6)
CHLORIDE SERPL-SCNC: 93 MMOL/L (ref 98–107)
CO2 SERPL-SCNC: 25 MMOL/L (ref 22–29)
CREAT SERPL-MCNC: 2.74 MG/DL (ref 0.57–1)
DEPRECATED RDW RBC AUTO: 49.6 FL (ref 37–54)
EGFRCR SERPLBLD CKD-EPI 2021: 15.2 ML/MIN/1.73
ERYTHROCYTE [DISTWIDTH] IN BLOOD BY AUTOMATED COUNT: 13.4 % (ref 12.3–15.4)
GLUCOSE SERPL-MCNC: 129 MG/DL (ref 65–99)
HCT VFR BLD AUTO: 40.3 % (ref 34–46.6)
HGB BLD-MCNC: 12.2 G/DL (ref 12–15.9)
MCH RBC QN AUTO: 30 PG (ref 26.6–33)
MCHC RBC AUTO-ENTMCNC: 30.3 G/DL (ref 31.5–35.7)
MCV RBC AUTO: 99 FL (ref 79–97)
PLATELET # BLD AUTO: 254 10*3/MM3 (ref 140–450)
PMV BLD AUTO: 10.2 FL (ref 6–12)
POTASSIUM SERPL-SCNC: 4 MMOL/L (ref 3.5–5.2)
RBC # BLD AUTO: 4.07 10*6/MM3 (ref 3.77–5.28)
SODIUM SERPL-SCNC: 132 MMOL/L (ref 136–145)
WBC NRBC COR # BLD AUTO: 10.85 10*3/MM3 (ref 3.4–10.8)

## 2025-02-03 PROCEDURE — 85027 COMPLETE CBC AUTOMATED: CPT | Performed by: HOSPITALIST

## 2025-02-03 PROCEDURE — 99232 SBSQ HOSP IP/OBS MODERATE 35: CPT | Performed by: HOSPITALIST

## 2025-02-03 PROCEDURE — 80048 BASIC METABOLIC PNL TOTAL CA: CPT | Performed by: HOSPITALIST

## 2025-02-03 PROCEDURE — 71045 X-RAY EXAM CHEST 1 VIEW: CPT

## 2025-02-03 PROCEDURE — 25010000002 BUMETANIDE PER 0.5 MG: Performed by: INTERNAL MEDICINE

## 2025-02-03 PROCEDURE — 97530 THERAPEUTIC ACTIVITIES: CPT

## 2025-02-03 RX ADMIN — APIXABAN 5 MG: 5 TABLET, FILM COATED ORAL at 21:38

## 2025-02-03 RX ADMIN — HYDRALAZINE HYDROCHLORIDE 25 MG: 25 TABLET ORAL at 08:31

## 2025-02-03 RX ADMIN — Medication 10 ML: at 21:39

## 2025-02-03 RX ADMIN — APIXABAN 5 MG: 5 TABLET, FILM COATED ORAL at 08:31

## 2025-02-03 RX ADMIN — ACETAMINOPHEN 650 MG: 325 TABLET ORAL at 08:31

## 2025-02-03 RX ADMIN — BUMETANIDE 2 MG: 0.25 INJECTION INTRAMUSCULAR; INTRAVENOUS at 05:53

## 2025-02-03 RX ADMIN — BUMETANIDE 2 MG: 0.25 INJECTION INTRAMUSCULAR; INTRAVENOUS at 17:29

## 2025-02-03 RX ADMIN — HYDRALAZINE HYDROCHLORIDE 25 MG: 25 TABLET ORAL at 21:38

## 2025-02-03 RX ADMIN — Medication 10 ML: at 08:31

## 2025-02-03 NOTE — THERAPY PROGRESS REPORT/RE-CERT
Patient Name: Rose J Kellermann  : 1926    MRN: [id number]                              Today's Date: 2/3/2025       Admit Date: 2025    Visit Dx:     ICD-10-CM ICD-9-CM   1. Pleural effusion, left  J90 511.9   2. Hypoxia  R09.02 799.02   3. Chronic kidney disease, unspecified CKD stage  N18.9 585.9     Patient Active Problem List   Diagnosis    Essential hypertension    Vitamin D deficiency    Fatigue    Chronic kidney disease, stage III (moderate)     Osteoarthritis    Dyslipidemia    Post-menopausal bleeding    Endometrial cancer    Closed fracture of left hip    Asymptomatic bacteriuria    Post-traumatic osteoarthritis of left hip    Status post total replacement of left hip    Prediabetes    Acute blood loss anemia, asymptomatic    Postoperative urinary retention    Myopia    Posterior crocodile shagreen of cornea    Presbyopia    Ptosis of eyelid    Regular astigmatism    Retinal neovascularization    After cataract    Secondary cataract    Premature atrial contractions    Heart murmur    Nocturnal hypoxia    Acute on chronic heart failure with preserved ejection fraction (HFpEF)    Chronic deep vein thrombosis (DVT) of both lower extremities    Retinal neovascularization    Pleural effusion on left    Pulmonary hypertension     Past Medical History:   Diagnosis Date    Abscess of back     Arrhythmia     frequent PVC    Cancer 2011    Endometrial cancer, status post robotically assisted hysterectomy with Dr. Haddad, 2011.      CKD (chronic kidney disease), stage III     no dilaysis     Dizzy     Dvt femoral (deep venous thrombosis) 2017    s/p hip surgery in . Took xarelto until 2018    Dyslipidemia     Dyslipidemia.  Observing.    Enthesopathy of hip region     Generalized osteoarthrosis, involving multiple sites     Headache     Hearing loss     Hypertension     Low backache     Needs flu shot     Osteoarthritis     Osteoarthritis with questionable carpal tunnel syndrome  bilaterally.     Otitis, serous     Pneumonia 1967    Remote pneumonia, 1967.     Retinal hemorrhage     SI joint arthritis     SOB (shortness of breath)     Syncope 2001    Remote syncope with working diagnosis of vasodepressor, 2001.     Venous insufficiency of leg     Wears glasses     readers     Past Surgical History:   Procedure Laterality Date    CATARACT EXTRACTION      bilat     COLONOSCOPY      HIP PERCUTANEOUS PINNING Left 11/24/2017    Procedure: HIP PERCUTANEOUS PINNING;  Surgeon: Lucas Swanson MD;  Location: [facility] OR;  Service:     HYSTERECTOMY      total? but unsure     KNEE SURGERY  2001    Remote knee surgery in 2001.- right     TOTAL HIP ARTHROPLASTY Left 10/26/2018    Procedure: TOTAL HIP ARTHROPLASTY LEFT;  Surgeon: Stephen Baker MD;  Location: [facility] OR;  Service: Orthopedics      General Information       Row Name 02/03/25 1523          Physical Therapy Time and Intention    Document Type progress note/recertification  -KG     Mode of Treatment physical therapy  -KG       Row Name 02/03/25 1523          General Information    Existing Precautions/Restrictions fall;oxygen therapy device and L/min;other (see comments)  confusion; [place]  -KG     Barriers to Rehab medically complex;previous functional deficit;cognitive status;hearing deficit  -KG       Row Name 02/03/25 1523          Cognition    Orientation Status (Cognition) oriented to;person  -KG       Row Name 02/03/25 1523          Safety Issues/Impairments Affecting Functional Mobility    Safety Issues Affecting Function (Mobility) awareness of need for assistance;insight into deficits/self-awareness;safety precaution awareness;safety precautions follow-through/compliance;sequencing abilities  -KG     Impairments Affecting Function (Mobility) balance;cognition;coordination;endurance/activity tolerance;motor control;motor planning;postural/trunk control;shortness of breath;strength;pain  -KG     Cognitive Impairments, Mobility  Safety/Performance awareness, need for assistance;insight into deficits/self-awareness;safety precaution awareness;safety precaution follow-through;sequencing abilities  -KG               User Key  (r) = Recorded By, (t) = Taken By, (c) = Cosigned By      Initials Name Provider Type    KG Ann Nicole, PT Physical Therapist                   Mobility       Row Name 02/03/25 1524          Bed Mobility    Bed Mobility supine-sit  -KG     Supine-Sit [place] (Bed Mobility) maximum assist (25% patient effort);2 person assist;verbal cues  -KG     Assistive Device (Bed Mobility) head of bed elevated;repositioning sheet  -KG     Comment, (Bed Mobility) VC's for sequencing and technique. Pt required increased assistance at trunk and BLEs. Pt with c/o L LE pain with all movement.  -KG       Row Name 02/03/25 1524          Transfers    Comment, (Transfers) VC's for sequencing and technique. Pt unable to maintain standing posture on first attempt and returned to seated position. Increased time and effort required on second attempt.  -KG       Row Name 02/03/25 1524          Sit-Stand Transfer    Sit-Stand [place] (Transfers) moderate assist (50% patient effort);2 person assist;verbal cues  -KG     Assistive Device (Sit-Stand Transfers) walker, 4-wheeled  -KG       Row Name 02/03/25 1524          Gait/Stairs (Locomotion)    [place] Level (Gait) moderate assist (50% patient effort);2 person assist;verbal cues  -KG     Assistive Device (Gait) walker, 4-wheeled  -KG     Distance in Feet (Gait) 5  -KG     Deviations/Abnormal Patterns (Gait) bilateral deviations;mike decreased;festinating/shuffling;stride length decreased  -KG     Bilateral Gait Deviations forward flexed posture;heel strike decreased  -KG     Comment, (Gait/Stairs) Pt demonstrated step to gait pattern with very slow mike and short, shuffled steps. Pt with very forward flexed posture and tendency to lean over onto rollator; unable to  improve despite verbal and tactile cues. Pt very unsteady, worsening with continued forward ambulation. Pt attempting to sit prior to reaching chair. Limited by increased weakness and SOA.  -KG               User Key  (r) = Recorded By, (t) = Taken By, (c) = Cosigned By      Initials Name Provider Type    Ann Elder PT Physical Therapist                   Obj/Interventions       Row Name 02/03/25 1527          Balance    Balance Assessment sitting static balance;standing static balance;standing dynamic balance  -KG     Static Sitting Balance minimal assist  -KG     Position, Sitting Balance supported;sitting edge of bed  -KG     Static Standing Balance minimal assist;2-person assist  -KG     Dynamic Standing Balance moderate assist;2-person assist  -KG     Position/Device Used, Standing Balance supported;walker, 4-wheeled  -KG               User Key  (r) = Recorded By, (t) = Taken By, (c) = Cosigned By      Initials Name Provider Type    Ann Elder PT Physical Therapist                   Goals/Plan       Row Name 02/03/25 1533          Bed Mobility Goal 1 (PT)    Activity/Assistive Device (Bed Mobility Goal 1, PT) sit to supine;supine to sit  -KG     [place] Level/Cues Needed (Bed Mobility Goal 1, PT) moderate assist (50-74% patient effort)  -KG     Time Frame (Bed Mobility Goal 1, PT) short term goal (STG);3 days  -KG     Progress/Outcomes (Bed Mobility Goal 1, PT) goal ongoing;goal revised this date  -KG       Row Name 02/03/25 1533          Transfer Goal 1 (PT)    Activity/Assistive Device (Transfer Goal 1, PT) sit-to-stand/stand-to-sit;bed-to-chair/chair-to-bed;walker, rolling  -KG     [place] Level/Cues Needed (Transfer Goal 1, PT) minimum assist (75% or more patient effort)  -KG     Time Frame (Transfer Goal 1, PT) long term goal (LTG);1 week  -KG     Progress/Outcome (Transfer Goal 1, PT) goal ongoing;goal revised this date  -KG       Row Name 02/03/25 6113           Gait Training Goal 1 (PT)    Activity/Assistive Device (Gait Training Goal 1, PT) gait (walking locomotion);assistive device use;walker, rolling  -KG     [place] Level (Gait Training Goal 1, PT) moderate assist (50-74% patient effort)  -KG     Distance (Gait Training Goal 1, PT) 50 feet  -KG     Time Frame (Gait Training Goal 1, PT) long term goal (LTG);1 week  -KG     Progress/Outcome (Gait Training Goal 1, PT) goal ongoing;goal revised this date  -KG               User Key  (r) = Recorded By, (t) = Taken By, (c) = Cosigned By      Initials Name Provider Type    KG Ann Nicole N, PT Physical Therapist                   Clinical Impression       Row Name 02/03/25 1528          Pain    Additional Documentation Pain Scale: FACES Pre/Post-Treatment (Group)  -KG       Row Name 02/03/25 1528          Pain Scale: FACES Pre/Post-Treatment    Pain: FACES Scale, Pretreatment 2-->hurts little bit  -KG     Posttreatment Pain Rating 2-->hurts little bit  -KG       Row Name 02/03/25 1528          Plan of Care Review    Plan of Care Reviewed With patient  -KG     Progress declining  -KG     Outcome Evaluation Pt ambulated 5ft with rollator and modA x2. Pt demonstrated very forward flexed posture with slow mike and short, shuffled steps. Pt with worsening balance and coordination with continued forward ambulation. Limited by increased weakness and SOA. Continue to recommend PT skilled care and D/C to SNF.  -KG       Row Name 02/03/25 1528          Vital Signs    Pre Systolic BP Rehab 103  -KG     Pre Treatment Diastolic BP 71  -KG     Pretreatment Heart Rate (beats/min) 85  -KG     Posttreatment Heart Rate (beats/min) 87  -KG     Pre SpO2 (%) 94  -KG     O2 Delivery Pre Treatment supplemental O2  -KG     Post SpO2 (%) 91  -KG     O2 Delivery Post Treatment supplemental O2  -KG     Pre Patient Position Supine  -KG     Intra Patient Position Standing  -KG     Post Patient Position Sitting  -KG       Row Name 02/03/25  1528          Positioning and Restraints    Pre-Treatment Position in bed  -KG     Post Treatment Position chair  -KG     In Chair notified nsg;reclined;call light within reach;encouraged to call for assist;exit alarm on;with family/caregiver;legs elevated  -KG               User Key  (r) = Recorded By, (t) = Taken By, (c) = Cosigned By      Initials Name Provider Type    Ann Elder PT Physical Therapist                   Outcome Measures       Row Name 02/03/25 1534 02/03/25 0800       How much help from another person do you currently need...    Turning from your back to your side while in flat bed without using bedrails? 2  -KG 2  -CB    Moving from lying on back to sitting on the side of a flat bed without bedrails? 2  -KG 2  -CB    Moving to and from a bed to a chair (including a wheelchair)? 2  -KG 2  -CB    Standing up from a chair using your arms (e.g., wheelchair, bedside chair)? 2  -KG 2  -CB    Climbing 3-5 steps with a railing? 1  -KG 1  -CB    To walk in hospital room? 2  -KG 1  -CB    AM-PAC 6 Clicks Score (PT) 11  -KG 10  -CB    Highest Level of Mobility Goal 4 --> Transfer to chair/commode  -KG 4 --> Transfer to chair/commode  -CB      Row Name 02/03/25 1534          Functional Assessment    Outcome Measure Options AM-PAC 6 Clicks Basic Mobility (PT)  -KG               User Key  (r) = Recorded By, (t) = Taken By, (c) = Cosigned By      Initials Name Provider Type    Ann Edler PT Physical Therapist    Cecil Anderson, RN Registered Nurse                                 Physical Therapy Education       Title: PT OT SLP Therapies (In Progress)       Topic: Physical Therapy (In Progress)       Point: Mobility training (In Progress)       Learning Progress Summary            Patient Acceptance, E, NR by KG at 2/3/2025 1413    Acceptance, E,TB, NL,NR by CB at 2/2/2025 1541    Acceptance, E,TB, NR,VU by  at 2/1/2025 0904    Acceptance, E, NR by KG at 1/27/2025  1031    Acceptance, E, NR by GUSTAVO at 1/24/2025 1430    Acceptance, E,D, VU,NR by LR at 1/23/2025 1407    Comment: Educated on benefits of mobility, safety with mobility, correct supine to sit t/f technique, correct sit<->stand t/f technique, correct gait mechanics, LE HEP, and progression of POC.    Acceptance, E, NR by KG at 1/21/2025 1511                      Point: Home exercise program (In Progress)       Learning Progress Summary            Patient Acceptance, E, NR by KG at 2/3/2025 1413    Acceptance, E,TB, NL,NR by CB at 2/2/2025 1541    Acceptance, E,TB, NR,VU by  at 2/1/2025 0904    Acceptance, E, NR by  at 1/30/2025 1132    Acceptance, E, NR by KG at 1/27/2025 1031    Acceptance, E, NR by GUSTAVO at 1/24/2025 1430    Acceptance, E,D, VU,NR by LR at 1/23/2025 1407    Comment: Educated on benefits of mobility, safety with mobility, correct supine to sit t/f technique, correct sit<->stand t/f technique, correct gait mechanics, LE HEP, and progression of POC.   Family Acceptance, E, NR by  at 1/30/2025 1132                      Point: Body mechanics (In Progress)       Learning Progress Summary            Patient Acceptance, E, NR by KG at 2/3/2025 1413    Acceptance, E,TB, NL,NR by CB at 2/2/2025 1541    Acceptance, E,TB, NR,VU by  at 2/1/2025 0904    Acceptance, E, NR by KG at 1/27/2025 1031    Acceptance, E, NR by GUSTAVO at 1/24/2025 1430    Acceptance, E,D, VU,NR by LR at 1/23/2025 1407    Comment: Educated on benefits of mobility, safety with mobility, correct supine to sit t/f technique, correct sit<->stand t/f technique, correct gait mechanics, LE HEP, and progression of POC.    Acceptance, E, NR by KG at 1/21/2025 1511                      Point: Precautions (In Progress)       Learning Progress Summary            Patient Acceptance, E, NR by KG at 2/3/2025 1413    Acceptance, E,TB, NL,NR by CB at 2/2/2025 1541    Acceptance, E,TB, NR,VU by CH at 2/1/2025 0904    Acceptance, E, NR by KG at 1/27/2025 1031     Acceptance, E, NR by GUSTAVO at 1/24/2025 1430    Acceptance, E,D, VU,NR by LR at 1/23/2025 1407    Comment: Educated on benefits of mobility, safety with mobility, correct supine to sit t/f technique, correct sit<->stand t/f technique, correct gait mechanics, LE HEP, and progression of POC.    Acceptance, E, NR by KG at 1/21/2025 1511                                      User Key       Initials Effective Dates Name Provider Type Discipline    GUSTAVO 02/03/23 -  Audrey Gore, PT Physical Therapist PT    LR 02/03/23 -  Lola Finch, PT Physical Therapist PT    CH 06/16/21 -  Madalyn Beavers, RN Registered Nurse Nurse    KG 05/22/20 -  Ann Nicole, PT Physical Therapist PT    CB 06/16/21 -  Cecil Ramirez, RN Registered Nurse Nurse     09/21/23 -  Justa Bedoya, PT Physical Therapist PT                  PT Recommendation and Plan  Planned Therapy Interventions (PT): balance training, bed mobility training, gait training, strengthening, transfer training  Progress: declining  Outcome Evaluation: Pt ambulated 5ft with rollator and modA x2. Pt demonstrated very forward flexed posture with slow mike and short, shuffled steps. Pt with worsening balance and coordination with continued forward ambulation. Limited by increased weakness and SOA. Continue to recommend PT skilled care and D/C to SNF.     Time Calculation:   PT Evaluation Complexity  History, PT Evaluation Complexity: 3 or more personal factors and/or comorbidities  Examination of Body Systems (PT Eval Complexity): total of 3 or more elements  Clinical Presentation (PT Evaluation Complexity): evolving  Clinical Decision Making (PT Evaluation Complexity): moderate complexity  Overall Complexity (PT Evaluation Complexity): moderate complexity     PT Charges       Row Name 02/03/25 1413             Time Calculation    Start Time 1413  -KG      PT Received On 02/03/25  -KG      PT Goal Re-Cert Due Date 02/13/25  -KG          Time Calculation- PT    Total Timed Code Minutes- PT 24 minute(s)  -KG         Timed Charges    55633 - PT Therapeutic Activity Minutes 24  -KG         Total Minutes    Timed Charges Total Minutes 24  -KG       Total Minutes 24  -KG                User Key  (r) = Recorded By, (t) = Taken By, (c) = Cosigned By      Initials Name Provider Type    KG Ann Nicole, PT Physical Therapist                  Therapy Charges for Today       Code Description Service Date Service Provider Modifiers Qty    30751282866 HC PT THERAPEUTIC ACT EA 15 MIN 2/3/2025 Ann Nicole, PT GP 2    04443122388 HC PT THER SUPP EA 15 MIN 2/3/2025 Ann Nicole, PT GP 2            PT G-Codes  Outcome Measure Options: AM-PAC 6 Clicks Basic Mobility (PT)  AM-PAC 6 Clicks Score (PT): 11  AM-PAC 6 Clicks Score (OT): 12  PT Discharge Summary  Anticipated Discharge Disposition (PT): skilled nursing facility    Kelsey Nicole PT  2/3/2025

## 2025-02-03 NOTE — CASE MANAGEMENT/SOCIAL WORK
Continued Stay Note   [place]     Patient Name: Rose J Kellermann  MRN: [id number]  Today's Date: 2/3/2025    Admit Date: 1/20/2025    Plan: rehab   Discharge Plan       Row Name 02/03/25 1015       Plan    Plan rehab    Patient/Family in Agreement with Plan no    Plan Comments This patient's plan remains rehab to [facility]. She vesta need a new precert started when medically ready. Palliative is following and the family may want hospice involved. She will need transport arranged. CM to follow.    Final Discharge Disposition Code 03 - skilled nursing facility (SNF)                   Discharge Codes    No documentation.                 Expected Discharge Date and Time       Expected Discharge Date Expected Discharge Time    Feb 5, 2025               Floresita Dawson RN

## 2025-02-03 NOTE — PLAN OF CARE
Problem: Adult Inpatient Plan of Care  Goal: Plan of Care Review  Outcome: Progressing  Flowsheets  Taken 2/3/2025 0456 by Kahlil Bowman RN  Progress: no change  Outcome Evaluation: VSS. Skin and fall precautions in place. No complaints of pain or nausea. Will continue plan of care.  Taken 2/2/2025 1542 by Cecil Ramirez RN  Plan of Care Reviewed With: patient  Goal: Patient-Specific Goal (Individualized)  Outcome: Progressing  Goal: Absence of Hospital-Acquired Illness or Injury  Outcome: Progressing  Intervention: Identify and Manage Fall Risk  Recent Flowsheet Documentation  Taken 2/3/2025 0400 by aKhlil Bowman RN  Safety Promotion/Fall Prevention:   activity supervised   assistive device/personal items within reach   clutter free environment maintained   nonskid shoes/slippers when out of bed   room organization consistent   safety round/check completed  Taken 2/3/2025 0200 by Kahlil Bowman RN  Safety Promotion/Fall Prevention:   activity supervised   assistive device/personal items within reach   clutter free environment maintained   nonskid shoes/slippers when out of bed   room organization consistent   safety round/check completed  Intervention: Prevent Skin Injury  Recent Flowsheet Documentation  Taken 2/3/2025 0400 by Kahlil Bowman RN  Body Position:   turned   left  Skin Protection:   incontinence pads utilized   silicone foam dressing in place   transparent dressing maintained  Taken 2/3/2025 0200 by Kahlil Bowman RN  Body Position:   turned   right  Skin Protection:   incontinence pads utilized   silicone foam dressing in place   transparent dressing maintained  Intervention: Prevent and Manage VTE (Venous Thromboembolism) Risk  Recent Flowsheet Documentation  Taken 2/3/2025 0200 by Kahlil Bowman RN  VTE Prevention/Management:   bilateral   SCDs (sequential compression devices) on  Intervention: Prevent Infection  Recent Flowsheet Documentation  Taken 2/3/2025 0400 by Kahlil Bowman  RN  Infection Prevention:   cohorting utilized   environmental surveillance performed   single patient room provided   rest/sleep promoted  Taken 2/3/2025 0200 by Kahlil Bowman RN  Infection Prevention:   environmental surveillance performed   cohorting utilized   single patient room provided   rest/sleep promoted  Goal: Optimal Comfort and Wellbeing  Outcome: Progressing  Intervention: Provide Person-Centered Care  Recent Flowsheet Documentation  Taken 2/3/2025 0200 by Kahlil Bowman RN  Trust Relationship/Rapport: care explained  Goal: Readiness for Transition of Care  Outcome: Progressing     Problem: Skin Injury Risk Increased  Goal: Skin Health and Integrity  Outcome: Progressing  Intervention: Optimize Skin Protection  Recent Flowsheet Documentation  Taken 2/3/2025 0400 by Kahlil Bowman RN  Activity Management: activity encouraged  Pressure Reduction Techniques:   positioned off wounds   pressure points protected  Head of Bed (HOB) Positioning: HOB elevated  Pressure Reduction Devices:   foam padding utilized   positioning supports utilized   pressure-redistributing mattress utilized  Skin Protection:   incontinence pads utilized   silicone foam dressing in place   transparent dressing maintained  Taken 2/3/2025 0200 by Kahlil Bowman RN  Activity Management: activity encouraged  Pressure Reduction Techniques:   positioned off wounds   pressure points protected  Head of Bed (HOB) Positioning: HOB elevated  Pressure Reduction Devices:   foam padding utilized   positioning supports utilized   pressure-redistributing mattress utilized  Skin Protection:   incontinence pads utilized   silicone foam dressing in place   transparent dressing maintained     Problem: Comorbidity Management  Goal: Maintenance of Heart Failure Symptom Control  Outcome: Progressing  Goal: Blood Pressure in Desired Range  Outcome: Progressing     Problem: Fall Injury Risk  Goal: Absence of Fall and Fall-Related Injury  Outcome:  Progressing  Intervention: Promote Injury-Free Environment  Recent Flowsheet Documentation  Taken 2/3/2025 0400 by Kahlil Bowman, RN  Safety Promotion/Fall Prevention:   activity supervised   assistive device/personal items within reach   clutter free environment maintained   nonskid shoes/slippers when out of bed   room organization consistent   safety round/check completed  Taken 2/3/2025 0200 by Kahlil Bowman, RN  Safety Promotion/Fall Prevention:   activity supervised   assistive device/personal items within reach   clutter free environment maintained   nonskid shoes/slippers when out of bed   room organization consistent   safety round/check completed     Problem: Palliative Care  Goal: Enhanced Quality of Life  Outcome: Progressing   Goal Outcome Evaluation:  Plan of Care Reviewed With: patient        Progress: no change  Outcome Evaluation: VSS. Skin and fall precautions in place. No complaints of pain or nausea. Will continue plan of care.

## 2025-02-03 NOTE — PROGRESS NOTES
"   LOS: 14 days    Patient Care Team:  Sam Hung MD as PCP - General  Elver Sullivan MD as Consulting Physician (Otolaryngology)    Subjective     No new events    Objective     Vital Signs:  Blood pressure 123/78, pulse 102, temperature 97.8 °F (36.6 °C), temperature source Oral, resp. rate 25, height 162.6 cm (64.02\"), weight 71.1 kg (156 lb 12.8 oz), SpO2 93%, not currently breastfeeding.    No intake or output data in the 24 hours ending 02/03/25 1146     02/02 0701 - 02/03 0700  In: 460 [P.O.:460]  Out: -     Physical Exam:        GENERAL: WD elderly WF NAD  NEURO: Awake and alert, oriented. No focal deficit  PSYCHIATRIC: Pleasant with conversation, cooperative with PE  EYE: PE, no icterus, no conjunctivitis  ENT: omm dry, dentition intact,  Hearing intact  NECK: Supple , No JVD discernable,  Trachea midline  CV: No edema, RRR  LUNGS:  Quiet,  Nonlabored resp.  Symmetrical expansion  ABDOMEN: Nondistended, soft nontender.  : No Padilla, no palp bladder  SKIN: Warm and dry without rash      Labs:  Results from last 7 days   Lab Units 02/03/25  1036 01/31/25  0926 01/30/25  1259   WBC 10*3/mm3 10.85* 8.62 11.02*   HEMOGLOBIN g/dL 12.2 12.0 12.8   PLATELETS 10*3/mm3 254 220 272     Results from last 7 days   Lab Units 02/02/25  1135 02/01/25  0837 01/31/25  0926 01/30/25  1259 01/29/25  1404   SODIUM mmol/L 135* 137 136 134* 137   POTASSIUM mmol/L 4.3 4.1 3.9 4.3 4.6   CHLORIDE mmol/L 95* 94* 97* 96* 98   CO2 mmol/L 27.0 28.0 28.0 25.0 26.0   BUN mg/dL 68* 65* 68* 72* 77*   CREATININE mg/dL 2.44* 2.28* 2.25* 2.52* 2.89*   CALCIUM mg/dL 8.8 8.7 8.8 8.4 8.9  8.9   PHOSPHORUS mg/dL  --   --   --  4.1 4.6*   MAGNESIUM mg/dL  --   --   --  2.4*  --    ALBUMIN g/dL  --   --   --  2.8* 3.1*         Results from last 7 days   Lab Units 01/28/25  1552   PH, ARTERIAL pH units 7.393   PO2 ART mm Hg 145.0*   PCO2, ARTERIAL mm Hg 35.2   HCO3 ART mmol/L 21.4               Estimated Creatinine Clearance: " 12.5 mL/min (A) (by C-G formula based on SCr of 2.44 mg/dL (H)).         A/P:        Pleural effusion on left    Essential hypertension    Chronic kidney disease, stage III (moderate)     Acute on chronic heart failure with preserved ejection fraction (HFpEF)    Pulmonary hypertension        Assessment & Plan     1.  CKD stage IV: Creatinine stable at 2.4.  Likely close to baseline.  Followes with [facility]  Dr. Hernandez has discussed with the patient and the family in the past regards to dialysis and they have declined..       2.  Proteinuria: 4.9 g, patient has been not a candidate for kidney biopsy or aggressive intervention.    3.  Chronic diastolic heart failure failure:  Patient on diuretics.    4.  CKD bone and mineral disease. Last PTH ~ 84.Phos in goal range <5.5.    5.  Hypertension: Blood pressure stable.  Monitor for now.    6.  History of ovarian cancer in remission status post surgical removal.    7.  Progressive shortness of breath: Left pleural effusion. Found to have PNA on this admission.       Andrew Copeland MD  02/03/25  11:46 EST

## 2025-02-03 NOTE — PLAN OF CARE
Goal Outcome Evaluation:    Problem: Palliative Care  Goal: Enhanced Quality of Life  Outcome: Not Progressing  Intervention: Optimize Psychosocial Wellbeing  Flowsheets (Taken 2/3/2025 1141)  Supportive Measures:   active listening utilized   verbalization of feelings encouraged  Family/Support System Care:   caregiver stress acknowledged   involvement promoted   presence promoted   support provided     Palliative RN visited bedside at 1115. Pt was resting in bed, her daughter, Areli, at bedside. Pt not in any current pain, but did require a dose of tylenol this morning. Pt has pain in her knees that comes and goes. Pt denied SOB, on 4L NC, but did have slight increase in her work of breathing while talking w/RN. No anxiety or depression. Per CM's note, the plan is still rehab at the [facility]. Palliative care team following for continued support to pt and family in [facility]/POC.    0930 Palliative IDT meeting:  CONNIE MERCEDES, RN, SW,   After hours, weekends and holidays, contact Palliative Provider by calling [phone]

## 2025-02-03 NOTE — PROGRESS NOTES
[facility] Medicine Services  PROGRESS NOTE    Patient Name: Rose J Kellermann  : 1926  MRN: [id number]    Date of Admission: 2025  Primary Care Physician: Sam Hung MD    Subjective   Subjective     CC: Dyspnea    HPI: Up in bed. Denies SOA, but appears mildly labored. Denies pain. Poor appetite.     Objective   Objective     Vital Signs:   Temp:  [97.5 °F (36.4 °C)-98.2 °F (36.8 °C)] 97.6 °F (36.4 °C)  Heart Rate:  [81-98] 86  Resp:  [12-16] 16  BP: (101-116)/(69-84) 104/79  Flow (L/min) (Oxygen Therapy):  [4] 4     Physical Exam:  NAD, alert and oriented, slow to respond  Chronically ill appearing  OP clear, dry MM  Neck supple  RRR  Decreased at bases, worse on L  +BS, soft  JOSE  Flat affect    Results Reviewed:  LAB RESULTS:      Lab 25  0926 25  1259 25  1502 25  0838   WBC 8.62 11.02* 10.09 9.99   HEMOGLOBIN 12.0 12.8 13.1 11.9*   HEMATOCRIT 39.3 39.5 40.4 37.4   PLATELETS 220 272 291 244   NEUTROS ABS  --   --  8.52* 8.17*   IMMATURE GRANS (ABS)  --   --  0.05 0.03   LYMPHS ABS  --   --  0.55* 0.64*   MONOS ABS  --   --  0.82 0.91*   EOS ABS  --   --  0.13 0.21   MCV 99.2* 93.4 93.3 95.9         Lab 25  1135 25  0837 25  0926 25  1259 25  1404   SODIUM 135* 137 136 134* 137   POTASSIUM 4.3 4.1 3.9 4.3 4.6   CHLORIDE 95* 94* 97* 96* 98   CO2 27.0 28.0 28.0 25.0 26.0   ANION GAP 13.0 15.0 11.0 13.0 13.0   BUN 68* 65* 68* 72* 77*   CREATININE 2.44* 2.28* 2.25* 2.52* 2.89*   EGFR 17.5* 19.0* 19.3* 16.8* 14.3*   GLUCOSE 116* 112* 131* 152* 154*   CALCIUM 8.8 8.7 8.8 8.4 8.9  8.9   MAGNESIUM  --   --   --  2.4*  --    PHOSPHORUS  --   --   --  4.1 4.6*         Lab 25  1259 25  1404   ALBUMIN 2.8* 3.1*                 Lab 25  1404   IRON 26*   IRON SATURATION (TSAT) 12*   TIBC 209*   TRANSFERRIN 140*         Lab 25  1552   PH, ARTERIAL 7.393   PCO2, ARTERIAL 35.2   PO2 .0*    FIO2 40   HCO3 ART 21.4   BASE EXCESS ART -2.9*   CARBOXYHEMOGLOBIN 1.1     Brief Urine Lab Results       None            Microbiology Results Abnormal       None            XR Chest 1 View    Result Date: 2/3/2025  XR CHEST 1 VW Date of Exam: 2/3/2025 3:32 AM EST Indication: Follow up L pleural effusion s/p Left pleural CT placement Comparison: 2/2/2025 Findings: There is persistent and fairly extensive bibasilar effusion and atelectasis without significant interval change. Exam history states left pleural chest tube placement but no chest drain is visible. Lung apices remain clear. Heart appears upper normal size. Vasculature is slightly cephalized. No pneumothorax is seen.     Impression: Impression: Persistent bibasilar effusion and atelectasis. Electronically Signed: James Juarez MD  2/3/2025 7:09 AM EST  Workstation ID: [id number]    XR Chest 1 View    Result Date: 2/2/2025  XR CHEST 1 VW Date of Exam: 2/2/2025 5:55 AM EST Indication: Follow up L pleural effusion s/p Left pleural CT placement Comparison: Chest radiograph 2/1/2025 Findings: Stable cardiomediastinal silhouette including cardiomegaly. Moderate right probably small to moderate left pleural effusions without significant change with adjacent compressive atelectasis versus airspace disease. Minimal interstitial thickening which could reflect vascular congestion or edema similar to prior. No worsening consolidation or pneumothorax. Degenerative related osseous change. Lung apices partially obscured by patient's chin.     Impression: Impression: No significant interval change. Electronically Signed: Juice Martinez MD  2/2/2025 7:50 AM EST  Workstation ID: [id number]     Results for orders placed during the hospital encounter of 01/20/25    Adult Transthoracic Echo Complete W/ Cont if Necessary Per Protocol    Interpretation Summary    Left ventricular ejection fraction appears to be 61 - 65%.    Left ventricular diastolic function was indeterminate.     Mild aortic valve stenosis is present.    Peak velocity of the flow distal to the aortic valve is 245.6 cm/s. Aortic valve maximum pressure gradient is 24 mmHg. Aortic valve mean pressure gradient is 13 mmHg.    The mitral valve mean gradient is 4 mmHg.    Moderate tricuspid valve regurgitation is present.    Estimated right ventricular systolic pressure from tricuspid regurgitation is markedly elevated (>55 mmHg). Calculated right ventricular systolic pressure from tricuspid regurgitation is 57 mmHg.    There is a moderate 2cm  pericardial effusion with no tamponade    There is a left pleural effusion.      Current medications:  Scheduled Meds:apixaban, 5 mg, Oral, Q12H  bumetanide, 2 mg, Intravenous, Q12H  hydrALAZINE, 25 mg, Oral, BID  sodium chloride, 10 mL, Intravenous, Q12H      Continuous Infusions:   PRN Meds:.  acetaminophen    senna-docusate sodium **AND** polyethylene glycol **AND** bisacodyl **AND** bisacodyl    sodium chloride    sodium chloride    sodium chloride    Assessment & Plan   Assessment & Plan     Active Hospital Problems    Diagnosis  POA    **Pleural effusion on left [J90]  Yes    Pulmonary hypertension [I27.20]  Yes    Acute on chronic heart failure with preserved ejection fraction (HFpEF) [I50.33]  Yes    Essential hypertension [I10]  Yes    Chronic kidney disease, stage III (moderate)  [N18.30]  Yes      Resolved Hospital Problems   No resolved problems to display.        Brief Hospital Course to date:  Rose J Kellermann is a 89 y.o. female with history of DVT on Eliquis, HFpEF, HTN, and CKD IV who presents due to dyspnea and hypoxia. Had been wearing 2-3 liters home oxygen intermittently over the past several months, now requiring continuously. Workup in the ED notable for a left pleural effusion. Admitted for further management.      Progressive hypoxia, acute on chronic  Left pleural effusion  Acute on chronic HFpEF exacerbation  Valvular heart disease  Pulmonary HTN  Pericardial  effusion  -CXR with moderate L effusion, s/p IR chest tube, CTS managed, CT pulled 1/26  -fluid with cultures/cytology negative, exudate  -ECHO with pericardial effusion  -cardiology consulted, deferred window/pericardiocentesis  -diurese per nephrology  -wean oxygen as tolerated     ANGEL LUIS on CKD IV  -baseline creatinine around 1.8  -nephrology following  -diuresis per nephrology, patient would not pursue dialysis    Afib/Aflutter  Hx of PACs  -cardiology following, on eliquis  -no BB due to significant pauses/bradycardia on 6/23 Holter,      History of BLE DVT (June 2023)  -continue Eliquis     Hypertension  -CTM    Goals of care  -Team discussed with patient and daughter 1/31/25, agreeable to Palliative Care consult  -DNR/DNI now  -Continue to discuss goals of care, for now will continue IV diuretics and monitor    Expected Discharge Location and Transportation: SNF  Expected Discharge she appears to be improving, hopefully will be able to go to SNF this week  Expected Discharge Date: 2/5/2025; Expected Discharge Time:      VTE Prophylaxis:  Pharmacologic & mechanical VTE prophylaxis orders are present.         AM-PAC 6 Clicks Score (PT): 10 (02/03/25 0200)    CODE STATUS:   Code Status and Medical Interventions: No CPR (Do Not Attempt to Resuscitate); Limited Support; No intubation (DNI), No dialysis   Ordered at: 01/31/25 1140     Medical Intervention Limits:    No intubation (DNI)    No dialysis     Level Of Support Discussed With:    Health Care Surrogate     Code Status (Patient has no pulse and is not breathing):    No CPR (Do Not Attempt to Resuscitate)     Medical Interventions (Patient has pulse or is breathing):    Limited Support       James Gatica MD  02/03/25

## 2025-02-03 NOTE — PLAN OF CARE
Goal Outcome Evaluation:  Plan of Care Reviewed With: patient        Progress: declining  Outcome Evaluation: Pt ambulated 5ft with rollator and modA x2. Pt demonstrated very forward flexed posture with slow mike and short, shuffled steps. Pt with worsening balance and coordination with continued forward ambulation. Limited by increased weakness and SOA. Continue to recommend PT skilled care and D/C to SNF.    Anticipated Discharge Disposition (PT): skilled nursing facility

## 2025-02-03 NOTE — CONSULTS
"Hospice consult received. Chart reviewed. Call placed to the pt's daughter, Areli, who states she was not aware of the hospice consult & prefers to not discuss hospice because she is in the \"mindset that the pt is going to get better & do rehab\". Writer did explain that per CM's documentation that rehab is the plan & that our consult is for information. Areli states that she prefers that the pt not hear the word \"hospice\". Writer did explain that she has not spoken to the pt. Writer did provide Areli w/ the 24 hr hospice number for [facility] & explained that she could call should she have questions. Writer also offered written information, which Areli prefers to receive [id number] vs leaving it in the pt's room. Writer asked Areli to call should she want a hospice brochure, verbal understanding expressed. Pt's care team updated via Epic chat. If further assistance is needed. If we can be of further assistance, please call [phone].  "

## 2025-02-03 NOTE — PLAN OF CARE
Problem: Adult Inpatient Plan of Care  Goal: Plan of Care Review  Outcome: Progressing  Flowsheets  Taken 2/3/2025 1654 by Cecil Ramirez RN  Outcome Evaluation: Patient has had a decent shift, awake for most of today. VSS, and patient has been alert and oriented to self only. Patient took tylenol once for left lower extremity. Daughter has visited at bedside today. Nursing staff will continue to monitor and assess the patient.  Taken 2/3/2025 1528 by Ann Nicole PT  Progress: declining  Plan of Care Reviewed With: patient  Goal: Patient-Specific Goal (Individualized)  Outcome: Progressing  Flowsheets (Taken 2/3/2025 1655)  Patient/Family-Specific Goals (Include Timeframe): Monitor and assess for pain q2hrs and prn  Goal: Absence of Hospital-Acquired Illness or Injury  Outcome: Progressing  Intervention: Identify and Manage Fall Risk  Recent Flowsheet Documentation  Taken 2/3/2025 1400 by Cecil Ramierz RN  Safety Promotion/Fall Prevention:   activity supervised   assistive device/personal items within reach   clutter free environment maintained   elopement precautions   fall prevention program maintained   gait belt   lighting adjusted   muscle strengthening facilitated   nonskid shoes/slippers when out of bed   room organization consistent   safety round/check completed   toileting scheduled  Taken 2/3/2025 1200 by Cecil Ramirez, RN  Safety Promotion/Fall Prevention:   activity supervised   assistive device/personal items within reach   clutter free environment maintained   elopement precautions   fall prevention program maintained   gait belt   lighting adjusted   muscle strengthening facilitated   nonskid shoes/slippers when out of bed   room organization consistent   safety round/check completed   toileting scheduled  Taken 2/3/2025 1000 by Cecil Ramirez, RN  Safety Promotion/Fall Prevention:   activity supervised   assistive device/personal items  within reach   clutter free environment maintained   elopement precautions   fall prevention program maintained   gait belt   lighting adjusted   muscle strengthening facilitated   nonskid shoes/slippers when out of bed   room organization consistent   safety round/check completed   toileting scheduled  Taken 2/3/2025 0800 by Cecil Ramirez RN  Safety Promotion/Fall Prevention:   activity supervised   assistive device/personal items within reach   clutter free environment maintained   elopement precautions   fall prevention program maintained   gait belt   lighting adjusted   muscle strengthening facilitated   nonskid shoes/slippers when out of bed   room organization consistent   safety round/check completed   toileting scheduled  Intervention: Prevent Skin Injury  Recent Flowsheet Documentation  Taken 2/3/2025 1400 by Cecil Ramirez RN  Body Position: (Up to chair) other (see comments)  Skin Protection:   drying agents applied   incontinence pads utilized   pulse oximeter probe site changed   silicone foam dressing in place   transparent dressing maintained  Taken 2/3/2025 1200 by Cecil Ramirez RN  Skin Protection:   drying agents applied   incontinence pads utilized   pulse oximeter probe site changed   silicone foam dressing in place   transparent dressing maintained  Taken 2/3/2025 1000 by Cecil Ramirez RN  Body Position:   turned   right  Skin Protection:   drying agents applied   incontinence pads utilized   pulse oximeter probe site changed   silicone foam dressing in place   transparent dressing maintained  Taken 2/3/2025 0800 by Cecil Ramirez RN  Skin Protection: (Silicone dressings on bilateral heels and coccyx pulled back to assess skin integrity, and replaced)   drying agents applied   incontinence pads utilized   pulse oximeter probe site changed   silicone foam dressing in place   transparent dressing maintained  Intervention: Prevent and  Manage VTE (Venous Thromboembolism) Risk  Recent Flowsheet Documentation  Taken 2/3/2025 0831 by Cecil Ramirez RN  VTE Prevention/Management:   bilateral   SCDs (sequential compression devices) on  Taken 2/3/2025 0800 by Cecil Ramirez RN  VTE Prevention/Management:   bilateral   SCDs (sequential compression devices) on  Intervention: Prevent Infection  Recent Flowsheet Documentation  Taken 2/3/2025 1400 by Cecil Ramirez RN  Infection Prevention:   rest/sleep promoted   single patient room provided  Taken 2/3/2025 1200 by Cecil Ramirez RN  Infection Prevention:   rest/sleep promoted   single patient room provided  Taken 2/3/2025 1000 by Cecil Ramirez RN  Infection Prevention:   rest/sleep promoted   single patient room provided  Taken 2/3/2025 0800 by Cecil Ramirez RN  Infection Prevention:   rest/sleep promoted   single patient room provided  Goal: Optimal Comfort and Wellbeing  Outcome: Progressing  Intervention: Monitor Pain and Promote Comfort  Recent Flowsheet Documentation  Taken 2/3/2025 0831 by Cecil Ramirez RN  Pain Management Interventions:   pain medication given   pillow support provided   position adjusted   quiet environment facilitated   relaxation techniques promoted  Taken 2/3/2025 0800 by Cecil Ramirez RN  Pain Management Interventions:   pain medication given   pillow support provided   position adjusted   quiet environment facilitated   relaxation techniques promoted  Intervention: Provide Person-Centered Care  Recent Flowsheet Documentation  Taken 2/3/2025 0831 by Cecil Ramirez RN  Trust Relationship/Rapport:   care explained   choices provided   questions answered   questions encouraged  Taken 2/3/2025 0800 by Cecil Ramirez RN  Trust Relationship/Rapport:   care explained   choices provided   questions answered   questions encouraged  Goal: Readiness for Transition of  Care  Outcome: Progressing     Problem: Skin Injury Risk Increased  Goal: Skin Health and Integrity  Outcome: Progressing  Intervention: Optimize Skin Protection  Recent Flowsheet Documentation  Taken 2/3/2025 1400 by Cecil Ramirez RN  Activity Management: activity encouraged  Pressure Reduction Techniques:   frequent weight shift encouraged   heels elevated off bed   weight shift assistance provided  Head of Bed (HOB) Positioning: (Up to chair) other (see comments)  Pressure Reduction Devices:   foam padding utilized   heel offloading device utilized   positioning supports utilized   pressure-redistributing mattress utilized   specialty bed utilized  Skin Protection:   drying agents applied   incontinence pads utilized   pulse oximeter probe site changed   silicone foam dressing in place   transparent dressing maintained  Taken 2/3/2025 1200 by Cecil Ramirez RN  Activity Management: activity encouraged  Pressure Reduction Techniques:   frequent weight shift encouraged   heels elevated off bed   weight shift assistance provided  Head of Bed (HOB) Positioning: HOB elevated  Pressure Reduction Devices:   foam padding utilized   heel offloading device utilized   positioning supports utilized   pressure-redistributing mattress utilized   specialty bed utilized  Skin Protection:   drying agents applied   incontinence pads utilized   pulse oximeter probe site changed   silicone foam dressing in place   transparent dressing maintained  Taken 2/3/2025 1000 by Cecil Ramirez RN  Activity Management: activity encouraged  Pressure Reduction Techniques:   frequent weight shift encouraged   heels elevated off bed   weight shift assistance provided  Head of Bed (HOB) Positioning: HOB elevated  Pressure Reduction Devices:   foam padding utilized   heel offloading device utilized   positioning supports utilized   pressure-redistributing mattress utilized   specialty bed utilized  Skin  Protection:   drying agents applied   incontinence pads utilized   pulse oximeter probe site changed   silicone foam dressing in place   transparent dressing maintained  Taken 2/3/2025 0800 by Cecil Ramirez RN  Activity Management: activity encouraged  Pressure Reduction Techniques:   frequent weight shift encouraged   heels elevated off bed   weight shift assistance provided  Head of Bed (HOB) Positioning: HOB elevated  Pressure Reduction Devices:   foam padding utilized   heel offloading device utilized   positioning supports utilized   pressure-redistributing mattress utilized   specialty bed utilized  Skin Protection: (Silicone dressings on bilateral heels and coccyx pulled back to assess skin integrity, and replaced)   drying agents applied   incontinence pads utilized   pulse oximeter probe site changed   silicone foam dressing in place   transparent dressing maintained     Problem: Comorbidity Management  Goal: Maintenance of Heart Failure Symptom Control  Outcome: Progressing  Intervention: Maintain Heart Failure Management  Recent Flowsheet Documentation  Taken 2/3/2025 1400 by Cecil Ramirez RN  Medication Review/Management: medications reviewed  Taken 2/3/2025 1200 by Cecil Ramirez RN  Medication Review/Management: medications reviewed  Taken 2/3/2025 1000 by Cecil Ramirez RN  Medication Review/Management: medications reviewed  Taken 2/3/2025 0800 by Cecil Ramirez RN  Medication Review/Management: medications reviewed  Goal: Blood Pressure in Desired Range  Outcome: Progressing  Intervention: Maintain Blood Pressure Management  Recent Flowsheet Documentation  Taken 2/3/2025 1400 by Cecil Ramirez, RN  Medication Review/Management: medications reviewed  Taken 2/3/2025 1200 by Cecil Ramirez, RN  Medication Review/Management: medications reviewed  Taken 2/3/2025 1000 by Cecil Ramirez, LEANNA  Medication  Review/Management: medications reviewed  Taken 2/3/2025 0800 by Cecil Ramirez RN  Medication Review/Management: medications reviewed     Problem: Fall Injury Risk  Goal: Absence of Fall and Fall-Related Injury  Outcome: Progressing  Intervention: Identify and Manage Contributors  Recent Flowsheet Documentation  Taken 2/3/2025 1400 by Cecil Ramirez RN  Medication Review/Management: medications reviewed  Taken 2/3/2025 1200 by Cecil Ramirez RN  Medication Review/Management: medications reviewed  Taken 2/3/2025 1000 by Cecil Ramirez RN  Medication Review/Management: medications reviewed  Taken 2/3/2025 0800 by Cecil Ramirez RN  Medication Review/Management: medications reviewed  Intervention: Promote Injury-Free Environment  Recent Flowsheet Documentation  Taken 2/3/2025 1400 by Cecil Ramirez RN  Safety Promotion/Fall Prevention:   activity supervised   assistive device/personal items within reach   clutter free environment maintained   elopement precautions   fall prevention program maintained   gait belt   lighting adjusted   muscle strengthening facilitated   nonskid shoes/slippers when out of bed   room organization consistent   safety round/check completed   toileting scheduled  Taken 2/3/2025 1200 by Cecil Ramirez RN  Safety Promotion/Fall Prevention:   activity supervised   assistive device/personal items within reach   clutter free environment maintained   elopement precautions   fall prevention program maintained   gait belt   lighting adjusted   muscle strengthening facilitated   nonskid shoes/slippers when out of bed   room organization consistent   safety round/check completed   toileting scheduled  Taken 2/3/2025 1000 by Cecil Ramirez RN  Safety Promotion/Fall Prevention:   activity supervised   assistive device/personal items within reach   clutter free environment maintained   elopement precautions    fall prevention program maintained   gait belt   lighting adjusted   muscle strengthening facilitated   nonskid shoes/slippers when out of bed   room organization consistent   safety round/check completed   toileting scheduled  Taken 2/3/2025 0800 by Cecil Ramirez, RN  Safety Promotion/Fall Prevention:   activity supervised   assistive device/personal items within reach   clutter free environment maintained   elopement precautions   fall prevention program maintained   gait belt   lighting adjusted   muscle strengthening facilitated   nonskid shoes/slippers when out of bed   room organization consistent   safety round/check completed   toileting scheduled     Problem: Palliative Care  Goal: Enhanced Quality of Life  Outcome: Progressing  Intervention: Maximize Comfort  Recent Flowsheet Documentation  Taken 2/3/2025 0831 by Cecil Ramirez, RN  Pain Management Interventions:   pain medication given   pillow support provided   position adjusted   quiet environment facilitated   relaxation techniques promoted  Taken 2/3/2025 0800 by Cecil Ramirez, RN  Pain Management Interventions:   pain medication given   pillow support provided   position adjusted   quiet environment facilitated   relaxation techniques promoted  Intervention: Optimize Function  Recent Flowsheet Documentation  Taken 2/3/2025 0831 by Cecil Ramirez, RN  Sensory Stimulation Regulation:   auditory stimulation minimized   care clustered   lighting decreased  Sleep/Rest Enhancement:   awakenings minimized   noise level reduced   regular sleep/rest pattern promoted   relaxation techniques promoted  Taken 2/3/2025 0800 by Cecil Ramirez RN  Sensory Stimulation Regulation:   auditory stimulation minimized   care clustered   lighting decreased  Sleep/Rest Enhancement:   awakenings minimized   noise level reduced   regular sleep/rest pattern promoted   relaxation techniques promoted  Intervention:  Optimize Psychosocial Wellbeing  Recent Flowsheet Documentation  Taken 2/3/2025 0831 by Cecil Ramirez, RN  Supportive Measures:   active listening utilized   goal-setting facilitated  Family/Support System Care:   self-care encouraged   support provided  Taken 2/3/2025 0800 by Cecil Ramirez, RN  Supportive Measures:   active listening utilized   goal-setting facilitated  Family/Support System Care:   self-care encouraged   support provided   Goal Outcome Evaluation:  Plan of Care Reviewed With: patient        Progress: no change  Outcome Evaluation: Patient has had a decent shift, awake for most of today. VSS, and patient has been alert and oriented to self only. Patient took tylenol once for left lower extremity. Daughter has visited at bedside today. Nursing staff will continue to monitor and assess the patient.

## 2025-02-03 NOTE — TELEPHONE ENCOUNTER
Caller: KELLERMANN, KATHERINE    Relationship: Emergency Contact    Best call back number: [phone]    What is the best time to reach you: ANY TIME    Who are you requesting to speak with (clinical staff, provider,  specific staff member): DR SIEGEL    Do you know the name of the person who called: ALONZO    What was the call regarding: PATIENT HAS BEEN IN HOSPITAL FOR A 2 WEEKS AND DAUGHTER HAS SOME CONCERNS SHE WOULD LIKE TO DISCUSS WITH PROVIDER. PLEASE CALL DAUGHTER BACK.

## 2025-02-03 NOTE — PROGRESS NOTES
"Palliative Care Daily Progress Note     Referring: Yenifer Yangdy    C/C: rick    S: Medical record reviewed.  Events noted. Remains on tele.  Pt and dtr still interested in rehab.  Feel she is breathing better.  Report stabilizing of kidney function.  Remains on IV bumetanide for volume status.      ROS: Denied SOA  + weakness and wants PT    O: Code Status:   Code Status and Medical Interventions: No CPR (Do Not Attempt to Resuscitate); Limited Support; No intubation (DNI), No dialysis   Ordered at: 01/31/25 1140     Medical Intervention Limits:    No intubation (DNI)    No dialysis     Level Of Support Discussed With:    Health Care Surrogate     Code Status (Patient has no pulse and is not breathing):    No CPR (Do Not Attempt to Resuscitate)     Medical Interventions (Patient has pulse or is breathing):    Limited Support      Advanced Directives: Advance Directive Status: Patient has advance directive, copy in chart   Goals of Care: Ongoing.   Palliative Performance Scale Score: 40%     /78   Pulse 102   Temp 97.8 °F (36.6 °C) (Oral)   Resp 25   Ht 162.6 cm (64.02\")   Wt 71.1 kg (156 lb 12.8 oz)   SpO2 93%   BMI 26.90 kg/m²   No intake or output data in the 24 hours ending 02/03/25 1208    Physical Exam:    General Appearance:    Alert, cooperative, NAD   HEENT:    NC/AT, EOMI, anicteric, MMM, face relaxed   Neck:   supple, trachea midline, no JVD   Lungs:     CTA bilat, diminished in bases; respirations regular, even     and unlabored    Heart:    RRR, normal S1 and S2, no M/R/G   Abdomen:     Normal bowel sounds, soft, nontender, nondistended   G/U:   Deferred   MSK/Extremities:   No clubbing , cyanosis or edema, No wasting   Pulses:   Pulses palpable and equal bilaterally   Skin:   Warm, dry, no mottling   Neurologic:   A/Ox3, cooperative, moves extremities x 4, no tremor, nl     tone   Psych:   Calm, appropriate       Current Medications:      Current Facility-Administered Medications:     " acetaminophen (TYLENOL) tablet 650 mg, 650 mg, Oral, Q6H PRN, Linda Felix MD, 650 mg at 02/03/25 0831    apixaban (ELIQUIS) tablet 5 mg, 5 mg, Oral, Q12H, Yenifer Eid MD, 5 mg at 02/03/25 0831    sennosides-docusate (PERICOLACE) 8.6-50 MG per tablet 2 tablet, 2 tablet, Oral, BID PRN, 2 tablet at 02/01/25 0814 **AND** polyethylene glycol (MIRALAX) packet 17 g, 17 g, Oral, Daily PRN, 17 g at 02/01/25 0814 **AND** bisacodyl (DULCOLAX) EC tablet 5 mg, 5 mg, Oral, Daily PRN **AND** bisacodyl (DULCOLAX) suppository 10 mg, 10 mg, Rectal, Daily PRN, Chanel Noriega MD, 10 mg at 02/01/25 0506    bumetanide (BUMEX) injection 2 mg, 2 mg, Intravenous, Q12H, Victoriano Webster MD, 2 mg at 02/03/25 0553    hydrALAZINE (APRESOLINE) tablet 25 mg, 25 mg, Oral, BID, Chanel Noriega MD, 25 mg at 02/03/25 0831    sodium chloride 0.9 % flush 10 mL, 10 mL, Intravenous, PRN, Chanel Noriega MD    sodium chloride 0.9 % flush 10 mL, 10 mL, Intravenous, Q12H, Chanel Noriega MD, 10 mL at 02/03/25 0831    sodium chloride 0.9 % flush 10 mL, 10 mL, Intravenous, PRN, Chanel Noriega MD    sodium chloride 0.9 % infusion 40 mL, 40 mL, Intravenous, PRN, Chanel Noriega MD     Labs:   Results from last 7 days   Lab Units 02/03/25  1036   WBC 10*3/mm3 10.85*   HEMOGLOBIN g/dL 12.2   HEMATOCRIT % 40.3   PLATELETS 10*3/mm3 254     Results from last 7 days   Lab Units 02/02/25  1135   SODIUM mmol/L 135*   POTASSIUM mmol/L 4.3   CHLORIDE mmol/L 95*   CO2 mmol/L 27.0   BUN mg/dL 68*   CREATININE mg/dL 2.44*   CALCIUM mg/dL 8.8   GLUCOSE mg/dL 116*     Imaging Results (Last 72 Hours)       Procedure Component Value Units Date/Time    XR Chest 1 View [[id number]] Collected: 02/03/25 0707     Updated: 02/03/25 0712    Narrative:      XR CHEST 1 VW    Date of Exam: 2/3/2025 3:32 AM EST    Indication: Follow up L pleural effusion s/p Left pleural CT placement    Comparison: 2/2/2025    Findings:  There is persistent and fairly extensive bibasilar effusion and  atelectasis without significant interval change. Exam history states left pleural chest tube placement but no chest drain is visible. Lung apices remain clear. Heart appears upper normal   size. Vasculature is slightly cephalized. No pneumothorax is seen.      Impression:      Impression:  Persistent bibasilar effusion and atelectasis.      Electronically Signed: James Juarez MD    2/3/2025 7:09 AM EST    Workstation ID: [id number]    XR Chest 1 View [[id number]] Collected: 02/02/25 0749     Updated: 02/02/25 0753    Narrative:      XR CHEST 1 VW    Date of Exam: 2/2/2025 5:55 AM EST    Indication: Follow up L pleural effusion s/p Left pleural CT placement    Comparison: Chest radiograph 2/1/2025    Findings:  Stable cardiomediastinal silhouette including cardiomegaly. Moderate right probably small to moderate left pleural effusions without significant change with adjacent compressive atelectasis versus airspace disease. Minimal interstitial thickening which   could reflect vascular congestion or edema similar to prior. No worsening consolidation or pneumothorax. Degenerative related osseous change. Lung apices partially obscured by patient's chin.      Impression:      Impression:  No significant interval change.      Electronically Signed: Juice Martinez MD    2/2/2025 7:50 AM EST    Workstation ID: [id number]    XR Chest 1 View [[id number]] Collected: 02/01/25 0758     Updated: 02/01/25 0803    Narrative:      XR CHEST 1 VW    Date of Exam: 2/1/2025 12:26 AM EST    Indication: Follow up L pleural effusion s/p Left pleural CT placement    Comparison: Chest x-ray 1/31/2025    Findings:  Low lung volumes. Cardiomegaly. Moderate bilateral pleural effusions. Septal thickening. Probable atelectasis right middle lobe.      Impression:      Impression:  No significant change from previous exam.      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  Discussed option of hospice information.  Would like some hospice information for future use but not for utilization at this time.  Will refer for information only. Palliative Care Team will continue to follow patient.     Kriss Arboleda MD, 2/3/2025, 12:08 EST

## 2025-02-04 ENCOUNTER — APPOINTMENT (OUTPATIENT)
Dept: GENERAL RADIOLOGY | Facility: HOSPITAL | Age: OVER 89
End: 2025-02-04
Payer: MEDICARE

## 2025-02-04 ENCOUNTER — APPOINTMENT (OUTPATIENT)
Dept: ULTRASOUND IMAGING | Facility: HOSPITAL | Age: OVER 89
End: 2025-02-04
Payer: MEDICARE

## 2025-02-04 LAB
ANION GAP SERPL CALCULATED.3IONS-SCNC: 15 MMOL/L (ref 5–15)
BUN SERPL-MCNC: 80 MG/DL (ref 8–23)
BUN/CREAT SERPL: 32.3 (ref 7–25)
CALCIUM SPEC-SCNC: 8.7 MG/DL (ref 8.2–9.6)
CHLORIDE SERPL-SCNC: 94 MMOL/L (ref 98–107)
CO2 SERPL-SCNC: 26 MMOL/L (ref 22–29)
CREAT SERPL-MCNC: 2.48 MG/DL (ref 0.57–1)
EGFRCR SERPLBLD CKD-EPI 2021: 17.2 ML/MIN/1.73
GLUCOSE SERPL-MCNC: 172 MG/DL (ref 65–99)
POTASSIUM SERPL-SCNC: 4 MMOL/L (ref 3.5–5.2)
SODIUM SERPL-SCNC: 135 MMOL/L (ref 136–145)

## 2025-02-04 PROCEDURE — 97535 SELF CARE MNGMENT TRAINING: CPT

## 2025-02-04 PROCEDURE — 25010000002 ALBUMIN HUMAN 25% PER 50 ML: Performed by: HOSPITALIST

## 2025-02-04 PROCEDURE — 76942 ECHO GUIDE FOR BIOPSY: CPT

## 2025-02-04 PROCEDURE — 25010000002 BUMETANIDE PER 0.5 MG: Performed by: INTERNAL MEDICINE

## 2025-02-04 PROCEDURE — 71045 X-RAY EXAM CHEST 1 VIEW: CPT

## 2025-02-04 PROCEDURE — 99232 SBSQ HOSP IP/OBS MODERATE 35: CPT | Performed by: HOSPITALIST

## 2025-02-04 PROCEDURE — 80048 BASIC METABOLIC PNL TOTAL CA: CPT | Performed by: HOSPITALIST

## 2025-02-04 PROCEDURE — 97530 THERAPEUTIC ACTIVITIES: CPT

## 2025-02-04 PROCEDURE — P9047 ALBUMIN (HUMAN), 25%, 50ML: HCPCS | Performed by: HOSPITALIST

## 2025-02-04 PROCEDURE — 0W9B3ZZ DRAINAGE OF LEFT PLEURAL CAVITY, PERCUTANEOUS APPROACH: ICD-10-PCS | Performed by: RADIOLOGY

## 2025-02-04 RX ORDER — BUMETANIDE 2 MG/1
2 TABLET ORAL DAILY
Status: DISCONTINUED | OUTPATIENT
Start: 2025-02-04 | End: 2025-02-06 | Stop reason: HOSPADM

## 2025-02-04 RX ORDER — ALBUMIN (HUMAN) 12.5 G/50ML
25 SOLUTION INTRAVENOUS ONCE
Status: COMPLETED | OUTPATIENT
Start: 2025-02-04 | End: 2025-02-04

## 2025-02-04 RX ADMIN — APIXABAN 5 MG: 5 TABLET, FILM COATED ORAL at 20:52

## 2025-02-04 RX ADMIN — Medication 10 ML: at 20:52

## 2025-02-04 RX ADMIN — APIXABAN 5 MG: 5 TABLET, FILM COATED ORAL at 08:42

## 2025-02-04 RX ADMIN — BUMETANIDE 2 MG: 2 TABLET ORAL at 11:31

## 2025-02-04 RX ADMIN — BUMETANIDE 2 MG: 0.25 INJECTION INTRAMUSCULAR; INTRAVENOUS at 05:53

## 2025-02-04 RX ADMIN — ACETAMINOPHEN 650 MG: 325 TABLET ORAL at 11:31

## 2025-02-04 RX ADMIN — Medication 10 ML: at 08:42

## 2025-02-04 RX ADMIN — HYDRALAZINE HYDROCHLORIDE 25 MG: 25 TABLET ORAL at 08:42

## 2025-02-04 RX ADMIN — ALBUMIN (HUMAN) 25 G: 0.25 INJECTION, SOLUTION INTRAVENOUS at 11:31

## 2025-02-04 NOTE — PLAN OF CARE
Goal Outcome Evaluation:  Plan of Care Reviewed With: patient        Progress: improving  Outcome Evaluation: OT re-cert completed with goals adjusted appropriately. The pt performed STS x2 from the EOB using a RWx with modA x2. Pt took ~4 steps from the EOB to the chair using a RWx with Xiomara x2, cues provided for upright posture. Additional mobility limited by pt fatigue and weakness following prolonged standing at EOB for hygiene. The pt remains below baseline with generalized weakness, decreased activity tolerance, balance deficits, and decreased safety awareness warranting continued IP OT services. Rec a d/c to SNF when medically ready.    Anticipated Discharge Disposition (OT): skilled nursing facility

## 2025-02-04 NOTE — PROGRESS NOTES
[facility] Medicine Services  PROGRESS NOTE    Patient Name: Rose J Kellermann  : 1926  MRN: [id number]    Date of Admission: 2025  Primary Care Physician: Sam Hung MD    Subjective   Subjective     CC: Dyspnea    HPI: Up in chair, daughter at bedside. SOA with exertion, appetite improving. No cough/congestion. No f/c.     Objective   Objective     Vital Signs:   Temp:  [96.3 °F (35.7 °C)-97.8 °F (36.6 °C)] 96.3 °F (35.7 °C)  Heart Rate:  [] 84  Resp:  [18-22] 18  BP: (103-127)/(71-90) 114/80  Flow (L/min) (Oxygen Therapy):  [4] 4     Physical Exam:  NAD, alert and oriented, more alert/conversant/responsive today  Chronically ill appearing  OP clear, dry MM  Neck supple  RRR  Decreased BS bases, worse on L  +BS, soft  JOSE  Flat affect    Results Reviewed:  LAB RESULTS:      Lab 25  1036 25  0926 25  1259 25  1502   WBC 10.85* 8.62 11.02* 10.09   HEMOGLOBIN 12.2 12.0 12.8 13.1   HEMATOCRIT 40.3 39.3 39.5 40.4   PLATELETS 254 220 272 291   NEUTROS ABS  --   --   --  8.52*   IMMATURE GRANS (ABS)  --   --   --  0.05   LYMPHS ABS  --   --   --  0.55*   MONOS ABS  --   --   --  0.82   EOS ABS  --   --   --  0.13   MCV 99.0* 99.2* 93.4 93.3         Lab 25  1036 25  1135 25  0837 25  0926 25  1259 25  1404   SODIUM 132* 135* 137 136 134* 137   POTASSIUM 4.0 4.3 4.1 3.9 4.3 4.6   CHLORIDE 93* 95* 94* 97* 96* 98   CO2 25.0 27.0 28.0 28.0 25.0 26.0   ANION GAP 14.0 13.0 15.0 11.0 13.0 13.0   BUN 79* 68* 65* 68* 72* 77*   CREATININE 2.74* 2.44* 2.28* 2.25* 2.52* 2.89*   EGFR 15.2* 17.5* 19.0* 19.3* 16.8* 14.3*   GLUCOSE 129* 116* 112* 131* 152* 154*   CALCIUM 8.4 8.8 8.7 8.8 8.4 8.9  8.9   MAGNESIUM  --   --   --   --  2.4*  --    PHOSPHORUS  --   --   --   --  4.1 4.6*         Lab 25  1259 25  1404   ALBUMIN 2.8* 3.1*                 Lab 25  1404   IRON 26*   IRON SATURATION (TSAT) 12*    TIBC 209*   TRANSFERRIN 140*         Lab 01/28/25  1552   PH, ARTERIAL 7.393   PCO2, ARTERIAL 35.2   PO2 .0*   FIO2 40   HCO3 ART 21.4   BASE EXCESS ART -2.9*   CARBOXYHEMOGLOBIN 1.1     Brief Urine Lab Results       None            Microbiology Results Abnormal       None            XR Chest 1 View    Result Date: 2/4/2025  XR CHEST 1 VW Date of Exam: 2/4/2025 2:16 AM EST Indication: Follow up L pleural effusion s/p Left pleural CT placement Comparison: Chest radiograph 2/3/2025 Findings: Similar moderate size pleural effusions with presumed underlying bibasilar atelectasis. Differential includes infection. Slight interstitial prominence without overt edema. Stable cardiomediastinal silhouette including cardiomegaly. No new consolidation.  No visualized pneumothorax. Degenerative related osseous change.     Impression: Impression: No significant interval change. Electronically Signed: Juice Martinez MD  2/4/2025 8:04 AM EST  Workstation ID: [id number]    XR Chest 1 View    Result Date: 2/3/2025  XR CHEST 1 VW Date of Exam: 2/3/2025 3:32 AM EST Indication: Follow up L pleural effusion s/p Left pleural CT placement Comparison: 2/2/2025 Findings: There is persistent and fairly extensive bibasilar effusion and atelectasis without significant interval change. Exam history states left pleural chest tube placement but no chest drain is visible. Lung apices remain clear. Heart appears upper normal size. Vasculature is slightly cephalized. No pneumothorax is seen.     Impression: Impression: Persistent bibasilar effusion and atelectasis. Electronically Signed: James Juarez MD  2/3/2025 7:09 AM EST  Workstation ID: [id number]     Results for orders placed during the hospital encounter of 01/20/25    Adult Transthoracic Echo Complete W/ Cont if Necessary Per Protocol    Interpretation Summary    Left ventricular ejection fraction appears to be 61 - 65%.    Left ventricular diastolic function was indeterminate.    Mild  aortic valve stenosis is present.    Peak velocity of the flow distal to the aortic valve is 245.6 cm/s. Aortic valve maximum pressure gradient is 24 mmHg. Aortic valve mean pressure gradient is 13 mmHg.    The mitral valve mean gradient is 4 mmHg.    Moderate tricuspid valve regurgitation is present.    Estimated right ventricular systolic pressure from tricuspid regurgitation is markedly elevated (>55 mmHg). Calculated right ventricular systolic pressure from tricuspid regurgitation is 57 mmHg.    There is a moderate 2cm  pericardial effusion with no tamponade    There is a left pleural effusion.      Current medications:  Scheduled Meds:albumin human, 25 g, Intravenous, Once  apixaban, 5 mg, Oral, Q12H  bumetanide, 2 mg, Oral, Daily  hydrALAZINE, 25 mg, Oral, BID  sodium chloride, 10 mL, Intravenous, Q12H      Continuous Infusions:   PRN Meds:.  acetaminophen    senna-docusate sodium **AND** polyethylene glycol **AND** bisacodyl **AND** bisacodyl    sodium chloride    sodium chloride    sodium chloride    Assessment & Plan   Assessment & Plan     Active Hospital Problems    Diagnosis  POA    **Pleural effusion on left [J90]  Yes    Pulmonary hypertension [I27.20]  Yes    Acute on chronic heart failure with preserved ejection fraction (HFpEF) [I50.33]  Yes    Essential hypertension [I10]  Yes    Chronic kidney disease, stage III (moderate)  [N18.30]  Yes      Resolved Hospital Problems   No resolved problems to display.        Brief Hospital Course to date:  Rose J Kellermann is a 89 y.o. female with history of DVT on Eliquis, HFpEF, HTN, and CKD IV who presents due to dyspnea and hypoxia. Had been wearing 2-3 liters home oxygen intermittently over the past several months, now requiring continuously. Workup in the ED notable for a left pleural effusion. Admitted for further management.      Progressive hypoxia, acute on chronic  Left pleural effusion  Acute on chronic HFpEF exacerbation  Valvular heart  disease  Pulmonary HTN  Pericardial effusion  -CXR with moderate L effusion, s/p IR chest tube, CTS managed, CT pulled 1/26  -Fluid with cultures/cytology negative, exudate  -ECHO with pericardial effusion  -cardiology consulted, deferred window/pericardiocentesis  -diurese per nephrology  -wean oxygen as tolerated  -recurrent L effusion, will ask for repeat IR thoracentesis today 2/4  -R effusion appears significant on 2/4 CXR as well  -on bumex, add albumin  -may need R thoracentesis as well     ANGEL LUIS on CKD IV  -baseline creatinine around 2  -nephrology following, has been on IV bumex  -change to PO bumex    Afib/Aflutter  Hx of PACs  -cardiology following, on eliquis  -no BB due to significant pauses/bradycardia on 6/23 Holter,      History of BLE DVT (June 2023)  -continue Eliquis     Hypertension  -CTM    Goals of care  -Family/patient interested in rehab    Expected Discharge Location and Transportation: SNF  Expected Discharge she appears to be improving, hopefully will be able to go to SNF this week  Expected Discharge Date: 2/5/2025; Expected Discharge Time:      VTE Prophylaxis:  Pharmacologic & mechanical VTE prophylaxis orders are present.         AM-PAC 6 Clicks Score (PT): 13 (02/04/25 0800)    CODE STATUS:   Code Status and Medical Interventions: No CPR (Do Not Attempt to Resuscitate); Limited Support; No intubation (DNI), No dialysis   Ordered at: 01/31/25 1140     Medical Intervention Limits:    No intubation (DNI)    No dialysis     Level Of Support Discussed With:    Health Care Surrogate     Code Status (Patient has no pulse and is not breathing):    No CPR (Do Not Attempt to Resuscitate)     Medical Interventions (Patient has pulse or is breathing):    Limited Support       James Gatica MD  02/04/25

## 2025-02-04 NOTE — CASE MANAGEMENT/SOCIAL WORK
Continued Stay Note   [place]     Patient Name: Rose J Kellermann  MRN: [id number]  Today's Date: 2/4/2025    Admit Date: 1/20/2025    Plan: rehab   Discharge Plan       Row Name 02/04/25 1204       Plan    Plan rehab    Patient/Family in Agreement with Plan yes    Plan Comments I received a call from Gifty, from [facility], who stated that they may have a rehab bed available later in the week. CM emailed the CM office to begin a new insurance pre certification today. The daughter, RN and MD were notified. She may need transport arranged. CM to follow.    1238  This patient has been approved for [facility] through 2/6. MD and RN notified. CM to follow.    Final Discharge Disposition Code 03 - skilled nursing facility (SNF)                   Discharge Codes    No documentation.                 Expected Discharge Date and Time       Expected Discharge Date Expected Discharge Time    Feb 5, 2025               Floresita Dawson RN

## 2025-02-04 NOTE — PLAN OF CARE
Problem: Adult Inpatient Plan of Care  Goal: Plan of Care Review  Outcome: Progressing  Flowsheets (Taken 2/4/2025 1549)  Progress: no change  Outcome Evaluation: Patient has had a decent shift, awake for most of today. VSS, and patient has been alert and oriented to self only. Patient has required tylenol once for left lower extremity pain. Daughter at bedside. Nursing staff will continue to monitor and assess the patient.  Plan of Care Reviewed With:   patient   child  Goal: Patient-Specific Goal (Individualized)  Outcome: Progressing  Flowsheets (Taken 2/4/2025 1549)  Patient/Family-Specific Goals (Include Timeframe): Monitor and assess for pain q2hrs and prn  Goal: Absence of Hospital-Acquired Illness or Injury  Outcome: Progressing  Intervention: Identify and Manage Fall Risk  Recent Flowsheet Documentation  Taken 2/4/2025 1400 by Cecil Ramirez, RN  Safety Promotion/Fall Prevention:   activity supervised   assistive device/personal items within reach   clutter free environment maintained   elopement precautions   fall prevention program maintained   gait belt   lighting adjusted   muscle strengthening facilitated   nonskid shoes/slippers when out of bed   room organization consistent   safety round/check completed   toileting scheduled  Taken 2/4/2025 1200 by Cecil Ramirez, RN  Safety Promotion/Fall Prevention:   activity supervised   assistive device/personal items within reach   clutter free environment maintained   elopement precautions   fall prevention program maintained   gait belt   lighting adjusted   muscle strengthening facilitated   nonskid shoes/slippers when out of bed   room organization consistent   safety round/check completed   toileting scheduled  Taken 2/4/2025 1000 by Cecil Ramirez, RN  Safety Promotion/Fall Prevention:   activity supervised   assistive device/personal items within reach   clutter free environment maintained   elopement precautions    fall prevention program maintained   gait belt   lighting adjusted   muscle strengthening facilitated   nonskid shoes/slippers when out of bed   room organization consistent   safety round/check completed   toileting scheduled  Taken 2/4/2025 0800 by Cecil Ramirez RN  Safety Promotion/Fall Prevention:   activity supervised   assistive device/personal items within reach   clutter free environment maintained   elopement precautions   fall prevention program maintained   gait belt   lighting adjusted   muscle strengthening facilitated   nonskid shoes/slippers when out of bed   room organization consistent   safety round/check completed   toileting scheduled  Intervention: Prevent Skin Injury  Recent Flowsheet Documentation  Taken 2/4/2025 1400 by Cecil Ramirez RN  Body Position: (Up to chair) other (see comments)  Skin Protection:   drying agents applied   incontinence pads utilized   pulse oximeter probe site changed   silicone foam dressing in place   transparent dressing maintained  Taken 2/4/2025 1200 by Cecil Ramirez RN  Skin Protection:   drying agents applied   incontinence pads utilized   pulse oximeter probe site changed   silicone foam dressing in place   transparent dressing maintained  Taken 2/4/2025 1000 by Cecil Ramirez RN  Skin Protection:   drying agents applied   incontinence pads utilized   pulse oximeter probe site changed   silicone foam dressing in place   transparent dressing maintained  Taken 2/4/2025 0800 by Cecil Ramirez RN  Body Position:   turned   right  Skin Protection: (Silicone dressings on bilateral heels and coccyx pulled back to assess skin integrity, and replaced)   drying agents applied   incontinence pads utilized   pulse oximeter probe site changed   silicone foam dressing in place   transparent dressing maintained  Intervention: Prevent and Manage VTE (Venous Thromboembolism) Risk  Recent Flowsheet  Documentation  Taken 2/4/2025 0800 by Cecil Ramirez RN  VTE Prevention/Management:   bilateral   SCDs (sequential compression devices) on  Intervention: Prevent Infection  Recent Flowsheet Documentation  Taken 2/4/2025 1400 by Cecil Ramirez RN  Infection Prevention:   rest/sleep promoted   single patient room provided  Taken 2/4/2025 1200 by Cecil Ramirez RN  Infection Prevention:   rest/sleep promoted   single patient room provided  Taken 2/4/2025 1000 by Cecil Ramirez RN  Infection Prevention:   rest/sleep promoted   single patient room provided  Taken 2/4/2025 0800 by Cecil Ramirez RN  Infection Prevention:   rest/sleep promoted   single patient room provided  Goal: Optimal Comfort and Wellbeing  Outcome: Progressing  Intervention: Monitor Pain and Promote Comfort  Recent Flowsheet Documentation  Taken 2/4/2025 1131 by Cecil Ramirez RN  Pain Management Interventions:   pain medication given   pillow support provided   position adjusted   quiet environment facilitated   relaxation techniques promoted  Intervention: Provide Person-Centered Care  Recent Flowsheet Documentation  Taken 2/4/2025 0800 by Cecil Ramirez RN  Trust Relationship/Rapport:   care explained   choices provided   questions answered   questions encouraged  Goal: Readiness for Transition of Care  Outcome: Progressing     Problem: Skin Injury Risk Increased  Goal: Skin Health and Integrity  Outcome: Progressing  Intervention: Optimize Skin Protection  Recent Flowsheet Documentation  Taken 2/4/2025 1400 by Cecil Ramirez RN  Activity Management:   up in chair   activity encouraged  Pressure Reduction Techniques:   frequent weight shift encouraged   heels elevated off bed   weight shift assistance provided  Head of Bed (HOB) Positioning: (Up to chair) other (see comments)  Pressure Reduction Devices:   foam padding utilized   heel offloading device  utilized   positioning supports utilized   pressure-redistributing mattress utilized   specialty bed utilized  Skin Protection:   drying agents applied   incontinence pads utilized   pulse oximeter probe site changed   silicone foam dressing in place   transparent dressing maintained  Taken 2/4/2025 1200 by Cecil Ramirez RN  Activity Management: up in chair  Pressure Reduction Techniques:   frequent weight shift encouraged   heels elevated off bed   weight shift assistance provided  Head of Bed (HOB) Positioning: (Up to chair) other (see comments)  Pressure Reduction Devices:   foam padding utilized   heel offloading device utilized   positioning supports utilized   pressure-redistributing mattress utilized   specialty bed utilized  Skin Protection:   drying agents applied   incontinence pads utilized   pulse oximeter probe site changed   silicone foam dressing in place   transparent dressing maintained  Taken 2/4/2025 1000 by Cecil Ramirez RN  Activity Management:   up in chair   activity encouraged  Pressure Reduction Techniques:   frequent weight shift encouraged   heels elevated off bed   weight shift assistance provided  Head of Bed (HOB) Positioning: (Up to chair) other (see comments)  Pressure Reduction Devices:   foam padding utilized   heel offloading device utilized   positioning supports utilized   pressure-redistributing mattress utilized   specialty bed utilized  Skin Protection:   drying agents applied   incontinence pads utilized   pulse oximeter probe site changed   silicone foam dressing in place   transparent dressing maintained  Taken 2/4/2025 0800 by Cecil Ramirez RN  Activity Management: activity encouraged  Pressure Reduction Techniques:   frequent weight shift encouraged   heels elevated off bed   weight shift assistance provided  Head of Bed (HOB) Positioning: HOB elevated  Pressure Reduction Devices:   foam padding utilized   heel offloading device  utilized   positioning supports utilized   pressure-redistributing mattress utilized   specialty bed utilized  Skin Protection: (Silicone dressings on bilateral heels and coccyx pulled back to assess skin integrity, and replaced)   drying agents applied   incontinence pads utilized   pulse oximeter probe site changed   silicone foam dressing in place   transparent dressing maintained     Problem: Comorbidity Management  Goal: Maintenance of Heart Failure Symptom Control  Outcome: Progressing  Intervention: Maintain Heart Failure Management  Recent Flowsheet Documentation  Taken 2/4/2025 1400 by Cecil Ramirez RN  Medication Review/Management: medications reviewed  Taken 2/4/2025 1200 by Cecil Ramirez RN  Medication Review/Management: medications reviewed  Taken 2/4/2025 1000 by Cecil Ramirez RN  Medication Review/Management: medications reviewed  Taken 2/4/2025 0800 by Cecil Ramirez RN  Medication Review/Management: medications reviewed  Goal: Blood Pressure in Desired Range  Outcome: Progressing  Intervention: Maintain Blood Pressure Management  Recent Flowsheet Documentation  Taken 2/4/2025 1400 by Cecil Ramirez RN  Medication Review/Management: medications reviewed  Taken 2/4/2025 1200 by Cecil Ramirez RN  Medication Review/Management: medications reviewed  Taken 2/4/2025 1000 by Cecil Ramirez RN  Medication Review/Management: medications reviewed  Taken 2/4/2025 0800 by Cecil Ramirez RN  Medication Review/Management: medications reviewed     Problem: Fall Injury Risk  Goal: Absence of Fall and Fall-Related Injury  Outcome: Progressing  Intervention: Identify and Manage Contributors  Recent Flowsheet Documentation  Taken 2/4/2025 1400 by Cecil Ramirez RN  Medication Review/Management: medications reviewed  Taken 2/4/2025 1200 by Cecil Ramirez RN  Medication Review/Management:  medications reviewed  Taken 2/4/2025 1000 by Cecil Ramirez, RN  Medication Review/Management: medications reviewed  Taken 2/4/2025 0800 by Cecil Ramirez RN  Medication Review/Management: medications reviewed  Intervention: Promote Injury-Free Environment  Recent Flowsheet Documentation  Taken 2/4/2025 1400 by Cecil Ramirez, RN  Safety Promotion/Fall Prevention:   activity supervised   assistive device/personal items within reach   clutter free environment maintained   elopement precautions   fall prevention program maintained   gait belt   lighting adjusted   muscle strengthening facilitated   nonskid shoes/slippers when out of bed   room organization consistent   safety round/check completed   toileting scheduled  Taken 2/4/2025 1200 by Cecil Ramirez RN  Safety Promotion/Fall Prevention:   activity supervised   assistive device/personal items within reach   clutter free environment maintained   elopement precautions   fall prevention program maintained   gait belt   lighting adjusted   muscle strengthening facilitated   nonskid shoes/slippers when out of bed   room organization consistent   safety round/check completed   toileting scheduled  Taken 2/4/2025 1000 by Cecil Ramirez RN  Safety Promotion/Fall Prevention:   activity supervised   assistive device/personal items within reach   clutter free environment maintained   elopement precautions   fall prevention program maintained   gait belt   lighting adjusted   muscle strengthening facilitated   nonskid shoes/slippers when out of bed   room organization consistent   safety round/check completed   toileting scheduled  Taken 2/4/2025 0800 by Cecil Ramirez, RN  Safety Promotion/Fall Prevention:   activity supervised   assistive device/personal items within reach   clutter free environment maintained   elopement precautions   fall prevention program maintained   gait belt   lighting adjusted    muscle strengthening facilitated   nonskid shoes/slippers when out of bed   room organization consistent   safety round/check completed   toileting scheduled     Problem: Palliative Care  Goal: Enhanced Quality of Life  Outcome: Progressing  Intervention: Maximize Comfort  Recent Flowsheet Documentation  Taken 2/4/2025 1131 by Cecil Ramirez RN  Pain Management Interventions:   pain medication given   pillow support provided   position adjusted   quiet environment facilitated   relaxation techniques promoted  Intervention: Optimize Function  Recent Flowsheet Documentation  Taken 2/4/2025 0800 by Cecil Ramirez RN  Sensory Stimulation Regulation:   auditory stimulation minimized   care clustered   lighting decreased  Sleep/Rest Enhancement:   awakenings minimized   noise level reduced   regular sleep/rest pattern promoted   relaxation techniques promoted  Intervention: Optimize Psychosocial Wellbeing  Recent Flowsheet Documentation  Taken 2/4/2025 0800 by Cecil Ramirez RN  Supportive Measures:   active listening utilized   goal-setting facilitated  Family/Support System Care:   self-care encouraged   support provided   Goal Outcome Evaluation:  Plan of Care Reviewed With: patient, child        Progress: no change  Outcome Evaluation: Patient has had a decent shift, awake for most of today. VSS, and patient has been alert and oriented to self only. Patient has required tylenol once for left lower extremity pain. Daughter at bedside. Nursing staff will continue to monitor and assess the patient.

## 2025-02-04 NOTE — H&P (VIEW-ONLY)
[facility]  PROGRESS NOTE    Patient Name: Rose J Kellermann  : 1926  MRN: [id number]    Date of Admission: 2025  Primary Care Physician: Sam Hung MD    Subjective   Subjective     CC: Dyspnea    HPI: Up in chair, daughter at bedside. SOA with exertion, appetite improving. No cough/congestion. No f/c.     Objective   Objective     Vital Signs:   Temp:  [96.3 °F (35.7 °C)-97.8 °F (36.6 °C)] 96.3 °F (35.7 °C)  Heart Rate:  [] 84  Resp:  [18-22] 18  BP: (103-127)/(71-90) 114/80  Flow (L/min) (Oxygen Therapy):  [4] 4     Physical Exam:  NAD, alert and oriented, more alert/conversant/responsive today  Chronically ill appearing  OP clear, dry MM  Neck supple  RRR  Decreased BS bases, worse on L  +BS, soft  JOSE  Flat affect    Results Reviewed:  LAB RESULTS:      Lab 25  1036 25  0926 25  1259 25  1502   WBC 10.85* 8.62 11.02* 10.09   HEMOGLOBIN 12.2 12.0 12.8 13.1   HEMATOCRIT 40.3 39.3 39.5 40.4   PLATELETS 254 220 272 291   NEUTROS ABS  --   --   --  8.52*   IMMATURE GRANS (ABS)  --   --   --  0.05   LYMPHS ABS  --   --   --  0.55*   MONOS ABS  --   --   --  0.82   EOS ABS  --   --   --  0.13   MCV 99.0* 99.2* 93.4 93.3         Lab 25  1036 25  1135 25  0837 25  0926 25  1259 25  1404   SODIUM 132* 135* 137 136 134* 137   POTASSIUM 4.0 4.3 4.1 3.9 4.3 4.6   CHLORIDE 93* 95* 94* 97* 96* 98   CO2 25.0 27.0 28.0 28.0 25.0 26.0   ANION GAP 14.0 13.0 15.0 11.0 13.0 13.0   BUN 79* 68* 65* 68* 72* 77*   CREATININE 2.74* 2.44* 2.28* 2.25* 2.52* 2.89*   EGFR 15.2* 17.5* 19.0* 19.3* 16.8* 14.3*   GLUCOSE 129* 116* 112* 131* 152* 154*   CALCIUM 8.4 8.8 8.7 8.8 8.4 8.9  8.9   MAGNESIUM  --   --   --   --  2.4*  --    PHOSPHORUS  --   --   --   --  4.1 4.6*         Lab 25  1259 25  1404   ALBUMIN 2.8* 3.1*                 Lab 25  1404   IRON 26*   IRON SATURATION (TSAT) 12*    TIBC 209*   TRANSFERRIN 140*         Lab 01/28/25  1552   PH, ARTERIAL 7.393   PCO2, ARTERIAL 35.2   PO2 .0*   FIO2 40   HCO3 ART 21.4   BASE EXCESS ART -2.9*   CARBOXYHEMOGLOBIN 1.1     Brief Urine Lab Results       None            Microbiology Results Abnormal       None            XR Chest 1 View    Result Date: 2/4/2025  XR CHEST 1 VW Date of Exam: 2/4/2025 2:16 AM EST Indication: Follow up L pleural effusion s/p Left pleural CT placement Comparison: Chest radiograph 2/3/2025 Findings: Similar moderate size pleural effusions with presumed underlying bibasilar atelectasis. Differential includes infection. Slight interstitial prominence without overt edema. Stable cardiomediastinal silhouette including cardiomegaly. No new consolidation.  No visualized pneumothorax. Degenerative related osseous change.     Impression: Impression: No significant interval change. Electronically Signed: Juice Martinez MD  2/4/2025 8:04 AM EST  Workstation ID: [id number]    XR Chest 1 View    Result Date: 2/3/2025  XR CHEST 1 VW Date of Exam: 2/3/2025 3:32 AM EST Indication: Follow up L pleural effusion s/p Left pleural CT placement Comparison: 2/2/2025 Findings: There is persistent and fairly extensive bibasilar effusion and atelectasis without significant interval change. Exam history states left pleural chest tube placement but no chest drain is visible. Lung apices remain clear. Heart appears upper normal size. Vasculature is slightly cephalized. No pneumothorax is seen.     Impression: Impression: Persistent bibasilar effusion and atelectasis. Electronically Signed: James Juarez MD  2/3/2025 7:09 AM EST  Workstation ID: [id number]     Results for orders placed during the hospital encounter of 01/20/25    Adult Transthoracic Echo Complete W/ Cont if Necessary Per Protocol    Interpretation Summary    Left ventricular ejection fraction appears to be 61 - 65%.    Left ventricular diastolic function was indeterminate.    Mild  aortic valve stenosis is present.    Peak velocity of the flow distal to the aortic valve is 245.6 cm/s. Aortic valve maximum pressure gradient is 24 mmHg. Aortic valve mean pressure gradient is 13 mmHg.    The mitral valve mean gradient is 4 mmHg.    Moderate tricuspid valve regurgitation is present.    Estimated right ventricular systolic pressure from tricuspid regurgitation is markedly elevated (>55 mmHg). Calculated right ventricular systolic pressure from tricuspid regurgitation is 57 mmHg.    There is a moderate 2cm  pericardial effusion with no tamponade    There is a left pleural effusion.      Current medications:  Scheduled Meds:albumin human, 25 g, Intravenous, Once  apixaban, 5 mg, Oral, Q12H  bumetanide, 2 mg, Oral, Daily  hydrALAZINE, 25 mg, Oral, BID  sodium chloride, 10 mL, Intravenous, Q12H      Continuous Infusions:   PRN Meds:.  acetaminophen    senna-docusate sodium **AND** polyethylene glycol **AND** bisacodyl **AND** bisacodyl    sodium chloride    sodium chloride    sodium chloride    Assessment & Plan   Assessment & Plan     Active Hospital Problems    Diagnosis  POA    **Pleural effusion on left [J90]  Yes    Pulmonary hypertension [I27.20]  Yes    Acute on chronic heart failure with preserved ejection fraction (HFpEF) [I50.33]  Yes    Essential hypertension [I10]  Yes    Chronic kidney disease, stage III (moderate)  [N18.30]  Yes      Resolved Hospital Problems   No resolved problems to display.        Brief Hospital Course to date:  Rose J Kellermann is a 89 y.o. female with history of DVT on Eliquis, HFpEF, HTN, and CKD IV who presents due to dyspnea and hypoxia. Had been wearing 2-3 liters home oxygen intermittently over the past several months, now requiring continuously. Workup in the ED notable for a left pleural effusion. Admitted for further management.      Progressive hypoxia, acute on chronic  Left pleural effusion  Acute on chronic HFpEF exacerbation  Valvular heart  disease  Pulmonary HTN  Pericardial effusion  -CXR with moderate L effusion, s/p IR chest tube, CTS managed, CT pulled 1/26  -Fluid with cultures/cytology negative, exudate  -ECHO with pericardial effusion  -cardiology consulted, deferred window/pericardiocentesis  -diurese per nephrology  -wean oxygen as tolerated  -recurrent L effusion, will ask for repeat IR thoracentesis today 2/4  -R effusion appears significant on 2/4 CXR as well  -on bumex, add albumin  -may need R thoracentesis as well     ANGEL LUIS on CKD IV  -baseline creatinine around 2  -nephrology following, has been on IV bumex  -change to PO bumex    Afib/Aflutter  Hx of PACs  -cardiology following, on eliquis  -no BB due to significant pauses/bradycardia on 6/23 Holter,      History of BLE DVT (June 2023)  -continue Eliquis     Hypertension  -CTM    Goals of care  -Family/patient interested in rehab    Expected Discharge Location and Transportation: SNF  Expected Discharge she appears to be improving, hopefully will be able to go to SNF this week  Expected Discharge Date: 2/5/2025; Expected Discharge Time:      VTE Prophylaxis:  Pharmacologic & mechanical VTE prophylaxis orders are present.         AM-PAC 6 Clicks Score (PT): 13 (02/04/25 0800)    CODE STATUS:   Code Status and Medical Interventions: No CPR (Do Not Attempt to Resuscitate); Limited Support; No intubation (DNI), No dialysis   Ordered at: 01/31/25 1140     Medical Intervention Limits:    No intubation (DNI)    No dialysis     Level Of Support Discussed With:    Health Care Surrogate     Code Status (Patient has no pulse and is not breathing):    No CPR (Do Not Attempt to Resuscitate)     Medical Interventions (Patient has pulse or is breathing):    Limited Support       James Gatica MD  02/04/25

## 2025-02-04 NOTE — TELEPHONE ENCOUNTER
Pt has been in the hospital since 01/20, her daughter is upset because the pts nephrologist is pleased with her numbers and said she's doing well- all things considering. However, the hospitalist told Areli that she needs to really consider hospice/palliative care for her mother. Areli quite upset and offended by that since her mother is still eating and breathing on her own. She feels like they just want to give up on her, except nephrologist- She would like to speak with you personally and just get your input- she trusts you and your opinion, she was wondering if you could look over her records and call back.

## 2025-02-04 NOTE — PROCEDURES
Limited ultrasound performed in region of interest. Small left pleural effusion noted. No thoracentesis performed.

## 2025-02-04 NOTE — DISCHARGE PLACEMENT REQUEST
"Kellermann, Rose J (89 y.o. Female)       Date of Birth   1926    Social Security Number       Address   [address] [place] [address]    Home Phone   [phone]    MRN   [id number]       Yazdanism   Elishabarryjessica    Marital Status                               Admission Date   25    Admission Type   Emergency    Admitting Provider   James Gatica MD    Attending Provider   James Gatica MD    Department, Room/Bed   [address], S209/1       Discharge Date       Discharge Disposition       Discharge Destination                                 Attending Provider: James Gatica MD    Allergies: Sulfa Antibiotics    Isolation: None   Infection: None   Code Status: No CPR    Ht: 162.6 cm (64.02\")   Wt: 71.1 kg (156 lb 12 oz)    Admission Cmt: None   Principal Problem: Pleural effusion on left [J90]                   Active Insurance as of 2025       Primary Coverage       Payor Plan Insurance Group Employer/Plan Group    [facility] MEDICARE REPLACEMENT [facility] MED ADV GROUP [id number]       Payor Plan Address Payor Plan Phone Number Payor Plan Fax Number Effective Dates    [address] [address]   2023 - None Entered    [facility] [id number]         Subscriber Name Subscriber Birth Date Member ID       KELLERMANN,ROSE J 1926 [id number]                     Emergency Contacts        (Rel.) Home Phone Work Phone Mobile Phone    KELLERMANN, KATHERINE (Power of ) [phone] -- [phone]                 History & Physical        Darrell Rdz MD at 25 0838            H&P reviewed. The patient was examined and there are no changes to the H&P.          Electronically signed by Darrell Rdz MD at 25 0838   Source Note: H&P (View-Only)              [facility] Medicine Services  PROGRESS NOTE    Patient Name: Rose J Kellermann  : 1926  MRN: [id number]    Date of " Admission: 1/20/2025  Primary Care Physician: Sma Hung MD    Subjective  Subjective     CC:  progressive dyspnea     HPI:  alert, NAD on nc oxygen, amenable to plan of thoracentesis.       Objective  Objective     Vital Signs:   Temp:  [97.4 °F (36.3 °C)-98.6 °F (37 °C)] 98.4 °F (36.9 °C)  Heart Rate:  [79-95] 86  Resp:  [16-18] 16  BP: (120-145)/(78-95) 132/86  Flow (L/min) (Oxygen Therapy):  [2-4] 4     Physical Exam:  Gen:  alert, delightful, conversant, NAD   Neuro: alert and oriented, clear speech, follows commands, grossly nonfocal  HEENT:  NC/AT   Neck:  Supple, no LAD  Heart RRR   Lungs  anterior exam is clear   Abd:  Soft, nontender  Extrem:  No c/c/e      Results Reviewed:  LAB RESULTS:      Lab 01/20/25  1315 01/20/25  1115 01/20/25  1014   WBC  --   --  8.92   HEMOGLOBIN  --   --  12.0   HEMATOCRIT  --   --  37.5   PLATELETS  --   --  235   NEUTROS ABS  --   --  6.59   IMMATURE GRANS (ABS)  --   --  0.03   LYMPHS ABS  --   --  1.03   MONOS ABS  --   --  1.15*   EOS ABS  --   --  0.09   MCV  --   --  94.7   PROCALCITONIN  --   --  0.33*   LACTATE 1.7  --  2.1*   HSTROP T  --  43* 38*         Lab 01/20/25  1014   SODIUM 134*   POTASSIUM 3.8   CHLORIDE 97*   CO2 23.0   ANION GAP 14.0   BUN 48*   CREATININE 2.50*   EGFR 17.0*   GLUCOSE 131*   CALCIUM 9.1         Lab 01/20/25  1014   TOTAL PROTEIN 7.2   ALBUMIN 3.3*   GLOBULIN 3.9   ALT (SGPT) 11   AST (SGOT) 19   BILIRUBIN 1.3*   ALK PHOS 76         Lab 01/20/25  1115 01/20/25  1014   PROBNP  --  5,330.0*   HSTROP T 43* 38*                 Brief Urine Lab Results       None            Microbiology Results Abnormal       None            XR Chest 1 View    Result Date: 1/20/2025  XR CHEST 1 VW Date of Exam: 1/20/2025 10:03 AM EST Indication: SOA triage protocol Comparison: Chest CT 8/7/2024, chest radiograph 8/10/2024 Findings: Cardiomegaly. Moderate size left pleural effusion increasing from prior. Slightly blunted right costophrenic angle  which could reflect trace pleural fluid. Prominent central vasculature without overt edema. Retrocardiac consolidation. No pneumothorax. Degenerative related osseous change. Aortic atherosclerotic disease.     Impression: Impression: 1. Cardiomegaly with moderate size increasing left effusion and possible trace right pleural effusion. Associated presumed compressive atelectasis in the left lower lobe, differential includes infection in the right clinical setting. 2. Chronic appearing pulmonary vascular congestion without overt edema. Electronically Signed: Juice Martinez MD  1/20/2025 10:27 AM EST  Workstation ID: [id number]     Results for orders placed during the hospital encounter of 06/01/23    Adult Transthoracic Echo Complete W/ Cont if Necessary Per Protocol    Interpretation Summary    Left ventricular systolic function is normal. Calculated left ventricular EF = 61.4% Left ventricular ejection fraction appears to be 61 - 65%.    Left ventricular diastolic function was indeterminate.    Left atrial volume is severely increased.    Mild aortic valve stenosis is present.    Aortic valve maximum pressure gradient is 26 mmHg. Aortic valve mean pressure gradient is 15 mmHg.    Mild mitral valve stenosis is present. The mitral valve peak gradient is 12 mmHg. The mitral valve mean gradient is 5 mmHg.    Moderate tricuspid valve regurgitation is present.    Estimated right ventricular systolic pressure from tricuspid regurgitation is markedly elevated (>55 mmHg).    There is a small left pleural effusion.      Current medications:  Scheduled Meds:amLODIPine, 5 mg, Oral, Daily  [Held by provider] apixaban, 5 mg, Oral, Q12H  [Held by provider] bumetanide, 1 mg, Oral, Daily  hydrALAZINE, 25 mg, Oral, BID  sodium chloride, 10 mL, Intravenous, Q12H      Continuous Infusions:   PRN Meds:.  senna-docusate sodium **AND** polyethylene glycol **AND** bisacodyl **AND** bisacodyl    sodium chloride    sodium chloride    sodium  chloride    Assessment & Plan  Assessment & Plan     Active Hospital Problems    Diagnosis  POA    **Pleural effusion on left [J90]  Yes    Essential hypertension [I10]  Yes    Chronic kidney disease, stage III (moderate)  [N18.30]  Yes      Resolved Hospital Problems   No resolved problems to display.        Brief Hospital Course to date:  Rose J Kellermann is a 89 y.o. female with a history of DVT on Eliquis, HFpEF, CKD, lives at home, has been using home oxygen intermittently x several months, recently needing it constantly though denying dyspnea.      Progressive hypoxia  Left pleural effusion  Suspected acute HFpEF exacerbation  -  chest x-ray with moderate L pleural effusion  - Takes Bumex at home.  Continue. Watch creatinine   - Eliquis last given 1/20 AM.  Hold.  - Thoracentesis ordered:  await repeat CXR per IR request.         ANGEL LUIS on CKD  - Creat basealine 1.8, now 2.5  - Monitor with diuresis daily      History of DVT  - Hold Eliquis for now  - SCDs for now     Hypertension  - Continue home medications    Advanced age  - she is ambulatory  - OOB daily, pt should be up walking to BR w assist               Expected Discharge Location and Transportation: lives at home   Expected Discharge   Expected Discharge Date: 1/23/2025; Expected Discharge Time:      VTE Prophylaxis:  Pharmacologic & mechanical VTE prophylaxis orders are present.         AM-PAC 6 Clicks Score (PT): 17 (01/20/25 2000)    CODE STATUS:   Code Status and Medical Interventions: CPR (Attempt to Resuscitate); Full Support   Ordered at: 01/20/25 0425     Level Of Support Discussed With:    Patient     Code Status (Patient has no pulse and is not breathing):    CPR (Attempt to Resuscitate)     Medical Interventions (Patient has pulse or is breathing):    Full Support       Ayanna Garcia MD  01/21/25        Electronically signed by Ayanna Garcia MD at 01/21/25 1020                 Ayanna Garcia MD at 01/21/25 8583              [facility] Medicine Services  PROGRESS NOTE    Patient Name: Rose J Kellermann  : 1926  MRN: [id number]    Date of Admission: 2025  Primary Care Physician: Sam Hung MD    Subjective  Subjective     CC:  progressive dyspnea     HPI:  alert, NAD on nc oxygen, amenable to plan of thoracentesis.       Objective  Objective     Vital Signs:   Temp:  [97.4 °F (36.3 °C)-98.6 °F (37 °C)] 98.4 °F (36.9 °C)  Heart Rate:  [79-95] 86  Resp:  [16-18] 16  BP: (120-145)/(78-95) 132/86  Flow (L/min) (Oxygen Therapy):  [2-4] 4     Physical Exam:  Gen:  alert, delightful, conversant, NAD   Neuro: alert and oriented, clear speech, follows commands, grossly nonfocal  HEENT:  NC/AT   Neck:  Supple, no LAD  Heart RRR   Lungs  anterior exam is clear   Abd:  Soft, nontender  Extrem:  No c/c/e      Results Reviewed:  LAB RESULTS:      Lab 25  1315 25  1115 25  1014   WBC  --   --  8.92   HEMOGLOBIN  --   --  12.0   HEMATOCRIT  --   --  37.5   PLATELETS  --   --  235   NEUTROS ABS  --   --  6.59   IMMATURE GRANS (ABS)  --   --  0.03   LYMPHS ABS  --   --  1.03   MONOS ABS  --   --  1.15*   EOS ABS  --   --  0.09   MCV  --   --  94.7   PROCALCITONIN  --   --  0.33*   LACTATE 1.7  --  2.1*   HSTROP T  --  43* 38*         Lab 25  1014   SODIUM 134*   POTASSIUM 3.8   CHLORIDE 97*   CO2 23.0   ANION GAP 14.0   BUN 48*   CREATININE 2.50*   EGFR 17.0*   GLUCOSE 131*   CALCIUM 9.1         Lab 25  1014   TOTAL PROTEIN 7.2   ALBUMIN 3.3*   GLOBULIN 3.9   ALT (SGPT) 11   AST (SGOT) 19   BILIRUBIN 1.3*   ALK PHOS 76         Lab 25  1115 25  1014   PROBNP  --  5,330.0*   HSTROP T 43* 38*                 Brief Urine Lab Results       None            Microbiology Results Abnormal       None            XR Chest 1 View    Result Date: 2025  XR CHEST 1 VW Date of Exam: 2025 10:03 AM EST Indication: SOA triage protocol Comparison: Chest CT 2024, chest radiograph  8/10/2024 Findings: Cardiomegaly. Moderate size left pleural effusion increasing from prior. Slightly blunted right costophrenic angle which could reflect trace pleural fluid. Prominent central vasculature without overt edema. Retrocardiac consolidation. No pneumothorax. Degenerative related osseous change. Aortic atherosclerotic disease.     Impression: Impression: 1. Cardiomegaly with moderate size increasing left effusion and possible trace right pleural effusion. Associated presumed compressive atelectasis in the left lower lobe, differential includes infection in the right clinical setting. 2. Chronic appearing pulmonary vascular congestion without overt edema. Electronically Signed: Juice Martinez MD  1/20/2025 10:27 AM EST  Workstation ID: [id number]     Results for orders placed during the hospital encounter of 06/01/23    Adult Transthoracic Echo Complete W/ Cont if Necessary Per Protocol    Interpretation Summary    Left ventricular systolic function is normal. Calculated left ventricular EF = 61.4% Left ventricular ejection fraction appears to be 61 - 65%.    Left ventricular diastolic function was indeterminate.    Left atrial volume is severely increased.    Mild aortic valve stenosis is present.    Aortic valve maximum pressure gradient is 26 mmHg. Aortic valve mean pressure gradient is 15 mmHg.    Mild mitral valve stenosis is present. The mitral valve peak gradient is 12 mmHg. The mitral valve mean gradient is 5 mmHg.    Moderate tricuspid valve regurgitation is present.    Estimated right ventricular systolic pressure from tricuspid regurgitation is markedly elevated (>55 mmHg).    There is a small left pleural effusion.      Current medications:  Scheduled Meds:amLODIPine, 5 mg, Oral, Daily  [Held by provider] apixaban, 5 mg, Oral, Q12H  [Held by provider] bumetanide, 1 mg, Oral, Daily  hydrALAZINE, 25 mg, Oral, BID  sodium chloride, 10 mL, Intravenous, Q12H      Continuous Infusions:   PRN Meds:.   senna-docusate sodium **AND** polyethylene glycol **AND** bisacodyl **AND** bisacodyl    sodium chloride    sodium chloride    sodium chloride    Assessment & Plan  Assessment & Plan     Active Hospital Problems    Diagnosis  POA    **Pleural effusion on left [J90]  Yes    Essential hypertension [I10]  Yes    Chronic kidney disease, stage III (moderate)  [N18.30]  Yes      Resolved Hospital Problems   No resolved problems to display.        Brief Hospital Course to date:  Rose J Kellermann is a 89 y.o. female with a history of DVT on Eliquis, HFpEF, CKD, lives at home, has been using home oxygen intermittently x several months, recently needing it constantly though denying dyspnea.      Progressive hypoxia  Left pleural effusion  Suspected acute HFpEF exacerbation  -  chest x-ray with moderate L pleural effusion  - Takes Bumex at home.  Continue. Watch creatinine   - Eliquis last given 1/20 AM.  Hold.  - Thoracentesis ordered:  await repeat CXR per IR request.         ANGEL LUIS on CKD  - Creat basealine 1.8, now 2.5  - Monitor with diuresis daily      History of DVT  - Hold Eliquis for now  - SCDs for now     Hypertension  - Continue home medications    Advanced age  - she is ambulatory  - OOB daily, pt should be up walking to BR w assist               Expected Discharge Location and Transportation: lives at home   Expected Discharge   Expected Discharge Date: 1/23/2025; Expected Discharge Time:      VTE Prophylaxis:  Pharmacologic & mechanical VTE prophylaxis orders are present.         AM-PAC 6 Clicks Score (PT): 17 (01/20/25 2000)    CODE STATUS:   Code Status and Medical Interventions: CPR (Attempt to Resuscitate); Full Support   Ordered at: 01/20/25 1424     Level Of Support Discussed With:    Patient     Code Status (Patient has no pulse and is not breathing):    CPR (Attempt to Resuscitate)     Medical Interventions (Patient has pulse or is breathing):    Full Support       Ayanna Garcia  "MD  25        Electronically signed by Ayanna Garcia MD at 25 1020       Chanel Noriega MD at 25 1414              [facility] Medicine Services  HISTORY AND PHYSICAL    Patient Name: Rose J Kellermann  : 1926  MRN: [id number]  Primary Care Physician: Sam Hung MD  Date of admission: 2025      Subjective  Subjective     Chief Complaint:  dyspnea    HPI:  Rose J Kellermann is a 89 y.o. female with a history of DVT on Eliquis, HFpEF, CKD who is presenting with worsening dyspnea.  She has been brought in by her daughter who noted decreased oxygen saturations this past week and at home.  For the past several months she wears about 2 to 3 L of nasal cannula oxygen at home on and off, her home oxygen requirement segments have been increasing to the point yesterday where she continuously required it.  Currently the patient does not feel short of breath, denies any chest pain.  Daughter notes that she also noticed an increased cough, nonproductive.  Workup in the ED was notable for a left pleural effusion.  Daughter states that she has had problems in the past with \"fluid in her lungs\".  And her most recent hospital stay was related to that.  She takes Bumex 4 times a week and follows with cardiology also.      Personal History     Past Medical History:   Diagnosis Date    Abscess of back     Arrhythmia     frequent PVC    Cancer 2011    Endometrial cancer, status post robotically assisted hysterectomy with Dr. Haddad, 2011.      CKD (chronic kidney disease), stage III     no dilaysis     Dizzy     Dvt femoral (deep venous thrombosis) 2017    s/p hip surgery in . Took xarelto until 2018    Dyslipidemia     Dyslipidemia.  Observing.    Enthesopathy of hip region     Generalized osteoarthrosis, involving multiple sites     Headache     Hearing loss     Hypertension     Low backache     Needs flu shot     Osteoarthritis     " Osteoarthritis with questionable carpal tunnel syndrome bilaterally.     Otitis, serous     Pneumonia 1967    Remote pneumonia, 1967.     Retinal hemorrhage     SI joint arthritis     SOB (shortness of breath)     Syncope 2001    Remote syncope with working diagnosis of vasodepressor, 2001.     Venous insufficiency of leg     Wears glasses     readers         Oncology Problem List:  Endometrial cancer (Status: Active)    Oncology/Hematology History   Endometrial cancer   12/12/2011 Initial Diagnosis    D&C and hysterectomy for PMB. 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 Stage IA grade 2 by final path       Past Surgical History:   Procedure Laterality Date    CATARACT EXTRACTION      bilat     COLONOSCOPY      HIP PERCUTANEOUS PINNING Left 11/24/2017    Procedure: HIP PERCUTANEOUS PINNING;  Surgeon: Lucas Swanson MD;  Location:  [facility];  Service:     HYSTERECTOMY      total? but unsure     KNEE SURGERY  2001    Remote knee surgery in 2001.- right     TOTAL HIP ARTHROPLASTY Left 10/26/2018    Procedure: TOTAL HIP ARTHROPLASTY LEFT;  Surgeon: Stephen Baker MD;  Location:  [facility];  Service: Orthopedics       Family History: family history includes Cancer in her brother; Heart attack in her father; Heart disease in her mother; Hypertension in her father and mother; Osteoarthritis in her father and mother.     Social History:  reports that she has never smoked. She has never been exposed to tobacco smoke. She has never used smokeless tobacco. She reports that she does not drink alcohol and does not use drugs.  Social History     Social History Narrative    Lives alone, 2 daughters, one here, one in [place]. Retired teacher       Medications:  Available home medication information reviewed.  O2, acetaminophen, amLODIPine, apixaban, bumetanide, and hydrALAZINE    Allergies   Allergen Reactions    Sulfa Antibiotics Nausea  Only and GI Intolerance       Objective  Objective     Vital Signs:   Temp:  [98.2 °F (36.8 °C)] 98.2 °F (36.8 °C)  Heart Rate:  [79-95] 79  Resp:  [18] 18  BP: (120-145)/(78-95) 131/95  Flow (L/min) (Oxygen Therapy):  [2] 2       Physical Exam   Constitutional: No acute distress, awake, alert  HENT: NCAT, mucous membranes moist  Respiratory: Decreased lung sounds at the left lower lung base, diffuse slight crackles throughout, respiratory effort normal on 2 L nasal cannula  Cardiovascular: RRR, no murmurs, rubs, or gallops  Gastrointestinal: Positive bowel sounds, soft, nontender, nondistended  Musculoskeletal: No bilateral ankle edema  Psychiatric: Appropriate affect, cooperative  Neurologic: Oriented to person and place, hard of hearing, speech clear, strength symmetric, no focal deficits  Skin: Stasis dermatitis      Result Review:  I have personally reviewed the results from the time of this admission to 1/20/2025 14:27 EST and agree with these findings:  [x]  Laboratory list / accordion  [x]  Microbiology  [x]  Radiology  [x]  EKG/Telemetry   []  Cardiology/Vascular   []  Pathology  []  Old records  []  Other:  Most notable findings include:   Elevated creatinine  Elevated lactate now trended down  proBNP elevated from prior  Left pleural effusion    LAB RESULTS:      Lab 01/20/25  1315 01/20/25  1014   WBC  --  8.92   HEMOGLOBIN  --  12.0   HEMATOCRIT  --  37.5   PLATELETS  --  235   NEUTROS ABS  --  6.59   IMMATURE GRANS (ABS)  --  0.03   LYMPHS ABS  --  1.03   MONOS ABS  --  1.15*   EOS ABS  --  0.09   MCV  --  94.7   PROCALCITONIN  --  0.33*   LACTATE 1.7 2.1*         Lab 01/20/25  1014   SODIUM 134*   POTASSIUM 3.8   CHLORIDE 97*   CO2 23.0   ANION GAP 14.0   BUN 48*   CREATININE 2.50*   EGFR 17.0*   GLUCOSE 131*   CALCIUM 9.1         Lab 01/20/25  1014   TOTAL PROTEIN 7.2   ALBUMIN 3.3*   GLOBULIN 3.9   ALT (SGPT) 11   AST (SGOT) 19   BILIRUBIN 1.3*   ALK PHOS 76         Lab 01/20/25  1115 01/20/25  1014    PROBNP  --  5,330.0*   HSTROP T 43* 38*                     Microbiology Results (last 10 days)       Procedure Component Value - Date/Time    Respiratory Panel PCR w/COVID-19(SARS-CoV-2) [facility] In-House, NP Swab in UTM/VTM, 2 HR TAT - Swab, Nasopharynx [[id number]]  (Normal) Collected: 01/20/25 1027    Lab Status: Final result Specimen: Swab from Nasopharynx Updated: 01/20/25 1128     ADENOVIRUS, PCR Not Detected     Coronavirus 229E Not Detected     Coronavirus HKU1 Not Detected     Coronavirus NL63 Not Detected     Coronavirus OC43 Not Detected     COVID19 Not Detected     Human Metapneumovirus Not Detected     Human Rhinovirus/Enterovirus Not Detected     Influenza A PCR Not Detected     Influenza B PCR Not Detected     Parainfluenza Virus 1 Not Detected     Parainfluenza Virus 2 Not Detected     Parainfluenza Virus 3 Not Detected     Parainfluenza Virus 4 Not Detected     RSV, PCR Not Detected     Bordetella pertussis pcr Not Detected     Bordetella parapertussis PCR Not Detected     Chlamydophila pneumoniae PCR Not Detected     Mycoplasma pneumo by PCR Not Detected    Narrative:      In the setting of a positive respiratory panel with a viral infection PLUS a negative procalcitonin without other underlying concern for bacterial infection, consider observing off antibiotics or discontinuation of antibiotics and continue supportive care. If the respiratory panel is positive for atypical bacterial infection (Bordetella pertussis, Chlamydophila pneumoniae, or Mycoplasma pneumoniae), consider antibiotic de-escalation to target atypical bacterial infection.            XR Chest 1 View    Result Date: 1/20/2025  XR CHEST 1 VW Date of Exam: 1/20/2025 10:03 AM EST Indication: SOA triage protocol Comparison: Chest CT 8/7/2024, chest radiograph 8/10/2024 Findings: Cardiomegaly. Moderate size left pleural effusion increasing from prior. Slightly blunted right costophrenic angle which could reflect trace  pleural fluid. Prominent central vasculature without overt edema. Retrocardiac consolidation. No pneumothorax. Degenerative related osseous change. Aortic atherosclerotic disease.     Impression: Impression: 1. Cardiomegaly with moderate size increasing left effusion and possible trace right pleural effusion. Associated presumed compressive atelectasis in the left lower lobe, differential includes infection in the right clinical setting. 2. Chronic appearing pulmonary vascular congestion without overt edema. Electronically Signed: Juice Martinez MD  1/20/2025 10:27 AM EST  Workstation ID: [id number]     Results for orders placed during the hospital encounter of 06/01/23    Adult Transthoracic Echo Complete W/ Cont if Necessary Per Protocol    Interpretation Summary    Left ventricular systolic function is normal. Calculated left ventricular EF = 61.4% Left ventricular ejection fraction appears to be 61 - 65%.    Left ventricular diastolic function was indeterminate.    Left atrial volume is severely increased.    Mild aortic valve stenosis is present.    Aortic valve maximum pressure gradient is 26 mmHg. Aortic valve mean pressure gradient is 15 mmHg.    Mild mitral valve stenosis is present. The mitral valve peak gradient is 12 mmHg. The mitral valve mean gradient is 5 mmHg.    Moderate tricuspid valve regurgitation is present.    Estimated right ventricular systolic pressure from tricuspid regurgitation is markedly elevated (>55 mmHg).    There is a small left pleural effusion.      Assessment & Plan  Assessment & Plan       Pleural effusion on left    Essential hypertension    Chronic kidney disease, stage III (moderate)         Rose J Kellermann is a 89-year-old female with a history of HFpEF, DVT on Eliquis, CKD who is here with a left pleural effusion    Acute on chronic hypoxic respiratory failure  Left pleural effusion  Suspected acute HFpEF exacerbation  - Imaging as above, chest x-ray with moderately sized  left pleural effusion which looks increased from prior.  Also present is pulmonary vascular congestion but this appears chronic  - Her creatinine is little bit elevated above baseline but she may also have a component of CRS.  Clinically appears volume overloaded so we will give a dose of IV diuresis tonight.  Monitor daily, repeat BMP in a.m.  - Last dose of Eliquis was this morning.  Continue to hold further doses, consult to IR for thoracentesis.  Obtain coags for the morning.  N.p.o. at midnight in case thoracentesis can be done tomorrow  - Pleural effusion likely a result of underlying HFpEF.  Clinically does not appear to have infectious etiology will observe off antibiotics for now.  Will obtain pleural fluid culture    ANGEL LUIS on CKD  - As above creatinine seems to be elevated above baseline which is around 2  - Monitor with diuresis    History of DVT  - Hold Eliquis for now  - SCDs for now    Hypertension  - Continue home medications          VTE Prophylaxis:  Mechanical & pharmacologic VTE prophylaxis orders are signed & held.           CODE STATUS:    Code Status and Medical Interventions: CPR (Attempt to Resuscitate); Full Support   Ordered at: 25 1424     Level Of Support Discussed With:    Patient     Code Status (Patient has no pulse and is not breathing):    CPR (Attempt to Resuscitate)     Medical Interventions (Patient has pulse or is breathing):    Full Support       Expected Discharge   Expected Discharge Date: 2025; Expected Discharge Time:      Chanel Noriega MD  25      Electronically signed by Chanel Noriega MD at 25 1427          Physician Progress Notes (most recent note)        James Gatica MD at 25 0942              [facility] Medicine Services  PROGRESS NOTE    Patient Name: Rose J Kellermann  : 1926  MRN: [id number]    Date of Admission: 2025  Primary Care Physician: Sam Hung MD    Subjective   Subjective      CC: Dyspnea    HPI: Up in chair, daughter at bedside. SOA with exertion, appetite improving. No cough/congestion. No f/c.     Objective   Objective     Vital Signs:   Temp:  [96.3 °F (35.7 °C)-97.8 °F (36.6 °C)] 96.3 °F (35.7 °C)  Heart Rate:  [] 84  Resp:  [18-22] 18  BP: (103-127)/(71-90) 114/80  Flow (L/min) (Oxygen Therapy):  [4] 4     Physical Exam:  NAD, alert and oriented, more alert/conversant/responsive today  Chronically ill appearing  OP clear, dry MM  Neck supple  RRR  Decreased BS bases, worse on L  +BS, soft  JOSE  Flat affect    Results Reviewed:  LAB RESULTS:      Lab 02/03/25  1036 01/31/25  0926 01/30/25  1259 01/29/25  1502   WBC 10.85* 8.62 11.02* 10.09   HEMOGLOBIN 12.2 12.0 12.8 13.1   HEMATOCRIT 40.3 39.3 39.5 40.4   PLATELETS 254 220 272 291   NEUTROS ABS  --   --   --  8.52*   IMMATURE GRANS (ABS)  --   --   --  0.05   LYMPHS ABS  --   --   --  0.55*   MONOS ABS  --   --   --  0.82   EOS ABS  --   --   --  0.13   MCV 99.0* 99.2* 93.4 93.3         Lab 02/03/25  1036 02/02/25  1135 02/01/25  0837 01/31/25  0926 01/30/25  1259 01/29/25  1404   SODIUM 132* 135* 137 136 134* 137   POTASSIUM 4.0 4.3 4.1 3.9 4.3 4.6   CHLORIDE 93* 95* 94* 97* 96* 98   CO2 25.0 27.0 28.0 28.0 25.0 26.0   ANION GAP 14.0 13.0 15.0 11.0 13.0 13.0   BUN 79* 68* 65* 68* 72* 77*   CREATININE 2.74* 2.44* 2.28* 2.25* 2.52* 2.89*   EGFR 15.2* 17.5* 19.0* 19.3* 16.8* 14.3*   GLUCOSE 129* 116* 112* 131* 152* 154*   CALCIUM 8.4 8.8 8.7 8.8 8.4 8.9  8.9   MAGNESIUM  --   --   --   --  2.4*  --    PHOSPHORUS  --   --   --   --  4.1 4.6*         Lab 01/30/25  1259 01/29/25  1404   ALBUMIN 2.8* 3.1*                 Lab 01/29/25  1404   IRON 26*   IRON SATURATION (TSAT) 12*   TIBC 209*   TRANSFERRIN 140*         Lab 01/28/25  1552   PH, ARTERIAL 7.393   PCO2, ARTERIAL 35.2   PO2 .0*   FIO2 40   HCO3 ART 21.4   BASE EXCESS ART -2.9*   CARBOXYHEMOGLOBIN 1.1     Brief Urine Lab Results       None             Microbiology Results Abnormal       None            XR Chest 1 View    Result Date: 2/4/2025  XR CHEST 1 VW Date of Exam: 2/4/2025 2:16 AM EST Indication: Follow up L pleural effusion s/p Left pleural CT placement Comparison: Chest radiograph 2/3/2025 Findings: Similar moderate size pleural effusions with presumed underlying bibasilar atelectasis. Differential includes infection. Slight interstitial prominence without overt edema. Stable cardiomediastinal silhouette including cardiomegaly. No new consolidation.  No visualized pneumothorax. Degenerative related osseous change.     Impression: Impression: No significant interval change. Electronically Signed: Juice Martinez MD  2/4/2025 8:04 AM EST  Workstation ID: [id number]    XR Chest 1 View    Result Date: 2/3/2025  XR CHEST 1 VW Date of Exam: 2/3/2025 3:32 AM EST Indication: Follow up L pleural effusion s/p Left pleural CT placement Comparison: 2/2/2025 Findings: There is persistent and fairly extensive bibasilar effusion and atelectasis without significant interval change. Exam history states left pleural chest tube placement but no chest drain is visible. Lung apices remain clear. Heart appears upper normal size. Vasculature is slightly cephalized. No pneumothorax is seen.     Impression: Impression: Persistent bibasilar effusion and atelectasis. Electronically Signed: James Juarez MD  2/3/2025 7:09 AM EST  Workstation ID: [id number]     Results for orders placed during the hospital encounter of 01/20/25    Adult Transthoracic Echo Complete W/ Cont if Necessary Per Protocol    Interpretation Summary    Left ventricular ejection fraction appears to be 61 - 65%.    Left ventricular diastolic function was indeterminate.    Mild aortic valve stenosis is present.    Peak velocity of the flow distal to the aortic valve is 245.6 cm/s. Aortic valve maximum pressure gradient is 24 mmHg. Aortic valve mean pressure gradient is 13 mmHg.    The mitral valve mean gradient  is 4 mmHg.    Moderate tricuspid valve regurgitation is present.    Estimated right ventricular systolic pressure from tricuspid regurgitation is markedly elevated (>55 mmHg). Calculated right ventricular systolic pressure from tricuspid regurgitation is 57 mmHg.    There is a moderate 2cm  pericardial effusion with no tamponade    There is a left pleural effusion.      Current medications:  Scheduled Meds:albumin human, 25 g, Intravenous, Once  apixaban, 5 mg, Oral, Q12H  bumetanide, 2 mg, Oral, Daily  hydrALAZINE, 25 mg, Oral, BID  sodium chloride, 10 mL, Intravenous, Q12H      Continuous Infusions:   PRN Meds:.  acetaminophen    senna-docusate sodium **AND** polyethylene glycol **AND** bisacodyl **AND** bisacodyl    sodium chloride    sodium chloride    sodium chloride    Assessment & Plan   Assessment & Plan     Active Hospital Problems    Diagnosis  POA    **Pleural effusion on left [J90]  Yes    Pulmonary hypertension [I27.20]  Yes    Acute on chronic heart failure with preserved ejection fraction (HFpEF) [I50.33]  Yes    Essential hypertension [I10]  Yes    Chronic kidney disease, stage III (moderate)  [N18.30]  Yes      Resolved Hospital Problems   No resolved problems to display.        Brief Hospital Course to date:  Rose J Kellermann is a 89 y.o. female with history of DVT on Eliquis, HFpEF, HTN, and CKD IV who presents due to dyspnea and hypoxia. Had been wearing 2-3 liters home oxygen intermittently over the past several months, now requiring continuously. Workup in the ED notable for a left pleural effusion. Admitted for further management.      Progressive hypoxia, acute on chronic  Left pleural effusion  Acute on chronic HFpEF exacerbation  Valvular heart disease  Pulmonary HTN  Pericardial effusion  -CXR with moderate L effusion, s/p IR chest tube, CTS managed, CT pulled 1/26  -Fluid with cultures/cytology negative, exudate  -ECHO with pericardial effusion  -cardiology consulted, deferred  window/pericardiocentesis  -diurese per nephrology  -wean oxygen as tolerated  -recurrent L effusion, will ask for repeat IR thoracentesis today   -R effusion appears significant on  CXR as well  -on bumex, add albumin  -may need R thoracentesis as well     ANGEL LUIS on CKD IV  -baseline creatinine around 2  -nephrology following, has been on IV bumex  -change to PO bumex    Afib/Aflutter  Hx of PACs  -cardiology following, on eliquis  -no BB due to significant pauses/bradycardia on  Holter,      History of BLE DVT (2023)  -continue Eliquis     Hypertension  -CTM    Goals of care  -Family/patient interested in rehab    Expected Discharge Location and Transportation: SNF  Expected Discharge she appears to be improving, hopefully will be able to go to SNF this week  Expected Discharge Date: 2025; Expected Discharge Time:      VTE Prophylaxis:  Pharmacologic & mechanical VTE prophylaxis orders are present.         AM-PAC 6 Clicks Score (PT): 13 (25 0800)    CODE STATUS:   Code Status and Medical Interventions: No CPR (Do Not Attempt to Resuscitate); Limited Support; No intubation (DNI), No dialysis   Ordered at: 25 1140     Medical Intervention Limits:    No intubation (DNI)    No dialysis     Level Of Support Discussed With:    Health Care Surrogate     Code Status (Patient has no pulse and is not breathing):    No CPR (Do Not Attempt to Resuscitate)     Medical Interventions (Patient has pulse or is breathing):    Limited Support       James Gatica MD  25        Electronically signed by James Gatica MD at 25 0921          Physical Therapy Notes (most recent note)        Ann Nicole, PT at 25 1413  Version 1 of 1         Patient Name: Rose J Kellermann  : 1926    MRN: [id number]                              Today's Date: 2/3/2025       Admit Date: 2025    Visit Dx:     ICD-10-CM ICD-9-CM   1. Pleural effusion, left  J90 511.9   2. Hypoxia   R09.02 799.02   3. Chronic kidney disease, unspecified CKD stage  N18.9 585.9     Patient Active Problem List   Diagnosis    Essential hypertension    Vitamin D deficiency    Fatigue    Chronic kidney disease, stage III (moderate)     Osteoarthritis    Dyslipidemia    Post-menopausal bleeding    Endometrial cancer    Closed fracture of left hip    Asymptomatic bacteriuria    Post-traumatic osteoarthritis of left hip    Status post total replacement of left hip    Prediabetes    Acute blood loss anemia, asymptomatic    Postoperative urinary retention    Myopia    Posterior crocodile shagreen of cornea    Presbyopia    Ptosis of eyelid    Regular astigmatism    Retinal neovascularization    After cataract    Secondary cataract    Premature atrial contractions    Heart murmur    Nocturnal hypoxia    Acute on chronic heart failure with preserved ejection fraction (HFpEF)    Chronic deep vein thrombosis (DVT) of both lower extremities    Retinal neovascularization    Pleural effusion on left    Pulmonary hypertension     Past Medical History:   Diagnosis Date    Abscess of back     Arrhythmia     frequent PVC    Cancer 12/2011    Endometrial cancer, status post robotically assisted hysterectomy with Dr. Haddad, December 2011.      CKD (chronic kidney disease), stage III     no dilaysis     Dizzy     Dvt femoral (deep venous thrombosis) 2017    s/p hip surgery in 2017. Took xarelto until August 2018    Dyslipidemia     Dyslipidemia.  Observing.    Enthesopathy of hip region     Generalized osteoarthrosis, involving multiple sites     Headache     Hearing loss     Hypertension     Low backache     Needs flu shot     Osteoarthritis     Osteoarthritis with questionable carpal tunnel syndrome bilaterally.     Otitis, serous     Pneumonia 1967    Remote pneumonia, 1967.     Retinal hemorrhage     SI joint arthritis     SOB (shortness of breath)     Syncope 2001    Remote syncope with working diagnosis of vasodepressor, 2001.      Venous insufficiency of leg     Wears glasses     readers     Past Surgical History:   Procedure Laterality Date    CATARACT EXTRACTION      bilat     COLONOSCOPY      HIP PERCUTANEOUS PINNING Left 11/24/2017    Procedure: HIP PERCUTANEOUS PINNING;  Surgeon: Lucas Swanson MD;  Location: [facility] OR;  Service:     HYSTERECTOMY      total? but unsure     KNEE SURGERY  2001    Remote knee surgery in 2001.- right     TOTAL HIP ARTHROPLASTY Left 10/26/2018    Procedure: TOTAL HIP ARTHROPLASTY LEFT;  Surgeon: Stephen Baker MD;  Location: [facility] OR;  Service: Orthopedics      General Information       Row Name 02/03/25 1523          Physical Therapy Time and Intention    Document Type progress note/recertification  -KG     Mode of Treatment physical therapy  -KG       Row Name 02/03/25 1523          General Information    Existing Precautions/Restrictions fall;oxygen therapy device and L/min;other (see comments)  confusion; [place]  -KG     Barriers to Rehab medically complex;previous functional deficit;cognitive status;hearing deficit  -KG       Row Name 02/03/25 1523          Cognition    Orientation Status (Cognition) oriented to;person  -KG       Row Name 02/03/25 1523          Safety Issues/Impairments Affecting Functional Mobility    Safety Issues Affecting Function (Mobility) awareness of need for assistance;insight into deficits/self-awareness;safety precaution awareness;safety precautions follow-through/compliance;sequencing abilities  -KG     Impairments Affecting Function (Mobility) balance;cognition;coordination;endurance/activity tolerance;motor control;motor planning;postural/trunk control;shortness of breath;strength;pain  -KG     Cognitive Impairments, Mobility Safety/Performance awareness, need for assistance;insight into deficits/self-awareness;safety precaution awareness;safety precaution follow-through;sequencing abilities  -KG               User Key  (r) = Recorded By, (t) = Taken By, (c) =  Cosigned By      Initials Name Provider Type    KG Ann Nicole N, PT Physical Therapist                   Mobility       Row Name 02/03/25 1524          Bed Mobility    Bed Mobility supine-sit  -KG     Supine-Sit [place] (Bed Mobility) maximum assist (25% patient effort);2 person assist;verbal cues  -KG     Assistive Device (Bed Mobility) head of bed elevated;repositioning sheet  -KG     Comment, (Bed Mobility) VC's for sequencing and technique. Pt required increased assistance at trunk and BLEs. Pt with c/o L LE pain with all movement.  -KG       Row Name 02/03/25 1524          Transfers    Comment, (Transfers) VC's for sequencing and technique. Pt unable to maintain standing posture on first attempt and returned to seated position. Increased time and effort required on second attempt.  -KG       Row Name 02/03/25 1524          Sit-Stand Transfer    Sit-Stand [place] (Transfers) moderate assist (50% patient effort);2 person assist;verbal cues  -KG     Assistive Device (Sit-Stand Transfers) walker, 4-wheeled  -KG       Row Name 02/03/25 1524          Gait/Stairs (Locomotion)    [place] Level (Gait) moderate assist (50% patient effort);2 person assist;verbal cues  -KG     Assistive Device (Gait) walker, 4-wheeled  -KG     Distance in Feet (Gait) 5  -KG     Deviations/Abnormal Patterns (Gait) bilateral deviations;mike decreased;festinating/shuffling;stride length decreased  -KG     Bilateral Gait Deviations forward flexed posture;heel strike decreased  -KG     Comment, (Gait/Stairs) Pt demonstrated step to gait pattern with very slow mike and short, shuffled steps. Pt with very forward flexed posture and tendency to lean over onto rollator; unable to improve despite verbal and tactile cues. Pt very unsteady, worsening with continued forward ambulation. Pt attempting to sit prior to reaching chair. Limited by increased weakness and SOA.  -KG               User Key  (r) = Recorded By, (t) =  Taken By, (c) = Cosigned By      Initials Name Provider Type    KG Ann Nicole, PT Physical Therapist                   Obj/Interventions       Row Name 02/03/25 1527          Balance    Balance Assessment sitting static balance;standing static balance;standing dynamic balance  -KG     Static Sitting Balance minimal assist  -KG     Position, Sitting Balance supported;sitting edge of bed  -KG     Static Standing Balance minimal assist;2-person assist  -KG     Dynamic Standing Balance moderate assist;2-person assist  -KG     Position/Device Used, Standing Balance supported;walker, 4-wheeled  -KG               User Key  (r) = Recorded By, (t) = Taken By, (c) = Cosigned By      Initials Name Provider Type    KG Ann Nicole, PT Physical Therapist                   Goals/Plan       Row Name 02/03/25 1533          Bed Mobility Goal 1 (PT)    Activity/Assistive Device (Bed Mobility Goal 1, PT) sit to supine;supine to sit  -KG     [place] Level/Cues Needed (Bed Mobility Goal 1, PT) moderate assist (50-74% patient effort)  -KG     Time Frame (Bed Mobility Goal 1, PT) short term goal (STG);3 days  -KG     Progress/Outcomes (Bed Mobility Goal 1, PT) goal ongoing;goal revised this date  -KG       Row Name 02/03/25 1533          Transfer Goal 1 (PT)    Activity/Assistive Device (Transfer Goal 1, PT) sit-to-stand/stand-to-sit;bed-to-chair/chair-to-bed;walker, rolling  -KG     [place] Level/Cues Needed (Transfer Goal 1, PT) minimum assist (75% or more patient effort)  -KG     Time Frame (Transfer Goal 1, PT) long term goal (LTG);1 week  -KG     Progress/Outcome (Transfer Goal 1, PT) goal ongoing;goal revised this date  -KG       Row Name 02/03/25 1533          Gait Training Goal 1 (PT)    Activity/Assistive Device (Gait Training Goal 1, PT) gait (walking locomotion);assistive device use;walker, rolling  -KG     [place] Level (Gait Training Goal 1, PT) moderate assist (50-74% patient effort)  -KG      Distance (Gait Training Goal 1, PT) 50 feet  -KG     Time Frame (Gait Training Goal 1, PT) long term goal (LTG);1 week  -KG     Progress/Outcome (Gait Training Goal 1, PT) goal ongoing;goal revised this date  -KG               User Key  (r) = Recorded By, (t) = Taken By, (c) = Cosigned By      Initials Name Provider Type    KG Ann Nicole, PT Physical Therapist                   Clinical Impression       Row Name 02/03/25 1528          Pain    Additional Documentation Pain Scale: FACES Pre/Post-Treatment (Group)  -KG       Row Name 02/03/25 1528          Pain Scale: FACES Pre/Post-Treatment    Pain: FACES Scale, Pretreatment 2-->hurts little bit  -KG     Posttreatment Pain Rating 2-->hurts little bit  -KG       Row Name 02/03/25 1528          Plan of Care Review    Plan of Care Reviewed With patient  -KG     Progress declining  -KG     Outcome Evaluation Pt ambulated 5ft with rollator and modA x2. Pt demonstrated very forward flexed posture with slow mike and short, shuffled steps. Pt with worsening balance and coordination with continued forward ambulation. Limited by increased weakness and SOA. Continue to recommend PT skilled care and D/C to SNF.  -KG       Row Name 02/03/25 1528          Vital Signs    Pre Systolic BP Rehab 103  -KG     Pre Treatment Diastolic BP 71  -KG     Pretreatment Heart Rate (beats/min) 85  -KG     Posttreatment Heart Rate (beats/min) 87  -KG     Pre SpO2 (%) 94  -KG     O2 Delivery Pre Treatment supplemental O2  -KG     Post SpO2 (%) 91  -KG     O2 Delivery Post Treatment supplemental O2  -KG     Pre Patient Position Supine  -KG     Intra Patient Position Standing  -KG     Post Patient Position Sitting  -KG       Row Name 02/03/25 1528          Positioning and Restraints    Pre-Treatment Position in bed  -KG     Post Treatment Position chair  -KG     In Chair notified nsg;reclined;call light within reach;encouraged to call for assist;exit alarm on;with  family/caregiver;legs elevated  -KG               User Key  (r) = Recorded By, (t) = Taken By, (c) = Cosigned By      Initials Name Provider Type    Ann Elder PT Physical Therapist                   Outcome Measures       Row Name 02/03/25 1534 02/03/25 0800       How much help from another person do you currently need...    Turning from your back to your side while in flat bed without using bedrails? 2  -KG 2  -CB    Moving from lying on back to sitting on the side of a flat bed without bedrails? 2  -KG 2  -CB    Moving to and from a bed to a chair (including a wheelchair)? 2  -KG 2  -CB    Standing up from a chair using your arms (e.g., wheelchair, bedside chair)? 2  -KG 2  -CB    Climbing 3-5 steps with a railing? 1  -KG 1  -CB    To walk in hospital room? 2  -KG 1  -CB    AM-PAC 6 Clicks Score (PT) 11  -KG 10  -CB    Highest Level of Mobility Goal 4 --> Transfer to chair/commode  -KG 4 --> Transfer to chair/commode  -CB      Row Name 02/03/25 1534          Functional Assessment    Outcome Measure Options AM-PAC 6 Clicks Basic Mobility (PT)  -KG               User Key  (r) = Recorded By, (t) = Taken By, (c) = Cosigned By      Initials Name Provider Type    Ann Elder PT Physical Therapist    Cecil Anderson, RN Registered Nurse                                 Physical Therapy Education       Title: PT OT SLP Therapies (In Progress)       Topic: Physical Therapy (In Progress)       Point: Mobility training (In Progress)       Learning Progress Summary            Patient Acceptance, E, NR by KG at 2/3/2025 1413    Acceptance, E,TB, NL,NR by CB at 2/2/2025 1541    Acceptance, E,TB, NR,VU by CH at 2/1/2025 0904    Acceptance, E, NR by KG at 1/27/2025 1031    Acceptance, E, NR by GUSTAVO at 1/24/2025 1430    Acceptance, E,D, VU,NR by LR at 1/23/2025 1407    Comment: Educated on benefits of mobility, safety with mobility, correct supine to sit t/f technique, correct sit<->stand  t/f technique, correct gait mechanics, LE HEP, and progression of POC.    Acceptance, E, NR by KG at 1/21/2025 1511                      Point: Home exercise program (In Progress)       Learning Progress Summary            Patient Acceptance, E, NR by KG at 2/3/2025 1413    Acceptance, E,TB, NL,NR by CB at 2/2/2025 1541    Acceptance, E,TB, NR,VU by CH at 2/1/2025 0904    Acceptance, E, NR by  at 1/30/2025 1132    Acceptance, E, NR by KG at 1/27/2025 1031    Acceptance, E, NR by GUSTAVO at 1/24/2025 1430    Acceptance, E,D, VU,NR by LR at 1/23/2025 1407    Comment: Educated on benefits of mobility, safety with mobility, correct supine to sit t/f technique, correct sit<->stand t/f technique, correct gait mechanics, LE HEP, and progression of POC.   Family Acceptance, E, NR by  at 1/30/2025 1132                      Point: Body mechanics (In Progress)       Learning Progress Summary            Patient Acceptance, E, NR by KG at 2/3/2025 1413    Acceptance, E,TB, NL,NR by CB at 2/2/2025 1541    Acceptance, E,TB, NR,VU by CH at 2/1/2025 0904    Acceptance, E, NR by KG at 1/27/2025 1031    Acceptance, E, NR by GUSTAVO at 1/24/2025 1430    Acceptance, E,D, VU,NR by LR at 1/23/2025 1407    Comment: Educated on benefits of mobility, safety with mobility, correct supine to sit t/f technique, correct sit<->stand t/f technique, correct gait mechanics, LE HEP, and progression of POC.    Acceptance, E, NR by KG at 1/21/2025 1511                      Point: Precautions (In Progress)       Learning Progress Summary            Patient Acceptance, E, NR by KG at 2/3/2025 1413    Acceptance, E,TB, NL,NR by CB at 2/2/2025 1541    Acceptance, E,TB, NR,VU by  at 2/1/2025 0904    Acceptance, E, NR by KG at 1/27/2025 1031    Acceptance, E, NR by GUSTAVO at 1/24/2025 1430    Acceptance, E,D, VU,NR by LR at 1/23/2025 1407    Comment: Educated on benefits of mobility, safety with mobility, correct supine to sit t/f technique, correct sit<->stand t/f  technique, correct gait mechanics, LE HEP, and progression of POC.    Acceptance, E, NR by KG at 1/21/2025 1511                                      User Key       Initials Effective Dates Name Provider Type Discipline    GUSTAVO 02/03/23 -  Audrey Gore, PT Physical Therapist PT    LR 02/03/23 -  Lola Finch, PT Physical Therapist PT    CH 06/16/21 -  Madalyn Beavers, RN Registered Nurse Nurse    KG 05/22/20 -  Ann Nicole, PT Physical Therapist PT    CB 06/16/21 -  Cecil Ramirez, RN Registered Nurse Nurse     09/21/23 -  Justa Bedoya, PT Physical Therapist PT                  PT Recommendation and Plan  Planned Therapy Interventions (PT): balance training, bed mobility training, gait training, strengthening, transfer training  Progress: declining  Outcome Evaluation: Pt ambulated 5ft with rollator and modA x2. Pt demonstrated very forward flexed posture with slow mike and short, shuffled steps. Pt with worsening balance and coordination with continued forward ambulation. Limited by increased weakness and SOA. Continue to recommend PT skilled care and D/C to SNF.     Time Calculation:   PT Evaluation Complexity  History, PT Evaluation Complexity: 3 or more personal factors and/or comorbidities  Examination of Body Systems (PT Eval Complexity): total of 3 or more elements  Clinical Presentation (PT Evaluation Complexity): evolving  Clinical Decision Making (PT Evaluation Complexity): moderate complexity  Overall Complexity (PT Evaluation Complexity): moderate complexity     PT Charges       Row Name 02/03/25 1413             Time Calculation    Start Time 1413  -KG      PT Received On 02/03/25  -KG      PT Goal Re-Cert Due Date 02/13/25  -KG         Time Calculation- PT    Total Timed Code Minutes- PT 24 minute(s)  -KG         Timed Charges    62391 - PT Therapeutic Activity Minutes 24  -KG         Total Minutes    Timed Charges Total Minutes 24  -KG       Total Minutes  24  -KG                User Key  (r) = Recorded By, (t) = Taken By, (c) = Cosigned By      Initials Name Provider Type    KG nAn Nicole, PT Physical Therapist                  Therapy Charges for Today       Code Description Service Date Service Provider Modifiers Qty    74989329468 HC PT THERAPEUTIC ACT EA 15 MIN 2/3/2025 Ann Nicole, PT GP 2    14302411600 HC PT THER SUPP EA 15 MIN 2/3/2025 Ann Nicole, PT GP 2            PT G-Codes  Outcome Measure Options: AM-PAC 6 Clicks Basic Mobility (PT)  AM-PAC 6 Clicks Score (PT): 11  AM-PAC 6 Clicks Score (OT): 12  PT Discharge Summary  Anticipated Discharge Disposition (PT): skilled nursing facility    Kelsey Nicole PT  2/3/2025      Electronically signed by Ann Nicole, PT at 25 1535          Occupational Therapy Notes (most recent note)        Eun Roy, OT at 25 0831          Patient Name: Rose J Kellermann  : 1926    MRN: [id number]                              Today's Date: 2025       Admit Date: 2025    Visit Dx:     ICD-10-CM ICD-9-CM   1. Pleural effusion, left  J90 511.9   2. Hypoxia  R09.02 799.02   3. Chronic kidney disease, unspecified CKD stage  N18.9 585.9     Patient Active Problem List   Diagnosis    Essential hypertension    Vitamin D deficiency    Fatigue    Chronic kidney disease, stage III (moderate)     Osteoarthritis    Dyslipidemia    Post-menopausal bleeding    Endometrial cancer    Closed fracture of left hip    Asymptomatic bacteriuria    Post-traumatic osteoarthritis of left hip    Status post total replacement of left hip    Prediabetes    Acute blood loss anemia, asymptomatic    Postoperative urinary retention    Myopia    Posterior crocodile shagreen of cornea    Presbyopia    Ptosis of eyelid    Regular astigmatism    Retinal neovascularization    After cataract    Secondary cataract    Premature atrial contractions    Heart murmur    Nocturnal hypoxia     Acute on chronic heart failure with preserved ejection fraction (HFpEF)    Chronic deep vein thrombosis (DVT) of both lower extremities    Retinal neovascularization    Pleural effusion on left    Pulmonary hypertension     Past Medical History:   Diagnosis Date    Abscess of back     Arrhythmia     frequent PVC    Cancer 12/2011    Endometrial cancer, status post robotically assisted hysterectomy with Dr. Haddad, December 2011.      CKD (chronic kidney disease), stage III     no dilaysis     Dizzy     Dvt femoral (deep venous thrombosis) 2017    s/p hip surgery in 2017. Took xarelto until August 2018    Dyslipidemia     Dyslipidemia.  Observing.    Enthesopathy of hip region     Generalized osteoarthrosis, involving multiple sites     Headache     Hearing loss     Hypertension     Low backache     Needs flu shot     Osteoarthritis     Osteoarthritis with questionable carpal tunnel syndrome bilaterally.     Otitis, serous     Pneumonia 1967    Remote pneumonia, 1967.     Retinal hemorrhage     SI joint arthritis     SOB (shortness of breath)     Syncope 2001    Remote syncope with working diagnosis of vasodepressor, 2001.     Venous insufficiency of leg     Wears glasses     readers     Past Surgical History:   Procedure Laterality Date    CATARACT EXTRACTION      bilat     COLONOSCOPY      HIP PERCUTANEOUS PINNING Left 11/24/2017    Procedure: HIP PERCUTANEOUS PINNING;  Surgeon: Lucas Swanson MD;  Location:  [facility];  Service:     HYSTERECTOMY      total? but unsure     KNEE SURGERY  2001    Remote knee surgery in 2001.- right     TOTAL HIP ARTHROPLASTY Left 10/26/2018    Procedure: TOTAL HIP ARTHROPLASTY LEFT;  Surgeon: Stephen Baker MD;  Location:  [facility];  Service: Orthopedics      General Information       Row Name 02/04/25 0910          OT Time and Intention    Document Type progress note/recertification  -KF     Mode of Treatment occupational therapy;individual therapy  -KF       Row Name  02/04/25 0910          General Information    Patient Profile Reviewed yes  -KF     Existing Precautions/Restrictions fall;oxygen therapy device and L/min;other (see comments)  [place]  -KF     Barriers to Rehab medically complex;previous functional deficit;hearing deficit  -KF       Row Name 02/04/25 0910          Cognition    Orientation Status (Cognition) oriented x 3;other (see comments)  conversational confusion  -KF       Row Name 02/04/25 0910          Safety Issues/Impairments Affecting Functional Mobility    Safety Issues Affecting Function (Mobility) awareness of need for assistance;insight into deficits/self-awareness;safety precaution awareness;safety precautions follow-through/compliance;sequencing abilities  -KF     Impairments Affecting Function (Mobility) balance;cognition;coordination;endurance/activity tolerance;motor control;motor planning;postural/trunk control;shortness of breath;strength;pain;range of motion (ROM)  -KF     Cognitive Impairments, Mobility Safety/Performance awareness, need for assistance;insight into deficits/self-awareness;judgment;problem-solving/reasoning;safety precaution awareness;safety precaution follow-through;sequencing abilities  -KF               User Key  (r) = Recorded By, (t) = Taken By, (c) = Cosigned By      Initials Name Provider Type    KF Eun Roy, OT Occupational Therapist                     Mobility/ADL's       Row Name 02/04/25 0911          Bed Mobility    Bed Mobility supine-sit  -KF     Supine-Sit [place] (Bed Mobility) moderate assist (50% patient effort);2 person assist;verbal cues;nonverbal cues (demo/gesture)  -KF     Assistive Device (Bed Mobility) bed rails;head of bed elevated;repositioning sheet  -KF     Comment, (Bed Mobility) Increased time and effort needed with cues provided for sequence  -KF       Row Name 02/04/25 0911          Transfers    Transfers bed-chair transfer;sit-stand transfer;stand-sit transfer  -KF       Row Name  02/04/25 0911          Bed-Chair Transfer    Bed-Chair [place] (Transfers) minimum assist (75% patient effort);2 person assist;verbal cues;nonverbal cues (demo/gesture)  -     Assistive Device (Bed-Chair Transfers) walker, front-wheeled  -KF     Comment, (Bed-Chair Transfer) Pt able to take 4 short steps from the EOB to the chair using a RWx with Xiomara x2. Frequent cues for upright posture.  -       Row Name 02/04/25 0911          Sit-Stand Transfer    Sit-Stand [place] (Transfers) moderate assist (50% patient effort);2 person assist;verbal cues  -     Assistive Device (Sit-Stand Transfers) walker, front-wheeled  -KF     Comment, (Sit-Stand Transfer) STS x2 from the EOB  -       Row Name 02/04/25 0911          Stand-Sit Transfer    Stand-Sit [place] (Transfers) moderate assist (50% patient effort);2 person assist;verbal cues;nonverbal cues (demo/gesture)  -     Assistive Device (Stand-Sit Transfers) walker, front-wheeled  -       Row Name 02/04/25 0911          Functional Mobility    Functional Mobility- Ind. Level minimum assist (75% patient effort);2 person assist required;verbal cues required;nonverbal cues required (demo/gesture)  -     Functional Mobility- Device walker, front-wheeled  -     Functional Mobility-Distance (Feet) --  4 steps from the EOB to the chair  -     Functional Mobility- Comment Additional mobility limited by pt fatigue following prolonged standing at the EOB for hygiene following episode of bowel incontinence.  -       Row Name 02/04/25 0911          Activities of Daily Living    BADL Assessment/Intervention upper body dressing;feeding;toileting  -       Row Name 02/04/25 0911          Upper Body Dressing Assessment/Training    [place] Level (Upper Body Dressing) doff;don;pajama/robe;maximum assist (25% patient effort)  -     Position (Upper Body Dressing) supported sitting  -     Comment, (Upper Body Dressing) UBD completed x2- once following  episode of bowel incontinence and once following spill while eating breakfast  -KF       Row Name 02/04/25 0911          Self-Feeding Assessment/Training    [place] Level (Feeding) prepare tray/open items;maximum assist (25% patient effort);liquids to mouth;scoop food and bring to mouth;set up  -KF     Position (Feeding) supported sitting  -KF       Row Name 02/04/25 0911          Toileting Assessment/Training    [place] Level (Toileting) adjust/manage clothing;perform perineal hygiene;dependent (less than 25% patient effort)  -KF     Position (Toileting) supported standing  -KF               User Key  (r) = Recorded By, (t) = Taken By, (c) = Cosigned By      Initials Name Provider Type    Eun Sotelo OT Occupational Therapist                   Obj/Interventions       Row Name 02/04/25 0914          Balance    Balance Assessment sitting static balance;sitting dynamic balance;sit to stand dynamic balance;standing static balance;standing dynamic balance  -KF     Static Sitting Balance standby assist  -KF     Dynamic Sitting Balance contact guard  -KF     Position, Sitting Balance unsupported;sitting edge of bed  -KF     Sit to Stand Dynamic Balance moderate assist;2-person assist;verbal cues;non-verbal cues (demo/gesture)  -KF     Static Standing Balance minimal assist;2-person assist  -KF     Dynamic Standing Balance minimal assist;2-person assist  -KF     Position/Device Used, Standing Balance supported;walker, front-wheeled  -KF     Balance Interventions sitting;standing;sit to stand;supported;static;dynamic;occupation based/functional task  -KF               User Key  (r) = Recorded By, (t) = Taken By, (c) = Cosigned By      Initials Name Provider Type    Eun Sotelo OT Occupational Therapist                   Goals/Plan       Row Name 02/04/25 0916          Bed Mobility Goal 1 (OT)    Activity/Assistive Device (Bed Mobility Goal 1, OT) sit to supine  -KF     [place] Level/Cues  Needed (Bed Mobility Goal 1, OT) standby assist  -KF     Time Frame (Bed Mobility Goal 1, OT) short term goal (STG);3 days  -KF     Progress/Outcomes (Bed Mobility Goal 1, OT) goal ongoing  -KF       Row Name 02/04/25 0916          Transfer Goal 1 (OT)    Activity/Assistive Device (Transfer Goal 1, OT) sit-to-stand/stand-to-sit;toilet  -KF     [place] Level/Cues Needed (Transfer Goal 1, OT) contact guard required  -KF     Time Frame (Transfer Goal 1, OT) long term goal (LTG);5 days  -KF     Progress/Outcome (Transfer Goal 1, OT) goal ongoing  -KF       Row Name 02/04/25 0916          Dressing Goal 1 (OT)    Activity/Device (Dressing Goal 1, OT) lower body dressing  -KF     [place]/Cues Needed (Dressing Goal 1, OT) moderate assist (50-74% patient effort)  -KF     Time Frame (Dressing Goal 1, OT) long term goal (LTG);5 days  -KF     Progress/Outcome (Dressing Goal 1, OT) goal ongoing  -KF               User Key  (r) = Recorded By, (t) = Taken By, (c) = Cosigned By      Initials Name Provider Type    Eun Sotelo, OT Occupational Therapist                   Clinical Impression       Row Name 02/04/25 0914          Pain Assessment    Pretreatment Pain Rating 0/10 - no pain  -KF     Posttreatment Pain Rating 0/10 - no pain  -KF       Row Name 02/04/25 0914          Plan of Care Review    Plan of Care Reviewed With patient  -KF     Progress improving  -KF     Outcome Evaluation OT re-cert completed with goals adjusted appropriately. The pt performed STS x2 from the EOB using a RWx with modA x2. Pt took ~4 steps from the EOB to the chair using a RWx with Xiomara x2, cues provided for upright posture. Additional mobility limited by pt fatigue and weakness following prolonged standing at EOB for hygiene. The pt remains below baseline with generalized weakness, decreased activity tolerance, balance deficits, and decreased safety awareness warranting continued IP OT services. Rec a d/c to SNF when medically ready.   -KF       Row Name 02/04/25 0914          Therapy Assessment/Plan (OT)    Rehab Potential (OT) good  -KF     Criteria for Skilled Therapeutic Interventions Met (OT) yes;meets criteria;skilled treatment is necessary  -KF     Therapy Frequency (OT) daily  -KF       Row Name 02/04/25 0914          Therapy Plan Review/Discharge Plan (OT)    Anticipated Discharge Disposition (OT) skilled nursing facility  -KF       Row Name 02/04/25 0914          Vital Signs    Pre Systolic BP Rehab 114  -KF     Pre Treatment Diastolic BP 80  -KF     Pretreatment Heart Rate (beats/min) 94  -KF     Pre SpO2 (%) 93  4L  -KF     O2 Delivery Pre Treatment nasal cannula  -KF     Pre Patient Position Supine  -KF     Intra Patient Position Standing  -KF     Post Patient Position Sitting  -KF       Row Name 02/04/25 0914          Positioning and Restraints    Pre-Treatment Position in bed  -KF     Post Treatment Position chair  -KF     In Chair notified nsg;reclined;call light within reach;encouraged to call for assist;exit alarm on;waffle cushion;legs elevated  -KF               User Key  (r) = Recorded By, (t) = Taken By, (c) = Cosigned By      Initials Name Provider Type    KF Eun Roy, OT Occupational Therapist                   Outcome Measures       Row Name 02/04/25 0917          How much help from another is currently needed...    Putting on and taking off regular lower body clothing? 1  -KF     Bathing (including washing, rinsing, and drying) 2  -KF     Toileting (which includes using toilet bed pan or urinal) 2  -KF     Putting on and taking off regular upper body clothing 2  -KF     Taking care of personal grooming (such as brushing teeth) 3  -KF     Eating meals 3  -KF     AM-PAC 6 Clicks Score (OT) 13  -KF       Row Name 02/04/25 0800          How much help from another person do you currently need...    Turning from your back to your side while in flat bed without using bedrails? 3  -CB     Moving from lying on back to  sitting on the side of a flat bed without bedrails? 2  -CB     Moving to and from a bed to a chair (including a wheelchair)? 2  -CB     Standing up from a chair using your arms (e.g., wheelchair, bedside chair)? 2  -CB     Climbing 3-5 steps with a railing? 2  -CB     To walk in hospital room? 2  -CB     AM-PAC 6 Clicks Score (PT) 13  -CB     Highest Level of Mobility Goal 4 --> Transfer to chair/commode  -CB       Row Name 02/04/25 0917          Functional Assessment    Outcome Measure Options AM-PAC 6 Clicks Daily Activity (OT)  -KF               User Key  (r) = Recorded By, (t) = Taken By, (c) = Cosigned By      Initials Name Provider Type    CB Cecil Ramirez, RN Registered Nurse    Eun Sotelo OT Occupational Therapist                    Occupational Therapy Education       Title: PT OT SLP Therapies (In Progress)       Topic: Occupational Therapy (In Progress)       Point: ADL training (In Progress)       Description:   Instruct learner(s) on proper safety adaptation and remediation techniques during self care or transfers.   Instruct in proper use of assistive devices.                  Learning Progress Summary            Patient Acceptance, E,TB, NR by KF at 2/4/2025 0831    Acceptance, E,TB, NL,NR by CB at 2/3/2025 1553    Acceptance, E,TB, NL,NR by CB at 2/2/2025 1541    Acceptance, E,TB, NR,VU by  at 2/1/2025 0904    Acceptance, E, NR by  at 1/28/2025 1513    Acceptance, E, VU,NR by  at 1/22/2025 1519   Family Acceptance, E, VU,NR by MR at 1/22/2025 1519                      Point: Home exercise program (In Progress)       Description:   Instruct learner(s) on appropriate technique for monitoring, assisting and/or progressing therapeutic exercises/activities.                  Learning Progress Summary            Patient Acceptance, E,TB, NL,NR by CB at 2/3/2025 1553    Acceptance, E,TB, NL,NR by CB at 2/2/2025 1541    Acceptance, E,TB, NR,VU by  at 2/1/2025 0904                       Point: Precautions (In Progress)       Description:   Instruct learner(s) on prescribed precautions during self-care and functional transfers.                  Learning Progress Summary            Patient Acceptance, E,TB, NR by  at 2/4/2025 0831    Acceptance, E,TB, NL,NR by CB at 2/3/2025 1553    Acceptance, E,TB, NL,NR by CB at 2/2/2025 1541    Acceptance, E,TB, NR,VU by  at 2/1/2025 0904    Acceptance, E, NR by  at 1/28/2025 1513    Acceptance, E, VU,NR by MR at 1/22/2025 1519   Family Acceptance, E, VU,NR by MR at 1/22/2025 1519                      Point: Body mechanics (In Progress)       Description:   Instruct learner(s) on proper positioning and spine alignment during self-care, functional mobility activities and/or exercises.                  Learning Progress Summary            Patient Acceptance, E,TB, NR by KF at 2/4/2025 0831    Acceptance, E,TB, NL,NR by CB at 2/3/2025 1553    Acceptance, E,TB, NL,NR by CB at 2/2/2025 1541    Acceptance, E,TB, NR,VU by  at 2/1/2025 0904    Acceptance, E, NR by  at 1/28/2025 1513    Acceptance, E, VU,NR by MR at 1/22/2025 1519   Family Acceptance, E, VU,NR by MR at 1/22/2025 1519                                      User Key       Initials Effective Dates Name Provider Type Discipline     06/16/21 -  Madalyn Beavers RN Registered Nurse Nurse     06/16/21 -  Cecil Ramirez, RN Registered Nurse Nurse     06/16/21 -  Lola Snow, OT Occupational Therapist OT    MR 09/22/22 -  Kaye Espinal, OT Occupational Therapist OT     08/09/23 -  Eun Roy, OT Occupational Therapist OT                  OT Recommendation and Plan  Therapy Frequency (OT): daily  Plan of Care Review  Plan of Care Reviewed With: patient  Progress: improving  Outcome Evaluation: OT re-cert completed with goals adjusted appropriately. The pt performed STS x2 from the EOB using a RWx with modA x2. Pt took ~4 steps from the EOB to the chair using a RWx with  [place]omara x2, cues provided for upright posture. Additional mobility limited by pt fatigue and weakness following prolonged standing at EOB for hygiene. The pt remains below baseline with generalized weakness, decreased activity tolerance, balance deficits, and decreased safety awareness warranting continued IP OT services. Rec a d/c to SNF when medically ready.     Time Calculation:         Time Calculation- OT       Row Name 02/04/25 0917             Time Calculation- OT    OT Start Time 0831  -KF      OT Received On 02/04/25  -KF      OT Goal Re-Cert Due Date 02/14/25  -KF         Timed Charges    73998 - OT Therapeutic Activity Minutes 13  -KF      92678 - OT Self Care/Mgmt Minutes 25  -KF         Total Minutes    Timed Charges Total Minutes 38  -KF       Total Minutes 38  -KF                User Key  (r) = Recorded By, (t) = Taken By, (c) = Cosigned By      Initials Name Provider Type    Eun Sotelo OT Occupational Therapist                  Therapy Charges for Today       Code Description Service Date Service Provider Modifiers Qty    40327820736 HC OT THERAPEUTIC ACT EA 15 MIN 2/4/2025 Eun Roy OT GO 1    01574544004 HC OT SELF CARE/MGMT/TRAIN EA 15 MIN 2/4/2025 Eun Roy OT GO 2    93414306989 HC OT THER SUPP EA 15 MIN 2/4/2025 Eun Roy OT GO 2                 Eun Roy OT  2/4/2025    Electronically signed by Eun Roy OT at 02/04/25 0920

## 2025-02-04 NOTE — THERAPY PROGRESS REPORT/RE-CERT
Patient Name: Rose J Kellermann  : 1926    MRN: [id number]                              Today's Date: 2025       Admit Date: 2025    Visit Dx:     ICD-10-CM ICD-9-CM   1. Pleural effusion, left  J90 511.9   2. Hypoxia  R09.02 799.02   3. Chronic kidney disease, unspecified CKD stage  N18.9 585.9     Patient Active Problem List   Diagnosis    Essential hypertension    Vitamin D deficiency    Fatigue    Chronic kidney disease, stage III (moderate)     Osteoarthritis    Dyslipidemia    Post-menopausal bleeding    Endometrial cancer    Closed fracture of left hip    Asymptomatic bacteriuria    Post-traumatic osteoarthritis of left hip    Status post total replacement of left hip    Prediabetes    Acute blood loss anemia, asymptomatic    Postoperative urinary retention    Myopia    Posterior crocodile shagreen of cornea    Presbyopia    Ptosis of eyelid    Regular astigmatism    Retinal neovascularization    After cataract    Secondary cataract    Premature atrial contractions    Heart murmur    Nocturnal hypoxia    Acute on chronic heart failure with preserved ejection fraction (HFpEF)    Chronic deep vein thrombosis (DVT) of both lower extremities    Retinal neovascularization    Pleural effusion on left    Pulmonary hypertension     Past Medical History:   Diagnosis Date    Abscess of back     Arrhythmia     frequent PVC    Cancer 2011    Endometrial cancer, status post robotically assisted hysterectomy with Dr. Haddad, 2011.      CKD (chronic kidney disease), stage III     no dilaysis     Dizzy     Dvt femoral (deep venous thrombosis) 2017    s/p hip surgery in . Took xarelto until 2018    Dyslipidemia     Dyslipidemia.  Observing.    Enthesopathy of hip region     Generalized osteoarthrosis, involving multiple sites     Headache     Hearing loss     Hypertension     Low backache     Needs flu shot     Osteoarthritis     Osteoarthritis with questionable carpal tunnel syndrome  bilaterally.     Otitis, serous     Pneumonia 1967    Remote pneumonia, 1967.     Retinal hemorrhage     SI joint arthritis     SOB (shortness of breath)     Syncope 2001    Remote syncope with working diagnosis of vasodepressor, 2001.     Venous insufficiency of leg     Wears glasses     readers     Past Surgical History:   Procedure Laterality Date    CATARACT EXTRACTION      bilat     COLONOSCOPY      HIP PERCUTANEOUS PINNING Left 11/24/2017    Procedure: HIP PERCUTANEOUS PINNING;  Surgeon: Lucas Swanson MD;  Location: [facility] OR;  Service:     HYSTERECTOMY      total? but unsure     KNEE SURGERY  2001    Remote knee surgery in 2001.- right     TOTAL HIP ARTHROPLASTY Left 10/26/2018    Procedure: TOTAL HIP ARTHROPLASTY LEFT;  Surgeon: Stephen Baker MD;  Location: [facility] OR;  Service: Orthopedics      General Information       Row Name 02/04/25 0910          OT Time and Intention    Document Type progress note/recertification  -KF     Mode of Treatment occupational therapy;individual therapy  -KF       Row Name 02/04/25 0910          General Information    Patient Profile Reviewed yes  -KF     Existing Precautions/Restrictions fall;oxygen therapy device and L/min;other (see comments)  [place]  -KF     Barriers to Rehab medically complex;previous functional deficit;hearing deficit  -KF       Row Name 02/04/25 0910          Cognition    Orientation Status (Cognition) oriented x 3;other (see comments)  conversational confusion  -KF       Row Name 02/04/25 0910          Safety Issues/Impairments Affecting Functional Mobility    Safety Issues Affecting Function (Mobility) awareness of need for assistance;insight into deficits/self-awareness;safety precaution awareness;safety precautions follow-through/compliance;sequencing abilities  -KF     Impairments Affecting Function (Mobility) balance;cognition;coordination;endurance/activity tolerance;motor control;motor planning;postural/trunk control;shortness of  breath;strength;pain;range of motion (ROM)  -KF     Cognitive Impairments, Mobility Safety/Performance awareness, need for assistance;insight into deficits/self-awareness;judgment;problem-solving/reasoning;safety precaution awareness;safety precaution follow-through;sequencing abilities  -KF               User Key  (r) = Recorded By, (t) = Taken By, (c) = Cosigned By      Initials Name Provider Type    KF Eun Roy OT Occupational Therapist                     Mobility/ADL's       Row Name 02/04/25 0911          Bed Mobility    Bed Mobility supine-sit  -KF     Supine-Sit [place] (Bed Mobility) moderate assist (50% patient effort);2 person assist;verbal cues;nonverbal cues (demo/gesture)  -     Assistive Device (Bed Mobility) bed rails;head of bed elevated;repositioning sheet  -KF     Comment, (Bed Mobility) Increased time and effort needed with cues provided for sequence  -KF       Row Name 02/04/25 0911          Transfers    Transfers bed-chair transfer;sit-stand transfer;stand-sit transfer  -       Row Name 02/04/25 0911          Bed-Chair Transfer    Bed-Chair [place] (Transfers) minimum assist (75% patient effort);2 person assist;verbal cues;nonverbal cues (demo/gesture)  -     Assistive Device (Bed-Chair Transfers) walker, front-wheeled  -KF     Comment, (Bed-Chair Transfer) Pt able to take 4 short steps from the EOB to the chair using a RWx with Xiomara x2. Frequent cues for upright posture.  -       Row Name 02/04/25 0911          Sit-Stand Transfer    Sit-Stand [place] (Transfers) moderate assist (50% patient effort);2 person assist;verbal cues  -KF     Assistive Device (Sit-Stand Transfers) walker, front-wheeled  -KF     Comment, (Sit-Stand Transfer) STS x2 from the EOB  -       Row Name 02/04/25 0911          Stand-Sit Transfer    Stand-Sit [place] (Transfers) moderate assist (50% patient effort);2 person assist;verbal cues;nonverbal cues (demo/gesture)  -KF     Assistive  Device (Stand-Sit Transfers) walker, front-wheeled  -       Row Name 02/04/25 0911          Functional Mobility    Functional Mobility- Ind. Level minimum assist (75% patient effort);2 person assist required;verbal cues required;nonverbal cues required (demo/gesture)  -     Functional Mobility- Device walker, front-wheeled  -     Functional Mobility-Distance (Feet) --  4 steps from the EOB to the chair  -     Functional Mobility- Comment Additional mobility limited by pt fatigue following prolonged standing at the EOB for hygiene following episode of bowel incontinence.  -       Row Name 02/04/25 0911          Activities of Daily Living    BADL Assessment/Intervention upper body dressing;feeding;toileting  -       Row Name 02/04/25 0911          Upper Body Dressing Assessment/Training    [place] Level (Upper Body Dressing) doff;don;pajama/robe;maximum assist (25% patient effort)  -KF     Position (Upper Body Dressing) supported sitting  -     Comment, (Upper Body Dressing) UBD completed x2- once following episode of bowel incontinence and once following spill while eating breakfast  -       Row Name 02/04/25 0911          Self-Feeding Assessment/Training    [place] Level (Feeding) prepare tray/open items;maximum assist (25% patient effort);liquids to mouth;scoop food and bring to mouth;set up  -KF     Position (Feeding) supported sitting  -[facility] Name 02/04/25 0911          Toileting Assessment/Training    [place] Level (Toileting) adjust/manage clothing;perform perineal hygiene;dependent (less than 25% patient effort)  -KF     Position (Toileting) supported standing  -               User Key  (r) = Recorded By, (t) = Taken By, (c) = Cosigned By      Initials Name Provider Type    Eun Sotelo OT Occupational Therapist                   Obj/Interventions       Row Name 02/04/25 0914          Balance    Balance Assessment sitting static balance;sitting dynamic balance;sit  to stand dynamic balance;standing static balance;standing dynamic balance  -KF     Static Sitting Balance standby assist  -KF     Dynamic Sitting Balance contact guard  -KF     Position, Sitting Balance unsupported;sitting edge of bed  -KF     Sit to Stand Dynamic Balance moderate assist;2-person assist;verbal cues;non-verbal cues (demo/gesture)  -KF     Static Standing Balance minimal assist;2-person assist  -KF     Dynamic Standing Balance minimal assist;2-person assist  -KF     Position/Device Used, Standing Balance supported;walker, front-wheeled  -KF     Balance Interventions sitting;standing;sit to stand;supported;static;dynamic;occupation based/functional task  -KF               User Key  (r) = Recorded By, (t) = Taken By, (c) = Cosigned By      Initials Name Provider Type    Eun Sotelo OT Occupational Therapist                   Goals/Plan       Row Name 02/04/25 0916          Bed Mobility Goal 1 (OT)    Activity/Assistive Device (Bed Mobility Goal 1, OT) sit to supine  -KF     Williams Level/Cues Needed (Bed Mobility Goal 1, OT) standby assist  -KF     Time Frame (Bed Mobility Goal 1, OT) short term goal (STG);3 days  -KF     Progress/Outcomes (Bed Mobility Goal 1, OT) goal ongoing  -KF       Row Name 02/04/25 0916          Transfer Goal 1 (OT)    Activity/Assistive Device (Transfer Goal 1, OT) sit-to-stand/stand-to-sit;toilet  -KF     Williams Level/Cues Needed (Transfer Goal 1, OT) contact guard required  -KF     Time Frame (Transfer Goal 1, OT) long term goal (LTG);5 days  -KF     Progress/Outcome (Transfer Goal 1, OT) goal ongoing  -KF       Row Name 02/04/25 0916          Dressing Goal 1 (OT)    Activity/Device (Dressing Goal 1, OT) lower body dressing  -KF     Williams/Cues Needed (Dressing Goal 1, OT) moderate assist (50-74% patient effort)  -KF     Time Frame (Dressing Goal 1, OT) long term goal (LTG);5 days  -KF     Progress/Outcome (Dressing Goal 1, OT) goal ongoing  -KF                User Key  (r) = Recorded By, (t) = Taken By, (c) = Cosigned By      Initials Name Provider Type    KF Eun Roy, SALVADOR Occupational Therapist                   Clinical Impression       Row Name 02/04/25 0914          Pain Assessment    Pretreatment Pain Rating 0/10 - no pain  -KF     Posttreatment Pain Rating 0/10 - no pain  -KF       Row Name 02/04/25 0914          Plan of Care Review    Plan of Care Reviewed With patient  -KF     Progress improving  -KF     Outcome Evaluation OT re-cert completed with goals adjusted appropriately. The pt performed STS x2 from the EOB using a RWx with modA x2. Pt took ~4 steps from the EOB to the chair using a RWx with Xiomara x2, cues provided for upright posture. Additional mobility limited by pt fatigue and weakness following prolonged standing at EOB for hygiene. The pt remains below baseline with generalized weakness, decreased activity tolerance, balance deficits, and decreased safety awareness warranting continued IP OT services. Rec a d/c to SNF when medically ready.  -       Row Name 02/04/25 0914          Therapy Assessment/Plan (OT)    Rehab Potential (OT) good  -KF     Criteria for Skilled Therapeutic Interventions Met (OT) yes;meets criteria;skilled treatment is necessary  -KF     Therapy Frequency (OT) daily  -KF       Row Name 02/04/25 0914          Therapy Plan Review/Discharge Plan (OT)    Anticipated Discharge Disposition (OT) skilled nursing facility  -       Row Name 02/04/25 0914          Vital Signs    Pre Systolic BP Rehab 114  -KF     Pre Treatment Diastolic BP 80  -KF     Pretreatment Heart Rate (beats/min) 94  -KF     Pre SpO2 (%) 93  4L  -KF     O2 Delivery Pre Treatment nasal cannula  -KF     Pre Patient Position Supine  -KF     Intra Patient Position Standing  -KF     Post Patient Position Sitting  -KF       Row Name 02/04/25 0914          Positioning and Restraints    Pre-Treatment Position in bed  -KF     Post Treatment Position chair   -KF     In Chair notified nsg;reclined;call light within reach;encouraged to call for assist;exit alarm on;waffle cushion;legs elevated  -KF               User Key  (r) = Recorded By, (t) = Taken By, (c) = Cosigned By      Initials Name Provider Type    Eun Sotelo OT Occupational Therapist                   Outcome Measures       Row Name 02/04/25 0917          How much help from another is currently needed...    Putting on and taking off regular lower body clothing? 1  -KF     Bathing (including washing, rinsing, and drying) 2  -KF     Toileting (which includes using toilet bed pan or urinal) 2  -KF     Putting on and taking off regular upper body clothing 2  -KF     Taking care of personal grooming (such as brushing teeth) 3  -KF     Eating meals 3  -KF     AM-PAC 6 Clicks Score (OT) 13  -KF       Row Name 02/04/25 0800          How much help from another person do you currently need...    Turning from your back to your side while in flat bed without using bedrails? 3  -CB     Moving from lying on back to sitting on the side of a flat bed without bedrails? 2  -CB     Moving to and from a bed to a chair (including a wheelchair)? 2  -CB     Standing up from a chair using your arms (e.g., wheelchair, bedside chair)? 2  -CB     Climbing 3-5 steps with a railing? 2  -CB     To walk in hospital room? 2  -CB     AM-PAC 6 Clicks Score (PT) 13  -CB     Highest Level of Mobility Goal 4 --> Transfer to chair/commode  -CB       Row Name 02/04/25 0917          Functional Assessment    Outcome Measure Options AM-PAC 6 Clicks Daily Activity (OT)  -KF               User Key  (r) = Recorded By, (t) = Taken By, (c) = Cosigned By      Initials Name Provider Type    Cecil Anderson, RN Registered Nurse    Eun Sotelo OT Occupational Therapist                    Occupational Therapy Education       Title: PT OT SLP Therapies (In Progress)       Topic: Occupational Therapy (In Progress)       Point: ADL  training (In Progress)       Description:   Instruct learner(s) on proper safety adaptation and remediation techniques during self care or transfers.   Instruct in proper use of assistive devices.                  Learning Progress Summary            Patient Acceptance, E,TB, NR by KF at 2/4/2025 0831    Acceptance, E,TB, NL,NR by CB at 2/3/2025 1553    Acceptance, E,TB, NL,NR by CB at 2/2/2025 1541    Acceptance, E,TB, NR,VU by  at 2/1/2025 0904    Acceptance, E, NR by  at 1/28/2025 1513    Acceptance, E, VU,NR by MR at 1/22/2025 1519   Family Acceptance, E, VU,NR by MR at 1/22/2025 1519                      Point: Home exercise program (In Progress)       Description:   Instruct learner(s) on appropriate technique for monitoring, assisting and/or progressing therapeutic exercises/activities.                  Learning Progress Summary            Patient Acceptance, E,TB, NL,NR by CB at 2/3/2025 1553    Acceptance, E,TB, NL,NR by CB at 2/2/2025 1541    Acceptance, E,TB, NR,VU by  at 2/1/2025 0904                      Point: Precautions (In Progress)       Description:   Instruct learner(s) on prescribed precautions during self-care and functional transfers.                  Learning Progress Summary            Patient Acceptance, E,TB, NR by KF at 2/4/2025 0831    Acceptance, E,TB, NL,NR by CB at 2/3/2025 1553    Acceptance, E,TB, NL,NR by CB at 2/2/2025 1541    Acceptance, E,TB, NR,VU by  at 2/1/2025 0904    Acceptance, E, NR by  at 1/28/2025 1513    Acceptance, E, VU,NR by MR at 1/22/2025 1519   Family Acceptance, E, VU,NR by MR at 1/22/2025 1519                      Point: Body mechanics (In Progress)       Description:   Instruct learner(s) on proper positioning and spine alignment during self-care, functional mobility activities and/or exercises.                  Learning Progress Summary            Patient Acceptance, E,TB, NR by KF at 2/4/2025 0831    Acceptance, E,TB, NL,NR by CB at 2/3/2025 1553     Acceptance, E,TB, NL,NR by  at 2/2/2025 1541    Acceptance, E,TB, NR,VU by  at 2/1/2025 0904    Acceptance, E, NR by  at 1/28/2025 1513    Acceptance, E, VU,NR by MR at 1/22/2025 1519   Family Acceptance, E, VU,NR by MR at 1/22/2025 1519                                      User Key       Initials Effective Dates Name Provider Type Discipline     06/16/21 -  Madalyn Beavers, RN Registered Nurse Nurse    CB 06/16/21 -  Cecil Ramirez, RN Registered Nurse Nurse     06/16/21 -  Lola Snow, OT Occupational Therapist OT    MR 09/22/22 -  Kaye Espinal, OT Occupational Therapist OT     08/09/23 -  Eun Roy, OT Occupational Therapist OT                  OT Recommendation and Plan  Therapy Frequency (OT): daily  Plan of Care Review  Plan of Care Reviewed With: patient  Progress: improving  Outcome Evaluation: OT re-cert completed with goals adjusted appropriately. The pt performed STS x2 from the EOB using a RWx with modA x2. Pt took ~4 steps from the EOB to the chair using a RWx with Xiomara x2, cues provided for upright posture. Additional mobility limited by pt fatigue and weakness following prolonged standing at EOB for hygiene. The pt remains below baseline with generalized weakness, decreased activity tolerance, balance deficits, and decreased safety awareness warranting continued IP OT services. Rec a d/c to SNF when medically ready.     Time Calculation:         Time Calculation- OT       Row Name 02/04/25 0917             Time Calculation- OT    OT Start Time 0831  -KF      OT Received On 02/04/25  -KF      OT Goal Re-Cert Due Date 02/14/25  -KF         Timed Charges    69237 - OT Therapeutic Activity Minutes 13  -KF      89210 - OT Self Care/Mgmt Minutes 25  -KF         Total Minutes    Timed Charges Total Minutes 38  -KF       Total Minutes 38  -KF                User Key  (r) = Recorded By, (t) = Taken By, (c) = Cosigned By      Initials Name Provider Type    SHABBIR Roy  SALVADOR Haq Occupational Therapist                  Therapy Charges for Today       Code Description Service Date Service Provider Modifiers Qty    14054295192  OT THERAPEUTIC ACT EA 15 MIN 2/4/2025 Eun Roy OT GO 1    79082032510  OT SELF CARE/MGMT/TRAIN EA 15 MIN 2/4/2025 Eun Roy OT GO 2    [id number]  OT THER SUPP EA 15 MIN 2/4/2025 Eun Roy OT GO 2                 Eun Roy OT  2/4/2025

## 2025-02-04 NOTE — PLAN OF CARE
Goal Outcome Evaluation:  Plan of Care Reviewed With: patient        Progress: no change  Outcome Evaluation: Palliative RN saw pt. at 1141.  Pt. denied pain and nausea.  She appeared mildly soa with conversation on 4LNC.  Pt. was very pleasant and did not demonstrate any visible signs of discomfort during nursing visit.  Patient's daughter was setting up patient's lunch tray at time of visit.  Daughter did not have any questions or concerns at time of visit.  Plan SNF at discharge.  Palliative care to follow for support and ongoing [facility].     0947 Palliative IDT meeting: MD; CONNIE ; MDiv; MELODYW, ACHP-SW; RNs   After hours, weekends and holidays, contact Palliative Provider by calling [phone].

## 2025-02-04 NOTE — INTERVAL H&P NOTE
H&P reviewed. The patient was examined and there are no changes to the H&P.      Left thoracentesis for pleural effusion. CBC & INR reviewed.

## 2025-02-04 NOTE — PLAN OF CARE
Goal Outcome Evaluation:  Plan of Care Reviewed With: patient        Progress: no change  Outcome Evaluation: VSS. A&O to self. 4LNC humidified. Fall and Skin precautions in place. SCD's on during shift. No complaints of pain during shift. Pt has been resting comfortably. No further complaints. Will continue to monitor and assess pt.

## 2025-02-05 ENCOUNTER — APPOINTMENT (OUTPATIENT)
Dept: GENERAL RADIOLOGY | Facility: HOSPITAL | Age: OVER 89
End: 2025-02-05
Payer: MEDICARE

## 2025-02-05 LAB
ALBUMIN SERPL-MCNC: 3.3 G/DL (ref 3.5–5.2)
ALBUMIN/GLOB SERPL: 1 G/DL
ALP SERPL-CCNC: 105 U/L (ref 39–117)
ALT SERPL W P-5'-P-CCNC: 15 U/L (ref 1–33)
ANION GAP SERPL CALCULATED.3IONS-SCNC: 14 MMOL/L (ref 5–15)
AST SERPL-CCNC: 26 U/L (ref 1–32)
BASOPHILS # BLD AUTO: 0.04 10*3/MM3 (ref 0–0.2)
BASOPHILS NFR BLD AUTO: 0.3 % (ref 0–1.5)
BILIRUB SERPL-MCNC: 0.5 MG/DL (ref 0–1.2)
BUN SERPL-MCNC: 82 MG/DL (ref 8–23)
BUN/CREAT SERPL: 33.1 (ref 7–25)
CALCIUM SPEC-SCNC: 8.9 MG/DL (ref 8.2–9.6)
CHLORIDE SERPL-SCNC: 94 MMOL/L (ref 98–107)
CO2 SERPL-SCNC: 30 MMOL/L (ref 22–29)
CREAT SERPL-MCNC: 2.48 MG/DL (ref 0.57–1)
DEPRECATED RDW RBC AUTO: 45.6 FL (ref 37–54)
EGFRCR SERPLBLD CKD-EPI 2021: 17.2 ML/MIN/1.73
EOSINOPHIL # BLD AUTO: 0.25 10*3/MM3 (ref 0–0.4)
EOSINOPHIL NFR BLD AUTO: 2.2 % (ref 0.3–6.2)
ERYTHROCYTE [DISTWIDTH] IN BLOOD BY AUTOMATED COUNT: 13.2 % (ref 12.3–15.4)
GLOBULIN UR ELPH-MCNC: 3.3 GM/DL
GLUCOSE SERPL-MCNC: 96 MG/DL (ref 65–99)
HCT VFR BLD AUTO: 38.5 % (ref 34–46.6)
HGB BLD-MCNC: 12.2 G/DL (ref 12–15.9)
IMM GRANULOCYTES # BLD AUTO: 0.07 10*3/MM3 (ref 0–0.05)
IMM GRANULOCYTES NFR BLD AUTO: 0.6 % (ref 0–0.5)
LYMPHOCYTES # BLD AUTO: 0.77 10*3/MM3 (ref 0.7–3.1)
LYMPHOCYTES NFR BLD AUTO: 6.7 % (ref 19.6–45.3)
MCH RBC QN AUTO: 29.8 PG (ref 26.6–33)
MCHC RBC AUTO-ENTMCNC: 31.7 G/DL (ref 31.5–35.7)
MCV RBC AUTO: 94.1 FL (ref 79–97)
MONOCYTES # BLD AUTO: 0.85 10*3/MM3 (ref 0.1–0.9)
MONOCYTES NFR BLD AUTO: 7.3 % (ref 5–12)
NEUTROPHILS NFR BLD AUTO: 82.9 % (ref 42.7–76)
NEUTROPHILS NFR BLD AUTO: 9.59 10*3/MM3 (ref 1.7–7)
NRBC BLD AUTO-RTO: 0 /100 WBC (ref 0–0.2)
PLATELET # BLD AUTO: 279 10*3/MM3 (ref 140–450)
PMV BLD AUTO: 10.4 FL (ref 6–12)
POTASSIUM SERPL-SCNC: 3.9 MMOL/L (ref 3.5–5.2)
PROT SERPL-MCNC: 6.6 G/DL (ref 6–8.5)
RBC # BLD AUTO: 4.09 10*6/MM3 (ref 3.77–5.28)
SODIUM SERPL-SCNC: 138 MMOL/L (ref 136–145)
WBC NRBC COR # BLD AUTO: 11.57 10*3/MM3 (ref 3.4–10.8)

## 2025-02-05 PROCEDURE — 80053 COMPREHEN METABOLIC PANEL: CPT | Performed by: HOSPITALIST

## 2025-02-05 PROCEDURE — 99232 SBSQ HOSP IP/OBS MODERATE 35: CPT | Performed by: HOSPITALIST

## 2025-02-05 PROCEDURE — 97530 THERAPEUTIC ACTIVITIES: CPT

## 2025-02-05 PROCEDURE — 71045 X-RAY EXAM CHEST 1 VIEW: CPT

## 2025-02-05 PROCEDURE — 85025 COMPLETE CBC W/AUTO DIFF WBC: CPT | Performed by: HOSPITALIST

## 2025-02-05 RX ADMIN — ACETAMINOPHEN 650 MG: 325 TABLET ORAL at 10:19

## 2025-02-05 RX ADMIN — BUMETANIDE 2 MG: 2 TABLET ORAL at 08:11

## 2025-02-05 RX ADMIN — APIXABAN 5 MG: 5 TABLET, FILM COATED ORAL at 08:11

## 2025-02-05 RX ADMIN — Medication 10 ML: at 20:18

## 2025-02-05 RX ADMIN — APIXABAN 5 MG: 5 TABLET, FILM COATED ORAL at 20:18

## 2025-02-05 NOTE — PLAN OF CARE
"Goal Outcome Evaluation:  Plan of Care Reviewed With: patient        Progress: no change  Outcome Evaluation: Palliative RN saw pt at 1115.  Pt. was \"drowsing\" in bedside chair but daughter woke her from her slumber.  Pt. denied pain and nausea.  Mild increased wob at rest noted.  Daughter at BS stated the plan is for her mom to go to rehab at the [facility] at discharge.  She stated her mom is doing \"better every day\".  Palliative care to continue to follow for support and ongoing [facility].     0930 Palliative IDT meeting: MD; CONNIE ; MDiv; MELODYW, ACHP-SW; RNs   After hours, weekends and holidays, contact Palliative Provider by calling [phone].                              "

## 2025-02-05 NOTE — PLAN OF CARE
Goal Outcome Evaluation:  Plan of Care Reviewed With: patient        Progress: improving  Outcome Evaluation: Pt. continues to present below baseline function w/generalized weakness, balance deficits and decreased functional endurance affecting her ability to safely participate in functional mobility. She performed transfers and ambulated 6' w/four wheeled walker, mod assist of 2. Activity limited by fatigue and instability. Pt. may benefit from trialing with front wheeled walker for safety. She tolerated ther-ex well. Continue acute PT POC to progress as able.    Anticipated Discharge Disposition (PT): skilled nursing facility

## 2025-02-05 NOTE — PROGRESS NOTES
"          Clinical Nutrition Assessment     Patient Name: Rose J Kellermann  YOB: 1926  MRN: [id number]  Date of Encounter: 02/05/25 13:15 EST  Admission date: 1/20/2025  Reason for Visit: Follow-up protocol    Assessment   Nutrition Assessment   Admission Diagnosis:  Pleural effusion on right [J90]    Problem List:    Pleural effusion on left    Essential hypertension    Chronic kidney disease, stage III (moderate)     Acute on chronic heart failure with preserved ejection fraction (HFpEF)    Pulmonary hypertension      PMH:   She  has a past medical history of Abscess of back, Arrhythmia, Cancer (12/2011), CKD (chronic kidney disease), stage III, Dizzy, Dvt femoral (deep venous thrombosis) (2017), Dyslipidemia, Enthesopathy of hip region, Generalized osteoarthrosis, involving multiple sites, Headache, Hearing loss, Hypertension, Low backache, Needs flu shot, Osteoarthritis, Otitis, serous, Pneumonia (1967), Retinal hemorrhage, SI joint arthritis, SOB (shortness of breath), Syncope (2001), Venous insufficiency of leg, and Wears glasses.    PSH:  She  has a past surgical history that includes Knee surgery (2001); Hip Percutaneous Pinning (Left, 11/24/2017); Cataract extraction; Hysterectomy; Colonoscopy; and Total hip arthroplasty (Left, 10/26/2018).    Applicable Nutrition History:   1/22) s/p L pleural drain placement    Anthropometrics     Height: Height: 162.6 cm (64.02\")  Last Filed Weight: Weight: 78.7 kg (173 lb 8 oz) (02/05/25 0400)  Method: Weight Method: Bed scale  BMI: BMI (Calculated): 29.8    UBW:     Weight      Weight (kg) Weight (lbs) Weight Method   2/16/2024 73.029 kg  161 lb     6/26/2024 72.576 kg  160 lb     1/20/2025 71.668 kg  158 lb  Stated    1/21/2025 75.796 kg  167 lb 1.6 oz  Bed scale      Weight change: No significant changes    Nutrition Focused Physical Exam    Date:  1/21       Pt with some visible wasting however based on PO and wt hx suspect age related sarcopenia " over malnutrition      Subjective   Reported/Observed/Food/Nutrition Related History:   2/5  Pt up in chair with daughter present at time of visit. Reports pt seeming to eat lunch best since admission. Hoping to transfer to rehab soon. No new nutrition concerns raised at this time. LBM 2/1 1/29  Good PO intake noted - correlates with PO hx obtained on admission. Per RN, pt had episode of emesis with therapy yesterday - ended in rapid response team being called. No acute nutrition related concerns indicated at this time. ? LBM 1/20 1/21  Pt laying in bed at time of visit with daughter present - pt is somewhat [place] and daughter assisted in answering some nutrition related questions. Pt eats 2 meals/day usually - daughter prepares a large breakfast and then pt will eat small meal in late afternoon, early evening. Has tried ONS in the past - strong dislike. Inquired about NPO status - discussed planned procedure for today, verbalized understanding. Denies dysphagia. NKFA.     Current Nutrition Prescription   PO: Diet: Regular/House; Texture: Mechanical Ground (NDD 2); Fluid Consistency: Thin (IDDSI 0)  Oral Nutrition Supplement: N/A  Intake:  35.7% x last 7 meals documented    Assessment & Plan   Nutrition Diagnosis   Date:  1/29 Updated: 2/5  Problem Inadequate oral intake   Etiology Advanced age   Signs/Symptoms <50% at meals   Status: Active    Date:  1/21            Updated: 1/29   Problem Inadequate oral intake    Etiology Pleural effusion   Signs/Symptoms NPO status   Status: Discontinued    Goal:   Nutrition to support treatment and Tolerate PO, Increase intake      Nutrition Intervention      Follow treatment progress, Care plan reviewed, Menu adjusted    Continue to encourage PO intake at meals  Pt dislikes ONS      Monitoring/Evaluation:   Per protocol, I&O, PO intake, Pertinent labs, Symptoms, POC/GOC    Yazmin Chandra, MS,RD,LD  Time Spent: 25min

## 2025-02-05 NOTE — THERAPY TREATMENT NOTE
Patient Name: Rose J Kellermann  : 1926    MRN: [id number]                              Today's Date: 2025       Admit Date: 2025    Visit Dx:     ICD-10-CM ICD-9-CM   1. Pleural effusion, left  J90 511.9   2. Hypoxia  R09.02 799.02   3. Chronic kidney disease, unspecified CKD stage  N18.9 585.9     Patient Active Problem List   Diagnosis    Essential hypertension    Vitamin D deficiency    Fatigue    Chronic kidney disease, stage III (moderate)     Osteoarthritis    Dyslipidemia    Post-menopausal bleeding    Endometrial cancer    Closed fracture of left hip    Asymptomatic bacteriuria    Post-traumatic osteoarthritis of left hip    Status post total replacement of left hip    Prediabetes    Acute blood loss anemia, asymptomatic    Postoperative urinary retention    Myopia    Posterior crocodile shagreen of cornea    Presbyopia    Ptosis of eyelid    Regular astigmatism    Retinal neovascularization    After cataract    Secondary cataract    Premature atrial contractions    Heart murmur    Nocturnal hypoxia    Acute on chronic heart failure with preserved ejection fraction (HFpEF)    Chronic deep vein thrombosis (DVT) of both lower extremities    Retinal neovascularization    Pleural effusion on left    Pulmonary hypertension     Past Medical History:   Diagnosis Date    Abscess of back     Arrhythmia     frequent PVC    Cancer 2011    Endometrial cancer, status post robotically assisted hysterectomy with Dr. Haddad, 2011.      CKD (chronic kidney disease), stage III     no dilaysis     Dizzy     Dvt femoral (deep venous thrombosis) 2017    s/p hip surgery in . Took xarelto until 2018    Dyslipidemia     Dyslipidemia.  Observing.    Enthesopathy of hip region     Generalized osteoarthrosis, involving multiple sites     Headache     Hearing loss     Hypertension     Low backache     Needs flu shot     Osteoarthritis     Osteoarthritis with questionable carpal tunnel syndrome  bilaterally.     Otitis, serous     Pneumonia 1967    Remote pneumonia, 1967.     Retinal hemorrhage     SI joint arthritis     SOB (shortness of breath)     Syncope 2001    Remote syncope with working diagnosis of vasodepressor, 2001.     Venous insufficiency of leg     Wears glasses     readers     Past Surgical History:   Procedure Laterality Date    CATARACT EXTRACTION      bilat     COLONOSCOPY      HIP PERCUTANEOUS PINNING Left 11/24/2017    Procedure: HIP PERCUTANEOUS PINNING;  Surgeon: Lucas Swanson MD;  Location: On license of [facility] OR;  Service:     HYSTERECTOMY      total? but unsure     KNEE SURGERY  2001    Remote knee surgery in 2001.- right     TOTAL HIP ARTHROPLASTY Left 10/26/2018    Procedure: TOTAL HIP ARTHROPLASTY LEFT;  Surgeon: Stephen Baker MD;  Location: On license of [facility] OR;  Service: Orthopedics      General Information       Row Name 02/05/25 1557          Physical Therapy Time and Intention    Document Type therapy note (daily note)  -     Mode of Treatment physical therapy  -       Row Name 02/05/25 1559          General Information    Patient Profile Reviewed yes  -SS     Existing Precautions/Restrictions fall;oxygen therapy device and L/min;other (see comments)  hard of hearing  -     Barriers to Rehab medically complex;previous functional deficit;hearing deficit  -       Row Name 02/05/25 1557          Cognition    Orientation Status (Cognition) oriented to;person;place  -       Row Name 02/05/25 1553          Safety Issues/Impairments Affecting Functional Mobility    Safety Issues Affecting Function (Mobility) awareness of need for assistance;insight into deficits/self-awareness;judgment;positioning of assistive device;problem-solving;safety precaution awareness;safety precautions follow-through/compliance;sequencing abilities  -     Impairments Affecting Function (Mobility) balance;cognition;endurance/activity tolerance;motor control;postural/trunk control;shortness of  breath;strength;range of motion (ROM)  -     Cognitive Impairments, Mobility Safety/Performance awareness, need for assistance;judgment;insight into deficits/self-awareness;problem-solving/reasoning;safety precaution awareness;safety precaution follow-through;sequencing abilities  -               User Key  (r) = Recorded By, (t) = Taken By, (c) = Cosigned By      Initials Name Provider Type     Gifty West, ADAM Physical Therapist                   Mobility       Row Name 02/05/25 1558          Bed Mobility    Comment, (Bed Mobility) up in chair  -       Row Name 02/05/25 1558          Sit-Stand Transfer    Sit-Stand [place] (Transfers) moderate assist (50% patient effort);2 person assist;verbal cues  -     Assistive Device (Sit-Stand Transfers) walker, 4-wheeled  -SS     Comment, (Sit-Stand Transfer) V/TC for hand placement, appropriate alignment, emphasis on lowering with eccentric control, brake safety  -       Row Name 02/05/25 1558          Gait/Stairs (Locomotion)    [place] Level (Gait) moderate assist (50% patient effort);2 person assist;verbal cues  -     Assistive Device (Gait) walker, 4-wheeled  -SS     Distance in Feet (Gait) 6  -SS     Deviations/Abnormal Patterns (Gait) bilateral deviations;mike decreased;stride length decreased;gait speed decreased  -     Bilateral Gait Deviations forward flexed posture;heel strike decreased  -     Comment, (Gait/Stairs) Pt. ambulated with a shuffling gait pattern w/heavy reliance on BUE on rollator. V/TC for AD management, stability, sequencing of steps. No LOB but pt. demonstrates instability. May consider FWW in future sessions for pt. safety. Activity limited by fatigue.  -               User Key  (r) = Recorded By, (t) = Taken By, (c) = Cosigned By      Initials Name Provider Type     Gifty West PT Physical Therapist                   Obj/Interventions       Row Name 02/05/25 1600          Motor Skills    Motor Skills  functional endurance  -     Functional Endurance modified RPE: 4/10  -     Therapeutic Exercise hip;knee;ankle;other (see comments)  min assist for technique  -       Row Name 02/05/25 1600          Hip (Therapeutic Exercise)    Hip (Therapeutic Exercise) strengthening exercise  -     Hip Strengthening (Therapeutic Exercise) bilateral;aBduction;aDduction;external rotation;internal rotation;heel slides;10 repetitions  -       Row Name 02/05/25 1600          Knee (Therapeutic Exercise)    Knee (Therapeutic Exercise) strengthening exercise  -     Knee Strengthening (Therapeutic Exercise) bilateral;SLR (straight leg raise);10 repetitions  -       Row Name 02/05/25 1600          Ankle (Therapeutic Exercise)    Ankle (Therapeutic Exercise) AROM (active range of motion)  -     Ankle AROM (Therapeutic Exercise) bilateral;dorsiflexion;plantarflexion;10 repetitions  -       Row Name 02/05/25 1600          Balance    Balance Assessment sitting static balance;sitting dynamic balance;standing static balance;standing dynamic balance  -     Static Sitting Balance standby assist  -     Dynamic Sitting Balance contact guard  -     Position, Sitting Balance unsupported;sitting in chair  -     Static Standing Balance minimal assist  -     Dynamic Standing Balance moderate assist;2-person assist  -     Position/Device Used, Standing Balance supported;walker, 4-wheeled  -     Balance Interventions sitting;standing;sit to stand;supported;dynamic;static  -     Comment, Balance static standing x 2 minutes w/repeated cueing for upright posture/decreased reliance on rollator  -               User Key  (r) = Recorded By, (t) = Taken By, (c) = Cosigned By      Initials Name Provider Type     Gifty West PT Physical Therapist                   Goals/Plan    No documentation.                  Clinical Impression       Row Name 02/05/25 1602          Pain    Pretreatment Pain Rating 0/10 - no pain  -      Posttreatment Pain Rating 0/10 - no pain  -[facility] Name 02/05/25 1602          Plan of Care Review    Plan of Care Reviewed With patient  -SS     Progress improving  -     Outcome Evaluation Pt. continues to present below baseline function w/generalized weakness, balance deficits and decreased functional endurance affecting her ability to safely participate in functional mobility. She performed transfers and ambulated 6' w/four wheeled walker, mod assist of 2. Activity limited by fatigue and instability. Pt. may benefit from trialing with front wheeled walker for safety. She tolerated ther-ex well. Continue acute PT POC to progress as able.  -[facility] Name 02/05/25 1602          Therapy Assessment/Plan (PT)    Rehab Potential (PT) good  -     Criteria for Skilled Interventions Met (PT) yes;meets criteria;skilled treatment is necessary  -     Therapy Frequency (PT) daily  -[facility] Name 02/05/25 1602          Vital Signs    Pre Systolic BP Rehab 99  -SS     Pre Treatment Diastolic BP 73  -SS     Pretreatment Heart Rate (beats/min) 87  -SS     Pre SpO2 (%) 96  -SS     O2 Delivery Pre Treatment nasal cannula  4L  -SS     Pre Patient Position Sitting  -[facility] Name 02/05/25 1602          Positioning and Restraints    Pre-Treatment Position sitting in chair/recliner  -SS     Post Treatment Position chair  -SS     In Chair notified nsg;reclined;call light within reach;encouraged to call for assist;exit alarm on;waffle cushion;legs elevated;heels elevated  -               User Key  (r) = Recorded By, (t) = Taken By, (c) = Cosigned By      Initials Name Provider Type     Gifty West, PT Physical Therapist                   Outcome Measures       [facility] Name 02/05/25 1604 02/05/25 0800       How much help from another person do you currently need...    Turning from your back to your side while in flat bed without using bedrails? 3  -SS 3  -MH    Moving from lying on back to sitting on the side  of a flat bed without bedrails? 3  -SS 3  -MH    Moving to and from a bed to a chair (including a wheelchair)? 2  -SS 2  -MH    Standing up from a chair using your arms (e.g., wheelchair, bedside chair)? 2  -SS 2  -MH    Climbing 3-5 steps with a railing? 2  -SS 2  -MH    To walk in hospital room? 2  -SS 2  -MH    AM-PAC 6 Clicks Score (PT) 14  -SS 14  -MH    Highest Level of Mobility Goal 4 --> Transfer to chair/commode  -SS 4 --> Transfer to chair/commode  -MH      Row Name 02/05/25 1604          Functional Assessment    Outcome Measure Options AM-PAC 6 Clicks Basic Mobility (PT)  -               User Key  (r) = Recorded By, (t) = Taken By, (c) = Cosigned By      Initials Name Provider Type    Franny Berumen, RN Registered Nurse    Gifty Ortiz, PT Physical Therapist                                 Physical Therapy Education       Title: PT OT SLP Therapies (In Progress)       Topic: Physical Therapy (In Progress)       Point: Mobility training (Done)       Learning Progress Summary            Patient Eager, E, VU,DU,NR by SS at 2/5/2025 1604    Comment: Reviewed safety/technique w/transfers, ambulation, HEP, PT POC    Acceptance, E,TB, NL,NR by CB at 2/3/2025 1553    Acceptance, E, NR by KG at 2/3/2025 1413    Acceptance, E,TB, NL,NR by CB at 2/2/2025 1541    Acceptance, E,TB, NR,VU by CH at 2/1/2025 0904    Acceptance, E, NR by KG at 1/27/2025 1031    Acceptance, E, NR by GUSTAVO at 1/24/2025 1430    Acceptance, E,D, VU,NR by LR at 1/23/2025 1407    Comment: Educated on benefits of mobility, safety with mobility, correct supine to sit t/f technique, correct sit<->stand t/f technique, correct gait mechanics, LE HEP, and progression of POC.    Acceptance, E, NR by KG at 1/21/2025 1511                      Point: Home exercise program (In Progress)       Learning Progress Summary            Patient Eager, E, VU,DU,NR by  at 2/5/2025 1604    Comment: Reviewed safety/technique w/transfers, ambulation, HEP,  PT POC    Acceptance, E,TB, NL,NR by CB at 2/3/2025 1553    Acceptance, E, NR by KG at 2/3/2025 1413    Acceptance, E,TB, NL,NR by CB at 2/2/2025 1541    Acceptance, E,TB, NR,VU by CH at 2/1/2025 0904    Acceptance, E, NR by  at 1/30/2025 1132    Acceptance, E, NR by KG at 1/27/2025 1031    Acceptance, E, NR by GUSTAVO at 1/24/2025 1430    Acceptance, E,D, VU,NR by LR at 1/23/2025 1407    Comment: Educated on benefits of mobility, safety with mobility, correct supine to sit t/f technique, correct sit<->stand t/f technique, correct gait mechanics, LE HEP, and progression of POC.   Family Acceptance, E, NR by  at 1/30/2025 1132                      Point: Body mechanics (Done)       Learning Progress Summary            Patient Eager, E, VU,DU,NR by SS at 2/5/2025 1604    Comment: Reviewed safety/technique w/transfers, ambulation, HEP, PT POC    Acceptance, E,TB, NL,NR by CB at 2/3/2025 1553    Acceptance, E, NR by KG at 2/3/2025 1413    Acceptance, E,TB, NL,NR by CB at 2/2/2025 1541    Acceptance, E,TB, NR,VU by  at 2/1/2025 0904    Acceptance, E, NR by KG at 1/27/2025 1031    Acceptance, E, NR by GUSTAVO at 1/24/2025 1430    Acceptance, E,D, VU,NR by LR at 1/23/2025 1407    Comment: Educated on benefits of mobility, safety with mobility, correct supine to sit t/f technique, correct sit<->stand t/f technique, correct gait mechanics, LE HEP, and progression of POC.    Acceptance, E, NR by KG at 1/21/2025 1511                      Point: Precautions (Done)       Learning Progress Summary            Patient Eager, E, VU,DU,NR by  at 2/5/2025 1604    Comment: Reviewed safety/technique w/transfers, ambulation, HEP, PT POC    Acceptance, E,TB, NL,NR by CB at 2/3/2025 1553    Acceptance, E, NR by KG at 2/3/2025 1413    Acceptance, E,TB, NL,NR by CB at 2/2/2025 1541    Acceptance, E,TB, NR,VU by CH at 2/1/2025 0904    Acceptance, E, NR by KG at 1/27/2025 1031    Acceptance, E, NR by GUSTAVO at 1/24/2025 1430    Acceptance, E,D,  VU,NR by LR at 1/23/2025 1407    Comment: Educated on benefits of mobility, safety with mobility, correct supine to sit t/f technique, correct sit<->stand t/f technique, correct gait mechanics, LE HEP, and progression of POC.    Acceptance, E, NR by KG at 1/21/2025 1511                                      User Key       Initials Effective Dates Name Provider Type Discipline    GUSTAVO 02/03/23 -  Audrey Gore, PT Physical Therapist PT    LR 02/03/23 -  Lola Finch, PT Physical Therapist PT    CH 06/16/21 -  Madalyn Beavers, RN Registered Nurse Nurse    KG 05/22/20 -  Ann Nicole, PT Physical Therapist PT    CB 06/16/21 -  Cecil Ramirez, RN Registered Nurse Nurse     06/01/21 -  Gifty West, PT Physical Therapist PT     09/21/23 -  Justa Bedoya, PT Physical Therapist PT                  PT Recommendation and Plan     Progress: improving  Outcome Evaluation: Pt. continues to present below baseline function w/generalized weakness, balance deficits and decreased functional endurance affecting her ability to safely participate in functional mobility. She performed transfers and ambulated 6' w/four wheeled walker, mod assist of 2. Activity limited by fatigue and instability. Pt. may benefit from trialing with front wheeled walker for safety. She tolerated ther-ex well. Continue acute PT POC to progress as able.     Time Calculation:         PT Charges       Row Name 02/05/25 1605             Time Calculation    Start Time 1501  -SS      PT Received On 02/05/25  -SS         Timed Charges    56374 - PT Therapeutic Activity Minutes 13  -SS         Total Minutes    Timed Charges Total Minutes 13  -SS       Total Minutes 13  -SS                User Key  (r) = Recorded By, (t) = Taken By, (c) = Cosigned By      Initials Name Provider Type     Gifty West, PT Physical Therapist                  Therapy Charges for Today       Code Description Service Date Service Provider  Modifiers Qty    25816917990 HC PT THERAPEUTIC ACT EA 15 MIN 2/5/2025 Gifty West, PT GP 1    72595795522 HC PT THER SUPP EA 15 MIN 2/5/2025 Gifty West, PT GP 3            PT G-Codes  Outcome Measure Options: AM-PAC 6 Clicks Basic Mobility (PT)  AM-PAC 6 Clicks Score (PT): 14  AM-PAC 6 Clicks Score (OT): 13  PT Discharge Summary  Anticipated Discharge Disposition (PT): skilled nursing facility    Gifty West, PT  2/5/2025

## 2025-02-05 NOTE — PROGRESS NOTES
"   LOS: 16 days    Patient Care Team:  Sam Hung MD as PCP - General  Elver Sullivan MD as Consulting Physician (Otolaryngology)    Subjective   Feeling better today. Renal function close to baseline.     Objective     Vital Signs:  Blood pressure 90/67, pulse 87, temperature 97.8 °F (36.6 °C), temperature source Oral, resp. rate 14, height 162.6 cm (64.02\"), weight 78.7 kg (173 lb 8 oz), SpO2 92%, not currently breastfeeding.      Intake/Output Summary (Last 24 hours) at 2/5/2025 1527  Last data filed at 2/5/2025 0900  Gross per 24 hour   Intake 200 ml   Output 900 ml   Net -700 ml        02/04 0701 - 02/05 0700  In: -   Out: 900 [Urine:900]    Physical Exam:        GENERAL: WD elderly WF NAD  NEURO: Awake and alert, oriented. No focal deficit  PSYCHIATRIC: Calm, cooperative with PE  EYE: PE, no icterus, no conjunctivitis  ENT: omm dry, dentition intact,  Hearing intact  NECK: Supple , No JVD discernable,  Trachea midline  CV: No edema, RRR  LUNGS:  Quiet,  Nonlabored resp.  Symmetrical expansion  ABDOMEN: Nondistended, soft nontender.  : No Padilla, no palp bladder  SKIN: Warm and dry without rash      Labs:  Results from last 7 days   Lab Units 02/05/25  0830 02/03/25  1036 01/31/25  0926   WBC 10*3/mm3 11.57* 10.85* 8.62   HEMOGLOBIN g/dL 12.2 12.2 12.0   PLATELETS 10*3/mm3 279 254 220     Results from last 7 days   Lab Units 02/05/25  0830 02/04/25  1301 02/03/25  1036 02/02/25  1135 01/31/25  0926 01/30/25  1259   SODIUM mmol/L 138 135* 132* 135*   < > 134*   POTASSIUM mmol/L 3.9 4.0 4.0 4.3   < > 4.3   CHLORIDE mmol/L 94* 94* 93* 95*   < > 96*   CO2 mmol/L 30.0* 26.0 25.0 27.0   < > 25.0   BUN mg/dL 82* 80* 79* 68*   < > 72*   CREATININE mg/dL 2.48* 2.48* 2.74* 2.44*   < > 2.52*   CALCIUM mg/dL 8.9 8.7 8.4 8.8   < > 8.4   PHOSPHORUS mg/dL  --   --   --   --   --  4.1   MAGNESIUM mg/dL  --   --   --   --   --  2.4*   ALBUMIN g/dL 3.3*  --   --   --   --  2.8*    < > = values in this " interval not displayed.     Results from last 7 days   Lab Units 02/05/25  0830   ALK PHOS U/L 105   BILIRUBIN mg/dL 0.5   ALT (SGPT) U/L 15   AST (SGOT) U/L 26                     Estimated Creatinine Clearance: 12.9 mL/min (A) (by C-G formula based on SCr of 2.48 mg/dL (H)).         A/P:    ARF: Labs pending.  Creatinine up to 2.7 yesterday.  Urine output not accurately measured.  Patient has been getting diuretics.  Blood pressure low range at times.  Will need repeat labs.  Hold hydralazine for now.  Bumex decreased to once a day with addition of albumin.  Need strict I's and O's.  Monitor renal function closely.  No indication for HD at this time     CKD stage IV:   Followes with [facility]  Dr. Hernandez has discussed with the patient and the family in the past regards to dialysis and they have declined..       Proteinuria: 4.9 g, patient has been not a candidate for kidney biopsy or aggressive intervention.    Hypertension: Blood pressure low side.  Hold hydralazine.    Hyponatremia: Sodium down to 132 yesterday    Volume: Stable overnight.  Unable to get strict I's and O's.  Patient getting Bumex with albumin today for fluid mobilization.  Creatinine increasing.  Need strict I's and O's to assist with volume management.    CKD bone and mineral disease. Last PTH ~ 84.Phos in goal range <5.5.    Hypoxic respiratory failure: Patient with pneumonia.  Pleural effusion.  Patient to get thoracentesis today.    Chronic diastolic heart failure failure:  Patient on diuretics.    High risk and complexity patient.    Victoriano Webster MD  02/05/25  15:27 EST

## 2025-02-05 NOTE — CASE MANAGEMENT/SOCIAL WORK
Continued Stay Note  [facility]     Patient Name: Rose J Kellermann  MRN: [id number]  Today's Date: 2/5/2025    Admit Date: 1/20/2025    Plan: rehab   Discharge Plan       Row Name 02/05/25 0847       Plan    Plan rehab    Patient/Family in Agreement with Plan no    Plan Comments This patient was sleeping when CM went to visit with her. Her plan is [facility], and she has been approved through 2/6. MD notified. She may need  van transport arranged. CM to follow.    Final Discharge Disposition Code 03 - skilled nursing facility (SNF)                   Discharge Codes    No documentation.                 Expected Discharge Date and Time       Expected Discharge Date Expected Discharge Time    Feb 5, 2025               Floresita Dawson RN

## 2025-02-05 NOTE — PLAN OF CARE
Problem: Adult Inpatient Plan of Care  Goal: Plan of Care Review  Outcome: Progressing  Flowsheets (Taken 2/5/2025 0611)  Progress: improving  Plan of Care Reviewed With: patient  Goal: Patient-Specific Goal (Individualized)  Outcome: Progressing  Goal: Absence of Hospital-Acquired Illness or Injury  Outcome: Progressing  Intervention: Identify and Manage Fall Risk  Recent Flowsheet Documentation  Taken 2/5/2025 0400 by Mamadou Pandya RN  Safety Promotion/Fall Prevention:   activity supervised   assistive device/personal items within reach   clutter free environment maintained   fall prevention program maintained   nonskid shoes/slippers when out of bed   room organization consistent   safety round/check completed   toileting scheduled  Taken 2/5/2025 0200 by Mamadou Pandya RN  Safety Promotion/Fall Prevention:   activity supervised   assistive device/personal items within reach   clutter free environment maintained   fall prevention program maintained   nonskid shoes/slippers when out of bed   room organization consistent   safety round/check completed   toileting scheduled  Taken 2/5/2025 0000 by Mamadou Pandya RN  Safety Promotion/Fall Prevention:   activity supervised   assistive device/personal items within reach   clutter free environment maintained   fall prevention program maintained   nonskid shoes/slippers when out of bed   room organization consistent   safety round/check completed   toileting scheduled  Taken 2/4/2025 2200 by Mamadou Pandya RN  Safety Promotion/Fall Prevention:   activity supervised   assistive device/personal items within reach   clutter free environment maintained   fall prevention program maintained   nonskid shoes/slippers when out of bed   room organization consistent   safety round/check completed   toileting scheduled  Taken 2/4/2025 2000 by Mamadou Pandya RN  Safety Promotion/Fall Prevention:   activity supervised   assistive device/personal items within reach    clutter free environment maintained   fall prevention program maintained   nonskid shoes/slippers when out of bed   room organization consistent   safety round/check completed   toileting scheduled  Intervention: Prevent Skin Injury  Recent Flowsheet Documentation  Taken 2/5/2025 0400 by Mamadou Pandya RN  Skin Protection:   incontinence pads utilized   silicone foam dressing in place   transparent dressing maintained  Taken 2/5/2025 0200 by Mamadou Pandya RN  Skin Protection:   incontinence pads utilized   silicone foam dressing in place   transparent dressing maintained  Taken 2/5/2025 0000 by Mamadou Pandya RN  Body Position:   supine   legs elevated  Skin Protection:   incontinence pads utilized   silicone foam dressing in place   transparent dressing maintained  Taken 2/4/2025 2200 by Mamadou Pandya RN  Body Position:   turned   right  Skin Protection:   incontinence pads utilized   silicone foam dressing in place   transparent dressing maintained  Taken 2/4/2025 2000 by Mamadou Pandya RN  Body Position:   supine   legs elevated  Skin Protection: (silicone dressings peeled back and skin assessed)   incontinence pads utilized   silicone foam dressing in place   transparent dressing maintained  Intervention: Prevent and Manage VTE (Venous Thromboembolism) Risk  Recent Flowsheet Documentation  Taken 2/4/2025 2000 by Mamadou Pandya RN  VTE Prevention/Management:   bilateral   SCDs (sequential compression devices) on  Intervention: Prevent Infection  Recent Flowsheet Documentation  Taken 2/5/2025 0400 by Mamadou Pandya RN  Infection Prevention:   environmental surveillance performed   rest/sleep promoted   single patient room provided  Taken 2/5/2025 0200 by Mamadou Pandya RN  Infection Prevention:   environmental surveillance performed   rest/sleep promoted   single patient room provided  Taken 2/5/2025 0000 by Mamadou Pandya RN  Infection Prevention:   environmental surveillance  performed   rest/sleep promoted   single patient room provided  Taken 2/4/2025 2200 by Mamadou Pandya RN  Infection Prevention:   environmental surveillance performed   rest/sleep promoted   single patient room provided  Taken 2/4/2025 2000 by Mamadou Pandya RN  Infection Prevention:   environmental surveillance performed   rest/sleep promoted   single patient room provided  Goal: Optimal Comfort and Wellbeing  Outcome: Progressing  Intervention: Monitor Pain and Promote Comfort  Recent Flowsheet Documentation  Taken 2/5/2025 0400 by Mamadou Pandya RN  Pain Management Interventions:   no interventions per patient request   quiet environment facilitated   relaxation techniques promoted  Taken 2/5/2025 0200 by Mamadou Pandya RN  Pain Management Interventions:   no interventions per patient request   quiet environment facilitated   relaxation techniques promoted  Taken 2/5/2025 0000 by Mamadou Pandya RN  Pain Management Interventions:   no interventions per patient request   quiet environment facilitated   relaxation techniques promoted  Taken 2/4/2025 2200 by Mamadou Pandya RN  Pain Management Interventions:   no interventions per patient request   quiet environment facilitated   relaxation techniques promoted  Taken 2/4/2025 2000 by Mamadou Pandya RN  Pain Management Interventions:   no interventions per patient request   quiet environment facilitated   relaxation techniques promoted  Intervention: Provide Person-Centered Care  Recent Flowsheet Documentation  Taken 2/4/2025 2000 by Mamadou Pandya RN  Trust Relationship/Rapport:   care explained   choices provided   questions answered   reassurance provided   thoughts/feelings acknowledged  Goal: Readiness for Transition of Care  Outcome: Progressing     Problem: Skin Injury Risk Increased  Goal: Skin Health and Integrity  Outcome: Progressing  Intervention: Optimize Skin Protection  Recent Flowsheet Documentation  Taken 2/5/2025 0400 by Mumtaz  Coltin H., RN  Activity Management: activity encouraged  Pressure Reduction Techniques:   frequent weight shift encouraged   heels elevated off bed   positioned off wounds   pressure points protected   weight shift assistance provided  Pressure Reduction Devices:   pressure-redistributing mattress utilized   positioning supports utilized   heel offloading device utilized   foam padding utilized  Skin Protection:   incontinence pads utilized   silicone foam dressing in place   transparent dressing maintained  Taken 2/5/2025 0200 by Mamadou Pandya RN  Activity Management: activity encouraged  Pressure Reduction Techniques:   frequent weight shift encouraged   heels elevated off bed   positioned off wounds   pressure points protected   weight shift assistance provided  Pressure Reduction Devices:   pressure-redistributing mattress utilized   positioning supports utilized   heel offloading device utilized   foam padding utilized  Skin Protection:   incontinence pads utilized   silicone foam dressing in place   transparent dressing maintained  Taken 2/5/2025 0000 by Mamadou Pandya RN  Activity Management: activity encouraged  Pressure Reduction Techniques:   frequent weight shift encouraged   heels elevated off bed   positioned off wounds   pressure points protected   weight shift assistance provided  Head of Bed (HOB) Positioning: HOB elevated  Pressure Reduction Devices:   pressure-redistributing mattress utilized   positioning supports utilized   heel offloading device utilized   foam padding utilized  Skin Protection:   incontinence pads utilized   silicone foam dressing in place   transparent dressing maintained  Taken 2/4/2025 2200 by Mamadou Pandya RN  Activity Management: activity encouraged  Pressure Reduction Techniques:   frequent weight shift encouraged   heels elevated off bed   positioned off wounds   pressure points protected   weight shift assistance provided  Head of Bed (HOB) Positioning: HOB  elevated  Pressure Reduction Devices:   pressure-redistributing mattress utilized   positioning supports utilized   heel offloading device utilized   foam padding utilized  Skin Protection:   incontinence pads utilized   silicone foam dressing in place   transparent dressing maintained  Taken 2/4/2025 2000 by Mamadou Pandya RN  Activity Management: activity encouraged  Pressure Reduction Techniques:   frequent weight shift encouraged   heels elevated off bed   positioned off wounds   pressure points protected   weight shift assistance provided  Head of Bed (HOB) Positioning: HOB elevated  Pressure Reduction Devices:   pressure-redistributing mattress utilized   positioning supports utilized   heel offloading device utilized   foam padding utilized  Skin Protection: (silicone dressings peeled back and skin assessed)   incontinence pads utilized   silicone foam dressing in place   transparent dressing maintained     Problem: Comorbidity Management  Goal: Maintenance of Heart Failure Symptom Control  Outcome: Progressing  Intervention: Maintain Heart Failure Management  Recent Flowsheet Documentation  Taken 2/5/2025 0400 by Mamadou Pandya RN  Medication Review/Management: medications reviewed  Taken 2/5/2025 0200 by Mamadou Pandya RN  Medication Review/Management: medications reviewed  Taken 2/5/2025 0000 by Mamadou Pandya RN  Medication Review/Management: medications reviewed  Taken 2/4/2025 2200 by Mamadou Pandya RN  Medication Review/Management: medications reviewed  Taken 2/4/2025 2000 by Mamadou Pandya RN  Medication Review/Management: medications reviewed  Goal: Blood Pressure in Desired Range  Outcome: Progressing  Intervention: Maintain Blood Pressure Management  Recent Flowsheet Documentation  Taken 2/5/2025 0400 by Mamadou Pandya RN  Medication Review/Management: medications reviewed  Taken 2/5/2025 0200 by Mamadou Pandya RN  Medication Review/Management: medications reviewed  Taken  2/5/2025 0000 by Mamadou Pandya RN  Medication Review/Management: medications reviewed  Taken 2/4/2025 2200 by Mamadou Pandya RN  Medication Review/Management: medications reviewed  Taken 2/4/2025 2000 by Mamadou Pandya RN  Medication Review/Management: medications reviewed     Problem: Fall Injury Risk  Goal: Absence of Fall and Fall-Related Injury  Outcome: Progressing  Intervention: Identify and Manage Contributors  Recent Flowsheet Documentation  Taken 2/5/2025 0400 by Mamadou Panday RN  Medication Review/Management: medications reviewed  Taken 2/5/2025 0200 by Mamadou Pandya RN  Medication Review/Management: medications reviewed  Taken 2/5/2025 0000 by Mamadou Pandya RN  Medication Review/Management: medications reviewed  Taken 2/4/2025 2200 by Mamadou Pandya RN  Medication Review/Management: medications reviewed  Taken 2/4/2025 2000 by Mamadou Pandya RN  Medication Review/Management: medications reviewed  Intervention: Promote Injury-Free Environment  Recent Flowsheet Documentation  Taken 2/5/2025 0400 by Mamadou Pandya RN  Safety Promotion/Fall Prevention:   activity supervised   assistive device/personal items within reach   clutter free environment maintained   fall prevention program maintained   nonskid shoes/slippers when out of bed   room organization consistent   safety round/check completed   toileting scheduled  Taken 2/5/2025 0200 by Mamadou Pandya RN  Safety Promotion/Fall Prevention:   activity supervised   assistive device/personal items within reach   clutter free environment maintained   fall prevention program maintained   nonskid shoes/slippers when out of bed   room organization consistent   safety round/check completed   toileting scheduled  Taken 2/5/2025 0000 by Mamadou Pandya RN  Safety Promotion/Fall Prevention:   activity supervised   assistive device/personal items within reach   clutter free environment maintained   fall prevention program  maintained   nonskid shoes/slippers when out of bed   room organization consistent   safety round/check completed   toileting scheduled  Taken 2/4/2025 2200 by Mamadou Pandya RN  Safety Promotion/Fall Prevention:   activity supervised   assistive device/personal items within reach   clutter free environment maintained   fall prevention program maintained   nonskid shoes/slippers when out of bed   room organization consistent   safety round/check completed   toileting scheduled  Taken 2/4/2025 2000 by Mamadou Pandya RN  Safety Promotion/Fall Prevention:   activity supervised   assistive device/personal items within reach   clutter free environment maintained   fall prevention program maintained   nonskid shoes/slippers when out of bed   room organization consistent   safety round/check completed   toileting scheduled     Problem: Palliative Care  Goal: Enhanced Quality of Life  Outcome: Progressing  Intervention: Maximize Comfort  Recent Flowsheet Documentation  Taken 2/5/2025 0400 by Mamadou Pandya RN  Pain Management Interventions:   no interventions per patient request   quiet environment facilitated   relaxation techniques promoted  Taken 2/5/2025 0200 by Mamadou Pandya RN  Pain Management Interventions:   no interventions per patient request   quiet environment facilitated   relaxation techniques promoted  Taken 2/5/2025 0000 by Mamadou Pandya RN  Pain Management Interventions:   no interventions per patient request   quiet environment facilitated   relaxation techniques promoted  Taken 2/4/2025 2200 by Mamadou Pandya RN  Pain Management Interventions:   no interventions per patient request   quiet environment facilitated   relaxation techniques promoted  Taken 2/4/2025 2000 by Mamadou Pandya RN  Pain Management Interventions:   no interventions per patient request   quiet environment facilitated   relaxation techniques promoted  Intervention: Optimize Function  Recent Flowsheet  Documentation  Taken 2/4/2025 2000 by Mamadou Pandya RN  Sensory Stimulation Regulation:   auditory stimulation minimized   care clustered   lighting decreased  Fatigue Management:   frequent rest breaks encouraged   paced activity encouraged  Intervention: Promote Advance Care Planning  Recent Flowsheet Documentation  Taken 2/4/2025 2000 by Mamadou Pandya RN  Life Transition/Adjustment: palliative care discussed  Intervention: Optimize Psychosocial Wellbeing  Recent Flowsheet Documentation  Taken 2/5/2025 0200 by Mamadou Pandya RN  Supportive Measures:   active listening utilized   goal-setting facilitated   self-care encouraged  Taken 2/4/2025 2000 by Mamadou Pandya RN  Supportive Measures:   active listening utilized   goal-setting facilitated   self-care encouraged  Family/Support System Care:   support provided   self-care encouraged   Goal Outcome Evaluation:  Plan of Care Reviewed With: patient        Progress: improving

## 2025-02-05 NOTE — PROGRESS NOTES
[facility] Medicine Services  PROGRESS NOTE    Patient Name: Rose J Kellermann  : 1926  MRN: [id number]    Date of Admission: 2025  Primary Care Physician: Sam Hung MD    Subjective   Subjective     CC: Dyspnea    HPI: Up in bed. Daughter at bedside. No f/c. Dyspnea about the same/better.     Objective   Objective     Vital Signs:   Temp:  [96.5 °F (35.8 °C)-97.8 °F (36.6 °C)] 97.8 °F (36.6 °C)  Heart Rate:  [81-97] 87  Resp:  [14-20] 14  BP: (107-128)/(69-91) 120/83  Flow (L/min) (Oxygen Therapy):  [4] 4     Physical Exam:  NAD, alert and oriented, more alert/conversant/responsive today  Chronically ill appearing  OP clear, dry MM  Neck supple  RRR  Decreased BS bases, but improved BS at bases B  +BS, soft  JOSE  Flat affect    Results Reviewed:  LAB RESULTS:      Lab 25  1036 25  0926 25  1259 25  1502   WBC 10.85* 8.62 11.02* 10.09   HEMOGLOBIN 12.2 12.0 12.8 13.1   HEMATOCRIT 40.3 39.3 39.5 40.4   PLATELETS 254 220 272 291   NEUTROS ABS  --   --   --  8.52*   IMMATURE GRANS (ABS)  --   --   --  0.05   LYMPHS ABS  --   --   --  0.55*   MONOS ABS  --   --   --  0.82   EOS ABS  --   --   --  0.13   MCV 99.0* 99.2* 93.4 93.3         Lab 25  1301 25  1036 25  1135 25  0837 25  0926 25  1259 25  1404   SODIUM 135* 132* 135* 137 136 134* 137   POTASSIUM 4.0 4.0 4.3 4.1 3.9 4.3 4.6   CHLORIDE 94* 93* 95* 94* 97* 96* 98   CO2 26.0 25.0 27.0 28.0 28.0 25.0 26.0   ANION GAP 15.0 14.0 13.0 15.0 11.0 13.0 13.0   BUN 80* 79* 68* 65* 68* 72* 77*   CREATININE 2.48* 2.74* 2.44* 2.28* 2.25* 2.52* 2.89*   EGFR 17.2* 15.2* 17.5* 19.0* 19.3* 16.8* 14.3*   GLUCOSE 172* 129* 116* 112* 131* 152* 154*   CALCIUM 8.7 8.4 8.8 8.7 8.8 8.4 8.9  8.9   MAGNESIUM  --   --   --   --   --  2.4*  --    PHOSPHORUS  --   --   --   --   --  4.1 4.6*         Lab 25  1259 25  1404   ALBUMIN 2.8* 3.1*                  Lab 01/29/25  1404   IRON 26*   IRON SATURATION (TSAT) 12*   TIBC 209*   TRANSFERRIN 140*           Brief Urine Lab Results       None            Microbiology Results Abnormal       None            XR Chest 1 View    Result Date: 2/5/2025  XR CHEST 1 VW Date of Exam: 2/5/2025 2:29 AM EST Indication: DYSPNEA, ON EXERTION dyspnea Comparison: February 4, 2025 Findings: Moderate bilateral pleural effusions appear unchanged. There are hazy bilateral airspace opacities. The heart and mediastinal contours appear stable. The osseous structures appear intact.     Impression: Impression: 1.Moderate bilateral pleural effusions appear unchanged. 2.Hazy bilateral airspace opacities, which could reflect pulmonary edema or pneumonia. Electronically Signed: Lucas Nieto MD  2/5/2025 7:13 AM EST  Workstation ID: [id number]    US Thoracentesis    Result Date: 2/4/2025  Thoracentesis Clinical Diagnosis: recurrent effusion Technique: Multiple transaxial and longitudinal images were obtained through the region of interest with real time ultrasonography. A low-frequency curvilinear transducer was utilized. Pertinent ultrasound images were stored in the PACS for documentation.     Impression: Impression: Sonographic evaluation of the region of interest of the chest revealed a small left pleural effusion. Thoracentesis was therefore not performed. I, Darrell Rdz MD, have personally reviewed the image(s) and the prepared and signed interpretation by Erna Jackson NP.  Based on my review, I agree with the findings. Report dictated by: CONNIE Shoemaker  I have personally reviewed this case and agree with the findings above: Electronically Signed: Darrell Rdz MD  2/4/2025 4:06 PM EST  Workstation ID: [id number]    XR Chest 1 View    Result Date: 2/4/2025  XR CHEST 1 VW Date of Exam: 2/4/2025 2:16 AM EST Indication: Follow up L pleural effusion s/p Left pleural CT placement Comparison: Chest radiograph 2/3/2025 Findings: Similar  moderate size pleural effusions with presumed underlying bibasilar atelectasis. Differential includes infection. Slight interstitial prominence without overt edema. Stable cardiomediastinal silhouette including cardiomegaly. No new consolidation.  No visualized pneumothorax. Degenerative related osseous change.     Impression: Impression: No significant interval change. Electronically Signed: Juice Martinez MD  2/4/2025 8:04 AM EST  Workstation ID: [id number]     Results for orders placed during the hospital encounter of 01/20/25    Adult Transthoracic Echo Complete W/ Cont if Necessary Per Protocol    Interpretation Summary    Left ventricular ejection fraction appears to be 61 - 65%.    Left ventricular diastolic function was indeterminate.    Mild aortic valve stenosis is present.    Peak velocity of the flow distal to the aortic valve is 245.6 cm/s. Aortic valve maximum pressure gradient is 24 mmHg. Aortic valve mean pressure gradient is 13 mmHg.    The mitral valve mean gradient is 4 mmHg.    Moderate tricuspid valve regurgitation is present.    Estimated right ventricular systolic pressure from tricuspid regurgitation is markedly elevated (>55 mmHg). Calculated right ventricular systolic pressure from tricuspid regurgitation is 57 mmHg.    There is a moderate 2cm  pericardial effusion with no tamponade    There is a left pleural effusion.      Current medications:  Scheduled Meds:apixaban, 5 mg, Oral, Q12H  bumetanide, 2 mg, Oral, Daily  sodium chloride, 10 mL, Intravenous, Q12H      Continuous Infusions:   PRN Meds:.  acetaminophen    senna-docusate sodium **AND** polyethylene glycol **AND** bisacodyl **AND** bisacodyl    sodium chloride    sodium chloride    sodium chloride    Assessment & Plan   Assessment & Plan     Active Hospital Problems    Diagnosis  POA    **Pleural effusion on left [J90]  Yes    Pulmonary hypertension [I27.20]  Yes    Acute on chronic heart failure with preserved ejection fraction  (HFpEF) [I50.33]  Yes    Essential hypertension [I10]  Yes    Chronic kidney disease, stage III (moderate)  [N18.30]  Yes      Resolved Hospital Problems   No resolved problems to display.        Brief Hospital Course to date:  Rose J Kellermann is a 89 y.o. female with history of DVT on Eliquis, HFpEF, HTN, and CKD IV who presents due to dyspnea and hypoxia. Had been wearing 2-3 liters home oxygen intermittently over the past several months, now requiring continuously. Workup in the ED notable for a left pleural effusion. Admitted for further management.      Progressive hypoxia, acute on chronic  Left pleural effusion  Acute on chronic HFpEF exacerbation  Valvular heart disease  Pulmonary HTN  Pericardial effusion  -CXR with moderate L effusion, s/p IR chest tube, CTS managed, CT pulled 1/26  -Fluid with cultures/cytology negative, exudate  -ECHO with pericardial effusion  -cardiology consulted, deferred window/pericardiocentesis  -diurese per nephrology  -wean oxygen as tolerated  -B effusions, attempted repeat IR thoracentesis on L, but not amenable to drainage per IR  -R appears similar in size to L, but BS improved today, will hold off on IR thoracentesis as she seems stable/somewhat improved  -repeat CXR in AM, plan for rehab if all stable     ANGEL LUIS on CKD IV  -baseline creatinine around 2  -nephrology following, has been on IV bumex  -changed to PO bumex    Afib/Aflutter  Hx of PACs  -cardiology following, on eliquis  -no BB due to significant pauses/bradycardia on 6/23 Holter,      History of BLE DVT (June 2023)  -continue Eliquis     Hypertension  -CTM    Goals of care  -Family/patient interested in rehab    Expected Discharge Location and Transportation: SNF  Expected Discharge she appears to be improving, hopefully will be able to go to SNF this week  Expected Discharge Date: 2/6/2025; Expected Discharge Time:      VTE Prophylaxis:  Pharmacologic & mechanical VTE prophylaxis orders are present.          AM-PAC 6 Clicks Score (PT): 14 (02/05/25 0800)    CODE STATUS:   Code Status and Medical Interventions: No CPR (Do Not Attempt to Resuscitate); Limited Support; No intubation (DNI), No dialysis   Ordered at: 01/31/25 1140     Medical Intervention Limits:    No intubation (DNI)    No dialysis     Level Of Support Discussed With:    Health Care Surrogate     Code Status (Patient has no pulse and is not breathing):    No CPR (Do Not Attempt to Resuscitate)     Medical Interventions (Patient has pulse or is breathing):    Limited Support       James Gatica MD  02/05/25

## 2025-02-06 ENCOUNTER — APPOINTMENT (OUTPATIENT)
Dept: GENERAL RADIOLOGY | Facility: HOSPITAL | Age: OVER 89
End: 2025-02-06
Payer: MEDICARE

## 2025-02-06 VITALS
HEART RATE: 89 BPM | DIASTOLIC BLOOD PRESSURE: 80 MMHG | TEMPERATURE: 98.3 F | OXYGEN SATURATION: 97 % | SYSTOLIC BLOOD PRESSURE: 106 MMHG | WEIGHT: 175.8 LBS | HEIGHT: 64 IN | RESPIRATION RATE: 16 BRPM | BODY MASS INDEX: 30.01 KG/M2

## 2025-02-06 PROCEDURE — 71045 X-RAY EXAM CHEST 1 VIEW: CPT

## 2025-02-06 PROCEDURE — 99239 HOSP IP/OBS DSCHRG MGMT >30: CPT | Performed by: HOSPITALIST

## 2025-02-06 RX ORDER — BUMETANIDE 2 MG/1
2 TABLET ORAL DAILY
Qty: 30 TABLET | Refills: 0 | Status: ON HOLD | OUTPATIENT
Start: 2025-02-07 | End: 2025-03-09

## 2025-02-06 RX ADMIN — APIXABAN 5 MG: 5 TABLET, FILM COATED ORAL at 08:37

## 2025-02-06 RX ADMIN — BUMETANIDE 2 MG: 2 TABLET ORAL at 08:37

## 2025-02-06 NOTE — PLAN OF CARE
Problem: Adult Inpatient Plan of Care  Goal: Plan of Care Review  Outcome: Progressing  Flowsheets (Taken 2/6/2025 0503)  Progress: improving  Plan of Care Reviewed With: patient  Goal: Patient-Specific Goal (Individualized)  Outcome: Progressing  Goal: Absence of Hospital-Acquired Illness or Injury  Outcome: Progressing  Intervention: Identify and Manage Fall Risk  Recent Flowsheet Documentation  Taken 2/6/2025 0400 by Mamadou Pandya RN  Safety Promotion/Fall Prevention:   activity supervised   assistive device/personal items within reach   clutter free environment maintained   fall prevention program maintained   nonskid shoes/slippers when out of bed   room organization consistent   safety round/check completed   toileting scheduled  Taken 2/6/2025 0200 by Mamadou Pandya RN  Safety Promotion/Fall Prevention:   activity supervised   assistive device/personal items within reach   clutter free environment maintained   fall prevention program maintained   nonskid shoes/slippers when out of bed   room organization consistent   safety round/check completed   toileting scheduled  Taken 2/6/2025 0000 by Mamadou Pandya RN  Safety Promotion/Fall Prevention:   activity supervised   assistive device/personal items within reach   clutter free environment maintained   fall prevention program maintained   nonskid shoes/slippers when out of bed   room organization consistent   safety round/check completed   toileting scheduled  Taken 2/5/2025 2200 by aMmadou Pandya RN  Safety Promotion/Fall Prevention:   activity supervised   assistive device/personal items within reach   clutter free environment maintained   fall prevention program maintained   nonskid shoes/slippers when out of bed   room organization consistent   safety round/check completed   toileting scheduled  Taken 2/5/2025 2000 by Mamadou Pandya RN  Safety Promotion/Fall Prevention:   activity supervised   assistive device/personal items within reach    clutter free environment maintained   fall prevention program maintained   nonskid shoes/slippers when out of bed   room organization consistent   safety round/check completed   toileting scheduled  Intervention: Prevent Skin Injury  Recent Flowsheet Documentation  Taken 2/6/2025 0400 by Mamadou Pandya RN  Body Position:   turned   right  Skin Protection:   incontinence pads utilized   silicone foam dressing in place   transparent dressing maintained  Taken 2/6/2025 0200 by Mamadou Pandya RN  Body Position:   supine   legs elevated  Skin Protection:   incontinence pads utilized   silicone foam dressing in place   transparent dressing maintained  Taken 2/6/2025 0000 by Mamadou Pandya RN  Body Position:   turned   left  Skin Protection:   incontinence pads utilized   silicone foam dressing in place   transparent dressing maintained  Taken 2/5/2025 2200 by Mamadou Pandya RN  Skin Protection:   incontinence pads utilized   silicone foam dressing in place   transparent dressing maintained  Taken 2/5/2025 2000 by Mamadou Pandya RN  Skin Protection: (silicone dressings peeled back and skin assessed)   incontinence pads utilized   silicone foam dressing in place   transparent dressing maintained  Intervention: Prevent and Manage VTE (Venous Thromboembolism) Risk  Recent Flowsheet Documentation  Taken 2/5/2025 2000 by Mamadou Pandya RN  VTE Prevention/Management:   bilateral   SCDs (sequential compression devices) on  Intervention: Prevent Infection  Recent Flowsheet Documentation  Taken 2/6/2025 0400 by Mamadou Pandya RN  Infection Prevention:   environmental surveillance performed   rest/sleep promoted   single patient room provided  Taken 2/6/2025 0200 by Mamadou Pandya RN  Infection Prevention:   environmental surveillance performed   single patient room provided   rest/sleep promoted  Taken 2/6/2025 0000 by Mamadou Pandya RN  Infection Prevention:   environmental surveillance performed    rest/sleep promoted   single patient room provided  Taken 2/5/2025 2200 by Mamadou Pandya RN  Infection Prevention:   environmental surveillance performed   rest/sleep promoted   single patient room provided  Taken 2/5/2025 2000 by Mamadou Pandya RN  Infection Prevention:   environmental surveillance performed   rest/sleep promoted   single patient room provided  Goal: Optimal Comfort and Wellbeing  Outcome: Progressing  Intervention: Monitor Pain and Promote Comfort  Recent Flowsheet Documentation  Taken 2/6/2025 0400 by Mamadou Pandya RN  Pain Management Interventions:   no interventions per patient request   quiet environment facilitated   relaxation techniques promoted  Taken 2/6/2025 0200 by Mamadou Pandya RN  Pain Management Interventions:   no interventions per patient request   quiet environment facilitated   relaxation techniques promoted  Taken 2/6/2025 0000 by Mamadou Pandya RN  Pain Management Interventions:   no interventions per patient request   quiet environment facilitated   relaxation techniques promoted  Taken 2/5/2025 2200 by Mamadou Pandya RN  Pain Management Interventions:   no interventions per patient request   quiet environment facilitated   relaxation techniques promoted  Taken 2/5/2025 2000 by Mamadou Pandya RN  Pain Management Interventions:   no interventions per patient request   quiet environment facilitated   relaxation techniques promoted  Intervention: Provide Person-Centered Care  Recent Flowsheet Documentation  Taken 2/5/2025 2000 by Mamadou Pandya RN  Trust Relationship/Rapport:   care explained   choices provided   questions answered   reassurance provided   thoughts/feelings acknowledged  Goal: Readiness for Transition of Care  Outcome: Progressing     Problem: Skin Injury Risk Increased  Goal: Skin Health and Integrity  Outcome: Progressing  Intervention: Optimize Skin Protection  Recent Flowsheet Documentation  Taken 2/6/2025 0400 by Mamadou Pandya  RN  Activity Management: activity encouraged  Pressure Reduction Techniques:   frequent weight shift encouraged   heels elevated off bed   positioned off wounds   pressure points protected   weight shift assistance provided  Head of Bed ([facility]) Positioning: HOB elevated  Pressure Reduction Devices:   pressure-redistributing mattress utilized   positioning supports utilized   heel offloading device utilized   foam padding utilized  Skin Protection:   incontinence pads utilized   silicone foam dressing in place   transparent dressing maintained  Taken 2/6/2025 0200 by Mamadou Pandya RN  Activity Management: activity encouraged  Pressure Reduction Techniques:   frequent weight shift encouraged   heels elevated off bed   positioned off wounds   pressure points protected   weight shift assistance provided  Head of Bed ([facility]) Positioning: [facility] elevated  Pressure Reduction Devices:   pressure-redistributing mattress utilized   positioning supports utilized   heel offloading device utilized   foam padding utilized  Skin Protection:   incontinence pads utilized   silicone foam dressing in place   transparent dressing maintained  Taken 2/6/2025 0000 by Mamadou Pandya RN  Activity Management: activity encouraged  Pressure Reduction Techniques:   frequent weight shift encouraged   heels elevated off bed   positioned off wounds   pressure points protected   weight shift assistance provided  Head of Bed ([facility]) Positioning: [facility] elevated  Pressure Reduction Devices:   pressure-redistributing mattress utilized   positioning supports utilized   heel offloading device utilized   foam padding utilized  Skin Protection:   incontinence pads utilized   silicone foam dressing in place   transparent dressing maintained  Taken 2/5/2025 2200 by Mamadou Pandya RN  Activity Management: activity encouraged  Pressure Reduction Techniques:   frequent weight shift encouraged   heels elevated off bed   positioned off wounds   pressure points  protected   weight shift assistance provided  Pressure Reduction Devices:   pressure-redistributing mattress utilized   positioning supports utilized   heel offloading device utilized   foam padding utilized  Skin Protection:   incontinence pads utilized   silicone foam dressing in place   transparent dressing maintained  Taken 2/5/2025 2000 by Mamadou Pandya RN  Activity Management: activity encouraged  Pressure Reduction Techniques:   frequent weight shift encouraged   heels elevated off bed   positioned off wounds   pressure points protected   weight shift assistance provided  Pressure Reduction Devices:   pressure-redistributing mattress utilized   positioning supports utilized   heel offloading device utilized   foam padding utilized  Skin Protection: (silicone dressings peeled back and skin assessed)   incontinence pads utilized   silicone foam dressing in place   transparent dressing maintained     Problem: Comorbidity Management  Goal: Maintenance of Heart Failure Symptom Control  Outcome: Progressing  Intervention: Maintain Heart Failure Management  Recent Flowsheet Documentation  Taken 2/6/2025 0400 by Mamadou Pandya RN  Medication Review/Management: medications reviewed  Taken 2/6/2025 0200 by Mamadou Pandya RN  Medication Review/Management: medications reviewed  Taken 2/6/2025 0000 by Mamadou Pandya RN  Medication Review/Management: medications reviewed  Taken 2/5/2025 2200 by Mamadou Pandya RN  Medication Review/Management: medications reviewed  Taken 2/5/2025 2000 by Mamadou Pandya RN  Medication Review/Management: medications reviewed  Goal: Blood Pressure in Desired Range  Outcome: Progressing  Intervention: Maintain Blood Pressure Management  Recent Flowsheet Documentation  Taken 2/6/2025 0400 by Mamadou Pandya RN  Medication Review/Management: medications reviewed  Taken 2/6/2025 0200 by Mamadou Pandya RN  Medication Review/Management: medications reviewed  Taken 2/6/2025  0000 by Mamadou Pandya RN  Medication Review/Management: medications reviewed  Taken 2/5/2025 2200 by Mamadou Pandya RN  Medication Review/Management: medications reviewed  Taken 2/5/2025 2000 by Mamadou Pandya RN  Medication Review/Management: medications reviewed     Problem: Fall Injury Risk  Goal: Absence of Fall and Fall-Related Injury  Outcome: Progressing  Intervention: Identify and Manage Contributors  Recent Flowsheet Documentation  Taken 2/6/2025 0400 by Mamadou aPndya RN  Medication Review/Management: medications reviewed  Taken 2/6/2025 0200 by Mamadou Pandya RN  Medication Review/Management: medications reviewed  Taken 2/6/2025 0000 by Mamadou Pandya RN  Medication Review/Management: medications reviewed  Taken 2/5/2025 2200 by Mamadou Pandya RN  Medication Review/Management: medications reviewed  Taken 2/5/2025 2000 by Mamadou Pandya RN  Medication Review/Management: medications reviewed  Intervention: Promote Injury-Free Environment  Recent Flowsheet Documentation  Taken 2/6/2025 0400 by Mamadou Pandya RN  Safety Promotion/Fall Prevention:   activity supervised   assistive device/personal items within reach   clutter free environment maintained   fall prevention program maintained   nonskid shoes/slippers when out of bed   room organization consistent   safety round/check completed   toileting scheduled  Taken 2/6/2025 0200 by Mamadou Pandya RN  Safety Promotion/Fall Prevention:   activity supervised   assistive device/personal items within reach   clutter free environment maintained   fall prevention program maintained   nonskid shoes/slippers when out of bed   room organization consistent   safety round/check completed   toileting scheduled  Taken 2/6/2025 0000 by Mamadou Pandya RN  Safety Promotion/Fall Prevention:   activity supervised   assistive device/personal items within reach   clutter free environment maintained   fall prevention program maintained   nonskid  shoes/slippers when out of bed   room organization consistent   safety round/check completed   toileting scheduled  Taken 2/5/2025 2200 by Mamadou Pandya RN  Safety Promotion/Fall Prevention:   activity supervised   assistive device/personal items within reach   clutter free environment maintained   fall prevention program maintained   nonskid shoes/slippers when out of bed   room organization consistent   safety round/check completed   toileting scheduled  Taken 2/5/2025 2000 by Mamadou Pandya RN  Safety Promotion/Fall Prevention:   activity supervised   assistive device/personal items within reach   clutter free environment maintained   fall prevention program maintained   nonskid shoes/slippers when out of bed   room organization consistent   safety round/check completed   toileting scheduled     Problem: Palliative Care  Goal: Enhanced Quality of Life  Outcome: Progressing  Intervention: Maximize Comfort  Recent Flowsheet Documentation  Taken 2/6/2025 0400 by Mamadou Pandya RN  Pain Management Interventions:   no interventions per patient request   quiet environment facilitated   relaxation techniques promoted  Taken 2/6/2025 0200 by Mamadou Pandya RN  Pain Management Interventions:   no interventions per patient request   quiet environment facilitated   relaxation techniques promoted  Taken 2/6/2025 0000 by Mamadou Pandya RN  Pain Management Interventions:   no interventions per patient request   quiet environment facilitated   relaxation techniques promoted  Taken 2/5/2025 2200 by Mamadou Pandya RN  Pain Management Interventions:   no interventions per patient request   quiet environment facilitated   relaxation techniques promoted  Taken 2/5/2025 2000 by Mamadou Pandya RN  Pain Management Interventions:   no interventions per patient request   quiet environment facilitated   relaxation techniques promoted  Intervention: Optimize Psychosocial Wellbeing  Recent Flowsheet  Documentation  Taken 2/5/2025 2000 by Mamadou Pandya, RN  Supportive Measures:   active listening utilized   goal-setting facilitated  Family/Support System Care:   support provided   self-care encouraged   Goal Outcome Evaluation:  Plan of Care Reviewed With: patient        Progress: improving

## 2025-02-06 NOTE — H&P
[facility]  DISCHARGE SUMMARY    Patient Name: Rose J Kellermann  : 1926  MRN: [id number]    Date of Admission: 2025 10:00 AM  Date of Discharge:  2025  Primary Care Physician: Sam Hung MD    Consults       Date and Time Order Name Status Description    2025 10:23 AM Inpatient Palliative Care MD Consult Completed     2025  7:27 PM Inpatient Cardiology Consult Completed     2025  9:53 AM Inpatient Nephrology Consult Completed     2025  9:32 AM Inpatient Cardiothoracic Surgery Consult              Hospital Course     Presenting Problem: Dyspnea, pleural effusions    Active Hospital Problems    Diagnosis  POA    **Pleural effusion on left [J90]  Yes    Pulmonary hypertension [I27.20]  Yes    Acute on chronic heart failure with preserved ejection fraction (HFpEF) [I50.33]  Yes    Essential hypertension [I10]  Yes    Chronic kidney disease, stage III (moderate)  [N18.30]  Yes      Resolved Hospital Problems   No resolved problems to display.          Hospital Course:  Rose J Kellermann is a 89 y.o. female with history of DVT on Eliquis, HFpEF, HTN, and CKD IV who presents due to dyspnea and hypoxia. Had been wearing 2-3 liters home oxygen intermittently over the past several months, now requiring continuously. Workup in the ED notable for a left pleural effusion. Admitted for further management.       Progressive hypoxia, acute on chronic  Left pleural effusion  Acute on chronic HFpEF exacerbation  Valvular heart disease  Pulmonary HTN  Pericardial effusion  -CXR with moderate L effusion, s/p IR chest tube, CTS managed, CT pulled   -Fluid with cultures/cytology negative, exudate  -ECHO with pericardial effusion  -Cardiology consulted, deferred window/pericardiocentesis  -Wean oxygen as tolerated, stable on 4L  -B effusions, attempted repeat IR thoracentesis on L, but not amenable to drainage per IR  -R appears similar  in size to L, but BS improved today, held off on IR thoracentesis as she seems stable/somewhat improved  -repeat CXR stable/unchanged  -Continue daily PO bumex, with PCP/NAL follow up     ANGEL LUIS on CKD IV  -diuresed, creatinine stable around 2.4 now  -follow up with [facility]     Afib/Aflutter  Hx of PACs  -cardiology following, on eliquis  -no BB due to significant pauses/bradycardia on 6/23 Holter     History of BLE DVT (June 2023)  -continue Eliquis     Hypertension  -CTM     Goals of care  -Family/patient interested in rehab  Discharge Follow Up Recommendations for outpatient labs/diagnostics:  As written    Day of Discharge     HPI:  UP in chair/bed. Dyspnea unchanged, no new cough/congestion/f/c. Weak/tired.     Review of Systems  As above    Vital Signs:   Temp:  [97.8 °F (36.6 °C)-98.6 °F (37 °C)] 98.3 °F (36.8 °C)  Heart Rate:  [77-96] 90  Resp:  [14-18] 18  BP: ()/(67-93) 126/83  Flow (L/min) (Oxygen Therapy):  [4-5] 5      Physical Exam:  NAD, alert  OP clear, dry MM  Neck supple  No LAD  RRR  Decreased at bases  +BS, soft  JOSE  NO change in LE    Pertinent  and/or Most Recent Results     LAB RESULTS:      Lab 02/05/25  0830 02/03/25  1036 01/31/25  0926 01/30/25  1259   WBC 11.57* 10.85* 8.62 11.02*   HEMOGLOBIN 12.2 12.2 12.0 12.8   HEMATOCRIT 38.5 40.3 39.3 39.5   PLATELETS 279 254 220 272   NEUTROS ABS 9.59*  --   --   --    IMMATURE GRANS (ABS) 0.07*  --   --   --    LYMPHS ABS 0.77  --   --   --    MONOS ABS 0.85  --   --   --    EOS ABS 0.25  --   --   --    MCV 94.1 99.0* 99.2* 93.4         Lab 02/05/25  0830 02/04/25  1301 02/03/25  1036 02/02/25  1135 02/01/25  0837 01/31/25  0926 01/30/25  1259   SODIUM 138 135* 132* 135* 137   < > 134*   POTASSIUM 3.9 4.0 4.0 4.3 4.1   < > 4.3   CHLORIDE 94* 94* 93* 95* 94*   < > 96*   CO2 30.0* 26.0 25.0 27.0 28.0   < > 25.0   ANION GAP 14.0 15.0 14.0 13.0 15.0   < > 13.0   BUN 82* 80* 79* 68* 65*   < > 72*   CREATININE 2.48* 2.48* 2.74* 2.44* 2.28*   < > 2.52*    EGFR 17.2* 17.2* 15.2* 17.5* 19.0*   < > 16.8*   GLUCOSE 96 172* 129* 116* 112*   < > 152*   CALCIUM 8.9 8.7 8.4 8.8 8.7   < > 8.4   MAGNESIUM  --   --   --   --   --   --  2.4*   PHOSPHORUS  --   --   --   --   --   --  4.1    < > = values in this interval not displayed.         Lab 02/05/25  0830 01/30/25  1259   TOTAL PROTEIN 6.6  --    ALBUMIN 3.3* 2.8*   GLOBULIN 3.3  --    ALT (SGPT) 15  --    AST (SGOT) 26  --    BILIRUBIN 0.5  --    ALK PHOS 105  --                      Brief Urine Lab Results       None          Microbiology Results (last 10 days)       ** No results found for the last 240 hours. **            XR Chest 1 View    Result Date: 2/6/2025  XR CHEST 1 VW Date of Exam: 2/6/2025 2:41 AM EST Indication: Follow up L pleural effusion s/p Left pleural CT placement Comparison: 2/5/2025 radiographs Findings: Unchanged enlarged cardiac silhouette. Low lung volumes. Persistent diffuse interstitial and bibasilar airspace opacities with moderate bilateral pleural effusions. No pneumothorax. No acute osseous abnormality.     Impression: No significant interval change. Electronically Signed: Osbaldo Spangler MD  2/6/2025 7:27 AM EST  Workstation ID: [id number]    XR Chest 1 View    Result Date: 2/5/2025  XR CHEST 1 VW Date of Exam: 2/5/2025 2:29 AM EST Indication: DYSPNEA, ON EXERTION dyspnea Comparison: February 4, 2025 Findings: Moderate bilateral pleural effusions appear unchanged. There are hazy bilateral airspace opacities. The heart and mediastinal contours appear stable. The osseous structures appear intact.     Impression: 1.Moderate bilateral pleural effusions appear unchanged. 2.Hazy bilateral airspace opacities, which could reflect pulmonary edema or pneumonia. Electronically Signed: Lucas Nieto MD  2/5/2025 7:13 AM EST  Workstation ID: [id number]    US Thoracentesis    Result Date: 2/4/2025  Thoracentesis Clinical Diagnosis: recurrent effusion Technique: Multiple transaxial and longitudinal images  were obtained through the region of interest with real time ultrasonography. A low-frequency curvilinear transducer was utilized. Pertinent ultrasound images were stored in the PACS for documentation.     Impression: Sonographic evaluation of the region of interest of the chest revealed a small left pleural effusion. Thoracentesis was therefore not performed. I, Darrell Rdz MD, have personally reviewed the image(s) and the prepared and signed interpretation by Erna Jackson NP.  Based on my review, I agree with the findings. Report dictated by: CONNIE Shoemaker  I have personally reviewed this case and agree with the findings above: Electronically Signed: Darrell Rdz MD  2/4/2025 4:06 PM EST  Workstation ID: [id number]    XR Chest 1 View    Result Date: 2/4/2025  XR CHEST 1 VW Date of Exam: 2/4/2025 2:16 AM EST Indication: Follow up L pleural effusion s/p Left pleural CT placement Comparison: Chest radiograph 2/3/2025 Findings: Similar moderate size pleural effusions with presumed underlying bibasilar atelectasis. Differential includes infection. Slight interstitial prominence without overt edema. Stable cardiomediastinal silhouette including cardiomegaly. No new consolidation.  No visualized pneumothorax. Degenerative related osseous change.     Impression: No significant interval change. Electronically Signed: Juice Martinez MD  2/4/2025 8:04 AM EST  Workstation ID: [id number]    XR Chest 1 View    Result Date: 2/3/2025  XR CHEST 1 VW Date of Exam: 2/3/2025 3:32 AM EST Indication: Follow up L pleural effusion s/p Left pleural CT placement Comparison: 2/2/2025 Findings: There is persistent and fairly extensive bibasilar effusion and atelectasis without significant interval change. Exam history states left pleural chest tube placement but no chest drain is visible. Lung apices remain clear. Heart appears upper normal size. Vasculature is slightly cephalized. No pneumothorax is seen.     Impression: Persistent  bibasilar effusion and atelectasis. Electronically Signed: James Juarez MD  2/3/2025 7:09 AM EST  Workstation ID: [id number]    XR Chest 1 View    Result Date: 2/2/2025  XR CHEST 1 VW Date of Exam: 2/2/2025 5:55 AM EST Indication: Follow up L pleural effusion s/p Left pleural CT placement Comparison: Chest radiograph 2/1/2025 Findings: Stable cardiomediastinal silhouette including cardiomegaly. Moderate right probably small to moderate left pleural effusions without significant change with adjacent compressive atelectasis versus airspace disease. Minimal interstitial thickening which could reflect vascular congestion or edema similar to prior. No worsening consolidation or pneumothorax. Degenerative related osseous change. Lung apices partially obscured by patient's chin.     Impression: No significant interval change. Electronically Signed: Juice Martinez MD  2/2/2025 7:50 AM EST  Workstation ID: [id number]    XR Chest 1 View    Result Date: 2/1/2025  XR CHEST 1 VW Date of Exam: 2/1/2025 12:26 AM EST Indication: Follow up L pleural effusion s/p Left pleural CT placement Comparison: Chest x-ray 1/31/2025 Findings: Low lung volumes. Cardiomegaly. Moderate bilateral pleural effusions. Septal thickening. Probable atelectasis right middle lobe.     Impression: No significant change from previous exam. Electronically Signed: Brenda Mackey MD  2/1/2025 8:00 AM EST  Workstation ID: [id number]    XR Chest 1 View    Result Date: 1/31/2025  XR CHEST 1 VW Date of Exam: 1/31/2025 4:48 AM EST Indication: Follow up L pleural effusion s/p Left pleural CT placement Comparison: Chest x-ray 1/30/2025 Findings: Indication of the exam states left chest tube placement, however no chest tube is seen. Stable cardiomegaly. Similar dense bibasilar opacities compatible with combination of atelectasis and layering pleural effusions. Similar diffuse interstitial prominence and perihilar vascular congestion consistent with a component of interstitial  edema.  No pneumothorax.     Impression: Clinical indication of the exam states left chest tube placement, however no chest tube is seen. Similar appearance of interstitial pulmonary edema and hazy and dense bibasilar opacities likely reflecting combination of atelectasis and layering pleural effusions. Overall no significant interval change. Electronically Signed: Arya Vazquez MD  1/31/2025 8:09 AM EST  Workstation ID: [id number]    XR Chest 1 View    Result Date: 1/30/2025  XR CHEST 1 VW Date of Exam: 1/30/2025 11:03 PM EST Indication: shortness of breath Comparison: Chest radiograph 1/30/2025 Findings: The heart is enlarged. There is pulmonary vascular congestion. There are large bilateral pleural fluid collections. There is bilateral basilar atelectasis. There is no pneumothorax. There are degenerative changes of both shoulders.     Impression: Cardiomegaly with pulmonary vascular congestion and large bilateral pleural fluid collections. Electronically Signed: Aj Ramos MD  1/30/2025 11:06 PM EST  Workstation ID: [id number]    XR Chest 1 View    Result Date: 1/30/2025  XR CHEST 1 VW Date of Exam: 1/30/2025 2:01 AM EST Indication: Follow up L pleural effusion s/p Left pleural CT placement Comparison: Chest radiograph 1/30/2025 Findings: Reported drainage catheter not visualized. Grossly stable enlarged cardiac silhouette, moderate size left greater than right pleural effusions and increased interstitial opacities suspicious for edema. Similar bibasilar consolidation favoring compressive  atelectasis, versus infection in the right clinical setting. Aortic atherosclerotic disease. No pneumothorax. Degenerative elated osseous change. Left rib deformities similar to prior.     Impression: No significant interval change. Provided clinical information states left pleural drainage catheter however this is not visualized. Electronically Signed: Juice Martinez MD  1/30/2025 8:10 AM EST  Workstation ID: [id number]    XR  Chest 1 View    Result Date: 1/29/2025  XR CHEST 1 VW Date of Exam: 1/29/2025 4:18 AM EST Indication: Follow up L pleural effusion s/p Left pleural CT placement Comparison: Chest radiograph 1/28/2025. Findings: Enlarged cardiac silhouette, unchanged. Persistent pulmonary vascular congestion. Small/moderate bilateral pleural effusions with bibasilar airspace disease, unchanged. No pneumothorax. Osseous structures are unchanged.     Impression: No significant interval change. Electronically Signed: Adiel Mckeon MD  1/29/2025 8:16 AM EST  Workstation ID: [id number]    XR Chest 1 View    Result Date: 1/28/2025  XR CHEST 1 VW Date of Exam: 1/28/2025 4:05 PM EST Indication: worsening SOB Comparison: 1/27/2025 11:06 p.m. Findings: Current image is timed 4:14 p.m. 1/28/2025. Heart shadow is markedly enlarged. Vasculature is cephalized, but pulmonary edema is improved from the prior exam. There is persistent bibasilar effusion and atelectasis. No pneumothorax is seen. Reviewing multiple recent images back to 1/25/2025, there appears to be increasing size of the cardiomediastinal silhouette, and gradually increasing bibasilar effusion and atelectasis.     Impression: 1. Marked heart shadow enlargement, which appears further increased, but with improved pulmonary vascular congestion compared to yesterday's 11:06 p.m. exam. 2. Persistent, extensive bibasilar atelectasis and effusion which appears to be gradually increasing over the past several days. Electronically Signed: James Juarez MD  1/28/2025 4:31 PM EST  Workstation ID: [id number]    CT Head Without Contrast    Result Date: 1/28/2025  CT HEAD WO CONTRAST Date of Exam: 1/28/2025 4:00 PM EST Indication: Rapid response. Comparison: CT head without contrast 1/21/2021 Technique: Axial CT images were obtained of the head without contrast administration.  Automated exposure control and iterative construction methods were used. Findings: Negative for intracranial hemorrhage.  Extensive white matter findings most compatible with chronic microvascular disease. Generalized cerebral volume loss. Small areas of parenchymal loss in the left cerebellum and the right cerebellum related to remote infarcts, stable. No intraventricular hemorrhage. No midline shift or mass effect. No abnormal extra-axial fluid collection. The calvarium intact. Bilateral mastoid effusions. Visualized sinuses demonstrate suspected chronic sinus thickening at the right  maxillary sinus with mucoperiosteal thickening unchanged.     Impression: 1. No acute intracranial abnormality. 2. Extensive white matter findings most compatible with chronic microvascular disease. 3. Generalized cerebral atrophy and additional chronic findings above. 4. Bilateral mastoid effusions. Electronically Signed: Jhonny Barron MD  1/28/2025 4:21 PM EST  Workstation ID: [id number]    XR Chest 1 View    Result Date: 1/28/2025  XR CHEST 1 VW Date of Exam: 1/28/2025 2:05 AM EST Indication: Follow up L pleural effusion s/p Left pleural CT placement Comparison: January 27, 2025 Findings: The heart is enlarged. There are bibasilar effusions. There are interstitial changes which could reflect edema. The findings are similar to the prior study when taking differences in technique into consideration. There are degenerative changes involving both shoulders.     Impression: 1.Cardiomegaly. 2.Bibasilar effusions. 3.Interstitial changes which could reflect edema. Electronically Signed: Lanre Sifuentes MD  1/28/2025 8:04 AM EST  Workstation ID: [id number]    XR Chest 1 View    Result Date: 1/27/2025  XR CHEST 1 VW Date of Exam: 1/27/2025 3:21 AM EST Indication: Follow up L pleural effusion s/p Left pleural CT placement Comparison: Chest radiograph 1/26/2025. Findings: Removal of left-sided chest tube. Enlarged cardiac silhouette, unchanged. Small/moderate bilateral pleural effusions with bibasilar airspace disease, unchanged. No distinct pneumothorax on this exam,  with note made of patient's head/neck partially obscuring the lung apices. Osseous structures are unchanged.     Impression: Removal of left-sided chest tube without significant interval change otherwise. Electronically Signed: Adiel Mckeon MD  1/27/2025 7:30 AM EST  Workstation ID: [id number]     Results for orders placed during the hospital encounter of 06/23/23    Duplex Venous Lower Extremity - Bilateral CAR    Interpretation Summary    Acute right lower extremity deep vein thrombosis noted in the common femoral, proximal femoral, mid femoral, distal femoral, popliteal, posterior tibial and peroneal.    Acute right lower extremity superficial thrombophlebitis noted in the saphenofemoral junction.    Acute left lower extremity deep vein thrombosis noted in the distal femoral, popliteal, posterial tibial, peroneal and gastrocnemius.    Chronic left lower extremity superficial thrombophlebitis noted in the small saphenous.    All other veins appeared normal bilaterally.      Results for orders placed during the hospital encounter of 06/23/23    Duplex Venous Lower Extremity - Bilateral CAR    Interpretation Summary    Acute right lower extremity deep vein thrombosis noted in the common femoral, proximal femoral, mid femoral, distal femoral, popliteal, posterior tibial and peroneal.    Acute right lower extremity superficial thrombophlebitis noted in the saphenofemoral junction.    Acute left lower extremity deep vein thrombosis noted in the distal femoral, popliteal, posterial tibial, peroneal and gastrocnemius.    Chronic left lower extremity superficial thrombophlebitis noted in the small saphenous.    All other veins appeared normal bilaterally.      Results for orders placed during the hospital encounter of 01/20/25    Adult Transthoracic Echo Complete W/ Cont if Necessary Per Protocol    Interpretation Summary    Left ventricular ejection fraction appears to be 61 - 65%.    Left ventricular diastolic function  was indeterminate.    Mild aortic valve stenosis is present.    Peak velocity of the flow distal to the aortic valve is 245.6 cm/s. Aortic valve maximum pressure gradient is 24 mmHg. Aortic valve mean pressure gradient is 13 mmHg.    The mitral valve mean gradient is 4 mmHg.    Moderate tricuspid valve regurgitation is present.    Estimated right ventricular systolic pressure from tricuspid regurgitation is markedly elevated (>55 mmHg). Calculated right ventricular systolic pressure from tricuspid regurgitation is 57 mmHg.    There is a moderate 2cm  pericardial effusion with no tamponade    There is a left pleural effusion.      Plan for Follow-up of Pending Labs/Results: Reviewed    Discharge Details        Discharge Medications        Changes to Medications        Instructions Start Date   bumetanide 2 MG tablet  Commonly known as: BUMEX  What changed:   medication strength  See the new instructions.   2 mg, Oral, Daily   Start Date: February 7, 2025            Continue These Medications        Instructions Start Date   acetaminophen 500 MG tablet  Commonly known as: TYLENOL   500 mg, Oral, Every 6 Hours PRN      Eliquis 5 MG tablet tablet  Generic drug: apixaban   TAKE 1 TABLET TWICE A DAY FOR DEEP VEIN THROMBOSIS/PULMONARY EMBOLISM (ACTIVE THROMBOSIS)      O2  Commonly known as: OXYGEN   3 L/min, Daily             Stop These Medications      amLODIPine 5 MG tablet  Commonly known as: NORVASC     hydrALAZINE 25 MG tablet  Commonly known as: APRESOLINE              Allergies   Allergen Reactions    Sulfa Antibiotics Nausea Only and GI Intolerance         Discharge Disposition:  Rehab Facility or Unit (DC - External)    Diet:  Hospital:  Diet Order   Procedures    Diet: Regular/House; Texture: Mechanical Ground (NDD 2); Fluid Consistency: Thin (IDDSI 0)            Activity:      Restrictions or Other Recommendations:         CODE STATUS:    Code Status and Medical Interventions: No CPR (Do Not Attempt to  Resuscitate); Limited Support; No intubation (DNI), No dialysis   Ordered at: 01/31/25 1140     Medical Intervention Limits:    No intubation (DNI)    No dialysis     Level Of Support Discussed With:    Health Care Surrogate     Code Status (Patient has no pulse and is not breathing):    No CPR (Do Not Attempt to Resuscitate)     Medical Interventions (Patient has pulse or is breathing):    Limited Support       Future Appointments   Date Time Provider Department Center   4/4/2025 10:30 AM Sam Hung MD [facility]   5/16/2025  2:45 PM Andrew Xiao MD [facility]       [unfilled]          James Gatica MD  02/06/25      Time Spent on Discharge:  I spent  40  minutes on this discharge activity which included: face-to-face encounter with the patient, reviewing the data in the system, coordination of the care with the nursing staff as well as consultants, documentation, and entering orders.

## 2025-02-06 NOTE — DISCHARGE SUMMARY
James Gatica MD  Physician  Medicine     H&P      Signed     Date of Service: 25 112  Creation Time: 25     Signed       Expand All Collapse [facility] Medicine Services  DISCHARGE SUMMARY     Patient Name: Rose J Kellermann  : 1926  MRN: [id number]     Date of Admission: 2025 10:00 AM  Date of Discharge:  2025  Primary Care Physician: Sam Hung MD     Consults         Date and Time Order Name Status Description     2025 10:23 AM Inpatient Palliative Care MD Consult Completed       2025  7:27 PM Inpatient Cardiology Consult Completed       2025  9:53 AM Inpatient Nephrology Consult Completed       2025  9:32 AM Inpatient Cardiothoracic Surgery Consult                    Hospital Course      Presenting Problem: Dyspnea, pleural effusions           Active Hospital Problems     Diagnosis   POA    **Pleural effusion on left [J90]   Yes    Pulmonary hypertension [I27.20]   Yes    Acute on chronic heart failure with preserved ejection fraction (HFpEF) [I50.33]   Yes    Essential hypertension [I10]   Yes    Chronic kidney disease, stage III (moderate)  [N18.30]   Yes       Resolved Hospital Problems   No resolved problems to display.            Hospital Course:  Rose J Kellermann is a 89 y.o. female with history of DVT on Eliquis, HFpEF, HTN, and CKD IV who presents due to dyspnea and hypoxia. Had been wearing 2-3 liters home oxygen intermittently over the past several months, now requiring continuously. Workup in the ED notable for a left pleural effusion. Admitted for further management.       Progressive hypoxia, acute on chronic  Left pleural effusion  Acute on chronic HFpEF exacerbation  Valvular heart disease  Pulmonary HTN  Pericardial effusion  -CXR with moderate L effusion, s/p IR chest tube, CTS managed, CT pulled   -Fluid with cultures/cytology negative, exudate  -ECHO with pericardial  effusion  -Cardiology consulted, deferred window/pericardiocentesis  -Wean oxygen as tolerated, stable on 4L  -B effusions, attempted repeat IR thoracentesis on L, but not amenable to drainage per IR  -R appears similar in size to L, but BS improved today, held off on IR thoracentesis as she seems stable/somewhat improved  -repeat CXR stable/unchanged  -Continue daily PO bumex, with PCP/NAL follow up     ANGEL LUIS on CKD IV  -diuresed, creatinine stable around 2.4 now  -follow up with [facility]     Afib/Aflutter  Hx of PACs  -cardiology following, on eliquis  -no BB due to significant pauses/bradycardia on 6/23 Holter     History of BLE DVT (June 2023)  -continue Eliquis     Hypertension  -CTM     Goals of care  -Family/patient interested in rehab  Discharge Follow Up Recommendations for outpatient labs/diagnostics:  As written     Day of Discharge      HPI:  UP in chair/bed. Dyspnea unchanged, no new cough/congestion/f/c. Weak/tired.      Review of Systems  As above     Vital Signs:   Temp:  [97.8 °F (36.6 °C)-98.6 °F (37 °C)] 98.3 °F (36.8 °C)  Heart Rate:  [77-96] 90  Resp:  [14-18] 18  BP: ()/(67-93) 126/83  Flow (L/min) (Oxygen Therapy):  [4-5] 5        Physical Exam:  NAD, alert  OP clear, dry MM  Neck supple  No LAD  RRR  Decreased at bases  +BS, soft  JOSE  NO change in LE     Pertinent  and/or Most Recent Results      LAB RESULTS:             Lab 02/05/25  0830 02/03/25  1036 01/31/25  0926 01/30/25  1259   WBC 11.57* 10.85* 8.62 11.02*   HEMOGLOBIN 12.2 12.2 12.0 12.8   HEMATOCRIT 38.5 40.3 39.3 39.5   PLATELETS 279 254 220 272   NEUTROS ABS 9.59*  --   --   --    IMMATURE GRANS (ABS) 0.07*  --   --   --    LYMPHS ABS 0.77  --   --   --    MONOS ABS 0.85  --   --   --    EOS ABS 0.25  --   --   --    MCV 94.1 99.0* 99.2* 93.4                    Lab 02/05/25  0830 02/04/25  1301 02/03/25  1036 02/02/25  1135 02/01/25  0837 01/31/25  0926 01/30/25  1259   SODIUM 138 135* 132* 135* 137   < > 134*   POTASSIUM 3.9 4.0  4.0 4.3 4.1   < > 4.3   CHLORIDE 94* 94* 93* 95* 94*   < > 96*   CO2 30.0* 26.0 25.0 27.0 28.0   < > 25.0   ANION GAP 14.0 15.0 14.0 13.0 15.0   < > 13.0   BUN 82* 80* 79* 68* 65*   < > 72*   CREATININE 2.48* 2.48* 2.74* 2.44* 2.28*   < > 2.52*   EGFR 17.2* 17.2* 15.2* 17.5* 19.0*   < > 16.8*   GLUCOSE 96 172* 129* 116* 112*   < > 152*   CALCIUM 8.9 8.7 8.4 8.8 8.7   < > 8.4   MAGNESIUM  --   --   --   --   --   --  2.4*   PHOSPHORUS  --   --   --   --   --   --  4.1    < > = values in this interval not displayed.               Lab 02/05/25  0830 01/30/25  1259   TOTAL PROTEIN 6.6  --    ALBUMIN 3.3* 2.8*   GLOBULIN 3.3  --    ALT (SGPT) 15  --    AST (SGOT) 26  --    BILIRUBIN 0.5  --    ALK PHOS 105  --                       Brief Urine Lab Results         None             Microbiology Results (last 10 days)         ** No results found for the last 240 hours. **                  Radiology results from the last 10 days:   XR Chest 1 View     Result Date: 2/6/2025  XR CHEST 1 VW Date of Exam: 2/6/2025 2:41 AM EST Indication: Follow up L pleural effusion s/p Left pleural CT placement Comparison: 2/5/2025 radiographs Findings: Unchanged enlarged cardiac silhouette. Low lung volumes. Persistent diffuse interstitial and bibasilar airspace opacities with moderate bilateral pleural effusions. No pneumothorax. No acute osseous abnormality.      Impression: No significant interval change. Electronically Signed: Osbaldo Spangler MD  2/6/2025 7:27 AM EST  Workstation ID: [id number]     XR Chest 1 View     Result Date: 2/5/2025  XR CHEST 1 VW Date of Exam: 2/5/2025 2:29 AM EST Indication: DYSPNEA, ON EXERTION dyspnea Comparison: February 4, 2025 Findings: Moderate bilateral pleural effusions appear unchanged. There are hazy bilateral airspace opacities. The heart and mediastinal contours appear stable. The osseous structures appear intact.      Impression: 1.Moderate bilateral pleural effusions appear unchanged. 2.Hazy bilateral  airspace opacities, which could reflect pulmonary edema or pneumonia. Electronically Signed: Lucas Nieto MD  2/5/2025 7:13 AM EST  Workstation ID: [id number]     US Thoracentesis     Result Date: 2/4/2025  Thoracentesis Clinical Diagnosis: recurrent effusion Technique: Multiple transaxial and longitudinal images were obtained through the region of interest with real time ultrasonography. A low-frequency curvilinear transducer was utilized. Pertinent ultrasound images were stored in the PACS for documentation.      Impression: Sonographic evaluation of the region of interest of the chest revealed a small left pleural effusion. Thoracentesis was therefore not performed. I, Darrell Rdz MD, have personally reviewed the image(s) and the prepared and signed interpretation by Erna Jackson NP.  Based on my review, I agree with the findings. Report dictated by: CONNIE Shoemaker  I have personally reviewed this case and agree with the findings above: Electronically Signed: Darrell Rdz MD  2/4/2025 4:06 PM EST  Workstation ID: [id number]     XR Chest 1 View     Result Date: 2/4/2025  XR CHEST 1 VW Date of Exam: 2/4/2025 2:16 AM EST Indication: Follow up L pleural effusion s/p Left pleural CT placement Comparison: Chest radiograph 2/3/2025 Findings: Similar moderate size pleural effusions with presumed underlying bibasilar atelectasis. Differential includes infection. Slight interstitial prominence without overt edema. Stable cardiomediastinal silhouette including cardiomegaly. No new consolidation.  No visualized pneumothorax. Degenerative related osseous change.      Impression: No significant interval change. Electronically Signed: Juice Martinez MD  2/4/2025 8:04 AM EST  Workstation ID: [id number]     XR Chest 1 View     Result Date: 2/3/2025  XR CHEST 1 VW Date of Exam: 2/3/2025 3:32 AM EST Indication: Follow up L pleural effusion s/p Left pleural CT placement Comparison: 2/2/2025 Findings: There is persistent and  fairly extensive bibasilar effusion and atelectasis without significant interval change. Exam history states left pleural chest tube placement but no chest drain is visible. Lung apices remain clear. Heart appears upper normal size. Vasculature is slightly cephalized. No pneumothorax is seen.      Impression: Persistent bibasilar effusion and atelectasis. Electronically Signed: James Juarez MD  2/3/2025 7:09 AM EST  Workstation ID: [id number]     XR Chest 1 View     Result Date: 2/2/2025  XR CHEST 1 VW Date of Exam: 2/2/2025 5:55 AM EST Indication: Follow up L pleural effusion s/p Left pleural CT placement Comparison: Chest radiograph 2/1/2025 Findings: Stable cardiomediastinal silhouette including cardiomegaly. Moderate right probably small to moderate left pleural effusions without significant change with adjacent compressive atelectasis versus airspace disease. Minimal interstitial thickening which could reflect vascular congestion or edema similar to prior. No worsening consolidation or pneumothorax. Degenerative related osseous change. Lung apices partially obscured by patient's chin.      Impression: No significant interval change. Electronically Signed: Juice Martinez MD  2/2/2025 7:50 AM EST  Workstation ID: [id number]     XR Chest 1 View     Result Date: 2/1/2025  XR CHEST 1 VW Date of Exam: 2/1/2025 12:26 AM EST Indication: Follow up L pleural effusion s/p Left pleural CT placement Comparison: Chest x-ray 1/31/2025 Findings: Low lung volumes. Cardiomegaly. Moderate bilateral pleural effusions. Septal thickening. Probable atelectasis right middle lobe.      Impression: No significant change from previous exam. Electronically Signed: Brenda Mackey MD  2/1/2025 8:00 AM EST  Workstation ID: [id number]     XR Chest 1 View     Result Date: 1/31/2025  XR CHEST 1 VW Date of Exam: 1/31/2025 4:48 AM EST Indication: Follow up L pleural effusion s/p Left pleural CT placement Comparison: Chest x-ray 1/30/2025 Findings:  Indication of the exam states left chest tube placement, however no chest tube is seen. Stable cardiomegaly. Similar dense bibasilar opacities compatible with combination of atelectasis and layering pleural effusions. Similar diffuse interstitial prominence and perihilar vascular congestion consistent with a component of interstitial edema.  No pneumothorax.      Impression: Clinical indication of the exam states left chest tube placement, however no chest tube is seen. Similar appearance of interstitial pulmonary edema and hazy and dense bibasilar opacities likely reflecting combination of atelectasis and layering pleural effusions. Overall no significant interval change. Electronically Signed: Arya Vazquez MD  1/31/2025 8:09 AM EST  Workstation ID: [id number]     XR Chest 1 View     Result Date: 1/30/2025  XR CHEST 1 VW Date of Exam: 1/30/2025 11:03 PM EST Indication: shortness of breath Comparison: Chest radiograph 1/30/2025 Findings: The heart is enlarged. There is pulmonary vascular congestion. There are large bilateral pleural fluid collections. There is bilateral basilar atelectasis. There is no pneumothorax. There are degenerative changes of both shoulders.      Impression: Cardiomegaly with pulmonary vascular congestion and large bilateral pleural fluid collections. Electronically Signed: Aj Ramos MD  1/30/2025 11:06 PM EST  Workstation ID: [id number]     XR Chest 1 View     Result Date: 1/30/2025  XR CHEST 1 VW Date of Exam: 1/30/2025 2:01 AM EST Indication: Follow up L pleural effusion s/p Left pleural CT placement Comparison: Chest radiograph 1/30/2025 Findings: Reported drainage catheter not visualized. Grossly stable enlarged cardiac silhouette, moderate size left greater than right pleural effusions and increased interstitial opacities suspicious for edema. Similar bibasilar consolidation favoring compressive  atelectasis, versus infection in the right clinical setting. Aortic atherosclerotic  disease. No pneumothorax. Degenerative elated osseous change. Left rib deformities similar to prior.      Impression: No significant interval change. Provided clinical information states left pleural drainage catheter however this is not visualized. Electronically Signed: Juice Martinez MD  1/30/2025 8:10 AM EST  Workstation ID: [id number]     XR Chest 1 View     Result Date: 1/29/2025  XR CHEST 1 VW Date of Exam: 1/29/2025 4:18 AM EST Indication: Follow up L pleural effusion s/p Left pleural CT placement Comparison: Chest radiograph 1/28/2025. Findings: Enlarged cardiac silhouette, unchanged. Persistent pulmonary vascular congestion. Small/moderate bilateral pleural effusions with bibasilar airspace disease, unchanged. No pneumothorax. Osseous structures are unchanged.      Impression: No significant interval change. Electronically Signed: Adiel Mckeon MD  1/29/2025 8:16 AM EST  Workstation ID: [id number]     XR Chest 1 View     Result Date: 1/28/2025  XR CHEST 1 VW Date of Exam: 1/28/2025 4:05 PM EST Indication: worsening SOB Comparison: 1/27/2025 11:06 p.m. Findings: Current image is timed 4:14 p.m. 1/28/2025. Heart shadow is markedly enlarged. Vasculature is cephalized, but pulmonary edema is improved from the prior exam. There is persistent bibasilar effusion and atelectasis. No pneumothorax is seen. Reviewing multiple recent images back to 1/25/2025, there appears to be increasing size of the cardiomediastinal silhouette, and gradually increasing bibasilar effusion and atelectasis.      Impression: 1. Marked heart shadow enlargement, which appears further increased, but with improved pulmonary vascular congestion compared to yesterday's 11:06 p.m. exam. 2. Persistent, extensive bibasilar atelectasis and effusion which appears to be gradually increasing over the past several days. Electronically Signed: James Juarez MD  1/28/2025 4:31 PM EST  Workstation ID: [id number]     CT Head Without Contrast     Result Date:  1/28/2025  CT HEAD WO CONTRAST Date of Exam: 1/28/2025 4:00 PM EST Indication: Rapid response. Comparison: CT head without contrast 1/21/2021 Technique: Axial CT images were obtained of the head without contrast administration.  Automated exposure control and iterative construction methods were used. Findings: Negative for intracranial hemorrhage. Extensive white matter findings most compatible with chronic microvascular disease. Generalized cerebral volume loss. Small areas of parenchymal loss in the left cerebellum and the right cerebellum related to remote infarcts, stable. No intraventricular hemorrhage. No midline shift or mass effect. No abnormal extra-axial fluid collection. The calvarium intact. Bilateral mastoid effusions. Visualized sinuses demonstrate suspected chronic sinus thickening at the right  maxillary sinus with mucoperiosteal thickening unchanged.      Impression: 1. No acute intracranial abnormality. 2. Extensive white matter findings most compatible with chronic microvascular disease. 3. Generalized cerebral atrophy and additional chronic findings above. 4. Bilateral mastoid effusions. Electronically Signed: Jhonny Barron MD  1/28/2025 4:21 PM EST  Workstation ID: [id number]     XR Chest 1 View     Result Date: 1/28/2025  XR CHEST 1 VW Date of Exam: 1/28/2025 2:05 AM EST Indication: Follow up L pleural effusion s/p Left pleural CT placement Comparison: January 27, 2025 Findings: The heart is enlarged. There are bibasilar effusions. There are interstitial changes which could reflect edema. The findings are similar to the prior study when taking differences in technique into consideration. There are degenerative changes involving both shoulders.      Impression: 1.Cardiomegaly. 2.Bibasilar effusions. 3.Interstitial changes which could reflect edema. Electronically Signed: Lanre Sifuentes MD  1/28/2025 8:04 AM EST  Workstation ID: [id number]     XR Chest 1 View     Result Date: 1/27/2025  XR CHEST 1 VW  Date of Exam: 1/27/2025 3:21 AM EST Indication: Follow up L pleural effusion s/p Left pleural CT placement Comparison: Chest radiograph 1/26/2025. Findings: Removal of left-sided chest tube. Enlarged cardiac silhouette, unchanged. Small/moderate bilateral pleural effusions with bibasilar airspace disease, unchanged. No distinct pneumothorax on this exam, with note made of patient's head/neck partially obscuring the lung apices. Osseous structures are unchanged.      Impression: Removal of left-sided chest tube without significant interval change otherwise. Electronically Signed: Adiel Mckeon MD  1/27/2025 7:30 AM EST  Workstation ID: [id number]         Results for orders placed during the hospital encounter of 06/23/23     Duplex Venous Lower Extremity - Bilateral CAR     Interpretation Summary    Acute right lower extremity deep vein thrombosis noted in the common femoral, proximal femoral, mid femoral, distal femoral, popliteal, posterior tibial and peroneal.    Acute right lower extremity superficial thrombophlebitis noted in the saphenofemoral junction.    Acute left lower extremity deep vein thrombosis noted in the distal femoral, popliteal, posterial tibial, peroneal and gastrocnemius.    Chronic left lower extremity superficial thrombophlebitis noted in the small saphenous.    All other veins appeared normal bilaterally.        Results for orders placed during the hospital encounter of 06/23/23     Duplex Venous Lower Extremity - Bilateral CAR     Interpretation Summary    Acute right lower extremity deep vein thrombosis noted in the common femoral, proximal femoral, mid femoral, distal femoral, popliteal, posterior tibial and peroneal.    Acute right lower extremity superficial thrombophlebitis noted in the saphenofemoral junction.    Acute left lower extremity deep vein thrombosis noted in the distal femoral, popliteal, posterial tibial, peroneal and gastrocnemius.    Chronic left lower extremity superficial  thrombophlebitis noted in the small saphenous.    All other veins appeared normal bilaterally.        Results for orders placed during the hospital encounter of 01/20/25     Adult Transthoracic Echo Complete W/ Cont if Necessary Per Protocol     Interpretation Summary    Left ventricular ejection fraction appears to be 61 - 65%.    Left ventricular diastolic function was indeterminate.    Mild aortic valve stenosis is present.    Peak velocity of the flow distal to the aortic valve is 245.6 cm/s. Aortic valve maximum pressure gradient is 24 mmHg. Aortic valve mean pressure gradient is 13 mmHg.    The mitral valve mean gradient is 4 mmHg.    Moderate tricuspid valve regurgitation is present.    Estimated right ventricular systolic pressure from tricuspid regurgitation is markedly elevated (>55 mmHg). Calculated right ventricular systolic pressure from tricuspid regurgitation is 57 mmHg.    There is a moderate 2cm  pericardial effusion with no tamponade    There is a left pleural effusion.        Plan for Follow-up of Pending Labs/Results: Reviewed     Discharge Details          Discharge Medications          Changes to Medications         Instructions Start Date   bumetanide 2 MG tablet  Commonly known as: BUMEX  What changed:   medication strength  See the new instructions.    2 mg, Oral, Daily    Start Date: February 7, 2025                Continue These Medications         Instructions Start Date   acetaminophen 500 MG tablet  Commonly known as: TYLENOL    500 mg, Oral, Every 6 Hours PRN        Eliquis 5 MG tablet tablet  Generic drug: apixaban    TAKE 1 TABLET TWICE A DAY FOR DEEP VEIN THROMBOSIS/PULMONARY EMBOLISM (ACTIVE THROMBOSIS)        O2  Commonly known as: OXYGEN    3 L/min, Daily                  Stop These Medications       amLODIPine 5 MG tablet  Commonly known as: NORVASC      hydrALAZINE 25 MG tablet  Commonly known as: APRESOLINE                   Allergies        Allergies   Allergen Reactions     Sulfa Antibiotics Nausea Only and GI Intolerance               Discharge Disposition:  Rehab Facility or Unit (DC - External)     Diet:  Hospital:      Diet Order   Procedures    Diet: Regular/House; Texture: Mechanical Ground (NDD 2); Fluid Consistency: Thin (IDDSI 0)               Activity:        Restrictions or Other Recommendations:          CODE STATUS:        Code Status and Medical Interventions: No CPR (Do Not Attempt to Resuscitate); Limited Support; No intubation (DNI), No dialysis   Ordered at: 01/31/25 1140     Medical Intervention Limits:     No intubation (DNI)     No dialysis     Level Of Support Discussed With:     Health Care Surrogate     Code Status (Patient has no pulse and is not breathing):     No CPR (Do Not Attempt to Resuscitate)     Medical Interventions (Patient has pulse or is breathing):     Limited Support                Future Appointments   Date Time Provider Department Center   4/4/2025 10:30 AM Sam Hung MD [facility]   5/16/2025  2:45 PM Andrew Xiao MD [facility]         [unfilled]              James Gatica MD  02/06/25        Time Spent on Discharge:  I spent  40  minutes on this discharge activity which included: face-to-face encounter with the patient, reviewing the data in the system, coordination of the care with the nursing staff as well as consultants, documentation, and entering orders.

## 2025-02-06 NOTE — PLAN OF CARE
Goal Outcome Evaluation:  Plan of Care Reviewed With: patient, family        Progress: no change  Outcome Evaluation: Palliative RN saw pt at 1147.  Pt. was sitting up in bedside chair asleep on 5LNC demonstrating increased wob at rest.  Otherwise, pt did not demonstrate any observable signs of discomfort during visit.  Plan for pt to discharge to [place] at  for rehab at 1530.  Palliative care to follow for support and ongoing GOC.     0930 Palliative IDT meeting: MD; CONNIE ; Clarisa; MELODYW, ACHP-SW; RNs   After hours, weekends and holidays, contact Palliative Provider by calling [phone].

## 2025-02-12 ENCOUNTER — HOSPITAL ENCOUNTER (INPATIENT)
Facility: HOSPITAL | Age: OVER 89
LOS: 8 days | Discharge: NURSING, REHAB OR LONG-TERM CARE | End: 2025-02-20
Attending: EMERGENCY MEDICINE | Admitting: INTERNAL MEDICINE
Payer: MEDICARE

## 2025-02-12 ENCOUNTER — APPOINTMENT (OUTPATIENT)
Dept: CT IMAGING | Facility: HOSPITAL | Age: OVER 89
End: 2025-02-12
Payer: MEDICARE

## 2025-02-12 ENCOUNTER — APPOINTMENT (OUTPATIENT)
Dept: GENERAL RADIOLOGY | Facility: HOSPITAL | Age: OVER 89
End: 2025-02-12
Payer: MEDICARE

## 2025-02-12 DIAGNOSIS — D64.9 SYMPTOMATIC ANEMIA: Primary | ICD-10-CM

## 2025-02-12 DIAGNOSIS — K25.9 GASTRIC ULCER: ICD-10-CM

## 2025-02-12 DIAGNOSIS — K92.2 ACUTE UPPER GI BLEED: ICD-10-CM

## 2025-02-12 LAB
ABO GROUP BLD: NORMAL
ALBUMIN SERPL-MCNC: 2.7 G/DL (ref 3.5–5.2)
ALBUMIN/GLOB SERPL: 1 G/DL
ALP SERPL-CCNC: 72 U/L (ref 39–117)
ALT SERPL W P-5'-P-CCNC: 17 U/L (ref 1–33)
AMORPH URATE CRY URNS QL MICRO: ABNORMAL /HPF
ANION GAP SERPL CALCULATED.3IONS-SCNC: 18 MMOL/L (ref 5–15)
APTT PPP: 35 SECONDS (ref 60–90)
AST SERPL-CCNC: 18 U/L (ref 1–32)
BACTERIA UR QL AUTO: ABNORMAL /HPF
BASOPHILS # BLD AUTO: 0.01 10*3/MM3 (ref 0–0.2)
BASOPHILS NFR BLD AUTO: 0.1 % (ref 0–1.5)
BILIRUB SERPL-MCNC: 0.3 MG/DL (ref 0–1.2)
BILIRUB UR QL STRIP: NEGATIVE
BLD GP AB SCN SERPL QL: NEGATIVE
BUN SERPL-MCNC: 119 MG/DL (ref 8–23)
BUN/CREAT SERPL: 51.5 (ref 7–25)
CALCIUM SPEC-SCNC: 8.4 MG/DL (ref 8.2–9.6)
CHLORIDE SERPL-SCNC: 98 MMOL/L (ref 98–107)
CLARITY UR: ABNORMAL
CO2 SERPL-SCNC: 23 MMOL/L (ref 22–29)
COLOR UR: YELLOW
CREAT SERPL-MCNC: 2.31 MG/DL (ref 0.57–1)
D-LACTATE SERPL-SCNC: 2.2 MMOL/L (ref 0.5–2)
D-LACTATE SERPL-SCNC: 4.5 MMOL/L (ref 0.5–2)
D-LACTATE SERPL-SCNC: 6.6 MMOL/L (ref 0.5–2)
DEPRECATED RDW RBC AUTO: 45.3 FL (ref 37–54)
EGFRCR SERPLBLD CKD-EPI 2021: 18.7 ML/MIN/1.73
EOSINOPHIL # BLD AUTO: 0.01 10*3/MM3 (ref 0–0.4)
EOSINOPHIL NFR BLD AUTO: 0.1 % (ref 0.3–6.2)
ERYTHROCYTE [DISTWIDTH] IN BLOOD BY AUTOMATED COUNT: 13.7 % (ref 12.3–15.4)
FLUAV RNA RESP QL NAA+PROBE: NOT DETECTED
FLUBV RNA RESP QL NAA+PROBE: NOT DETECTED
GEN 5 1HR TROPONIN T REFLEX: 75 NG/L
GLOBULIN UR ELPH-MCNC: 2.7 GM/DL
GLUCOSE BLDC GLUCOMTR-MCNC: 122 MG/DL (ref 70–130)
GLUCOSE SERPL-MCNC: 142 MG/DL (ref 65–99)
GLUCOSE UR STRIP-MCNC: NEGATIVE MG/DL
HCT VFR BLD AUTO: 20.7 % (ref 34–46.6)
HGB BLD-MCNC: 6.6 G/DL (ref 12–15.9)
HGB UR QL STRIP.AUTO: ABNORMAL
HYALINE CASTS UR QL AUTO: ABNORMAL /LPF
IMM GRANULOCYTES # BLD AUTO: 0.05 10*3/MM3 (ref 0–0.05)
IMM GRANULOCYTES NFR BLD AUTO: 0.7 % (ref 0–0.5)
INR PPP: 2.21 (ref 0.89–1.12)
KETONES UR QL STRIP: NEGATIVE
LEUKOCYTE ESTERASE UR QL STRIP.AUTO: ABNORMAL
LYMPHOCYTES # BLD AUTO: 0.87 10*3/MM3 (ref 0.7–3.1)
LYMPHOCYTES NFR BLD AUTO: 12.2 % (ref 19.6–45.3)
MCH RBC QN AUTO: 30 PG (ref 26.6–33)
MCHC RBC AUTO-ENTMCNC: 31.9 G/DL (ref 31.5–35.7)
MCV RBC AUTO: 94.1 FL (ref 79–97)
MONOCYTES # BLD AUTO: 0.38 10*3/MM3 (ref 0.1–0.9)
MONOCYTES NFR BLD AUTO: 5.3 % (ref 5–12)
NEUTROPHILS NFR BLD AUTO: 5.83 10*3/MM3 (ref 1.7–7)
NEUTROPHILS NFR BLD AUTO: 81.6 % (ref 42.7–76)
NITRITE UR QL STRIP: NEGATIVE
NRBC BLD AUTO-RTO: 0 /100 WBC (ref 0–0.2)
NT-PROBNP SERPL-MCNC: ABNORMAL PG/ML (ref 0–1800)
PH UR STRIP.AUTO: <=5 [PH] (ref 5–8)
PLATELET # BLD AUTO: 217 10*3/MM3 (ref 140–450)
PMV BLD AUTO: 10.2 FL (ref 6–12)
POTASSIUM SERPL-SCNC: 4.2 MMOL/L (ref 3.5–5.2)
PROCALCITONIN SERPL-MCNC: 0.49 NG/ML (ref 0–0.25)
PROT SERPL-MCNC: 5.4 G/DL (ref 6–8.5)
PROT UR QL STRIP: ABNORMAL
PROTHROMBIN TIME: 24.6 SECONDS (ref 12.2–14.5)
RBC # BLD AUTO: 2.2 10*6/MM3 (ref 3.77–5.28)
RBC # UR STRIP: ABNORMAL /HPF
REF LAB TEST METHOD: ABNORMAL
RH BLD: POSITIVE
SARS-COV-2 RNA RESP QL NAA+PROBE: NOT DETECTED
SODIUM SERPL-SCNC: 139 MMOL/L (ref 136–145)
SP GR UR STRIP: 1.02 (ref 1–1.03)
SQUAMOUS #/AREA URNS HPF: ABNORMAL /HPF
T&S EXPIRATION DATE: NORMAL
TROPONIN T % DELTA: -14
TROPONIN T NUMERIC DELTA: -12 NG/L
TROPONIN T SERPL HS-MCNC: 87 NG/L
UROBILINOGEN UR QL STRIP: ABNORMAL
WBC # UR STRIP: ABNORMAL /HPF
WBC NRBC COR # BLD AUTO: 7.15 10*3/MM3 (ref 3.4–10.8)

## 2025-02-12 PROCEDURE — 86923 COMPATIBILITY TEST ELECTRIC: CPT

## 2025-02-12 PROCEDURE — 86900 BLOOD TYPING SEROLOGIC ABO: CPT | Performed by: EMERGENCY MEDICINE

## 2025-02-12 PROCEDURE — 83605 ASSAY OF LACTIC ACID: CPT | Performed by: EMERGENCY MEDICINE

## 2025-02-12 PROCEDURE — 25010000002 PIPERACILLIN SOD-TAZOBACTAM PER 1 G: Performed by: EMERGENCY MEDICINE

## 2025-02-12 PROCEDURE — 81001 URINALYSIS AUTO W/SCOPE: CPT | Performed by: EMERGENCY MEDICINE

## 2025-02-12 PROCEDURE — 84484 ASSAY OF TROPONIN QUANT: CPT | Performed by: EMERGENCY MEDICINE

## 2025-02-12 PROCEDURE — 86850 RBC ANTIBODY SCREEN: CPT | Performed by: EMERGENCY MEDICINE

## 2025-02-12 PROCEDURE — 25010000002 FUROSEMIDE PER 20 MG: Performed by: PHYSICIAN ASSISTANT

## 2025-02-12 PROCEDURE — 86901 BLOOD TYPING SEROLOGIC RH(D): CPT | Performed by: EMERGENCY MEDICINE

## 2025-02-12 PROCEDURE — 36430 TRANSFUSION BLD/BLD COMPNT: CPT

## 2025-02-12 PROCEDURE — 99291 CRITICAL CARE FIRST HOUR: CPT

## 2025-02-12 PROCEDURE — 83880 ASSAY OF NATRIURETIC PEPTIDE: CPT | Performed by: EMERGENCY MEDICINE

## 2025-02-12 PROCEDURE — 85025 COMPLETE CBC W/AUTO DIFF WBC: CPT | Performed by: EMERGENCY MEDICINE

## 2025-02-12 PROCEDURE — 93005 ELECTROCARDIOGRAM TRACING: CPT | Performed by: EMERGENCY MEDICINE

## 2025-02-12 PROCEDURE — 71045 X-RAY EXAM CHEST 1 VIEW: CPT

## 2025-02-12 PROCEDURE — 99223 1ST HOSP IP/OBS HIGH 75: CPT | Performed by: INTERNAL MEDICINE

## 2025-02-12 PROCEDURE — 36415 COLL VENOUS BLD VENIPUNCTURE: CPT

## 2025-02-12 PROCEDURE — 25810000003 LACTATED RINGERS SOLUTION: Performed by: EMERGENCY MEDICINE

## 2025-02-12 PROCEDURE — 87636 SARSCOV2 & INF A&B AMP PRB: CPT | Performed by: EMERGENCY MEDICINE

## 2025-02-12 PROCEDURE — 86900 BLOOD TYPING SEROLOGIC ABO: CPT

## 2025-02-12 PROCEDURE — 80053 COMPREHEN METABOLIC PANEL: CPT | Performed by: EMERGENCY MEDICINE

## 2025-02-12 PROCEDURE — 84145 PROCALCITONIN (PCT): CPT | Performed by: EMERGENCY MEDICINE

## 2025-02-12 PROCEDURE — 74176 CT ABD & PELVIS W/O CONTRAST: CPT

## 2025-02-12 PROCEDURE — 85610 PROTHROMBIN TIME: CPT | Performed by: EMERGENCY MEDICINE

## 2025-02-12 PROCEDURE — 87040 BLOOD CULTURE FOR BACTERIA: CPT | Performed by: EMERGENCY MEDICINE

## 2025-02-12 PROCEDURE — P9016 RBC LEUKOCYTES REDUCED: HCPCS

## 2025-02-12 PROCEDURE — 85730 THROMBOPLASTIN TIME PARTIAL: CPT | Performed by: EMERGENCY MEDICINE

## 2025-02-12 PROCEDURE — 87076 CULTURE ANAEROBE IDENT EACH: CPT | Performed by: EMERGENCY MEDICINE

## 2025-02-12 PROCEDURE — 82948 REAGENT STRIP/BLOOD GLUCOSE: CPT

## 2025-02-12 PROCEDURE — P9612 CATHETERIZE FOR URINE SPEC: HCPCS

## 2025-02-12 RX ORDER — SODIUM CHLORIDE 9 MG/ML
40 INJECTION, SOLUTION INTRAVENOUS AS NEEDED
Status: DISCONTINUED | OUTPATIENT
Start: 2025-02-12 | End: 2025-02-13

## 2025-02-12 RX ORDER — ACETAMINOPHEN 500 MG
1000 TABLET ORAL EVERY 6 HOURS PRN
Status: DISCONTINUED | OUTPATIENT
Start: 2025-02-12 | End: 2025-02-20 | Stop reason: HOSPADM

## 2025-02-12 RX ORDER — ONDANSETRON 4 MG/1
4 TABLET, ORALLY DISINTEGRATING ORAL EVERY 6 HOURS PRN
Status: DISCONTINUED | OUTPATIENT
Start: 2025-02-12 | End: 2025-02-13

## 2025-02-12 RX ORDER — PANTOPRAZOLE SODIUM 40 MG/10ML
80 INJECTION, POWDER, LYOPHILIZED, FOR SOLUTION INTRAVENOUS ONCE
Status: COMPLETED | OUTPATIENT
Start: 2025-02-12 | End: 2025-02-12

## 2025-02-12 RX ORDER — ACETAMINOPHEN 650 MG/1
325 SUPPOSITORY RECTAL EVERY 6 HOURS PRN
Status: DISCONTINUED | OUTPATIENT
Start: 2025-02-12 | End: 2025-02-13

## 2025-02-12 RX ORDER — SODIUM CHLORIDE 0.9 % (FLUSH) 0.9 %
10 SYRINGE (ML) INJECTION AS NEEDED
Status: DISCONTINUED | OUTPATIENT
Start: 2025-02-12 | End: 2025-02-20 | Stop reason: HOSPADM

## 2025-02-12 RX ORDER — PANTOPRAZOLE SODIUM 40 MG/10ML
40 INJECTION, POWDER, LYOPHILIZED, FOR SOLUTION INTRAVENOUS 2 TIMES DAILY
Status: DISCONTINUED | OUTPATIENT
Start: 2025-02-12 | End: 2025-02-13

## 2025-02-12 RX ORDER — SODIUM CHLORIDE 0.9 % (FLUSH) 0.9 %
10 SYRINGE (ML) INJECTION EVERY 12 HOURS SCHEDULED
Status: DISCONTINUED | OUTPATIENT
Start: 2025-02-12 | End: 2025-02-20

## 2025-02-12 RX ORDER — FUROSEMIDE 10 MG/ML
20 INJECTION INTRAMUSCULAR; INTRAVENOUS ONCE
Status: COMPLETED | OUTPATIENT
Start: 2025-02-12 | End: 2025-02-12

## 2025-02-12 RX ORDER — ONDANSETRON 2 MG/ML
4 INJECTION INTRAMUSCULAR; INTRAVENOUS EVERY 6 HOURS PRN
Status: DISCONTINUED | OUTPATIENT
Start: 2025-02-12 | End: 2025-02-20 | Stop reason: HOSPADM

## 2025-02-12 RX ADMIN — FUROSEMIDE 20 MG: 10 INJECTION, SOLUTION INTRAMUSCULAR; INTRAVENOUS at 22:39

## 2025-02-12 RX ADMIN — SODIUM CHLORIDE, SODIUM LACTATE, POTASSIUM CHLORIDE, CALCIUM CHLORIDE 1000 ML: 20; 30; 600; 310 INJECTION, SOLUTION INTRAVENOUS at 11:51

## 2025-02-12 RX ADMIN — Medication 10 ML: at 22:43

## 2025-02-12 RX ADMIN — PANTOPRAZOLE SODIUM 40 MG: 40 INJECTION, POWDER, FOR SOLUTION INTRAVENOUS at 22:38

## 2025-02-12 RX ADMIN — PANTOPRAZOLE SODIUM 8 MG/HR: 40 INJECTION, POWDER, FOR SOLUTION INTRAVENOUS at 18:58

## 2025-02-12 RX ADMIN — PIPERACILLIN AND TAZOBACTAM 3.38 G: 3; .375 INJECTION, POWDER, LYOPHILIZED, FOR SOLUTION INTRAVENOUS at 13:27

## 2025-02-12 RX ADMIN — SODIUM CHLORIDE, SODIUM LACTATE, POTASSIUM CHLORIDE, CALCIUM CHLORIDE 1000 ML: 20; 30; 600; 310 INJECTION, SOLUTION INTRAVENOUS at 14:59

## 2025-02-12 RX ADMIN — PANTOPRAZOLE SODIUM 80 MG: 40 INJECTION, POWDER, FOR SOLUTION INTRAVENOUS at 12:02

## 2025-02-12 RX ADMIN — MUPIROCIN 1 APPLICATION: 20 OINTMENT TOPICAL at 22:38

## 2025-02-12 NOTE — ED PROVIDER NOTES
Subjective   History of Present Illness    Pt presents for low blood pressure.  She is not helpful to history.  Daughter says she was recently admitted for low blood pressure and cough.  She went to the [facility] for short term rehab.  Today she was found to have SBP in the 70s and was sent here.  She is less responsive than usual per daughter.  She has vomited a few times.    Daughter says at baseline pt is alert, uses a walker, watches TV and does crossword puzzles.  Daughter helps her eat but says that's more about ensuring a decent diet than about ability.    History provided by:  Patient and relative  History limited by:  Mental status change      Review of Systems    Past Medical History:   Diagnosis Date    Abscess of back     Arrhythmia     frequent PVC    Cancer 12/2011    Endometrial cancer, status post robotically assisted hysterectomy with Dr. Haddad, December 2011.      CKD (chronic kidney disease), stage III     no dilaysis     Dizzy     Dvt femoral (deep venous thrombosis) 2017    s/p hip surgery in 2017. Took xarelto until August 2018    Dyslipidemia     Dyslipidemia.  Observing.    Enthesopathy of hip region     Generalized osteoarthrosis, involving multiple sites     Headache     Hearing loss     Hypertension     Low backache     Needs flu shot     Osteoarthritis     Osteoarthritis with questionable carpal tunnel syndrome bilaterally.     Otitis, serous     Pneumonia 1967    Remote pneumonia, 1967.     Retinal hemorrhage     SI joint arthritis     SOB (shortness of breath)     Syncope 2001    Remote syncope with working diagnosis of vasodepressor, 2001.     Venous insufficiency of leg     Wears glasses     readers       Allergies   Allergen Reactions    Sulfa Antibiotics Nausea Only and GI Intolerance       Past Surgical History:   Procedure Laterality Date    CATARACT EXTRACTION      bilat     COLONOSCOPY      HIP PERCUTANEOUS PINNING Left 11/24/2017    Procedure: HIP PERCUTANEOUS PINNING;   Surgeon: Lucas Swanson MD;  Location:  [facility];  Service:     HYSTERECTOMY      total? but unsure     KNEE SURGERY  2001    Remote knee surgery in 2001.- right     TOTAL HIP ARTHROPLASTY Left 10/26/2018    Procedure: TOTAL HIP ARTHROPLASTY LEFT;  Surgeon: Stephen Baker MD;  Location:  [facility];  Service: Orthopedics       Family History   Problem Relation Age of Onset    Heart disease Mother     Osteoarthritis Mother     Hypertension Mother     Hypertension Father     Heart attack Father     Osteoarthritis Father     Cancer Brother         lung       Social History     Socioeconomic History    Marital status:    Tobacco Use    Smoking status: Never     Passive exposure: Never    Smokeless tobacco: Never   Vaping Use    Vaping status: Never Used   Substance and Sexual Activity    Alcohol use: No    Drug use: No    Sexual activity: Defer     Birth control/protection: None           Objective   Physical Exam  Vitals and nursing note reviewed.   Constitutional:       General: She is not in acute distress.     Appearance: Normal appearance. She is not ill-appearing.   HENT:      Head: Normocephalic and atraumatic.      Mouth/Throat:      Mouth: Mucous membranes are moist.   Eyes:      General: No scleral icterus.        Right eye: No discharge.         Left eye: No discharge.      Conjunctiva/sclera: Conjunctivae normal.   Cardiovascular:      Rate and Rhythm: Normal rate and regular rhythm.      Heart sounds: No murmur heard.  Pulmonary:      Effort: Pulmonary effort is normal. No respiratory distress.      Breath sounds: Normal breath sounds. No wheezing.   Abdominal:      General: Bowel sounds are normal. There is no distension.      Palpations: Abdomen is soft.      Tenderness: There is no abdominal tenderness. There is no guarding or rebound.   Musculoskeletal:         General: No swelling. Normal range of motion.      Cervical back: Normal range of motion and neck supple.   Skin:     General:  Skin is warm and dry.      Findings: No rash.   Neurological:      General: No focal deficit present.      Mental Status: She is lethargic and disoriented.      Comments: Moves all fours   Psychiatric:         Mood and Affect: Mood normal.         Behavior: Behavior normal.         Thought Content: Thought content normal.         Critical Care    Performed by: Prasanth Rowan MD  Authorized by: Prasanth Rowan MD    Critical care provider statement:     Critical care time (minutes):  35    Critical care time was exclusive of:  Separately billable procedures and treating other patients    Critical care was necessary to treat or prevent imminent or life-threatening deterioration of the following conditions: GI bleed.    Critical care was time spent personally by me on the following activities:  Examination of patient, evaluation of patient's response to treatment, ordering and review of laboratory studies, ordering and review of radiographic studies, ordering and performing treatments and interventions, obtaining history from patient or surrogate, re-evaluation of patient's condition and review of old charts    I assumed direction of critical care for this patient from another provider in my specialty: no      Care discussed with: admitting provider               ED Course    I reviewed outside records.  Discharge summary 2/6/25 admission for L pleural effusion with hypoxia, CHF exacerbation.  Noted CKD4, PAF on Eliquis.  Change in GOC was made to DNR/DNI but otherwise full supportive measures.    Labs with mild lactic acidosis and procalcitonin elevation but most importantly there is a large BUN elevation and acute anemia, a major change from a week ago.    IV PPI given.  Saline bolus, BP improved and stayed up.  No further hypotension. No further vomiting.    CT abd/pel read by me no acute process.    CXR read by me bibasilar effusions similar to prior.    All radiologist reports reviewed.    Patient  monitored on serial rechecks.  Discussed exam findings, test results so far and concerns in detail at the bedside.  Discussed need for admission for further evaluation and treatment.  I discussed the patient's presentation, test results and need for admission with the hospitalist.                                                         Medical Decision Making  Problems Addressed:  Acute upper GI bleed: complicated acute illness or injury with systemic symptoms that poses a threat to life or bodily functions  Symptomatic anemia: complicated acute illness or injury with systemic symptoms that poses a threat to life or bodily functions    Amount and/or Complexity of Data Reviewed  Independent Historian: caregiver     Details: daughter  External Data Reviewed: notes.  Labs: ordered. Decision-making details documented in ED Course.  Radiology: ordered and independent interpretation performed. Decision-making details documented in ED Course.  ECG/medicine tests: ordered and independent interpretation performed. Decision-making details documented in ED Course.  Discussion of management or test interpretation with external provider(s): Hospitalist, ICU    Risk  Prescription drug management.  Drug therapy requiring intensive monitoring for toxicity.  Decision regarding hospitalization.    Critical Care  Total time providing critical care: 35 minutes        Final diagnoses:   Symptomatic anemia   Acute upper GI bleed       ED Disposition  ED Disposition       ED Disposition   Decision to Admit    Condition   --    Comment   Level of Care: Critical Care [6]   Diagnosis: Acute blood loss anemia [874330]   Admitting Physician: BRIDGER BONDS [[id number]]   Attending Physician: BRIDGER BONDS [[id number]]   Certification: I Certify That Inpatient Hospital Services Are Medically Necessary For Greater Than 2 Midnights                 No follow-up provider specified.       Medication List      No changes were made to your  prescriptions during this visit.            Prasanth Rowan MD  02/12/25 2008

## 2025-02-13 ENCOUNTER — ANESTHESIA EVENT (OUTPATIENT)
Dept: GASTROENTEROLOGY | Facility: HOSPITAL | Age: OVER 89
End: 2025-02-13
Payer: MEDICARE

## 2025-02-13 ENCOUNTER — ANESTHESIA (OUTPATIENT)
Dept: GASTROENTEROLOGY | Facility: HOSPITAL | Age: OVER 89
End: 2025-02-13
Payer: MEDICARE

## 2025-02-13 LAB
ANION GAP SERPL CALCULATED.3IONS-SCNC: 14 MMOL/L (ref 5–15)
BASOPHILS # BLD AUTO: 0.04 10*3/MM3 (ref 0–0.2)
BASOPHILS NFR BLD AUTO: 0.4 % (ref 0–1.5)
BH BB BLOOD EXPIRATION DATE: NORMAL
BH BB BLOOD EXPIRATION DATE: NORMAL
BH BB BLOOD TYPE BARCODE: 7300
BH BB BLOOD TYPE BARCODE: 7300
BH BB DISPENSE STATUS: NORMAL
BH BB DISPENSE STATUS: NORMAL
BH BB PRODUCT CODE: NORMAL
BH BB PRODUCT CODE: NORMAL
BH BB UNIT NUMBER: NORMAL
BH BB UNIT NUMBER: NORMAL
BUN SERPL-MCNC: 124 MG/DL (ref 8–23)
BUN/CREAT SERPL: 59.3 (ref 7–25)
CALCIUM SPEC-SCNC: 8.7 MG/DL (ref 8.2–9.6)
CHLORIDE SERPL-SCNC: 101 MMOL/L (ref 98–107)
CO2 SERPL-SCNC: 27 MMOL/L (ref 22–29)
CREAT SERPL-MCNC: 2.09 MG/DL (ref 0.57–1)
CROSSMATCH INTERPRETATION: NORMAL
CROSSMATCH INTERPRETATION: NORMAL
D-LACTATE SERPL-SCNC: 1.9 MMOL/L (ref 0.5–2)
DEPRECATED RDW RBC AUTO: 47.6 FL (ref 37–54)
EGFRCR SERPLBLD CKD-EPI 2021: 21.1 ML/MIN/1.73
EOSINOPHIL # BLD AUTO: 0.1 10*3/MM3 (ref 0–0.4)
EOSINOPHIL NFR BLD AUTO: 1 % (ref 0.3–6.2)
ERYTHROCYTE [DISTWIDTH] IN BLOOD BY AUTOMATED COUNT: 14.9 % (ref 12.3–15.4)
FERRITIN SERPL-MCNC: 511.3 NG/ML (ref 13–150)
FOLATE SERPL-MCNC: 12.2 NG/ML (ref 4.78–24.2)
GLUCOSE BLDC GLUCOMTR-MCNC: 98 MG/DL (ref 70–130)
GLUCOSE SERPL-MCNC: 98 MG/DL (ref 65–99)
HCT VFR BLD AUTO: 23.6 % (ref 34–46.6)
HCT VFR BLD AUTO: 25 % (ref 34–46.6)
HCT VFR BLD AUTO: 26.6 % (ref 34–46.6)
HCT VFR BLD AUTO: 27.4 % (ref 34–46.6)
HCT VFR BLD AUTO: 27.7 % (ref 34–46.6)
HGB BLD-MCNC: 7.7 G/DL (ref 12–15.9)
HGB BLD-MCNC: 8.2 G/DL (ref 12–15.9)
HGB BLD-MCNC: 8.8 G/DL (ref 12–15.9)
HGB BLD-MCNC: 8.9 G/DL (ref 12–15.9)
HGB BLD-MCNC: 9.1 G/DL (ref 12–15.9)
IMM GRANULOCYTES # BLD AUTO: 0.06 10*3/MM3 (ref 0–0.05)
IMM GRANULOCYTES NFR BLD AUTO: 0.6 % (ref 0–0.5)
IRON 24H UR-MRATE: 173 MCG/DL (ref 37–145)
IRON SATN MFR SERPL: 81 % (ref 20–50)
LYMPHOCYTES # BLD AUTO: 1.39 10*3/MM3 (ref 0.7–3.1)
LYMPHOCYTES NFR BLD AUTO: 13.7 % (ref 19.6–45.3)
MAGNESIUM SERPL-MCNC: 2.3 MG/DL (ref 1.7–2.3)
MCH RBC QN AUTO: 29.6 PG (ref 26.6–33)
MCHC RBC AUTO-ENTMCNC: 32.9 G/DL (ref 31.5–35.7)
MCV RBC AUTO: 90.2 FL (ref 79–97)
MONOCYTES # BLD AUTO: 0.75 10*3/MM3 (ref 0.1–0.9)
MONOCYTES NFR BLD AUTO: 7.4 % (ref 5–12)
NEUTROPHILS NFR BLD AUTO: 7.83 10*3/MM3 (ref 1.7–7)
NEUTROPHILS NFR BLD AUTO: 76.9 % (ref 42.7–76)
NRBC BLD AUTO-RTO: 0 /100 WBC (ref 0–0.2)
PLATELET # BLD AUTO: 233 10*3/MM3 (ref 140–450)
PMV BLD AUTO: 10.2 FL (ref 6–12)
POTASSIUM SERPL-SCNC: 3.7 MMOL/L (ref 3.5–5.2)
QT INTERVAL: 340 MS
QTC INTERVAL: 389 MS
RBC # BLD AUTO: 3.07 10*6/MM3 (ref 3.77–5.28)
SODIUM SERPL-SCNC: 142 MMOL/L (ref 136–145)
TIBC SERPL-MCNC: 213 MCG/DL (ref 298–536)
TRANSFERRIN SERPL-MCNC: 143 MG/DL (ref 200–360)
UNIT  ABO: NORMAL
UNIT  ABO: NORMAL
UNIT  RH: NORMAL
UNIT  RH: NORMAL
VIT B12 BLD-MCNC: 475 PG/ML (ref 211–946)
WBC NRBC COR # BLD AUTO: 10.17 10*3/MM3 (ref 3.4–10.8)

## 2025-02-13 PROCEDURE — 84466 ASSAY OF TRANSFERRIN: CPT | Performed by: PHYSICIAN ASSISTANT

## 2025-02-13 PROCEDURE — 85018 HEMOGLOBIN: CPT | Performed by: PHYSICIAN ASSISTANT

## 2025-02-13 PROCEDURE — 85018 HEMOGLOBIN: CPT | Performed by: INTERNAL MEDICINE

## 2025-02-13 PROCEDURE — 85014 HEMATOCRIT: CPT | Performed by: INTERNAL MEDICINE

## 2025-02-13 PROCEDURE — 25810000003 SODIUM CHLORIDE 0.9 % SOLUTION

## 2025-02-13 PROCEDURE — 88305 TISSUE EXAM BY PATHOLOGIST: CPT | Performed by: INTERNAL MEDICINE

## 2025-02-13 PROCEDURE — 99222 1ST HOSP IP/OBS MODERATE 55: CPT | Performed by: PHYSICIAN ASSISTANT

## 2025-02-13 PROCEDURE — 0DB78ZX EXCISION OF STOMACH, PYLORUS, VIA NATURAL OR ARTIFICIAL OPENING ENDOSCOPIC, DIAGNOSTIC: ICD-10-PCS | Performed by: INTERNAL MEDICINE

## 2025-02-13 PROCEDURE — 43255 EGD CONTROL BLEEDING ANY: CPT | Performed by: INTERNAL MEDICINE

## 2025-02-13 PROCEDURE — 83605 ASSAY OF LACTIC ACID: CPT | Performed by: EMERGENCY MEDICINE

## 2025-02-13 PROCEDURE — 82948 REAGENT STRIP/BLOOD GLUCOSE: CPT

## 2025-02-13 PROCEDURE — 25010000002 PROPOFOL 10 MG/ML EMULSION

## 2025-02-13 PROCEDURE — 99232 SBSQ HOSP IP/OBS MODERATE 35: CPT | Performed by: INTERNAL MEDICINE

## 2025-02-13 PROCEDURE — 82746 ASSAY OF FOLIC ACID SERUM: CPT | Performed by: PHYSICIAN ASSISTANT

## 2025-02-13 PROCEDURE — 80048 BASIC METABOLIC PNL TOTAL CA: CPT | Performed by: PHYSICIAN ASSISTANT

## 2025-02-13 PROCEDURE — 83540 ASSAY OF IRON: CPT | Performed by: PHYSICIAN ASSISTANT

## 2025-02-13 PROCEDURE — 82728 ASSAY OF FERRITIN: CPT | Performed by: PHYSICIAN ASSISTANT

## 2025-02-13 PROCEDURE — 0W3P8ZZ CONTROL BLEEDING IN GASTROINTESTINAL TRACT, VIA NATURAL OR ARTIFICIAL OPENING ENDOSCOPIC: ICD-10-PCS | Performed by: INTERNAL MEDICINE

## 2025-02-13 PROCEDURE — 83735 ASSAY OF MAGNESIUM: CPT | Performed by: PHYSICIAN ASSISTANT

## 2025-02-13 PROCEDURE — 85014 HEMATOCRIT: CPT | Performed by: PHYSICIAN ASSISTANT

## 2025-02-13 PROCEDURE — 85025 COMPLETE CBC W/AUTO DIFF WBC: CPT | Performed by: PHYSICIAN ASSISTANT

## 2025-02-13 PROCEDURE — 43239 EGD BIOPSY SINGLE/MULTIPLE: CPT | Performed by: INTERNAL MEDICINE

## 2025-02-13 PROCEDURE — 82607 VITAMIN B-12: CPT | Performed by: PHYSICIAN ASSISTANT

## 2025-02-13 PROCEDURE — 25010000002 LIDOCAINE PF 1% 1 % SOLUTION

## 2025-02-13 DEVICE — ST SYS OTSC CLIP 11/3T 165CM: Type: IMPLANTABLE DEVICE | Site: DUODENUM | Status: FUNCTIONAL

## 2025-02-13 RX ORDER — SODIUM CHLORIDE 0.9 % (FLUSH) 0.9 %
10 SYRINGE (ML) INJECTION EVERY 12 HOURS SCHEDULED
Status: CANCELLED | OUTPATIENT
Start: 2025-02-13

## 2025-02-13 RX ORDER — PANTOPRAZOLE SODIUM 40 MG/10ML
40 INJECTION, POWDER, LYOPHILIZED, FOR SOLUTION INTRAVENOUS
Status: DISCONTINUED | OUTPATIENT
Start: 2025-02-13 | End: 2025-02-15

## 2025-02-13 RX ORDER — FAMOTIDINE 10 MG/ML
20 INJECTION, SOLUTION INTRAVENOUS ONCE
Status: CANCELLED | OUTPATIENT
Start: 2025-02-13 | End: 2025-02-13

## 2025-02-13 RX ORDER — MIDAZOLAM HYDROCHLORIDE 1 MG/ML
0.5 INJECTION, SOLUTION INTRAMUSCULAR; INTRAVENOUS
Status: CANCELLED | OUTPATIENT
Start: 2025-02-13

## 2025-02-13 RX ORDER — SODIUM CHLORIDE, SODIUM LACTATE, POTASSIUM CHLORIDE, CALCIUM CHLORIDE 600; 310; 30; 20 MG/100ML; MG/100ML; MG/100ML; MG/100ML
9 INJECTION, SOLUTION INTRAVENOUS CONTINUOUS
Status: CANCELLED | OUTPATIENT
Start: 2025-02-14 | End: 2025-02-14

## 2025-02-13 RX ORDER — SODIUM CHLORIDE 0.9 % (FLUSH) 0.9 %
10 SYRINGE (ML) INJECTION AS NEEDED
Status: CANCELLED | OUTPATIENT
Start: 2025-02-13

## 2025-02-13 RX ORDER — LIDOCAINE HYDROCHLORIDE 10 MG/ML
0.5 INJECTION, SOLUTION EPIDURAL; INFILTRATION; INTRACAUDAL; PERINEURAL ONCE AS NEEDED
Status: CANCELLED | OUTPATIENT
Start: 2025-02-13

## 2025-02-13 RX ORDER — DOCUSATE SODIUM 100 MG/1
100 CAPSULE, LIQUID FILLED ORAL NIGHTLY
COMMUNITY

## 2025-02-13 RX ORDER — BUPIVACAINE HCL/0.9 % NACL/PF 0.125 %
PLASTIC BAG, INJECTION (ML) EPIDURAL AS NEEDED
Status: DISCONTINUED | OUTPATIENT
Start: 2025-02-13 | End: 2025-02-13 | Stop reason: SURG

## 2025-02-13 RX ORDER — ONDANSETRON 2 MG/ML
4 INJECTION INTRAMUSCULAR; INTRAVENOUS ONCE AS NEEDED
Status: CANCELLED | OUTPATIENT
Start: 2025-02-13

## 2025-02-13 RX ORDER — SODIUM CHLORIDE 9 MG/ML
INJECTION, SOLUTION INTRAVENOUS CONTINUOUS PRN
Status: DISCONTINUED | OUTPATIENT
Start: 2025-02-13 | End: 2025-02-13 | Stop reason: SURG

## 2025-02-13 RX ORDER — FAMOTIDINE 20 MG/1
20 TABLET, FILM COATED ORAL ONCE
Status: CANCELLED | OUTPATIENT
Start: 2025-02-13 | End: 2025-02-13

## 2025-02-13 RX ORDER — LIDOCAINE HYDROCHLORIDE 10 MG/ML
INJECTION, SOLUTION EPIDURAL; INFILTRATION; INTRACAUDAL; PERINEURAL AS NEEDED
Status: DISCONTINUED | OUTPATIENT
Start: 2025-02-13 | End: 2025-02-13 | Stop reason: SURG

## 2025-02-13 RX ORDER — FENTANYL CITRATE 50 UG/ML
50 INJECTION, SOLUTION INTRAMUSCULAR; INTRAVENOUS
Status: CANCELLED | OUTPATIENT
Start: 2025-02-13

## 2025-02-13 RX ORDER — PROPOFOL 10 MG/ML
VIAL (ML) INTRAVENOUS CONTINUOUS PRN
Status: DISCONTINUED | OUTPATIENT
Start: 2025-02-13 | End: 2025-02-13 | Stop reason: SURG

## 2025-02-13 RX ORDER — ONDANSETRON 4 MG/1
4 TABLET, ORALLY DISINTEGRATING ORAL EVERY 6 HOURS PRN
COMMUNITY
End: 2025-02-20 | Stop reason: HOSPADM

## 2025-02-13 RX ORDER — HYDROMORPHONE HYDROCHLORIDE 1 MG/ML
0.5 INJECTION, SOLUTION INTRAMUSCULAR; INTRAVENOUS; SUBCUTANEOUS
Status: CANCELLED | OUTPATIENT
Start: 2025-02-13

## 2025-02-13 RX ADMIN — PANTOPRAZOLE SODIUM 40 MG: 40 INJECTION, POWDER, FOR SOLUTION INTRAVENOUS at 18:06

## 2025-02-13 RX ADMIN — Medication 200 MCG: at 14:36

## 2025-02-13 RX ADMIN — MUPIROCIN 1 APPLICATION: 20 OINTMENT TOPICAL at 08:29

## 2025-02-13 RX ADMIN — Medication 200 MCG: at 14:31

## 2025-02-13 RX ADMIN — SODIUM CHLORIDE: 9 INJECTION, SOLUTION INTRAVENOUS at 14:08

## 2025-02-13 RX ADMIN — LIDOCAINE HYDROCHLORIDE 50 MG: 10 INJECTION, SOLUTION EPIDURAL; INFILTRATION; INTRACAUDAL; PERINEURAL at 14:14

## 2025-02-13 RX ADMIN — PANTOPRAZOLE SODIUM 40 MG: 40 INJECTION, POWDER, FOR SOLUTION INTRAVENOUS at 08:30

## 2025-02-13 RX ADMIN — PROPOFOL 75 MCG/KG/MIN: 10 INJECTION, EMULSION INTRAVENOUS at 14:14

## 2025-02-13 RX ADMIN — MUPIROCIN 1 APPLICATION: 20 OINTMENT TOPICAL at 21:16

## 2025-02-13 RX ADMIN — Medication 10 ML: at 22:14

## 2025-02-13 RX ADMIN — Medication 10 ML: at 08:30

## 2025-02-13 RX ADMIN — Medication 200 MCG: at 14:25

## 2025-02-13 NOTE — ANESTHESIA PREPROCEDURE EVALUATION
Anesthesia Evaluation     Patient summary reviewed and Nursing notes reviewed                Airway   Mallampati: II  TM distance: >3 FB  Neck ROM: full  No difficulty expected  Dental - normal exam     Pulmonary - negative pulmonary ROS and normal exam   (+) pneumonia , pleural effusion,shortness of breath  Cardiovascular - negative cardio ROS and normal exam    (+) hypertension, valvular problems/murmurs murmur, CHF , DVT    ROS comment: ECHO 1/28/25  ·  Left ventricular ejection fraction appears to be 61 - 65%.  ·  Left ventricular diastolic function was indeterminate.  ·  Mild aortic valve stenosis is present.  ·  Peak velocity of the flow distal to the aortic valve is 245.6 cm/s. Aortic valve maximum pressure gradient is 24 mmHg. Aortic valve mean pressure gradient is 13 mmHg.  ·  The mitral valve mean gradient is 4 mmHg.  ·  Moderate tricuspid valve regurgitation is present.  ·  Estimated right ventricular systolic pressure from tricuspid regurgitation is markedly elevated (>55 mmHg). Calculated right ventricular systolic pressure from tricuspid regurgitation is 57 mmHg.  ·  There is a moderate 2cm  pericardial effusion with no tamponade  ·  There is a left pleural effusion.      Neuro/Psych- negative ROS  (+) headaches, dizziness/light headedness, syncope  GI/Hepatic/Renal/Endo - negative ROS   (+) renal disease- CRI    Musculoskeletal     Abdominal  - normal exam    Bowel sounds: normal.   Substance History - negative use     OB/GYN negative ob/gyn ROS         Other   arthritis,   history of cancer (ENDOMETRIAL)                  Anesthesia Plan    ASA 4     general     (PROPOFOL)  intravenous induction     Anesthetic plan, risks, benefits, and alternatives have been provided, discussed and informed consent has been obtained with: patient.    Plan discussed with CRNA.    CODE STATUS:    Medical Intervention Limits: No dialysis; No intubation (DNI)  Code Status (Patient has no pulse and is not breathing): No  CPR (Do Not Attempt to Resuscitate)  Medical Interventions (Patient has pulse or is breathing): Limited Support

## 2025-02-13 NOTE — PLAN OF CARE
Goal Outcome Evaluation:      NEURO: GCS 14. Confused to time, place, situation. PERRL. Generalized weakness.    RESP: 3L NC  CARDIO: AFIB. 2 units pRBCs given, no signs of reaction. MAP > 65  GI/: NPO. External catheter.   ACCESS: PIV x2  SKIN: As charted in EMR  NOK: Daughter at bedside questions and needs addressed.    Pt needs addressed prior to shift change. Bed locked in lowest position, HOB elevated, and call light within reach. Bedside report given to oncoming RN.

## 2025-02-13 NOTE — CONSULTS
[facility] Consult    Referring Provider:  Mark Camarillo MD     PCP: Sam Hung MD    Reason for Consultation:  Hematemesis of coffee grounds     Chief complaint:  Hypotension     History of present illness:    Rose J Kellermann is a 89 y.o. female who is admitted with hematemesis of coffee grounds with associated blood loss anemia and hypovolemic shock.   [facility] for hypotension (systolic BP of 70s).   She had witnessed coffee ground emesis in the emergency department.   H&H was 6.6 and 20 (previously 12 and 38 one week ago).   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   She received 2,000 mL lactated ringers and 2 units of pRBCS overnight.    She was admitted to the ICU.    No history of previous GI bleeding.   No known NSAID use.      Allergies:  Sulfa antibiotics    Scheduled Meds:  mupirocin, 1 Application, Each Nare, BID  pantoprazole, 40 mg, Intravenous, BID  sodium chloride, 10 mL, Intravenous, Q12H         Infusions:       PRN Meds:    acetaminophen **OR** [DISCONTINUED] acetaminophen    [DISCONTINUED] ondansetron ODT **OR** ondansetron    sodium chloride    Home Meds:  Medications Prior to Admission   Medication Sig Dispense Refill Last Dose/Taking    acetaminophen (TYLENOL) 500 MG tablet Take 1 tablet by mouth Every 6 (Six) Hours As Needed for Mild Pain.       bumetanide (BUMEX) 2 MG tablet Take 1 tablet by mouth Daily for 30 days. 30 tablet 0     Eliquis 5 MG tablet tablet TAKE 1 TABLET TWICE A DAY FOR DEEP VEIN THROMBOSIS/PULMONARY EMBOLISM (ACTIVE THROMBOSIS) 180 tablet 3     O2 (OXYGEN) Inhale 3 L/min Daily. Only at night  Indications: Shortness of breath        ROS: Review of Systems   Unable to perform ROS: Acuity of condition       PAST MED HX:  Past Medical History:   Diagnosis Date    Abscess of back     Arrhythmia     frequent PVC    Cancer 12/2011    Endometrial cancer, status post  robotically assisted hysterectomy with Dr. Haddad, December 2011.      CKD (chronic kidney disease), stage III     no dilaysis     Dizzy     Dvt femoral (deep venous thrombosis) 2017    s/p hip surgery in 2017. Took xarelto until August 2018    Dyslipidemia     Dyslipidemia.  Observing.    Enthesopathy of hip region     Generalized osteoarthrosis, involving multiple sites     Headache     Hearing loss     Hypertension     Low backache     Needs flu shot     Osteoarthritis     Osteoarthritis with questionable carpal tunnel syndrome bilaterally.     Otitis, serous     Pneumonia 1967    Remote pneumonia, 1967.     Retinal hemorrhage     SI joint arthritis     SOB (shortness of breath)     Syncope 2001    Remote syncope with working diagnosis of vasodepressor, 2001.     Venous insufficiency of leg     Wears glasses     readers       PAST SURG HX:  Past Surgical History:   Procedure Laterality Date    CATARACT EXTRACTION      bilat     COLONOSCOPY      HIP PERCUTANEOUS PINNING Left 11/24/2017    Procedure: HIP PERCUTANEOUS PINNING;  Surgeon: Lucas Swanson MD;  Location:  [facility];  Service:     HYSTERECTOMY      total? but unsure     KNEE SURGERY  2001    Remote knee surgery in 2001.- right     TOTAL HIP ARTHROPLASTY Left 10/26/2018    Procedure: TOTAL HIP ARTHROPLASTY LEFT;  Surgeon: Stephen Baker MD;  Location:  [facility];  Service: Orthopedics       FAM HX:  Family History   Problem Relation Age of Onset    Heart disease Mother     Osteoarthritis Mother     Hypertension Mother     Hypertension Father     Heart attack Father     Osteoarthritis Father     Cancer Brother         lung       SOC HX:  Social History     Socioeconomic History    Marital status:    Tobacco Use    Smoking status: Never     Passive exposure: Never    Smokeless tobacco: Never   Vaping Use    Vaping status: Never Used   Substance and Sexual Activity    Alcohol use: No    Drug use: No    Sexual activity: Defer     Birth  "control/protection: None       PHYSICAL EXAM  /67   Pulse 78   Temp 97.5 °F (36.4 °C) (Axillary)   Resp 16   Ht 162.6 cm (64\")   Wt 71.7 kg (158 lb)   SpO2 98%   BMI 27.12 kg/m²   Wt Readings from Last 3 Encounters:   02/12/25 71.7 kg (158 lb)   02/06/25 79.7 kg (175 lb 12.8 oz)   06/26/24 72.6 kg (160 lb)   ,body mass index is 27.12 kg/m².  Physical Exam  Constitutional:       Comments: Elderly female sitting up in bed, awake    HENT:      Ears:      Comments: Hard of hearing   Cardiovascular:      Rate and Rhythm: Normal rate and regular rhythm.   Pulmonary:      Effort: Pulmonary effort is normal.   Abdominal:      General: Bowel sounds are normal. There is no distension.      Palpations: Abdomen is soft.      Tenderness: There is no abdominal tenderness.   Skin:     Coloration: Skin is pale.   Neurological:      Mental Status: She is alert.       Results Review:   I reviewed the patient's new clinical results.    Lab Results   Component Value Date    WBC 10.17 02/13/2025    HGB 8.2 (L) 02/13/2025    HGB 9.1 (L) 02/13/2025    HGB 7.7 (L) 02/13/2025    HCT 25.0 (L) 02/13/2025    MCV 90.2 02/13/2025     02/13/2025     Lab Results   Component Value Date    INR 2.21 (H) 02/12/2025    INR 1.82 (H) 01/21/2025    INR 1.06 10/17/2018     Lab Results   Component Value Date    GLUCOSE 98 02/13/2025     (H) 02/13/2025    CREATININE 2.09 (H) 02/13/2025    EGFRIFNONA 28 (L) 04/22/2019    BCR 59.3 (H) 02/13/2025     02/13/2025    K 3.7 02/13/2025    CO2 27.0 02/13/2025    CALCIUM 8.7 02/13/2025    ALBUMIN 2.7 (L) 02/12/2025    ALKPHOS 72 02/12/2025    BILITOT 0.3 02/12/2025    BILIDIR <0.2 11/09/2023    ALT 17 02/12/2025    AST 18 02/12/2025       ASSESSMENTS/PLANS    Hematemesis of coffee grounds   Acute blood loss anemia   Hypovolemic shock   Anticoagulation use , on Apixaban 5 mg BID     >> Recommend EGD today  >> IV Protonix drip 8 mg/hr   >> NPO   >> Hold anticoagulation     I discussed " the patient's findings and my recommendations with patient and her daughter whom is present at the bedside     GELY Kingsley  02/13/25  13:54 EST

## 2025-02-13 NOTE — CASE MANAGEMENT/SOCIAL WORK
Discharge Planning Assessment  [facility]     Patient Name: Rose J Kellermann  MRN: [id number]  Today's Date: 2/13/2025    Admit Date: 2/12/2025    Plan: IDP   Discharge Needs Assessment       Row Name 02/13/25 1039       Living Environment    People in Home alone    Current Living Arrangements home    Potentially Unsafe Housing Conditions unable to assess    In the past 12 months has the electric, gas, oil, or water company threatened to shut off services in your home? No    Primary Care Provided by self    Provides Primary Care For no one    Family Caregiver if Needed child(malika), adult    Family Caregiver Names Katherine Kellermann, daughter    Quality of Family Relationships unable to assess       Transportation Needs    In the past 12 months, has lack of transportation kept you from medical appointments or from getting medications? no    In the past 12 months, has lack of transportation kept you from meetings, work, or from getting things needed for daily living? No       Food Insecurity    Within the past 12 months, you worried that your food would run out before you got the money to buy more. Never true    Within the past 12 months, the food you bought just didn't last and you didn't have money to get more. Never true       Transition Planning    Patient/Family Anticipates Transition to other (see comments)  return to [facility] for SNF completion    Patient/Family Anticipated Services at Transition        Discharge Needs Assessment    Equipment Currently Used at Home walker, rolling;wheelchair;oxygen  3L continuous thru [employer]    Concerns to be Addressed discharge planning    Do you want help finding or keeping work or a job? Patient declined    Do you want help with school or training? For example, starting or completing job training or getting a high school diploma, GED or equivalent Patient declined    Discharge Facility/Level of Care Needs nursing facility, skilled                    Discharge Plan       Row Name 02/13/25 1041       Plan    Plan IDP    Plan Comments Chart reviewed from recent admission to [facility] January 20-February 6 to initiate discharge planning.  Patient discharged to [facility] on Feb. 6; prior to last admission patient lived alone and was independent with ADLs.  Patient has RW, WC, and home O2 (3L NC cont. thru [employer]).  Patient's PCP is Sam Hung and insurance is [facility] Medicare.  LVM for Brenda at [facility] to return call.  CM following.                  Continued Care and Services - Admitted Since 2/12/2025       Destination       Service Provider Request Status Services Address Phone Fax Patient Preferred    [facility] Pending - No Request Sent -- [address] [id number] [phone] --              Durable Medical Equipment Coordination complete.      Service Provider Request Status Services Address Phone Fax Patient Preferred    [facility] [place]  Selected Oxygen Equipment and Accessories [address] [facility] [address] [phone] --                  Selected Continued Care - Prior Encounters Includes continued care and service providers with selected services from prior encounters from 11/14/2024 to 2/13/2025      Discharged on 2/6/2025 Admission date: 1/20/2025 - Discharge disposition: Rehab Facility or Unit (DC - External)      Destination       Service Provider Services Address Phone Fax Patient Preferred    [facility] [address] [facility] [address] [phone] --              Durable Medical Equipment       Service Provider Services Address Phone Fax Patient Preferred    [facility] [place] [facility]xygen Equipment and Accessories [address] [facility] [address] [phone] --       Internal Comment last updated by Charo Tomas, RN 1/27/2025 1527    3L at baseline.                                         Demographic Summary       Row Name 02/13/25 1038       General Information    Referral Source admission list    Reason for Consult discharge planning    Preferred Language English       Contact Information    Permission Granted to Share Info With                    Functional Status       Row Name 02/13/25 1038       Physical Activity    On average, how many days per week do you engage in moderate to strenuous exercise (like a brisk walk)? Pt Declined    On average, how many minutes do you engage in exercise at this level? Pt Declined       Functional Status, IADL    Medications independent    Meal Preparation assistive equipment    Housekeeping assistive equipment    Laundry assistive equipment    Shopping assistive equipment    If for any reason you need help with day-to-day activities such as bathing, preparing meals, shopping, managing finances, etc., do you get the help you need? Patient declined                   Psychosocial    No documentation.                  Abuse/Neglect    No documentation.                  Legal    No documentation.                  Substance Abuse    No documentation.                  Patient Forms    No documentation.                     Tiffany Edmondson RN

## 2025-02-13 NOTE — H&P
Intensive Care Admission Note     Acute blood loss anemia    History of Present Illness     89 F with history of chronic diastolic CHF, A-fib, history of DVT on Eliquis, CKD stage IV, chronic respiratory failure on baseline 3 L oxygen, currently residing at rehab facility after she was recently admitted and discharged from the hospital 3 weeks ago.  She was more weak than usual and less responsive and that her blood pressure was checked she was in the 70s systolic.  She was brought to ED for further evaluation.  In the ED patient was hypotensive and received 2 L fluid bolus.  Labs showed hemoglobin 6.6 from baseline of 12.2.  She had a couple of episodes of coffee-ground looking emesis in the ED.  Lactic acid initially elevated up to 6.6 and now improved to 2.2.  INR was 2.2.  Other labs at baseline and unremarkable.    Evaluation patient is sleepy and wakes up to questions but falls asleep easily.  Appears generalized weakness and tired.  Patient heart rate is stable.    Problem List, Surgical History, Family, Social History, and ROS     Past Medical History:   Diagnosis Date    Abscess of back     Arrhythmia     frequent PVC    Cancer 12/2011    Endometrial cancer, status post robotically assisted hysterectomy with Dr. Haddad, December 2011.      CKD (chronic kidney disease), stage III     no dilaysis     Dizzy     Dvt femoral (deep venous thrombosis) 2017    s/p hip surgery in 2017. Took xarelto until August 2018    Dyslipidemia     Dyslipidemia.  Observing.    Enthesopathy of hip region     Generalized osteoarthrosis, involving multiple sites     Headache     Hearing loss     Hypertension     Low backache     Needs flu shot     Osteoarthritis     Osteoarthritis with questionable carpal tunnel syndrome bilaterally.     Otitis, serous     Pneumonia 1967    Remote pneumonia, 1967.     Retinal hemorrhage     SI joint arthritis     SOB (shortness of breath)     Syncope 2001    Remote syncope with working diagnosis  of vasodepressor, 2001.     Venous insufficiency of leg     Wears glasses     readers      Past Surgical History:   Procedure Laterality Date    CATARACT EXTRACTION      bilat     COLONOSCOPY      HIP PERCUTANEOUS PINNING Left 11/24/2017    Procedure: HIP PERCUTANEOUS PINNING;  Surgeon: Lucas Swanson MD;  Location: [facility];  Service:     HYSTERECTOMY      total? but unsure     KNEE SURGERY  2001    Remote knee surgery in 2001.- right     TOTAL HIP ARTHROPLASTY Left 10/26/2018    Procedure: TOTAL HIP ARTHROPLASTY LEFT;  Surgeon: Stephen Baker MD;  Location: [facility] OR;  Service: Orthopedics       Allergies   Allergen Reactions    Sulfa Antibiotics Nausea Only and GI Intolerance     No current facility-administered medications on file prior to encounter.     Current Outpatient Medications on File Prior to Encounter   Medication Sig    acetaminophen (TYLENOL) 500 MG tablet Take 1 tablet by mouth Every 6 (Six) Hours As Needed for Mild Pain.    bumetanide (BUMEX) 2 MG tablet Take 1 tablet by mouth Daily for 30 days.    Eliquis 5 MG tablet tablet TAKE 1 TABLET TWICE A DAY FOR DEEP VEIN THROMBOSIS/PULMONARY EMBOLISM (ACTIVE THROMBOSIS)    O2 (OXYGEN) Inhale 3 L/min Daily. Only at night  Indications: Shortness of breath     MEDICATION LIST AND ALLERGIES REVIEWED.    Family History   Problem Relation Age of Onset    Heart disease Mother     Osteoarthritis Mother     Hypertension Mother     Hypertension Father     Heart attack Father     Osteoarthritis Father     Cancer Brother         lung     Social History     Tobacco Use    Smoking status: Never     Passive exposure: Never    Smokeless tobacco: Never   Vaping Use    Vaping status: Never Used   Substance Use Topics    Alcohol use: No    Drug use: No     Social History     Social History Narrative    Lives alone, 2 daughters, one here, one in [place]. Retired teacher     FAMILY AND SOCIAL HISTORY REVIEWED.    Review of Systems  ALL OTHER SYSTEMS REVIEWED AND  "ARE NEGATIVE.    Physical Exam and Clinical Information   /59   Pulse 81   Temp 97.9 °F (36.6 °C) (Axillary)   Resp 18   Ht 162.6 cm (64\")   Wt 71.7 kg (158 lb)   SpO2 97%   BMI 27.12 kg/m²     Physical exam  General : Elderly female sleepy and appears  weak and tired.  Neuro: CN 2-12 grossly intact, upper and lower extremity weakness  HEENT : AT,NC,PERRLA,+ pallor, No Icterus,No thyromegaly. No JVD.  CVS : RRR, No M/R/G. B/l UE and LE palpable pulses +  Resp/Chest: B/l previous anterior breath sounds right greater than left.  No wheezing noted.  Basal Rales present.  Abd: Soft, Non distended, No tenderness, No organomegaly. Bowel sounds +  EXT: Bilateral lower extremity pitting edema with stasis dermatitis changes present      Results from last 7 days   Lab Units 02/12/25  1650   WBC 10*3/mm3 7.15   HEMOGLOBIN g/dL 6.6*   PLATELETS 10*3/mm3 217     Results from last 7 days   Lab Units 02/12/25  1311   SODIUM mmol/L 139   POTASSIUM mmol/L 4.2   CO2 mmol/L 23.0   BUN mg/dL 119*   CREATININE mg/dL 2.31*   GLUCOSE mg/dL 142*     Estimated Creatinine Clearance: 13.2 mL/min (A) (by C-G formula based on SCr of 2.31 mg/dL (H)).          Lab Results   Component Value Date    LACTATE 2.2 (C) 02/12/2025        Images: X-ray chest and CT abdomen    I reviewed the patient's results and images.  Bilateral pleural effusions noted.  No other acute abdominal findings.  Prior left hip replacement.     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  Coffee-ground emesis noted in the ED.  Blood " pressure seems to be now improved after receiving 2 L fluid bolus.  Plan to transfuse 2 units PRBC.  1 x 20 mg IV Lasix dose ordered given her chronic heart failure and bilateral effusions.  Monitor H&H.  Continue IV PPI.  GI consult in am.  Hold Eliquis.  Currently hemodynamics are stable and will avoid reversal with Kcentra.  If having recurrent bleeding will consider reversal.  Monitor urine output.    DNR/DNI          Toni Villegas MD   Critical Care Medicine  02/12/25 19:46 EST

## 2025-02-13 NOTE — PROGRESS NOTES
INTENSIVIST   PROGRESS NOTE        SUBJECTIVE     Anastasia 89 y.o. female is followed for: Altered Mental Status       Acute blood loss anemia, asymptomatic    As an Intensivist, we provide an integrated approach to the ICU patient and family, medical management of comorbid conditions, including but not limited to electrolytes, glycemic control, organ dysfunction, lead interdisciplinary rounds and coordinate the care with all other services, including those from other specialists.     Interval History:    Doing better this morning.    No active bleeding.    Daughter at bedside.    Last Bowel Movement: 25 (250)    Temp  Min: 96.9 °F (36.1 °C)  Max: 98.7 °F (37.1 °C)       History     Last Reviewed by Mark Camarillo MD on 2025 at  8:15 AM    Sections Reviewed    Medical, Surgical, Family, Tobacco, Alcohol, Drug Use, Sexual Activity,   Social Documentation    Problem list reviewed by Mark Camarillo MD on 2025 at  8:15 AM  Medicines reviewed by Mark Camarillo MD on 2025 at  8:15 AM  Allergies reviewed by Mark Camarillo MD on 2025 at  8:15 AM       The patient's relevant past medical, surgical and social history were reviewed and updated in Epic as appropriate.        OBJECTIVE     Physical Examination    Vitals:  Temp: 96.9 °F (36.1 °C) (25) Temp  Min: 96.9 °F (36.1 °C)  Max: 98.7 °F (37.1 °C)   Temp core:      BP: 118/56 (25) BP  Min: 74/44  Max: 120/80   MAP (non-invasive) Noninvasive MAP (mmHg): 85 (25) Noninvasive MAP (mmHg)  Av.3  Min: 64  Max: 119   Pulse: 75 (25) Pulse  Min: 74  Max: 90   Resp: 12 (25) Resp  Min: 12  Max: 18   SpO2: 100 % (25) SpO2  Min: 89 %  Max: 100 %   Device: nasal cannula (25)    Flow Rate: 3 (25) Flow (L/min) (Oxygen Therapy)  Min: 2  Max: 3     Intake/Ouptut 24 hrs (7:00AM - 6:59 AM)  Intake & Output (last 2 days)          07 0700  07  0700 02/13 0701 02/14 0700    I.V. (mL/kg)  27.7 (0.4)     Blood  675     IV Piggyback  2200     Total Intake(mL/kg)  2902.7 (40.5)     Urine (mL/kg/hr)  600     Total Output  600     Net  +2302.7                    Medications (drips):           02/12/25  1146   Weight: 71.7 kg (158 lb)        Telemetry:  Rhythm: atrial rhythm (02/13/25 0600)  Atrial Rhythm: atrial fibrillation (02/13/25 0600)      Constitutional:  No acute distress.   Cardiovascular: IRR.    Respiratory: Normal breath sounds  No adventitious sounds   Abdominal:  Soft with no tenderness.   Extremities: No Edema   Neurological:   Alert, Oriented, Cooperative.  Best Eye Response: 4-->(E4) spontaneous (02/13/25 0600)  Best Motor Response: 6-->(M6) obeys commands (02/13/25 0600)  Best Verbal Response: 4-->(V4) confused (02/13/25 0600)  [place] Coma Scale Score: 14 (02/13/25 0600)     Results Reviewed:  Laboratory  Microbiology  Radiology  Pathology    Hematology:  Results from last 7 days   Lab Units 02/13/25  0503 02/13/25  0010 02/12/25  1650   WBC 10*3/mm3 10.17  --  7.15   HEMOGLOBIN g/dL 9.1* 7.7* 6.6*   MCV fL 90.2  --  94.1   PLATELETS 10*3/mm3 233  --  217     Results from last 7 days   Lab Units 02/13/25  0503 02/12/25  1650   NEUTROS ABS 10*3/mm3 7.83* 5.83   LYMPHS ABS 10*3/mm3 1.39 0.87   EOS ABS 10*3/mm3 0.10 0.01     Chemistry:  Estimated Creatinine Clearance: 14.6 mL/min (A) (by C-G formula based on SCr of 2.09 mg/dL (H)).    Results from last 7 days   Lab Units 02/13/25  0503 02/12/25  1311   SODIUM mmol/L 142 139   POTASSIUM mmol/L 3.7 4.2   CHLORIDE mmol/L 101 98   CO2 mmol/L 27.0 23.0   BUN mg/dL 124* 119*   CREATININE mg/dL 2.09* 2.31*   GLUCOSE mg/dL 98 142*     Results from last 7 days   Lab Units 02/13/25  0503 02/12/25  1311   CALCIUM mg/dL 8.7 8.4   MAGNESIUM mg/dL 2.3  --      Hepatic Panel:  Results from last 7 days   Lab Units 02/12/25  1311   ALBUMIN g/dL 2.7*   TOTAL PROTEIN g/dL 5.4*   BILIRUBIN mg/dL 0.3   AST (SGOT)  U/L 18   ALT (SGPT) U/L 17   ALK PHOS U/L 72     Coagulation Labs:  Results from last 7 days   Lab Units 02/12/25  1141   PROTIME Seconds 24.6*   INR  2.21*   APTT seconds 35.0*      Cardiac Labs:  Results from last 7 days   Lab Units 02/12/25  1311 02/12/25  1141   PROBNP pg/mL  --  15,731.0*   HSTROP T ng/L 75* 87*     Biomarkers:  Results from last 7 days   Lab Units 02/13/25  0010 02/12/25  1650 02/12/25  1311 02/12/25  1141   LACTATE mmol/L 1.9 2.2* 6.6* 4.5*   PROCALCITONIN ng/mL  --   --   --  0.49*   FERRITIN ng/mL 511.30*  --   --   --        U/A  Results from last 7 days   Lab Units 02/12/25  1327   RBC UA /HPF 3-5*   WBC UA /HPF 0-2   BACTERIA UA /HPF Unable to determine due to loaded field*   SQUAM EPITHEL UA /HPF 0-2   HYALINE CASTS UA /LPF 7-12       COVID-19  Lab Results   Component Value Date    COVID19 Not Detected 02/12/2025    COVID19 Not Detected 01/20/2025    COVID19 Not Detected 08/10/2023     Images:  CT Abdomen Pelvis Without Contrast    Result Date: 2/12/2025  Impression: 1.Moderate right and small left pleural effusions with lower lung airspace disease. Pneumonia not excluded. Correlate with symptoms. 2.Other incidental nonemergent findings as detailed above. Electronically Signed: Nils Vides MD  2/12/2025 4:48 PM EST  Workstation ID: [id number]    XR Chest 1 View    Result Date: 2/12/2025  Impression: No change from previous exam. Cardiomegaly pleural effusions bibasilar airspace opacity. Electronically Signed: Brenda Mackey MD  2/12/2025 12:11 PM EST  Workstation ID: [id number]     Echo:  Results for orders placed during the hospital encounter of 01/20/25    Adult Transthoracic Echo Complete W/ Cont if Necessary Per Protocol    Interpretation Summary    Left ventricular ejection fraction appears to be 61 - 65%.    Left ventricular diastolic function was indeterminate.    Mild aortic valve stenosis is present.    Peak velocity of the flow distal to the aortic valve is 245.6 cm/s. Aortic valve  maximum pressure gradient is 24 mmHg. Aortic valve mean pressure gradient is 13 mmHg.    The mitral valve mean gradient is 4 mmHg.    Moderate tricuspid valve regurgitation is present.    Estimated right ventricular systolic pressure from tricuspid regurgitation is markedly elevated (>55 mmHg). Calculated right ventricular systolic pressure from tricuspid regurgitation is 57 mmHg.    There is a moderate 2cm  pericardial effusion with no tamponade    There is a left pleural effusion.      Results: Reviewed.  I reviewed the patient's new laboratory and imaging results.  I independently reviewed the patient's new images.    Medications: Reviewed.    Assessment   A/P     Assessment/Management/Treatment Plan:    Hospital:  LOS: 1 day   ICU: 11h     Active Hospital Problems    Diagnosis  POA    **Acute blood loss anemia, asymptomatic [D62]  Yes     Anastasia is a 89 y.o. female currently residing at rehab facility after she was recently admitted and discharged from the hospital 3 weeks ago, who was transferred to [facility] on 2/12/2025 with hypotension (SBP 70) and weakness secondary to Acute blood loss anemia [D62]    In the ED, her  Hb was 6.6 from baseline of 12.2.  She had a couple of episodes of coffee-ground looking emesis in the ED.   INR was 2.2.      Comorbidities: chronic diastolic CHF, A-fib, history of DVT on APIxaban, CKD stage IV, chronic respiratory failure on baseline 3 L oxygen    GIB  EGD 02/13/25 - Dr. Brunner: Oozing cratered Duodenal ulcer with a visible vessel at the duodenal bulb. S/P Ovesco clip.  Pulmonary   Chronic respiratory failure, type 1  Cardiovascular  Chronic HFpEF  A Fib. Anticoagulation with APIxaban   Renal  CKD IV  Endocrine   Body mass index is 27.12 kg/m². Overweight: 25.0-29.9kg/m2   Prediabetes (A1C 5.7%-6.4%)    Lab Results   Lab Value Date/Time    HGBA1C 5.98 (H) 04/22/2019 1136    HGBA1C 6.10 (H) 10/17/2018 1623     Results from last 7 days   Lab Units 02/13/25  0516 02/12/25  6083    GLUCOSE mg/dL 98 122       Diet: NPO Diet NPO Type: Sips with Meds   Advance Directives: Code Status and Medical Interventions: No CPR (Do Not Attempt to Resuscitate); Limited Support; No dialysis, No intubation (DNI)   Ordered at: 02/12/25 1941     Medical Intervention Limits:    No dialysis    No intubation (DNI)     Code Status (Patient has no pulse and is not breathing):    No CPR (Do Not Attempt to Resuscitate)     Medical Interventions (Patient has pulse or is breathing):    Limited Support        VTE Prophylaxis:  Mechanical VTE prophylaxis orders are present.         In brief:  EGD Today.(DONE)  Duodenal ulcer  PPI BID x 1 month, then QD  SCr better  Monitor Hb  Monitor renal function  Discussed with daughter.  Hold anticoagulation for now.  Disposition: Keep in ICU.    Plan of care and goals reviewed during interdisciplinary rounds.  I discussed the patient's findings and my recommendations with patient, family, and nursing staff    MDM:    Problem(s) High due to: Acute or Chronic illness or injury that may poses a threat to life or bodily function  Data: Moderate due to: Review of prior external records from each unique source, Review or results of each unique test, and Ordering of each unique test    Moderate    [x] Primary Attending Intensive Care Medicine - Nutrition Support   [] Consultant    Copied text in this note has been reviewed and is accurate as of 02/13/25

## 2025-02-13 NOTE — ANESTHESIA POSTPROCEDURE EVALUATION
Patient: Rose J Kellermann    Procedure Summary       Date: 02/13/25 Room / [facility]:  [facility]    Anesthesia Start: 1408 Anesthesia Stop: 1440    Procedure: ESOPHAGOGASTRODUODENOSCOPY Diagnosis:     Surgeons: Brunner, Mark I, MD Provider: Jason Youngblood MD    Anesthesia Type: general ASA Status: 4            Anesthesia Type: general    Vitals  Vitals Value Taken Time   /58 02/13/25 1440   Temp     Pulse 72 02/13/25 1440   Resp 10 02/13/25 1440   SpO2 93 % 02/13/25 1440           Post Anesthesia Care and Evaluation    Patient location during evaluation: PACU  Patient participation: complete - patient cannot participate  Level of consciousness: sleepy but conscious  Pain management: adequate    Airway patency: patent  Anesthetic complications: No anesthetic complications  PONV Status: none  Cardiovascular status: hemodynamically stable and acceptable  Respiratory status: nonlabored ventilation, acceptable, nasal cannula, oral airway and spontaneous ventilation  Hydration status: acceptable  No anesthesia care post op

## 2025-02-14 LAB
ALBUMIN SERPL-MCNC: 2.7 G/DL (ref 3.5–5.2)
ALBUMIN/GLOB SERPL: 1 G/DL
ALP SERPL-CCNC: 59 U/L (ref 39–117)
ALT SERPL W P-5'-P-CCNC: 13 U/L (ref 1–33)
ANION GAP SERPL CALCULATED.3IONS-SCNC: 11 MMOL/L (ref 5–15)
AST SERPL-CCNC: 17 U/L (ref 1–32)
BASOPHILS # BLD AUTO: 0.03 10*3/MM3 (ref 0–0.2)
BASOPHILS NFR BLD AUTO: 0.3 % (ref 0–1.5)
BILIRUB SERPL-MCNC: 0.5 MG/DL (ref 0–1.2)
BUN SERPL-MCNC: 114 MG/DL (ref 8–23)
BUN/CREAT SERPL: 64 (ref 7–25)
CALCIUM SPEC-SCNC: 8.4 MG/DL (ref 8.2–9.6)
CHLORIDE SERPL-SCNC: 104 MMOL/L (ref 98–107)
CO2 SERPL-SCNC: 27 MMOL/L (ref 22–29)
CREAT SERPL-MCNC: 1.78 MG/DL (ref 0.57–1)
DEPRECATED RDW RBC AUTO: 50.3 FL (ref 37–54)
EGFRCR SERPLBLD CKD-EPI 2021: 25.5 ML/MIN/1.73
EOSINOPHIL # BLD AUTO: 0.28 10*3/MM3 (ref 0–0.4)
EOSINOPHIL NFR BLD AUTO: 2.9 % (ref 0.3–6.2)
ERYTHROCYTE [DISTWIDTH] IN BLOOD BY AUTOMATED COUNT: 15.7 % (ref 12.3–15.4)
GLOBULIN UR ELPH-MCNC: 2.6 GM/DL
GLUCOSE SERPL-MCNC: 94 MG/DL (ref 65–99)
HCT VFR BLD AUTO: 23.2 % (ref 34–46.6)
HCT VFR BLD AUTO: 31.2 % (ref 34–46.6)
HGB BLD-MCNC: 10.1 G/DL (ref 12–15.9)
HGB BLD-MCNC: 7.5 G/DL (ref 12–15.9)
IMM GRANULOCYTES # BLD AUTO: 0.06 10*3/MM3 (ref 0–0.05)
IMM GRANULOCYTES NFR BLD AUTO: 0.6 % (ref 0–0.5)
LYMPHOCYTES # BLD AUTO: 0.94 10*3/MM3 (ref 0.7–3.1)
LYMPHOCYTES NFR BLD AUTO: 9.7 % (ref 19.6–45.3)
MAGNESIUM SERPL-MCNC: 2.1 MG/DL (ref 1.7–2.3)
MCH RBC QN AUTO: 30.2 PG (ref 26.6–33)
MCHC RBC AUTO-ENTMCNC: 32.3 G/DL (ref 31.5–35.7)
MCV RBC AUTO: 93.5 FL (ref 79–97)
MONOCYTES # BLD AUTO: 0.73 10*3/MM3 (ref 0.1–0.9)
MONOCYTES NFR BLD AUTO: 7.5 % (ref 5–12)
NEUTROPHILS NFR BLD AUTO: 7.66 10*3/MM3 (ref 1.7–7)
NEUTROPHILS NFR BLD AUTO: 79 % (ref 42.7–76)
NRBC BLD AUTO-RTO: 0.3 /100 WBC (ref 0–0.2)
PHOSPHATE SERPL-MCNC: 3.4 MG/DL (ref 2.5–4.5)
PLATELET # BLD AUTO: 190 10*3/MM3 (ref 140–450)
PMV BLD AUTO: 9.7 FL (ref 6–12)
POTASSIUM SERPL-SCNC: 3.4 MMOL/L (ref 3.5–5.2)
POTASSIUM SERPL-SCNC: 4.1 MMOL/L (ref 3.5–5.2)
PROT SERPL-MCNC: 5.3 G/DL (ref 6–8.5)
RBC # BLD AUTO: 2.48 10*6/MM3 (ref 3.77–5.28)
SODIUM SERPL-SCNC: 142 MMOL/L (ref 136–145)
WBC NRBC COR # BLD AUTO: 9.7 10*3/MM3 (ref 3.4–10.8)

## 2025-02-14 PROCEDURE — 85018 HEMOGLOBIN: CPT | Performed by: INTERNAL MEDICINE

## 2025-02-14 PROCEDURE — 85025 COMPLETE CBC W/AUTO DIFF WBC: CPT | Performed by: INTERNAL MEDICINE

## 2025-02-14 PROCEDURE — 87040 BLOOD CULTURE FOR BACTERIA: CPT

## 2025-02-14 PROCEDURE — 80053 COMPREHEN METABOLIC PANEL: CPT | Performed by: INTERNAL MEDICINE

## 2025-02-14 PROCEDURE — 25010000002 BUMETANIDE PER 0.5 MG: Performed by: INTERNAL MEDICINE

## 2025-02-14 PROCEDURE — 84132 ASSAY OF SERUM POTASSIUM: CPT | Performed by: INTERNAL MEDICINE

## 2025-02-14 PROCEDURE — 85014 HEMATOCRIT: CPT | Performed by: INTERNAL MEDICINE

## 2025-02-14 PROCEDURE — 86900 BLOOD TYPING SEROLOGIC ABO: CPT

## 2025-02-14 PROCEDURE — 36430 TRANSFUSION BLD/BLD COMPNT: CPT

## 2025-02-14 PROCEDURE — 99232 SBSQ HOSP IP/OBS MODERATE 35: CPT | Performed by: INTERNAL MEDICINE

## 2025-02-14 PROCEDURE — 99232 SBSQ HOSP IP/OBS MODERATE 35: CPT | Performed by: PHYSICIAN ASSISTANT

## 2025-02-14 PROCEDURE — 84100 ASSAY OF PHOSPHORUS: CPT | Performed by: INTERNAL MEDICINE

## 2025-02-14 PROCEDURE — 83735 ASSAY OF MAGNESIUM: CPT | Performed by: INTERNAL MEDICINE

## 2025-02-14 PROCEDURE — P9016 RBC LEUKOCYTES REDUCED: HCPCS

## 2025-02-14 RX ORDER — BUMETANIDE 0.25 MG/ML
1 INJECTION, SOLUTION INTRAMUSCULAR; INTRAVENOUS ONCE
Status: COMPLETED | OUTPATIENT
Start: 2025-02-14 | End: 2025-02-14

## 2025-02-14 RX ORDER — POTASSIUM CHLORIDE 1500 MG/1
40 TABLET, FILM COATED, EXTENDED RELEASE ORAL ONCE
Status: COMPLETED | OUTPATIENT
Start: 2025-02-14 | End: 2025-02-14

## 2025-02-14 RX ADMIN — PANTOPRAZOLE SODIUM 40 MG: 40 INJECTION, POWDER, FOR SOLUTION INTRAVENOUS at 18:18

## 2025-02-14 RX ADMIN — BUMETANIDE 1 MG: 0.25 INJECTION INTRAMUSCULAR; INTRAVENOUS at 12:13

## 2025-02-14 RX ADMIN — POTASSIUM CHLORIDE 40 MEQ: 1500 TABLET, EXTENDED RELEASE ORAL at 09:39

## 2025-02-14 RX ADMIN — Medication 10 ML: at 21:00

## 2025-02-14 RX ADMIN — MUPIROCIN 1 APPLICATION: 20 OINTMENT TOPICAL at 21:15

## 2025-02-14 RX ADMIN — Medication 10 ML: at 09:39

## 2025-02-14 RX ADMIN — MUPIROCIN 1 APPLICATION: 20 OINTMENT TOPICAL at 09:39

## 2025-02-14 RX ADMIN — PANTOPRAZOLE SODIUM 40 MG: 40 INJECTION, POWDER, FOR SOLUTION INTRAVENOUS at 05:56

## 2025-02-14 NOTE — PROGRESS NOTES
INTENSIVIST   PROGRESS NOTE        SUBJECTIVE     Anastasia 89 y.o. female is followed for: Altered Mental Status       Acute blood loss anemia, asymptomatic    As an Intensivist, we provide an integrated approach to the ICU patient and family, medical management of comorbid conditions, including but not limited to electrolytes, glycemic control, organ dysfunction, lead interdisciplinary rounds and coordinate the care with all other services, including those from other specialists.     Interval History:    Doing better this morning.    No active bleeding.    Daughter at bedside.    Last Bowel Movement: 25 (25)    Temp  Min: 96.6 °F (35.9 °C)  Max: 98.6 °F (37 °C)       History     Last Reviewed by Chari Pack RN on 2025 at  2:10 PM    Sections Reviewed    Medical, Surgical, Family, Tobacco, Custom, Alcohol, Drug Use, Sexual   Activity    Problem list reviewed by Mark Camarillo MD on 2025 at  8:15 AM  Medicines reviewed by Mark Camarillo MD on 2025 at  8:15 AM  Allergies reviewed by Mark Camarillo MD on 2025 at  8:15 AM       The patient's relevant past medical, surgical and social history were reviewed and updated in Epic as appropriate.        OBJECTIVE     Physical Examination    Vitals:  Temp: 96.6 °F (35.9 °C) (25) Temp  Min: 96.6 °F (35.9 °C)  Max: 98.6 °F (37 °C)   Temp core:      BP: 99/62 (25) BP  Min: 90/59  Max: 140/58   MAP (non-invasive) Noninvasive MAP (mmHg): 87 (25) Noninvasive MAP (mmHg)  Av.2  Min: 64  Max: 119   Pulse: 75 (25) Pulse  Min: 64  Max: 85   Resp: 12 (25) Resp  Min: 10  Max: 16   SpO2: 98 % (25) SpO2  Min: 88 %  Max: 100 %   Device: nasal cannula (25)    Flow Rate: 3 (25) Flow (L/min) (Oxygen Therapy)  Min: 3  Max: 4     Intake/Ouptut 24 hrs (7:00AM - 6:59 AM)  Intake & Output (last 2 days)          07 07  0701  02/15 0700    P.O.  500     I.V. (mL/kg) 27.7 (0.4) 500 (7)     Blood 675      IV Piggyback 2200      Total Intake(mL/kg) 2902.7 (40.5) 1000 (13.9)     Urine (mL/kg/hr) 600 1250 (0.7)     Stool  0     Total Output 600 1250     Net +2302.7 -250            Urine Unmeasured Occurrence  2 x     Stool Unmeasured Occurrence  3 x             Medications (drips):           02/12/25  1146   Weight: 71.7 kg (158 lb)        Telemetry:  Rhythm: atrial rhythm (02/14/25 0600)  Atrial Rhythm: atrial fibrillation (02/14/25 0600)      Constitutional:  No acute distress.   Cardiovascular: IRR.    Respiratory: Normal breath sounds  No adventitious sounds   Abdominal:  Soft with no tenderness.   Extremities: No Edema   Neurological:   Alert, Oriented, Cooperative.  Best Eye Response: 3-->(E3) to speech (02/14/25 0600)  Best Motor Response: 6-->(M6) obeys commands (02/14/25 0600)  Best Verbal Response: 4-->(V4) confused (02/14/25 0600)  [place] Coma Scale Score: 13 (02/14/25 0600)     Results Reviewed:  Laboratory  Microbiology  Radiology  Pathology    Hematology:  Results from last 7 days   Lab Units 02/14/25  0403 02/13/25  2147 02/13/25  1901 02/13/25  1228 02/13/25  0503 02/13/25  0010 02/12/25  1650   WBC 10*3/mm3 9.70  --   --   --  10.17  --  7.15   HEMOGLOBIN g/dL 7.5* 8.8* 8.9*   < > 9.1*   < > 6.6*   MCV fL 93.5  --   --   --  90.2  --  94.1   PLATELETS 10*3/mm3 190  --   --   --  233  --  217    < > = values in this interval not displayed.     Results from last 7 days   Lab Units 02/14/25  0403 02/13/25  0503 02/12/25  1650   NEUTROS ABS 10*3/mm3 7.66* 7.83* 5.83   LYMPHS ABS 10*3/mm3 0.94 1.39 0.87   EOS ABS 10*3/mm3 0.28 0.10 0.01     Chemistry:  Estimated Creatinine Clearance: 17.1 mL/min (A) (by C-G formula based on SCr of 1.78 mg/dL (H)).    Results from last 7 days   Lab Units 02/14/25  0403 02/13/25  0503   SODIUM mmol/L 142 142   POTASSIUM mmol/L 3.4* 3.7   CHLORIDE mmol/L 104 101   CO2 mmol/L 27.0 27.0   BUN  mg/dL 114* 124*   CREATININE mg/dL 1.78* 2.09*   GLUCOSE mg/dL 94 98     Results from last 7 days   Lab Units 02/14/25  0403 02/13/25  0503   CALCIUM mg/dL 8.4 8.7   MAGNESIUM mg/dL 2.1 2.3   PHOSPHORUS mg/dL 3.4  --      Hepatic Panel:  Results from last 7 days   Lab Units 02/14/25  0403 02/12/25  1311   ALBUMIN g/dL 2.7* 2.7*   TOTAL PROTEIN g/dL 5.3* 5.4*   BILIRUBIN mg/dL 0.5 0.3   AST (SGOT) U/L 17 18   ALT (SGPT) U/L 13 17   ALK PHOS U/L 59 72     Coagulation Labs:  Results from last 7 days   Lab Units 02/12/25  1141   PROTIME Seconds 24.6*   INR  2.21*   APTT seconds 35.0*      Cardiac Labs:  Results from last 7 days   Lab Units 02/12/25  1311 02/12/25  1141   PROBNP pg/mL  --  15,731.0*   HSTROP T ng/L 75* 87*     Biomarkers:  Results from last 7 days   Lab Units 02/13/25  0010 02/12/25  1650 02/12/25  1311 02/12/25  1141   LACTATE mmol/L 1.9 2.2* 6.6* 4.5*   PROCALCITONIN ng/mL  --   --   --  0.49*   FERRITIN ng/mL 511.30*  --   --   --      Lab Results   Component Value Date    BLOODCX No growth at 2 days 02/12/2025    BLOODCX Abnormal Stain (C) 02/12/2025       COVID-19  Lab Results   Component Value Date    COVID19 Not Detected 02/12/2025    COVID19 Not Detected 01/20/2025    COVID19 Not Detected 08/10/2023     Images:  CT Abdomen Pelvis Without Contrast    Result Date: 2/12/2025  Impression: 1.Moderate right and small left pleural effusions with lower lung airspace disease. Pneumonia not excluded. Correlate with symptoms. 2.Other incidental nonemergent findings as detailed above. Electronically Signed: Nils Vides MD  2/12/2025 4:48 PM EST  Workstation ID: [id number]    XR Chest 1 View    Result Date: 2/12/2025  Impression: No change from previous exam. Cardiomegaly pleural effusions bibasilar airspace opacity. Electronically Signed: Brenda Mackey MD  2/12/2025 12:11 PM EST  Workstation ID: [id number]     Echo:  Results for orders placed during the hospital encounter of 01/20/25    Adult Transthoracic Echo  Complete W/ Cont if Necessary Per Protocol    Interpretation Summary    Left ventricular ejection fraction appears to be 61 - 65%.    Left ventricular diastolic function was indeterminate.    Mild aortic valve stenosis is present.    Peak velocity of the flow distal to the aortic valve is 245.6 cm/s. Aortic valve maximum pressure gradient is 24 mmHg. Aortic valve mean pressure gradient is 13 mmHg.    The mitral valve mean gradient is 4 mmHg.    Moderate tricuspid valve regurgitation is present.    Estimated right ventricular systolic pressure from tricuspid regurgitation is markedly elevated (>55 mmHg). Calculated right ventricular systolic pressure from tricuspid regurgitation is 57 mmHg.    There is a moderate 2cm  pericardial effusion with no tamponade    There is a left pleural effusion.      Results: Reviewed.  I reviewed the patient's new laboratory and imaging results.  I independently reviewed the patient's new images.    Medications: Reviewed.    Assessment   A/P     Assessment/Management/Treatment Plan:    Hospital:  LOS: 2 days   ICU: 1d 10h     Active Hospital Problems    Diagnosis  POA    **Acute blood loss anemia, asymptomatic [D62]  Yes     Anastasia is a 89 y.o. female currently residing at rehab facility after she was recently admitted and discharged from the hospital 3 weeks ago, who was transferred to [facility] on 2/12/2025 with hypotension (SBP 70) and weakness secondary to Acute blood loss anemia [D62]    In the ED, her Hb was 6.6 from baseline of 12.2.  She had a couple of episodes of coffee-ground looking emesis in the ED.   INR was 2.2.      Comorbidities: chronic diastolic CHF, A-fib, history of DVT on APIxaban, CKD stage IV, chronic respiratory failure on baseline 3 L oxygen    GIB. Anemia Acute Blood loss.  Hb down to 7.5 on 02/14  EGD 02/13/25 - Dr. Brunner: Oozing cratered Duodenal ulcer with a visible vessel at the duodenal bulb. S/P Ovesco clip.  PPI BID x 1 month, then  QD     2/5 2/14        Lab Value Date/Time    HGB 7.5 (L) 02/14/2025 0403    HGB 8.8 (L) 02/13/2025 2147    HGB 8.9 (L) 02/13/2025 1901    HGB 8.2 (L) 02/13/2025 1228    HGB 9.1 (L) 02/13/2025 0503    HGB 7.7 (L) 02/13/2025 0010    HGB 6.6 (C) 02/12/2025 1650    HGB 12.2 02/05/2025 0830     Pulmonary   Chronic respiratory failure, type 1  Cardiovascular  Chronic HFpEF  A Fib. Anticoagulation with APIxaban   Renal  CKD IV  Endocrine   Body mass index is 27.12 kg/m². Overweight: 25.0-29.9kg/m2   Prediabetes (A1C 5.7%-6.4%)    Lab Results   Lab Value Date/Time    HGBA1C 5.98 (H) 04/22/2019 1136    HGBA1C 6.10 (H) 10/17/2018 1623     Results from last 7 days   Lab Units 02/13/25  0516 02/12/25  2342   GLUCOSE mg/dL 98 122       Diet: Diet: Cardiac; Healthy Heart (2-3 Na+); Fluid Consistency: Thin (IDDSI 0)   Advance Directives: Code Status and Medical Interventions: No CPR (Do Not Attempt to Resuscitate); Limited Support; No dialysis, No intubation (DNI)   Ordered at: 02/12/25 1941     Medical Intervention Limits:    No dialysis    No intubation (DNI)     Code Status (Patient has no pulse and is not breathing):    No CPR (Do Not Attempt to Resuscitate)     Medical Interventions (Patient has pulse or is breathing):    Limited Support        VTE Prophylaxis:  Mechanical VTE prophylaxis orders are present.         In brief:    Transfusion 2 PRBCs today. (#3 and #4 this admission)  SCr better - SCr peak on 01/8/25: 2.90  Monitor Hb  Hold anticoagulation for now.  BC from 02/12: 1 out of 2: GPB. Most likely a contaminant. No fevers. No leukocytosis.  PT/OT  Discussed with Daughter  Plan for 2 PRBCs today.  Monitor hb.  Hold on discharge from the Hospital  If Hb stable, may be transferred to her facility early next week.  She is concerned she was discharged too prematurely during last hospitalization.  Disposition: Keep in ICU. - Consider transfer to [facility] tomorrow if Hb stable.    Plan of care and  goals reviewed during interdisciplinary rounds.  I discussed the patient's findings and my recommendations with patient, family, and nursing staff    MDM:    Problem(s) High due to: Acute or Chronic illness or injury that may poses a threat to life or bodily function  Data: Moderate due to: Review of prior external records from each unique source, Review or results of each unique test, and Ordering of each unique test    Moderate    [x] Primary Attending Intensive Care Medicine - Nutrition Support   [] Consultant    Copied text in this note has been reviewed and is accurate as of 02/14/25

## 2025-02-14 NOTE — CASE MANAGEMENT/SOCIAL WORK
Continued Stay Note   [place]     Patient Name: Rose J Kellermann  MRN: [id number]  Today's Date: 2/14/2025    Admit Date: 2/12/2025    Plan: [facility] SN[facility]   Discharge Plan       Row Name 02/14/25 1253       Plan    Plan [facility]    Patient/Family in Agreement with Plan yes    Plan Comments Spoke with patient and her daughter, Areli, at bedside to discuss disposition.  Also [facility]; confirmed patient has private pay bed hold and she will need a new insurance pre-cert initiated once therapy has seen her.  Patient will need EMS transportation arranged at discharge.  CM will continue to follow.    Final Discharge Disposition Code 03 - skilled nursing facility (SNF)                   Discharge Codes    No documentation.                   Tiffany Edmondson RN

## 2025-02-14 NOTE — PROGRESS NOTES
"GI Daily Progress Note  Subjective:    Chief Complaint:  Follow up coffee ground emesis     Anastasia is in good spirits today, much more alert and bright.    Denies any complaints.   Daughter is present at the bedside.      No overt bleeding overnight      Objective:    BP 99/62   Pulse 75   Temp 96.6 °F (35.9 °C) (Axillary)   Resp 12   Ht 162.6 cm (64\")   Wt 71.7 kg (158 lb)   SpO2 98%   BMI 27.12 kg/m²     Physical Exam  Constitutional:       General: She is not in acute distress.  HENT:      Ears:      Comments: hard of hearing   Cardiovascular:      Rate and Rhythm: Normal rate and regular rhythm.   Pulmonary:      Effort: Pulmonary effort is normal. No respiratory distress.   Abdominal:      General: Bowel sounds are normal. There is no distension.      Palpations: Abdomen is soft.      Tenderness: There is no abdominal tenderness. There is no guarding.   Neurological:      Mental Status: She is alert.         Lab  Lab Results   Component Value Date    WBC 9.70 02/14/2025    HGB 7.5 (L) 02/14/2025    HGB 8.8 (L) 02/13/2025    HGB 8.9 (L) 02/13/2025    MCV 93.5 02/14/2025     02/14/2025    INR 2.21 (H) 02/12/2025    INR 1.82 (H) 01/21/2025    INR 1.06 10/17/2018    INR 1.04 11/24/2017       Lab Results   Component Value Date    GLUCOSE 94 02/14/2025     (H) 02/14/2025    CREATININE 1.78 (H) 02/14/2025    EGFRIFNONA 28 (L) 04/22/2019    BCR 64.0 (H) 02/14/2025     02/14/2025    K 3.4 (L) 02/14/2025    CO2 27.0 02/14/2025    CALCIUM 8.4 02/14/2025    ALBUMIN 2.7 (L) 02/14/2025    ALKPHOS 59 02/14/2025    BILITOT 0.5 02/14/2025    BILIDIR <0.2 11/09/2023    ALT 13 02/14/2025    AST 17 02/14/2025       Assessment:    Duodenal ulcer with oozing and visible vessel s/p EGD yesterday with Ovesco clip applied for hemostasis   Hematemesis of coffee grounds, due to above, resolved.     Acute blood loss anemia, slightly worse today  Anticoagulation use, on Apixaban 5 mg BID     Plan:    H&H decreased " to 7.5 and 23.    Not unexpected due to bleeding during EGD yesterday.        >> Agree with additional transfusion of 1 unit of PRBCs  >> Twice daily PPI for 1 month, then daily indefinitely  >> Await H. Pylori biopsy.     >> Continue to hold Eliquis.   When resuming would consider dose reduction for age and renal function.        GELY Kingsley  02/14/25  07:45 EST

## 2025-02-14 NOTE — PLAN OF CARE
Problem: Adult Inpatient Plan of Care  Goal: Plan of Care Review  Outcome: Progressing  Goal: Patient-Specific Goal (Individualized)  Outcome: Progressing  Goal: Absence of Hospital-Acquired Illness or Injury  Outcome: Progressing  Intervention: Identify and Manage Fall Risk  Description: Perform standard risk assessment on admission using a validated tool or comprehensive approach appropriate to the patient; reassess fall risk frequently, with change in status or transfer to another level of care.  Communicate risk to interprofessional healthcare team; ensure fall risk visible cue.  Determine need for increased observation, equipment and environmental modification, as well as use of supportive, nonskid footwear.  Adjust safety measures to individual needs and identified risk factors.  Reinforce the importance of active participation with fall risk prevention, safety, and physical activity with the patient and family.  Perform regular intentional rounding to assess need for position change, pain assessment and personal needs, including assistance with toileting.  Recent Flowsheet Documentation  Taken 2/14/2025 1600 by Layla Rojo, RN  Safety Promotion/Fall Prevention:   assistive device/personal items within reach   clutter free environment maintained   fall prevention program maintained   toileting scheduled   safety round/check completed   room organization consistent   nonskid shoes/slippers when out of bed  Taken 2/14/2025 1345 by Layla Rojo, RN  Safety Promotion/Fall Prevention:   assistive device/personal items within reach   clutter free environment maintained   fall prevention program maintained   toileting scheduled   safety round/check completed   room organization consistent   nonskid shoes/slippers when out of bed  Taken 2/14/2025 1210 by Layla Rojo, RN  Safety Promotion/Fall Prevention:   assistive device/personal items within reach   clutter free environment maintained   fall prevention  program maintained   toileting scheduled   safety round/check completed   room organization consistent   nonskid shoes/slippers when out of bed  Taken 2/14/2025 1000 by Layla Rojo RN  Safety Promotion/Fall Prevention:   assistive device/personal items within reach   clutter free environment maintained   fall prevention program maintained   toileting scheduled   safety round/check completed   room organization consistent   nonskid shoes/slippers when out of bed  Taken 2/14/2025 0800 by Layla Rojo, RN  Safety Promotion/Fall Prevention:   assistive device/personal items within reach   clutter free environment maintained   fall prevention program maintained   toileting scheduled   safety round/check completed   room organization consistent   nonskid shoes/slippers when out of bed  Intervention: Prevent Skin Injury  Description: Perform a screening for skin injury risk, such as pressure or moisture-associated skin damage on admission and at regular intervals throughout hospital stay.  Keep all areas of skin (especially folds) clean and dry.  Maintain adequate skin hydration.  Relieve and redistribute pressure and protect bony prominences and skin at risk for injury; implement measures based on patient-specific risk factors.  Match turning and repositioning schedule to clinical condition.  Encourage weight shift frequently; assist with reposition if unable to complete independently.  Float heels off bed; avoid pressure on the Achilles tendon.  Keep skin free from extended contact with medical devices.  Optimize nutrition and hydration.  Encourage functional activity and mobility, as early as tolerated.  Use aids (e.g., slide boards, mechanical lift) during transfer.  Recent Flowsheet Documentation  Taken 2/14/2025 1600 by Layla Rojo, RN  Body Position:   weight shifting   tilted   turned   right   lower extremity elevated   upper extremity elevated   neutral body alignment   neutral head position  Skin  Protection:   incontinence pads utilized   transparent dressing maintained   silicone foam dressing in place  Taken 2/14/2025 1345 by Layla Rojo RN  Body Position:   patient/family refused   lower extremity elevated   neutral head position   neutral body alignment   upper extremity elevated  Skin Protection:   incontinence pads utilized   transparent dressing maintained   silicone foam dressing in place  Taken 2/14/2025 1210 by Layla Rojo RN  Body Position: (supine per pt request)   weight shifting   turned   supine   tilted   upper extremity elevated   lower extremity elevated   neutral body alignment   neutral head position  Skin Protection:   incontinence pads utilized   transparent dressing maintained   silicone foam dressing in place   protective footwear used  Taken 2/14/2025 1000 by Layla Rojo RN  Body Position:   weight shifting   tilted   turned   left   lower extremity elevated   neutral body alignment   neutral head position   upper extremity elevated  Skin Protection:   incontinence pads utilized   transparent dressing maintained   silicone foam dressing in place   protective footwear used  Taken 2/14/2025 0800 by Layla Rojo RN  Body Position: patient/family refused  Skin Protection:   incontinence pads utilized   transparent dressing maintained   silicone foam dressing in place   protective footwear used  Intervention: Prevent and Manage VTE (Venous Thromboembolism) Risk  Description: Assess for VTE (venous thromboembolism) risk.  Promote early mobilization; encourage both active and passive leg exercises, if unable to ambulate.  Initiate and maintain compression or other therapy, as indicated, based on identified risk in accordance with organizational protocol and provider order.  Recognize the patient's individual risk for bleeding before initiating pharmacologic thromboprophylaxis.  Recent Flowsheet Documentation  Taken 2/14/2025 1600 by Layla Rojo RN  VTE  Prevention/Management:   bilateral   SCDs (sequential compression devices) off   patient refused intervention  Taken 2/14/2025 0800 by Layla Rojo RN  VTE Prevention/Management:   bilateral   SCDs (sequential compression devices) on  Goal: Optimal Comfort and Wellbeing  Outcome: Progressing  Intervention: Provide Person-Centered Care  Description: Use a family-focused approach to care; encourage support system presence and participation.  Develop trust and rapport by proactively providing information, encouraging questions, addressing concerns and offering reassurance.  Acknowledge emotional response to hospitalization.  Recognize and utilize personal coping strategies and strengths; develop goals via shared decision-making.  Honor spiritual and cultural preferences.  Recent Flowsheet Documentation  Taken 2/14/2025 1600 by Layla Rojo RN  Trust Relationship/Rapport:   care explained   reassurance provided  Taken 2/14/2025 1345 by Layla Rojo RN  Trust Relationship/Rapport:   care explained   reassurance provided  Taken 2/14/2025 1210 by Layla Rojo RN  Trust Relationship/Rapport:   care explained   reassurance provided  Taken 2/14/2025 1000 by Layla Rojo RN  Trust Relationship/Rapport:   care explained   reassurance provided  Taken 2/14/2025 0800 by Layla Rojo RN  Trust Relationship/Rapport:   care explained   reassurance provided  Goal: Readiness for Transition of Care  Outcome: Progressing     Problem: Fall Injury Risk  Goal: Absence of Fall and Fall-Related Injury  Outcome: Progressing  Intervention: Identify and Manage Contributors  Description: Develop a fall prevention plan, considering patient-centered interventions and family/caregiver involvement; identify and address patient's facilitators and barriers.  Provide reorientation, appropriate sensory stimulation and routines with changes in mental status to decrease risk of fall.  Promote use of personal vision and auditory  aids.  Assess assistance level required for safe and effective self-care; provide support as needed, such as toileting and mobilization. For age 65 and older, implement timed toileting with assistance.  Encourage physical activity, such as performance of mobility and self-care at highest level of patient ability, multicomponent exercise program and provision of appropriate assistive devices.  If fall occurs, assess the severity of injury; implement fall injury protocol. Determine the cause and revise fall injury prevention plan.  Regularly review and advocate for medication adjustment to decrease fall risk; consider administration times, polypharmacy and age.  Balance adequate pain management with potential for oversedation.  Recent Flowsheet Documentation  Taken 2/14/2025 1600 by Layla Rojo RN  Self-Care Promotion: independence encouraged  Taken 2/14/2025 1210 by Layla Rojo RN  Self-Care Promotion: independence encouraged  Taken 2/14/2025 1000 by Layla Rojo RN  Self-Care Promotion: independence encouraged  Taken 2/14/2025 0800 by Layla Rojo RN  Self-Care Promotion: independence encouraged  Intervention: Promote Injury-Free Environment  Description: Provide a safe, barrier-free environment that encourages independent activity.  Keep care area uncluttered and well-lighted.  Determine need for increased observation or monitoring.  Avoid use of devices that minimize mobility, such as restraints or indwelling urinary catheter.  Recent Flowsheet Documentation  Taken 2/14/2025 1600 by Layla Rojo RN  Safety Promotion/Fall Prevention:   assistive device/personal items within reach   clutter free environment maintained   fall prevention program maintained   toileting scheduled   safety round/check completed   room organization consistent   nonskid shoes/slippers when out of bed  Taken 2/14/2025 1345 by Layla Rojo RN  Safety Promotion/Fall Prevention:   assistive device/personal items within  reach   clutter free environment maintained   fall prevention program maintained   toileting scheduled   safety round/check completed   room organization consistent   nonskid shoes/slippers when out of bed  Taken 2/14/2025 1210 by Layla Rojo RN  Safety Promotion/Fall Prevention:   assistive device/personal items within reach   clutter free environment maintained   fall prevention program maintained   toileting scheduled   safety round/check completed   room organization consistent   nonskid shoes/slippers when out of bed  Taken 2/14/2025 1000 by Layla Rojo RN  Safety Promotion/Fall Prevention:   assistive device/personal items within reach   clutter free environment maintained   fall prevention program maintained   toileting scheduled   safety round/check completed   room organization consistent   nonskid shoes/slippers when out of bed  Taken 2/14/2025 0800 by Layla Rojo RN  Safety Promotion/Fall Prevention:   assistive device/personal items within reach   clutter free environment maintained   fall prevention program maintained   toileting scheduled   safety round/check completed   room organization consistent   nonskid shoes/slippers when out of bed     Problem: Skin Injury Risk Increased  Goal: Skin Health and Integrity  Outcome: Progressing  Intervention: Optimize Skin Protection  Description: Perform a full pressure injury risk assessment, as indicated by screening, upon admission to care unit.  Reassess skin (full inspection and injury risk, including skin temperature, consistency and color) frequently (e.g., scheduled interval, with change in condition) to provide optimal early detection and prevention.  Maintain adequate tissue perfusion (e.g., encourage fluid balance; avoid crossing legs, constrictive clothing or devices) to promote tissue oxygenation.  Maintain head of bed at lowest degree of elevation tolerated, considering medical condition and other restrictions. Use positioning supports  to prevent sliding and friction. Consider low friction textiles.  Avoid positioning onto an area that remains reddened or on bony prominences.  Minimize incontinence and moisture (e.g., toileting schedule; moisture-wicking pad, diaper or incontinence collection device; skin moisture barrier).  Cleanse skin promptly and gently, when soiled, utilizing a pH-balanced cleanser.  Relieve and redistribute pressure (e.g., scheduled position changes, weight shifts, use of support surface, medical device repositioning, protective dressing application, use of positioning device, microclimate control, use of pressure-injury-monitor  Encourage increased activity, such as sitting in a chair at the bedside or early mobilization, when able to tolerate. Avoid prolonged sitting.  Recent Flowsheet Documentation  Taken 2/14/2025 1600 by Layla Rojo RN  Activity Management: bedrest  Pressure Reduction Techniques:   frequent weight shift encouraged   heels elevated off bed   positioned off wounds   pressure points protected   weight shift assistance provided  Head of Bed (HOB) Positioning: HOB at 60 degrees  Pressure Reduction Devices:   specialty bed utilized   pressure-redistributing mattress utilized   positioning supports utilized   foam padding utilized   heel offloading device utilized  Skin Protection:   incontinence pads utilized   transparent dressing maintained   silicone foam dressing in place  Taken 2/14/2025 1345 by Layla Rojo RN  Activity Management: bedrest  Pressure Reduction Techniques:   frequent weight shift encouraged   heels elevated off bed   positioned off wounds   pressure points protected  Head of Bed (HOB) Positioning: HOB at 60 degrees  Pressure Reduction Devices:   specialty bed utilized   pressure-redistributing mattress utilized   positioning supports utilized   foam padding utilized  Skin Protection:   incontinence pads utilized   transparent dressing maintained   silicone foam dressing in  place  Taken 2/14/2025 1210 by Layla Rojo RN  Activity Management: bedrest  Pressure Reduction Techniques:   frequent weight shift encouraged   heels elevated off bed   positioned off wounds   pressure points protected   weight shift assistance provided  Head of Bed (HOB) Positioning: HOB at 60 degrees  Pressure Reduction Devices:   specialty bed utilized   pressure-redistributing mattress utilized   positioning supports utilized   heel offloading device utilized   foam padding utilized  Skin Protection:   incontinence pads utilized   transparent dressing maintained   silicone foam dressing in place   protective footwear used  Taken 2/14/2025 1000 by Layla Rojo RN  Activity Management: bedrest  Pressure Reduction Techniques:   frequent weight shift encouraged   heels elevated off bed   positioned off wounds   pressure points protected   weight shift assistance provided  Head of Bed (HOB) Positioning: HOB at 60 degrees  Pressure Reduction Devices:   specialty bed utilized   heel offloading device utilized   foam padding utilized   positioning supports utilized   pressure-redistributing mattress utilized  Skin Protection:   incontinence pads utilized   transparent dressing maintained   silicone foam dressing in place   protective footwear used  Taken 2/14/2025 0800 by Layla Rojo RN  Activity Management: bedrest  Pressure Reduction Techniques:   frequent weight shift encouraged   heels elevated off bed   positioned off wounds   pressure points protected  Head of Bed (HOB) Positioning: HOB at 60 degrees  Pressure Reduction Devices:   specialty bed utilized   pressure-redistributing mattress utilized   positioning supports utilized   heel offloading device utilized   foam padding utilized  Skin Protection:   incontinence pads utilized   transparent dressing maintained   silicone foam dressing in place   protective footwear used     Problem: Comorbidity Management  Goal: Maintenance of Heart Failure Symptom  Control  Outcome: Progressing  Goal: Blood Pressure in Desired Range  Outcome: Progressing   Goal Outcome Evaluation:

## 2025-02-14 NOTE — PLAN OF CARE
Goal Outcome Evaluation:      NEURO: GCS 13-14. Confused. PERRL. Generalized weakness.   RESP: 3-4 L NC.   CARDIO: AFIB MAP > 65   GI/: Reg diet. External cath.   ACCESS: PIV x2  SKIN: As charted in EMR      Pt needs addressed prior to shift change. Bed locked in lowest position, HOB elevated, and call light within reach. Bedside report given to oncoming RN.

## 2025-02-15 LAB
ANION GAP SERPL CALCULATED.3IONS-SCNC: 12 MMOL/L (ref 5–15)
BACTERIA SPEC AEROBE CULT: ABNORMAL
BASOPHILS # BLD AUTO: 0.04 10*3/MM3 (ref 0–0.2)
BASOPHILS NFR BLD AUTO: 0.5 % (ref 0–1.5)
BH BB BLOOD EXPIRATION DATE: NORMAL
BH BB BLOOD EXPIRATION DATE: NORMAL
BH BB BLOOD TYPE BARCODE: 7300
BH BB BLOOD TYPE BARCODE: 7300
BH BB DISPENSE STATUS: NORMAL
BH BB DISPENSE STATUS: NORMAL
BH BB PRODUCT CODE: NORMAL
BH BB PRODUCT CODE: NORMAL
BH BB UNIT NUMBER: NORMAL
BH BB UNIT NUMBER: NORMAL
BUN SERPL-MCNC: 99 MG/DL (ref 8–23)
BUN/CREAT SERPL: 53.8 (ref 7–25)
CALCIUM SPEC-SCNC: 8.5 MG/DL (ref 8.2–9.6)
CHLORIDE SERPL-SCNC: 102 MMOL/L (ref 98–107)
CO2 SERPL-SCNC: 26 MMOL/L (ref 22–29)
CREAT SERPL-MCNC: 1.84 MG/DL (ref 0.57–1)
CROSSMATCH INTERPRETATION: NORMAL
CROSSMATCH INTERPRETATION: NORMAL
DEPRECATED RDW RBC AUTO: 50.4 FL (ref 37–54)
EGFRCR SERPLBLD CKD-EPI 2021: 24.5 ML/MIN/1.73
EOSINOPHIL # BLD AUTO: 0.3 10*3/MM3 (ref 0–0.4)
EOSINOPHIL NFR BLD AUTO: 3.8 % (ref 0.3–6.2)
ERYTHROCYTE [DISTWIDTH] IN BLOOD BY AUTOMATED COUNT: 15.7 % (ref 12.3–15.4)
GLUCOSE SERPL-MCNC: 113 MG/DL (ref 65–99)
GRAM STN SPEC: ABNORMAL
HCT VFR BLD AUTO: 28.8 % (ref 34–46.6)
HCT VFR BLD AUTO: 30.8 % (ref 34–46.6)
HGB BLD-MCNC: 10.2 G/DL (ref 12–15.9)
HGB BLD-MCNC: 9.6 G/DL (ref 12–15.9)
IMM GRANULOCYTES # BLD AUTO: 0.05 10*3/MM3 (ref 0–0.05)
IMM GRANULOCYTES NFR BLD AUTO: 0.6 % (ref 0–0.5)
ISOLATED FROM: ABNORMAL
LYMPHOCYTES # BLD AUTO: 0.93 10*3/MM3 (ref 0.7–3.1)
LYMPHOCYTES NFR BLD AUTO: 11.9 % (ref 19.6–45.3)
MAGNESIUM SERPL-MCNC: 2.1 MG/DL (ref 1.7–2.3)
MCH RBC QN AUTO: 30.7 PG (ref 26.6–33)
MCHC RBC AUTO-ENTMCNC: 33.3 G/DL (ref 31.5–35.7)
MCV RBC AUTO: 92 FL (ref 79–97)
MONOCYTES # BLD AUTO: 0.69 10*3/MM3 (ref 0.1–0.9)
MONOCYTES NFR BLD AUTO: 8.8 % (ref 5–12)
NEUTROPHILS NFR BLD AUTO: 5.81 10*3/MM3 (ref 1.7–7)
NEUTROPHILS NFR BLD AUTO: 74.4 % (ref 42.7–76)
NRBC BLD AUTO-RTO: 0.3 /100 WBC (ref 0–0.2)
PLATELET # BLD AUTO: 175 10*3/MM3 (ref 140–450)
PMV BLD AUTO: 10.4 FL (ref 6–12)
POTASSIUM SERPL-SCNC: 3.9 MMOL/L (ref 3.5–5.2)
QT INTERVAL: 356 MS
QTC INTERVAL: 395 MS
RBC # BLD AUTO: 3.13 10*6/MM3 (ref 3.77–5.28)
SODIUM SERPL-SCNC: 140 MMOL/L (ref 136–145)
UNIT  ABO: NORMAL
UNIT  ABO: NORMAL
UNIT  RH: NORMAL
UNIT  RH: NORMAL
WBC NRBC COR # BLD AUTO: 7.82 10*3/MM3 (ref 3.4–10.8)

## 2025-02-15 PROCEDURE — 99232 SBSQ HOSP IP/OBS MODERATE 35: CPT | Performed by: NURSE PRACTITIONER

## 2025-02-15 PROCEDURE — 93005 ELECTROCARDIOGRAM TRACING: CPT | Performed by: INTERNAL MEDICINE

## 2025-02-15 PROCEDURE — 85018 HEMOGLOBIN: CPT | Performed by: INTERNAL MEDICINE

## 2025-02-15 PROCEDURE — 93010 ELECTROCARDIOGRAM REPORT: CPT | Performed by: INTERNAL MEDICINE

## 2025-02-15 PROCEDURE — 85025 COMPLETE CBC W/AUTO DIFF WBC: CPT | Performed by: INTERNAL MEDICINE

## 2025-02-15 PROCEDURE — 83735 ASSAY OF MAGNESIUM: CPT | Performed by: INTERNAL MEDICINE

## 2025-02-15 PROCEDURE — 99232 SBSQ HOSP IP/OBS MODERATE 35: CPT | Performed by: INTERNAL MEDICINE

## 2025-02-15 PROCEDURE — 85014 HEMATOCRIT: CPT | Performed by: INTERNAL MEDICINE

## 2025-02-15 PROCEDURE — 97166 OT EVAL MOD COMPLEX 45 MIN: CPT

## 2025-02-15 PROCEDURE — 80048 BASIC METABOLIC PNL TOTAL CA: CPT | Performed by: INTERNAL MEDICINE

## 2025-02-15 PROCEDURE — 97162 PT EVAL MOD COMPLEX 30 MIN: CPT

## 2025-02-15 RX ORDER — PANTOPRAZOLE SODIUM 40 MG/1
40 TABLET, DELAYED RELEASE ORAL
Status: DISCONTINUED | OUTPATIENT
Start: 2025-02-15 | End: 2025-02-20 | Stop reason: HOSPADM

## 2025-02-15 RX ADMIN — MUPIROCIN 1 APPLICATION: 20 OINTMENT TOPICAL at 09:32

## 2025-02-15 RX ADMIN — Medication 10 ML: at 20:53

## 2025-02-15 RX ADMIN — MUPIROCIN 1 APPLICATION: 20 OINTMENT TOPICAL at 20:54

## 2025-02-15 RX ADMIN — PANTOPRAZOLE SODIUM 40 MG: 40 INJECTION, POWDER, FOR SOLUTION INTRAVENOUS at 06:19

## 2025-02-15 RX ADMIN — Medication 10 ML: at 09:32

## 2025-02-15 RX ADMIN — PANTOPRAZOLE SODIUM 40 MG: 40 TABLET, DELAYED RELEASE ORAL at 18:10

## 2025-02-15 NOTE — PROGRESS NOTES
"  GI Daily Progress Note  Subjective:    Chief Complaint: Follow-up GI bleed, duodenal ulcer    Patient resting in bed in no acute distress.  Notes she is feeling fine today.  No further reports of bleeding.  Nursing does report that she is passing dark tarry stools though hemoglobin is improving.    Objective:    /69   Pulse 70   Temp 97.5 °F (36.4 °C) (Axillary)   Resp 16   Ht 162.6 cm (64\")   Wt 71.7 kg (158 lb)   SpO2 98%   BMI 27.12 kg/m²     Physical Exam  Vitals and nursing note reviewed.   Constitutional:       General: She is not in acute distress.     Appearance: Normal appearance. She is normal weight. She is not ill-appearing or toxic-appearing.   Cardiovascular:      Rate and Rhythm: Normal rate and regular rhythm.      Pulses: Normal pulses.      Heart sounds: Normal heart sounds.   Pulmonary:      Effort: Pulmonary effort is normal. No respiratory distress.      Breath sounds: Normal breath sounds.   Abdominal:      General: Abdomen is flat. Bowel sounds are normal. There is no distension.      Palpations: Abdomen is soft. There is no mass.      Tenderness: There is no abdominal tenderness. There is no guarding or rebound.      Hernia: No hernia is present.   Neurological:      Mental Status: She is alert. Mental status is at baseline.      Comments: Significantly hard of hearing   Psychiatric:         Mood and Affect: Mood normal.         Behavior: Behavior normal.         Thought Content: Thought content normal.         Judgment: Judgment normal.         Lab  I have personally reviewed most recent cardiac tracings, lab results, and radiology images and interpretations and agree with findings.    Lab Results   Component Value Date    WBC 7.82 02/15/2025    HGB 10.2 (L) 02/15/2025    HGB 9.6 (L) 02/15/2025    HGB 10.1 (L) 02/14/2025    MCV 92.0 02/15/2025     02/15/2025    INR 2.21 (H) 02/12/2025    INR 1.82 (H) 01/21/2025    INR 1.06 10/17/2018    INR 1.04 11/24/2017       Lab " Results   Component Value Date    GLUCOSE 113 (H) 02/15/2025    BUN 99 (H) 02/15/2025    CREATININE 1.84 (H) 02/15/2025    EGFRIFNONA 28 (L) 04/22/2019    BCR 53.8 (H) 02/15/2025     02/15/2025    K 3.9 02/15/2025    CO2 26.0 02/15/2025    CALCIUM 8.5 02/15/2025    ALBUMIN 2.7 (L) 02/14/2025    ALKPHOS 59 02/14/2025    BILITOT 0.5 02/14/2025    BILIDIR <0.2 11/09/2023    ALT 13 02/14/2025    AST 17 02/14/2025       Assessment:      Acute blood loss anemia, asymptomatic    Duodenal ulcer with oozing and visible vessel s/p EGD yesterday with Ovesco clip applied for hemostasis   Hematemesis of coffee grounds, due to above, resolved.     Acute blood loss anemia, slightly worse today  Anticoagulation use, on Apixaban 5 mg BID     Plan:  Hemoglobin stable.  Passing old blood in stools currently.    >> Continue to monitor H&H and transfuse per protocol  >> Twice daily PPI x 30 days; daily thereafter  >> Avoid NSAIDs  >> Continue to hold Eliquis; can consider reinitiating prior to discharge.  >> Await Helicobacter pylori biopsy    CONNIE Jimenez  02/15/25  17:57 EST

## 2025-02-15 NOTE — PROGRESS NOTES
"Intensive Care Follow-up     Hospital:  LOS: 3 days   Ms. Rose J Kellermann, 89 y.o. female is followed for:   Acute blood loss anemia        Bryan Oconnor is a 89 y.o. female currently residing at rehab facility after she was recently admitted and discharged from the hospital 3 weeks ago, who was transferred to [facility] on 2/12/2025 with hypotension (SBP 70) and weakness secondary to Acute blood loss anemia [D62]     In the ED, her Hb was 6.6 from baseline of 12.2.  She had a couple of episodes of coffee-ground looking emesis in the ED.   INR was 2.2.  Subsequently found to have a duodenal ulcer.  Interval History:  The chart has been reviewed.  The patient has had some further episodes of melena with bowel movements.  No hemodynamic instability.  Good urinary outflow.  She states that she feels tired today but is not having any problem with chest pain or shortness of breath.    The patient's past medical, surgical and social history were reviewed and updated in Epic as appropriate.        Objective     Infusions:     Medications:  mupirocin, 1 Application, Each Nare, BID  pantoprazole, 40 mg, Oral, BID AC  sodium chloride, 10 mL, Intravenous, Q12H        Vital Sign Min/Max for last 24 hours  Temp  Min: 96.9 °F (36.1 °C)  Max: 98 °F (36.7 °C)   BP  Min: 101/59  Max: 126/91   Pulse  Min: 58  Max: 90   Resp  Min: 12  Max: 17   SpO2  Min: 88 %  Max: 100 %   Flow (L/min) (Oxygen Therapy)  Min: 3  Max: 3       Input/Output for last 24 hour shift  02/14 0701 - 02/15 0700  In: 2049.8 [P.O.:1330]  Out: 1000 [Urine:1000]      Objective:  General Appearance:  Comfortable, in no acute distress and not in pain.    Vital signs: (most recent): Blood pressure 118/91, pulse 63, temperature 97.8 °F (36.6 °C), temperature source Axillary, resp. rate 17, height 162.6 cm (64\"), weight 71.7 kg (158 lb), SpO2 99%, not currently breastfeeding.    HEENT: Normal HEENT exam.    Lungs:  Normal effort and normal respiratory rate.  Breath " sounds clear to auscultation.    Heart: Normal rate.  Regular rhythm.  S1 normal and S2 normal.  No murmur.   Chest: Symmetric chest wall expansion.   Abdomen: Abdomen is soft.  Bowel sounds are normal.   There is no abdominal tenderness.     Extremities: Normal range of motion.    Neurological: Patient is alert and oriented to person, place and time.    Pupils:  Pupils are equal, round, and reactive to light.  Pupils are equal.   Skin:  Warm.                Results from last 7 days   Lab Units 02/15/25  0424 02/14/25  1810 02/14/25  0403 02/13/25  1228 02/13/25  0503   WBC 10*3/mm3 7.82  --  9.70  --  10.17   HEMOGLOBIN g/dL 9.6* 10.1* 7.5*   < > 9.1*   PLATELETS 10*3/mm3 175  --  190  --  233    < > = values in this interval not displayed.     Results from last 7 days   Lab Units 02/15/25  0959 02/14/25  1810 02/14/25  0403 02/13/25  0503   SODIUM mmol/L 140  --  142 142   POTASSIUM mmol/L 3.9 4.1 3.4* 3.7   CO2 mmol/L 26.0  --  27.0 27.0   BUN mg/dL 99*  --  114* 124*   CREATININE mg/dL 1.84*  --  1.78* 2.09*   MAGNESIUM mg/dL 2.1  --  2.1 2.3   PHOSPHORUS mg/dL  --   --  3.4  --    GLUCOSE mg/dL 113*  --  94 98     Estimated Creatinine Clearance: 16.6 mL/min (A) (by C-G formula based on SCr of 1.84 mg/dL (H)).            I reviewed the patient's results and images.     Assessment & Plan   Impression    GI bleeding secondary to duodenal ulcer  Acute blood loss anemia  Stage III chronic kidney disease  Atrial flutter     Plan        We will continue with serial hemoglobin checks although we will backed this off to every 8 hours.  She is likely clearing old blood from her GI tract and there is no evidence of continued bleeding.  Mobilize as tolerated physical Occupational Therapy.  Nutrition has been encouraged.  Plan to transition to telemetry.    Plan of care and goals reviewed with mulitdisciplinary/antibiotic stewardship team during rounds.   I discussed the patient's findings and my recommendations with  patient, family, and nursing staff       Juan Cobb MD, [facility]  Pulmonology and Critical Care Medicine

## 2025-02-15 NOTE — THERAPY EVALUATION
Patient Name: Rose J Kellermann  : 1926    MRN: [id number]                              Today's Date: 2/15/2025       Admit Date: 2025    Visit Dx:     ICD-10-CM ICD-9-CM   1. Symptomatic anemia  D64.9 285.9   2. Acute upper GI bleed  K92.2 578.9   3. Gastric ulcer  K25.9 531.90     Patient Active Problem List   Diagnosis    Essential hypertension    Vitamin D deficiency    Fatigue    Chronic kidney disease, stage III (moderate)     Osteoarthritis    Dyslipidemia    Post-menopausal bleeding    Endometrial cancer    Closed fracture of left hip    Asymptomatic bacteriuria    Post-traumatic osteoarthritis of left hip    Status post total replacement of left hip    Prediabetes    Acute blood loss anemia, asymptomatic    Postoperative urinary retention    Myopia    Posterior crocodile shagreen of cornea    Presbyopia    Ptosis of eyelid    Regular astigmatism    Retinal neovascularization    After cataract    Secondary cataract    Premature atrial contractions    Heart murmur    Nocturnal hypoxia    Acute on chronic heart failure with preserved ejection fraction (HFpEF)    Chronic deep vein thrombosis (DVT) of both lower extremities    Retinal neovascularization    Pleural effusion on left    Pulmonary hypertension     Past Medical History:   Diagnosis Date    Abscess of back     Arrhythmia     frequent PVC    Cancer 2011    Endometrial cancer, status post robotically assisted hysterectomy with Dr. Haddad, 2011.      CKD (chronic kidney disease), stage III     no dilaysis     Dizzy     Dvt femoral (deep venous thrombosis) 2017    s/p hip surgery in . Took xarelto until 2018    Dyslipidemia     Dyslipidemia.  Observing.    Enthesopathy of hip region     Generalized osteoarthrosis, involving multiple sites     Headache     Hearing loss     Hypertension     Low backache     Needs flu shot     Osteoarthritis     Osteoarthritis with questionable carpal tunnel syndrome bilaterally.      Otitis, serous     Pneumonia 1967    Remote pneumonia, 1967.     Retinal hemorrhage     SI joint arthritis     SOB (shortness of breath)     Syncope 2001    Remote syncope with working diagnosis of vasodepressor, 2001.     Venous insufficiency of leg     Wears glasses     readers     Past Surgical History:   Procedure Laterality Date    CATARACT EXTRACTION      bilat     COLONOSCOPY      ENDOSCOPY N/A 2/13/2025    Procedure: ESOPHAGOGASTRODUODENOSCOPY;  Surgeon: Brunner, Mark I, MD;  Location:  [facility];  Service: Gastroenterology;  Laterality: N/A;    HIP PERCUTANEOUS PINNING Left 11/24/2017    Procedure: HIP PERCUTANEOUS PINNING;  Surgeon: Lucas Swanson MD;  Location:  [facility];  Service:     HYSTERECTOMY      total? but unsure     KNEE SURGERY  2001    Remote knee surgery in 2001.- right     TOTAL HIP ARTHROPLASTY Left 10/26/2018    Procedure: TOTAL HIP ARTHROPLASTY LEFT;  Surgeon: Stephen Baker MD;  Location:  [facility];  Service: Orthopedics      General Information       Row Name 02/15/25 1130          OT Time and Intention    Document Type evaluation  -     Mode of Treatment occupational therapy  -       Row Name 02/15/25 1130          General Information    Patient Profile Reviewed yes  -JR     Prior Level of Function independent:;gait;transfer;bed mobility;ADL's  Pt reports she was independent with ADL's and mobility prior to admit 3 weeks ago. Has been at the [facility] for rehab. Dtr checks on her daily at home.  -     Existing Precautions/Restrictions fall;oxygen therapy device and L/min  -     Barriers to Rehab medically complex;hearing deficit   -       Row Name 02/15/25 1133          Living Environment    People in Home alone;other (see comments)  Dtr checks on her daily at home, has recently been at the [facility] for rehab.  -       Row Name 02/15/25 1130          Home Main Entrance    Number of Stairs, Main Entrance three  -JR     Stair Railings, Main Entrance railings on  both sides of stairs  -       Row Name 02/15/25 1130          Stairs Within Home, Primary    Number of Stairs, Within Home, Primary none  -     Stair Railings, Within Home, Primary none  -       Row Name 02/15/25 1130          Cognition    Orientation Status (Cognition) oriented x 3  -       Row Name 02/15/25 1130          Safety Issues/Impairments Affecting Functional Mobility    Safety Issues Affecting Function (Mobility) awareness of need for assistance;insight into deficits/self-awareness;safety precaution awareness;safety precautions follow-through/compliance;sequencing abilities  -     Impairments Affecting Function (Mobility) balance;endurance/activity tolerance;pain;strength  -               User Key  (r) = Recorded By, (t) = Taken By, (c) = Cosigned By      Initials Name Provider Type    JR Paola Azar OT Occupational Therapist                     Mobility/ADL's       Row Name 02/15/25 1132          Bed Mobility    Bed Mobility supine-sit  -     Supine-Sit [place] (Bed Mobility) contact guard;verbal cues  -     Assistive Device (Bed Mobility) head of bed elevated;bed rails  -     Comment, (Bed Mobility) Pt required increased time and verbal cues for supine to sit on EOB.  -       Row Name 02/15/25 1132          Transfers    Transfers sit-stand transfer;bed-chair transfer  -[facility] Name 02/15/25 1132          Bed-Chair Transfer    Bed-Chair Travis (Transfers) minimum assist (75% patient effort);2 person assist;verbal cues  -     Assistive Device (Bed-Chair Transfers) walker, front-wheeled  -     Comment, (Bed-Chair Transfer) Verbal cues for hand placement with transfers. Forward flexed posture  -       Row Name 02/15/25 1132          Sit-Stand Transfer    Sit-Stand Travis (Transfers) minimum assist (75% patient effort);2 person assist;verbal cues  -     Assistive Device (Sit-Stand Transfers) walker, front-wheeled  -[facility] Name 02/15/25 1132           Functional Mobility    Functional Mobility- Ind. Level minimum assist (75% patient effort);2 person assist required;verbal cues required  -     Functional Mobility- Device walker, front-wheeled  -     Functional Mobility-Distance (Feet) --  bed to chair  -     Functional Mobility- Safety Issues supplemental O2;step length decreased  -JR       Row Name 02/15/25 1132          Activities of Daily Living    BADL Assessment/Intervention upper body dressing;lower body dressing;grooming;feeding  -JR       Row Name 02/15/25 1132          Upper Body Dressing Assessment/Training    [place] Level (Upper Body Dressing) don;front opening garment;maximum assist (25% patient effort);verbal cues  -     Position (Upper Body Dressing) edge of bed sitting  -JR       Row Name 02/15/25 1132          Lower Body Dressing Assessment/Training    [place] Level (Lower Body Dressing) don;socks;dependent (less than 25% patient effort)  -     Position (Lower Body Dressing) supine  -JR       Row Name 02/15/25 1132          Grooming Assessment/Training    [place] Level (Grooming) wash face, hands;set up  -     Position (Grooming) supported sitting  -JR       Row Name 02/15/25 1132          Self-Feeding Assessment/Training    [place] Level (Feeding) prepare tray/open items;minimum assist (75% patient effort);liquids to mouth;scoop food and bring to mouth;independent  -     Position (Feeding) supported sitting  -     Comment, (Feeding) Assisted with opening packages and cutting meat  -               User Key  (r) = Recorded By, (t) = Taken By, (c) = Cosigned By      Initials Name Provider Type     Paola Azar OT Occupational Therapist                   Obj/Interventions       [facility] Name 02/15/25 1134          Sensory Assessment (Somatosensory)    Sensory Assessment (Somatosensory) UE sensation intact  -JR       Row Name 02/15/25 1134          Range of Motion Comprehensive    General Range of Motion  bilateral upper extremity ROM WFL  -       Row Name 02/15/25 1134          Strength Comprehensive (MMT)    Comment, General Manual Muscle Testing (MMT) Assessment B UE functionally 3+/5  -[facility] Name 02/15/25 1134          Balance    Balance Assessment sitting static balance;standing dynamic balance  -JR     Static Sitting Balance contact guard  -JR     Dynamic Standing Balance minimal assist;2-person assist  -JR     Position/Device Used, Standing Balance supported;walker, rolling  -JR               User Key  (r) = Recorded By, (t) = Taken By, (c) = Cosigned By      Initials Name Provider Type    JR Paola Azar R, OT Occupational Therapist                   Goals/Plan       [facility] Name 02/15/25 1137          Transfer Goal 1 (OT)    Activity/Assistive Device (Transfer Goal 1, OT) transfers, all;walker, rolling  -JR     [place] Level/Cues Needed (Transfer Goal 1, OT) contact guard required;verbal cues required  -JR     Time Frame (Transfer Goal 1, OT) short term goal (STG);5 days  -JR     Progress/Outcome (Transfer Goal 1, OT) new goal  -[facility] Name 02/15/25 1137          Toileting Goal 1 (OT)    Activity/Device (Toileting Goal 1, OT) toileting skills, all  -JR     [place] Level/Cues Needed (Toileting Goal 1, OT) minimum assist (75% or more patient effort);verbal cues required  -JR     Time Frame (Toileting Goal 1, OT) long term goal (LTG);by discharge  -JR     Progress/Outcome (Toileting Goal 1, OT) new goal  -[facility] Name 02/15/25 1137          Grooming Goal 1 (OT)    Activity/Device (Grooming Goal 1, OT) grooming skills, all  -JR     [place] (Grooming Goal 1, OT) set-up required;verbal cues required  -JR     Time Frame (Grooming Goal 1, OT) long term goal (LTG);by discharge  -JR     Progress/Outcome (Grooming Goal 1, OT) new goal  -[facility] Name 02/15/25 1137          Therapy Assessment/Plan (OT)    Planned Therapy Interventions (OT) activity tolerance training;adaptive  equipment training;BADL retraining;functional balance retraining;occupation/activity based interventions;ROM/therapeutic exercise;transfer/mobility retraining;strengthening exercise;patient/caregiver education/training  -               User Key  (r) = Recorded By, (t) = Taken By, (c) = Cosigned By      Initials Name Provider Type    JR Paola Azar, OT Occupational Therapist                   Clinical Impression       [facility] Name 02/15/25 1135          Pain Assessment    Pretreatment Pain Rating 0/10 - no pain  -JR     Posttreatment Pain Rating 0/10 - no pain  -JR       [facility] Name 02/15/25 1135          Plan of Care Review    Plan of Care Reviewed With patient  -JR     Outcome Evaluation OT initial eval and expanded chart review completed. Pt presents with multiple comorbidities and decreased strength, balance, activity tolerance and SOA limiting independence with ADL's and mobility from baseline status. Recommend continued skilled OT services and transfer to SNF when medically appropriate.  -[facility] Name 02/15/25 1135          Therapy Assessment/Plan (OT)    Patient/Family Therapy Goal Statement (OT) go home  -     Rehab Potential (OT) good  -     Criteria for Skilled Therapeutic Interventions Met (OT) yes;meets criteria;skilled treatment is necessary  -     Therapy Frequency (OT) daily  -     Predicted Duration of Therapy Intervention (OT) 10 days  -[facility] Name 02/15/25 1135          Therapy Plan Review/Discharge Plan (OT)    Anticipated Discharge Disposition (OT) skilled nursing facility  -[facility] Name 02/15/25 1135          Vital Signs    Pre Systolic BP Rehab 104  -JR     Pre Treatment Diastolic BP 62  -JR     Post Systolic BP Rehab 131  -JR     Post Treatment Diastolic BP 88  -JR     Pretreatment Heart Rate (beats/min) 70  -JR     Posttreatment Heart Rate (beats/min) 74  -JR     Pre SpO2 (%) 97  -JR     O2 Delivery Pre Treatment nasal cannula  -JR     Post SpO2 (%) 97  -JR     O2  Delivery Post Treatment nasal cannula  -JR     Pre Patient Position Supine  -JR     Intra Patient Position Standing  -JR     Post Patient Position Sitting  -JR       Row Name 02/15/25 1135          Positioning and Restraints    Pre-Treatment Position in bed  -JR     Post Treatment Position chair  -JR     In Chair notified nsg;reclined;call light within reach;encouraged to call for assist;exit alarm on;waffle cushion;heels elevated  -JR               User Key  (r) = Recorded By, (t) = Taken By, (c) = Cosigned By      Initials Name Provider Type    JR Paola Azar, OT Occupational Therapist                   Outcome Measures       Row Name 02/15/25 1139          How much help from another is currently needed...    Putting on and taking off regular lower body clothing? 1  -JR     Bathing (including washing, rinsing, and drying) 2  -JR     Toileting (which includes using toilet bed pan or urinal) 1  -JR     Putting on and taking off regular upper body clothing 2  -JR     Taking care of personal grooming (such as brushing teeth) 3  -JR     Eating meals 3  -JR     AM-PAC 6 Clicks Score (OT) 12  -JR       Row Name 02/15/25 1013 02/15/25 0800       How much help from another person do you currently need...    Turning from your back to your side while in flat bed without using bedrails? 3  -CK 2  -AL    Moving from lying on back to sitting on the side of a flat bed without bedrails? 2  -CK 1  -AL    Moving to and from a bed to a chair (including a wheelchair)? 3  -CK 1  -AL    Standing up from a chair using your arms (e.g., wheelchair, bedside chair)? 3  -CK 1  -AL    Climbing 3-5 steps with a railing? 2  -CK 1  -AL    To walk in hospital room? 3  -CK 1  -AL    AM-PAC 6 Clicks Score (PT) 16  -CK 7  -AL    Highest Level of Mobility Goal 5 --> Static standing  -CK 2 --> Bed activities/dependent transfer  -AL      Row Name 02/15/25 1139 02/15/25 1013       Functional Assessment    Outcome Measure Options AM-PAC 6 Clicks  Daily Activity (OT)  - AM-PAC 6 Clicks Basic Mobility (PT)  -CK              User Key  (r) = Recorded By, (t) = Taken By, (c) = Cosigned By      Initials Name Provider Type     Paola Azar OT Occupational Therapist    Layla Bond, RN Registered Nurse    CK Jaycee Harding, PT Physical Therapist                    Occupational Therapy Education       Title: PT OT SLP Therapies (In Progress)       Topic: Occupational Therapy (In Progress)       Point: ADL training (Done)       Description:   Instruct learner(s) on proper safety adaptation and remediation techniques during self care or transfers.   Instruct in proper use of assistive devices.                  Learning Progress Summary            Patient Acceptance, E, VU,NR by  at 2/15/2025 0800    Comment: role of therapy, ongoing treatment plan, discharge recommendations                      Point: Home exercise program (Not Started)       Description:   Instruct learner(s) on appropriate technique for monitoring, assisting and/or progressing therapeutic exercises/activities.                  Learner Progress:  Not documented in this visit.              Point: Precautions (Done)       Description:   Instruct learner(s) on prescribed precautions during self-care and functional transfers.                  Learning Progress Summary            Patient Acceptance, E, VU,NR by  at 2/15/2025 0800    Comment: role of therapy, ongoing treatment plan, discharge recommendations                      Point: Body mechanics (Not Started)       Description:   Instruct learner(s) on proper positioning and spine alignment during self-care, functional mobility activities and/or exercises.                  Learner Progress:  Not documented in this visit.                              User Key       Initials Effective Dates Name Provider Type [facility] 02/03/23 -  Paola Azar OT Occupational Therapist OT                  OT Recommendation and  Plan  Planned Therapy Interventions (OT): activity tolerance training, adaptive equipment training, BADL retraining, functional balance retraining, occupation/activity based interventions, ROM/therapeutic exercise, transfer/mobility retraining, strengthening exercise, patient/caregiver education/training  Therapy Frequency (OT): daily  Plan of Care Review  Plan of Care Reviewed With: patient  Outcome Evaluation: OT initial eval and expanded chart review completed. Pt presents with multiple comorbidities and decreased strength, balance, activity tolerance and SOA limiting independence with ADL's and mobility from baseline status. Recommend continued skilled OT services and transfer to SNF when medically appropriate.     Time Calculation:   Evaluation Complexity (OT)  Review Occupational Profile/Medical/Therapy History Complexity: expanded/moderate complexity  Assessment, Occupational Performance/Identification of Deficit Complexity: 3-5 performance deficits  Clinical Decision Making Complexity (OT): detailed assessment/moderate complexity  Overall Complexity of Evaluation (OT): moderate complexity     Time Calculation- OT       Row Name 02/15/25 1140             Time Calculation- OT    OT Start Time 0800  -JR      OT Received On 02/15/25  -JR      OT Goal Re-Cert Due Date 02/25/25  -JR         Untimed Charges    OT Eval/Re-eval Minutes 48  -JR         Total Minutes    Untimed Charges Total Minutes 48  -JR       Total Minutes 48  -JR                User Key  (r) = Recorded By, (t) = Taken By, (c) = Cosigned By      Initials Name Provider Type     Paola Azar OT Occupational Therapist                  Therapy Charges for Today       Code Description Service Date Service Provider Modifiers Qty    [id number]  OT EVAL MOD COMPLEXITY 4 2/15/2025 Paola Azar OT GO 1                 Paola Azar OT  2/15/2025

## 2025-02-15 NOTE — PLAN OF CARE
Goal Outcome Evaluation:  Plan of Care Reviewed With: patient           Outcome Evaluation: OT initial eval and expanded chart review completed. Pt presents with multiple comorbidities and decreased strength, balance, activity tolerance and SOA limiting independence with ADL's and mobility from baseline status. Recommend continued skilled OT services and transfer to SNF when medically appropriate.    Anticipated Discharge Disposition (OT): skilled nursing facility

## 2025-02-15 NOTE — PLAN OF CARE
Goal Outcome Evaluation:     NEURO: GCS 13-14. Confused. PERRL. Generalized weakness.   RESP: 3-4 L NC.   CARDIO: AFIB MAP > 65. ECG alarm went off for V tach. EKG shows AFIB CONNIE Ca aware.  GI/: Reg diet. External cath.   ACCESS: PIV x2  SKIN: As charted in EMR        Pt needs addressed prior to shift change. Bed locked in lowest position, HOB elevated, and call light within reach. Bedside report given to oncoming RN.

## 2025-02-15 NOTE — PLAN OF CARE
Problem: Adult Inpatient Plan of Care  Goal: Plan of Care Review  Outcome: Progressing  Goal: Patient-Specific Goal (Individualized)  Outcome: Progressing  Goal: Absence of Hospital-Acquired Illness or Injury  Outcome: Progressing  Intervention: Identify and Manage Fall Risk  Description: Perform standard risk assessment on admission using a validated tool or comprehensive approach appropriate to the patient; reassess fall risk frequently, with change in status or transfer to another level of care.  Communicate risk to interprofessional healthcare team; ensure fall risk visible cue.  Determine need for increased observation, equipment and environmental modification, as well as use of supportive, nonskid footwear.  Adjust safety measures to individual needs and identified risk factors.  Reinforce the importance of active participation with fall risk prevention, safety, and physical activity with the patient and family.  Perform regular intentional rounding to assess need for position change, pain assessment and personal needs, including assistance with toileting.  Recent Flowsheet Documentation  Taken 2/15/2025 1600 by Layla Rojo, RN  Safety Promotion/Fall Prevention:   assistive device/personal items within reach   clutter free environment maintained   fall prevention program maintained   toileting scheduled   safety round/check completed   room organization consistent   nonskid shoes/slippers when out of bed  Taken 2/15/2025 1400 by Layla Rojo, RN  Safety Promotion/Fall Prevention:   assistive device/personal items within reach   clutter free environment maintained   fall prevention program maintained   toileting scheduled   safety round/check completed   room organization consistent   nonskid shoes/slippers when out of bed  Taken 2/15/2025 1200 by Layla Rojo, RN  Safety Promotion/Fall Prevention:   assistive device/personal items within reach   clutter free environment maintained   fall prevention  program maintained   toileting scheduled   safety round/check completed   room organization consistent   nonskid shoes/slippers when out of bed  Taken 2/15/2025 1000 by Layla Rojo RN  Safety Promotion/Fall Prevention:   assistive device/personal items within reach   clutter free environment maintained   fall prevention program maintained   toileting scheduled   safety round/check completed   room organization consistent   nonskid shoes/slippers when out of bed  Taken 2/15/2025 0800 by Layla Rojo, RN  Safety Promotion/Fall Prevention:   assistive device/personal items within reach   clutter free environment maintained   fall prevention program maintained   toileting scheduled   safety round/check completed   room organization consistent   nonskid shoes/slippers when out of bed  Intervention: Prevent Skin Injury  Description: Perform a screening for skin injury risk, such as pressure or moisture-associated skin damage on admission and at regular intervals throughout hospital stay.  Keep all areas of skin (especially folds) clean and dry.  Maintain adequate skin hydration.  Relieve and redistribute pressure and protect bony prominences and skin at risk for injury; implement measures based on patient-specific risk factors.  Match turning and repositioning schedule to clinical condition.  Encourage weight shift frequently; assist with reposition if unable to complete independently.  Float heels off bed; avoid pressure on the Achilles tendon.  Keep skin free from extended contact with medical devices.  Optimize nutrition and hydration.  Encourage functional activity and mobility, as early as tolerated.  Use aids (e.g., slide boards, mechanical lift) during transfer.  Recent Flowsheet Documentation  Taken 2/15/2025 1600 by Layla Rojo, RN  Body Position:   weight shifting   tilted   turned   left   lower extremity elevated   neutral body alignment   neutral head position   upper extremity elevated  Skin  Protection:   incontinence pads utilized   transparent dressing maintained   silicone foam dressing in place   protective footwear used  Taken 2/15/2025 1400 by Layla Rojo RN  Body Position:   weight shifting   tilted   turned   lower extremity elevated   neutral body alignment   neutral head position   upper extremity elevated  Skin Protection:   incontinence pads utilized   transparent dressing maintained   silicone foam dressing in place  Taken 2/15/2025 1200 by Layla Rojo RN  Body Position:   weight shifting   tilted   turned   supine   upper extremity elevated   lower extremity elevated   neutral body alignment   neutral head position  Skin Protection:   incontinence pads utilized   transparent dressing maintained   silicone foam dressing in place  Taken 2/15/2025 1000 by Layla Rojo RN  Body Position:   lower extremity elevated   neutral body alignment   neutral head position   upper extremity elevated   patient/family refused  Skin Protection:   incontinence pads utilized   silicone foam dressing in place   transparent dressing maintained  Taken 2/15/2025 0800 by Layla Rojo RN  Body Position:   weight shifting   tilted   turned   supine   upper extremity elevated   lower extremity elevated   neutral body alignment   neutral head position  Skin Protection:   incontinence pads utilized   transparent dressing maintained   silicone foam dressing in place   protective footwear used  Intervention: Prevent and Manage VTE (Venous Thromboembolism) Risk  Description: Assess for VTE (venous thromboembolism) risk.  Promote early mobilization; encourage both active and passive leg exercises, if unable to ambulate.  Initiate and maintain compression or other therapy, as indicated, based on identified risk in accordance with organizational protocol and provider order.  Recognize the patient's individual risk for bleeding before initiating pharmacologic thromboprophylaxis.  Recent Flowsheet  Documentation  Taken 2/15/2025 1000 by Layla Rojo RN  VTE Prevention/Management:   bilateral   SCDs (sequential compression devices) off   patient refused intervention  Taken 2/15/2025 0800 by Layla Rojo RN  VTE Prevention/Management:   bilateral   SCDs (sequential compression devices) on  Goal: Optimal Comfort and Wellbeing  Outcome: Progressing  Intervention: Provide Person-Centered Care  Description: Use a family-focused approach to care; encourage support system presence and participation.  Develop trust and rapport by proactively providing information, encouraging questions, addressing concerns and offering reassurance.  Acknowledge emotional response to hospitalization.  Recognize and utilize personal coping strategies and strengths; develop goals via shared decision-making.  Honor spiritual and cultural preferences.  Recent Flowsheet Documentation  Taken 2/15/2025 1600 by Layla Rojo RN  Trust Relationship/Rapport:   care explained   reassurance provided  Taken 2/15/2025 1400 by Layla Rojo RN  Trust Relationship/Rapport:   care explained   reassurance provided  Taken 2/15/2025 1200 by Layla Rojo RN  Trust Relationship/Rapport:   care explained   reassurance provided  Taken 2/15/2025 1000 by Layla Rojo RN  Trust Relationship/Rapport:   care explained   reassurance provided  Taken 2/15/2025 0800 by Layla Rojo RN  Trust Relationship/Rapport:   care explained   reassurance provided  Goal: Readiness for Transition of Care  Outcome: Progressing     Problem: Fall Injury Risk  Goal: Absence of Fall and Fall-Related Injury  Outcome: Progressing  Intervention: Identify and Manage Contributors  Description: Develop a fall prevention plan, considering patient-centered interventions and family/caregiver involvement; identify and address patient's facilitators and barriers.  Provide reorientation, appropriate sensory stimulation and routines with changes in mental status to decrease risk  of fall.  Promote use of personal vision and auditory aids.  Assess assistance level required for safe and effective self-care; provide support as needed, such as toileting and mobilization. For age 65 and older, implement timed toileting with assistance.  Encourage physical activity, such as performance of mobility and self-care at highest level of patient ability, multicomponent exercise program and provision of appropriate assistive devices.  If fall occurs, assess the severity of injury; implement fall injury protocol. Determine the cause and revise fall injury prevention plan.  Regularly review and advocate for medication adjustment to decrease fall risk; consider administration times, polypharmacy and age.  Balance adequate pain management with potential for oversedation.  Recent Flowsheet Documentation  Taken 2/15/2025 1200 by Layla Rojo RN  Self-Care Promotion: independence encouraged  Taken 2/15/2025 0800 by Layla Rojo RN  Self-Care Promotion: independence encouraged  Intervention: Promote Injury-Free Environment  Description: Provide a safe, barrier-free environment that encourages independent activity.  Keep care area uncluttered and well-lighted.  Determine need for increased observation or monitoring.  Avoid use of devices that minimize mobility, such as restraints or indwelling urinary catheter.  Recent Flowsheet Documentation  Taken 2/15/2025 1600 by Layla Rojo, RN  Safety Promotion/Fall Prevention:   assistive device/personal items within reach   clutter free environment maintained   fall prevention program maintained   toileting scheduled   safety round/check completed   room organization consistent   nonskid shoes/slippers when out of bed  Taken 2/15/2025 1400 by Layla Rojo, RN  Safety Promotion/Fall Prevention:   assistive device/personal items within reach   clutter free environment maintained   fall prevention program maintained   toileting scheduled   safety round/check  completed   room organization consistent   nonskid shoes/slippers when out of bed  Taken 2/15/2025 1200 by Layla Rojo RN  Safety Promotion/Fall Prevention:   assistive device/personal items within reach   clutter free environment maintained   fall prevention program maintained   toileting scheduled   safety round/check completed   room organization consistent   nonskid shoes/slippers when out of bed  Taken 2/15/2025 1000 by Layla Rojo RN  Safety Promotion/Fall Prevention:   assistive device/personal items within reach   clutter free environment maintained   fall prevention program maintained   toileting scheduled   safety round/check completed   room organization consistent   nonskid shoes/slippers when out of bed  Taken 2/15/2025 0800 by Layla Rojo RN  Safety Promotion/Fall Prevention:   assistive device/personal items within reach   clutter free environment maintained   fall prevention program maintained   toileting scheduled   safety round/check completed   room organization consistent   nonskid shoes/slippers when out of bed     Problem: Skin Injury Risk Increased  Goal: Skin Health and Integrity  Outcome: Progressing  Intervention: Optimize Skin Protection  Description: Perform a full pressure injury risk assessment, as indicated by screening, upon admission to care unit.  Reassess skin (full inspection and injury risk, including skin temperature, consistency and color) frequently (e.g., scheduled interval, with change in condition) to provide optimal early detection and prevention.  Maintain adequate tissue perfusion (e.g., encourage fluid balance; avoid crossing legs, constrictive clothing or devices) to promote tissue oxygenation.  Maintain head of bed at lowest degree of elevation tolerated, considering medical condition and other restrictions. Use positioning supports to prevent sliding and friction. Consider low friction textiles.  Avoid positioning onto an area that remains reddened or on  bony prominences.  Minimize incontinence and moisture (e.g., toileting schedule; moisture-wicking pad, diaper or incontinence collection device; skin moisture barrier).  Cleanse skin promptly and gently, when soiled, utilizing a pH-balanced cleanser.  Relieve and redistribute pressure (e.g., scheduled position changes, weight shifts, use of support surface, medical device repositioning, protective dressing application, use of positioning device, microclimate control, use of pressure-injury-monitor  Encourage increased activity, such as sitting in a chair at the bedside or early mobilization, when able to tolerate. Avoid prolonged sitting.  Recent Flowsheet Documentation  Taken 2/15/2025 1600 by Layla Rojo RN  Activity Management: back to bed  Pressure Reduction Techniques:   frequent weight shift encouraged   heels elevated off bed   positioned off wounds   pressure points protected   weight shift assistance provided  Head of Bed (HOB) Positioning: HOB at 30-45 degrees  Pressure Reduction Devices:   specialty bed utilized   pressure-redistributing mattress utilized   positioning supports utilized   heel offloading device utilized   foam padding utilized  Skin Protection:   incontinence pads utilized   transparent dressing maintained   silicone foam dressing in place   protective footwear used  Taken 2/15/2025 1400 by Layla Rojo RN  Activity Management: up in chair  Pressure Reduction Techniques:   frequent weight shift encouraged   pressure points protected   weight shift assistance provided  Head of Bed (HOB) Positioning: (oob) other (see comments)  Pressure Reduction Devices:   chair cushion utilized   feet on footrest/footstool   foam padding utilized   heel offloading device utilized  Skin Protection:   incontinence pads utilized   transparent dressing maintained   silicone foam dressing in place  Taken 2/15/2025 1200 by Layla Rojo RN  Activity Management: up in chair  Pressure Reduction  Techniques:   frequent weight shift encouraged   heels elevated off bed   positioned off wounds   pressure points protected   weight shift assistance provided  Head of Bed (HOB) Positioning: (oob) other (see comments)  Pressure Reduction Devices:   chair cushion utilized   foam padding utilized   feet on footrest/footstool   heel offloading device utilized   positioning supports utilized  Skin Protection:   incontinence pads utilized   transparent dressing maintained   silicone foam dressing in place  Taken 2/15/2025 1000 by Layla Rojo RN  Activity Management: up in chair  Pressure Reduction Techniques:   frequent weight shift encouraged   positioned off wounds   pressure points protected  Head of Bed (HOB) Positioning: (oob) other (see comments)  Pressure Reduction Devices:   chair cushion utilized   feet on footrest/footstool   foam padding utilized   heel offloading device utilized  Skin Protection:   incontinence pads utilized   silicone foam dressing in place   transparent dressing maintained  Taken 2/15/2025 0800 by Layla Rojo RN  Activity Management: activity encouraged  Pressure Reduction Techniques:   frequent weight shift encouraged   heels elevated off bed   positioned off wounds   pressure points protected   weight shift assistance provided  Head of Bed (HOB) Positioning: HOB at 30-45 degrees  Pressure Reduction Devices:   specialty bed utilized   pressure-redistributing mattress utilized   positioning supports utilized   heel offloading device utilized   foam padding utilized  Skin Protection:   incontinence pads utilized   transparent dressing maintained   silicone foam dressing in place   protective footwear used     Problem: Comorbidity Management  Goal: Maintenance of Heart Failure Symptom Control  Outcome: Progressing  Goal: Blood Pressure in Desired Range  Outcome: Progressing   Goal Outcome Evaluation:

## 2025-02-15 NOTE — PLAN OF CARE
Goal Outcome Evaluation:  Plan of Care Reviewed With: patient           Outcome Evaluation: Patient presents with deficits in strength, endurance, and balance resulting in functional mobility that is below her baseline. She was able to take sidesteps to chair with minAx2 and FWW, limited by fatigue. IPPT is indicated to address current deficits while admitted. Recommend D/C back to SNF when medically appropriate.    Anticipated Discharge Disposition (PT): skilled nursing facility

## 2025-02-15 NOTE — THERAPY EVALUATION
Patient Name: Rose J Kellermann  : 1926    MRN: [id number]                              Today's Date: 2/15/2025       Admit Date: 2025    Visit Dx:     ICD-10-CM ICD-9-CM   1. Symptomatic anemia  D64.9 285.9   2. Acute upper GI bleed  K92.2 578.9   3. Gastric ulcer  K25.9 531.90     Patient Active Problem List   Diagnosis    Essential hypertension    Vitamin D deficiency    Fatigue    Chronic kidney disease, stage III (moderate)     Osteoarthritis    Dyslipidemia    Post-menopausal bleeding    Endometrial cancer    Closed fracture of left hip    Asymptomatic bacteriuria    Post-traumatic osteoarthritis of left hip    Status post total replacement of left hip    Prediabetes    Acute blood loss anemia, asymptomatic    Postoperative urinary retention    Myopia    Posterior crocodile shagreen of cornea    Presbyopia    Ptosis of eyelid    Regular astigmatism    Retinal neovascularization    After cataract    Secondary cataract    Premature atrial contractions    Heart murmur    Nocturnal hypoxia    Acute on chronic heart failure with preserved ejection fraction (HFpEF)    Chronic deep vein thrombosis (DVT) of both lower extremities    Retinal neovascularization    Pleural effusion on left    Pulmonary hypertension     Past Medical History:   Diagnosis Date    Abscess of back     Arrhythmia     frequent PVC    Cancer 2011    Endometrial cancer, status post robotically assisted hysterectomy with Dr. Haddad, 2011.      CKD (chronic kidney disease), stage III     no dilaysis     Dizzy     Dvt femoral (deep venous thrombosis) 2017    s/p hip surgery in . Took xarelto until 2018    Dyslipidemia     Dyslipidemia.  Observing.    Enthesopathy of hip region     Generalized osteoarthrosis, involving multiple sites     Headache     Hearing loss     Hypertension     Low backache     Needs flu shot     Osteoarthritis     Osteoarthritis with questionable carpal tunnel syndrome bilaterally.      Otitis, serous     Pneumonia 1967    Remote pneumonia, 1967.     Retinal hemorrhage     SI joint arthritis     SOB (shortness of breath)     Syncope 2001    Remote syncope with working diagnosis of vasodepressor, 2001.     Venous insufficiency of leg     Wears glasses     readers     Past Surgical History:   Procedure Laterality Date    CATARACT EXTRACTION      bilat     COLONOSCOPY      ENDOSCOPY N/A 2/13/2025    Procedure: ESOPHAGOGASTRODUODENOSCOPY;  Surgeon: Brunner, Mark I, MD;  Location:  [facility];  Service: Gastroenterology;  Laterality: N/A;    HIP PERCUTANEOUS PINNING Left 11/24/2017    Procedure: HIP PERCUTANEOUS PINNING;  Surgeon: Lucas Swanson MD;  Location:  [facility];  Service:     HYSTERECTOMY      total? but unsure     KNEE SURGERY  2001    Remote knee surgery in 2001.- right     TOTAL HIP ARTHROPLASTY Left 10/26/2018    Procedure: TOTAL HIP ARTHROPLASTY LEFT;  Surgeon: Stephen Baker MD;  Location:  [facility];  Service: Orthopedics      General Information       Row Name 02/15/25 1006          Physical Therapy Time and Intention    Document Type evaluation  -CK     Mode of Treatment physical therapy  -CK       Row Name 02/15/25 1006          General Information    Patient Profile Reviewed yes  -CK     Prior Level of Function independent:;all household mobility;ADL's  typically ind, was readmitted from [place]  -     Existing Precautions/Restrictions fall;oxygen therapy device and L/min  -CK     Barriers to Rehab medically complex;hearing deficit  -CK       Row Name 02/15/25 1006          Living Environment    People in Home alone;other (see comments)  daughter checks on her daily  -CK       Row Name 02/15/25 1006          Home Main Entrance    Number of Stairs, Main Entrance three  -CK     Stair Railings, Main Entrance railings on both sides of stairs  -CK       Row Name 02/15/25 1006          Stairs Within Home, Primary    Number of Stairs, Within Home, Primary none  -CK     Stair  Railings, Within Home, Primary none  -CK       Row Name 02/15/25 1006          Cognition    Orientation Status (Cognition) oriented x 3  -CK       Row Name 02/15/25 1006          Safety Issues/Impairments Affecting Functional Mobility    Safety Issues Affecting Function (Mobility) awareness of need for assistance;insight into deficits/self-awareness;safety precaution awareness;safety precautions follow-through/compliance;sequencing abilities  -CK     Impairments Affecting Function (Mobility) balance;endurance/activity tolerance;pain;strength  -CK               User Key  (r) = Recorded By, (t) = Taken By, (c) = Cosigned By      Initials Name Provider Type    CK Jaycee Harding, PT Physical Therapist                   Mobility       Row Name 02/15/25 1008          Bed Mobility    Bed Mobility supine-sit  -CK     Supine-Sit [place] (Bed Mobility) contact guard;1 person assist;verbal cues  -CK     Assistive Device (Bed Mobility) head of bed elevated;bed rails  -CK     Comment, (Bed Mobility) cues for sequencing, patient then able to bring BLEs off EOB and scoot to EOB with increased time and rest breaks  -CK       Row Name 02/15/25 1008          Bed-Chair Transfer    Bed-Chair [place] (Transfers) minimum assist (75% patient effort);2 person assist;verbal cues  -CK     Assistive Device (Bed-Chair Transfers) walker, front-wheeled  -CK     Comment, (Bed-Chair Transfer) patient able to take sidesteps to chair, assist for balance and management of AD, maintains flexed posture throughout  -CK       Row Name 02/15/25 1008          Sit-Stand Transfer    Sit-Stand [place] (Transfers) minimum assist (75% patient effort);2 person assist;verbal cues  -CK     Assistive Device (Sit-Stand Transfers) walker, front-wheeled  -CK     Comment, (Sit-Stand Transfer) [place] to clear hips from bed, cues for upright posture upon standing  -CK       Row Name 02/15/25 1008          Gait/Stairs (Locomotion)    [place] Level  (Gait) minimum assist (75% patient effort);2 person assist;verbal cues  -CK     Assistive Device (Gait) walker, front-wheeled  -CK     Distance in Feet (Gait) 2  -CK     Deviations/Abnormal Patterns (Gait) bilateral deviations;mike decreased;festinating/shuffling;gait speed decreased;stride length decreased  -CK     Bilateral Gait Deviations forward flexed posture;heel strike decreased  -CK     Comment, (Gait/Stairs) took sidesteps to chair as above  -CK               User Key  (r) = Recorded By, (t) = Taken By, (c) = Cosigned By      Initials Name Provider Type    CK Jaycee Harding PT Physical Therapist                   Obj/Interventions       Row Name 02/15/25 1010          Range of Motion Comprehensive    General Range of Motion bilateral lower extremity ROM WFL  -CK       Row Name 02/15/25 1010          Strength Comprehensive (MMT)    General Manual Muscle Testing (MMT) Assessment lower extremity strength deficits identified  -CK     Comment, General Manual Muscle Testing (MMT) Assessment BLE grossly 3+/5  -CK       Row Name 02/15/25 1010          Balance    Balance Assessment sitting static balance;standing static balance;standing dynamic balance  -CK     Static Sitting Balance contact guard  -CK     Position, Sitting Balance unsupported;sitting edge of bed  -CK     Static Standing Balance minimal assist;2-person assist  -CK     Dynamic Standing Balance minimal assist;2-person assist  -CK     Position/Device Used, Standing Balance supported;walker, front-wheeled  -CK     Comment, Balance unsteady and requires assist for management of walker  -CK       Row Name 02/15/25 1010          Sensory Assessment (Somatosensory)    Sensory Assessment (Somatosensory) LE sensation intact  -CK               User Key  (r) = Recorded By, (t) = Taken By, (c) = Cosigned By      Initials Name Provider Type    CK Jaycee Harding PT Physical Therapist                   Goals/Plan       Row Name 02/15/25 1012           Bed Mobility Goal 1 (PT)    Activity/Assistive Device (Bed Mobility Goal 1, PT) sit to supine/supine to sit  -CK     [place] Level/Cues Needed (Bed Mobility Goal 1, PT) modified independence  -CK     Time Frame (Bed Mobility Goal 1, PT) short term goal (STG);3 days  -CK     Progress/Outcomes (Bed Mobility Goal 1, PT) new goal  -CK       Row Name 02/15/25 1012          Transfer Goal 1 (PT)    Activity/Assistive Device (Transfer Goal 1, PT) sit-to-stand/stand-to-sit;bed-to-chair/chair-to-bed  -CK     [place] Level/Cues Needed (Transfer Goal 1, PT) contact guard required  -CK     Time Frame (Transfer Goal 1, PT) long term goal (LTG);10 days  -CK     Progress/Outcome (Transfer Goal 1, PT) new goal  -CK       Row Name 02/15/25 1012          Gait Training Goal 1 (PT)    Activity/Assistive Device (Gait Training Goal 1, PT) gait (walking locomotion);assistive device use  -CK     [place] Level (Gait Training Goal 1, PT) contact guard required  -CK     Distance (Gait Training Goal 1, PT) 75'  -CK     Time Frame (Gait Training Goal 1, PT) long term goal (LTG);10 days  -CK     Progress/Outcome (Gait Training Goal 1, PT) new [place]  -CK       Row Name 02/15/25 1012          Therapy Assessment/Plan (PT)    Planned Therapy Interventions (PT) balance training;bed mobility training;gait training;home exercise program;neuromuscular re-education;transfer training;stretching;strengthening;stair training;ROM (range of motion);postural re-education;patient/family education  -CK               User Key  (r) = Recorded By, (t) = Taken By, (c) = Cosigned By      Initials Name Provider Type    CK Jaycee Harding, PT Physical Therapist                   Clinical Impression       Row Name 02/15/25 1010          Pain    Pretreatment Pain Rating 0/10 - no pain  -CK     Posttreatment Pain Rating 0/10 - no pain  -CK     Pre/Posttreatment Pain Comment denied pain pre/post treatment, had some discomfort and facial grimacing with  movement of LLE  -CK       Row Name 02/15/25 1010          Plan of Care Review    Plan of Care Reviewed With patient  -CK     Outcome Evaluation Patient presents with deficits in strength, endurance, and balance resulting in functional mobility that is below her baseline. She was able to take sidesteps to chair with minAx2 and FWW, limited by fatigue. IPPT is indicated to address current deficits while admitted. Recommend D/C back to SNF when medically appropriate.  -CK       Row Name 02/15/25 1010          Therapy Assessment/Plan (PT)    Patient/Family Therapy Goals Statement (PT) feel better  -CK     Rehab Potential (PT) good  -CK     Criteria for Skilled Interventions Met (PT) yes;meets criteria;skilled treatment is necessary  -CK     Therapy Frequency (PT) daily  -CK     Predicted Duration of Therapy Intervention (PT) 1 week  -CK       Row Name 02/15/25 1010          Vital Signs    Pre Systolic BP Rehab 102  -CK     Pre Treatment Diastolic BP 62  -CK     Post Systolic BP Rehab 131  -CK     Post Treatment Diastolic BP 88  -CK     Pretreatment Heart Rate (beats/min) 57  -CK     Intratreatment Heart Rate (beats/min) 95  -CK     Posttreatment Heart Rate (beats/min) 75  -CK     Pre SpO2 (%) 97  -CK     O2 Delivery Pre Treatment nasal cannula  -CK     Intra SpO2 (%) 92  -CK     O2 Delivery Intra Treatment nasal cannula  -CK     Post SpO2 (%) 91  -CK     O2 Delivery Post Treatment nasal cannula  -CK     Pre Patient Position Supine  -CK     Intra Patient Position Sitting  -CK     Post Patient Position Sitting  -CK       Row Name 02/15/25 1010          Positioning and Restraints    Pre-Treatment Position in bed  -CK     Post Treatment Position chair  -CK     In Chair reclined;call light within reach;encouraged to call for assist;exit alarm on;with family/caregiver;waffle cushion;heels elevated;notified nsg  -CK               User Key  (r) = Recorded By, (t) = Taken By, (c) = Cosigned By      Initials Name Provider Type     Jaycee Mensah, ADAM Physical Therapist                   Outcome Measures       Row Name 02/15/25 1013 02/15/25 0800       How much help from another person do you currently need...    Turning from your back to your side while in flat bed without using bedrails? 3  -CK 2  -AL    Moving from lying on back to sitting on the side of a flat bed without bedrails? 2  -CK 1  -AL    Moving to and from a bed to a chair (including a wheelchair)? 3  -CK 1  -AL    Standing up from a chair using your arms (e.g., wheelchair, bedside chair)? 3  -CK 1  -AL    Climbing 3-5 steps with a railing? 2  -CK 1  -AL    To walk in hospital room? 3  -CK 1  -AL    AM-PAC 6 Clicks Score (PT) 16  -CK 7  -AL    Highest Level of Mobility Goal 5 --> Static standing  -CK 2 --> Bed activities/dependent transfer  -AL      Row Name 02/15/25 1013          Functional Assessment    Outcome Measure Options AM-PAC 6 Clicks Basic Mobility (PT)  -CK               User Key  (r) = Recorded By, (t) = Taken By, (c) = Cosigned By      Initials Name Provider Type    AL Layla Rojo, RN Registered Nurse    Jaycee Mensah, ADAM Physical Therapist                                 Physical Therapy Education       Title: PT OT SLP Therapies (In Progress)       Topic: Physical Therapy (In Progress)       Point: Mobility training (Done)       Learning Progress Summary            Patient Acceptance, E, VU by CK at 2/15/2025 1013                      Point: Home exercise program (Not Started)       Learner Progress:  Not documented in this visit.              Point: Body mechanics (Done)       Learning Progress Summary            Patient Acceptance, E, VU by CK at 2/15/2025 1013                      Point: Precautions (Done)       Learning Progress Summary            Patient Acceptance, E, VU by CK at 2/15/2025 1013                                      User Key       Initials Effective Dates Name Provider Type [facility] 02/06/24 -  Jaycee Harding  PT Physical Therapist PT                  PT Recommendation and Plan  Planned Therapy Interventions (PT): balance training, bed mobility training, gait training, home exercise program, neuromuscular re-education, transfer training, stretching, strengthening, stair training, ROM (range of motion), postural re-education, patient/family education  Outcome Evaluation: Patient presents with deficits in strength, endurance, and balance resulting in functional mobility that is below her baseline. She was able to take sidesteps to chair with minAx2 and FWW, limited by fatigue. IPPT is indicated to address current deficits while admitted. Recommend D/C back to SNF when medically appropriate.     Time Calculation:   PT Evaluation Complexity  History, PT Evaluation Complexity: 3 or more personal factors and/or comorbidities  Examination of Body Systems (PT Eval Complexity): total of 3 or more elements  Clinical Presentation (PT Evaluation Complexity): evolving  Clinical Decision Making (PT Evaluation Complexity): moderate complexity  Overall Complexity (PT Evaluation Complexity): moderate complexity     PT Charges       Row Name 02/15/25 1013             Time Calculation    Start Time 0822  -CK      PT Received On 02/15/25  -CK      PT Goal Re-Cert Due Date 02/25/25  -CK         Untimed Charges    PT Eval/Re-eval Minutes 47  -CK         Total Minutes    Untimed Charges Total Minutes 47  -CK       Total Minutes 47  -CK                User Key  (r) = Recorded By, (t) = Taken By, (c) = Cosigned By      Initials Name Provider Type    CK Jaycee Harding, PT Physical Therapist                  Therapy Charges for Today       Code Description Service Date Service Provider Modifiers Qty    31325431279 HC PT EVAL MOD COMPLEXITY 4 2/15/2025 Jaycee Harding, PT GP 1            PT G-Codes  Outcome Measure Options: AM-PAC 6 Clicks Basic Mobility (PT)  AM-PAC 6 Clicks Score (PT): 16  PT Discharge Summary  Anticipated Discharge  Disposition (PT): skilled nursing facility    Jaycee Harding, PT  2/15/2025

## 2025-02-16 LAB
ANION GAP SERPL CALCULATED.3IONS-SCNC: 9 MMOL/L (ref 5–15)
BASOPHILS # BLD AUTO: 0.03 10*3/MM3 (ref 0–0.2)
BASOPHILS NFR BLD AUTO: 0.4 % (ref 0–1.5)
BUN SERPL-MCNC: 90 MG/DL (ref 8–23)
BUN/CREAT SERPL: 49.7 (ref 7–25)
CALCIUM SPEC-SCNC: 8.5 MG/DL (ref 8.2–9.6)
CHLORIDE SERPL-SCNC: 103 MMOL/L (ref 98–107)
CO2 SERPL-SCNC: 27 MMOL/L (ref 22–29)
CREAT SERPL-MCNC: 1.81 MG/DL (ref 0.57–1)
DEPRECATED RDW RBC AUTO: 51.9 FL (ref 37–54)
EGFRCR SERPLBLD CKD-EPI 2021: 25 ML/MIN/1.73
EOSINOPHIL # BLD AUTO: 0.26 10*3/MM3 (ref 0–0.4)
EOSINOPHIL NFR BLD AUTO: 3.8 % (ref 0.3–6.2)
ERYTHROCYTE [DISTWIDTH] IN BLOOD BY AUTOMATED COUNT: 15.8 % (ref 12.3–15.4)
GLUCOSE SERPL-MCNC: 98 MG/DL (ref 65–99)
HCT VFR BLD AUTO: 29 % (ref 34–46.6)
HCT VFR BLD AUTO: 30.2 % (ref 34–46.6)
HCT VFR BLD AUTO: 33.5 % (ref 34–46.6)
HGB BLD-MCNC: 10.5 G/DL (ref 12–15.9)
HGB BLD-MCNC: 9.3 G/DL (ref 12–15.9)
HGB BLD-MCNC: 9.5 G/DL (ref 12–15.9)
IMM GRANULOCYTES # BLD AUTO: 0.03 10*3/MM3 (ref 0–0.05)
IMM GRANULOCYTES NFR BLD AUTO: 0.4 % (ref 0–0.5)
LYMPHOCYTES # BLD AUTO: 0.95 10*3/MM3 (ref 0.7–3.1)
LYMPHOCYTES NFR BLD AUTO: 14 % (ref 19.6–45.3)
MAGNESIUM SERPL-MCNC: 2.1 MG/DL (ref 1.7–2.3)
MCH RBC QN AUTO: 30.4 PG (ref 26.6–33)
MCHC RBC AUTO-ENTMCNC: 32.1 G/DL (ref 31.5–35.7)
MCV RBC AUTO: 94.8 FL (ref 79–97)
MONOCYTES # BLD AUTO: 0.6 10*3/MM3 (ref 0.1–0.9)
MONOCYTES NFR BLD AUTO: 8.8 % (ref 5–12)
NEUTROPHILS NFR BLD AUTO: 4.91 10*3/MM3 (ref 1.7–7)
NEUTROPHILS NFR BLD AUTO: 72.6 % (ref 42.7–76)
NRBC BLD AUTO-RTO: 0 /100 WBC (ref 0–0.2)
PLATELET # BLD AUTO: 175 10*3/MM3 (ref 140–450)
PMV BLD AUTO: 10.1 FL (ref 6–12)
POTASSIUM SERPL-SCNC: 4.1 MMOL/L (ref 3.5–5.2)
RBC # BLD AUTO: 3.06 10*6/MM3 (ref 3.77–5.28)
SODIUM SERPL-SCNC: 139 MMOL/L (ref 136–145)
WBC NRBC COR # BLD AUTO: 6.78 10*3/MM3 (ref 3.4–10.8)

## 2025-02-16 PROCEDURE — 85018 HEMOGLOBIN: CPT | Performed by: INTERNAL MEDICINE

## 2025-02-16 PROCEDURE — 83735 ASSAY OF MAGNESIUM: CPT | Performed by: INTERNAL MEDICINE

## 2025-02-16 PROCEDURE — 85014 HEMATOCRIT: CPT | Performed by: INTERNAL MEDICINE

## 2025-02-16 PROCEDURE — 85025 COMPLETE CBC W/AUTO DIFF WBC: CPT | Performed by: INTERNAL MEDICINE

## 2025-02-16 PROCEDURE — 80048 BASIC METABOLIC PNL TOTAL CA: CPT | Performed by: INTERNAL MEDICINE

## 2025-02-16 PROCEDURE — 99232 SBSQ HOSP IP/OBS MODERATE 35: CPT | Performed by: NURSE PRACTITIONER

## 2025-02-16 RX ADMIN — MUPIROCIN 1 APPLICATION: 20 OINTMENT TOPICAL at 09:27

## 2025-02-16 RX ADMIN — Medication 10 ML: at 22:18

## 2025-02-16 RX ADMIN — Medication 10 ML: at 09:27

## 2025-02-16 RX ADMIN — PANTOPRAZOLE SODIUM 40 MG: 40 TABLET, DELAYED RELEASE ORAL at 17:06

## 2025-02-16 RX ADMIN — PANTOPRAZOLE SODIUM 40 MG: 40 TABLET, DELAYED RELEASE ORAL at 09:27

## 2025-02-16 RX ADMIN — MUPIROCIN 1 APPLICATION: 20 OINTMENT TOPICAL at 22:18

## 2025-02-16 NOTE — PROGRESS NOTES
"Intensive Care Follow-up     Hospital:  LOS: 4 days   Ms. Rose J Kellermann, 89 y.o. female is followed for:   Acute blood loss anemia        Subjective     Anastasia is a 89 y.o. female currently residing at rehab facility after she was recently admitted and discharged from the hospital 3 weeks ago, who was transferred to [facility] on 2/12/2025 with hypotension (SBP 70) and weakness secondary to Acute blood loss anemia [D62]     In the ED, her Hb was 6.6 from baseline of 12.2.  She had a couple of episodes of coffee-ground looking emesis in the ED.   INR was 2.2.  Subsequently found to have a duodenal ulcer.    Interval History:  Patient is sitting up in bed. Alert and pleasant.  Extremely [place].  H/H stable.  No complaints.    The patient's past medical, surgical and social history were reviewed and updated in Epic as appropriate.        Objective     Infusions:       Medications:  mupirocin, 1 Application, Each Nare, BID  pantoprazole, 40 mg, Oral, BID AC  sodium chloride, 10 mL, Intravenous, Q12H        Vital Sign Min/Max for last 24 hours  Temp  Min: 97.5 °F (36.4 °C)  Max: 97.8 °F (36.6 °C)   BP  Min: 79/63  Max: 131/100   Pulse  Min: 62  Max: 85   Resp  Min: 14  Max: 18   SpO2  Min: 86 %  Max: 100 %   Flow (L/min) (Oxygen Therapy)  Min: 3  Max: 3       Input/Output for last 24 hour shift  02/15 0701 - 02/16 0700  In: -   Out: 1250 [Urine:1250]      Objective:  General Appearance:  Comfortable, in no acute distress and not in pain.    Vital signs: (most recent): Blood pressure 132/82, pulse 77, temperature 97.6 °F (36.4 °C), temperature source Oral, resp. rate 18, height 162.6 cm (64\"), weight 71.7 kg (158 lb), SpO2 97%, not currently breastfeeding.    HEENT: Normal HEENT exam.    Lungs:  Normal effort and normal respiratory rate.  Breath sounds clear to auscultation.    Heart: Normal rate.  Regular rhythm.  S1 normal and S2 normal.  No murmur.   Chest: Symmetric chest wall expansion.   Abdomen: Abdomen is soft.  Bowel " sounds are normal.   There is no abdominal tenderness.     Extremities: Normal range of motion.    Neurological: Patient is alert and oriented to person, place and time.    Pupils:  Pupils are equal, round, and reactive to light.  Pupils are equal.   Skin:  Warm.                Results from last 7 days   Lab Units 02/16/25  0547 02/15/25  2359 02/15/25  1601 02/15/25  0424 02/14/25  1810 02/14/25  0403   WBC 10*3/mm3 6.78  --   --  7.82  --  9.70   HEMOGLOBIN g/dL 9.3* 9.5* 10.2* 9.6*   < > 7.5*   PLATELETS 10*3/mm3 175  --   --  175  --  190    < > = values in this interval not displayed.     Results from last 7 days   Lab Units 02/16/25  0547 02/15/25  0959 02/14/25  1810 02/14/25  0403   SODIUM mmol/L 139 140  --  142   POTASSIUM mmol/L 4.1 3.9 4.1 3.4*   CO2 mmol/L 27.0 26.0  --  27.0   BUN mg/dL 90* 99*  --  114*   CREATININE mg/dL 1.81* 1.84*  --  1.78*   MAGNESIUM mg/dL 2.1 2.1  --  2.1   PHOSPHORUS mg/dL  --   --   --  3.4   GLUCOSE mg/dL 98 113*  --  94     Estimated Creatinine Clearance: 16.8 mL/min (A) (by C-G formula based on SCr of 1.81 mg/dL (H)).            I reviewed the patient's results and images.     Assessment & Plan   Impression    GI bleeding secondary to duodenal ulcer  Acute blood loss anemia  Stage III chronic kidney disease  Atrial flutter       Plan        Transfer to telemetry  Continue to monitor H/H  Continue PPI BID  Avoid NSAIDS  Continue to hold Eliquis for now. ? Restart prior to discharge  Mobilize as tolerated. Continue PT/OT.  Continue SCDs  Diet as tolerated  AM labs  Hospitalists to assume care tomorrow.    Plan of care and goals reviewed with mulitdisciplinary/antibiotic stewardship team during rounds.   I discussed the patient's findings and my recommendations with patient, family, and nursing staff     Time spent: 35 minutes    CONNIE Hsieh, AGACNP-BC, FNP-BC  Pulmonary and Critical Care Service

## 2025-02-17 LAB
ANION GAP SERPL CALCULATED.3IONS-SCNC: 10 MMOL/L (ref 5–15)
BACTERIA SPEC AEROBE CULT: NORMAL
BUN SERPL-MCNC: 77 MG/DL (ref 8–23)
BUN/CREAT SERPL: 43 (ref 7–25)
CALCIUM SPEC-SCNC: 8.4 MG/DL (ref 8.2–9.6)
CHLORIDE SERPL-SCNC: 106 MMOL/L (ref 98–107)
CO2 SERPL-SCNC: 28 MMOL/L (ref 22–29)
CREAT SERPL-MCNC: 1.79 MG/DL (ref 0.57–1)
CYTO UR: NORMAL
DEPRECATED RDW RBC AUTO: 50.9 FL (ref 37–54)
EGFRCR SERPLBLD CKD-EPI 2021: 25.4 ML/MIN/1.73
ERYTHROCYTE [DISTWIDTH] IN BLOOD BY AUTOMATED COUNT: 16 % (ref 12.3–15.4)
GLUCOSE SERPL-MCNC: 113 MG/DL (ref 65–99)
HCT VFR BLD AUTO: 28.5 % (ref 34–46.6)
HCT VFR BLD AUTO: 29 % (ref 34–46.6)
HCT VFR BLD AUTO: 37.2 % (ref 34–46.6)
HGB BLD-MCNC: 11.2 G/DL (ref 12–15.9)
HGB BLD-MCNC: 9 G/DL (ref 12–15.9)
HGB BLD-MCNC: 9.5 G/DL (ref 12–15.9)
LAB AP CASE REPORT: NORMAL
LAB AP CLINICAL INFORMATION: NORMAL
MAGNESIUM SERPL-MCNC: 2.3 MG/DL (ref 1.7–2.3)
MCH RBC QN AUTO: 30 PG (ref 26.6–33)
MCHC RBC AUTO-ENTMCNC: 31.6 G/DL (ref 31.5–35.7)
MCV RBC AUTO: 95 FL (ref 79–97)
PATH REPORT.FINAL DX SPEC: NORMAL
PATH REPORT.GROSS SPEC: NORMAL
PHOSPHATE SERPL-MCNC: 3.6 MG/DL (ref 2.5–4.5)
PLATELET # BLD AUTO: 179 10*3/MM3 (ref 140–450)
PMV BLD AUTO: 10.1 FL (ref 6–12)
POTASSIUM SERPL-SCNC: 3.8 MMOL/L (ref 3.5–5.2)
RBC # BLD AUTO: 3 10*6/MM3 (ref 3.77–5.28)
SODIUM SERPL-SCNC: 144 MMOL/L (ref 136–145)
WBC NRBC COR # BLD AUTO: 6.86 10*3/MM3 (ref 3.4–10.8)

## 2025-02-17 PROCEDURE — 84100 ASSAY OF PHOSPHORUS: CPT | Performed by: NURSE PRACTITIONER

## 2025-02-17 PROCEDURE — 85014 HEMATOCRIT: CPT | Performed by: INTERNAL MEDICINE

## 2025-02-17 PROCEDURE — 80048 BASIC METABOLIC PNL TOTAL CA: CPT | Performed by: NURSE PRACTITIONER

## 2025-02-17 PROCEDURE — 85018 HEMOGLOBIN: CPT | Performed by: INTERNAL MEDICINE

## 2025-02-17 PROCEDURE — 99232 SBSQ HOSP IP/OBS MODERATE 35: CPT | Performed by: INTERNAL MEDICINE

## 2025-02-17 PROCEDURE — 85027 COMPLETE CBC AUTOMATED: CPT | Performed by: NURSE PRACTITIONER

## 2025-02-17 PROCEDURE — 83735 ASSAY OF MAGNESIUM: CPT | Performed by: NURSE PRACTITIONER

## 2025-02-17 RX ADMIN — PANTOPRAZOLE SODIUM 40 MG: 40 TABLET, DELAYED RELEASE ORAL at 17:44

## 2025-02-17 RX ADMIN — MUPIROCIN 1 APPLICATION: 20 OINTMENT TOPICAL at 07:28

## 2025-02-17 RX ADMIN — Medication 10 ML: at 07:28

## 2025-02-17 RX ADMIN — PANTOPRAZOLE SODIUM 40 MG: 40 TABLET, DELAYED RELEASE ORAL at 07:28

## 2025-02-17 RX ADMIN — Medication 10 ML: at 21:43

## 2025-02-17 NOTE — CASE MANAGEMENT/SOCIAL WORK
Continued Stay Note   [place]     Patient Name: Rose J Kellermann  MRN: [id number]  Today's Date: 2/17/2025    Admit Date: 2/12/2025    Plan: update   Discharge Plan       Row Name 02/17/25 1332       Plan    Plan update    Patient/Family in Agreement with Plan yes    Plan Comments Per hospitalist, ok to start precert with insurance.  Spoke with patient and family at bedside to advise who verbalize understanding and agreement with plan.  Spoke with [facility] liaison who can accept patient once insurance approved.  Request for precert sent.  CM following.  Patient plan is to discharge to [facility] pending insurance approval.    Final Discharge Disposition Code 03 - skilled nursing facility (SNF)                   Discharge Codes    No documentation.                 Expected Discharge Date and Time       Expected Discharge Date Expected Discharge Time    Feb 17, 2025               Charo Terry RN

## 2025-02-17 NOTE — PROGRESS NOTES
[facility] Medicine Services  PROGRESS NOTE    Patient Name: Rose J Kellermann  : 1926  MRN: [id number]    Date of Admission: 2025  Primary Care Physician: Sam Hung MD    Subjective   Subjective     CC:  Follow-up for GI bleed and hypotension    HPI:  As seen the patient multiple times today.  Earlier in the morning patient was not feeling well and was very confused.  However, as day progressed patient's condition improved.  In the afternoon patient was sitting in the chair in no acute distress and very good mood and was much more awake and alert and oriented.  Patient's daughter also was present at the bedside.  Patient denied any pain or shortness of breath.      Objective   Objective     Vital Signs:   Temp:  [97.4 °F (36.3 °C)-98 °F (36.7 °C)] 97.7 °F (36.5 °C)  Heart Rate:  [61-84] 73  Resp:  [15-18] 16  BP: (111-133)/(69-99) 111/69  Flow (L/min) (Oxygen Therapy):  [2-3] 2     Physical Exam:  Constitutional: No acute distress, awake, alert  HENT: NCAT, mucous membranes moist  Respiratory: Clear to auscultation bilaterally, respiratory effort normal   Cardiovascular: RRR, no murmurs, rubs, or gallops  Gastrointestinal: Positive bowel sounds, soft, nontender, nondistended  Musculoskeletal: No bilateral ankle edema  Psychiatric: Appropriate affect, cooperative  Neurologic: Awake, alert, follows commands, speech clear  Skin: No rashes     Results Reviewed:  LAB RESULTS:      Lab 25  1627 25  0605 25  2341 25  1614 25  0547 02/15/25  1601 02/15/25  0424 25  1810 25  0403 25  1228 25  0503 25  0010 25  1650 25  1650 25  1311 25  1141   WBC  --  6.86  --   --  6.78  --  7.82  --  9.70  --  10.17  --    < > 7.15  --   --    HEMOGLOBIN 11.2* 9.0* 9.5* 10.5* 9.3*   < > 9.6*   < > 7.5*   < > 9.1* 7.7*   < > 6.6*  --   --    HEMATOCRIT 37.2 28.5* 29.0* 33.5* 29.0*   < > 28.8*   < >  23.2*   < > 27.7* 23.6*   < > 20.7*  --   --    PLATELETS  --  179  --   --  175  --  175  --  190  --  233  --    < > 217  --   --    NEUTROS ABS  --   --   --   --  4.91  --  5.81  --  7.66*  --  7.83*  --   --  5.83  --   --    IMMATURE GRANS (ABS)  --   --   --   --  0.03  --  0.05  --  0.06*  --  0.06*  --   --  0.05  --   --    LYMPHS ABS  --   --   --   --  0.95  --  0.93  --  0.94  --  1.39  --   --  0.87  --   --    MONOS ABS  --   --   --   --  0.60  --  0.69  --  0.73  --  0.75  --   --  0.38  --   --    EOS ABS  --   --   --   --  0.26  --  0.30  --  0.28  --  0.10  --   --  0.01  --   --    MCV  --  95.0  --   --  94.8  --  92.0  --  93.5  --  90.2  --    < > 94.1  --   --    PROCALCITONIN  --   --   --   --   --   --   --   --   --   --   --   --   --   --   --  0.49*   LACTATE  --   --   --   --   --   --   --   --   --   --   --  1.9  --  2.2* 6.6* 4.5*   PROTIME  --   --   --   --   --   --   --   --   --   --   --   --   --   --   --  24.6*   APTT  --   --   --   --   --   --   --   --   --   --   --   --   --   --   --  35.0*   HSTROP T  --   --   --   --   --   --   --   --   --   --   --   --   --   --  75* 87*    < > = values in this interval not displayed.         Lab 02/17/25  0605 02/16/25  05 02/15/25  0959 02/14/25  1810 02/14/25  0403 02/13/25  0503   SODIUM 144 139 140  --  142 142   POTASSIUM 3.8 4.1 3.9 4.1 3.4* 3.7   CHLORIDE 106 103 102  --  104 101   CO2 28.0 27.0 26.0  --  27.0 27.0   ANION GAP 10.0 9.0 12.0  --  11.0 14.0   BUN 77* 90* 99*  --  114* 124*   CREATININE 1.79* 1.81* 1.84*  --  1.78* 2.09*   EGFR 25.4* 25.0* 24.5*  --  25.5* 21.1*   GLUCOSE 113* 98 113*  --  94 98   CALCIUM 8.4 8.5 8.5  --  8.4 8.7   MAGNESIUM 2.3 2.1 2.1  --  2.1 2.3   PHOSPHORUS 3.6  --   --   --  3.4  --          Lab 02/14/25  0403 02/12/25  1311   TOTAL PROTEIN 5.3* 5.4*   ALBUMIN 2.7* 2.7*   GLOBULIN 2.6 2.7   ALT (SGPT) 13 17   AST (SGOT) 17 18   BILIRUBIN 0.5 0.3   ALK PHOS 59 72         Lab  02/12/25  1311 02/12/25  1141   PROBNP  --  15,731.0*   HSTROP T 75* 87*   PROTIME  --  24.6*   INR  --  2.21*             Lab 02/13/25  0010 02/12/25  1756   IRON 173*  --    IRON SATURATION (TSAT) 81*  --    TIBC 213*  --    TRANSFERRIN 143*  --    FERRITIN 511.30*  --    FOLATE 12.20  --    VITAMIN B 12 475  --    ABO TYPING  --  B   RH TYPING  --  Positive   ANTIBODY SCREEN  --  Negative         Brief Urine Lab Results  (Last result in the past 365 days)        Color   Clarity   Blood   Leuk Est   Nitrite   Protein   CREAT   Urine HCG        02/12/25 1327 Yellow   Cloudy   Small (1+)   Trace   Negative   100 mg/dL (2+)                   Microbiology Results Abnormal       Procedure Component Value - Date/Time    Blood Culture - Blood, Arm, Right [[id number]]  (Abnormal) Collected: 02/12/25 1141    Lab Status: Final result Specimen: Blood from Arm, Right Updated: 02/15/25 0906     Blood Culture Corynebacterium species     Isolated from Aerobic Bottle     Gram Stain Aerobic Bottle Gram positive bacilli    Narrative:      Less than seven (7) mL's of blood was collected.  Insufficient quantity may yield false negative results.  Blood culture does not meet the specified criteria for PCR identification.  If pregnant, immunocompromised, or clinical concern for meningitis, call lab to run BCID for Listeria monocytogenes.    Probable contaminant requires clinical correlation, susceptibility not performed unless requested by physician.              No radiology results from the last 24 hrs    Results for orders placed during the hospital encounter of 01/20/25    Adult Transthoracic Echo Complete W/ Cont if Necessary Per Protocol    Interpretation Summary    Left ventricular ejection fraction appears to be 61 - 65%.    Left ventricular diastolic function was indeterminate.    Mild aortic valve stenosis is present.    Peak velocity of the flow distal to the aortic valve is 245.6 cm/s. Aortic valve maximum pressure gradient  is 24 mmHg. Aortic valve mean pressure gradient is 13 mmHg.    The mitral valve mean gradient is 4 mmHg.    Moderate tricuspid valve regurgitation is present.    Estimated right ventricular systolic pressure from tricuspid regurgitation is markedly elevated (>55 mmHg). Calculated right ventricular systolic pressure from tricuspid regurgitation is 57 mmHg.    There is a moderate 2cm  pericardial effusion with no tamponade    There is a left pleural effusion.      Current medications:  Scheduled Meds:pantoprazole, 40 mg, Oral, BID AC  sodium chloride, 10 mL, Intravenous, Q12H      Continuous Infusions:   PRN Meds:.  acetaminophen **OR** [DISCONTINUED] acetaminophen    [DISCONTINUED] ondansetron ODT **OR** ondansetron    sodium chloride    Assessment & Plan   Assessment & Plan     Active Hospital Problems    Diagnosis  POA    **Acute blood loss anemia, asymptomatic [D62]  Yes      Resolved Hospital Problems   No resolved problems to display.        Brief Hospital Course to date:  Rose J Kellermann is a 89 y.o. female with past medical history significant for atrial fibrillation, previous history of DVT, chronic kidney disease stage IV, chronic respiratory failure on 2 L of nasal cannula, congestive heart failure.  She was admitted to ICU on 2/12/2024 for GI bleed, hypotension.    GI bleed  Acute blood loss anemia  -Patient had witnessed coffee-ground emesis in the emergency room and hemoglobin was 6.6.  -GI saw and evaluated the patient.  Dr. Brunner did an EGD on 2/13/2025.  There was a 1 cm oozing ulcer with visible vessels in the duodenal bulb.  A clip was applied for hemostasis.  -Patient also has received 3 units of blood transfusion.  Hemoglobin and hematocrit seems to be stable although the last 3 measurements show small drop.  -Will monitor tonight and make sure that hemoglobin hematocrit is stable.  If hemoglobin is stable then patient could be sent back to the nursing home.    Hypertension  -As mentioned  above initially patient was admitted to the ICU.  She was stabilized and then sent today to telemetry floor on 2/16/2025  -Currently blood pressure is acceptable.    Atrial fibrillation  -Was on Eliquis which is on hold  -Rate is controlled    Chronic kidney disease stage IV  -Patient follow-up with [facility]  -Currently creatinine is about 1.79 which is close to baseline.        Expected Discharge Location and Transportation: Back to nursing home  Expected Discharge hopefully tomorrow  Expected Discharge Date: 2/17/2025; Expected Discharge Time:      VTE Prophylaxis:  Mechanical VTE prophylaxis orders are present.         AM-PAC 6 Clicks Score (PT): 12 (02/16/25 2010)    CODE STATUS:   Code Status and Medical Interventions: No CPR (Do Not Attempt to Resuscitate); Limited Support; No dialysis, No intubation (DNI)   Ordered at: 02/12/25 1941     Medical Intervention Limits:    No dialysis    No intubation (DNI)     Code Status (Patient has no pulse and is not breathing):    No CPR (Do Not Attempt to Resuscitate)     Medical Interventions (Patient has pulse or is breathing):    Limited Support       Kirill Rodríguez MD  02/17/25

## 2025-02-18 PROBLEM — N18.4 CKD (CHRONIC KIDNEY DISEASE) STAGE 4, GFR 15-29 ML/MIN: Chronic | Status: ACTIVE | Noted: 2025-02-18

## 2025-02-18 PROBLEM — K27.9 PUD (PEPTIC ULCER DISEASE): Status: ACTIVE | Noted: 2025-02-18

## 2025-02-18 LAB
ANION GAP SERPL CALCULATED.3IONS-SCNC: 10 MMOL/L (ref 5–15)
BASOPHILS # BLD AUTO: 0.04 10*3/MM3 (ref 0–0.2)
BASOPHILS NFR BLD AUTO: 0.6 % (ref 0–1.5)
BUN SERPL-MCNC: 62 MG/DL (ref 8–23)
BUN/CREAT SERPL: 29.2 (ref 7–25)
CALCIUM SPEC-SCNC: 8.4 MG/DL (ref 8.2–9.6)
CHLORIDE SERPL-SCNC: 105 MMOL/L (ref 98–107)
CO2 SERPL-SCNC: 24 MMOL/L (ref 22–29)
CREAT SERPL-MCNC: 2.12 MG/DL (ref 0.57–1)
DEPRECATED RDW RBC AUTO: 55.3 FL (ref 37–54)
EGFRCR SERPLBLD CKD-EPI 2021: 20.7 ML/MIN/1.73
EOSINOPHIL # BLD AUTO: 0.29 10*3/MM3 (ref 0–0.4)
EOSINOPHIL NFR BLD AUTO: 4.6 % (ref 0.3–6.2)
ERYTHROCYTE [DISTWIDTH] IN BLOOD BY AUTOMATED COUNT: 16.8 % (ref 12.3–15.4)
GLUCOSE SERPL-MCNC: 90 MG/DL (ref 65–99)
HCT VFR BLD AUTO: 33.3 % (ref 34–46.6)
HGB BLD-MCNC: 10.1 G/DL (ref 12–15.9)
IMM GRANULOCYTES # BLD AUTO: 0.03 10*3/MM3 (ref 0–0.05)
IMM GRANULOCYTES NFR BLD AUTO: 0.5 % (ref 0–0.5)
LYMPHOCYTES # BLD AUTO: 0.91 10*3/MM3 (ref 0.7–3.1)
LYMPHOCYTES NFR BLD AUTO: 14.3 % (ref 19.6–45.3)
MAGNESIUM SERPL-MCNC: 2.2 MG/DL (ref 1.7–2.3)
MCH RBC QN AUTO: 30.5 PG (ref 26.6–33)
MCHC RBC AUTO-ENTMCNC: 30.3 G/DL (ref 31.5–35.7)
MCV RBC AUTO: 100.6 FL (ref 79–97)
MONOCYTES # BLD AUTO: 0.62 10*3/MM3 (ref 0.1–0.9)
MONOCYTES NFR BLD AUTO: 9.8 % (ref 5–12)
NEUTROPHILS NFR BLD AUTO: 4.46 10*3/MM3 (ref 1.7–7)
NEUTROPHILS NFR BLD AUTO: 70.2 % (ref 42.7–76)
NRBC BLD AUTO-RTO: 0 /100 WBC (ref 0–0.2)
PHOSPHATE SERPL-MCNC: 3.1 MG/DL (ref 2.5–4.5)
PLATELET # BLD AUTO: 180 10*3/MM3 (ref 140–450)
PMV BLD AUTO: 10.1 FL (ref 6–12)
POTASSIUM SERPL-SCNC: 3.8 MMOL/L (ref 3.5–5.2)
RBC # BLD AUTO: 3.31 10*6/MM3 (ref 3.77–5.28)
SODIUM SERPL-SCNC: 139 MMOL/L (ref 136–145)
WBC NRBC COR # BLD AUTO: 6.35 10*3/MM3 (ref 3.4–10.8)

## 2025-02-18 PROCEDURE — 80048 BASIC METABOLIC PNL TOTAL CA: CPT | Performed by: INTERNAL MEDICINE

## 2025-02-18 PROCEDURE — 97535 SELF CARE MNGMENT TRAINING: CPT | Performed by: OCCUPATIONAL THERAPIST

## 2025-02-18 PROCEDURE — 97530 THERAPEUTIC ACTIVITIES: CPT

## 2025-02-18 PROCEDURE — 97116 GAIT TRAINING THERAPY: CPT

## 2025-02-18 PROCEDURE — 85025 COMPLETE CBC W/AUTO DIFF WBC: CPT | Performed by: INTERNAL MEDICINE

## 2025-02-18 PROCEDURE — 97530 THERAPEUTIC ACTIVITIES: CPT | Performed by: OCCUPATIONAL THERAPIST

## 2025-02-18 PROCEDURE — 99232 SBSQ HOSP IP/OBS MODERATE 35: CPT | Performed by: INTERNAL MEDICINE

## 2025-02-18 PROCEDURE — 84100 ASSAY OF PHOSPHORUS: CPT | Performed by: INTERNAL MEDICINE

## 2025-02-18 PROCEDURE — 83735 ASSAY OF MAGNESIUM: CPT | Performed by: INTERNAL MEDICINE

## 2025-02-18 RX ADMIN — PANTOPRAZOLE SODIUM 40 MG: 40 TABLET, DELAYED RELEASE ORAL at 16:52

## 2025-02-18 RX ADMIN — PANTOPRAZOLE SODIUM 40 MG: 40 TABLET, DELAYED RELEASE ORAL at 08:57

## 2025-02-18 RX ADMIN — Medication 10 ML: at 08:56

## 2025-02-18 RX ADMIN — Medication 10 ML: at 21:13

## 2025-02-18 NOTE — PLAN OF CARE
Problem: Adult Inpatient Plan of Care  Goal: Plan of Care Review  Recent Flowsheet Documentation  Taken 2/18/2025 1047 by Pretty Chaves, OT  Progress: improving  Outcome Evaluation: Pt. with improving strength and occupational endurance to support balance and functional mobility. Pt. required mod v/c's for hand placement on wx & for keeping feet within wx. Pt. DEP for toileting in supported standing. OT skilled services continue to be warranted to return pt. to PLOF. Recommend SNF upon d/c.   Goal Outcome Evaluation:  Plan of Care Reviewed With: patient, daughter        Progress: improving  Outcome Evaluation: Pt. with improving strength and occupational endurance to support balance and functional mobility. Pt. required mod v/c's for hand placement on wx & for keeping feet within wx. Pt. DEP for toileting in supported standing. OT skilled services continue to be warranted to return pt. to PLOF. Recommend SNF upon d/c.    Anticipated Discharge Disposition (OT): skilled nursing facility

## 2025-02-18 NOTE — THERAPY TREATMENT NOTE
Patient Name: Rose J Kellermann  : 1926    MRN: [id number]                              Today's Date: 2025       Admit Date: 2025    Visit Dx:     ICD-10-CM ICD-9-CM   1. Symptomatic anemia  D64.9 285.9   2. Acute upper GI bleed  K92.2 578.9   3. Gastric ulcer  K25.9 531.90     Patient Active Problem List   Diagnosis    Essential hypertension    Vitamin D deficiency    Fatigue    Chronic kidney disease, stage III (moderate)     Osteoarthritis    Dyslipidemia    Post-menopausal bleeding    Endometrial cancer    Closed fracture of left hip    Asymptomatic bacteriuria    Post-traumatic osteoarthritis of left hip    Status post total replacement of left hip    Prediabetes    Acute blood loss anemia, asymptomatic    Postoperative urinary retention    Myopia    Posterior crocodile shagreen of cornea    Presbyopia    Ptosis of eyelid    Regular astigmatism    Retinal neovascularization    After cataract    Secondary cataract    Premature atrial contractions    Heart murmur    Nocturnal hypoxia    Acute on chronic heart failure with preserved ejection fraction (HFpEF)    Chronic deep vein thrombosis (DVT) of both lower extremities    Retinal neovascularization    Pleural effusion on left    Pulmonary hypertension     Past Medical History:   Diagnosis Date    Abscess of back     Arrhythmia     frequent PVC    Cancer 2011    Endometrial cancer, status post robotically assisted hysterectomy with Dr. Haddad, 2011.      CKD (chronic kidney disease), stage III     no dilaysis     Dizzy     Dvt femoral (deep venous thrombosis) 2017    s/p hip surgery in . Took xarelto until 2018    Dyslipidemia     Dyslipidemia.  Observing.    Enthesopathy of hip region     Generalized osteoarthrosis, involving multiple sites     Headache     Hearing loss     Hypertension     Low backache     Needs flu shot     Osteoarthritis     Osteoarthritis with questionable carpal tunnel syndrome bilaterally.      Otitis, serous     Pneumonia 1967    Remote pneumonia, 1967.     Retinal hemorrhage     SI joint arthritis     SOB (shortness of breath)     Syncope 2001    Remote syncope with working diagnosis of vasodepressor, 2001.     Venous insufficiency of leg     Wears glasses     readers     Past Surgical History:   Procedure Laterality Date    CATARACT EXTRACTION      bilat     COLONOSCOPY      ENDOSCOPY N/A 2/13/2025    Procedure: ESOPHAGOGASTRODUODENOSCOPY;  Surgeon: Brunner, Mark I, MD;  Location:  [facility];  Service: Gastroenterology;  Laterality: N/A;    HIP PERCUTANEOUS PINNING Left 11/24/2017    Procedure: HIP PERCUTANEOUS PINNING;  Surgeon: Lucas Swanson MD;  Location:  [facility];  Service:     HYSTERECTOMY      total? but unsure     KNEE SURGERY  2001    Remote knee surgery in 2001.- right     TOTAL HIP ARTHROPLASTY Left 10/26/2018    Procedure: TOTAL HIP ARTHROPLASTY LEFT;  Surgeon: Stephen Baker MD;  Location:  [facility];  Service: Orthopedics      General Information       Row Name 02/18/25 1001          Physical Therapy Time and Intention    Document Type therapy note (daily note)  -KW     Mode of Treatment physical therapy  -       Row Name 02/18/25 1001          General Information    Patient Profile Reviewed yes  -KW     Existing Precautions/Restrictions fall;oxygen therapy device and L/min  -KW     Barriers to Rehab hearing deficit  -KW               User Key  (r) = Recorded By, (t) = Taken By, (c) = Cosigned By      Initials Name Provider Type    KW Kylah Kapadia, ADAM Physical Therapist                   Mobility       Row Name 02/18/25 1001          Bed Mobility    Bed Mobility supine-sit  -KW     Supine-Sit [place] (Bed Mobility) contact guard;verbal cues  -KW     Assistive Device (Bed Mobility) head of bed elevated;bed rails  -       Row Name 02/18/25 1001          Sit-Stand Transfer    Sit-Stand [place] (Transfers) moderate assist (50% patient effort);1 person  assist;verbal cues  -KW     Assistive Device (Sit-Stand Transfers) walker, front-wheeled  -KW       Row Name 02/18/25 1001          Gait/Stairs (Locomotion)    [place] Level (Gait) minimum assist (75% patient effort);2 person assist;verbal cues  -KW     Assistive Device (Gait) walker, front-wheeled  -KW     Distance in Feet (Gait) 20  -KW     Deviations/Abnormal Patterns (Gait) bilateral deviations;mike decreased;festinating/shuffling;gait speed decreased;stride length decreased  -KW     Bilateral Gait Deviations forward flexed posture;heel strike decreased  -KW               User Key  (r) = Recorded By, (t) = Taken By, (c) = Cosigned By      Initials Name Provider Type    KW Kylah Kapadia, PT Physical Therapist                   Obj/Interventions    No documentation.                  Goals/Plan    No documentation.                  Clinical Impression       Row Name 02/18/25 1001          Pain    Pretreatment Pain Rating 0/10 - no pain  -       Row Name 02/18/25 1001          Plan of Care Review    Plan of Care Reviewed With patient  -KW     Progress improving  -     Outcome Evaluation Physical therapy treatment complete. The patient continues to require additional time to complete mobility and heavy cues for AD management and safety. Patient increased ambulation distance compared to previous treatment session. Patient required asssitance with Rwx and hand placement. She completed stands after ambulation for clean up. The patient continues to present below baseline for functional mobility. The patient would continue to benefit from skilled PT to address strength, balance and activity tolerance deficits. Continue to current PT POC  -KW       Row Name 02/18/25 1001          Therapy Assessment/Plan (PT)    Patient/Family Therapy Goals Statement (PT) to return to baseline  -KW     Predicted Duration of Therapy Intervention (PT) 10 days  -KW       Row Name 02/18/25 1001          Vital Signs    Pre  Systolic BP Rehab 142  -KW     Pre Treatment Diastolic BP 98  -KW     Intra Systolic BP Rehab 136  -KW     Intra Treatment Diastolic BP 84  -KW     Post Systolic BP Rehab 139  -KW     Post Treatment Diastolic BP 89  -KW     Pretreatment Heart Rate (beats/min) 73  -KW     Intratreatment Heart Rate (beats/min) 84  -KW     Posttreatment Heart Rate (beats/min) 71  -KW     Pre SpO2 (%) 88  -KW     O2 Delivery Pre Treatment room air  -KW     Post SpO2 (%) 93  -KW     O2 Delivery Post Treatment nasal cannula  -KW       Row Name 02/18/25 1001          Positioning and Restraints    Pre-Treatment Position in bed  -KW     Post Treatment Position chair  -KW     In Chair reclined;call light within reach;encouraged to call for assist;legs elevated;exit alarm on  -KW               User Key  (r) = Recorded By, (t) = Taken By, (c) = Cosigned By      Initials Name Provider Type    Kylah Whaley, PT Physical Therapist                   Outcome Measures       Row Name 02/18/25 1001          How much help from another person do you currently need...    Turning from your back to your side while in flat bed without using bedrails? 3  -KW     Moving from lying on back to sitting on the side of a flat bed without bedrails? 3  -KW     Moving to and from a bed to a chair (including a wheelchair)? 2  -KW     Standing up from a chair using your arms (e.g., wheelchair, bedside chair)? 2  -KW     Climbing 3-5 steps with a railing? 2  -KW     To walk in hospital room? 3  -KW     AM-PAC 6 Clicks Score (PT) 15  -KW     Highest Level of Mobility Goal 4 --> Transfer to chair/commode  -KW       Row Name 02/18/25 1014 02/18/25 1001       Functional Assessment    Outcome Measure Options AM-PAC 6 Clicks Daily Activity (OT)  -SD AM-PAC 6 Clicks Basic Mobility (PT)  -KW              User Key  (r) = Recorded By, (t) = Taken By, (c) = Cosigned By      Initials Name Provider Type    Pretty Vazquez, OT Occupational Therapist    KW  Kylah Kapadia, ADAM Physical Therapist                                 Physical Therapy Education       Title: PT OT SLP Therapies (In Progress)       Topic: Physical Therapy (In Progress)       Point: Mobility training (Done)       Learning Progress Summary            Patient Acceptance, E, VU by CK at 2/15/2025 1013                      Point: Home exercise program (Not Started)       Learner Progress:  Not documented in this visit.              Point: Body mechanics (Done)       Learning Progress Summary            Patient Acceptance, E, VU by CK at 2/15/2025 1013                      Point: Precautions (Done)       Learning Progress Summary            Patient Acceptance, E, VU by CK at 2/15/2025 1013                                      User Key       Initials Effective Dates Name Provider Type Discipline     02/06/24 -  Jaycee Harding, ADAM Physical Therapist PT                  PT Recommendation and Plan     Progress: improving  Outcome Evaluation: Physical therapy treatment complete. The patient continues to require additional time to complete mobility and heavy cues for AD management and safety. Patient increased ambulation distance compared to previous treatment session. Patient required asssitance with Rwx and hand placement. She completed stands after ambulation for clean up. The patient continues to present below baseline for functional mobility. The patient would continue to benefit from skilled PT to address strength, balance and activity tolerance deficits. Continue to current PT POC     Time Calculation:         PT Charges       Row Name 02/18/25 1001             Time Calculation    Start Time 1001  -KW      PT Received On 02/18/25  -KW         Timed Charges    33404 - Gait Training Minutes  17  -KW      94555 - PT Therapeutic Activity Minutes 25  -KW         Total Minutes    Timed Charges Total Minutes 42  -KW       Total Minutes 42  -KW                User Key  (r) = Recorded By, (t) =  Taken By, (c) = Cosigned By      Initials Name Provider Type    Kylah Whaley, PT Physical Therapist                  Therapy Charges for Today       Code Description Service Date Service Provider Modifiers Qty    34287995396 HC GAIT TRAINING EA 15 MIN 2/18/2025 Kylah Kapadia, PT GP 1    99276051718 HC PT THERAPEUTIC ACT EA 15 MIN 2/18/2025 Kylah Kapadia, PT GP 2            PT G-Codes  Outcome Measure Options: AM-PAC 6 Clicks Daily Activity (OT)  AM-PAC 6 Clicks Score (PT): 15  AM-PAC 6 Clicks Score (OT): 12  PT Discharge Summary  Anticipated Discharge Disposition (PT): skilled nursing facility    Kylah Kapadia PT  2/18/2025

## 2025-02-18 NOTE — THERAPY TREATMENT NOTE
Patient Name: Rose J Kellermann  : 1926    MRN: [id number]                              Today's Date: 2025       Admit Date: 2025    Visit Dx:     ICD-10-CM ICD-9-CM   1. Symptomatic anemia  D64.9 285.9   2. Acute upper GI bleed  K92.2 578.9   3. Gastric ulcer  K25.9 531.90     Patient Active Problem List   Diagnosis    Essential hypertension    Vitamin D deficiency    Fatigue    Chronic kidney disease, stage III (moderate)     Osteoarthritis    Dyslipidemia    Post-menopausal bleeding    Endometrial cancer    Closed fracture of left hip    Asymptomatic bacteriuria    Post-traumatic osteoarthritis of left hip    Status post total replacement of left hip    Prediabetes    Acute blood loss anemia, asymptomatic    Postoperative urinary retention    Myopia    Posterior crocodile shagreen of cornea    Presbyopia    Ptosis of eyelid    Regular astigmatism    Retinal neovascularization    After cataract    Secondary cataract    Premature atrial contractions    Heart murmur    Nocturnal hypoxia    Acute on chronic heart failure with preserved ejection fraction (HFpEF)    Chronic deep vein thrombosis (DVT) of both lower extremities    Retinal neovascularization    Pleural effusion on left    Pulmonary hypertension     Past Medical History:   Diagnosis Date    Abscess of back     Arrhythmia     frequent PVC    Cancer 2011    Endometrial cancer, status post robotically assisted hysterectomy with Dr. Haddad, 2011.      CKD (chronic kidney disease), stage III     no dilaysis     Dizzy     Dvt femoral (deep venous thrombosis) 2017    s/p hip surgery in . Took xarelto until 2018    Dyslipidemia     Dyslipidemia.  Observing.    Enthesopathy of hip region     Generalized osteoarthrosis, involving multiple sites     Headache     Hearing loss     Hypertension     Low backache     Needs flu shot     Osteoarthritis     Osteoarthritis with questionable carpal tunnel syndrome bilaterally.      Otitis, serous     Pneumonia 1967    Remote pneumonia, 1967.     Retinal hemorrhage     SI joint arthritis     SOB (shortness of breath)     Syncope 2001    Remote syncope with working diagnosis of vasodepressor, 2001.     Venous insufficiency of leg     Wears glasses     readers     Past Surgical History:   Procedure Laterality Date    CATARACT EXTRACTION      bilat     COLONOSCOPY      ENDOSCOPY N/A 2/13/2025    Procedure: ESOPHAGOGASTRODUODENOSCOPY;  Surgeon: Brunner, Mark I, MD;  Location:  [facility];  Service: Gastroenterology;  Laterality: N/A;    HIP PERCUTANEOUS PINNING Left 11/24/2017    Procedure: HIP PERCUTANEOUS PINNING;  Surgeon: Lucas Swanson MD;  Location:  [facility];  Service:     HYSTERECTOMY      total? but unsure     KNEE SURGERY  2001    Remote knee surgery in 2001.- right     TOTAL HIP ARTHROPLASTY Left 10/26/2018    Procedure: TOTAL HIP ARTHROPLASTY LEFT;  Surgeon: Stephen Baker MD;  Location:  [facility];  Service: Orthopedics      General Information       Row Name 02/18/25 1014          OT Time and Intention    Document Type therapy note (daily note)  -SD     Mode of Treatment occupational therapy  -SD     Patient Effort excellent  -SD       Row Name 02/18/25 1014          General Information    Patient Profile Reviewed yes  -SD     Existing Precautions/Restrictions fall;oxygen therapy device and L/min  -SD     Barriers to Rehab medically complex;hearing deficit   -SD       Row Name 02/18/25 1014          Cognition    Orientation Status (Cognition) oriented x 3;other (see comments)  dtr. commented that this is the 1st day pt. has shown interest in looking through her magazines  -SD       Row Name 02/18/25 1014          Safety Issues/Impairments Affecting Functional Mobility    Safety Issues Affecting Function (Mobility) awareness of need for assistance;insight into deficits/self-awareness;safety precaution awareness;safety precautions follow-through/compliance;sequencing  abilities;steps too close to assistive device;other (see comments)  pt. required mod v/c's for hand placement on walker & v/c's to not step beyond front of wx  -SD     Impairments Affecting Function (Mobility) balance;endurance/activity tolerance;shortness of breath;strength;postural/trunk control  -SD               User Key  (r) = Recorded By, (t) = Taken By, (c) = Cosigned By      Initials Name Provider Type    Pretty Vazquez OT Occupational Therapist                     Mobility/ADL's       Row Name 02/18/25 1043          Bed Mobility    Comment, (Bed Mobility) pt. sitting EOB upon therapist's arrival  -SD       Row Name 02/18/25 1043          Transfers    Transfers bed-chair transfer;sit-stand transfer;stand-sit transfer  -SD       Row Name 02/18/25 1043          Bed-Chair Transfer    Bed-Chair [place] (Transfers) minimum assist (75% patient effort);1 person assist;1 person to manage equipment;verbal cues  -SD     Assistive Device (Bed-Chair Transfers) walker, front-wheeled  -SD       Row Name 02/18/25 1043          Sit-Stand Transfer    Sit-Stand [place] (Transfers) moderate assist (50% patient effort);1 person assist;verbal cues  -SD     Assistive Device (Sit-Stand Transfers) walker, front-wheeled  -SD       Row Name 02/18/25 1043          Stand-Sit Transfer    Stand-Sit [place] (Transfers) minimum assist (75% patient effort);1 person assist;1 person to manage equipment;verbal cues;nonverbal cues (demo/gesture)  -SD     Assistive Device (Stand-Sit Transfers) walker, front-wheeled  -SD       Row Name 02/18/25 1043          Functional Mobility    Functional Mobility- Ind. Level minimum assist (75% patient effort);1 person + 1 person to manage equipment;verbal cues required;nonverbal cues required (demo/gesture)  -SD     Functional Mobility- Device walker, front-wheeled  -SD     Functional Mobility-Distance (Feet) 20  -SD     Functional Mobility- Safety Issues balance decreased during  turns;supplemental O2;steps too close front assistive device  -SD     Patient was able to Ambulate yes  -SD       Row Name 02/18/25 1043          Activities of Daily Living    BADL Assessment/Intervention upper body dressing;toileting;grooming  -SD       Row Name 02/18/25 1043          Upper Body Dressing Assessment/Training    [place] Level (Upper Body Dressing) doff;don;front opening garment;pajama/robe;moderate assist (50% patient effort)  -SD     Position (Upper Body Dressing) unsupported sitting  -SD       Row Name 02/18/25 1043          Grooming Assessment/Training    [place] Level (Grooming) wash face, hands;verbal cues;supervision  -SD     Position (Grooming) supported sitting  -SD       Row Name 02/18/25 1043          Toileting Assessment/Training    [place] Level (Toileting) adjust/manage clothing;manage urinary drainage device;perform perineal hygiene;dependent (less than 25% patient effort)  -SD     Position (Toileting) supported standing  -SD               User Key  (r) = Recorded By, (t) = Taken By, (c) = Cosigned By      Initials Name Provider Type    Pretty Vazquez OT Occupational Therapist                   Obj/Interventions       Row Name 02/18/25 1046          Balance    Balance Assessment sitting dynamic balance  -SD     Static Sitting Balance contact guard  -SD     Dynamic Sitting Balance minimal assist  -SD     Position, Sitting Balance supported;sitting in chair  -SD     Static Standing Balance minimal assist;1-person assist;1 person to manage equipment  -SD     Dynamic Standing Balance minimal assist;1-person assist;1 person to manage equipment  -SD     Position/Device Used, Standing Balance supported;walker, front-wheeled  -SD               User Key  (r) = Recorded By, (t) = Taken By, (c) = Cosigned By      Initials Name Provider Type    Pretty Vazquez OT Occupational Therapist                   Goals/Plan    No documentation.                  Clinical  Impression       Row Name 02/18/25 1047          Pain Assessment    Pretreatment Pain Rating 0/10 - no pain  -SD     Posttreatment Pain Rating 0/10 - no pain  -SD       Row Name 02/18/25 1047          Plan of Care Review    Plan of Care Reviewed With patient;daughter  -SD     Progress improving  -SD     Outcome Evaluation Pt. with improving strength and occupational endurance to support balance and functional mobility. Pt. required mod v/c's for hand placement on wx & for keeping feet within wx. Pt. DEP for toileting in supported standing. OT skilled services continue to be warranted to return pt. to PLOF. Recommend SNF upon d/c.  -SD       Row Name 02/18/25 1047          Therapy Assessment/Plan (OT)    Rehab Potential (OT) good  -SD     Criteria for Skilled Therapeutic Interventions Met (OT) yes;meets criteria;skilled treatment is necessary  -SD     Therapy Frequency (OT) daily  -SD       Row Name 02/18/25 1047          Therapy Plan Review/Discharge Plan (OT)    Anticipated Discharge Disposition (OT) skilled nursing facility  -SD       Row Name 02/18/25 1047          Vital Signs    Pre Systolic BP Rehab 136  -SD     Pre Treatment Diastolic BP 84  -SD     Post Systolic BP Rehab 139  -SD     Post Treatment Diastolic BP 89  -SD     Posttreatment Heart Rate (beats/min) 76  -SD     O2 Delivery Pre Treatment nasal cannula  -SD     O2 Delivery Intra Treatment nasal cannula  -SD     Post SpO2 (%) 94  -SD     O2 Delivery Post Treatment nasal cannula  -SD     Pre Patient Position Sitting  -SD     Intra Patient Position Standing  -SD     Post Patient Position Sitting  -SD       Row Name 02/18/25 1047          Positioning and Restraints    Pre-Treatment Position in bed  -SD     Post Treatment Position chair  -SD     In Chair notified nsg;reclined;call light within reach;encouraged to call for assist;exit alarm on;with family/caregiver;waffle cushion;LUE elevated;legs elevated;heels elevated  -SD               User Key  (r) =  Recorded By, (t) = Taken By, (c) = Cosigned By      Initials Name Provider Type    Pretty Vazquez OT Occupational Therapist                   Outcome Measures       Row Name 02/18/25 1014          How much help from another is currently needed...    Putting on and taking off regular lower body clothing? 1  -SD     Bathing (including washing, rinsing, and drying) 2  -SD     Toileting (which includes using toilet bed pan or urinal) 1  -SD     Putting on and taking off regular upper body clothing 2  -SD     Taking care of personal grooming (such as brushing teeth) 3  -SD     Eating meals 3  -SD     AM-PAC 6 Clicks Score (OT) 12  -SD       Row Name 02/18/25 1014          Functional Assessment    Outcome Measure Options AM-PAC 6 Clicks Daily Activity (OT)  -SD               User Key  (r) = Recorded By, (t) = Taken By, (c) = Cosigned By      Initials Name Provider Type    Pretty Vazquez OT Occupational Therapist                    Occupational Therapy Education       Title: PT OT SLP Therapies (In Progress)       Topic: Occupational Therapy (In Progress)       Point: ADL training (Done)       Description:   Instruct learner(s) on proper safety adaptation and remediation techniques during self care or transfers.   Instruct in proper use of assistive devices.                  Learning Progress Summary            Patient Acceptance, E, VU,NR by  at 2/15/2025 0800    Comment: role of therapy, ongoing treatment plan, discharge recommendations                      Point: Home exercise program (Not Started)       Description:   Instruct learner(s) on appropriate technique for monitoring, assisting and/or progressing therapeutic exercises/activities.                  Learner Progress:  Not documented in this visit.              Point: Precautions (Done)       Description:   Instruct learner(s) on prescribed precautions during self-care and functional transfers.                  Learning Progress Summary             Patient Acceptance, E, VU,NR by  at 2/15/2025 0800    Comment: role of therapy, ongoing treatment plan, discharge recommendations                      Point: Body mechanics (Not Started)       Description:   Instruct learner(s) on proper positioning and spine alignment during self-care, functional mobility activities and/or exercises.                  Learner Progress:  Not documented in this visit.                              User Key       Initials Effective Dates Name Provider Type Discipline     02/03/23 -  Paola Azar OT Occupational Therapist OT                  OT Recommendation and Plan  Therapy Frequency (OT): daily  Plan of Care Review  Plan of Care Reviewed With: patient, daughter  Progress: improving  Outcome Evaluation: Pt. with improving strength and occupational endurance to support balance and functional mobility. Pt. required mod v/c's for hand placement on wx & for keeping feet within wx. Pt. DEP for toileting in supported standing. OT skilled services continue to be warranted to return pt. to PLOF. Recommend SNF upon d/c.     Time Calculation:         Time Calculation- OT       Row Name 02/18/25 1050             Time Calculation- OT    OT Received On 02/18/25  -SD      OT Goal Re-Cert Due Date 02/25/25  -SD         Timed Charges    83854 - OT Therapeutic Activity Minutes 10  -SD      51675 - OT Self Care/Mgmt Minutes 16  -SD         Total Minutes    Timed Charges Total Minutes 26  -SD       Total Minutes 26  -SD                User Key  (r) = Recorded By, (t) = Taken By, (c) = Cosigned By      Initials Name Provider Type    Pretty Vazquez OT Occupational Therapist                  Therapy Charges for Today       Code Description Service Date Service Provider Modifiers Qty    44130079737 HC OT THERAPEUTIC ACT EA 15 MIN 2/18/2025 Pretty Chaves OT GO 1    60756935681 HC OT SELF CARE/MGMT/TRAIN EA 15 MIN 2/18/2025 Pretty Chaves OT GO 1                  Pretty Chaves, OT  2/18/2025

## 2025-02-18 NOTE — CASE MANAGEMENT/SOCIAL WORK
Continued Stay Note   [place]     Patient Name: Rose J Kellermann  MRN: [id number]  Today's Date: 2/18/2025    Admit Date: 2/12/2025    Plan: update   Discharge Plan       Row Name 02/18/25 1302       Plan    Plan update    Patient/Family in Agreement with Plan yes    Plan Comments Request for precert sent.  Spoke with patient daughter to advise of insurance precert who verbalizes understanding and agreement with plan.  No new discharge needs verbalized.  CM following.  Patient plan is to discharge to [facility] pending insurance approval.    Final Discharge Disposition Code 03 - skilled nursing facility (SNF)                   Discharge Codes    No documentation.                 Expected Discharge Date and Time       Expected Discharge Date Expected Discharge Time    Feb 18, 2025               Charo Terry, RN

## 2025-02-18 NOTE — PLAN OF CARE
Goal Outcome Evaluation:  Plan of Care Reviewed With: patient        Progress: improving  Outcome Evaluation: Physical therapy treatment complete. The patient continues to require additional time to complete mobility and heavy cues for AD management and safety. Patient increased ambulation distance compared to previous treatment session. Patient required asssitance with Rwx and hand placement. She completed stands after ambulation for clean up. The patient continues to present below baseline for functional mobility. The patient would continue to benefit from skilled PT to address strength, balance and activity tolerance deficits. Continue to current PT POC    Anticipated Discharge Disposition (PT): skilled nursing facility

## 2025-02-18 NOTE — PROGRESS NOTES
[facility] Medicine Services  PROGRESS NOTE    Patient Name: Rose J Kellermann  : 1926  MRN: [id number]    Date of Admission: 2025  Primary Care Physician: Sam Hung MD    Subjective   Subjective     CC:  F/u anemia    HPI:  No acute complaints this morning, family at bedside.  Waiting on insurance pre-CERT      Objective   Objective     Vital Signs:   Temp:  [97.7 °F (36.5 °C)-98.3 °F (36.8 °C)] 98.1 °F (36.7 °C)  Heart Rate:  [66-79] 78  Resp:  [16] 16  BP: (126-154)/(86-98) 133/98  Flow (L/min) (Oxygen Therapy):  [2] 2     Physical Exam:  Constitutional: Awake, alert, elderly female sitting in bedside chair in NAD  Respiratory: Clear to auscultation bilaterally, respiratory effort normal   Cardiovascular: RRR, palpable radial pulse  Gastrointestinal: Positive bowel sounds, soft, nontender, nondistended  Psychiatric: Appropriate affect, cooperative  Neurologic: Hard of hearing    Results Reviewed:  LAB RESULTS:      Lab 25  0909 25  1627 25  0605 25  2341 25  1614 25  0547 02/15/25  1601 02/15/25  0424 25  1810 25  0403 25  1228 25  0503 25  0010 25  1650 25  1650 25  1311 25  1141   WBC 6.35  --  6.86  --   --  6.78  --  7.82  --  9.70  --  10.17  --    < > 7.15  --   --    HEMOGLOBIN 10.1* 11.2* 9.0* 9.5* 10.5* 9.3*   < > 9.6*   < > 7.5*   < > 9.1* 7.7*   < > 6.6*  --   --    HEMATOCRIT 33.3* 37.2 28.5* 29.0* 33.5* 29.0*   < > 28.8*   < > 23.2*   < > 27.7* 23.6*   < > 20.7*  --   --    PLATELETS 180  --  179  --   --  175  --  175  --  190  --  233  --    < > 217  --   --    NEUTROS ABS 4.46  --   --   --   --  4.91  --  5.81  --  7.66*  --  7.83*  --    < > 5.83  --   --    IMMATURE GRANS (ABS) 0.03  --   --   --   --  0.03  --  0.05  --  0.06*  --  0.06*  --    < > 0.05  --   --    LYMPHS ABS 0.91  --   --   --   --  0.95  --  0.93  --  0.94  --  1.39  --    < >  0.87  --   --    MONOS ABS 0.62  --   --   --   --  0.60  --  0.69  --  0.73  --  0.75  --    < > 0.38  --   --    EOS ABS 0.29  --   --   --   --  0.26  --  0.30  --  0.28  --  0.10  --    < > 0.01  --   --    .6*  --  95.0  --   --  94.8  --  92.0  --  93.5  --  90.2  --    < > 94.1  --   --    PROCALCITONIN  --   --   --   --   --   --   --   --   --   --   --   --   --   --   --   --  0.49*   LACTATE  --   --   --   --   --   --   --   --   --   --   --   --  1.9  --  2.2* 6.6* 4.5*   PROTIME  --   --   --   --   --   --   --   --   --   --   --   --   --   --   --   --  24.6*   APTT  --   --   --   --   --   --   --   --   --   --   --   --   --   --   --   --  35.0*   HSTROP T  --   --   --   --   --   --   --   --   --   --   --   --   --   --   --  75* 87*    < > = values in this interval not displayed.         Lab 02/18/25  0909 02/17/25  0605 02/16/25  0547 02/15/25  0959 02/14/25  1810 02/14/25  0403   SODIUM 139 144 139 140  --  142   POTASSIUM 3.8 3.8 4.1 3.9 4.1 3.4*   CHLORIDE 105 106 103 102  --  104   CO2 24.0 28.0 27.0 26.0  --  27.0   ANION GAP 10.0 10.0 9.0 12.0  --  11.0   BUN 62* 77* 90* 99*  --  114*   CREATININE 2.12* 1.79* 1.81* 1.84*  --  1.78*   EGFR 20.7* 25.4* 25.0* 24.5*  --  25.5*   GLUCOSE 90 113* 98 113*  --  94   CALCIUM 8.4 8.4 8.5 8.5  --  8.4   MAGNESIUM 2.2 2.3 2.1 2.1  --  2.1   PHOSPHORUS 3.1 3.6  --   --   --  3.4         Lab 02/14/25  0403 02/12/25  1311   TOTAL PROTEIN 5.3* 5.4*   ALBUMIN 2.7* 2.7*   GLOBULIN 2.6 2.7   ALT (SGPT) 13 17   AST (SGOT) 17 18   BILIRUBIN 0.5 0.3   ALK PHOS 59 72         Lab 02/12/25  1311 02/12/25  1141   PROBNP  --  15,731.0*   HSTROP T 75* 87*   PROTIME  --  24.6*   INR  --  2.21*             Lab 02/13/25  0010 02/12/25  1756   IRON 173*  --    IRON SATURATION (TSAT) 81*  --    TIBC 213*  --    TRANSFERRIN 143*  --    FERRITIN 511.30*  --    FOLATE 12.20  --    VITAMIN B 12 475  --    ABO TYPING  --  B   RH TYPING  --  Positive    ANTIBODY SCREEN  --  Negative         Brief Urine Lab Results  (Last result in the past 365 days)        Color   Clarity   Blood   Leuk Est   Nitrite   Protein   CREAT   Urine HCG        02/12/25 1327 Yellow   Cloudy   Small (1+)   Trace   Negative   100 mg/dL (2+)                   Microbiology Results Abnormal       Procedure Component Value - Date/Time    Blood Culture - Blood, Arm, Right [[id number]]  (Abnormal) Collected: 02/12/25 1141    Lab Status: Final result Specimen: Blood from Arm, Right Updated: 02/15/25 0906     Blood Culture Corynebacterium species     Isolated from Aerobic Bottle     Gram Stain Aerobic Bottle Gram positive bacilli    Narrative:      Less than seven (7) mL's of blood was collected.  Insufficient quantity may yield false negative results.  Blood culture does not meet the specified criteria for PCR identification.  If pregnant, immunocompromised, or clinical concern for meningitis, call lab to run BCID for Listeria monocytogenes.    Probable contaminant requires clinical correlation, susceptibility not performed unless requested by physician.              No radiology results from the last 24 hrs    Results for orders placed during the hospital encounter of 01/20/25    Adult Transthoracic Echo Complete W/ Cont if Necessary Per Protocol    Interpretation Summary    Left ventricular ejection fraction appears to be 61 - 65%.    Left ventricular diastolic function was indeterminate.    Mild aortic valve stenosis is present.    Peak velocity of the flow distal to the aortic valve is 245.6 cm/s. Aortic valve maximum pressure gradient is 24 mmHg. Aortic valve mean pressure gradient is 13 mmHg.    The mitral valve mean gradient is 4 mmHg.    Moderate tricuspid valve regurgitation is present.    Estimated right ventricular systolic pressure from tricuspid regurgitation is markedly elevated (>55 mmHg). Calculated right ventricular systolic pressure from tricuspid regurgitation is 57 mmHg.     There is a moderate 2cm  pericardial effusion with no tamponade    There is a left pleural effusion.      Current medications:  Scheduled Meds:pantoprazole, 40 mg, Oral, BID AC  sodium chloride, 10 mL, Intravenous, Q12H      Continuous Infusions:   PRN Meds:.  acetaminophen **OR** [DISCONTINUED] acetaminophen    [DISCONTINUED] ondansetron ODT **OR** ondansetron    sodium chloride    Assessment & Plan   Assessment & Plan     Active Hospital Problems    Diagnosis  POA    **Acute blood loss anemia, asymptomatic [D62]  Yes    PUD (peptic ulcer disease) [K27.9]  Yes    CKD (chronic kidney disease) stage 4, GFR 15-29 ml/min [N18.4]  Yes    Pulmonary hypertension [I27.20]  Yes      Resolved Hospital Problems   No resolved problems to display.        Brief Hospital Course to date:  Rose J Kellermann is a 89 y.o. female w/ CKD4, HFpEF, pulm HTN, chronic O2 use, Afib, DVT, recent admit 1/20/25-2/6/25 w/ AECHF, ANGEL LUIS/CKD4, and discharged to rehab; she was sent for dec responsiveness, reported BP 70 systolic; labs were notably for Hgb 6.6 down from 12.2 a week prior and lactate 6.6; she was admitted to the ICU, underwent EGD 2/13 w/ 1cm oozing ulcer w/ visible vessel in the duodenal bulb, s/p clip; she has required 4U PRBC this admit    The following problems are new to me today    Assessment/Plan    Acute blood loss anemia  Duodenal ulcer  -s/p EGD 2/13/25 w/ 1 cm ulcer in the duodenal bulb with visible vessel, s/p clipping  -BID PPI x1 mo then daily  -s/p 4U PRBC this admit  -H&H stable  -per last GI note, can consider restart eliquis prior to DC    Chronic HFpEF  Pulm HTN  Chronic 3lpm NC use  Hx DVT  HTN/HLD  Afib  -not on BB 2/2 Hx pauses/bradycardia  - home bumex and eliquis on hold  - otherwise not on meds for the same    CKD4 - baseline Cr ~1.8-2.5; eGFR ~17-28    Expected Discharge Location and Transportation: rehab pending insurance  Expected Discharge   Expected Discharge Date: 2/19/2025; Expected Discharge Time:       VTE Prophylaxis:  Mechanical VTE prophylaxis orders are present.         AM-PAC 6 Clicks Score (PT): 15 (02/18/25 1001)    CODE STATUS:   Code Status and Medical Interventions: No CPR (Do Not Attempt to Resuscitate); Limited Support; No dialysis, No intubation (DNI)   Ordered at: 02/12/25 1941     Medical Intervention Limits:    No dialysis    No intubation (DNI)     Code Status (Patient has no pulse and is not breathing):    No CPR (Do Not Attempt to Resuscitate)     Medical Interventions (Patient has pulse or is breathing):    Limited Support       Osbaldo Izaguirre, DO  02/18/25

## 2025-02-18 NOTE — DISCHARGE PLACEMENT REQUEST
"Kellermann, Rose J (89 y.o. Female)       Date of Birth   12/09/1926    Social Security Number       Address   [facility] [place] [address]    Home Phone   [phone]    MRN   [id number]       Jainism   Orthodoxy    Marital Status                               Admission Date   2/12/25    Admission Type   Emergency    Admitting Provider   Osbaldo Izaguirre DO    Attending Provider   Osbaldo Izaguirre DO    Department, Room/Bed   [facility] 4G, S459/1       Discharge Date       Discharge Disposition       Discharge Destination                                 Attending Provider: Osbaldo Izaguirre DO    Allergies: Sulfa Antibiotics    Isolation: None   Infection: None   Code Status: No CPR    Ht: 162.6 cm (64\")   Wt: 71.7 kg (158 lb)    Admission Cmt: None   Principal Problem: Acute blood loss anemia, asymptomatic [D62]                   Active Insurance as of 2/12/2025       Primary Coverage       Payor Plan Insurance Group Employer/Plan Group    [facility] MEDICARE REPLACEMENT [facility] [employer] ADV GROUP [id number]       Payor Plan Address Payor Plan Phone Number Payor Plan Fax Number Effective Dates    [address] [address]   1/1/2023 - None Entered    [facility] [id number]         Subscriber Name Subscriber Birth Date Member ID       KELLERMANN,ROSE J 12/9/1926 [id number]                     Emergency Contacts        (Rel.) Home Phone Work Phone Mobile Phone    KELLERMANN, ALONZO (Power of ) [phone] -- [phone]                 History & Physical        Toni Villegas MD at 02/12/25 1945            Intensive Care Admission Note     Acute blood loss anemia    History of Present Illness     89 F with history of chronic diastolic CHF, A-fib, history of DVT on Eliquis, CKD stage IV, chronic respiratory failure on baseline 3 L oxygen, currently residing at rehab facility after she was recently admitted and discharged from " the hospital 3 weeks ago.  She was more weak than usual and less responsive and that her blood pressure was checked she was in the 70s systolic.  She was brought to ED for further evaluation.  In the ED patient was hypotensive and received 2 L fluid bolus.  Labs showed hemoglobin 6.6 from baseline of 12.2.  She had a couple of episodes of coffee-ground looking emesis in the ED.  Lactic acid initially elevated up to 6.6 and now improved to 2.2.  INR was 2.2.  Other labs at baseline and unremarkable.    Evaluation patient is sleepy and wakes up to questions but falls asleep easily.  Appears generalized weakness and tired.  Patient heart rate is stable.    Problem List, Surgical History, Family, Social History, and ROS     Past Medical History:   Diagnosis Date    Abscess of back     Arrhythmia     frequent PVC    Cancer 12/2011    Endometrial cancer, status post robotically assisted hysterectomy with Dr. Haddad, December 2011.      CKD (chronic kidney disease), stage III     no dilaysis     Dizzy     Dvt femoral (deep venous thrombosis) 2017    s/p hip surgery in 2017. Took xarelto until August 2018    Dyslipidemia     Dyslipidemia.  Observing.    Enthesopathy of hip region     Generalized osteoarthrosis, involving multiple sites     Headache     Hearing loss     Hypertension     Low backache     Needs flu shot     Osteoarthritis     Osteoarthritis with questionable carpal tunnel syndrome bilaterally.     Otitis, serous     Pneumonia 1967    Remote pneumonia, 1967.     Retinal hemorrhage     SI joint arthritis     SOB (shortness of breath)     Syncope 2001    Remote syncope with working diagnosis of vasodepressor, 2001.     Venous insufficiency of leg     Wears glasses     readers      Past Surgical History:   Procedure Laterality Date    CATARACT EXTRACTION      bilat     COLONOSCOPY      HIP PERCUTANEOUS PINNING Left 11/24/2017    Procedure: HIP PERCUTANEOUS PINNING;  Surgeon: Lucas Swanson MD;  Location:  " [facility] OR;  Service:     HYSTERECTOMY      total? but unsure     KNEE SURGERY  2001    Remote knee surgery in 2001.- right     TOTAL HIP ARTHROPLASTY Left 10/26/2018    Procedure: TOTAL HIP ARTHROPLASTY LEFT;  Surgeon: Stephen Baker MD;  Location: [facility] OR;  Service: Orthopedics       Allergies   Allergen Reactions    Sulfa Antibiotics Nausea Only and GI Intolerance     No current facility-administered medications on file prior to encounter.     Current Outpatient Medications on File Prior to Encounter   Medication Sig    acetaminophen (TYLENOL) 500 MG tablet Take 1 tablet by mouth Every 6 (Six) Hours As Needed for Mild Pain.    bumetanide (BUMEX) 2 MG tablet Take 1 tablet by mouth Daily for 30 days.    Eliquis 5 MG tablet tablet TAKE 1 TABLET TWICE A DAY FOR DEEP VEIN THROMBOSIS/PULMONARY EMBOLISM (ACTIVE THROMBOSIS)    O2 (OXYGEN) Inhale 3 L/min Daily. Only at night  Indications: Shortness of breath     MEDICATION LIST AND ALLERGIES REVIEWED.    Family History   Problem Relation Age of Onset    Heart disease Mother     Osteoarthritis Mother     Hypertension Mother     Hypertension Father     Heart attack Father     Osteoarthritis Father     Cancer Brother         lung     Social History     Tobacco Use    Smoking status: Never     Passive exposure: Never    Smokeless tobacco: Never   Vaping Use    Vaping status: Never Used   Substance Use Topics    Alcohol use: No    Drug use: No     Social History     Social History Narrative    Lives alone, 2 daughters, one here, one in [place]. Retired teacher     FAMILY AND SOCIAL HISTORY REVIEWED.    Review of Systems  ALL OTHER SYSTEMS REVIEWED AND ARE NEGATIVE.    Physical Exam and Clinical Information   /59   Pulse 81   Temp 97.9 °F (36.6 °C) (Axillary)   Resp 18   Ht 162.6 cm (64\")   Wt 71.7 kg (158 lb)   SpO2 97%   BMI 27.12 kg/m²     Physical exam  General : Elderly female sleepy and appears  weak and tired.  Neuro: CN 2-12 grossly intact, upper and " lower extremity weakness  HEENT : AT,NC,PERRLA,+ pallor, No Icterus,No thyromegaly. No JVD.  CVS : RRR, No M/R/G. B/l UE and LE palpable pulses +  Resp/Chest: B/l previous anterior breath sounds right greater than left.  No wheezing noted.  Basal Rales present.  Abd: Soft, Non distended, No tenderness, No organomegaly. Bowel sounds +  EXT: Bilateral lower extremity pitting edema with stasis dermatitis changes present      Results from last 7 days   Lab Units 02/12/25  1650   WBC 10*3/mm3 7.15   HEMOGLOBIN g/dL 6.6*   PLATELETS 10*3/mm3 217     Results from last 7 days   Lab Units 02/12/25  1311   SODIUM mmol/L 139   POTASSIUM mmol/L 4.2   CO2 mmol/L 23.0   BUN mg/dL 119*   CREATININE mg/dL 2.31*   GLUCOSE mg/dL 142*     Estimated Creatinine Clearance: 13.2 mL/min (A) (by C-G formula based on SCr of 2.31 mg/dL (H)).          Lab Results   Component Value Date    LACTATE 2.2 (C) 02/12/2025        Images: X-ray chest and CT abdomen    I reviewed the patient's results and images.  Bilateral pleural effusions noted.  No other acute abdominal findings.  Prior left hip replacement.     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  Coffee-ground emesis noted in the ED.  Blood pressure seems to be now improved after receiving 2 L fluid bolus.  Plan to transfuse 2 units PRBC.  1 x 20 mg IV Lasix dose ordered given her chronic heart failure and bilateral effusions.  Monitor H&H.  Continue IV PPI.  GI consult in am.  Hold Eliquis.  Currently hemodynamics are stable and will avoid reversal with  Kcentra.  If having recurrent bleeding will consider reversal.  Monitor urine output.    DNR/DNI          Toni Villegas MD   Critical Care Medicine  25 19:46 EST         Electronically signed by Toni Villegas MD at 25 2210          Physician Progress Notes (most recent note)        Kirill Rodríguez MD at 25 1732              [facility]  PROGRESS NOTE    Patient Name: Rose J Kellermann  : 1926  MRN: [id number]    Date of Admission: 2025  Primary Care Physician: Sam Hung MD    Subjective   Subjective     CC:  Follow-up for GI bleed and hypotension    HPI:  As seen the patient multiple times today.  Earlier in the morning patient was not feeling well and was very confused.  However, as day progressed patient's condition improved.  In the afternoon patient was sitting in the chair in no acute distress and very good mood and was much more awake and alert and oriented.  Patient's daughter also was present at the bedside.  Patient denied any pain or shortness of breath.      Objective   Objective     Vital Signs:   Temp:  [97.4 °F (36.3 °C)-98 °F (36.7 °C)] 97.7 °F (36.5 °C)  Heart Rate:  [61-84] 73  Resp:  [15-18] 16  BP: (111-133)/(69-99) 111/69  Flow (L/min) (Oxygen Therapy):  [2-3] 2     Physical Exam:  Constitutional: No acute distress, awake, alert  HENT: NCAT, mucous membranes moist  Respiratory: Clear to auscultation bilaterally, respiratory effort normal   Cardiovascular: RRR, no murmurs, rubs, or gallops  Gastrointestinal: Positive bowel sounds, soft, nontender, nondistended  Musculoskeletal: No bilateral ankle edema  Psychiatric: Appropriate affect, cooperative  Neurologic: Awake, alert, follows commands, speech clear  Skin: No rashes     Results Reviewed:  LAB RESULTS:      Lab 25  1627 25  0605 25  2341 25  1614 25  0547 02/15/25  1601 02/15/25  0424 25  1810  02/14/25  0403 02/13/25  1228 02/13/25  0503 02/13/25  0010 02/12/25  1650 02/12/25  1650 02/12/25  1311 02/12/25  1141   WBC  --  6.86  --   --  6.78  --  7.82  --  9.70  --  10.17  --    < > 7.15  --   --    HEMOGLOBIN 11.2* 9.0* 9.5* 10.5* 9.3*   < > 9.6*   < > 7.5*   < > 9.1* 7.7*   < > 6.6*  --   --    HEMATOCRIT 37.2 28.5* 29.0* 33.5* 29.0*   < > 28.8*   < > 23.2*   < > 27.7* 23.6*   < > 20.7*  --   --    PLATELETS  --  179  --   --  175  --  175  --  190  --  233  --    < > 217  --   --    NEUTROS ABS  --   --   --   --  4.91  --  5.81  --  7.66*  --  7.83*  --   --  5.83  --   --    IMMATURE GRANS (ABS)  --   --   --   --  0.03  --  0.05  --  0.06*  --  0.06*  --   --  0.05  --   --    LYMPHS ABS  --   --   --   --  0.95  --  0.93  --  0.94  --  1.39  --   --  0.87  --   --    MONOS ABS  --   --   --   --  0.60  --  0.69  --  0.73  --  0.75  --   --  0.38  --   --    EOS ABS  --   --   --   --  0.26  --  0.30  --  0.28  --  0.10  --   --  0.01  --   --    MCV  --  95.0  --   --  94.8  --  92.0  --  93.5  --  90.2  --    < > 94.1  --   --    PROCALCITONIN  --   --   --   --   --   --   --   --   --   --   --   --   --   --   --  0.49*   LACTATE  --   --   --   --   --   --   --   --   --   --   --  1.9  --  2.2* 6.6* 4.5*   PROTIME  --   --   --   --   --   --   --   --   --   --   --   --   --   --   --  24.6*   APTT  --   --   --   --   --   --   --   --   --   --   --   --   --   --   --  35.0*   HSTROP T  --   --   --   --   --   --   --   --   --   --   --   --   --   --  75* 87*    < > = values in this interval not displayed.         Lab 02/17/25  0605 02/16/25  0547 02/15/25  0959 02/14/25  1810 02/14/25  0403 02/13/25  0503   SODIUM 144 139 140  --  142 142   POTASSIUM 3.8 4.1 3.9 4.1 3.4* 3.7   CHLORIDE 106 103 102  --  104 101   CO2 28.0 27.0 26.0  --  27.0 27.0   ANION GAP 10.0 9.0 12.0  --  11.0 14.0   BUN 77* 90* 99*  --  114* 124*   CREATININE 1.79* 1.81* 1.84*  --  1.78* 2.09*   EGFR 25.4*  25.0* 24.5*  --  25.5* 21.1*   GLUCOSE 113* 98 113*  --  94 98   CALCIUM 8.4 8.5 8.5  --  8.4 8.7   MAGNESIUM 2.3 2.1 2.1  --  2.1 2.3   PHOSPHORUS 3.6  --   --   --  3.4  --          Lab 02/14/25  0403 02/12/25  1311   TOTAL PROTEIN 5.3* 5.4*   ALBUMIN 2.7* 2.7*   GLOBULIN 2.6 2.7   ALT (SGPT) 13 17   AST (SGOT) 17 18   BILIRUBIN 0.5 0.3   ALK PHOS 59 72         Lab 02/12/25  1311 02/12/25  1141   PROBNP  --  15,731.0*   HSTROP T 75* 87*   PROTIME  --  24.6*   INR  --  2.21*             Lab 02/13/25  0010 02/12/25  1756   IRON 173*  --    IRON SATURATION (TSAT) 81*  --    TIBC 213*  --    TRANSFERRIN 143*  --    FERRITIN 511.30*  --    FOLATE 12.20  --    VITAMIN B 12 475  --    ABO TYPING  --  B   RH TYPING  --  Positive   ANTIBODY SCREEN  --  Negative         Brief Urine Lab Results  (Last result in the past 365 days)        Color   Clarity   Blood   Leuk Est   Nitrite   Protein   CREAT   Urine HCG        02/12/25 1327 Yellow   Cloudy   Small (1+)   Trace   Negative   100 mg/dL (2+)                   Microbiology Results Abnormal       Procedure Component Value - Date/Time    Blood Culture - Blood, Arm, Right [[id number]]  (Abnormal) Collected: 02/12/25 1141    Lab Status: Final result Specimen: Blood from Arm, Right Updated: 02/15/25 0906     Blood Culture Corynebacterium species     Isolated from Aerobic Bottle     Gram Stain Aerobic Bottle Gram positive bacilli    Narrative:      Less than seven (7) mL's of blood was collected.  Insufficient quantity may yield false negative results.  Blood culture does not meet the specified criteria for PCR identification.  If pregnant, immunocompromised, or clinical concern for meningitis, call lab to run BCID for Listeria monocytogenes.    Probable contaminant requires clinical correlation, susceptibility not performed unless requested by physician.              No radiology results from the last 24 hrs    Results for orders placed during the hospital encounter of  01/20/25    Adult Transthoracic Echo Complete W/ Cont if Necessary Per Protocol    Interpretation Summary    Left ventricular ejection fraction appears to be 61 - 65%.    Left ventricular diastolic function was indeterminate.    Mild aortic valve stenosis is present.    Peak velocity of the flow distal to the aortic valve is 245.6 cm/s. Aortic valve maximum pressure gradient is 24 mmHg. Aortic valve mean pressure gradient is 13 mmHg.    The mitral valve mean gradient is 4 mmHg.    Moderate tricuspid valve regurgitation is present.    Estimated right ventricular systolic pressure from tricuspid regurgitation is markedly elevated (>55 mmHg). Calculated right ventricular systolic pressure from tricuspid regurgitation is 57 mmHg.    There is a moderate 2cm  pericardial effusion with no tamponade    There is a left pleural effusion.      Current medications:  Scheduled Meds:pantoprazole, 40 mg, Oral, BID AC  sodium chloride, 10 mL, Intravenous, Q12H      Continuous Infusions:   PRN Meds:.  acetaminophen **OR** [DISCONTINUED] acetaminophen    [DISCONTINUED] ondansetron ODT **OR** ondansetron    sodium chloride    Assessment & Plan   Assessment & Plan     Active Hospital Problems    Diagnosis  POA    **Acute blood loss anemia, asymptomatic [D62]  Yes      Resolved Hospital Problems   No resolved problems to display.        Brief Hospital Course to date:  Rose J Kellermann is a 89 y.o. female with past medical history significant for atrial fibrillation, previous history of DVT, chronic kidney disease stage IV, chronic respiratory failure on 2 L of nasal cannula, congestive heart failure.  She was admitted to ICU on 2/12/2024 for GI bleed, hypotension.    GI bleed  Acute blood loss anemia  -Patient had witnessed coffee-ground emesis in the emergency room and hemoglobin was 6.6.  -GI saw and evaluated the patient.  Dr. Brunner did an EGD on 2/13/2025.  There was a 1 cm oozing ulcer with visible vessels in the duodenal bulb.   A clip was applied for hemostasis.  -Patient also has received 3 units of blood transfusion.  Hemoglobin and hematocrit seems to be stable although the last 3 measurements show small drop.  -Will monitor tonight and make sure that hemoglobin hematocrit is stable.  If hemoglobin is stable then patient could be sent back to the nursing home.    Hypertension  -As mentioned above initially patient was admitted to the ICU.  She was stabilized and then sent today to telemetry floor on 2/16/2025  -Currently blood pressure is acceptable.    Atrial fibrillation  -Was on Eliquis which is on hold  -Rate is controlled    Chronic kidney disease stage IV  -Patient follow-up with [facility]  -Currently creatinine is about 1.79 which is close to baseline.        Expected Discharge Location and Transportation: Back to nursing home  Expected Discharge hopefully tomorrow  Expected Discharge Date: 2/17/2025; Expected Discharge Time:      VTE Prophylaxis:  Mechanical VTE prophylaxis orders are present.         AM-PAC 6 Clicks Score (PT): 12 (02/16/25 2010)    CODE STATUS:   Code Status and Medical Interventions: No CPR (Do Not Attempt to Resuscitate); Limited Support; No dialysis, No intubation (DNI)   Ordered at: 02/12/25 1941     Medical Intervention Limits:    No dialysis    No intubation (DNI)     Code Status (Patient has no pulse and is not breathing):    No CPR (Do Not Attempt to Resuscitate)     Medical Interventions (Patient has pulse or is breathing):    Limited Support       Kirill Rodríguez MD  02/17/25        Electronically signed by Kirill Rodríguez MD at 02/17/25 9396          Physical Therapy Notes (most recent note)        Kylah Kapadia, PT at 02/18/25 1001  Version 1 of 1         Goal Outcome Evaluation:  Plan of Care Reviewed With: patient        Progress: improving  Outcome Evaluation: Physical therapy treatment complete. The patient continues to require additional time to complete  mobility and heavy cues for AD management and safety. Patient increased ambulation distance compared to previous treatment session. Patient required asssitance with Rwx and hand placement. She completed stands after ambulation for clean up. The patient continues to present below baseline for functional mobility. The patient would continue to benefit from skilled PT to address strength, balance and activity tolerance deficits. Continue to current PT POC    Anticipated Discharge Disposition (PT): skilled nursing facility                          Electronically signed by Kylah Kapadia, PT at 25 1117          Occupational Therapy Notes (most recent note)        Pretty Chaves, OT at 25 1051          Patient Name: Rose J Kellermann  : 1926    MRN: [id number]                              Today's Date: 2025       Admit Date: 2025    Visit Dx:     ICD-10-CM ICD-9-CM   1. Symptomatic anemia  D64.9 285.9   2. Acute upper GI bleed  K92.2 578.9   3. Gastric ulcer  K25.9 531.90     Patient Active Problem List   Diagnosis    Essential hypertension    Vitamin D deficiency    Fatigue    Chronic kidney disease, stage III (moderate)     Osteoarthritis    Dyslipidemia    Post-menopausal bleeding    Endometrial cancer    Closed fracture of left hip    Asymptomatic bacteriuria    Post-traumatic osteoarthritis of left hip    Status post total replacement of left hip    Prediabetes    Acute blood loss anemia, asymptomatic    Postoperative urinary retention    Myopia    Posterior crocodile shagreen of cornea    Presbyopia    Ptosis of eyelid    Regular astigmatism    Retinal neovascularization    After cataract    Secondary cataract    Premature atrial contractions    Heart murmur    Nocturnal hypoxia    Acute on chronic heart failure with preserved ejection fraction (HFpEF)    Chronic deep vein thrombosis (DVT) of both lower extremities    Retinal neovascularization    Pleural effusion on left     Pulmonary hypertension     Past Medical History:   Diagnosis Date    Abscess of back     Arrhythmia     frequent PVC    Cancer 12/2011    Endometrial cancer, status post robotically assisted hysterectomy with Dr. Haddad, December 2011.      CKD (chronic kidney disease), stage III     no dilaysis     Dizzy     Dvt femoral (deep venous thrombosis) 2017    s/p hip surgery in 2017. Took xarelto until August 2018    Dyslipidemia     Dyslipidemia.  Observing.    Enthesopathy of hip region     Generalized osteoarthrosis, involving multiple sites     Headache     Hearing loss     Hypertension     Low backache     Needs flu shot     Osteoarthritis     Osteoarthritis with questionable carpal tunnel syndrome bilaterally.     Otitis, serous     Pneumonia 1967    Remote pneumonia, 1967.     Retinal hemorrhage     SI joint arthritis     SOB (shortness of breath)     Syncope 2001    Remote syncope with working diagnosis of vasodepressor, 2001.     Venous insufficiency of leg     Wears glasses     readers     Past Surgical History:   Procedure Laterality Date    CATARACT EXTRACTION      bilat     COLONOSCOPY      ENDOSCOPY N/A 2/13/2025    Procedure: ESOPHAGOGASTRODUODENOSCOPY;  Surgeon: Brunner, Mark I, MD;  Location: [facility] ENDOSCOPY;  Service: Gastroenterology;  Laterality: N/A;    HIP PERCUTANEOUS PINNING Left 11/24/2017    Procedure: HIP PERCUTANEOUS PINNING;  Surgeon: Lucas Swanson MD;  Location:  [facility] OR;  Service:     HYSTERECTOMY      total? but unsure     KNEE SURGERY  2001    Remote knee surgery in 2001.- right     TOTAL HIP ARTHROPLASTY Left 10/26/2018    Procedure: TOTAL HIP ARTHROPLASTY LEFT;  Surgeon: Stephen Baker MD;  Location:  [facility] OR;  Service: Orthopedics      General Information       Row Name 02/18/25 1014          OT Time and Intention    Document Type therapy note (daily note)  -SD     Mode of Treatment occupational therapy  -SD     Patient Effort excellent  -SD       Row Name 02/18/25  1014          General Information    Patient Profile Reviewed yes  -SD     Existing Precautions/Restrictions fall;oxygen therapy device and L/min  -SD     Barriers to Rehab medically complex;hearing deficit   -SD       Row Name 02/18/25 1014          Cognition    Orientation Status (Cognition) oriented x 3;other (see comments)  dtr. commented that this is the 1st day pt. has shown interest in looking through her magazines  -SD       Row Name 02/18/25 1014          Safety Issues/Impairments Affecting Functional Mobility    Safety Issues Affecting Function (Mobility) awareness of need for assistance;insight into deficits/self-awareness;safety precaution awareness;safety precautions follow-through/compliance;sequencing abilities;steps too close to assistive device;other (see comments)  pt. required mod v/c's for hand placement on walker & v/c's to not step beyond front of wx  -SD     Impairments Affecting Function (Mobility) balance;endurance/activity tolerance;shortness of breath;strength;postural/trunk control  -SD               User Key  (r) = Recorded By, (t) = Taken By, (c) = Cosigned By      Initials Name Provider Type    Pretty Vazquez, OT Occupational Therapist                     Mobility/ADL's       Row Name 02/18/25 1043          Bed Mobility    Comment, (Bed Mobility) pt. sitting EOB upon therapist's arrival  -SD       Row Name 02/18/25 1043          Transfers    Transfers bed-chair transfer;sit-stand transfer;stand-sit transfer  -SD       Row Name 02/18/25 1043          Bed-Chair Transfer    Bed-Chair [place] (Transfers) minimum assist (75% patient effort);1 person assist;1 person to manage equipment;verbal cues  -SD     Assistive Device (Bed-Chair Transfers) walker, front-wheeled  -SD       Row Name 02/18/25 1043          Sit-Stand Transfer    Sit-Stand [place] (Transfers) moderate assist (50% patient effort);1 person assist;verbal cues  -SD     Assistive Device (Sit-Stand Transfers)  walker, front-wheeled  -SD       Row Name 02/18/25 1043          Stand-Sit Transfer    Stand-Sit [place] (Transfers) minimum assist (75% patient effort);1 person assist;1 person to manage equipment;verbal cues;nonverbal cues (demo/gesture)  -SD     Assistive Device (Stand-Sit Transfers) walker, front-wheeled  -SD       Row Name 02/18/25 1043          Functional Mobility    Functional Mobility- Ind. Level minimum assist (75% patient effort);1 person + 1 person to manage equipment;verbal cues required;nonverbal cues required (demo/gesture)  -SD     Functional Mobility- Device walker, front-wheeled  -SD     Functional Mobility-Distance (Feet) 20  -SD     Functional Mobility- Safety Issues balance decreased during turns;supplemental O2;steps too close front assistive device  -SD     Patient was able to Ambulate yes  -SD       Row Name 02/18/25 1043          Activities of Daily Living    BADL Assessment/Intervention upper body dressing;toileting;grooming  -SD       Row Name 02/18/25 1043          Upper Body Dressing Assessment/Training    [place] Level (Upper Body Dressing) doff;don;front opening garment;pajama/robe;moderate assist (50% patient effort)  -SD     Position (Upper Body Dressing) unsupported sitting  -SD       Row Name 02/18/25 1043          Grooming Assessment/Training    [place] Level (Grooming) wash face, hands;verbal cues;supervision  -SD     Position (Grooming) supported sitting  -SD       Row Name 02/18/25 1043          Toileting Assessment/Training    [place] Level (Toileting) adjust/manage clothing;manage urinary drainage device;perform perineal hygiene;dependent (less than 25% patient effort)  -SD     Position (Toileting) supported standing  -SD               User Key  (r) = Recorded By, (t) = Taken By, (c) = Cosigned By      Initials Name Provider Type    Pretty Vazquez OT Occupational Therapist                   Obj/Interventions       Row Name 02/18/25 1048           Balance    Balance Assessment sitting dynamic balance  -SD     Static Sitting Balance contact guard  -SD     Dynamic Sitting Balance minimal assist  -SD     Position, Sitting Balance supported;sitting in chair  -SD     Static Standing Balance minimal assist;1-person assist;1 person to manage equipment  -SD     Dynamic Standing Balance minimal assist;1-person assist;1 person to manage equipment  -SD     Position/Device Used, Standing Balance supported;walker, front-wheeled  -SD               User Key  (r) = Recorded By, (t) = Taken By, (c) = Cosigned By      Initials Name Provider Type    Pretty Vazquez, SALVADOR Occupational Therapist                   Goals/Plan    No documentation.                  Clinical Impression       Row Name 02/18/25 1047          Pain Assessment    Pretreatment Pain Rating 0/10 - no pain  -SD     Posttreatment Pain Rating 0/10 - no pain  -SD       Row Name 02/18/25 1047          Plan of Care Review    Plan of Care Reviewed With patient;daughter  -SD     Progress improving  -SD     Outcome Evaluation Pt. with improving strength and occupational endurance to support balance and functional mobility. Pt. required mod v/c's for hand placement on wx & for keeping feet within wx. Pt. DEP for toileting in supported standing. OT skilled services continue to be warranted to return pt. to PLOF. Recommend SNF upon d/c.  -SD       Row Name 02/18/25 1047          Therapy Assessment/Plan (OT)    Rehab Potential (OT) good  -SD     Criteria for Skilled Therapeutic Interventions Met (OT) yes;meets criteria;skilled treatment is necessary  -SD     Therapy Frequency (OT) daily  -SD       Row Name 02/18/25 1047          Therapy Plan Review/Discharge Plan (OT)    Anticipated Discharge Disposition (OT) skilled nursing facility  -SD       Row Name 02/18/25 1047          Vital Signs    Pre Systolic BP Rehab 136  -SD     Pre Treatment Diastolic BP 84  -SD     Post Systolic BP Rehab 139  -SD     Post Treatment  Diastolic BP 89  -SD     Posttreatment Heart Rate (beats/min) 76  -SD     O2 Delivery Pre Treatment nasal cannula  -SD     O2 Delivery Intra Treatment nasal cannula  -SD     Post SpO2 (%) 94  -SD     O2 Delivery Post Treatment nasal cannula  -SD     Pre Patient Position Sitting  -SD     Intra Patient Position Standing  -SD     Post Patient Position Sitting  -SD       Row Name 02/18/25 1047          Positioning and Restraints    Pre-Treatment Position in bed  -SD     Post Treatment Position chair  -SD     In Chair notified nsg;reclined;call light within reach;encouraged to call for assist;exit alarm on;with family/caregiver;waffle cushion;LUE elevated;legs elevated;heels elevated  -SD               User Key  (r) = Recorded By, (t) = Taken By, (c) = Cosigned By      Initials Name Provider Type    Pretty Vazquez OT Occupational Therapist                   Outcome Measures       Row Name 02/18/25 1014          How much help from another is currently needed...    Putting on and taking off regular lower body clothing? 1  -SD     Bathing (including washing, rinsing, and drying) 2  -SD     Toileting (which includes using toilet bed pan or urinal) 1  -SD     Putting on and taking off regular upper body clothing 2  -SD     Taking care of personal grooming (such as brushing teeth) 3  -SD     Eating meals 3  -SD     AM-PAC 6 Clicks Score (OT) 12  -SD       Row Name 02/18/25 1014          Functional Assessment    Outcome Measure Options AM-PAC 6 Clicks Daily Activity (OT)  -SD               User Key  (r) = Recorded By, (t) = Taken By, (c) = Cosigned By      Initials Name Provider Type    Pretty Vazquez OT Occupational Therapist                    Occupational Therapy Education       Title: PT OT SLP Therapies (In Progress)       Topic: Occupational Therapy (In Progress)       Point: ADL training (Done)       Description:   Instruct learner(s) on proper safety adaptation and remediation techniques during  self care or transfers.   Instruct in proper use of assistive devices.                  Learning Progress Summary            Patient Acceptance, E, VU,NR by  at 2/15/2025 0800    Comment: role of therapy, ongoing treatment plan, discharge recommendations                      Point: Home exercise program (Not Started)       Description:   Instruct learner(s) on appropriate technique for monitoring, assisting and/or progressing therapeutic exercises/activities.                  Learner Progress:  Not documented in this visit.              Point: Precautions (Done)       Description:   Instruct learner(s) on prescribed precautions during self-care and functional transfers.                  Learning Progress Summary            Patient Acceptance, E, VU,NR by  at 2/15/2025 0800    Comment: role of therapy, ongoing treatment plan, discharge recommendations                      Point: Body mechanics (Not Started)       Description:   Instruct learner(s) on proper positioning and spine alignment during self-care, functional mobility activities and/or exercises.                  Learner Progress:  Not documented in this visit.                              User Key       Initials Effective Dates Name Provider Type Discipline     02/03/23 -  Paola Azar OT Occupational Therapist OT                  OT Recommendation and Plan  Therapy Frequency (OT): daily  Plan of Care Review  Plan of Care Reviewed With: patient, daughter  Progress: improving  Outcome Evaluation: Pt. with improving strength and occupational endurance to support balance and functional mobility. Pt. required mod v/c's for hand placement on wx & for keeping feet within wx. Pt. DEP for toileting in supported standing. OT skilled services continue to be warranted to return pt. to PLOF. Recommend SNF upon d/c.     Time Calculation:         Time Calculation- OT       Row Name 02/18/25 1050             Time Calculation- OT    OT Received On 02/18/25  -SD       OT Goal Re-Cert Due Date 02/25/25  -SD         Timed Charges    72393 - OT Therapeutic Activity Minutes 10  -SD      90842 - OT Self Care/Mgmt Minutes 16  -SD         Total Minutes    Timed Charges Total Minutes 26  -SD       Total Minutes 26  -SD                User Key  (r) = Recorded By, (t) = Taken By, (c) = Cosigned By      Initials Name Provider Type    Pretty Vazquez OT Occupational Therapist                  Therapy Charges for Today       Code Description Service Date Service Provider Modifiers Qty    21971876939 HC OT THERAPEUTIC ACT EA 15 MIN 2/18/2025 Pretty Chaves OT GO 1    11396178813 HC OT SELF CARE/MGMT/TRAIN EA 15 MIN 2/18/2025 Pretty Chaves OT GO 1                 Pretty Chaves OT  2/18/2025    Electronically signed by Pretty Chaves OT at 02/18/25 1059

## 2025-02-19 LAB
ANION GAP SERPL CALCULATED.3IONS-SCNC: 9 MMOL/L (ref 5–15)
BACTERIA SPEC AEROBE CULT: NORMAL
BACTERIA SPEC AEROBE CULT: NORMAL
BUN SERPL-MCNC: 54 MG/DL (ref 8–23)
BUN/CREAT SERPL: 27.7 (ref 7–25)
CALCIUM SPEC-SCNC: 8.1 MG/DL (ref 8.2–9.6)
CHLORIDE SERPL-SCNC: 107 MMOL/L (ref 98–107)
CO2 SERPL-SCNC: 25 MMOL/L (ref 22–29)
CREAT SERPL-MCNC: 1.95 MG/DL (ref 0.57–1)
DEPRECATED RDW RBC AUTO: 53.5 FL (ref 37–54)
EGFRCR SERPLBLD CKD-EPI 2021: 22.9 ML/MIN/1.73
ERYTHROCYTE [DISTWIDTH] IN BLOOD BY AUTOMATED COUNT: 16.6 % (ref 12.3–15.4)
GLUCOSE SERPL-MCNC: 77 MG/DL (ref 65–99)
HCT VFR BLD AUTO: 31.1 % (ref 34–46.6)
HGB BLD-MCNC: 9.7 G/DL (ref 12–15.9)
MCH RBC QN AUTO: 30.5 PG (ref 26.6–33)
MCHC RBC AUTO-ENTMCNC: 31.2 G/DL (ref 31.5–35.7)
MCV RBC AUTO: 97.8 FL (ref 79–97)
PLATELET # BLD AUTO: 181 10*3/MM3 (ref 140–450)
PMV BLD AUTO: 10.3 FL (ref 6–12)
POTASSIUM SERPL-SCNC: 4.2 MMOL/L (ref 3.5–5.2)
RBC # BLD AUTO: 3.18 10*6/MM3 (ref 3.77–5.28)
SODIUM SERPL-SCNC: 141 MMOL/L (ref 136–145)
WBC NRBC COR # BLD AUTO: 6.76 10*3/MM3 (ref 3.4–10.8)

## 2025-02-19 PROCEDURE — 85027 COMPLETE CBC AUTOMATED: CPT | Performed by: INTERNAL MEDICINE

## 2025-02-19 PROCEDURE — 80048 BASIC METABOLIC PNL TOTAL CA: CPT | Performed by: INTERNAL MEDICINE

## 2025-02-19 PROCEDURE — 99232 SBSQ HOSP IP/OBS MODERATE 35: CPT

## 2025-02-19 RX ADMIN — Medication 10 ML: at 21:09

## 2025-02-19 RX ADMIN — PANTOPRAZOLE SODIUM 40 MG: 40 TABLET, DELAYED RELEASE ORAL at 17:29

## 2025-02-19 RX ADMIN — APIXABAN 5 MG: 5 TABLET, FILM COATED ORAL at 12:13

## 2025-02-19 RX ADMIN — APIXABAN 5 MG: 5 TABLET, FILM COATED ORAL at 21:08

## 2025-02-19 RX ADMIN — PANTOPRAZOLE SODIUM 40 MG: 40 TABLET, DELAYED RELEASE ORAL at 10:02

## 2025-02-19 RX ADMIN — Medication 10 ML: at 10:02

## 2025-02-19 NOTE — PROGRESS NOTES
[facility] Medicine Services  PROGRESS NOTE    Patient Name: Rose J Kellermann  : 1926  MRN: [id number]    Date of Admission: 2025  Primary Care Physician: Sam Hung MD    Subjective   Subjective     CC:  F/u anemia    HPI:  Patient reports she is feeling well today, her daughter is at bedside. Discussed with patient and her daughter that we would be restarting her Eliquis today. Pending her hemoglobin results in the morning, should be ready for discharge at that time. Patient's daughter expressed understanding.     Objective   Objective     Vital Signs:   Temp:  [97.4 °F (36.3 °C)-98.1 °F (36.7 °C)] 97.9 °F (36.6 °C)  Heart Rate:  [61-86] 80  Resp:  [16-18] 16  BP: (133-164)/() 138/77  Flow (L/min) (Oxygen Therapy):  [2] 2     Physical Exam:  Constitutional: Elderly appearing female sitting up in bed in NAD  HENT: NCAT, mucous membranes moist  Respiratory: Anterior lung fields clear to auscultation bilaterally, nonlabored respirations on room air  Cardiovascular: RRR, no murmurs  Gastrointestinal: Positive bowel sounds, soft, nontender, nondistended  Musculoskeletal: Bilateral LE edema  Psychiatric: Appropriate affect, cooperative  Neurologic: [place], speech clear  Skin: Venous stasis dermatitis on bilateral LE    Results Reviewed:  LAB RESULTS:      Lab 25  0809 25  0909 25  1627 25  0605 25  2341 25  1614 25  0547 02/15/25  1601 02/15/25  0424 25  1810 25  0403 25  1228 25  0503 25  0010 25  1650 25  1650 25  1311   WBC 6.76 6.35  --  6.86  --   --  6.78  --  7.82  --  9.70  --  10.17  --    < > 7.15  --    HEMOGLOBIN 9.7* 10.1* 11.2* 9.0* 9.5*   < > 9.3*   < > 9.6*   < > 7.5*   < > 9.1* 7.7*   < > 6.6*  --    HEMATOCRIT 31.1* 33.3* 37.2 28.5* 29.0*   < > 29.0*   < > 28.8*   < > 23.2*   < > 27.7* 23.6*   < > 20.7*  --    PLATELETS 181 180  --  179  --   --  175  --   175  --  190  --  233  --    < > 217  --    NEUTROS ABS  --  4.46  --   --   --   --  4.91  --  5.81  --  7.66*  --  7.83*  --    < > 5.83  --    IMMATURE GRANS (ABS)  --  0.03  --   --   --   --  0.03  --  0.05  --  0.06*  --  0.06*  --    < > 0.05  --    LYMPHS ABS  --  0.91  --   --   --   --  0.95  --  0.93  --  0.94  --  1.39  --    < > 0.87  --    MONOS ABS  --  0.62  --   --   --   --  0.60  --  0.69  --  0.73  --  0.75  --    < > 0.38  --    EOS ABS  --  0.29  --   --   --   --  0.26  --  0.30  --  0.28  --  0.10  --    < > 0.01  --    MCV 97.8* 100.6*  --  95.0  --   --  94.8  --  92.0  --  93.5  --  90.2  --    < > 94.1  --    LACTATE  --   --   --   --   --   --   --   --   --   --   --   --   --  1.9  --  2.2* 6.6*   HSTROP T  --   --   --   --   --   --   --   --   --   --   --   --   --   --   --   --  75*    < > = values in this interval not displayed.         Lab 02/19/25  0809 02/18/25  0909 02/17/25  0605 02/16/25  0547 02/15/25  0959 02/14/25  1810 02/14/25  0403   SODIUM 141 139 144 139 140  --  142   POTASSIUM 4.2 3.8 3.8 4.1 3.9   < > 3.4*   CHLORIDE 107 105 106 103 102  --  104   CO2 25.0 24.0 28.0 27.0 26.0  --  27.0   ANION GAP 9.0 10.0 10.0 9.0 12.0  --  11.0   BUN 54* 62* 77* 90* 99*  --  114*   CREATININE 1.95* 2.12* 1.79* 1.81* 1.84*  --  1.78*   EGFR 22.9* 20.7* 25.4* 25.0* 24.5*  --  25.5*   GLUCOSE 77 90 113* 98 113*  --  94   CALCIUM 8.1* 8.4 8.4 8.5 8.5  --  8.4   MAGNESIUM  --  2.2 2.3 2.1 2.1  --  2.1   PHOSPHORUS  --  3.1 3.6  --   --   --  3.4    < > = values in this interval not displayed.         Lab 02/14/25  0403 02/12/25  1311   TOTAL PROTEIN 5.3* 5.4*   ALBUMIN 2.7* 2.7*   GLOBULIN 2.6 2.7   ALT (SGPT) 13 17   AST (SGOT) 17 18   BILIRUBIN 0.5 0.3   ALK PHOS 59 72         Lab 02/12/25  1311   HSTROP T 75*             Lab 02/13/25  0010 02/12/25  1756   IRON 173*  --    IRON SATURATION (TSAT) 81*  --    TIBC 213*  --    TRANSFERRIN 143*  --    FERRITIN 511.30*  --    FOLATE  12.20  --    VITAMIN B 12 475  --    ABO TYPING  --  B   RH TYPING  --  Positive   ANTIBODY SCREEN  --  Negative         Brief Urine Lab Results  (Last result in the past 365 days)        Color   Clarity   Blood   Leuk Est   Nitrite   Protein   CREAT   Urine HCG        02/12/25 1327 Yellow   Cloudy   Small (1+)   Trace   Negative   100 mg/dL (2+)                   Microbiology Results Abnormal       Procedure Component Value - Date/Time    Blood Culture - Blood, Arm, Right [[id number]]  (Abnormal) Collected: 02/12/25 1141    Lab Status: Final result Specimen: Blood from Arm, Right Updated: 02/15/25 0906     Blood Culture Corynebacterium species     Isolated from Aerobic Bottle     Gram Stain Aerobic Bottle Gram positive bacilli    Narrative:      Less than seven (7) mL's of blood was collected.  Insufficient quantity may yield false negative results.  Blood culture does not meet the specified criteria for PCR identification.  If pregnant, immunocompromised, or clinical concern for meningitis, call lab to run BCID for Listeria monocytogenes.    Probable contaminant requires clinical correlation, susceptibility not performed unless requested by physician.              No radiology results from the last 24 hrs    Results for orders placed during the hospital encounter of 01/20/25    Adult Transthoracic Echo Complete W/ Cont if Necessary Per Protocol    Interpretation Summary    Left ventricular ejection fraction appears to be 61 - 65%.    Left ventricular diastolic function was indeterminate.    Mild aortic valve stenosis is present.    Peak velocity of the flow distal to the aortic valve is 245.6 cm/s. Aortic valve maximum pressure gradient is 24 mmHg. Aortic valve mean pressure gradient is 13 mmHg.    The mitral valve mean gradient is 4 mmHg.    Moderate tricuspid valve regurgitation is present.    Estimated right ventricular systolic pressure from tricuspid regurgitation is markedly elevated (>55 mmHg). Calculated  right ventricular systolic pressure from tricuspid regurgitation is 57 mmHg.    There is a moderate 2cm  pericardial effusion with no tamponade    There is a left pleural effusion.      Current medications:  Scheduled Meds:apixaban, 5 mg, Oral, Q12H  pantoprazole, 40 mg, Oral, BID AC  sodium chloride, 10 mL, Intravenous, Q12H      Continuous Infusions:   PRN Meds:.  acetaminophen **OR** [DISCONTINUED] acetaminophen    [DISCONTINUED] ondansetron ODT **OR** ondansetron    sodium chloride    Assessment & Plan   Assessment & Plan     Active Hospital Problems    Diagnosis  POA    **Acute blood loss anemia, asymptomatic [D62]  Yes    PUD (peptic ulcer disease) [K27.9]  Yes    CKD (chronic kidney disease) stage 4, GFR 15-29 ml/min [N18.4]  Yes    Pulmonary hypertension [I27.20]  Yes      Resolved Hospital Problems   No resolved problems to display.        Brief Hospital Course to date:  Rose J Kellermann is a 89 y.o. female w/ CKD4, HFpEF, pulm HTN, chronic O2 use, Afib, DVT, recent admit 1/20/25-2/6/25 w/ AECHF, ANGEL LUIS/CKD4, and discharged to rehab; she was sent for dec responsiveness, reported BP 70 systolic; labs were notably for Hgb 6.6 down from 12.2 a week prior and lactate 6.6; she was admitted to the ICU, underwent EGD 2/13 w/ 1cm oozing ulcer w/ visible vessel in the duodenal bulb, s/p clip; she has required 4U PRBC this admit.    This patient's problems and plans were partially entered by my partner and updated as appropriate by me 02/19/25.     Acute blood loss anemia  Duodenal ulcer  - S/p EGD 2/13/25 w/ 1 cm ulcer in the duodenal bulb with visible vessel, s/p clipping  - BID PPI x1 month then daily thereafter  - S/p 4U PRBC this admit  - Restarted Eliquis 2/19/25 per GI recs, will monitor H&H overnight. If H&H stable, may discharge to facility tomorrow    Chronic HFpEF  Pulm HTN  Chronic 3lpm NC use  Hx DVT  HTN/HLD  Afib  - Not on BB 2/2 hx pauses/bradycardia  - Home Bumex on hold  - Eliquis resumed as  above    CKD4 - stable  - Baseline Cr ~1.8-2.5; eGFR ~17-28    Venous stasis dermatitis  - Patient's daughter states they previously wrapped patient's legs at home but she was unsure of why they stopped. Encourage to continue to wrap her legs    Expected Discharge Location and Transportation: SNF ([facility]  Expected Discharge   Expected Discharge Date: 2/20/2025; Expected Discharge Time:      VTE Prophylaxis:  Pharmacologic & mechanical VTE prophylaxis orders are present.         AM-PAC 6 Clicks Score (PT): 15 (02/18/25 2000)    CODE STATUS:   Code Status and Medical Interventions: No CPR (Do Not Attempt to Resuscitate); Limited Support; No dialysis, No intubation (DNI)   Ordered at: 02/12/25 1941     Medical Intervention Limits:    No dialysis    No intubation (DNI)     Code Status (Patient has no pulse and is not breathing):    No CPR (Do Not Attempt to Resuscitate)     Medical Interventions (Patient has pulse or is breathing):    Limited Support       Maryjane Sesay PA-C  02/19/25

## 2025-02-20 VITALS
TEMPERATURE: 97.8 F | OXYGEN SATURATION: 93 % | HEIGHT: 64 IN | SYSTOLIC BLOOD PRESSURE: 135 MMHG | BODY MASS INDEX: 26.98 KG/M2 | DIASTOLIC BLOOD PRESSURE: 87 MMHG | HEART RATE: 61 BPM | RESPIRATION RATE: 17 BRPM | WEIGHT: 158 LBS

## 2025-02-20 LAB
ANION GAP SERPL CALCULATED.3IONS-SCNC: 9 MMOL/L (ref 5–15)
BASOPHILS # BLD AUTO: 0.02 10*3/MM3 (ref 0–0.2)
BASOPHILS NFR BLD AUTO: 0.3 % (ref 0–1.5)
BUN SERPL-MCNC: 46 MG/DL (ref 8–23)
BUN/CREAT SERPL: 31.1 (ref 7–25)
CALCIUM SPEC-SCNC: 8.5 MG/DL (ref 8.2–9.6)
CHLORIDE SERPL-SCNC: 105 MMOL/L (ref 98–107)
CO2 SERPL-SCNC: 26 MMOL/L (ref 22–29)
CREAT SERPL-MCNC: 1.48 MG/DL (ref 0.57–1)
DEPRECATED RDW RBC AUTO: 53 FL (ref 37–54)
EGFRCR SERPLBLD CKD-EPI 2021: 31.9 ML/MIN/1.73
EOSINOPHIL # BLD AUTO: 0.27 10*3/MM3 (ref 0–0.4)
EOSINOPHIL NFR BLD AUTO: 4.6 % (ref 0.3–6.2)
ERYTHROCYTE [DISTWIDTH] IN BLOOD BY AUTOMATED COUNT: 16.5 % (ref 12.3–15.4)
GLUCOSE SERPL-MCNC: 88 MG/DL (ref 65–99)
HCT VFR BLD AUTO: 31.5 % (ref 34–46.6)
HGB BLD-MCNC: 9.9 G/DL (ref 12–15.9)
IMM GRANULOCYTES # BLD AUTO: 0.02 10*3/MM3 (ref 0–0.05)
IMM GRANULOCYTES NFR BLD AUTO: 0.3 % (ref 0–0.5)
LYMPHOCYTES # BLD AUTO: 0.78 10*3/MM3 (ref 0.7–3.1)
LYMPHOCYTES NFR BLD AUTO: 13.2 % (ref 19.6–45.3)
MCH RBC QN AUTO: 30.2 PG (ref 26.6–33)
MCHC RBC AUTO-ENTMCNC: 31.4 G/DL (ref 31.5–35.7)
MCV RBC AUTO: 96 FL (ref 79–97)
MONOCYTES # BLD AUTO: 0.59 10*3/MM3 (ref 0.1–0.9)
MONOCYTES NFR BLD AUTO: 9.9 % (ref 5–12)
NEUTROPHILS NFR BLD AUTO: 4.25 10*3/MM3 (ref 1.7–7)
NEUTROPHILS NFR BLD AUTO: 71.7 % (ref 42.7–76)
NRBC BLD AUTO-RTO: 0 /100 WBC (ref 0–0.2)
PLATELET # BLD AUTO: 189 10*3/MM3 (ref 140–450)
PMV BLD AUTO: 10.5 FL (ref 6–12)
POTASSIUM SERPL-SCNC: 4.1 MMOL/L (ref 3.5–5.2)
RBC # BLD AUTO: 3.28 10*6/MM3 (ref 3.77–5.28)
SODIUM SERPL-SCNC: 140 MMOL/L (ref 136–145)
WBC NRBC COR # BLD AUTO: 5.93 10*3/MM3 (ref 3.4–10.8)

## 2025-02-20 PROCEDURE — 85025 COMPLETE CBC W/AUTO DIFF WBC: CPT

## 2025-02-20 PROCEDURE — 80048 BASIC METABOLIC PNL TOTAL CA: CPT

## 2025-02-20 PROCEDURE — 99239 HOSP IP/OBS DSCHRG MGMT >30: CPT

## 2025-02-20 RX ORDER — PANTOPRAZOLE SODIUM 40 MG/1
40 TABLET, DELAYED RELEASE ORAL
Qty: 60 TABLET | Refills: 0 | Status: SHIPPED | OUTPATIENT
Start: 2025-02-20

## 2025-02-20 RX ORDER — BUMETANIDE 2 MG/1
1 TABLET ORAL DAILY
Qty: 15 TABLET | Refills: 0 | Status: SHIPPED | OUTPATIENT
Start: 2025-02-20 | End: 2025-03-22

## 2025-02-20 RX ADMIN — PANTOPRAZOLE SODIUM 40 MG: 40 TABLET, DELAYED RELEASE ORAL at 08:56

## 2025-02-20 RX ADMIN — APIXABAN 5 MG: 5 TABLET, FILM COATED ORAL at 08:56

## 2025-02-20 NOTE — CASE MANAGEMENT/SOCIAL WORK
Case Management Discharge Note      Final Note: Patient has orders for discharge. Request for transport with [facility], schedule full today. Called [facility] who has scheduled transport today at 1200. Spoke with patient and daughter at bedside regarding discharge plan and advised of transportation and bed today who verbalize understanding and agreement with plan. No new discharge needs verbalized. Patient plan is to discharge to [facility] today via [facility] scheduled at 1200. Nursing to call report to [phone]. Discharge summary will be pulled by [facility] liaison.         Selected Continued Care - Admitted Since 2/12/2025       Destination Coordination complete.      Service Provider Services Address Phone Fax Patient Preferred    [facility] [address] [place] [id number] [phone] --              Durable Medical Equipment Coordination complete.      Service Provider Services Address Phone Fax Patient Preferred    [facility] [place] [facility] [address] [facility] [address] [phone] --              Dialysis/Infusion    No services have been selected for the patient.                Home Medical Care    No services have been selected for the patient.                Therapy    No services have been selected for the patient.                Community Resources    No services have been selected for the patient.                Community & DME    No services have been selected for the patient.                    Selected Continued Care - Prior Encounters Includes continued care and service providers with selected services from prior encounters from 11/14/2024 to 2/20/2025      Discharged on 2/6/2025 Admission date: 1/20/2025 - Discharge disposition: Rehab Facility or Unit (DC - External)      Destination       Service Provider Services Address Phone Fax Patient Preferred    [facility] [address] [facility] [address] [phone] --              Durable Medical Equipment       Service Provider Services Address Phone Fax Patient Preferred    [facility] [place] [facility] [address] [id number] [phone] --       Internal Comment last updated by Charo Tomas, RN 1/27/2025 1527    3L at baseline.                                          Final Discharge Disposition Code: 03 - skilled nursing facility (SNF)

## 2025-02-20 NOTE — PLAN OF CARE
Problem: Adult Inpatient Plan of Care  Goal: Plan of Care Review  Outcome: Progressing  Flowsheets (Taken 2/20/2025 0425)  Progress: improving     Problem: Adult Inpatient Plan of Care  Goal: Absence of Hospital-Acquired Illness or Injury  Intervention: Identify and Manage Fall Risk  Recent Flowsheet Documentation  Taken 2/20/2025 0220 by Tatiana Galeano RN  Safety Promotion/Fall Prevention:   activity supervised   assistive device/personal items within reach   safety round/check completed  Taken 2/20/2025 0020 by Tatiana Galeano RN  Safety Promotion/Fall Prevention:   activity supervised   assistive device/personal items within reach   safety round/check completed  Taken 2/19/2025 2220 by Tatiana Galeano RN  Safety Promotion/Fall Prevention:   activity supervised   assistive device/personal items within reach   safety round/check completed  Taken 2/19/2025 2015 by Tatiana Galeano RN  Safety Promotion/Fall Prevention:   activity supervised   assistive device/personal items within reach   safety round/check completed     Problem: Adult Inpatient Plan of Care  Goal: Absence of Hospital-Acquired Illness or Injury  Intervention: Prevent Skin Injury  Recent Flowsheet Documentation  Taken 2/20/2025 0220 by Tatiana Galeano RN  Skin Protection: incontinence pads utilized  Taken 2/20/2025 0020 by Tatiana Galeano RN  Skin Protection: incontinence pads utilized  Taken 2/19/2025 2220 by Tatiana Galeano RN  Skin Protection: incontinence pads utilized  Taken 2/19/2025 2015 by Tatiana Galeano RN  Skin Protection: incontinence pads utilized     Problem: Fall Injury Risk  Goal: Absence of Fall and Fall-Related Injury  Intervention: Promote Injury-Free Environment  Recent Flowsheet Documentation  Taken 2/20/2025 0220 by Tatiana Galeano RN  Safety Promotion/Fall Prevention:   activity supervised   assistive device/personal items within reach   safety round/check completed  Taken 2/20/2025 0020 by Tatiana Galeano RN  Safety Promotion/Fall Prevention:    activity supervised   assistive device/personal items within reach   safety round/check completed  Taken 2/19/2025 2220 by Tatiana Galeano RN  Safety Promotion/Fall Prevention:   activity supervised   assistive device/personal items within reach   safety round/check completed  Taken 2/19/2025 2015 by Tatiana Galeano RN  Safety Promotion/Fall Prevention:   activity supervised   assistive device/personal items within reach   safety round/check completed     Problem: Skin Injury Risk Increased  Goal: Skin Health and Integrity  Intervention: Optimize Skin Protection  Recent Flowsheet Documentation  Taken 2/20/2025 0220 by Tatiana Galeano RN  Activity Management: activity minimized  Pressure Reduction Techniques: frequent weight shift encouraged  Head of Bed (HOB) Positioning: HOB elevated  Pressure Reduction Devices: pressure-redistributing mattress utilized  Skin Protection: incontinence pads utilized  Taken 2/20/2025 0020 by Tatiana Galeano RN  Activity Management: activity encouraged  Pressure Reduction Techniques: frequent weight shift encouraged  Head of Bed (HOB) Positioning: HOB elevated  Pressure Reduction Devices: pressure-redistributing mattress utilized  Skin Protection: incontinence pads utilized  Taken 2/19/2025 2220 by Tatiana Galeano RN  Activity Management: activity encouraged  Pressure Reduction Techniques: frequent weight shift encouraged  Pressure Reduction Devices: chair cushion utilized  Skin Protection: incontinence pads utilized  Taken 2/19/2025 2015 by Tatiana Galeano RN  Activity Management: activity encouraged  Pressure Reduction Techniques: frequent weight shift encouraged  Pressure Reduction Devices: chair cushion utilized  Skin Protection: incontinence pads utilized   Goal Outcome Evaluation:           Progress: improving

## 2025-02-20 NOTE — DISCHARGE SUMMARY
[facility] Medicine Services  DISCHARGE SUMMARY    Patient Name: Rose J Kellermann  : 1926  MRN: [id number]    Date of Admission: 2025 11:27 AM  Date of Discharge:  2025  Primary Care Physician: Sam Hung MD    Consults       Date and Time Order Name Status Description    2025  8:20 AM Inpatient Gastroenterology Consult Completed     2025 10:23 AM Inpatient Palliative Care MD Consult Completed     2025  7:27 PM Inpatient Cardiology Consult Completed     2025  9:53 AM Inpatient Nephrology Consult Completed             Hospital Course     Presenting Problem: Anemia    Active Hospital Problems    Diagnosis  POA    **Acute blood loss anemia, asymptomatic [D62]  Yes    PUD (peptic ulcer disease) [K27.9]  Yes    CKD (chronic kidney disease) stage 4, GFR 15-29 ml/min [N18.4]  Yes    Pulmonary hypertension [I27.20]  Yes      Resolved Hospital Problems   No resolved problems to display.        Hospital Course:  Rose J Kellermann is a 89 y.o. female w/ CKD4, HFpEF, pulm HTN, chronic O2 use, Afib, DVT, recent admit 25-25 w/ AECHF, ANGEL LUIS/CKD4, and discharged to rehab; she was sent for dec responsiveness, reported BP 70 systolic; labs were notably for Hgb 6.6 down from 12.2 a week prior and lactate 6.6; she was admitted to the ICU, underwent EGD  w/ 1cm oozing ulcer w/ visible vessel in the duodenal bulb, s/p clip; she has required 4U PRBC this admit.    Acute blood loss anemia  Duodenal ulcer  - S/p EGD 25 w/ 1 cm ulcer in the duodenal bulb with visible vessel, s/p clipping  - BID PPI x1 month then daily thereafter  - S/p 4U PRBC this admit  - Restarted Eliquis 25 per GI recs, H&H stable, recheck CBC in 1 week at rehab  - Follow up with PCP after discharge from rehab     Chronic HFpEF  Pulm HTN  Chronic 3lpm NC use  Hx DVT  HTN/HLD  Afib  - Not on BB 2/2 hx pauses/bradycardia  - Home Bumex on hold  - Eliquis resumed as  above     CKD4 - stable  - Baseline Cr ~1.8-2.5; eGFR ~17-28  - Resume Bumex on discharge, repeat BMP in 1 week at rehab     Venous stasis dermatitis  - Patient's daughter states they previously wrapped patient's legs at home but she was unsure of why they stopped. Encourage to continue to wrap her legs      Discharge Follow Up Recommendations for outpatient labs/diagnostics:  - BID PPI x1 month then daily thereafter  - Wrap legs for compression  - Restarted Eliquis 2/19/25 per GI recs, H&H stable, recheck CBC in 1 week at rehab  - Resume Bumex on discharge, repeat BMP in 1 week at rehab  - Follow up with PCP after discharge from rehab    Day of Discharge     HPI:   Patient states she feels well this morning. Daughter is present at bedside. H&H stable overnight since resuming Eliquis. Follow up instructions and medications reviewed. Patient and daughter express understanding.     Review of Systems   Constitutional:  Negative for chills and fever.   Respiratory:  Negative for shortness of breath.    Cardiovascular:  Negative for chest pain.   Gastrointestinal:  Negative for abdominal pain, nausea and vomiting.       Vital Signs:   Temp:  [97.5 °F (36.4 °C)-97.9 °F (36.6 °C)] 97.8 °F (36.6 °C)  Heart Rate:  [61-80] 61  Resp:  [16-18] 17  BP: (135-142)/(77-95) 135/87  Flow (L/min) (Oxygen Therapy):  [2] 2      Physical Exam:  Constitutional: Elderly appearing female sitting up in bed in NAD  HENT: NCAT, mucous membranes moist  Respiratory: Anterior lung fields clear to auscultation bilaterally, nonlabored respirations on room air  Cardiovascular: RRR, no murmurs  Gastrointestinal: Positive bowel sounds, soft, nontender, nondistended  Musculoskeletal: Bilateral LE edema  Psychiatric: Appropriate affect, cooperative  Neurologic: [place], speech clear  Skin: Venous stasis dermatitis on bilateral LE, small skin tear to left LE    Pertinent  and/or Most Recent Results     LAB RESULTS:      Lab 02/20/25  0552 02/19/25  0809  02/18/25  0909 02/17/25  1627 02/17/25  0605 02/16/25  1614 02/16/25  0547 02/15/25  1601 02/15/25  0424 02/14/25  1810 02/14/25  0403   WBC 5.93 6.76 6.35  --  6.86  --  6.78  --  7.82  --  9.70   HEMOGLOBIN 9.9* 9.7* 10.1* 11.2* 9.0*   < > 9.3*   < > 9.6*   < > 7.5*   HEMATOCRIT 31.5* 31.1* 33.3* 37.2 28.5*   < > 29.0*   < > 28.8*   < > 23.2*   PLATELETS 189 181 180  --  179  --  175  --  175  --  190   NEUTROS ABS 4.25  --  4.46  --   --   --  4.91  --  5.81  --  7.66*   IMMATURE GRANS (ABS) 0.02  --  0.03  --   --   --  0.03  --  0.05  --  0.06*   LYMPHS ABS 0.78  --  0.91  --   --   --  0.95  --  0.93  --  0.94   MONOS ABS 0.59  --  0.62  --   --   --  0.60  --  0.69  --  0.73   EOS ABS 0.27  --  0.29  --   --   --  0.26  --  0.30  --  0.28   MCV 96.0 97.8* 100.6*  --  95.0  --  94.8  --  92.0  --  93.5    < > = values in this interval not displayed.         Lab 02/20/25  0553 02/19/25  0809 02/18/25  0909 02/17/25  0605 02/16/25  0547 02/15/25  0959 02/14/25  1810 02/14/25  0403   SODIUM 140 141 139 144 139 140  --  142   POTASSIUM 4.1 4.2 3.8 3.8 4.1 3.9   < > 3.4*   CHLORIDE 105 107 105 106 103 102  --  104   CO2 26.0 25.0 24.0 28.0 27.0 26.0  --  27.0   ANION GAP 9.0 9.0 10.0 10.0 9.0 12.0  --  11.0   BUN 46* 54* 62* 77* 90* 99*  --  114*   CREATININE 1.48* 1.95* 2.12* 1.79* 1.81* 1.84*  --  1.78*   EGFR 31.9* 22.9* 20.7* 25.4* 25.0* 24.5*  --  25.5*   GLUCOSE 88 77 90 113* 98 113*  --  94   CALCIUM 8.5 8.1* 8.4 8.4 8.5 8.5  --  8.4   MAGNESIUM  --   --  2.2 2.3 2.1 2.1  --  2.1   PHOSPHORUS  --   --  3.1 3.6  --   --   --  3.4    < > = values in this interval not displayed.         Lab 02/14/25  0403   TOTAL PROTEIN 5.3*   ALBUMIN 2.7*   GLOBULIN 2.6   ALT (SGPT) 13   AST (SGOT) 17   BILIRUBIN 0.5   ALK PHOS 59                     Brief Urine Lab Results  (Last result in the past 365 days)        Color   Clarity   Blood   Leuk Est   Nitrite   Protein   CREAT   Urine HCG        02/12/25 1327 Yellow    Cloudy   Small (1+)   Trace   Negative   100 mg/dL (2+)                 Microbiology Results (last 10 days)       Procedure Component Value - Date/Time    Blood Culture - Blood, Arm, Right [[id number]]  (Normal) Collected: 02/14/25 1810    Lab Status: Final result Specimen: Blood from Arm, Right Updated: 02/19/25 2045     Blood Culture No growth at 5 days    Blood Culture - Blood, Hand, Left [[id number]]  (Normal) Collected: 02/14/25 0609    Lab Status: Final result Specimen: Blood from Hand, Left Updated: 02/19/25 0745     Blood Culture No growth at 5 days    Narrative:      Less than seven (7) mL's of blood was collected.  Insufficient quantity may yield false negative results.  Aerobic Bottle Only      COVID PRE-OP / PRE-PROCEDURE SCREENING ORDER (NO ISOLATION) - Swab, Nasal Cavity [[id number]]  (Normal) Collected: 02/12/25 1151    Lab Status: Final result Specimen: Swab from Nasal Cavity Updated: 02/12/25 1248    Narrative:      The following orders were created for panel order COVID PRE-OP / PRE-PROCEDURE SCREENING ORDER (NO ISOLATION) - Swab, Nasal Cavity.  Procedure                               Abnormality         Status                     ---------                               -----------         ------                     COVID-19 and FLU A/B PCR...[[id number]]  Normal              Final result                 Please view results for these tests on the individual orders.    COVID-19 and FLU A/B PCR, 1 HR TAT - Swab, Nasopharynx [[id number]]  (Normal) Collected: 02/12/25 1151    Lab Status: Final result Specimen: Swab from Nasopharynx Updated: 02/12/25 1248     COVID19 Not Detected     Influenza A PCR Not Detected     Influenza B PCR Not Detected    Narrative:      Fact sheet for providers: https://www.fda.gov/media/312022/download    Fact sheet for patients: https://www.fda.gov/media/699750/download    Test performed by PCR.    Blood Culture - Blood, Hand, Left [[id number]]  (Normal) Collected: 02/12/25 1145     Lab Status: Final result Specimen: Blood from Hand, Left Updated: 02/17/25 1230     Blood Culture No growth at 5 days    Narrative:      Less than seven (7) mL's of blood was collected.  Insufficient quantity may yield false negative results.    Blood Culture - Blood, Arm, Right [[id number]]  (Abnormal) Collected: 02/12/25 1141    Lab Status: Final result Specimen: Blood from Arm, Right Updated: 02/15/25 0906     Blood Culture Corynebacterium species     Isolated from Aerobic Bottle     Gram Stain Aerobic Bottle Gram positive bacilli    Narrative:      Less than seven (7) mL's of blood was collected.  Insufficient quantity may yield false negative results.  Blood culture does not meet the specified criteria for PCR identification.  If pregnant, immunocompromised, or clinical concern for meningitis, call lab to run BCID for Listeria monocytogenes.    Probable contaminant requires clinical correlation, susceptibility not performed unless requested by physician.              CT Abdomen Pelvis Without Contrast    Result Date: 2/12/2025  CT ABDOMEN PELVIS WO CONTRAST Date of Exam: 2/12/2025 4:14 PM EST Indication: sepsis of uncertain origin, vomiting. Comparison: 12/16/2011 Technique: Axial CT images were obtained of the abdomen and pelvis without the administration of contrast. Reconstructed coronal and sagittal images were also obtained. Automated exposure control and iterative construction methods were used. Findings: LUNG BASES: Moderate right and small left pleural effusions with basal airspace disease likely atelectasis although superimposed pneumonia not excluded. Heart is enlarged with aortic valvular and mitral annular calcifications. LIVER:  Unremarkable parenchyma without focal lesion. BILIARY/GALLBLADDER:  Unremarkable SPLEEN:  Unremarkable PANCREAS:  Unremarkable ADRENAL:  Unremarkable KIDNEYS:  Unremarkable parenchyma with no solid mass identified. No obstruction.  No calculus identified.  GASTROINTESTINAL/MESENTERY:  No evidence of obstruction nor inflammation.  AORTA/IVC:  Normal caliber. RETROPERITONEUM/LYMPH NODES:  Unremarkable REPRODUCTIVE: Hysterectomy BLADDER:  Unremarkable OSSEUS STRUCTURES: There is a total left hip arthroplasty with associated streak and beam hardening artifact. No acute osseous process is identified. There is a fat-containing umbilical hernia with herniated fat measuring up to 5 cm in diameter.     Impression: 1.Moderate right and small left pleural effusions with lower lung airspace disease. Pneumonia not excluded. Correlate with symptoms. 2.Other incidental nonemergent findings as detailed above. Electronically Signed: Nils Vides MD  2/12/2025 4:48 PM EST  Workstation ID: [id number]    XR Chest 1 View    Result Date: 2/12/2025  XR CHEST 1 VW Date of Exam: 2/12/2025 11:51 AM EST Indication: vomiting, hypotension, possible aspiration event Comparison: Chest x-ray 2/6/2025 Findings: Low lung volumes. Moderate bilateral pleural effusions. Bibasilar airspace opacity. Cardiomegaly. Remote rib fractures. Osteopenia. No pneumothorax.     Impression: No change from previous exam. Cardiomegaly pleural effusions bibasilar airspace opacity. Electronically Signed: Brenda Mackey MD  2/12/2025 12:11 PM EST  Workstation ID: [id number]     Results for orders placed during the hospital encounter of 06/23/23    Duplex Venous Lower Extremity - Bilateral CAR    Interpretation Summary    Acute right lower extremity deep vein thrombosis noted in the common femoral, proximal femoral, mid femoral, distal femoral, popliteal, posterior tibial and peroneal.    Acute right lower extremity superficial thrombophlebitis noted in the saphenofemoral junction.    Acute left lower extremity deep vein thrombosis noted in the distal femoral, popliteal, posterial tibial, peroneal and gastrocnemius.    Chronic left lower extremity superficial thrombophlebitis noted in the small saphenous.    All other veins  appeared normal bilaterally.      Results for orders placed during the hospital encounter of 06/23/23    Duplex Venous Lower Extremity - Bilateral CAR    Interpretation Summary    Acute right lower extremity deep vein thrombosis noted in the common femoral, proximal femoral, mid femoral, distal femoral, popliteal, posterior tibial and peroneal.    Acute right lower extremity superficial thrombophlebitis noted in the saphenofemoral junction.    Acute left lower extremity deep vein thrombosis noted in the distal femoral, popliteal, posterial tibial, peroneal and gastrocnemius.    Chronic left lower extremity superficial thrombophlebitis noted in the small saphenous.    All other veins appeared normal bilaterally.      Results for orders placed during the hospital encounter of 01/20/25    Adult Transthoracic Echo Complete W/ Cont if Necessary Per Protocol    Interpretation Summary    Left ventricular ejection fraction appears to be 61 - 65%.    Left ventricular diastolic function was indeterminate.    Mild aortic valve stenosis is present.    Peak velocity of the flow distal to the aortic valve is 245.6 cm/s. Aortic valve maximum pressure gradient is 24 mmHg. Aortic valve mean pressure gradient is 13 mmHg.    The mitral valve mean gradient is 4 mmHg.    Moderate tricuspid valve regurgitation is present.    Estimated right ventricular systolic pressure from tricuspid regurgitation is markedly elevated (>55 mmHg). Calculated right ventricular systolic pressure from tricuspid regurgitation is 57 mmHg.    There is a moderate 2cm  pericardial effusion with no tamponade    There is a left pleural effusion.      Plan for Follow-up of Pending Labs/Results:     Discharge Details        Discharge Medications        New Medications        Instructions Start Date   pantoprazole 40 MG EC tablet  Commonly known as: PROTONIX   40 mg, Oral, 2 Times Daily Before Meals, Until 3/15/25. Then take 1 tablet by mouth once daily              Changes to Medications        Instructions Start Date   bumetanide 2 MG tablet  Commonly known as: BUMEX  What changed: how much to take   1 mg, Oral, Daily             Continue These Medications        Instructions Start Date   acetaminophen 500 MG tablet  Commonly known as: TYLENOL   500 mg, Every 6 Hours PRN      docusate sodium 100 MG capsule  Commonly known as: COLACE   100 mg, Nightly      Eliquis 5 MG tablet tablet  Generic drug: apixaban   TAKE 1 TABLET TWICE A DAY FOR DEEP VEIN THROMBOSIS/PULMONARY EMBOLISM (ACTIVE THROMBOSIS)      O2  Commonly known as: OXYGEN   3 L/min, Daily             Stop These Medications      ondansetron ODT 4 MG disintegrating tablet  Commonly known as: ZOFRAN-ODT              Allergies   Allergen Reactions    Sulfa Antibiotics Nausea Only and GI Intolerance         Discharge Disposition:  Skilled Nursing Facility (DC - External)    Diet:  Hospital:  Diet Order   Procedures    Diet: Cardiac; Healthy Heart (2-3 Na+); Fluid Consistency: Thin (IDDSI 0)       Diet Instructions       Diet: Cardiac Diets; Healthy Heart (2-3 Na+); Regular (IDDSI 7); Thin (IDDSI 0)      Discharge Diet: Cardiac Diets    Cardiac Diet: Healthy Heart (2-3 Na+)    Texture: Regular (IDDSI 7)    Fluid Consistency: Thin (IDDSI 0)             Activity:  Activity Instructions       Up WIth Assist              Restrictions or Other Recommendations:         CODE STATUS:    Code Status and Medical Interventions: No CPR (Do Not Attempt to Resuscitate); Limited Support; No dialysis, No intubation (DNI)   Ordered at: 02/12/25 1941     Medical Intervention Limits:    No dialysis    No intubation (DNI)     Code Status (Patient has no pulse and is not breathing):    No CPR (Do Not Attempt to Resuscitate)     Medical Interventions (Patient has pulse or is breathing):    Limited Support       Future Appointments   Date Time Provider Department Center   4/4/2025 10:30 AM Sam Hung MD [facility]    5/16/2025  2:45 PM Andrew Xiao MD [facility]       Additional Instructions for the Follow-ups that You Need to Schedule       Discharge Follow-up with PCP   As directed       Currently Documented PCP:    Sam Hung MD    PCP Phone Number:    [phone]     Follow Up Details: Follow up with PCP 1 week after discharge from rehab                      Maryjane Sesay PA-C  02/20/25      Time Spent on Discharge:  I spent  33  minutes on this discharge activity which included: face-to-face encounter with the patient, reviewing the data in the system, coordination of the care with the nursing staff as well as consultants, documentation, and entering orders.

## 2025-02-28 ENCOUNTER — TELEPHONE (OUTPATIENT)
Dept: CARDIOLOGY | Facility: CLINIC | Age: OVER 89
End: 2025-02-28
Payer: MEDICARE

## 2025-02-28 NOTE — TELEPHONE ENCOUNTER
Received a call from the [facility] , pt has experienced a 20 lb weight gain since admission of Feb 6th, increased bumex to 2 mg x5 days  , due to pts poor kidney function, MD requesting advice on controlling fluid volume . Please advise.   Call back # [phone] Sol

## 2025-03-04 NOTE — PROGRESS NOTES
"[facility]  Heart Failure Clinic    Cardiologist/EP: Dr. Xiao  PCP: Sam Hung MD      Chief Complaint  Edema    PROBLEM LIST:  HFpEF/pulmonary HTN  Echo 1/28/25 EF 61-65%. RVSP 57mmHg  Pericardial effusion  Echo 1/2025 moderate (2cm)  VHD  Echo 1/28/25 mild AS, moderate TR  Frequent PACs  Holter 2010 with >18,000 PACs  Persistent atrial fibrillation/flutter  No beta-blocker secondary to pauses/bradycardia  Hypertension  DVT 2018  CKD IV  Hypercalcemia  Endometrial cancer s/p hysterectomy  Anemia  EGD 2/13/25   1 cm oozing ulcer with visible vessel in duodenal bulb status post clipping    Subjective    History of Present Illness    Rose J Kellermann is a 89 y.o. female who presents today for evaluation of fluid overload.     Patient was admitted 2/12 to 2/20 with acute blood loss anemia status post 4 units of packed red blood cells.  S she underwent EGD and was found to have an ulcer with visible vessel in the duodenal bulb s/p clipping. Discharged to the [facility].     According to the [facility] she is up 20lbs since 2/6 but daughter is not sure this is accurate because they had difficulty with the scales.  Her weight has actually been the same since 2/25.  She was discharged from our hospital at 158.  Daughter reports baseline weight is usually around 161.  She has chronic lower extremity edema but the [facility] recently increased her Bumex for 5 days and swelling has improved significantly.  Patient is very hard of hearing but she denies any dyspnea, orthopnea or PND.  Daughter reports that she has not noticed her being short of breath.  Her daughter also reports that she has had a significantly improved appetite since duodenal ulcer clipping.            Objective     Vital Signs:   Vitals:    03/06/25 1242   BP: 145/72   BP Location: Left arm   Patient Position: Sitting   Pulse: 83   SpO2: 95%   Weight: 74.4 kg (164 lb)   Height: 160 cm (63\")     Body mass index is 29.05 " kg/m².  Physical Exam  Vitals reviewed.   Constitutional:       Appearance: Normal appearance.   HENT:      Head: Normocephalic.   Neck:      Vascular: No carotid bruit.   Cardiovascular:      Rate and Rhythm: Normal rate. Rhythm irregularly irregular.      Pulses: Normal pulses.      Heart sounds: S1 normal and S2 normal. Murmur heard.      Systolic murmur is present with a grade of 2/6.   Pulmonary:      Effort: Pulmonary effort is normal. No respiratory distress.      Breath sounds: Examination of the right-lower field reveals wheezing and rales. Examination of the left-lower field reveals wheezing and rales. Wheezing and rales present.   Chest:      Chest wall: No tenderness.   Abdominal:      General: Abdomen is flat.      Palpations: Abdomen is soft.   Musculoskeletal:      Cervical back: Neck supple.      Right lower le+ Pitting Edema present.      Left lower le+ Pitting Edema present.   Skin:     General: Skin is warm and dry.   Neurological:      General: No focal deficit present.      Mental Status: She is alert and oriented to person, place, and time. Mental status is at baseline.   Psychiatric:         Mood and Affect: Mood normal.         Behavior: Behavior normal.         Thought Content: Thought content normal.              Data Reviewed:  Lab Results   Component Value Date    GLUCOSE 88 2025    CALCIUM 8.5 2025     2025    K 4.1 2025    CO2 26.0 2025     2025    BUN 46 (H) 2025    CREATININE 1.48 (H) 2025    EGFR 31.9 (L) 2025    BCR 31.1 (H) 2025    ANIONGAP 9.0 2025     Lab Results   Component Value Date    WBC 5.93 2025    HGB 9.9 (L) 2025    HCT 31.5 (L) 2025    MCV 96.0 2025     2025     Lab Results   Component Value Date    PROBNP 15,731.0 (H) 2025   Labs 3/3/2025 BUN 39, creatinine 2, potassium 4, sodium 140, CO2 22, chloride 102.  WBC 4.5, hemoglobin 9.9, hematocrit  31.6, platelets 156         DATE 3/6/25      ReDs lung fluid volume assessment  (Normal 25-35%) 38%                 Assessment and Plan   Chronic HFpEF  - LVEF 61-65%, NYHA class III ACC/AHA stage: C  -Possible slight fluid retention on exam, ReDs lung fluid volume assessment is mildly elevated, which likely is her baseline considering history of pleural effusions.  -Recommend that they continue Bumex 2 mg daily for 3 more days and then resume 1 mg daily.  Continue 1 mg daily and may take an extra 1 mg as needed for 3 pound weight gain in 24 hours or 5 pound weight gain in 1 week.  - Reinforced reasons to call and diuretic plan.   - ReDs Vest    2. Essential hypertension  -Blood pressures reasonably controlled considering age and risk for falls  -Continue monitoring at skilled nursing facility    3. Permanent atrial fibrillation  -Rates controlled and asymptomatic  -No beta-blocker due to history of bradycardia  -Continue Eliquis    4. CKD (chronic kidney disease) stage 4, GFR 15-29 ml/min  -Recent baseline creatinine around 2.  Reviewed labs that were drawn on 3/3.  -[facility] to monitor at [facility]    5. Aortic valve stenosis  - Mild AS per echo 1/2025      I have reviewed this documentation, made edits where appropriate, and agree with the final report of ReDS data/interpretation. In addition, I have the following to add:    Lung fluid content by ReDS assessment correlates to pulmonary capillary wedge pressure (Lance et al. JAHA 2018). In patients with LEFT-sided heart failure, elevated readings suggest that the patient MAY benefit from additional diuresis while low readings suggest that the patient MAY benefit from a reduction in diuretics; clinical correlation is recommended. Low normal and mildly elevated readings may be appropriate for some patients. In patients with RIGHT-sided heart failure, lung fluid content may be a less reliable marker for guiding diuresis.        Follow Up {Instructions Charge Capture   Follow-up Communications :23}   Return if symptoms worsen or fail to improve.    Patient was given instructions and counseling regarding her condition or for health maintenance advice. Please see specific information pulled into the AVS if appropriate.  Advised to call the [facility] with any questions, concerns, or worsening symptoms.

## 2025-03-06 ENCOUNTER — OFFICE VISIT (OUTPATIENT)
Dept: CARDIOLOGY | Facility: HOSPITAL | Age: OVER 89
End: 2025-03-06
Payer: MEDICARE

## 2025-03-06 VITALS
OXYGEN SATURATION: 95 % | HEIGHT: 63 IN | WEIGHT: 164 LBS | BODY MASS INDEX: 29.06 KG/M2 | SYSTOLIC BLOOD PRESSURE: 145 MMHG | HEART RATE: 83 BPM | DIASTOLIC BLOOD PRESSURE: 72 MMHG

## 2025-03-06 DIAGNOSIS — I10 ESSENTIAL HYPERTENSION: ICD-10-CM

## 2025-03-06 DIAGNOSIS — N18.4 CKD (CHRONIC KIDNEY DISEASE) STAGE 4, GFR 15-29 ML/MIN: ICD-10-CM

## 2025-03-06 DIAGNOSIS — I48.21 PERMANENT ATRIAL FIBRILLATION: ICD-10-CM

## 2025-03-06 DIAGNOSIS — I50.32 CHRONIC HEART FAILURE WITH PRESERVED EJECTION FRACTION (HFPEF): Primary | ICD-10-CM

## 2025-03-06 DIAGNOSIS — I35.0 AORTIC VALVE STENOSIS, ETIOLOGY OF CARDIAC VALVE DISEASE UNSPECIFIED: ICD-10-CM

## 2025-03-06 LAB — ABSOLUTE LUNG FLUID CONTENT: 38 % (ref 20–35)

## 2025-03-06 NOTE — PROGRESS NOTES
Heart Failure Clinic    Date:  03/06/25     Vitals:    03/06/25 1242   BP: 145/72   Pulse: 83   SpO2: 95%        Indication:  Heart Failure     Procedure:  ReDS device sensor unit applied to right side of chest and right side of back.  Appropriate positioning confirmed based off of the unit's calculation.  Chest measurement obtained with the chest size ruler.  Measurement session performed over 45 seconds.      Method of arrival:  Other wheelchair    Weighing self daily:  Most days    Taking medications as prescribed:  Yes    Edema:  Yes and 1+    Shortness of Air:  No    Number of pillows used at night:  <2    Results:  ReDS Value=                      38    Interpretation:  36-38% - mildly elevated lung fluid content    Lorenza Lerma, CONNIE 03/06/25 13:46 EST

## 2025-03-17 ENCOUNTER — TELEPHONE (OUTPATIENT)
Dept: FAMILY MEDICINE CLINIC | Facility: CLINIC | Age: OVER 89
End: 2025-03-17
Payer: MEDICARE

## 2025-03-17 NOTE — TELEPHONE ENCOUNTER
Caller: [facility]    Relationship: Provider    Best call back number: [phone]     What orders are you requesting (i.e. lab or imaging): VERBAL ORDERS THAT DR. SIEGEL WILL BE FOLLOWING PATIENT FOR HOME HEALTH    In what timeframe would the patient need to come in: AS SOON AS POSSIBLE    Where will you receive your lab/imaging services: [facility]

## 2025-03-21 ENCOUNTER — TELEPHONE (OUTPATIENT)
Dept: FAMILY MEDICINE CLINIC | Facility: CLINIC | Age: OVER 89
End: 2025-03-21

## 2025-03-21 NOTE — TELEPHONE ENCOUNTER
Caller: DELLA WITH [facility]    Best call back number: [phone]      What orders are you requesting (i.e. lab or imaging):  VERBAL ORDER FOR NURSING ONCE A WEEK FOR 8 WEEKS   FOR HEART FAILURE AND WOUND CARE MANAGEMENT       PLEASE CALL

## 2025-03-25 ENCOUNTER — OFFICE VISIT (OUTPATIENT)
Dept: FAMILY MEDICINE CLINIC | Facility: CLINIC | Age: OVER 89
End: 2025-03-25
Payer: MEDICARE

## 2025-03-25 VITALS
TEMPERATURE: 98 F | RESPIRATION RATE: 14 BRPM | WEIGHT: 168 LBS | DIASTOLIC BLOOD PRESSURE: 68 MMHG | BODY MASS INDEX: 29.77 KG/M2 | SYSTOLIC BLOOD PRESSURE: 140 MMHG | HEIGHT: 63 IN | OXYGEN SATURATION: 88 %

## 2025-03-25 DIAGNOSIS — I10 ESSENTIAL HYPERTENSION: ICD-10-CM

## 2025-03-25 DIAGNOSIS — R01.1 HEART MURMUR: ICD-10-CM

## 2025-03-25 DIAGNOSIS — E78.5 DYSLIPIDEMIA: ICD-10-CM

## 2025-03-25 DIAGNOSIS — I50.33 ACUTE ON CHRONIC HEART FAILURE WITH PRESERVED EJECTION FRACTION (HFPEF): Primary | ICD-10-CM

## 2025-03-25 RX ORDER — PANTOPRAZOLE SODIUM 40 MG/1
40 TABLET, DELAYED RELEASE ORAL
Qty: 90 TABLET | Refills: 3 | Status: SHIPPED | OUTPATIENT
Start: 2025-03-25

## 2025-03-25 RX ORDER — BUMETANIDE 2 MG/1
2 TABLET ORAL DAILY
Qty: 90 TABLET | Refills: 3 | Status: SHIPPED | OUTPATIENT
Start: 2025-03-25

## 2025-03-27 NOTE — PROGRESS NOTES
Subjective   Rose J Kellermann is a 89 y.o. female    Chief Complaint    Hospital follow-up  Anemia  Bleeding ulcer  Anticoagulation therapy  Hypoxia  Congestive heart failure    History of Present Illness  History of Present Illness  The patient is a 89-year-old female presenting to the office today for a follow-up visit after hospitalization for GI bleeding and a subsequent rehabilitation stay. She is accompanied by her daughter.    She experienced a bleeding ulcer while on Eliquis, which significantly dropped her hemoglobin levels, necessitating multiple transfusions. After an appropriate timeframe, she resumed her Eliquis regimen. She is currently on a 2.5 mg dose of Eliquis, achieved by halving a 5 mg tablet. She is requesting refills of her Eliquis sent into [facility].    She has had a lot of trouble with congestive heart failure and oxygenation. Her oxygen saturation today in the office is 88% on portable oxygen at 3 L/min. She reports feeling well overall, with a good appetite and no cognitive issues. She has recently started in-home physical therapy and occupational therapy, with weekly blood work scheduled for the next 8 weeks.    She continues to experience leg swelling, which is painful upon palpation. She manages this by wrapping her left leg with an Ace bandage.    Her blood pressure medication was discontinued during her hospital stay due to hypotension.    She is currently on a 2 mg dose of Bumex, which she halves, and a 30-day supply of pantoprazole for her ulcer. She is requesting refills of her Bumex and Protonix sent into [facility].          The following portions of the patient's history were reviewed and updated as appropriate: allergies, current medications, past social history and problem list    Review of Systems   Constitutional:  Positive for fatigue and unexpected weight change. Negative for chills and fever.   HENT: Negative.     Respiratory:  Positive for shortness of  breath. Negative for cough, chest tightness and wheezing.    Cardiovascular:  Positive for palpitations and leg swelling. Negative for chest pain.   Gastrointestinal:  Negative for abdominal pain, nausea and vomiting.   Endocrine: Negative for polydipsia, polyphagia and polyuria.   Genitourinary:  Negative for dysuria, frequency and urgency.   Musculoskeletal:  Positive for arthralgias and back pain. Negative for myalgias.   Skin:  Negative for color change and rash.   Neurological:  Negative for dizziness, syncope, weakness and headaches.   Hematological:  Negative for adenopathy. Does not bruise/bleed easily.   Psychiatric/Behavioral:  Positive for confusion. Negative for sleep disturbance. The patient is not nervous/anxious.      Objective     Vitals:    03/25/25 1502   BP: 140/68   Resp: 14   Temp: 98 °F (36.7 °C)   SpO2: (!) 88%       Physical Exam  Vitals and nursing note reviewed.   Constitutional:       Appearance: Normal appearance.   HENT:      Head: Normocephalic and atraumatic.   Eyes:      Conjunctiva/sclera: Conjunctivae normal.      Pupils: Pupils are equal, round, and reactive to light.   Neurological:      Mental Status: She is alert.   Psychiatric:         Mood and Affect: Affect normal. Mood is anxious.         Speech: Speech normal.         Behavior: Behavior is slowed.         Thought Content: Thought content does not include homicidal or suicidal ideation.   Physical Exam  There is a slight wheeze in the lungs.  Heart was examined.  There is swelling in the legs.    Vital Signs  Oxygen saturation is at 88% on portable oxygen at 3 L/min.    Assessment & Plan   Assessment & Plan  1. Gastrointestinal bleeding.  She had a bleeding ulcer while on Eliquis, which significantly dropped her hemoglobin levels, necessitating multiple transfusions. After an appropriate timeframe, she resumed her Eliquis regimen. She is currently on a 2.5 mg dose of Eliquis, achieved by halving a 5 mg tablet. A prescription  for Eliquis 2.5 mg will be sent to [facility].    2. Congestive heart failure.  She has had a lot of trouble with congestive heart failure and oxygenation. Her oxygen saturation today in the office is 88% on portable oxygen at 3 L/min. She reports feeling well overall, with a good appetite and no cognitive issues. She has recently started in-home physical therapy and occupational therapy, with weekly blood work scheduled for the next 8 weeks.    3. Leg swelling.  She continues to experience leg swelling, which is painful upon palpation. She manages this by wrapping her left leg with an Ace bandage.    4. Hypotension.  Her blood pressure medication was discontinued during her hospital stay due to hypotension.    5. Medication management.  She is currently on a 2 mg dose of Bumex, which she halves, and a 30-day supply of pantoprazole for her ulcer. She is requesting refills of her Bumex and Protonix sent into [facility]. A prescription for Bumex 2 mg and pantoprazole will be sent to [facility].    Problems Addressed this Visit    None  Diagnoses    None.

## 2025-04-01 ENCOUNTER — TELEPHONE (OUTPATIENT)
Dept: FAMILY MEDICINE CLINIC | Facility: CLINIC | Age: OVER 89
End: 2025-04-01

## 2025-04-21 ENCOUNTER — TELEPHONE (OUTPATIENT)
Dept: FAMILY MEDICINE CLINIC | Facility: CLINIC | Age: OVER 89
End: 2025-04-21

## 2025-04-21 NOTE — TELEPHONE ENCOUNTER
Caller: [facility]     Relationship:     Best call back number: [phone]    What is your medical concern? DAUGHTER WAS WITH PATIENT ON 4/19/25 SHE WAS UP WASHING HER HANDS AND FELL. PATIENTS DAUGHTER CALLED EMS AND PATIENT DENIES ANY PAIN. SHE HAS SOME BRUISING AROUND HER RIGHT EYE  EMS DID GET HER UP.

## 2025-04-28 ENCOUNTER — TELEPHONE (OUTPATIENT)
Dept: FAMILY MEDICINE CLINIC | Facility: CLINIC | Age: OVER 89
End: 2025-04-28
Payer: MEDICARE

## 2025-04-28 DIAGNOSIS — L60.8 DEFORMITY OF TOENAIL: Primary | ICD-10-CM

## 2025-04-28 NOTE — TELEPHONE ENCOUNTER
Caller: KELLERMANN, KATHERINE    Relationship: Emergency Contact    Best call back number: [phone]     What is the medical concern/diagnosis: FEET NEED TO BE LOOKED AT AND TOENAILS TRIMMED        What is the provider, practice or medical service name: [email or web address]      Any additional details:

## 2025-06-05 ENCOUNTER — OFFICE VISIT (OUTPATIENT)
Dept: CARDIOLOGY | Facility: CLINIC | Age: OVER 89
End: 2025-06-05
Payer: MEDICARE

## 2025-06-05 VITALS
HEART RATE: 76 BPM | SYSTOLIC BLOOD PRESSURE: 114 MMHG | WEIGHT: 163 LBS | OXYGEN SATURATION: 96 % | BODY MASS INDEX: 28.88 KG/M2 | DIASTOLIC BLOOD PRESSURE: 70 MMHG | HEIGHT: 63 IN

## 2025-06-05 DIAGNOSIS — I10 ESSENTIAL HYPERTENSION: ICD-10-CM

## 2025-06-05 DIAGNOSIS — I35.0 AORTIC VALVE STENOSIS, ETIOLOGY OF CARDIAC VALVE DISEASE UNSPECIFIED: Primary | ICD-10-CM

## 2025-06-05 DIAGNOSIS — I05.0 MITRAL VALVE STENOSIS, UNSPECIFIED ETIOLOGY: ICD-10-CM

## 2025-06-05 PROCEDURE — 99214 OFFICE O/P EST MOD 30 MIN: CPT | Performed by: INTERNAL MEDICINE

## (undated) DEVICE — AMD ANTIMICROBIAL GAUZE SPONGES,12 PLY USP TYPE VII, 0.2% POLYHEXAMETHYLENE BIGUANIDE HCI (PHMB): Brand: CURITY

## (undated) DEVICE — CVR HNDL LT SURG ACCSSRY BLU STRL

## (undated) DEVICE — GLV SURG SENSICARE W/ALOE PF LF 9 STRL

## (undated) DEVICE — 2963 MEDIPORE SOFT CLOTH TAPE 3 IN X 10 YD 12 RLS/CS: Brand: 3M™ MEDIPORE™

## (undated) DEVICE — FIRST STEP BEDSIDE ADD WATER KIT - RESEALABLE STAND-UP POUCH, ENDOSCOPIC CLEANING PAD - 1 POUCH: Brand: FIRST STEP BEDSIDE ADD WATER KIT - RESEALABLE STAND-UP POUCH, ENDOSCOPIC CLEANIN

## (undated) DEVICE — MEDI-VAC NON-CONDUCTIVE SUCTION TUBING: Brand: CARDINAL HEALTH

## (undated) DEVICE — AIRWY 90MM NO9

## (undated) DEVICE — SUCTION CANISTER, 2500CC, RIGID: Brand: DEROYAL

## (undated) DEVICE — GOWN,REINF,POLY,ECL,PP SLV,XL: Brand: MEDLINE

## (undated) DEVICE — HYBRID CO2 TUBING/CAP SET FOR OLYMPUS® SCOPES & CO2 SOURCE: Brand: ERBE

## (undated) DEVICE — CONTN GRAD MEAS TRIANG 32OZ BLK

## (undated) DEVICE — MEDI-VAC YANKAUER SUCTION HANDLE W/BULBOUS TIP: Brand: CARDINAL HEALTH

## (undated) DEVICE — Device

## (undated) DEVICE — ADHS LIQ MASTISOL 2/3ML

## (undated) DEVICE — GLV SURG DERMASSURE GRN LF PF 8.0

## (undated) DEVICE — LEGGINGS, PAIR, 29X43, STERILE: Brand: MEDLINE

## (undated) DEVICE — 3M™ STERI-STRIP™ REINFORCED ADHESIVE SKIN CLOSURES, R1547, 1/2 IN X 4 IN (12 MM X 100 MM), 6 STRIPS/ENVELOPE: Brand: 3M™ STERI-STRIP™

## (undated) DEVICE — SUT PDS 3/0 SH 27IN CLR PDP416H

## (undated) DEVICE — HDRST POSTIN FM CRDL TRACH SLOT 9X8X4IN

## (undated) DEVICE — SOLIDIFIER LIQ PREMISORB 1500CC

## (undated) DEVICE — DRSNG GZ CURAD XEROFORM NONADHR OVERWRAP 5X9IN

## (undated) DEVICE — PAD CAST SOF ROL NS 4IN

## (undated) DEVICE — PK MAJ FX HIP 10

## (undated) DEVICE — STRYKER PERFORMANCE SERIES SAGITTAL BLADE: Brand: STRYKER PERFORMANCE SERIES

## (undated) DEVICE — ENCORE® LATEX MICRO SIZE 8.5, STERILE LATEX POWDER-FREE SURGICAL GLOVE: Brand: ENCORE

## (undated) DEVICE — DRSNG WND BORDR/ADHS NONADHR/GZ LF 2X2IN STRL

## (undated) DEVICE — SYR LUERLOK 50ML

## (undated) DEVICE — SKIN AFFIX SURG ADHESIVE 72/CS 0.55ML: Brand: MEDLINE

## (undated) DEVICE — SPNG GZ WOVN 4X4IN 12PLY 10/BX STRL

## (undated) DEVICE — ANTIBACTERIAL UNDYED BRAIDED (POLYGLACTIN 910), SYNTHETIC ABSORBABLE SUTURE: Brand: COATED VICRYL

## (undated) DEVICE — GLV SURG TRIUMPH CLASSIC PF LTX 7.5 STRL

## (undated) DEVICE — CANNULA,OXY,ADULT,SUPERSOFT,W/7'TUB,UC: Brand: MEDLINE

## (undated) DEVICE — SOL IRR H2O BO 1000ML STRL

## (undated) DEVICE — KT ORCA ORCAPOD DISP STRL

## (undated) DEVICE — THE BITE BLOCK MAXI, LATEX FREE STRAP IS USED TO PROTECT THE ENDOSCOPE INSERTION TUBE FROM BEING BITTEN BY THE PATIENT.

## (undated) DEVICE — LUBE JELLY FOIL PACK 1.4 OZ: Brand: MEDLINE INDUSTRIES, INC.

## (undated) DEVICE — SOL LR 1000ML

## (undated) DEVICE — SYS SKIN CLS DERMABOND PRINEO W/22CM MESH TP

## (undated) DEVICE — ST LINER SAFECAP GRN RED CP STRL

## (undated) DEVICE — INTRO ACCSR BLNT TP

## (undated) DEVICE — TUBING, SUCTION, 1/4" X 10', STRAIGHT: Brand: MEDLINE

## (undated) DEVICE — AIR/WATER CLEANING VALVES: Brand: AIR/WATER CLEANING VALVES

## (undated) DEVICE — GW THRD 2X230MM FOR 7MM CANN SCRW

## (undated) DEVICE — PK HIP TOTL UNIV 10